# Patient Record
Sex: MALE | Race: WHITE | Employment: FULL TIME | URBAN - METROPOLITAN AREA
[De-identification: names, ages, dates, MRNs, and addresses within clinical notes are randomized per-mention and may not be internally consistent; named-entity substitution may affect disease eponyms.]

---

## 2017-01-27 ENCOUNTER — OFFICE VISIT (OUTPATIENT)
Dept: PAIN MANAGEMENT | Age: 42
End: 2017-01-27

## 2017-01-27 VITALS
HEART RATE: 78 BPM | SYSTOLIC BLOOD PRESSURE: 121 MMHG | WEIGHT: 315 LBS | BODY MASS INDEX: 52.58 KG/M2 | DIASTOLIC BLOOD PRESSURE: 74 MMHG

## 2017-01-27 DIAGNOSIS — S33.9XXA SPRAIN OF LIGAMENT OF LUMBOSACRAL JOINT, INITIAL ENCOUNTER: ICD-10-CM

## 2017-01-27 DIAGNOSIS — M51.27 LUMBAGO-SCIATICA DUE TO DISPLACEMENT OF LUMBAR INTERVERTEBRAL DISC: ICD-10-CM

## 2017-01-27 DIAGNOSIS — S83.92XA SPRAIN OF LEFT KNEE/LEG, INITIAL ENCOUNTER: ICD-10-CM

## 2017-01-27 PROCEDURE — 99214 OFFICE O/P EST MOD 30 MIN: CPT | Performed by: INTERNAL MEDICINE

## 2017-01-27 RX ORDER — OXYCODONE HYDROCHLORIDE 30 MG/1
30 TABLET, FILM COATED, EXTENDED RELEASE ORAL 3 TIMES DAILY
Qty: 84 EACH | Refills: 0 | Status: SHIPPED | OUTPATIENT
Start: 2017-01-27 | End: 2017-02-24 | Stop reason: SDUPTHER

## 2017-01-27 RX ORDER — OXYCODONE AND ACETAMINOPHEN 7.5; 325 MG/1; MG/1
.5-1 TABLET ORAL PRN
Qty: 30 TABLET | Refills: 0 | Status: SHIPPED | OUTPATIENT
Start: 2017-01-27 | End: 2017-02-24 | Stop reason: SDUPTHER

## 2017-01-27 RX ORDER — IBUPROFEN 600 MG/1
TABLET ORAL
Qty: 60 TABLET | Refills: 0 | OUTPATIENT
Start: 2017-01-27

## 2017-02-04 DIAGNOSIS — S83.92XA SPRAIN OF LEFT KNEE/LEG, INITIAL ENCOUNTER: ICD-10-CM

## 2017-02-04 DIAGNOSIS — S33.9XXA SPRAIN OF LIGAMENT OF LUMBOSACRAL JOINT, INITIAL ENCOUNTER: ICD-10-CM

## 2017-02-04 DIAGNOSIS — M51.27 LUMBAGO-SCIATICA DUE TO DISPLACEMENT OF LUMBAR INTERVERTEBRAL DISC: ICD-10-CM

## 2017-02-06 RX ORDER — IBUPROFEN 600 MG/1
TABLET ORAL
Qty: 60 TABLET | Refills: 0 | Status: SHIPPED | OUTPATIENT
Start: 2017-02-06 | End: 2017-02-24 | Stop reason: SDUPTHER

## 2017-02-22 DIAGNOSIS — S33.9XXA SPRAIN OF LIGAMENT OF LUMBOSACRAL JOINT, INITIAL ENCOUNTER: ICD-10-CM

## 2017-02-22 DIAGNOSIS — S83.92XA SPRAIN OF LEFT KNEE/LEG, INITIAL ENCOUNTER: ICD-10-CM

## 2017-02-22 DIAGNOSIS — M51.27 LUMBAGO-SCIATICA DUE TO DISPLACEMENT OF LUMBAR INTERVERTEBRAL DISC: ICD-10-CM

## 2017-02-22 RX ORDER — NORTRIPTYLINE HYDROCHLORIDE 25 MG/1
CAPSULE ORAL
Qty: 60 CAPSULE | Refills: 1 | Status: SHIPPED | OUTPATIENT
Start: 2017-02-22 | End: 2017-03-23 | Stop reason: SDUPTHER

## 2017-02-24 ENCOUNTER — OFFICE VISIT (OUTPATIENT)
Dept: PAIN MANAGEMENT | Age: 42
End: 2017-02-24

## 2017-02-24 VITALS
SYSTOLIC BLOOD PRESSURE: 158 MMHG | WEIGHT: 315 LBS | DIASTOLIC BLOOD PRESSURE: 84 MMHG | HEART RATE: 79 BPM | BODY MASS INDEX: 52 KG/M2

## 2017-02-24 DIAGNOSIS — M51.27 LUMBAGO-SCIATICA DUE TO DISPLACEMENT OF LUMBAR INTERVERTEBRAL DISC: ICD-10-CM

## 2017-02-24 DIAGNOSIS — S33.9XXA SPRAIN OF LIGAMENT OF LUMBOSACRAL JOINT, INITIAL ENCOUNTER: ICD-10-CM

## 2017-02-24 DIAGNOSIS — S83.92XA SPRAIN OF LEFT KNEE/LEG, INITIAL ENCOUNTER: ICD-10-CM

## 2017-02-24 PROCEDURE — 99214 OFFICE O/P EST MOD 30 MIN: CPT | Performed by: INTERNAL MEDICINE

## 2017-02-24 RX ORDER — PREGABALIN 100 MG/1
CAPSULE ORAL
Qty: 90 CAPSULE | Refills: 1 | Status: SHIPPED | OUTPATIENT
Start: 2017-02-24 | End: 2017-03-23 | Stop reason: SDUPTHER

## 2017-02-24 RX ORDER — OXYCODONE AND ACETAMINOPHEN 7.5; 325 MG/1; MG/1
.5-1 TABLET ORAL PRN
Qty: 30 TABLET | Refills: 0 | Status: SHIPPED | OUTPATIENT
Start: 2017-02-24 | End: 2017-03-23 | Stop reason: SDUPTHER

## 2017-02-24 RX ORDER — IBUPROFEN 600 MG/1
TABLET ORAL
Qty: 60 TABLET | Refills: 0 | Status: SHIPPED | OUTPATIENT
Start: 2017-02-24 | End: 2017-03-23 | Stop reason: SDUPTHER

## 2017-02-24 RX ORDER — OXYCODONE HYDROCHLORIDE 30 MG/1
30 TABLET, FILM COATED, EXTENDED RELEASE ORAL 3 TIMES DAILY
Qty: 84 EACH | Refills: 0 | Status: SHIPPED | OUTPATIENT
Start: 2017-02-24 | End: 2017-03-23 | Stop reason: SDUPTHER

## 2017-03-13 RX ORDER — CEPHALEXIN 500 MG/1
1000 CAPSULE ORAL 4 TIMES DAILY
Qty: 28 CAPSULE | Refills: 0 | Status: SHIPPED | OUTPATIENT
Start: 2017-03-13 | End: 2017-09-15

## 2017-03-23 ENCOUNTER — OFFICE VISIT (OUTPATIENT)
Dept: PAIN MANAGEMENT | Age: 42
End: 2017-03-23

## 2017-03-23 VITALS
DIASTOLIC BLOOD PRESSURE: 83 MMHG | SYSTOLIC BLOOD PRESSURE: 128 MMHG | WEIGHT: 315 LBS | BODY MASS INDEX: 53.67 KG/M2 | HEART RATE: 99 BPM

## 2017-03-23 DIAGNOSIS — S83.92XA SPRAIN OF LEFT KNEE/LEG, INITIAL ENCOUNTER: ICD-10-CM

## 2017-03-23 DIAGNOSIS — M51.27 LUMBAGO-SCIATICA DUE TO DISPLACEMENT OF LUMBAR INTERVERTEBRAL DISC: ICD-10-CM

## 2017-03-23 DIAGNOSIS — S33.9XXA SPRAIN OF LIGAMENT OF LUMBOSACRAL JOINT, INITIAL ENCOUNTER: ICD-10-CM

## 2017-03-23 PROCEDURE — 99214 OFFICE O/P EST MOD 30 MIN: CPT | Performed by: INTERNAL MEDICINE

## 2017-03-23 RX ORDER — PREGABALIN 100 MG/1
CAPSULE ORAL
Qty: 90 CAPSULE | Refills: 1 | Status: SHIPPED | OUTPATIENT
Start: 2017-03-23 | End: 2017-04-21 | Stop reason: SDUPTHER

## 2017-03-23 RX ORDER — OXYCODONE HYDROCHLORIDE 30 MG/1
30 TABLET, FILM COATED, EXTENDED RELEASE ORAL 3 TIMES DAILY
Qty: 87 EACH | Refills: 0 | Status: SHIPPED | OUTPATIENT
Start: 2017-03-23 | End: 2017-04-21 | Stop reason: SDUPTHER

## 2017-03-23 RX ORDER — IBUPROFEN 600 MG/1
TABLET ORAL
Qty: 60 TABLET | Refills: 0 | Status: SHIPPED | OUTPATIENT
Start: 2017-03-23 | End: 2017-04-21

## 2017-03-23 RX ORDER — NORTRIPTYLINE HYDROCHLORIDE 25 MG/1
CAPSULE ORAL
Qty: 60 CAPSULE | Refills: 1 | Status: SHIPPED | OUTPATIENT
Start: 2017-03-23 | End: 2017-04-21 | Stop reason: SDUPTHER

## 2017-03-23 RX ORDER — OXYCODONE AND ACETAMINOPHEN 7.5; 325 MG/1; MG/1
.5-1 TABLET ORAL PRN
Qty: 30 TABLET | Refills: 0 | Status: SHIPPED | OUTPATIENT
Start: 2017-03-23 | End: 2017-04-21 | Stop reason: SDUPTHER

## 2017-03-30 ENCOUNTER — TELEPHONE (OUTPATIENT)
Dept: PAIN MANAGEMENT | Age: 42
End: 2017-03-30

## 2017-04-03 ENCOUNTER — TELEPHONE (OUTPATIENT)
Dept: PAIN MANAGEMENT | Age: 42
End: 2017-04-03

## 2017-04-10 DIAGNOSIS — S83.92XA SPRAIN OF LEFT KNEE/LEG, INITIAL ENCOUNTER: ICD-10-CM

## 2017-04-10 DIAGNOSIS — M51.27 LUMBAGO-SCIATICA DUE TO DISPLACEMENT OF LUMBAR INTERVERTEBRAL DISC: ICD-10-CM

## 2017-04-10 DIAGNOSIS — S33.9XXA SPRAIN OF LIGAMENT OF LUMBOSACRAL JOINT, INITIAL ENCOUNTER: ICD-10-CM

## 2017-04-12 RX ORDER — IBUPROFEN 600 MG/1
TABLET ORAL
Qty: 60 TABLET | Refills: 0 | Status: SHIPPED | OUTPATIENT
Start: 2017-04-12 | End: 2017-04-21 | Stop reason: SDUPTHER

## 2017-04-19 DIAGNOSIS — S83.92XA SPRAIN OF LEFT KNEE/LEG, INITIAL ENCOUNTER: ICD-10-CM

## 2017-04-19 DIAGNOSIS — S33.9XXA SPRAIN OF LIGAMENT OF LUMBOSACRAL JOINT, INITIAL ENCOUNTER: ICD-10-CM

## 2017-04-19 DIAGNOSIS — M51.27 LUMBAGO-SCIATICA DUE TO DISPLACEMENT OF LUMBAR INTERVERTEBRAL DISC: ICD-10-CM

## 2017-04-19 LAB
A/G RATIO: 2.1 (ref 1.1–2.2)
ALBUMIN SERPL-MCNC: 4.5 G/DL (ref 3.4–5)
ALP BLD-CCNC: 61 U/L (ref 40–129)
ALT SERPL-CCNC: 33 U/L (ref 10–40)
ANION GAP SERPL CALCULATED.3IONS-SCNC: 16 MMOL/L (ref 3–16)
AST SERPL-CCNC: 26 U/L (ref 15–37)
BILIRUB SERPL-MCNC: 0.7 MG/DL (ref 0–1)
BUN BLDV-MCNC: 12 MG/DL (ref 7–20)
CALCIUM SERPL-MCNC: 9.2 MG/DL (ref 8.3–10.6)
CHLORIDE BLD-SCNC: 99 MMOL/L (ref 99–110)
CO2: 26 MMOL/L (ref 21–32)
CREAT SERPL-MCNC: 0.6 MG/DL (ref 0.9–1.3)
GFR AFRICAN AMERICAN: >60
GFR NON-AFRICAN AMERICAN: >60
GLOBULIN: 2.1 G/DL
GLUCOSE BLD-MCNC: 91 MG/DL (ref 70–99)
HCT VFR BLD CALC: 44.5 % (ref 40.5–52.5)
HEMOGLOBIN: 15.3 G/DL (ref 13.5–17.5)
MCH RBC QN AUTO: 30.3 PG (ref 26–34)
MCHC RBC AUTO-ENTMCNC: 34.5 G/DL (ref 31–36)
MCV RBC AUTO: 87.9 FL (ref 80–100)
PDW BLD-RTO: 13.8 % (ref 12.4–15.4)
PLATELET # BLD: 158 K/UL (ref 135–450)
PMV BLD AUTO: 9.3 FL (ref 5–10.5)
POTASSIUM SERPL-SCNC: 4.2 MMOL/L (ref 3.5–5.1)
RBC # BLD: 5.07 M/UL (ref 4.2–5.9)
SODIUM BLD-SCNC: 141 MMOL/L (ref 136–145)
TOTAL PROTEIN: 6.6 G/DL (ref 6.4–8.2)
WBC # BLD: 8.1 K/UL (ref 4–11)

## 2017-04-21 ENCOUNTER — OFFICE VISIT (OUTPATIENT)
Dept: PAIN MANAGEMENT | Age: 42
End: 2017-04-21

## 2017-04-21 VITALS
WEIGHT: 315 LBS | SYSTOLIC BLOOD PRESSURE: 162 MMHG | DIASTOLIC BLOOD PRESSURE: 76 MMHG | HEART RATE: 74 BPM | BODY MASS INDEX: 52.64 KG/M2

## 2017-04-21 DIAGNOSIS — S83.92XA SPRAIN OF LEFT KNEE/LEG, INITIAL ENCOUNTER: ICD-10-CM

## 2017-04-21 DIAGNOSIS — M51.27 LUMBAGO-SCIATICA DUE TO DISPLACEMENT OF LUMBAR INTERVERTEBRAL DISC: ICD-10-CM

## 2017-04-21 DIAGNOSIS — S33.9XXA SPRAIN OF LIGAMENT OF LUMBOSACRAL JOINT, INITIAL ENCOUNTER: ICD-10-CM

## 2017-04-21 PROCEDURE — 99214 OFFICE O/P EST MOD 30 MIN: CPT | Performed by: INTERNAL MEDICINE

## 2017-04-21 RX ORDER — OXYCODONE AND ACETAMINOPHEN 7.5; 325 MG/1; MG/1
.5-1 TABLET ORAL PRN
Qty: 30 TABLET | Refills: 0 | Status: SHIPPED | OUTPATIENT
Start: 2017-04-21 | End: 2017-05-19 | Stop reason: SDUPTHER

## 2017-04-21 RX ORDER — NORTRIPTYLINE HYDROCHLORIDE 25 MG/1
CAPSULE ORAL
Qty: 60 CAPSULE | Refills: 1 | Status: SHIPPED | OUTPATIENT
Start: 2017-04-21 | End: 2017-06-16 | Stop reason: SDUPTHER

## 2017-04-21 RX ORDER — IBUPROFEN 600 MG/1
TABLET ORAL
Qty: 60 TABLET | Refills: 0 | Status: SHIPPED | OUTPATIENT
Start: 2017-04-21 | End: 2017-05-19 | Stop reason: SDUPTHER

## 2017-04-21 RX ORDER — OXYCODONE HYDROCHLORIDE 30 MG/1
30 TABLET, FILM COATED, EXTENDED RELEASE ORAL 3 TIMES DAILY
Qty: 84 EACH | Refills: 0 | Status: SHIPPED | OUTPATIENT
Start: 2017-04-21 | End: 2017-05-19 | Stop reason: SDUPTHER

## 2017-04-21 RX ORDER — PREGABALIN 100 MG/1
CAPSULE ORAL
Qty: 90 CAPSULE | Refills: 1 | Status: SHIPPED | OUTPATIENT
Start: 2017-04-21 | End: 2017-06-16 | Stop reason: ALTCHOICE

## 2017-05-09 RX ORDER — FUROSEMIDE 20 MG/1
20 TABLET ORAL DAILY
Qty: 30 TABLET | Refills: 0 | Status: SHIPPED | OUTPATIENT
Start: 2017-05-09 | End: 2017-05-23 | Stop reason: SDUPTHER

## 2017-05-19 ENCOUNTER — OFFICE VISIT (OUTPATIENT)
Dept: PAIN MANAGEMENT | Age: 42
End: 2017-05-19

## 2017-05-19 VITALS
SYSTOLIC BLOOD PRESSURE: 131 MMHG | WEIGHT: 315 LBS | DIASTOLIC BLOOD PRESSURE: 79 MMHG | BODY MASS INDEX: 52.64 KG/M2 | HEART RATE: 72 BPM

## 2017-05-19 DIAGNOSIS — S33.9XXA SPRAIN OF LIGAMENT OF LUMBOSACRAL JOINT, INITIAL ENCOUNTER: ICD-10-CM

## 2017-05-19 DIAGNOSIS — S83.92XA SPRAIN OF LEFT KNEE/LEG, INITIAL ENCOUNTER: ICD-10-CM

## 2017-05-19 DIAGNOSIS — M51.27 LUMBAGO-SCIATICA DUE TO DISPLACEMENT OF LUMBAR INTERVERTEBRAL DISC: ICD-10-CM

## 2017-05-19 PROCEDURE — 99214 OFFICE O/P EST MOD 30 MIN: CPT | Performed by: INTERNAL MEDICINE

## 2017-05-19 RX ORDER — OXYCODONE HYDROCHLORIDE 30 MG/1
30 TABLET, FILM COATED, EXTENDED RELEASE ORAL 3 TIMES DAILY
Qty: 90 EACH | Refills: 0 | Status: SHIPPED | OUTPATIENT
Start: 2017-05-19 | End: 2017-06-16 | Stop reason: SDUPTHER

## 2017-05-19 RX ORDER — OXYCODONE AND ACETAMINOPHEN 7.5; 325 MG/1; MG/1
.5-1 TABLET ORAL PRN
Qty: 30 TABLET | Refills: 0 | Status: SHIPPED | OUTPATIENT
Start: 2017-05-19 | End: 2017-06-16

## 2017-05-19 RX ORDER — IBUPROFEN 600 MG/1
TABLET ORAL
Qty: 60 TABLET | Refills: 0 | Status: SHIPPED | OUTPATIENT
Start: 2017-05-19 | End: 2017-06-16 | Stop reason: SDUPTHER

## 2017-05-23 ENCOUNTER — OFFICE VISIT (OUTPATIENT)
Dept: FAMILY MEDICINE CLINIC | Age: 42
End: 2017-05-23

## 2017-05-23 VITALS
WEIGHT: 315 LBS | SYSTOLIC BLOOD PRESSURE: 120 MMHG | RESPIRATION RATE: 14 BRPM | BODY MASS INDEX: 40.43 KG/M2 | DIASTOLIC BLOOD PRESSURE: 74 MMHG | OXYGEN SATURATION: 98 % | HEIGHT: 74 IN | TEMPERATURE: 99 F | HEART RATE: 71 BPM

## 2017-05-23 DIAGNOSIS — J40 BRONCHITIS: ICD-10-CM

## 2017-05-23 DIAGNOSIS — E66.01 MORBID OBESITY DUE TO EXCESS CALORIES (HCC): Chronic | ICD-10-CM

## 2017-05-23 DIAGNOSIS — I87.2 STASIS DERMATITIS OF BOTH LEGS: Primary | ICD-10-CM

## 2017-05-23 PROCEDURE — 99214 OFFICE O/P EST MOD 30 MIN: CPT | Performed by: FAMILY MEDICINE

## 2017-05-23 RX ORDER — FUROSEMIDE 20 MG/1
TABLET ORAL
Qty: 60 TABLET | Refills: 5 | Status: SHIPPED | OUTPATIENT
Start: 2017-05-23 | End: 2017-12-08 | Stop reason: SDUPTHER

## 2017-05-23 RX ORDER — ALBUTEROL SULFATE 90 UG/1
2 AEROSOL, METERED RESPIRATORY (INHALATION) EVERY 6 HOURS PRN
Qty: 1 INHALER | Refills: 3 | Status: SHIPPED | OUTPATIENT
Start: 2017-05-23 | End: 2017-09-15

## 2017-05-23 ASSESSMENT — PATIENT HEALTH QUESTIONNAIRE - PHQ9
2. FEELING DOWN, DEPRESSED OR HOPELESS: 0
SUM OF ALL RESPONSES TO PHQ QUESTIONS 1-9: 0
SUM OF ALL RESPONSES TO PHQ9 QUESTIONS 1 & 2: 0
1. LITTLE INTEREST OR PLEASURE IN DOING THINGS: 0

## 2017-05-24 ENCOUNTER — TELEPHONE (OUTPATIENT)
Dept: BARIATRICS/WEIGHT MGMT | Age: 42
End: 2017-05-24

## 2017-05-30 ENCOUNTER — TELEPHONE (OUTPATIENT)
Dept: FAMILY MEDICINE CLINIC | Age: 42
End: 2017-05-30

## 2017-06-16 ENCOUNTER — OFFICE VISIT (OUTPATIENT)
Dept: PAIN MANAGEMENT | Age: 42
End: 2017-06-16

## 2017-06-16 VITALS
DIASTOLIC BLOOD PRESSURE: 83 MMHG | BODY MASS INDEX: 53.54 KG/M2 | HEART RATE: 70 BPM | SYSTOLIC BLOOD PRESSURE: 148 MMHG | WEIGHT: 315 LBS

## 2017-06-16 DIAGNOSIS — M51.27 LUMBAGO-SCIATICA DUE TO DISPLACEMENT OF LUMBAR INTERVERTEBRAL DISC: ICD-10-CM

## 2017-06-16 DIAGNOSIS — S33.9XXA SPRAIN OF LIGAMENT OF LUMBOSACRAL JOINT, INITIAL ENCOUNTER: ICD-10-CM

## 2017-06-16 DIAGNOSIS — S83.92XA SPRAIN OF LEFT KNEE/LEG, INITIAL ENCOUNTER: ICD-10-CM

## 2017-06-16 PROCEDURE — 99214 OFFICE O/P EST MOD 30 MIN: CPT | Performed by: INTERNAL MEDICINE

## 2017-06-16 RX ORDER — OXYCODONE HYDROCHLORIDE 30 MG/1
30 TABLET, FILM COATED, EXTENDED RELEASE ORAL 3 TIMES DAILY
Qty: 90 EACH | Refills: 0 | Status: SHIPPED | OUTPATIENT
Start: 2017-06-16 | End: 2017-07-17 | Stop reason: SDUPTHER

## 2017-06-16 RX ORDER — NORTRIPTYLINE HYDROCHLORIDE 25 MG/1
CAPSULE ORAL
Qty: 60 CAPSULE | Refills: 1 | Status: SHIPPED | OUTPATIENT
Start: 2017-06-16 | End: 2017-07-17 | Stop reason: SDUPTHER

## 2017-06-16 RX ORDER — PREGABALIN 150 MG/1
150 CAPSULE ORAL 3 TIMES DAILY
Qty: 90 CAPSULE | Refills: 0 | Status: SHIPPED | OUTPATIENT
Start: 2017-06-16 | End: 2017-07-17 | Stop reason: SDUPTHER

## 2017-06-16 RX ORDER — IBUPROFEN 600 MG/1
TABLET ORAL
Qty: 60 TABLET | Refills: 0 | Status: SHIPPED | OUTPATIENT
Start: 2017-06-16 | End: 2017-07-13 | Stop reason: SDUPTHER

## 2017-07-13 DIAGNOSIS — S33.9XXA SPRAIN OF LIGAMENT OF LUMBOSACRAL JOINT, INITIAL ENCOUNTER: ICD-10-CM

## 2017-07-13 DIAGNOSIS — S83.92XA SPRAIN OF LEFT KNEE/LEG, INITIAL ENCOUNTER: ICD-10-CM

## 2017-07-13 DIAGNOSIS — M51.27 LUMBAGO-SCIATICA DUE TO DISPLACEMENT OF LUMBAR INTERVERTEBRAL DISC: ICD-10-CM

## 2017-07-14 RX ORDER — IBUPROFEN 600 MG/1
TABLET ORAL
Qty: 60 TABLET | Refills: 0 | Status: SHIPPED | OUTPATIENT
Start: 2017-07-14 | End: 2017-07-17 | Stop reason: SDUPTHER

## 2017-07-17 ENCOUNTER — OFFICE VISIT (OUTPATIENT)
Dept: PAIN MANAGEMENT | Age: 42
End: 2017-07-17

## 2017-07-17 VITALS
WEIGHT: 315 LBS | SYSTOLIC BLOOD PRESSURE: 152 MMHG | BODY MASS INDEX: 53.54 KG/M2 | HEART RATE: 68 BPM | DIASTOLIC BLOOD PRESSURE: 82 MMHG

## 2017-07-17 DIAGNOSIS — S33.9XXA SPRAIN OF LIGAMENT OF LUMBOSACRAL JOINT, INITIAL ENCOUNTER: ICD-10-CM

## 2017-07-17 DIAGNOSIS — S83.92XA SPRAIN OF LEFT KNEE/LEG, INITIAL ENCOUNTER: ICD-10-CM

## 2017-07-17 DIAGNOSIS — M51.27 LUMBAGO-SCIATICA DUE TO DISPLACEMENT OF LUMBAR INTERVERTEBRAL DISC: ICD-10-CM

## 2017-07-17 PROCEDURE — 99214 OFFICE O/P EST MOD 30 MIN: CPT | Performed by: INTERNAL MEDICINE

## 2017-07-17 RX ORDER — PREGABALIN 150 MG/1
150 CAPSULE ORAL 3 TIMES DAILY
Qty: 90 CAPSULE | Refills: 0 | Status: SHIPPED | OUTPATIENT
Start: 2017-07-17 | End: 2017-08-14 | Stop reason: SDUPTHER

## 2017-07-17 RX ORDER — IBUPROFEN 600 MG/1
TABLET ORAL
Qty: 60 TABLET | Refills: 0 | Status: SHIPPED | OUTPATIENT
Start: 2017-07-17 | End: 2017-08-14 | Stop reason: SDUPTHER

## 2017-07-17 RX ORDER — NORTRIPTYLINE HYDROCHLORIDE 25 MG/1
CAPSULE ORAL
Qty: 60 CAPSULE | Refills: 1 | Status: SHIPPED | OUTPATIENT
Start: 2017-07-17 | End: 2017-08-14 | Stop reason: SDUPTHER

## 2017-07-17 RX ORDER — METHYLPREDNISOLONE 4 MG/1
TABLET ORAL
Qty: 1 KIT | Refills: 0 | Status: SHIPPED | OUTPATIENT
Start: 2017-07-17 | End: 2017-08-14

## 2017-07-17 RX ORDER — OXYCODONE HYDROCHLORIDE 30 MG/1
30 TABLET, FILM COATED, EXTENDED RELEASE ORAL 3 TIMES DAILY
Qty: 84 EACH | Refills: 0 | Status: SHIPPED | OUTPATIENT
Start: 2017-07-17 | End: 2017-08-14 | Stop reason: SDUPTHER

## 2017-08-14 ENCOUNTER — OFFICE VISIT (OUTPATIENT)
Dept: PAIN MANAGEMENT | Age: 42
End: 2017-08-14

## 2017-08-14 VITALS
WEIGHT: 315 LBS | DIASTOLIC BLOOD PRESSURE: 82 MMHG | HEART RATE: 73 BPM | BODY MASS INDEX: 53.67 KG/M2 | SYSTOLIC BLOOD PRESSURE: 138 MMHG

## 2017-08-14 DIAGNOSIS — S83.92XA SPRAIN OF LEFT KNEE/LEG, INITIAL ENCOUNTER: ICD-10-CM

## 2017-08-14 DIAGNOSIS — M51.27 LUMBAGO-SCIATICA DUE TO DISPLACEMENT OF LUMBAR INTERVERTEBRAL DISC: ICD-10-CM

## 2017-08-14 DIAGNOSIS — S33.9XXA SPRAIN OF LIGAMENT OF LUMBOSACRAL JOINT, INITIAL ENCOUNTER: ICD-10-CM

## 2017-08-14 PROCEDURE — 99214 OFFICE O/P EST MOD 30 MIN: CPT | Performed by: INTERNAL MEDICINE

## 2017-08-14 RX ORDER — NORTRIPTYLINE HYDROCHLORIDE 25 MG/1
CAPSULE ORAL
Qty: 60 CAPSULE | Refills: 1 | Status: SHIPPED | OUTPATIENT
Start: 2017-08-14 | End: 2017-09-15 | Stop reason: SDUPTHER

## 2017-08-14 RX ORDER — OXYCODONE HYDROCHLORIDE 30 MG/1
30 TABLET, FILM COATED, EXTENDED RELEASE ORAL 3 TIMES DAILY
Qty: 90 EACH | Refills: 0 | Status: SHIPPED | OUTPATIENT
Start: 2017-08-14 | End: 2017-09-15 | Stop reason: SDUPTHER

## 2017-08-14 RX ORDER — PREGABALIN 150 MG/1
150 CAPSULE ORAL 3 TIMES DAILY
Qty: 90 CAPSULE | Refills: 0 | Status: SHIPPED | OUTPATIENT
Start: 2017-08-14 | End: 2017-09-15 | Stop reason: SDUPTHER

## 2017-08-14 RX ORDER — DULOXETIN HYDROCHLORIDE 30 MG/1
CAPSULE, DELAYED RELEASE ORAL
Qty: 60 CAPSULE | Refills: 0 | Status: SHIPPED | OUTPATIENT
Start: 2017-08-14 | End: 2017-09-15

## 2017-08-14 RX ORDER — IBUPROFEN 600 MG/1
TABLET ORAL
Qty: 60 TABLET | Refills: 0 | Status: SHIPPED | OUTPATIENT
Start: 2017-08-14 | End: 2017-09-15 | Stop reason: SDUPTHER

## 2017-08-16 ENCOUNTER — OFFICE VISIT (OUTPATIENT)
Dept: FAMILY MEDICINE CLINIC | Age: 42
End: 2017-08-16

## 2017-08-16 VITALS
WEIGHT: 315 LBS | HEART RATE: 70 BPM | BODY MASS INDEX: 52.41 KG/M2 | TEMPERATURE: 97.3 F | OXYGEN SATURATION: 97 % | SYSTOLIC BLOOD PRESSURE: 138 MMHG | DIASTOLIC BLOOD PRESSURE: 84 MMHG

## 2017-08-16 DIAGNOSIS — G47.33 OSA (OBSTRUCTIVE SLEEP APNEA): ICD-10-CM

## 2017-08-16 DIAGNOSIS — L03.116 CELLULITIS OF LEFT LOWER EXTREMITY: Primary | ICD-10-CM

## 2017-08-16 DIAGNOSIS — E66.01 MORBID OBESITY DUE TO EXCESS CALORIES (HCC): Chronic | ICD-10-CM

## 2017-08-16 DIAGNOSIS — I87.2 CHRONIC VENOUS STASIS DERMATITIS OF BOTH LOWER EXTREMITIES: ICD-10-CM

## 2017-08-16 PROCEDURE — 99213 OFFICE O/P EST LOW 20 MIN: CPT | Performed by: FAMILY MEDICINE

## 2017-08-16 RX ORDER — CEPHALEXIN 500 MG/1
1000 CAPSULE ORAL 4 TIMES DAILY
Qty: 80 CAPSULE | Refills: 0 | Status: SHIPPED | OUTPATIENT
Start: 2017-08-16 | End: 2017-09-15

## 2017-08-29 ENCOUNTER — OFFICE VISIT (OUTPATIENT)
Dept: VASCULAR SURGERY | Age: 42
End: 2017-08-29

## 2017-08-29 VITALS
BODY MASS INDEX: 40.43 KG/M2 | HEIGHT: 74 IN | SYSTOLIC BLOOD PRESSURE: 136 MMHG | DIASTOLIC BLOOD PRESSURE: 72 MMHG | WEIGHT: 315 LBS

## 2017-08-29 DIAGNOSIS — I87.2 CHRONIC VENOUS STASIS DERMATITIS OF BOTH LOWER EXTREMITIES: Primary | ICD-10-CM

## 2017-08-29 PROCEDURE — 99203 OFFICE O/P NEW LOW 30 MIN: CPT | Performed by: SURGERY

## 2017-09-15 ENCOUNTER — OFFICE VISIT (OUTPATIENT)
Dept: PAIN MANAGEMENT | Age: 42
End: 2017-09-15

## 2017-09-15 VITALS
SYSTOLIC BLOOD PRESSURE: 145 MMHG | HEART RATE: 75 BPM | BODY MASS INDEX: 52.51 KG/M2 | DIASTOLIC BLOOD PRESSURE: 84 MMHG | WEIGHT: 315 LBS

## 2017-09-15 DIAGNOSIS — S33.9XXA SPRAIN OF LIGAMENT OF LUMBOSACRAL JOINT, INITIAL ENCOUNTER: ICD-10-CM

## 2017-09-15 DIAGNOSIS — S83.92XA SPRAIN OF LEFT KNEE/LEG, INITIAL ENCOUNTER: ICD-10-CM

## 2017-09-15 DIAGNOSIS — M51.27 LUMBAGO-SCIATICA DUE TO DISPLACEMENT OF LUMBAR INTERVERTEBRAL DISC: ICD-10-CM

## 2017-09-15 PROCEDURE — 99214 OFFICE O/P EST MOD 30 MIN: CPT | Performed by: INTERNAL MEDICINE

## 2017-09-15 RX ORDER — IBUPROFEN 600 MG/1
TABLET ORAL
Qty: 60 TABLET | Refills: 0 | Status: SHIPPED | OUTPATIENT
Start: 2017-09-15 | End: 2017-10-13 | Stop reason: SDUPTHER

## 2017-09-15 RX ORDER — NORTRIPTYLINE HYDROCHLORIDE 25 MG/1
CAPSULE ORAL
Qty: 60 CAPSULE | Refills: 0 | Status: SHIPPED | OUTPATIENT
Start: 2017-09-15 | End: 2017-10-13 | Stop reason: SDUPTHER

## 2017-09-15 RX ORDER — OXYCODONE HYDROCHLORIDE 30 MG/1
30 TABLET, FILM COATED, EXTENDED RELEASE ORAL 3 TIMES DAILY
Qty: 84 EACH | Refills: 0 | Status: SHIPPED | OUTPATIENT
Start: 2017-09-15 | End: 2017-10-13 | Stop reason: SDUPTHER

## 2017-09-15 RX ORDER — PREGABALIN 150 MG/1
150 CAPSULE ORAL 3 TIMES DAILY
Qty: 90 CAPSULE | Refills: 0 | Status: SHIPPED | OUTPATIENT
Start: 2017-09-15 | End: 2017-10-13 | Stop reason: SDUPTHER

## 2017-10-13 ENCOUNTER — OFFICE VISIT (OUTPATIENT)
Dept: PAIN MANAGEMENT | Age: 42
End: 2017-10-13

## 2017-10-13 ENCOUNTER — OFFICE VISIT (OUTPATIENT)
Dept: FAMILY MEDICINE CLINIC | Age: 42
End: 2017-10-13

## 2017-10-13 VITALS
DIASTOLIC BLOOD PRESSURE: 88 MMHG | HEART RATE: 80 BPM | SYSTOLIC BLOOD PRESSURE: 160 MMHG | WEIGHT: 315 LBS | BODY MASS INDEX: 51.15 KG/M2 | OXYGEN SATURATION: 97 %

## 2017-10-13 VITALS
DIASTOLIC BLOOD PRESSURE: 91 MMHG | SYSTOLIC BLOOD PRESSURE: 150 MMHG | BODY MASS INDEX: 52.51 KG/M2 | WEIGHT: 315 LBS | HEART RATE: 71 BPM

## 2017-10-13 DIAGNOSIS — M51.27 LUMBAGO-SCIATICA DUE TO DISPLACEMENT OF LUMBAR INTERVERTEBRAL DISC: ICD-10-CM

## 2017-10-13 DIAGNOSIS — G47.33 OSA (OBSTRUCTIVE SLEEP APNEA): ICD-10-CM

## 2017-10-13 DIAGNOSIS — S83.92XA SPRAIN OF LEFT KNEE/LEG, INITIAL ENCOUNTER: ICD-10-CM

## 2017-10-13 DIAGNOSIS — S33.9XXA SPRAIN OF LIGAMENT OF LUMBOSACRAL JOINT, INITIAL ENCOUNTER: ICD-10-CM

## 2017-10-13 DIAGNOSIS — E66.01 MORBID OBESITY DUE TO EXCESS CALORIES (HCC): Primary | Chronic | ICD-10-CM

## 2017-10-13 DIAGNOSIS — F41.1 GAD (GENERALIZED ANXIETY DISORDER): ICD-10-CM

## 2017-10-13 DIAGNOSIS — I10 ESSENTIAL HYPERTENSION: ICD-10-CM

## 2017-10-13 PROCEDURE — 99213 OFFICE O/P EST LOW 20 MIN: CPT | Performed by: INTERNAL MEDICINE

## 2017-10-13 PROCEDURE — 99213 OFFICE O/P EST LOW 20 MIN: CPT | Performed by: FAMILY MEDICINE

## 2017-10-13 RX ORDER — LISINOPRIL AND HYDROCHLOROTHIAZIDE 20; 12.5 MG/1; MG/1
1 TABLET ORAL DAILY
Qty: 30 TABLET | Refills: 3 | Status: SHIPPED | OUTPATIENT
Start: 2017-10-13 | End: 2018-02-16 | Stop reason: SDUPTHER

## 2017-10-13 RX ORDER — NORTRIPTYLINE HYDROCHLORIDE 25 MG/1
CAPSULE ORAL
Qty: 60 CAPSULE | Refills: 0 | Status: SHIPPED | OUTPATIENT
Start: 2017-10-13 | End: 2017-11-10 | Stop reason: SDUPTHER

## 2017-10-13 RX ORDER — PREGABALIN 150 MG/1
150 CAPSULE ORAL 3 TIMES DAILY
Qty: 90 CAPSULE | Refills: 0 | Status: SHIPPED | OUTPATIENT
Start: 2017-10-13 | End: 2017-11-10 | Stop reason: SDUPTHER

## 2017-10-13 RX ORDER — IBUPROFEN 600 MG/1
TABLET ORAL
Qty: 60 TABLET | Refills: 0 | Status: SHIPPED | OUTPATIENT
Start: 2017-10-13 | End: 2017-11-10 | Stop reason: SDUPTHER

## 2017-10-13 RX ORDER — OXYCODONE HYDROCHLORIDE 30 MG/1
30 TABLET, FILM COATED, EXTENDED RELEASE ORAL 3 TIMES DAILY
Qty: 84 EACH | Refills: 0 | Status: SHIPPED | OUTPATIENT
Start: 2017-10-13 | End: 2017-11-10 | Stop reason: SDUPTHER

## 2017-10-13 NOTE — PROGRESS NOTES
[facility]  1975  [id number]    HISTORY OF PRESENT ILLNESS:  Mr. Tim Grubbs is a 43 y.o. male returns for a follow up visit for multiple medical problems. His current presenting problems are   1. 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 BWC Sprain of left knee/leg, initial encounter    3. BWC Sprain of ligament of lumbosacral joint, initial encounter    . As per information/history obtained from the PADT(patient assessment and documentation tool) - He complains of pain in the lower back with radiation to the buttocks, hips Bilateral, upper leg Left, knees Left, lower leg Left, ankles Left and feet Left He rates the pain 4/10 and describes it as sharp, aching, burning, numbness, pins and needles. Pain is made worse by: movement, walking, standing, sitting, bending, lifting. Current treatment regimen has helped relieve about 70% of the pain. He denies side effects from the current pain regimen. Patient reports that since the last follow up visit the physical functioning is unchanged, family/social relationships are unchanged, mood is unchanged and sleep patterns are unchanged, and that the overall functioning is unchanged. Patient denies neurological bowel or bladder. Patient denies misusing/abusing his narcotic pain medications or using any illegal drugs. There are No indicators for possible drug abuse, addiction or diversion problems. Upon obtaining the medical history from Mr. Tim Grubbs regarding today's office visit for his presenting problems, Patient states his pain has been baseline tolerable with the medications. He states he has been having a lot of panic attacks and anxiety symptoms. He complains of excessive worry, agitation, labile mood,nervousness, restlessness and difficulty with concentration. Patient states his sleep is fair. Has fairly normal sleep latency. Averages about 4-6 hours of sleep a night. Denies any signs of sleep apnea.  Feels somewhat rested in the morning. Mr. Alli Schaefer mentions he had some deaths in his family. He mentions he is working full time still. He denies any constipation symptoms. Patient reports his weight has been stable. ALLERGIES: Patients list of allergies were reviewed     MEDICATIONS: Mr. Alli Schaefer list of medications were reviewed. His current medications are   Outpatient Medications Prior to Visit   Medication Sig Dispense Refill    oxyCODONE (OXYCONTIN) 30 MG T12A extended release tablet Take 30 mg by mouth 3 times daily . 84 each 0    ibuprofen (ADVIL;MOTRIN) 600 MG tablet TAKE 1 TABLET BY MOUTH 2 TIMES DAILY AS NEEDED FOR PAIN 60 tablet 0    pregabalin (LYRICA) 150 MG capsule Take 1 capsule by mouth 3 times daily 90 capsule 0    nortriptyline (PAMELOR) 25 MG capsule TAKE 1-2 TABLETS BY MOUTH NIGHTLY 60 capsule 0    furosemide (LASIX) 20 MG tablet 1 - 2 po qam prn edema 60 tablet 5     No facility-administered medications prior to visit. SOCIAL/FAMILY/PAST MEDICAL HISTORY: Mr. Deborrah Sandhoff, family and past medical history was reviewed. REVIEW OF SYSTEMS:    Respiratory: Negative for apnea, chest tightness and shortness of breath or change in baseline breathing. Gastrointestinal: Negative for nausea, vomiting, abdominal pain, diarrhea, constipation, blood in stool and abdominal distention. PHYSICAL EXAM:   Nursing note and vitals reviewed. BP (!) 150/91   Pulse 71   Wt (!) 409 lb (185.5 kg)   BMI 52.51 kg/m²   Constitutional: He appears well-developed and well-nourished. No acute distress. Skin: Skin is warm and dry, good turgor. No rash noted. He is not diaphoretic. Cardiovascular: Normal rate, regular rhythm, normal heart sounds, and does not have murmur. Pulmonary/Chest: Effort normal. No respiratory distress. He does not have wheezes in the lung fields. He has no rales. Neurological/Psychiatric:He is alert and oriented to person, place, and time.  Coordination is  normal. His mood documentation as scribed by   Ron Lovell MA in my presence and it is both accurate and complete

## 2017-10-13 NOTE — PROGRESS NOTES
Citlali Marlow is a 43 y.o. male. HPI: Here for anxiety  Has suffered several losses friends and family recently 3 funerals in the last several months plus numerous other stressors  Finds that his mind is racing at times  Does not actually feel depressed does not have anxiety attacks but worries a lot and isn't sleeping very well      Meds, vitamins and allergies reviewed with pt    ROS: No TIA's or unusual headaches, no dysphagia. No prolonged cough. No dyspnea or chest pain on exertion. No abdominal pain, change in bowel habits, black or bloody stools. No urinary tract symptoms. No new or unusual musculoskeletal symptoms. Prior to Visit Medications    Medication Sig Taking? Authorizing Provider   oxyCODONE (OXYCONTIN) 30 MG T12A extended release tablet Take 30 mg by mouth 3 times daily .  Yes Phyllis Babin MD   ibuprofen (ADVIL;MOTRIN) 600 MG tablet TAKE 1 TABLET BY MOUTH 2 TIMES DAILY AS NEEDED FOR PAIN Yes Phyllis Babin MD   pregabalin (LYRICA) 150 MG capsule Take 1 capsule by mouth 3 times daily Yes Phyllis Babin MD   nortriptyline (PAMELOR) 25 MG capsule TAKE 1-2 TABLETS BY MOUTH NIGHTLY Yes Phyllis Babin MD   furosemide (LASIX) 20 MG tablet 1 - 2 po qam prn edema Yes Maria Fernanda Willard MD       Past Medical History:   Diagnosis Date    Chronic back pain     HNP (herniated nucleus pulposus with myelopathy), thoracic     ANN-MARIE (obstructive sleep apnea)     Stasis dermatitis        Social History   Substance Use Topics    Smoking status: Former Smoker     Types: Cigarettes     Quit date: 1/1/2005    Smokeless tobacco: Never Used    Alcohol use No      Comment: rarely        Family History   Problem Relation Age of Onset    Cancer Mother     COPD Paternal Grandfather     Colon Cancer Maternal Grandfather        No Known Allergies    OBJECTIVE:  BP (!) 160/88   Pulse 80   Wt (!) 398 lb 6.4 oz (180.7 kg)   SpO2 97%   BMI 51.15 kg/m²   GEN:  in NADObese though he is improving some

## 2017-10-20 ENCOUNTER — TELEPHONE (OUTPATIENT)
Dept: FAMILY MEDICINE CLINIC | Age: 42
End: 2017-10-20

## 2017-10-20 NOTE — TELEPHONE ENCOUNTER
Patient having erectile dysfunction from 50 mg of Zoloft in the morning  Told him to decrease to 25 mg after dinner and report in 2 weeks

## 2017-11-08 ENCOUNTER — OFFICE VISIT (OUTPATIENT)
Dept: FAMILY MEDICINE CLINIC | Age: 42
End: 2017-11-08

## 2017-11-08 VITALS
SYSTOLIC BLOOD PRESSURE: 134 MMHG | HEIGHT: 74 IN | RESPIRATION RATE: 16 BRPM | OXYGEN SATURATION: 96 % | BODY MASS INDEX: 40.43 KG/M2 | HEART RATE: 64 BPM | DIASTOLIC BLOOD PRESSURE: 86 MMHG | WEIGHT: 315 LBS

## 2017-11-08 DIAGNOSIS — G47.33 OSA (OBSTRUCTIVE SLEEP APNEA): ICD-10-CM

## 2017-11-08 DIAGNOSIS — S83.92XA SPRAIN OF LEFT KNEE/LEG, INITIAL ENCOUNTER: ICD-10-CM

## 2017-11-08 DIAGNOSIS — S33.9XXA SPRAIN OF LIGAMENT OF LUMBOSACRAL JOINT, INITIAL ENCOUNTER: ICD-10-CM

## 2017-11-08 DIAGNOSIS — M51.27 LUMBAGO-SCIATICA DUE TO DISPLACEMENT OF LUMBAR INTERVERTEBRAL DISC: ICD-10-CM

## 2017-11-08 DIAGNOSIS — G89.29 CHRONIC MIDLINE BACK PAIN, UNSPECIFIED BACK LOCATION: Primary | Chronic | ICD-10-CM

## 2017-11-08 DIAGNOSIS — M54.9 CHRONIC MIDLINE BACK PAIN, UNSPECIFIED BACK LOCATION: Primary | Chronic | ICD-10-CM

## 2017-11-08 PROCEDURE — 99213 OFFICE O/P EST LOW 20 MIN: CPT | Performed by: FAMILY MEDICINE

## 2017-11-08 RX ORDER — NORTRIPTYLINE HYDROCHLORIDE 25 MG/1
CAPSULE ORAL
Qty: 60 CAPSULE | Refills: 1 | OUTPATIENT
Start: 2017-11-08

## 2017-11-08 NOTE — PROGRESS NOTES
Horace Cotton is a 43 y.o. male. HPI:  Here  For medical check up  zoloft like cymbalta made ED worse so dropped it form 50 to 25, but no better, so stopped it  Anxiety is doing well without meds for now  Seems to sleep well, feels well rested    Wt Readings from Last 3 Encounters:   11/08/17 (!) 410 lb (186 kg)   10/13/17 (!) 398 lb 6.4 oz (180.7 kg)   10/13/17 (!) 409 lb (185.5 kg)     Meds, vitamins and allergies reviewed with Patient    ROS:  Gen:  fever  HEENT:  cold symptoms, sore throat. CV:  Denies chest pain or palpitations. Pulm:  Denies shortness of breath, cough. Abd:  Denies abdominal pain, nausea and vomiting. Skin: no rash    No Known Allergies    Prior to Visit Medications    Medication Sig Taking? Authorizing Provider   oxyCODONE (OXYCONTIN) 30 MG T12A extended release tablet Take 30 mg by mouth 3 times daily . Yes Dylan Kearney MD   ibuprofen (ADVIL;MOTRIN) 600 MG tablet TAKE 1 TABLET BY MOUTH 2 TIMES DAILY AS NEEDED FOR PAIN Yes Dylan Kearney MD   pregabalin (LYRICA) 150 MG capsule Take 1 capsule by mouth 3 times daily Yes Dylan Kearney MD   nortriptyline (PAMELOR) 25 MG capsule TAKE 1-2 TABLETS BY MOUTH NIGHTLY Yes Dylan Kearney MD   sertraline (ZOLOFT) 50 MG tablet Take 1 tablet by mouth daily Yes Mariana Petersen MD   lisinopril-hydrochlorothiazide (PRINZIDE;ZESTORETIC) 20-12.5 MG per tablet Take 1 tablet by mouth daily Yes Mariana Petersen MD   furosemide (LASIX) 20 MG tablet 1 - 2 po qam prn edema Yes Mariana Petersen MD       OBJECTIVE:  /86 (Site: Left Arm, Position: Sitting, Cuff Size: Large Adult)   Pulse 64   Resp 16   Ht 6' 2\" (1.88 m)   Wt (!) 410 lb (186 kg)   SpO2 96%   BMI 52.64 kg/m²   GEN:  in NAD  HEENT:  NCAT, TMs:normal and throat: clear  NECK:  Supple without adenopathy. CV:  Regular rate and rhythm, S1 and S2 normal, no murmurs, clicks  PULM:  Chest is clear, no wheezing ,  symmetric air entry throughout both lung fields.   ABD: Soft, NT  EXT:

## 2017-11-10 ENCOUNTER — OFFICE VISIT (OUTPATIENT)
Dept: PAIN MANAGEMENT | Age: 42
End: 2017-11-10

## 2017-11-10 VITALS
SYSTOLIC BLOOD PRESSURE: 122 MMHG | DIASTOLIC BLOOD PRESSURE: 66 MMHG | HEART RATE: 72 BPM | WEIGHT: 315 LBS | BODY MASS INDEX: 52.64 KG/M2

## 2017-11-10 DIAGNOSIS — S83.92XA SPRAIN OF LEFT KNEE/LEG, INITIAL ENCOUNTER: ICD-10-CM

## 2017-11-10 DIAGNOSIS — M51.27 LUMBAGO-SCIATICA DUE TO DISPLACEMENT OF LUMBAR INTERVERTEBRAL DISC: ICD-10-CM

## 2017-11-10 DIAGNOSIS — S33.9XXA SPRAIN OF LIGAMENT OF LUMBOSACRAL JOINT, INITIAL ENCOUNTER: ICD-10-CM

## 2017-11-10 PROCEDURE — 99214 OFFICE O/P EST MOD 30 MIN: CPT | Performed by: INTERNAL MEDICINE

## 2017-11-10 RX ORDER — NORTRIPTYLINE HYDROCHLORIDE 25 MG/1
CAPSULE ORAL
Qty: 60 CAPSULE | Refills: 0 | Status: SHIPPED | OUTPATIENT
Start: 2017-11-10 | End: 2018-02-01 | Stop reason: SDUPTHER

## 2017-11-10 RX ORDER — OXYCODONE HYDROCHLORIDE 30 MG/1
30 TABLET, FILM COATED, EXTENDED RELEASE ORAL 3 TIMES DAILY
Qty: 84 EACH | Refills: 0 | Status: SHIPPED | OUTPATIENT
Start: 2017-11-10 | End: 2017-12-08 | Stop reason: SDUPTHER

## 2017-11-10 RX ORDER — METHYLPREDNISOLONE 4 MG/1
TABLET ORAL
Qty: 1 KIT | Refills: 0 | Status: SHIPPED | OUTPATIENT
Start: 2017-11-10 | End: 2017-12-08

## 2017-11-10 RX ORDER — PREGABALIN 150 MG/1
150 CAPSULE ORAL 3 TIMES DAILY
Qty: 90 CAPSULE | Refills: 0 | Status: SHIPPED | OUTPATIENT
Start: 2017-11-10 | End: 2017-12-08 | Stop reason: SDUPTHER

## 2017-11-10 RX ORDER — IBUPROFEN 600 MG/1
TABLET ORAL
Qty: 60 TABLET | Refills: 0 | Status: SHIPPED | OUTPATIENT
Start: 2017-11-10 | End: 2018-02-01 | Stop reason: SDUPTHER

## 2017-11-10 NOTE — PROGRESS NOTES
Citlali Marlow  1975  [id number]    HISTORY OF PRESENT ILLNESS:  Mr. Vida Brooks is a 43 y.o. male returns for a follow up visit for multiple medical problems. His current presenting problems are   1. 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 BWC Sprain of left knee/leg, initial encounter    3. BWC Sprain of ligament of lumbosacral joint, initial encounter    . As per information/history obtained from the PADT(patient assessment and documentation tool) - He complains of pain in the lower back with radiation to the buttocks, hips Bilateral, upper leg Left, lower leg Left, ankles Left and feet Left He rates the pain 5/10 and describes it as sharp, aching, burning, numbness, pins and needles. Pain is made worse by: movement, walking, standing, sitting, bending, lifting. Current treatment regimen has helped relieve about 60% of the pain. He denies side effects from the current pain regimen. Patient reports that since the last follow up visit the physical functioning is unchanged, family/social relationships are unchanged, mood is unchanged and sleep patterns are unchanged, and that the overall functioning is unchanged. Patient denies neurological bowel or bladder. Patient denies misusing/abusing his narcotic pain medications or using any illegal drugs. There are No indicators for possible drug abuse, addiction or diversion problems. Upon obtaining the medical history from Mr. Vida Brooks regarding today's office visit for his presenting problems, Patient states his pain has been worse. He states he has been having burning in the right side groin, feels like he has a lump on the side. Mr. Vida Brooks mentions using Lyrica everyday, 450 mg. He denies any accident, injury, or trauma. Patient states he sleeps well. Has normal sleep latency. Averages about 5-7 hours of sleep a night. Denies any signs of sleep apnea. Feels rested in the AM. Denies any sleep attacks during the day. Medication regimen helps with maintaining/regulating sleep. He mentions he is working full time, denies any heavy lifting. Patient's  subjective report of his mood is fair. he describes occasional symptoms of depression, occasional  irritability and some mood swings. Describes his mood as being neutral and reports some pleasure in his daily activities. Reports  fair  appetite, energy and concentration. Able to function well in different aspects of his daily activities. Denies suicidal or homicidal ideation. Denies any complaints of increased tension, does   Worry sometimes and occasional  irritability  he denies any c/o increased anxiety, No c/o panic attacks or symptoms of PTSD. ALLERGIES/PAST MED/FAM/SOC HISTORY: Mr. Naomi Saleh allergies, past medical, family and social history were reviewed in the chart and also listed below. Social History     Social History    Marital status: Single     Spouse name: Laura Chirinos Number of children: 2    Years of education: N/A     Occupational History    Not on file. Social History Main Topics    Smoking status: Former Smoker     Types: Cigarettes     Quit date: 1/1/2005    Smokeless tobacco: Never Used    Alcohol use No      Comment: rarely     Drug use: No    Sexual activity: Yes     Partners: Female     Other Topics Concern    Not on file     Social History Narrative    No narrative on file       Mr. Harris current medications are   Outpatient Medications Prior to Visit   Medication Sig Dispense Refill    oxyCODONE (OXYCONTIN) 30 MG T12A extended release tablet Take 30 mg by mouth 3 times daily .  84 each 0    ibuprofen (ADVIL;MOTRIN) 600 MG tablet TAKE 1 TABLET BY MOUTH 2 TIMES DAILY AS NEEDED FOR PAIN 60 tablet 0    pregabalin (LYRICA) 150 MG capsule Take 1 capsule by mouth 3 times daily 90 capsule 0    nortriptyline (PAMELOR) 25 MG capsule TAKE 1-2 TABLETS BY MOUTH NIGHTLY 60 capsule 0    sertraline (ZOLOFT) 50 MG tablet Take 1 tablet by mouth daily 30 tablet 3    lisinopril-hydrochlorothiazide (PRINZIDE;ZESTORETIC) 20-12.5 MG per tablet Take 1 tablet by mouth daily 30 tablet 3    furosemide (LASIX) 20 MG tablet 1 - 2 po qam prn edema 60 tablet 5     No facility-administered medications prior to visit. REVIEW OF SYSTEMS:   Constitutional: Negative for fatigue and unexpected weight change. Eyes: Negative for visual disturbance. Respiratory: Negative for shortness of breath. Cardiovascular: Negative for chest pain, palpitations  Gastrointestinal: Negative for blood in stool, abdominal distention, nausea, vomiting, abdominal pain, diarrhea,constipation. Skin: Negative for color change or any abnormal bruising. Neurological: Negative for speech difficulty, weakness and light-headedness, dizziness, tremors, sleepiness  Psychiatric/Behavioral: Negative for suicidal ideas, hallucinations, behavioral problems, self-injury, decreased concentration/cognition, agitation, confusion. PHYSICAL EXAM:   Nursing note and vitals reviewed. /66 (Site: Left Arm, Position: Sitting)   Pulse 72   Wt (!) 410 lb (186 kg)   BMI 52.64 kg/m²   General Appearance: Patient is well nourished, well developed, well groomed and in no acute distress. Skin: Skin is warm and dry, good turgor . No rash or lesions noted. He is not diaphoretic. Pulmonary/Chest: Effort normal. No respiratory distress or use of accessory muscles. Auscultation revealing normal air entry. He does not have wheezes in the lung fields. He has no rales. Cardiovascular: Normal rate, regular rhythm, normal heart sounds, and does not have murmur. Exam reveals no gallop and no friction rub. Abdominal: Soft. Bowel sounds are normal.  On inspection of abdomen is obese no distension and no mass. No tenderness. He has no rebound and no guarding. Musculoskeletal / Extremities: Range of motion is normal. Gait is normal, assistive devices use: none.     He exhibits edema: none, and no tenderness. Neurological/Psychiatric:He is alert and oriented to person, place, and time. Coordination is  normal.   Judgement and Insight is normal  His mood is Appropriate and affect is Flat/blunted . His behavior is normal.   thought content normal.        IMPRESSION:     1.  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 BWC Sprain of left knee/leg, initial encounter    3. BWC Sprain of ligament of lumbosacral joint, initial encounter        PLAN:  Informed verbal consent was obtained.  -Continue with current regimen   -MRI lumbar spine from 2015 reviewed : bilateral foraminal stenosis L5-S1 with impingement   -Advised medrol pack to help with radicular pain   -Continue with Lyrica 450 mg   -he was advised proper sleep hygiene-told to avoid:use of caffeine or other stimulants after noon, alcohol use near bedtime, long or frequent naps during the day, erratic sleep schedule, heavy meals near bedtime, vigorous exercise near bedtime and use of electronic devices near bedtime  -Continue with Pamelor   -May consider TPI/NANETTE     Mr. 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 Medications/orders associated with this visit:    Current Outpatient Prescriptions   Medication Sig Dispense Refill    oxyCODONE (OXYCONTIN) 30 MG T12A extended release tablet Take 30 mg by mouth 3 times daily .  84 each 0    ibuprofen (ADVIL;MOTRIN) 600 MG tablet TAKE 1 TABLET BY MOUTH 2 TIMES DAILY AS NEEDED FOR PAIN 60 tablet 0    pregabalin (LYRICA) 150 MG capsule Take 1 capsule by mouth 3 times daily 90 capsule 0    nortriptyline (PAMELOR) 25 MG capsule TAKE 1-2 TABLETS BY MOUTH NIGHTLY 60 capsule 0    sertraline (ZOLOFT) 50 MG tablet Take 1 tablet by mouth daily 30 tablet 3    lisinopril-hydrochlorothiazide (PRINZIDE;ZESTORETIC) 20-12.5 MG per tablet Take 1 tablet by mouth daily 30 tablet 3    furosemide (LASIX) 20 MG tablet 1 - 2 po qam prn edema 60 tablet 5     No current facility-administered medications for this visit. Goals of current treatment regimen include improvement in pain, restoration of functioning- with focus on improvement in physical performance, general activity, work or disability,emotional distress, health care utilization and  decreased medication consumption. Will continue to monitor progress towards achieving/maintaining therapeutic goals with special emphasis on  1. Improvement in perceived interfernce  of pain with ADL's. Ability to do home exercises independently. Ability to do household chores indoor and/or outdoor work and social and leisure activities. To increase flexibility/ROM, strength and endurance. Improve psychosocial and physical functioning.- he is not showing any significant progress/or showing regression  towards this goal and reassessment and adjustment of goals/treatment have been made. 2. Improving sleep to 6-7 hours a night. Improve mood/ anxiety and depression symptoms such as crying spells, low energy, problems with concentration, motivation.- he is showing progression towards this treatment goal with the current regimen. 3. Reduction of reliance on opioid analgesia/more appropriate opioid use. - he is showing progression towards this treatment goal with the current regimen. Risks and benefits of the medications and other alternative treatments have been/were  discussed with the patient. Any questions on the  common side effects of these medications were also answered.   He was advised against drinking alcohol with the narcotic pain medicines, advised against driving or handling machinery when  starting or adjusting the dose

## 2017-11-13 NOTE — ADDENDUM NOTE
Encounter addended by: Jevon Castro MA on: 11/13/2017  2:27 PM<BR>    Actions taken: Letter status changed

## 2017-12-08 ENCOUNTER — OFFICE VISIT (OUTPATIENT)
Dept: PAIN MANAGEMENT | Age: 42
End: 2017-12-08

## 2017-12-08 VITALS
DIASTOLIC BLOOD PRESSURE: 63 MMHG | HEART RATE: 69 BPM | BODY MASS INDEX: 53.28 KG/M2 | WEIGHT: 315 LBS | SYSTOLIC BLOOD PRESSURE: 144 MMHG

## 2017-12-08 DIAGNOSIS — S33.9XXA SPRAIN OF LIGAMENT OF LUMBOSACRAL JOINT, INITIAL ENCOUNTER: ICD-10-CM

## 2017-12-08 DIAGNOSIS — S83.92XA SPRAIN OF LEFT KNEE/LEG, INITIAL ENCOUNTER: ICD-10-CM

## 2017-12-08 DIAGNOSIS — M51.27 LUMBAGO-SCIATICA DUE TO DISPLACEMENT OF LUMBAR INTERVERTEBRAL DISC: ICD-10-CM

## 2017-12-08 PROCEDURE — 99213 OFFICE O/P EST LOW 20 MIN: CPT | Performed by: INTERNAL MEDICINE

## 2017-12-08 RX ORDER — OXYCODONE HYDROCHLORIDE 30 MG/1
30 TABLET, FILM COATED, EXTENDED RELEASE ORAL 3 TIMES DAILY
Qty: 84 EACH | Refills: 0 | Status: SHIPPED | OUTPATIENT
Start: 2017-12-08 | End: 2018-01-05 | Stop reason: SDUPTHER

## 2017-12-08 RX ORDER — PREGABALIN 150 MG/1
150 CAPSULE ORAL 3 TIMES DAILY
Qty: 90 CAPSULE | Refills: 0 | Status: SHIPPED | OUTPATIENT
Start: 2017-12-08 | End: 2018-01-05 | Stop reason: SDUPTHER

## 2017-12-11 RX ORDER — FUROSEMIDE 20 MG/1
TABLET ORAL
Qty: 60 TABLET | Refills: 5 | Status: SHIPPED | OUTPATIENT
Start: 2017-12-11 | End: 2018-06-28 | Stop reason: SDUPTHER

## 2018-01-05 ENCOUNTER — OFFICE VISIT (OUTPATIENT)
Dept: PAIN MANAGEMENT | Age: 43
End: 2018-01-05

## 2018-01-05 VITALS
HEART RATE: 72 BPM | DIASTOLIC BLOOD PRESSURE: 67 MMHG | BODY MASS INDEX: 52.64 KG/M2 | WEIGHT: 315 LBS | SYSTOLIC BLOOD PRESSURE: 136 MMHG

## 2018-01-05 DIAGNOSIS — S83.92XA SPRAIN OF LEFT KNEE/LEG, INITIAL ENCOUNTER: ICD-10-CM

## 2018-01-05 DIAGNOSIS — M51.27 LUMBAGO-SCIATICA DUE TO DISPLACEMENT OF LUMBAR INTERVERTEBRAL DISC: ICD-10-CM

## 2018-01-05 DIAGNOSIS — S33.9XXA SPRAIN OF LIGAMENT OF LUMBOSACRAL JOINT, INITIAL ENCOUNTER: ICD-10-CM

## 2018-01-05 PROCEDURE — 99213 OFFICE O/P EST LOW 20 MIN: CPT | Performed by: INTERNAL MEDICINE

## 2018-01-05 RX ORDER — PREGABALIN 150 MG/1
150 CAPSULE ORAL 3 TIMES DAILY
Qty: 90 CAPSULE | Refills: 0 | Status: SHIPPED | OUTPATIENT
Start: 2018-01-05 | End: 2018-02-01 | Stop reason: SDUPTHER

## 2018-01-05 RX ORDER — OXYCODONE HYDROCHLORIDE 30 MG/1
30 TABLET, FILM COATED, EXTENDED RELEASE ORAL 3 TIMES DAILY
Qty: 84 EACH | Refills: 0 | Status: SHIPPED | OUTPATIENT
Start: 2018-01-05 | End: 2018-02-01 | Stop reason: SDUPTHER

## 2018-01-05 NOTE — PROGRESS NOTES
Franklin Peña  1975  [id number]    HISTORY OF PRESENT ILLNESS:  Mr. Marco A Grimaldo is a 43 y.o. male returns for a follow up visit for multiple medical problems. His current presenting problems are   1. 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 BWC Sprain of left knee/leg, initial encounter    3. BWC Sprain of ligament of lumbosacral joint, initial encounter    . As per information/history obtained from the PADT(patient assessment and documentation tool) - He complains of pain in the lower back, buttocks and knees Bilateral with radiation to the lower leg Left, ankles Left and feet Left He rates the pain 4/10 and describes it as aching, burning, throbbing. Pain is made worse by: nothing, movement, walking, standing, sitting, bending, lifting. Current treatment regimen has helped relieve about 80% of the pain. He denies side effects from the current pain regimen. Patient reports that since the last follow up visit the physical functioning is worse, family/social relationships are unchanged, mood is unchanged and sleep patterns are unchanged, and that the overall functioning is unchanged. Patient denies neurological bowel or bladder. Patient denies misusing/abusing his narcotic pain medications or using any illegal drugs. There are No indicators for possible drug abuse, addiction or diversion problems. Upon obtaining the medical history from Mr. Marco A Grimaldo regarding today's office visit for his presenting problems, patient states Patient reports that the pain is fairly well tolerated with the current regimen has had some exacerbations, but overall has been tolerable. Mr. Marco A Grimaldo states that  he has been staying with his exercise routine and working indoors and/or outdoors as tolerated, performing household chores. Mr. Marco A Grimaldo says he has been taking all his medications. He mentions the burning in the legs is better, took the Medrol Pack and it helped.  He says he is working full time.Patient states his sleep is fair. Has fairly normal sleep latency. Averages about 4-6 hours of sleep a night. Denies any signs of sleep apnea. Feels somewhat rested in the morning. ALLERGIES: Patients list of allergies were reviewed     MEDICATIONS: Mr. Rajat Cuenca list of medications were reviewed. His current medications are   Outpatient Medications Prior to Visit   Medication Sig Dispense Refill    furosemide (LASIX) 20 MG tablet TAKE 1 TO 2 TABLETS BY MOUTH EVERY MORNING AS NEEDED FOR EDEMA 60 tablet 5    oxyCODONE (OXYCONTIN) 30 MG T12A extended release tablet Take 30 mg by mouth 3 times daily . 84 each 0    pregabalin (LYRICA) 150 MG capsule Take 1 capsule by mouth 3 times daily . 90 capsule 0    ibuprofen (ADVIL;MOTRIN) 600 MG tablet TAKE 1 TABLET BY MOUTH 2 TIMES DAILY AS NEEDED FOR PAIN 60 tablet 0    nortriptyline (PAMELOR) 25 MG capsule TAKE 1-2 TABLETS BY MOUTH NIGHTLY 60 capsule 0    lisinopril-hydrochlorothiazide (PRINZIDE;ZESTORETIC) 20-12.5 MG per tablet Take 1 tablet by mouth daily 30 tablet 3     No facility-administered medications prior to visit. SOCIAL/FAMILY/PAST MEDICAL HISTORY: Mr. Dima Pond, family and past medical history was reviewed. REVIEW OF SYSTEMS:    Respiratory: Negative for apnea, chest tightness and shortness of breath or change in baseline breathing. Gastrointestinal: Negative for nausea, vomiting, abdominal pain, diarrhea, constipation, blood in stool and abdominal distention. PHYSICAL EXAM:   Nursing note and vitals reviewed. /67 (Site: Right Arm, Position: Sitting)   Pulse 72   Wt (!) 410 lb (186 kg)   BMI 52.64 kg/m²   Constitutional: He appears well-developed and well-nourished. No acute distress. Skin: Skin is warm and dry, good turgor. No rash noted. He is not diaphoretic. Cardiovascular: Normal rate, regular rhythm, normal heart sounds, and does not have murmur. Pulmonary/Chest: Effort normal. No respiratory distress. develops new symptoms or if the symptoms worsen, the patient should call the office. While transcribing every attempt was made to maintain the accuracy of the note in terms of it's contents,there may have been some errors made inadvertently. Thank you for allowing me to participate in the care of this patient. Pedro Pablo Rivera MD.    Cc: Lucy Tong MD    I, Mary Kate Albert, scribing for in the presence  of Dr. Pedro Pablo Rivera. 01/05/18  5:12 PM  Con Ocampo Assistant  I, Dr. Pedro Pablo Rivera, personally performed the services described in this documentation as scribed by  Mary Kate Albert MA in my presence and it is both accurate and complete

## 2018-01-22 ENCOUNTER — TELEPHONE (OUTPATIENT)
Dept: FAMILY MEDICINE CLINIC | Age: 43
End: 2018-01-22

## 2018-01-22 NOTE — TELEPHONE ENCOUNTER
Pt states he feels like he has the flu and his leg is sore. Pt is most concerned with pain in left leg.      ACUTE ILL    NASAL DRAINAGE: no - no    WHAT COLOR: ,   COUGH: yes,   BRINGING UP PHLEGM:  no   IF YES, WHAT COLOR:  ,   SORE THROAT:  no,   POST NASAL DRIP:  no,   HEADACHE:  no,   EAR PAIN:  no,   S.O.B.:  no    WITH OR WITHOUT EXERTION:  ,   WHEEZING:  no,   HEAD CONGESTION:  no,   CHEST CONGESTION:  yes,   BODY ACHES:  yes, left leg, back, and all joints  VOMITING: no,   DIARRHEA:  no,   TEMP:  yes    IF SO, HIGHEST TEMP:  Pt states he has not taken temp,   SYMPTOMS FOR HOW MANY DAYS:  1 day,   WHAT MEDS HAS PT TRIED: Musinex, Ibuprofen, Tylenol

## 2018-01-23 ENCOUNTER — OFFICE VISIT (OUTPATIENT)
Dept: FAMILY MEDICINE CLINIC | Age: 43
End: 2018-01-23

## 2018-01-23 ENCOUNTER — TELEPHONE (OUTPATIENT)
Dept: FAMILY MEDICINE CLINIC | Age: 43
End: 2018-01-23

## 2018-01-23 VITALS
HEART RATE: 84 BPM | OXYGEN SATURATION: 95 % | TEMPERATURE: 97.3 F | WEIGHT: 315 LBS | SYSTOLIC BLOOD PRESSURE: 124 MMHG | BODY MASS INDEX: 53.03 KG/M2 | DIASTOLIC BLOOD PRESSURE: 72 MMHG

## 2018-01-23 DIAGNOSIS — R68.89 FLU-LIKE SYMPTOMS: Primary | ICD-10-CM

## 2018-01-23 DIAGNOSIS — L03.116 CELLULITIS OF LEFT LOWER EXTREMITY: ICD-10-CM

## 2018-01-23 LAB
INFLUENZA A ANTIBODY: NEGATIVE
INFLUENZA B ANTIBODY: NEGATIVE

## 2018-01-23 PROCEDURE — 99213 OFFICE O/P EST LOW 20 MIN: CPT | Performed by: FAMILY MEDICINE

## 2018-01-23 PROCEDURE — 87804 INFLUENZA ASSAY W/OPTIC: CPT | Performed by: FAMILY MEDICINE

## 2018-01-23 RX ORDER — CEPHALEXIN 500 MG/1
500 CAPSULE ORAL 4 TIMES DAILY
Qty: 40 CAPSULE | Refills: 0 | Status: SHIPPED | OUTPATIENT
Start: 2018-01-23 | End: 2018-05-31 | Stop reason: ALTCHOICE

## 2018-01-23 NOTE — PROGRESS NOTES
Shanell White is a 43 y.o. male. HPI:  Mr. David Blunt is a 43 y.o male who presents with flu like symptoms and leg pain. Symptoms started yesterday morning. When he woke up he was dizzy and had some confusion throughout the day. He had fatigue and muscle aches. He had a fever at home of 104.9, which went down to 100 last night. Feels better today but symptoms are still present. Denies chills, cough, shortness of breath, wheezing, sore throat, sinus congestion or nasal drainage. Denies sick contacts, but coworker's family has the flu. He has been alternating tylenol and ibuprofen, which has helped bring down his fever. He took Mucinex because his girlfriend thought he sounded congestion, but he denies any feelings of congestion. Patient's left leg was also sore yesterday. Pain is localized to his lateral leg. Tender to the touch. He describes it as a constant dull pain and rates it a 1/10. No redness or warmth in that area that he can tell. Not painful when he moves his leg. He has had cellulitis 10 times before in the same leg. When this has happened last time, a year ago, his leg was swollen, warm and tender. When this happened a year ago he had similar symptoms of fever and slightly altered mental status throughout the day. Has not noticed any edema in his leg. Wt Readings from Last 3 Encounters:   01/23/18 (!) 413 lb (187.3 kg)   01/05/18 (!) 410 lb (186 kg)   12/08/17 (!) 415 lb (188.2 kg)     Meds, vitamins and allergies reviewed with Patient    ROS:  Gen: positive for fever  HEENT: no cold symptoms, sore throat. CV:  Denies chest pain or palpitations. Pulm:  Denies shortness of breath, cough. Abd:  Denies abdominal pain, nausea and vomiting. Skin: no rash    No Known Allergies    Prior to Visit Medications    Medication Sig Taking? Authorizing Provider   oxyCODONE (OXYCONTIN) 30 MG T12A extended release tablet Take 30 mg by mouth 3 times daily for 28 days.  Yes Lorrene Apley, MD   pregabalin

## 2018-01-23 NOTE — TELEPHONE ENCOUNTER
*Per previous telephone encounter*    FYI--    Patient was scheduled to see Dr. Lisa Castellanos @ 2:00pm for possible flu and left leg pain. Dr. Hernan Willams 12:30pm slot opened up and patient rescheduled for there, ok per [facility].

## 2018-01-24 ENCOUNTER — TELEPHONE (OUTPATIENT)
Dept: FAMILY MEDICINE CLINIC | Age: 43
End: 2018-01-24

## 2018-02-01 ENCOUNTER — OFFICE VISIT (OUTPATIENT)
Dept: PAIN MANAGEMENT | Age: 43
End: 2018-02-01

## 2018-02-01 VITALS
BODY MASS INDEX: 53.03 KG/M2 | HEART RATE: 65 BPM | SYSTOLIC BLOOD PRESSURE: 122 MMHG | DIASTOLIC BLOOD PRESSURE: 75 MMHG | WEIGHT: 315 LBS

## 2018-02-01 DIAGNOSIS — S33.9XXA SPRAIN OF LIGAMENT OF LUMBOSACRAL JOINT, INITIAL ENCOUNTER: ICD-10-CM

## 2018-02-01 DIAGNOSIS — S83.92XA SPRAIN OF LEFT KNEE/LEG, INITIAL ENCOUNTER: ICD-10-CM

## 2018-02-01 DIAGNOSIS — M51.27 LUMBAGO-SCIATICA DUE TO DISPLACEMENT OF LUMBAR INTERVERTEBRAL DISC: ICD-10-CM

## 2018-02-01 PROCEDURE — 99213 OFFICE O/P EST LOW 20 MIN: CPT | Performed by: INTERNAL MEDICINE

## 2018-02-01 RX ORDER — PREGABALIN 150 MG/1
150 CAPSULE ORAL 3 TIMES DAILY
Qty: 90 CAPSULE | Refills: 0 | Status: SHIPPED | OUTPATIENT
Start: 2018-02-01 | End: 2018-03-02 | Stop reason: SDUPTHER

## 2018-02-01 RX ORDER — NORTRIPTYLINE HYDROCHLORIDE 25 MG/1
CAPSULE ORAL
Qty: 60 CAPSULE | Refills: 0 | Status: SHIPPED | OUTPATIENT
Start: 2018-02-01 | End: 2018-03-02 | Stop reason: SDUPTHER

## 2018-02-01 RX ORDER — OXYCODONE HYDROCHLORIDE 30 MG/1
30 TABLET, FILM COATED, EXTENDED RELEASE ORAL 3 TIMES DAILY
Qty: 84 EACH | Refills: 0 | Status: SHIPPED | OUTPATIENT
Start: 2018-02-01 | End: 2018-03-02 | Stop reason: SDUPTHER

## 2018-02-01 RX ORDER — IBUPROFEN 600 MG/1
TABLET ORAL
Qty: 60 TABLET | Refills: 0 | Status: SHIPPED | OUTPATIENT
Start: 2018-02-01 | End: 2018-03-02 | Stop reason: SDUPTHER

## 2018-02-01 NOTE — PROGRESS NOTES
[facility]  1975  [id number]    HISTORY OF PRESENT ILLNESS:  Mr. Pj Pink is a 43 y.o. male returns for a follow up visit for multiple medical problems. His current presenting problems are   1. Sprain of ligament of lumbosacral joint, initial encounter    2. Sprain of left knee/leg, initial encounter    3. [facility] Lumbago-sciatica due to displacement of lumbar intervertebral disc    . As per information/history obtained from the PADT(patient assessment and documentation tool) - He complains of pain in the lower back with radiation to the knees Left, lower leg Left, ankles Left and feet Left He rates the pain 4/10 and describes it as sharp, aching, burning. Pain is made worse by: movement, walking, standing. Current treatment regimen has helped relieve about 70% of the pain. He denies side effects from the current pain regimen. Patient reports that since the last follow up visit the physical functioning is unchanged, family/social relationships are unchanged, mood is unchanged and sleep patterns are unchanged, and that the overall functioning is unchanged. Patient denies neurological bowel or bladder. Patient denies misusing/abusing his narcotic pain medications or using any illegal drugs. There are No indicators for possible drug abuse, addiction or diversion problems. Upon obtaining the medical history from Mr. Pj Pink regarding today's office visit for his presenting problems, Patient states he pain has been baseline and tolerable somewhat. He says he is using OxyContin 3 per day. He denies any side effects with the medications. He mentions he is working 40 hrs per week. Denies abdominal pain, dysphagia, gas bloat, heartburn, nausea, odynophagia, regurgitation, vomiting and water brash-GERD Sxs are controlled  with the medications. He states his weight has been stable. He complains he was sick for a few days  and was on antibiotics.        ALLERGIES: Patients list of allergies were reviewed His mood isAppropriate and affect is Neutral/Euthymic(normal) . Other: + Venous stasis dermatitis     IMPRESSION:   1. Sprain of ligament of lumbosacral joint, initial encounter    2. Sprain of left knee/leg, initial encounter    3. [facility] Lumbago-sciatica due to displacement of lumbar intervertebral disc        PLAN:  Informed verbal consent was obtained  -Continue with current regimen   -Continue with OxyContin 30 mg TID   -Urine drug screen with GC/MS for opiates and drugs of abuse was ordered and will follow up on results.   -He was advised weight reduction, diet changes- 800-1200 roc diet, diet diary, exercising, nutritional  consult increased physical activity as tolerated   -Records from PCP Reviewed    Current Outpatient Prescriptions   Medication Sig Dispense Refill    cephALEXin (KEFLEX) 500 MG capsule Take 1 capsule by mouth 4 times daily 40 capsule 0    oxyCODONE (OXYCONTIN) 30 MG T12A extended release tablet Take 30 mg by mouth 3 times daily for 28 days. 84 each 0    pregabalin (LYRICA) 150 MG capsule Take 1 capsule by mouth 3 times daily for 30 days. 90 capsule 0    furosemide (LASIX) 20 MG tablet TAKE 1 TO 2 TABLETS BY MOUTH EVERY MORNING AS NEEDED FOR EDEMA 60 tablet 5    ibuprofen (ADVIL;MOTRIN) 600 MG tablet TAKE 1 TABLET BY MOUTH 2 TIMES DAILY AS NEEDED FOR PAIN 60 tablet 0    nortriptyline (PAMELOR) 25 MG capsule TAKE 1-2 TABLETS BY MOUTH NIGHTLY 60 capsule 0    lisinopril-hydrochlorothiazide (PRINZIDE;ZESTORETIC) 20-12.5 MG per tablet Take 1 tablet by mouth daily 30 tablet 3     No current facility-administered medications for this visit. I will continue his current medication regimen  which is part of the above treatment schedule.  It has been helping with Mr. Taryn Montana chronic  medical problems which for this visit include:   Diagnoses of Sprain of ligament of lumbosacral joint, initial encounter, Sprain of left knee/leg, initial encounter, and  [facility] Lumbago-sciatica due to by   Frandy Brower MA in my presence and it is both accurate and complete

## 2018-02-16 DIAGNOSIS — I10 ESSENTIAL HYPERTENSION: ICD-10-CM

## 2018-02-19 RX ORDER — LISINOPRIL AND HYDROCHLOROTHIAZIDE 20; 12.5 MG/1; MG/1
TABLET ORAL
Qty: 30 TABLET | Refills: 11 | Status: ON HOLD | OUTPATIENT
Start: 2018-02-19 | End: 2018-10-19

## 2018-02-19 NOTE — TELEPHONE ENCOUNTER
Last Ov: 01/23/2018, flu-like symptoms  Last Refill: 10/13/2017, #30,3    Lab Results   Component Value Date     04/19/2017    K 4.2 04/19/2017    CL 99 04/19/2017    CO2 26 04/19/2017    BUN 12 04/19/2017    CREATININE 0.6 (L) 04/19/2017    GLUCOSE 91 04/19/2017    CALCIUM 9.2 04/19/2017    PROT 6.6 04/19/2017    LABALBU 4.5 04/19/2017    BILITOT 0.7 04/19/2017    ALKPHOS 61 04/19/2017    AST 26 04/19/2017    ALT 33 04/19/2017    LABGLOM >60 04/19/2017    GFRAA >60 04/19/2017    AGRATIO 2.1 04/19/2017    GLOB 2.1 04/19/2017

## 2018-03-02 ENCOUNTER — OFFICE VISIT (OUTPATIENT)
Dept: PAIN MANAGEMENT | Age: 43
End: 2018-03-02

## 2018-03-02 VITALS
WEIGHT: 315 LBS | SYSTOLIC BLOOD PRESSURE: 126 MMHG | HEART RATE: 81 BPM | BODY MASS INDEX: 53.03 KG/M2 | DIASTOLIC BLOOD PRESSURE: 64 MMHG

## 2018-03-02 DIAGNOSIS — S33.9XXA SPRAIN OF LIGAMENT OF LUMBOSACRAL JOINT, INITIAL ENCOUNTER: ICD-10-CM

## 2018-03-02 DIAGNOSIS — S83.92XA SPRAIN OF LEFT KNEE/LEG, INITIAL ENCOUNTER: ICD-10-CM

## 2018-03-02 DIAGNOSIS — M51.27 LUMBAGO-SCIATICA DUE TO DISPLACEMENT OF LUMBAR INTERVERTEBRAL DISC: ICD-10-CM

## 2018-03-02 PROCEDURE — 99213 OFFICE O/P EST LOW 20 MIN: CPT | Performed by: INTERNAL MEDICINE

## 2018-03-02 RX ORDER — PREGABALIN 150 MG/1
150 CAPSULE ORAL 3 TIMES DAILY
Qty: 90 CAPSULE | Refills: 0 | Status: SHIPPED | OUTPATIENT
Start: 2018-03-02 | End: 2018-04-02 | Stop reason: SDUPTHER

## 2018-03-02 RX ORDER — NORTRIPTYLINE HYDROCHLORIDE 25 MG/1
CAPSULE ORAL
Qty: 60 CAPSULE | Refills: 0 | Status: SHIPPED | OUTPATIENT
Start: 2018-03-02 | End: 2018-04-02 | Stop reason: SDUPTHER

## 2018-03-02 RX ORDER — IBUPROFEN 600 MG/1
TABLET ORAL
Qty: 60 TABLET | Refills: 0 | Status: SHIPPED | OUTPATIENT
Start: 2018-03-02 | End: 2018-04-02 | Stop reason: SDUPTHER

## 2018-03-02 RX ORDER — OXYCODONE HYDROCHLORIDE 30 MG/1
30 TABLET, FILM COATED, EXTENDED RELEASE ORAL 3 TIMES DAILY
Qty: 84 EACH | Refills: 0 | Status: SHIPPED | OUTPATIENT
Start: 2018-03-02 | End: 2018-04-02 | Stop reason: SDUPTHER

## 2018-03-02 NOTE — PROGRESS NOTES
symptoms or if the symptoms worsen, the patient should call the office. While transcribing every attempt was made to maintain the accuracy of the note in terms of it's contents,there may have been some errors made inadvertently. Thank you for allowing me to participate in the care of this patient. Francisca Kirkland MD.    Cc: MD CHERYL Campbell, Ivelisse Sun, scribing for in the presence  of Dr. Francisca Kirkland.   03/02/18  9:21 AM  Tyson Eddyma Assistant  I, Dr. Francisca Kirkland, personally performed the services described in this documentation as scribed by  Ivelisse Sun MA in my presence and it is both accurate and complete

## 2018-03-12 ENCOUNTER — TELEPHONE (OUTPATIENT)
Dept: PAIN MANAGEMENT | Age: 43
End: 2018-03-12

## 2018-03-21 DIAGNOSIS — S83.92XA SPRAIN OF LEFT KNEE/LEG, INITIAL ENCOUNTER: ICD-10-CM

## 2018-03-21 DIAGNOSIS — S33.9XXA SPRAIN OF LIGAMENT OF LUMBOSACRAL JOINT, INITIAL ENCOUNTER: ICD-10-CM

## 2018-03-21 DIAGNOSIS — M51.27 LUMBAGO-SCIATICA DUE TO DISPLACEMENT OF LUMBAR INTERVERTEBRAL DISC: ICD-10-CM

## 2018-03-21 LAB
A/G RATIO: 1.8 (ref 1.1–2.2)
ALBUMIN SERPL-MCNC: 4.6 G/DL (ref 3.4–5)
ALP BLD-CCNC: 64 U/L (ref 40–129)
ALT SERPL-CCNC: 33 U/L (ref 10–40)
ANION GAP SERPL CALCULATED.3IONS-SCNC: 18 MMOL/L (ref 3–16)
AST SERPL-CCNC: 30 U/L (ref 15–37)
BILIRUB SERPL-MCNC: 0.6 MG/DL (ref 0–1)
BUN BLDV-MCNC: 11 MG/DL (ref 7–20)
CALCIUM SERPL-MCNC: 8.9 MG/DL (ref 8.3–10.6)
CHLORIDE BLD-SCNC: 95 MMOL/L (ref 99–110)
CO2: 26 MMOL/L (ref 21–32)
CREAT SERPL-MCNC: 0.6 MG/DL (ref 0.9–1.3)
GFR AFRICAN AMERICAN: >60
GFR NON-AFRICAN AMERICAN: >60
GLOBULIN: 2.5 G/DL
GLUCOSE BLD-MCNC: 98 MG/DL (ref 70–99)
HCT VFR BLD CALC: 45 % (ref 40.5–52.5)
HEMOGLOBIN: 15.7 G/DL (ref 13.5–17.5)
MCH RBC QN AUTO: 31.5 PG (ref 26–34)
MCHC RBC AUTO-ENTMCNC: 34.8 G/DL (ref 31–36)
MCV RBC AUTO: 90.4 FL (ref 80–100)
PDW BLD-RTO: 13.1 % (ref 12.4–15.4)
PLATELET # BLD: 143 K/UL (ref 135–450)
PMV BLD AUTO: 9.4 FL (ref 5–10.5)
POTASSIUM SERPL-SCNC: 4.2 MMOL/L (ref 3.5–5.1)
RBC # BLD: 4.98 M/UL (ref 4.2–5.9)
SODIUM BLD-SCNC: 139 MMOL/L (ref 136–145)
TOTAL PROTEIN: 7.1 G/DL (ref 6.4–8.2)
WBC # BLD: 6.2 K/UL (ref 4–11)

## 2018-04-02 ENCOUNTER — OFFICE VISIT (OUTPATIENT)
Dept: PAIN MANAGEMENT | Age: 43
End: 2018-04-02

## 2018-04-02 VITALS
WEIGHT: 315 LBS | BODY MASS INDEX: 54.32 KG/M2 | HEART RATE: 80 BPM | DIASTOLIC BLOOD PRESSURE: 76 MMHG | SYSTOLIC BLOOD PRESSURE: 138 MMHG

## 2018-04-02 DIAGNOSIS — S33.9XXA SPRAIN OF LIGAMENT OF LUMBOSACRAL JOINT, INITIAL ENCOUNTER: ICD-10-CM

## 2018-04-02 DIAGNOSIS — S83.92XA SPRAIN OF LEFT KNEE/LEG, INITIAL ENCOUNTER: ICD-10-CM

## 2018-04-02 DIAGNOSIS — M51.27 LUMBAGO-SCIATICA DUE TO DISPLACEMENT OF LUMBAR INTERVERTEBRAL DISC: ICD-10-CM

## 2018-04-02 PROCEDURE — 99214 OFFICE O/P EST MOD 30 MIN: CPT | Performed by: INTERNAL MEDICINE

## 2018-04-02 RX ORDER — IBUPROFEN 600 MG/1
TABLET ORAL
Qty: 60 TABLET | Refills: 0 | Status: SHIPPED | OUTPATIENT
Start: 2018-04-02 | End: 2018-05-01 | Stop reason: SDUPTHER

## 2018-04-02 RX ORDER — PREGABALIN 150 MG/1
150 CAPSULE ORAL 3 TIMES DAILY
Qty: 90 CAPSULE | Refills: 0 | Status: SHIPPED | OUTPATIENT
Start: 2018-04-02 | End: 2018-05-01 | Stop reason: SDUPTHER

## 2018-04-02 RX ORDER — OXYCODONE HYDROCHLORIDE 30 MG/1
30 TABLET, FILM COATED, EXTENDED RELEASE ORAL 3 TIMES DAILY
Qty: 84 EACH | Refills: 0 | Status: SHIPPED | OUTPATIENT
Start: 2018-04-02 | End: 2018-05-01 | Stop reason: SDUPTHER

## 2018-04-02 RX ORDER — NORTRIPTYLINE HYDROCHLORIDE 25 MG/1
CAPSULE ORAL
Qty: 60 CAPSULE | Refills: 0 | Status: SHIPPED | OUTPATIENT
Start: 2018-04-02 | End: 2018-05-01 | Stop reason: SDUPTHER

## 2018-04-30 ENCOUNTER — OFFICE VISIT (OUTPATIENT)
Dept: FAMILY MEDICINE CLINIC | Age: 43
End: 2018-04-30

## 2018-04-30 VITALS
SYSTOLIC BLOOD PRESSURE: 122 MMHG | RESPIRATION RATE: 14 BRPM | HEIGHT: 74 IN | TEMPERATURE: 99.5 F | WEIGHT: 315 LBS | HEART RATE: 85 BPM | OXYGEN SATURATION: 95 % | DIASTOLIC BLOOD PRESSURE: 80 MMHG | BODY MASS INDEX: 40.43 KG/M2

## 2018-04-30 DIAGNOSIS — G47.33 OSA (OBSTRUCTIVE SLEEP APNEA): ICD-10-CM

## 2018-04-30 DIAGNOSIS — L03.116 CELLULITIS OF LEFT LOWER EXTREMITY: Primary | ICD-10-CM

## 2018-04-30 DIAGNOSIS — I87.2 CHRONIC VENOUS STASIS DERMATITIS OF BOTH LOWER EXTREMITIES: ICD-10-CM

## 2018-04-30 PROCEDURE — 99213 OFFICE O/P EST LOW 20 MIN: CPT | Performed by: FAMILY MEDICINE

## 2018-04-30 RX ORDER — CLINDAMYCIN HYDROCHLORIDE 300 MG/1
300 CAPSULE ORAL 3 TIMES DAILY
Qty: 30 CAPSULE | Refills: 1 | Status: SHIPPED | OUTPATIENT
Start: 2018-04-30 | End: 2018-07-17 | Stop reason: SDUPTHER

## 2018-04-30 RX ORDER — CEPHALEXIN 500 MG/1
500 CAPSULE ORAL 3 TIMES DAILY
Qty: 30 CAPSULE | Refills: 0 | Status: SHIPPED | OUTPATIENT
Start: 2018-04-30 | End: 2018-05-31 | Stop reason: ALTCHOICE

## 2018-05-01 ENCOUNTER — OFFICE VISIT (OUTPATIENT)
Dept: PAIN MANAGEMENT | Age: 43
End: 2018-05-01

## 2018-05-01 VITALS
BODY MASS INDEX: 53.03 KG/M2 | DIASTOLIC BLOOD PRESSURE: 64 MMHG | SYSTOLIC BLOOD PRESSURE: 138 MMHG | WEIGHT: 315 LBS | HEART RATE: 81 BPM

## 2018-05-01 DIAGNOSIS — S83.92XA SPRAIN OF LEFT KNEE/LEG, INITIAL ENCOUNTER: ICD-10-CM

## 2018-05-01 DIAGNOSIS — M51.27 LUMBAGO-SCIATICA DUE TO DISPLACEMENT OF LUMBAR INTERVERTEBRAL DISC: ICD-10-CM

## 2018-05-01 DIAGNOSIS — S33.9XXA SPRAIN OF LIGAMENT OF LUMBOSACRAL JOINT, INITIAL ENCOUNTER: ICD-10-CM

## 2018-05-01 PROCEDURE — 99213 OFFICE O/P EST LOW 20 MIN: CPT | Performed by: INTERNAL MEDICINE

## 2018-05-01 RX ORDER — IBUPROFEN 600 MG/1
TABLET ORAL
Qty: 60 TABLET | Refills: 0 | Status: SHIPPED | OUTPATIENT
Start: 2018-05-01 | End: 2018-06-28 | Stop reason: SDUPTHER

## 2018-05-01 RX ORDER — OXYCODONE HYDROCHLORIDE 30 MG/1
30 TABLET, FILM COATED, EXTENDED RELEASE ORAL 3 TIMES DAILY
Qty: 90 EACH | Refills: 0 | Status: SHIPPED | OUTPATIENT
Start: 2018-05-01 | End: 2018-05-31 | Stop reason: SDUPTHER

## 2018-05-01 RX ORDER — PREGABALIN 150 MG/1
150 CAPSULE ORAL 3 TIMES DAILY
Qty: 90 CAPSULE | Refills: 0 | Status: SHIPPED | OUTPATIENT
Start: 2018-05-01 | End: 2018-05-31 | Stop reason: SDUPTHER

## 2018-05-01 RX ORDER — NORTRIPTYLINE HYDROCHLORIDE 25 MG/1
CAPSULE ORAL
Qty: 60 CAPSULE | Refills: 0 | Status: SHIPPED | OUTPATIENT
Start: 2018-05-01 | End: 2018-05-31 | Stop reason: SDUPTHER

## 2018-05-31 ENCOUNTER — OFFICE VISIT (OUTPATIENT)
Dept: PAIN MANAGEMENT | Age: 43
End: 2018-05-31

## 2018-05-31 VITALS
WEIGHT: 315 LBS | SYSTOLIC BLOOD PRESSURE: 137 MMHG | HEART RATE: 82 BPM | BODY MASS INDEX: 53.03 KG/M2 | DIASTOLIC BLOOD PRESSURE: 77 MMHG

## 2018-05-31 DIAGNOSIS — S83.92XA SPRAIN OF LEFT KNEE/LEG, INITIAL ENCOUNTER: ICD-10-CM

## 2018-05-31 DIAGNOSIS — M51.27 LUMBAGO-SCIATICA DUE TO DISPLACEMENT OF LUMBAR INTERVERTEBRAL DISC: ICD-10-CM

## 2018-05-31 DIAGNOSIS — S33.9XXA SPRAIN OF LIGAMENT OF LUMBOSACRAL JOINT, INITIAL ENCOUNTER: ICD-10-CM

## 2018-05-31 PROCEDURE — 99213 OFFICE O/P EST LOW 20 MIN: CPT | Performed by: INTERNAL MEDICINE

## 2018-05-31 RX ORDER — NORTRIPTYLINE HYDROCHLORIDE 25 MG/1
CAPSULE ORAL
Qty: 60 CAPSULE | Refills: 0 | Status: SHIPPED | OUTPATIENT
Start: 2018-05-31 | End: 2018-06-28 | Stop reason: SDUPTHER

## 2018-05-31 RX ORDER — PREGABALIN 150 MG/1
150 CAPSULE ORAL 3 TIMES DAILY
Qty: 90 CAPSULE | Refills: 0 | Status: SHIPPED | OUTPATIENT
Start: 2018-05-31 | End: 2018-06-28 | Stop reason: SDUPTHER

## 2018-05-31 RX ORDER — OXYCODONE HYDROCHLORIDE 30 MG/1
30 TABLET, FILM COATED, EXTENDED RELEASE ORAL 3 TIMES DAILY
Qty: 90 EACH | Refills: 0 | Status: SHIPPED | OUTPATIENT
Start: 2018-05-31 | End: 2018-06-28 | Stop reason: SDUPTHER

## 2018-06-28 ENCOUNTER — OFFICE VISIT (OUTPATIENT)
Dept: PAIN MANAGEMENT | Age: 43
End: 2018-06-28

## 2018-06-28 VITALS
HEART RATE: 73 BPM | WEIGHT: 315 LBS | DIASTOLIC BLOOD PRESSURE: 67 MMHG | SYSTOLIC BLOOD PRESSURE: 137 MMHG | BODY MASS INDEX: 53.41 KG/M2

## 2018-06-28 DIAGNOSIS — S33.9XXA SPRAIN OF LIGAMENT OF LUMBOSACRAL JOINT, INITIAL ENCOUNTER: ICD-10-CM

## 2018-06-28 DIAGNOSIS — M51.27 LUMBAGO-SCIATICA DUE TO DISPLACEMENT OF LUMBAR INTERVERTEBRAL DISC: ICD-10-CM

## 2018-06-28 DIAGNOSIS — S83.92XA SPRAIN OF LEFT KNEE/LEG, INITIAL ENCOUNTER: ICD-10-CM

## 2018-06-28 PROCEDURE — 99213 OFFICE O/P EST LOW 20 MIN: CPT | Performed by: INTERNAL MEDICINE

## 2018-06-28 RX ORDER — OXYCODONE HYDROCHLORIDE 30 MG/1
30 TABLET, FILM COATED, EXTENDED RELEASE ORAL 3 TIMES DAILY
Qty: 84 EACH | Refills: 0 | Status: SHIPPED | OUTPATIENT
Start: 2018-06-28 | End: 2018-07-26 | Stop reason: SDUPTHER

## 2018-06-28 RX ORDER — NORTRIPTYLINE HYDROCHLORIDE 25 MG/1
CAPSULE ORAL
Qty: 60 CAPSULE | Refills: 0 | Status: SHIPPED | OUTPATIENT
Start: 2018-06-28 | End: 2018-07-26 | Stop reason: SDUPTHER

## 2018-06-28 RX ORDER — FUROSEMIDE 20 MG/1
TABLET ORAL
Qty: 60 TABLET | Refills: 11 | Status: SHIPPED | OUTPATIENT
Start: 2018-06-28 | End: 2019-07-18 | Stop reason: SDUPTHER

## 2018-06-28 RX ORDER — PREGABALIN 150 MG/1
150 CAPSULE ORAL 3 TIMES DAILY
Qty: 90 CAPSULE | Refills: 0 | Status: SHIPPED | OUTPATIENT
Start: 2018-06-28 | End: 2018-07-26 | Stop reason: SDUPTHER

## 2018-06-28 RX ORDER — IBUPROFEN 600 MG/1
TABLET ORAL
Qty: 60 TABLET | Refills: 0 | Status: SHIPPED | OUTPATIENT
Start: 2018-06-28 | End: 2018-07-26 | Stop reason: SDUPTHER

## 2018-07-16 ENCOUNTER — TELEPHONE (OUTPATIENT)
Dept: FAMILY MEDICINE CLINIC | Age: 43
End: 2018-07-16

## 2018-07-16 DIAGNOSIS — L03.116 CELLULITIS OF LEFT LOWER EXTREMITY: ICD-10-CM

## 2018-07-16 NOTE — TELEPHONE ENCOUNTER
Pt had been having a fever since yesterday, now fever is down but pt is still feeling body ache. Pt wanted to see if  could call in something (keflex) instead of coming in for appt. Please Advise.

## 2018-07-17 ENCOUNTER — OFFICE VISIT (OUTPATIENT)
Dept: FAMILY MEDICINE CLINIC | Age: 43
End: 2018-07-17

## 2018-07-17 VITALS
DIASTOLIC BLOOD PRESSURE: 88 MMHG | TEMPERATURE: 100.3 F | SYSTOLIC BLOOD PRESSURE: 132 MMHG | WEIGHT: 315 LBS | BODY MASS INDEX: 54.44 KG/M2 | OXYGEN SATURATION: 92 % | HEART RATE: 92 BPM

## 2018-07-17 DIAGNOSIS — G47.33 OSA (OBSTRUCTIVE SLEEP APNEA): ICD-10-CM

## 2018-07-17 DIAGNOSIS — L03.116 CELLULITIS OF LEFT LOWER EXTREMITY: Primary | ICD-10-CM

## 2018-07-17 PROCEDURE — 99213 OFFICE O/P EST LOW 20 MIN: CPT | Performed by: FAMILY MEDICINE

## 2018-07-17 RX ORDER — CEPHALEXIN 500 MG/1
CAPSULE ORAL
Qty: 30 CAPSULE | Refills: 0 | OUTPATIENT
Start: 2018-07-17

## 2018-07-17 RX ORDER — CEPHALEXIN 500 MG/1
500 CAPSULE ORAL 4 TIMES DAILY
Qty: 40 CAPSULE | Refills: 2 | Status: SHIPPED | OUTPATIENT
Start: 2018-07-17 | End: 2018-07-17 | Stop reason: SDUPTHER

## 2018-07-17 RX ORDER — CEPHALEXIN 500 MG/1
500 CAPSULE ORAL 4 TIMES DAILY
Qty: 40 CAPSULE | Refills: 2 | Status: SHIPPED | OUTPATIENT
Start: 2018-07-17 | End: 2018-07-26

## 2018-07-17 RX ORDER — CLINDAMYCIN HYDROCHLORIDE 300 MG/1
300 CAPSULE ORAL 3 TIMES DAILY
Qty: 30 CAPSULE | Refills: 3 | Status: SHIPPED | OUTPATIENT
Start: 2018-07-17 | End: 2018-07-26

## 2018-07-17 RX ORDER — CLINDAMYCIN HYDROCHLORIDE 300 MG/1
300 CAPSULE ORAL 3 TIMES DAILY
Qty: 30 CAPSULE | Refills: 3 | Status: SHIPPED | OUTPATIENT
Start: 2018-07-17 | End: 2018-07-17 | Stop reason: SDUPTHER

## 2018-07-18 NOTE — PROGRESS NOTES
600 MG tablet TAKE 1 TABLET BY MOUTH 2 TIMES DAILY AS NEEDED FOR PAIN Yes Gabriella Lombardi MD   lisinopril-hydrochlorothiazide (PRINZIDE;ZESTORETIC) 20-12.5 MG per tablet TAKE ONE TABLET BY MOUTH DAILY Yes Mk Fernández MD       OBJECTIVE:  /88   Pulse 92   Temp 100.3 °F (37.9 °C)   Wt (!) 424 lb (192.3 kg)   SpO2 92%   BMI 54.44 kg/m²   GEN:  in NADMorbidly obese but thousand lose 50 pounds in the next 6 months  CV:  Regular rate and rhythm, S1 and S2 normal, no murmurs, clicks  PULM:  Chest is clear, no wheezing ,  symmetric air entry throughout both lung fields. EXT: Right leg is large and has chronic venous stasis hyperpigmentation changes without any pitting or redness or warmth under his compression stocking  Left leg is warm red tense swollen tender from the ankle up into the mid thigh with early fading of the redness at the line of demarcation of the mid thigh that the patient had placed yesterday      ASSESSMENT/PLAN:  1. Cellulitis of left lower extremity  Recurrent, stabilizing  - clindamycin (CLEOCIN) 300 MG capsule; Take 1 capsule by mouth 3 times daily for 10 days  Dispense: 30 capsule; Refill: 3  Keflex 500 4 times a day  Warning signs given-fever or chills increased pain he will need to be in the emergency room for IV antibiotics    2. Class 3 obesity due to excess calories without serious comorbidity with body mass index (BMI) of 50.0 to 59.9 in [facility])  He is getting to the point where he knows he has no choice, he plans to lose 50 pounds in the next 6 months    3.  ANN-MARIE (obstructive sleep apnea)  CPAP nightly  Further weight loss efforts

## 2018-07-26 ENCOUNTER — OFFICE VISIT (OUTPATIENT)
Dept: PAIN MANAGEMENT | Age: 43
End: 2018-07-26

## 2018-07-26 VITALS — DIASTOLIC BLOOD PRESSURE: 66 MMHG | SYSTOLIC BLOOD PRESSURE: 152 MMHG

## 2018-07-26 DIAGNOSIS — S83.92XA SPRAIN OF LEFT KNEE/LEG, INITIAL ENCOUNTER: ICD-10-CM

## 2018-07-26 DIAGNOSIS — S33.9XXA SPRAIN OF LIGAMENT OF LUMBOSACRAL JOINT, INITIAL ENCOUNTER: ICD-10-CM

## 2018-07-26 DIAGNOSIS — S33.9XXD SPRAIN OF LIGAMENT OF LUMBOSACRAL JOINT, SUBSEQUENT ENCOUNTER: ICD-10-CM

## 2018-07-26 DIAGNOSIS — S83.92XD SPRAIN OF LEFT KNEE/LEG, SUBSEQUENT ENCOUNTER: ICD-10-CM

## 2018-07-26 DIAGNOSIS — M51.27 LUMBAGO-SCIATICA DUE TO DISPLACEMENT OF LUMBAR INTERVERTEBRAL DISC: ICD-10-CM

## 2018-07-26 PROCEDURE — 99214 OFFICE O/P EST MOD 30 MIN: CPT | Performed by: INTERNAL MEDICINE

## 2018-07-26 RX ORDER — PREGABALIN 150 MG/1
150 CAPSULE ORAL 3 TIMES DAILY
Qty: 90 CAPSULE | Refills: 0 | Status: SHIPPED | OUTPATIENT
Start: 2018-07-26 | End: 2018-08-23 | Stop reason: SDUPTHER

## 2018-07-26 RX ORDER — OXYCODONE HYDROCHLORIDE 30 MG/1
30 TABLET, FILM COATED, EXTENDED RELEASE ORAL 3 TIMES DAILY
Qty: 84 EACH | Refills: 0 | Status: SHIPPED | OUTPATIENT
Start: 2018-07-26 | End: 2018-08-23 | Stop reason: SDUPTHER

## 2018-07-26 RX ORDER — IBUPROFEN 600 MG/1
TABLET ORAL
Qty: 60 TABLET | Refills: 0 | Status: SHIPPED | OUTPATIENT
Start: 2018-07-26 | End: 2018-08-23 | Stop reason: SDUPTHER

## 2018-07-26 RX ORDER — NORTRIPTYLINE HYDROCHLORIDE 25 MG/1
CAPSULE ORAL
Qty: 60 CAPSULE | Refills: 0 | Status: SHIPPED | OUTPATIENT
Start: 2018-07-26 | End: 2018-08-23 | Stop reason: SDUPTHER

## 2018-07-26 NOTE — PROGRESS NOTES
Lelo Deidre  1975  [id number]    HISTORY OF PRESENT ILLNESS:  Mr. Beryl Lambert is a 37 y.o. male returns for a follow up visit for multiple medical problems. His current presenting problems are   1. Sprain of ligament of lumbosacral joint, subsequent encounter    2. Sprain of left knee/leg, subsequent encounter    3. 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 As per information/history obtained from the PADT(patient assessment and documentation tool) - He complains of pain in the lower back with radiation to the buttocks, knees Left, lower leg Left, ankles Left and feet Left He rates the pain 4/10 and describes it as sharp, aching, burning, numbness, pins and needles. Pain is made worse by: movement, walking, standing, sitting, bending, lifting. Current treatment regimen has helped relieve about 70% of the pain. He denies side effects from the current pain regimen. Patient reports that since the last follow up visit the physical functioning is unchanged, family/social relationships are unchanged, mood is unchanged and sleep patterns are unchanged, and that the overall functioning is unchanged. Patient denies neurological bowel or bladder. Patient denies misusing/abusing his narcotic pain medications or using any illegal drugs. There are No indicators for possible drug abuse, addiction or diversion problems. Upon obtaining the medical history from Mr. Beryl Lambert regarding today's office visit for his presenting problems, Patient states he has been doing fair, managing with the medications. He complains of burning in the left leg on walking any distance. He says he sits at work mostly. Mr. Beryl Lambert mentions he had an infection in the left leg, took antibiotics and now doing better. He mentions his weight has been stable. Patient states his sleep is fair. Has fairly normal sleep latency. Averages about 4-6 hours of sleep a night. Denies any signs of sleep apnea.  Feels somewhat ONE TABLET BY MOUTH DAILY 30 tablet 11    cephALEXin (KEFLEX) 500 MG capsule Take 1 capsule by mouth 4 times daily 40 capsule 2    clindamycin (CLEOCIN) 300 MG capsule Take 1 capsule by mouth 3 times daily for 10 days 30 capsule 3     No facility-administered medications prior to visit. REVIEW OF SYSTEMS:   Constitutional: Negative for fatigue and unexpected weight change. Eyes: Negative for visual disturbance. Respiratory: Negative for shortness of breath. Cardiovascular: Negative for chest pain, palpitations  Gastrointestinal: Negative for blood in stool, abdominal distention, nausea, vomiting, abdominal pain, diarrhea,constipation. Skin: Negative for color change or any abnormal bruising. Neurological: Negative for speech difficulty, weakness and light-headedness, dizziness, tremors, sleepiness  Psychiatric/Behavioral: Negative for suicidal ideas, hallucinations, behavioral problems, self-injury, decreased concentration/cognition, agitation, confusion. PHYSICAL EXAM:   Nursing note and vitals reviewed. BP (!) 152/66 (Site: Left Arm, Position: Sitting, Cuff Size: Large Adult)   General Appearance: Patient is well nourished, well developed, well groomed and in no acute distress. Skin: Skin is warm and dry, good turgor . No rash or lesions noted. He is not diaphoretic. Pulmonary/Chest: Effort normal. No respiratory distress or use of accessory muscles. Auscultation revealing normal air entry. He does not have wheezes in the lung fields. He has no rales. Cardiovascular: Normal rate, regular rhythm, normal heart sounds, and does not have murmur. Exam reveals no gallop and no friction rub. Abdominal: Soft. Bowel sounds are normal.  On inspection of abdomen is obese no distension and no mass. No tenderness. He has no rebound and no guarding. Musculoskeletal / Extremities: Range of motion is normal. Gait is normal, assistive devices use: none.     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 Medications/orders associated with this visit:    Current Outpatient Prescriptions   Medication Sig Dispense Refill    furosemide (LASIX) 20 MG tablet TAKE ONE TO TWO TABLETS BY MOUTH EVERY MORNING AS NEEDED FOR EDEMA 60 tablet 11    oxyCODONE (OXYCONTIN) 30 MG T12A extended release tablet Take 30 mg by mouth 3 times daily for 28 days. . 84 each 0    nortriptyline (PAMELOR) 25 MG capsule TAKE 1-2 TABLETS BY MOUTH NIGHTLY 60 capsule 0    pregabalin (LYRICA) 150 MG capsule Take 1 capsule by mouth 3 times daily for 30 days. . 90 capsule 0    ibuprofen (ADVIL;MOTRIN) 600 MG tablet TAKE 1 TABLET BY MOUTH 2 TIMES DAILY AS NEEDED FOR PAIN 60 tablet 0    lisinopril-hydrochlorothiazide (PRINZIDE;ZESTORETIC) 20-12.5 MG per tablet TAKE ONE TABLET BY MOUTH DAILY 30 tablet 11     No current facility-administered medications for this visit. Goals of current treatment regimen include improvement in pain, restoration of functioning- with focus on improvement in physical performance, general activity, work or disability,emotional distress, health care utilization and  decreased medication consumption. Will continue to monitor progress towards achieving/maintaining therapeutic goals with special emphasis on  1. Improvement in perceived interfernce  of pain with ADL's. Ability to do home exercises independently. Ability to do household chores indoor and/or outdoor work and social and leisure activities. To increase flexibility/ROM, strength and endurance. Improve psychosocial and physical functioning.- he is showing progression towards this treatment goal with the current regimen. 2. Improving sleep to 6-7 hours a night.  Improve mood/ anxiety and depression symptoms such as

## 2018-08-23 ENCOUNTER — OFFICE VISIT (OUTPATIENT)
Dept: PAIN MANAGEMENT | Age: 43
End: 2018-08-23

## 2018-08-23 VITALS
DIASTOLIC BLOOD PRESSURE: 66 MMHG | SYSTOLIC BLOOD PRESSURE: 140 MMHG | HEIGHT: 74 IN | HEART RATE: 73 BPM | BODY MASS INDEX: 54.44 KG/M2 | RESPIRATION RATE: 18 BRPM

## 2018-08-23 DIAGNOSIS — S33.9XXA SPRAIN OF LIGAMENT OF LUMBOSACRAL JOINT, INITIAL ENCOUNTER: ICD-10-CM

## 2018-08-23 DIAGNOSIS — S83.92XA SPRAIN OF LEFT KNEE/LEG, INITIAL ENCOUNTER: ICD-10-CM

## 2018-08-23 DIAGNOSIS — S33.9XXD SPRAIN OF LIGAMENT OF LUMBOSACRAL JOINT, SUBSEQUENT ENCOUNTER: ICD-10-CM

## 2018-08-23 DIAGNOSIS — S83.92XD SPRAIN OF LEFT KNEE/LEG, SUBSEQUENT ENCOUNTER: ICD-10-CM

## 2018-08-23 DIAGNOSIS — M51.27 LUMBAGO-SCIATICA DUE TO DISPLACEMENT OF LUMBAR INTERVERTEBRAL DISC: ICD-10-CM

## 2018-08-23 PROCEDURE — 99213 OFFICE O/P EST LOW 20 MIN: CPT | Performed by: INTERNAL MEDICINE

## 2018-08-23 RX ORDER — OXYCODONE HYDROCHLORIDE 30 MG/1
30 TABLET, FILM COATED, EXTENDED RELEASE ORAL 3 TIMES DAILY
Qty: 84 EACH | Refills: 0 | Status: SHIPPED | OUTPATIENT
Start: 2018-08-23 | End: 2018-09-21 | Stop reason: SDUPTHER

## 2018-08-23 RX ORDER — PREGABALIN 150 MG/1
150 CAPSULE ORAL 3 TIMES DAILY
Qty: 90 CAPSULE | Refills: 0 | Status: SHIPPED | OUTPATIENT
Start: 2018-08-23 | End: 2018-09-21 | Stop reason: SDUPTHER

## 2018-08-23 RX ORDER — NORTRIPTYLINE HYDROCHLORIDE 25 MG/1
CAPSULE ORAL
Qty: 60 CAPSULE | Refills: 1 | Status: SHIPPED | OUTPATIENT
Start: 2018-08-23 | End: 2018-09-21 | Stop reason: SDUPTHER

## 2018-08-23 RX ORDER — IBUPROFEN 600 MG/1
TABLET ORAL
Qty: 60 TABLET | Refills: 1 | Status: SHIPPED | OUTPATIENT
Start: 2018-08-23 | End: 2018-09-21 | Stop reason: SDUPTHER

## 2018-08-23 NOTE — PROGRESS NOTES
[facility]  1975  [id number]    HISTORY OF PRESENT ILLNESS:  Mr. Alan Atkins is a 37 y.o. male returns for a follow up visit for multiple medical problems. His current presenting problems are   1. 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 Sprain of ligament of lumbosacral joint, subsequent encounter    3. Sprain of left knee/leg, subsequent encounter    . As per information/history obtained from the PADT(patient assessment and documentation tool) - He complains of pain in the lower back with radiation to the knees Left, lower leg Left, ankles Left and feet Left He rates the pain 4/10 and describes it as sharp, aching, burning. Pain is made worse by: nothing, movement, walking, standing, sitting, bending, lifting. Current treatment regimen has helped relieve about 70% of the pain. He denies side effects from the current pain regimen. Patient reports that since the last follow up visit the physical functioning is unchanged, family/social relationships are unchanged, mood is unchanged and sleep patterns are unchanged, and that the overall functioning is unchanged. Patient denies neurological bowel or bladder. Patient denies misusing/abusing his narcotic pain medications or using any illegal drugs. There are No indicators for possible drug abuse, addiction or diversion problems. Upon obtaining the medical history from Mr. Alan Atkins regarding today's office visit for his presenting problems, Patient reports he is doing fair. He says he is managing with the medications. He mentions he is using OxyContin 30 mg TID. Patient states his sleep is fair. Has fairly normal sleep latency. Averages about 4-6 hours of sleep a night. Denies any signs of sleep apnea. Feels somewhat rested in the morning. He states he is using Pamelor. He says he is using Motrin as needed. He mentions he weight has been stable. He reports he is managing his house chores and ADLs.        ALLERGIES: Patients list of allergies were reviewed     MEDICATIONS: Mr. Angel Luis Nielsen list of medications were reviewed. His current medications are   Outpatient Medications Prior to Visit   Medication Sig Dispense Refill    oxyCODONE (OXYCONTIN) 30 MG T12A extended release tablet Take 30 mg by mouth 3 times daily for 28 days. . 84 each 0    nortriptyline (PAMELOR) 25 MG capsule TAKE 1-2 TABLETS BY MOUTH NIGHTLY 60 capsule 0    pregabalin (LYRICA) 150 MG capsule Take 1 capsule by mouth 3 times daily for 30 days. . 90 capsule 0    ibuprofen (ADVIL;MOTRIN) 600 MG tablet TAKE 1 TABLET BY MOUTH 2 TIMES DAILY AS NEEDED FOR PAIN 60 tablet 0    furosemide (LASIX) 20 MG tablet TAKE ONE TO TWO TABLETS BY MOUTH EVERY MORNING AS NEEDED FOR EDEMA 60 tablet 11    lisinopril-hydrochlorothiazide (PRINZIDE;ZESTORETIC) 20-12.5 MG per tablet TAKE ONE TABLET BY MOUTH DAILY 30 tablet 11     No facility-administered medications prior to visit. SOCIAL/FAMILY/PAST MEDICAL HISTORY: Mr. Jocelyne Chan, family and past medical history was reviewed. REVIEW OF SYSTEMS:    Respiratory: Negative for apnea, chest tightness and shortness of breath or change in baseline breathing. Gastrointestinal: Negative for nausea, vomiting, abdominal pain, diarrhea, constipation, blood in stool and abdominal distention. PHYSICAL EXAM:   Nursing note and vitals reviewed. BP (!) 140/66   Pulse 73   Resp 18   Ht 6' 2\" (1.88 m)   BMI 54.44 kg/m²   Constitutional: He appears well-developed and well-nourished. No acute distress. Skin: Skin is warm and dry, good turgor. No rash noted. He is not diaphoretic. Cardiovascular: Normal rate, regular rhythm, normal heart sounds, and does not have murmur. Pulmonary/Chest: Effort normal. No respiratory distress. He does not have wheezes in the lung fields. He has no rales. Neurological/Psychiatric:He is alert and oriented to person, place, and time.  Coordination is  normal. His mood isAppropriate and were also discussed. If the patient develops new symptoms or if the symptoms worsen, the patient should call the office. While transcribing every attempt was made to maintain the accuracy of the note in terms of it's contents,there may have been some errors made inadvertently. Thank you for allowing me to participate in the care of this patient.     Sharri Lennox, MD.    Cc: Saintclair Conine, MD    I, Sharon Crump, am scribing for and in the presence of Dr. Sharri Lennox.   08/23/18  4:34 PM  MANNY Parra, Dr. Sharri Lennox, personally performed the services described in this documentation as scribed by   Sharon Crump MA in my presence and it is both accurate and complete

## 2018-09-21 ENCOUNTER — OFFICE VISIT (OUTPATIENT)
Dept: PAIN MANAGEMENT | Age: 43
End: 2018-09-21

## 2018-09-21 VITALS
WEIGHT: 315 LBS | HEART RATE: 70 BPM | SYSTOLIC BLOOD PRESSURE: 109 MMHG | BODY MASS INDEX: 53.92 KG/M2 | DIASTOLIC BLOOD PRESSURE: 66 MMHG

## 2018-09-21 DIAGNOSIS — S33.9XXA SPRAIN OF LIGAMENT OF LUMBOSACRAL JOINT, INITIAL ENCOUNTER: ICD-10-CM

## 2018-09-21 DIAGNOSIS — S83.92XA SPRAIN OF LEFT KNEE/LEG, INITIAL ENCOUNTER: ICD-10-CM

## 2018-09-21 DIAGNOSIS — M51.27 LUMBAGO-SCIATICA DUE TO DISPLACEMENT OF LUMBAR INTERVERTEBRAL DISC: ICD-10-CM

## 2018-09-21 DIAGNOSIS — S33.9XXD SPRAIN OF LIGAMENT OF LUMBOSACRAL JOINT, SUBSEQUENT ENCOUNTER: ICD-10-CM

## 2018-09-21 DIAGNOSIS — S83.92XD SPRAIN OF LEFT KNEE/LEG, SUBSEQUENT ENCOUNTER: ICD-10-CM

## 2018-09-21 PROCEDURE — 99214 OFFICE O/P EST MOD 30 MIN: CPT | Performed by: INTERNAL MEDICINE

## 2018-09-21 RX ORDER — NORTRIPTYLINE HYDROCHLORIDE 25 MG/1
CAPSULE ORAL
Qty: 60 CAPSULE | Refills: 1 | Status: SHIPPED | OUTPATIENT
Start: 2018-09-21 | End: 2018-11-13 | Stop reason: SDUPTHER

## 2018-09-21 RX ORDER — PREGABALIN 150 MG/1
150 CAPSULE ORAL 3 TIMES DAILY
Qty: 90 CAPSULE | Refills: 0 | Status: SHIPPED | OUTPATIENT
Start: 2018-09-21 | End: 2018-11-13

## 2018-09-21 RX ORDER — OXYCODONE HYDROCHLORIDE 30 MG/1
30 TABLET, FILM COATED, EXTENDED RELEASE ORAL 3 TIMES DAILY
Qty: 84 EACH | Refills: 0 | Status: SHIPPED | OUTPATIENT
Start: 2018-09-21 | End: 2018-10-16 | Stop reason: SDUPTHER

## 2018-09-21 RX ORDER — IBUPROFEN 600 MG/1
TABLET ORAL
Qty: 60 TABLET | Refills: 1 | Status: SHIPPED | OUTPATIENT
Start: 2018-09-21 | End: 2018-11-13 | Stop reason: SDUPTHER

## 2018-09-21 NOTE — PROGRESS NOTES
FOR PAIN 60 tablet 1    furosemide (LASIX) 20 MG tablet TAKE ONE TO TWO TABLETS BY MOUTH EVERY MORNING AS NEEDED FOR EDEMA 60 tablet 11    lisinopril-hydrochlorothiazide (PRINZIDE;ZESTORETIC) 20-12.5 MG per tablet TAKE ONE TABLET BY MOUTH DAILY 30 tablet 11     No facility-administered medications prior to visit. REVIEW OF SYSTEMS:   Constitutional: Negative for fatigue and unexpected weight change. Eyes: Negative for visual disturbance. Respiratory: Negative for shortness of breath. Cardiovascular: Negative for chest pain, palpitations  Gastrointestinal: Negative for blood in stool, abdominal distention, nausea, vomiting, abdominal pain, diarrhea,constipation. Skin: Negative for color change or any abnormal bruising. Neurological: Negative for speech difficulty, weakness and light-headedness, dizziness, tremors, sleepiness  Psychiatric/Behavioral: Negative for suicidal ideas, hallucinations, behavioral problems, self-injury, decreased concentration/cognition, agitation, confusion. PHYSICAL EXAM:   Nursing note and vitals reviewed. /66   Pulse 70   Wt (!) 420 lb (190.5 kg)   BMI 53.92 kg/m²   General Appearance: Patient is well nourished, well developed, well groomed and in no acute distress. Skin: Skin is warm and dry, good turgor . No rash or lesions noted. He is not diaphoretic. Pulmonary/Chest: Effort normal. No respiratory distress or use of accessory muscles. Auscultation revealing normal air entry. He does not have wheezes in the lung fields. He has no rales. Cardiovascular: Normal rate, regular rhythm, normal heart sounds, and does not have murmur. Exam reveals no gallop and no friction rub. Abdominal: Soft. Bowel sounds are normal.  On inspection of abdomen is obese no distension and no mass. No tenderness. He has no rebound and no guarding. Musculoskeletal / Extremities: Range of motion is normal. Gait + limp, assistive devices use: none.     He exhibits edema: which for this visit include:   Diagnoses of  [facility] Lumbago-sciatica due to displacement of lumbar intervertebral disc, C Sprain of left knee/leg, initial encounter, BWC Sprain of ligament of lumbosacral joint, initial encounter, Sprain of ligament of lumbosacral joint, initial encounter, Sprain of left knee/leg, initial encounter, Sprain of ligament of lumbosacral joint, subsequent encounter, and Sprain of left knee/leg, subsequent encounter were pertinent to this visit. Medications/orders associated with this visit:    Current Outpatient Prescriptions   Medication Sig Dispense Refill    pregabalin (LYRICA) 150 MG capsule Take 1 capsule by mouth 3 times daily for 30 days. . 90 capsule 0    nortriptyline (PAMELOR) 25 MG capsule TAKE 1-2 TABLETS BY MOUTH NIGHTLY 60 capsule 1    ibuprofen (ADVIL;MOTRIN) 600 MG tablet TAKE 1 TABLET BY MOUTH 2 TIMES DAILY AS NEEDED FOR PAIN 60 tablet 1    furosemide (LASIX) 20 MG tablet TAKE ONE TO TWO TABLETS BY MOUTH EVERY MORNING AS NEEDED FOR EDEMA 60 tablet 11    lisinopril-hydrochlorothiazide (PRINZIDE;ZESTORETIC) 20-12.5 MG per tablet TAKE ONE TABLET BY MOUTH DAILY 30 tablet 11     No current facility-administered medications for this visit. Goals of current treatment regimen include improvement in pain, restoration of functioning- with focus on improvement in physical performance, general activity, work or disability,emotional distress, health care utilization and  decreased medication consumption. Will continue to monitor progress towards achieving/maintaining therapeutic goals with special emphasis on  1. Improvement in perceived interfernce  of pain with ADL's. Ability to do home exercises independently. Ability to do household chores indoor and/or outdoor work and social and leisure activities. To increase flexibility/ROM, strength and endurance.  Improve psychosocial and physical functioning.- he is showing progression towards this treatment goal with the current regimen. 2. Improving sleep to 6-7 hours a night. Improve mood/ anxiety and depression symptoms such as crying spells, low energy, problems with concentration, motivation.- he is showing progression towards this treatment goal with the current regimen. 3. Reduction of reliance on opioid analgesia/more appropriate opioid use. - he is showing progression towards this treatment goal with the current regimen. 4. Ability to focus/concentrate at work and perform the duties required of him at work  Sit through a workday without lower extremity symptoms. Stand 30-60 minutes without lower extremity symptoms. Ability to lift up to 10-20 lbs. Ability to go up and down stairs. Sit 30-60 minutes  Without having to stand up frequently. - he is maintaining/progressing towards his work related goals with the current regimen. Risks and benefits of the medications and other alternative treatments have been/were  discussed with the patient. Any questions on the  common side effects of these medications were also answered. He was advised against drinking alcohol with the narcotic pain medicines, advised against driving or handling machinery when  starting or adjusting the dose of medicines, feeling groggy or drowsy, or if having any cognitive issues related to the current medications. Heis fully aware of the risk of overdose and death, if medicines are misused and not taken as prescribed. If he develops new symptoms or if the symptoms worsen, he was told to call the office. .  Thank you for allowing me to participate in the care of this patient.     Eve Calero MD.    Cc: MD CHERYL Stewart, Hayden Alaniz, am scribing for and in the presence of Dr. Eve Calero.   09/21/18  9:48 AM  MANNY Bragg, Dr. Eve Calero, personally performed the services described in this documentation as scribed by   Hayden Alaniz MA in my presence and it is both accurate and complete

## 2018-10-01 ENCOUNTER — OFFICE VISIT (OUTPATIENT)
Dept: FAMILY MEDICINE CLINIC | Age: 43
End: 2018-10-01

## 2018-10-01 VITALS
HEIGHT: 74 IN | BODY MASS INDEX: 40.43 KG/M2 | HEART RATE: 88 BPM | OXYGEN SATURATION: 96 % | DIASTOLIC BLOOD PRESSURE: 80 MMHG | WEIGHT: 315 LBS | SYSTOLIC BLOOD PRESSURE: 136 MMHG | TEMPERATURE: 98.8 F

## 2018-10-01 DIAGNOSIS — G47.33 OSA (OBSTRUCTIVE SLEEP APNEA): ICD-10-CM

## 2018-10-01 DIAGNOSIS — L03.116 CELLULITIS OF LEFT LOWER EXTREMITY: Primary | ICD-10-CM

## 2018-10-01 DIAGNOSIS — I87.2 CHRONIC VENOUS STASIS DERMATITIS OF BOTH LOWER EXTREMITIES: ICD-10-CM

## 2018-10-01 DIAGNOSIS — E66.01 MORBID OBESITY (HCC): ICD-10-CM

## 2018-10-01 PROCEDURE — 99213 OFFICE O/P EST LOW 20 MIN: CPT | Performed by: FAMILY MEDICINE

## 2018-10-01 RX ORDER — CEPHALEXIN 500 MG/1
1000 CAPSULE ORAL 3 TIMES DAILY
Qty: 60 CAPSULE | Refills: 0 | Status: ON HOLD | OUTPATIENT
Start: 2018-10-01 | End: 2018-10-20 | Stop reason: HOSPADM

## 2018-10-01 ASSESSMENT — PATIENT HEALTH QUESTIONNAIRE - PHQ9
SUM OF ALL RESPONSES TO PHQ QUESTIONS 1-9: 0
SUM OF ALL RESPONSES TO PHQ QUESTIONS 1-9: 0
1. LITTLE INTEREST OR PLEASURE IN DOING THINGS: 0
2. FEELING DOWN, DEPRESSED OR HOPELESS: 0
SUM OF ALL RESPONSES TO PHQ9 QUESTIONS 1 & 2: 0

## 2018-10-01 NOTE — PROGRESS NOTES
MORNING AS NEEDED FOR EDEMA Yes Sarita Witt MD   lisinopril-hydrochlorothiazide (PRINZIDE;ZESTORETIC) 20-12.5 MG per tablet TAKE ONE TABLET BY MOUTH DAILY  Sarita Witt MD       OBJECTIVE:  /80   Pulse 88   Temp 98.8 °F (37.1 °C) (Tympanic)   Ht 6' 2\" (1.88 m)   Wt (!) 426 lb 9.6 oz (193.5 kg)   SpO2 96%   BMI 54.77 kg/m²   GEN:  in NADMorbidly obese weight is up  CV:  Regular rate and rhythm, S1 and S2 normal, no murmurs, clicks  PULM:  Chest is clear, no wheezing ,  symmetric air entry throughout both lung fields. EXT: Both lower extremities have dark hyperpigmentation from the knees down from chronic venous stasis left leg the discoloration is actually purple in color from the ankle to the knee and there is redness and warmth from the calf following up to the left inner thigh no open sores noted      ASSESSMENT/PLAN:  1. Cellulitis of left lower extremity  Recurrent  Start Keflex 1000 mg 3 times a day with clindamycin 300 3 times a day Tylenol and ibuprofen  Warning signs given  I have a very low threshold for putting in the hospital for IV and Biaxin and an ID consult because this is continued to recur  He prefers to try outpatient management first    2. Morbid obesity ([address]  Weight loss    3. ANN-MARIE (obstructive sleep apnea)  CPAP    4.  Chronic venous stasis dermatitis of both lower extremities  Compression stockings    5 chronic pain   follow-up with     #6 hypertension  Continue to observe off medication

## 2018-10-16 ENCOUNTER — HOSPITAL ENCOUNTER (INPATIENT)
Age: 43
LOS: 3 days | Discharge: HOME | DRG: 603 | End: 2018-10-20
Attending: EMERGENCY MEDICINE | Admitting: FAMILY MEDICINE
Payer: COMMERCIAL

## 2018-10-16 ENCOUNTER — OFFICE VISIT (OUTPATIENT)
Dept: PAIN MANAGEMENT | Age: 43
End: 2018-10-16
Payer: COMMERCIAL

## 2018-10-16 VITALS
WEIGHT: 315 LBS | DIASTOLIC BLOOD PRESSURE: 70 MMHG | BODY MASS INDEX: 54.7 KG/M2 | SYSTOLIC BLOOD PRESSURE: 140 MMHG | HEART RATE: 90 BPM

## 2018-10-16 DIAGNOSIS — S33.9XXA SPRAIN OF LIGAMENT OF LUMBOSACRAL JOINT, INITIAL ENCOUNTER: ICD-10-CM

## 2018-10-16 DIAGNOSIS — I87.2 CHRONIC VENOUS STASIS DERMATITIS OF BOTH LOWER EXTREMITIES: ICD-10-CM

## 2018-10-16 DIAGNOSIS — S83.92XA SPRAIN OF LEFT KNEE/LEG, INITIAL ENCOUNTER: ICD-10-CM

## 2018-10-16 DIAGNOSIS — S83.92XD SPRAIN OF LEFT KNEE/LEG, SUBSEQUENT ENCOUNTER: ICD-10-CM

## 2018-10-16 DIAGNOSIS — L03.116 CELLULITIS OF LEFT LEG: Primary | ICD-10-CM

## 2018-10-16 DIAGNOSIS — S33.9XXD SPRAIN OF LIGAMENT OF LUMBOSACRAL JOINT, SUBSEQUENT ENCOUNTER: ICD-10-CM

## 2018-10-16 DIAGNOSIS — M51.27 LUMBAGO-SCIATICA DUE TO DISPLACEMENT OF LUMBAR INTERVERTEBRAL DISC: ICD-10-CM

## 2018-10-16 PROBLEM — E66.01 MORBID OBESITY WITH BMI OF 50.0-59.9, ADULT (HCC): Chronic | Status: ACTIVE | Noted: 2018-10-16

## 2018-10-16 LAB
ANION GAP SERPL CALCULATED.3IONS-SCNC: 11 MMOL/L (ref 3–16)
BASOPHILS ABSOLUTE: 0 K/UL (ref 0–0.2)
BASOPHILS RELATIVE PERCENT: 0.2 %
BUN BLDV-MCNC: 14 MG/DL (ref 7–20)
CALCIUM SERPL-MCNC: 9 MG/DL (ref 8.3–10.6)
CHLORIDE BLD-SCNC: 100 MMOL/L (ref 99–110)
CO2: 27 MMOL/L (ref 21–32)
CREAT SERPL-MCNC: 0.6 MG/DL (ref 0.9–1.3)
EOSINOPHILS ABSOLUTE: 0.1 K/UL (ref 0–0.6)
EOSINOPHILS RELATIVE PERCENT: 0.9 %
GFR AFRICAN AMERICAN: >60
GFR NON-AFRICAN AMERICAN: >60
GLUCOSE BLD-MCNC: 142 MG/DL (ref 70–99)
HCT VFR BLD CALC: 38.9 % (ref 40.5–52.5)
HEMOGLOBIN: 13.3 G/DL (ref 13.5–17.5)
LYMPHOCYTES ABSOLUTE: 1.5 K/UL (ref 1–5.1)
LYMPHOCYTES RELATIVE PERCENT: 21.9 %
MCH RBC QN AUTO: 30.1 PG (ref 26–34)
MCHC RBC AUTO-ENTMCNC: 34.2 G/DL (ref 31–36)
MCV RBC AUTO: 88.1 FL (ref 80–100)
MONOCYTES ABSOLUTE: 0.6 K/UL (ref 0–1.3)
MONOCYTES RELATIVE PERCENT: 9.5 %
NEUTROPHILS ABSOLUTE: 4.6 K/UL (ref 1.7–7.7)
NEUTROPHILS RELATIVE PERCENT: 67.5 %
PDW BLD-RTO: 13.7 % (ref 12.4–15.4)
PLATELET # BLD: 133 K/UL (ref 135–450)
PMV BLD AUTO: 8.6 FL (ref 5–10.5)
POTASSIUM SERPL-SCNC: 3.8 MMOL/L (ref 3.5–5.1)
RBC # BLD: 4.42 M/UL (ref 4.2–5.9)
SODIUM BLD-SCNC: 138 MMOL/L (ref 136–145)
WBC # BLD: 6.8 K/UL (ref 4–11)

## 2018-10-16 PROCEDURE — 87040 BLOOD CULTURE FOR BACTERIA: CPT

## 2018-10-16 PROCEDURE — 99213 OFFICE O/P EST LOW 20 MIN: CPT | Performed by: INTERNAL MEDICINE

## 2018-10-16 PROCEDURE — 6360000002 HC RX W HCPCS: Performed by: INTERNAL MEDICINE

## 2018-10-16 PROCEDURE — 36415 COLL VENOUS BLD VENIPUNCTURE: CPT

## 2018-10-16 PROCEDURE — 2580000003 HC RX 258: Performed by: INTERNAL MEDICINE

## 2018-10-16 PROCEDURE — 85025 COMPLETE CBC W/AUTO DIFF WBC: CPT

## 2018-10-16 PROCEDURE — 80048 BASIC METABOLIC PNL TOTAL CA: CPT

## 2018-10-16 PROCEDURE — 96365 THER/PROPH/DIAG IV INF INIT: CPT

## 2018-10-16 PROCEDURE — 96367 TX/PROPH/DG ADDL SEQ IV INF: CPT

## 2018-10-16 PROCEDURE — 99284 EMERGENCY DEPT VISIT MOD MDM: CPT

## 2018-10-16 RX ORDER — OXYCODONE HYDROCHLORIDE 30 MG/1
30 TABLET, FILM COATED, EXTENDED RELEASE ORAL 3 TIMES DAILY
Qty: 84 EACH | Refills: 0 | Status: SHIPPED | OUTPATIENT
Start: 2018-10-16 | End: 2018-11-13 | Stop reason: SDUPTHER

## 2018-10-16 RX ADMIN — VANCOMYCIN HYDROCHLORIDE 2000 MG: 10 INJECTION, POWDER, LYOPHILIZED, FOR SOLUTION INTRAVENOUS at 23:29

## 2018-10-16 RX ADMIN — PIPERACILLIN SODIUM,TAZOBACTAM SODIUM 3.38 G: 3; .375 INJECTION, POWDER, FOR SOLUTION INTRAVENOUS at 22:21

## 2018-10-16 ASSESSMENT — PAIN DESCRIPTION - DESCRIPTORS: DESCRIPTORS: ACHING;BURNING

## 2018-10-16 ASSESSMENT — ENCOUNTER SYMPTOMS
COLOR CHANGE: 1
BACK PAIN: 0
ABDOMINAL PAIN: 0
CHEST TIGHTNESS: 0
COUGH: 0
CHOKING: 0
ABDOMINAL DISTENTION: 0
SHORTNESS OF BREATH: 0
VOMITING: 0
CONSTIPATION: 0
DIARRHEA: 0
NAUSEA: 0

## 2018-10-16 ASSESSMENT — PAIN SCALES - GENERAL: PAINLEVEL_OUTOF10: 1

## 2018-10-16 ASSESSMENT — PAIN DESCRIPTION - PAIN TYPE: TYPE: ACUTE PAIN

## 2018-10-16 ASSESSMENT — PAIN DESCRIPTION - ORIENTATION: ORIENTATION: LEFT

## 2018-10-16 ASSESSMENT — PAIN DESCRIPTION - LOCATION: LOCATION: LEG

## 2018-10-16 NOTE — PROGRESS NOTES
NIGHTLY 60 capsule 1    ibuprofen (ADVIL;MOTRIN) 600 MG tablet TAKE 1 TABLET BY MOUTH 2 TIMES DAILY AS NEEDED FOR PAIN 60 tablet 1    oxyCODONE (OXYCONTIN) 30 MG T12A extended release tablet Take 30 mg by mouth 3 times daily for 28 days. [facility]on[facility] Date: 9/21/18 84 each 0    furosemide (LASIX) 20 MG tablet TAKE ONE TO TWO TABLETS BY MOUTH EVERY MORNING AS NEEDED FOR EDEMA 60 tablet 11    lisinopril-hydrochlorothiazide (PRINZIDE;ZESTORETIC) 20-12.5 MG per tablet TAKE ONE TABLET BY MOUTH DAILY 30 tablet 11     No facility-administered medications prior to visit. SOCIAL/FAMILY/PAST MEDICAL HISTORY: Mr. Gusman Reasons, family and past medical history was reviewed. REVIEW OF SYSTEMS:    Respiratory: Negative for apnea, chest tightness and shortness of breath or change in baseline breathing. Gastrointestinal: Negative for nausea, vomiting, abdominal pain, diarrhea, constipation, blood in stool and abdominal distention. PHYSICAL EXAM:   Nursing note and vitals reviewed. BP (!) 140/70   Pulse 90   Wt (!) 426 lb (193.2 kg)   BMI 54.70 kg/m²   Constitutional: He appears well-developed and well-nourished. No acute distress. Skin: Skin is warm and dry, good turgor. No rash noted. He is not diaphoretic. Cardiovascular: Normal rate, regular rhythm, normal heart sounds, and does not have murmur. Pulmonary/Chest: Effort normal. No respiratory distress. He does not have wheezes in the lung fields. He has no rales. Neurological/Psychiatric:He is alert and oriented to person, place, and time. Coordination is  normal. His mood isAppropriate and affect is Flat/blunted and Anxious . Other: Using Compression stocking LE     IMPRESSION:   1.  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 BWC Sprain of left knee/leg, initial encounter    3. BWC Sprain of ligament of lumbosacral joint, initial encounter    4. BWCSprain of left knee/leg, initial encounter    5. BWCSprain of ligament of lumbosacral joint, subsequent encounter    6. BWCSprain of left knee/leg, subsequent encounter        PLAN:  Informed verbal consent was obtained  - Continue with current regimen   -OxyContin 30 mg TID   -He was advised weight reduction, diet changes- 800-1200 roc diet, diet diary, exercising, nutritional  consult increased physical activity as tolerated   -Walking stretching exercises as advised   -Will check Labs and UDS in the next 1-2 visits   Current Outpatient Prescriptions   Medication Sig Dispense Refill    cephALEXin (KEFLEX) 500 MG capsule Take 2 capsules by mouth 3 times daily 60 capsule 0    pregabalin (LYRICA) 150 MG capsule Take 1 capsule by mouth 3 times daily for 30 days. . 90 capsule 0    nortriptyline (PAMELOR) 25 MG capsule TAKE 1-2 TABLETS BY MOUTH NIGHTLY 60 capsule 1    ibuprofen (ADVIL;MOTRIN) 600 MG tablet TAKE 1 TABLET BY MOUTH 2 TIMES DAILY AS NEEDED FOR PAIN 60 tablet 1    oxyCODONE (OXYCONTIN) 30 MG T12A extended release tablet Take 30 mg by mouth 3 times daily for 28 days. [facility] Date: 9/21/18 84 each 0    furosemide (LASIX) 20 MG tablet TAKE ONE TO TWO TABLETS BY MOUTH EVERY MORNING AS NEEDED FOR EDEMA 60 tablet 11    lisinopril-hydrochlorothiazide (PRINZIDE;ZESTORETIC) 20-12.5 MG per tablet TAKE ONE TABLET BY MOUTH DAILY 30 tablet 11     No current facility-administered medications for this visit. I will continue his current medication regimen  which is part of the above treatment schedule.  It has been helping with Mr. Delroy Dubin chronic  medical problems which for this visit include:   Diagnoses of  BWC Lumbago-sciatica due to displacement of lumbar intervertebral disc, BWC Sprain of left knee/leg, initial encounter, BWC Sprain of ligament of lumbosacral joint, initial encounter, BWCSprain of left knee/leg, initial encounter, BWCSprain of ligament of lumbosacral joint, subsequent encounter, and BWCSprain of left knee/leg, subsequent encounter

## 2018-10-17 PROBLEM — L03.90 CELLULITIS: Status: ACTIVE | Noted: 2018-10-17

## 2018-10-17 PROCEDURE — 2500000003 HC RX 250 WO HCPCS: Performed by: HOSPITALIST

## 2018-10-17 PROCEDURE — 93971 EXTREMITY STUDY: CPT

## 2018-10-17 PROCEDURE — 6370000000 HC RX 637 (ALT 250 FOR IP): Performed by: FAMILY MEDICINE

## 2018-10-17 PROCEDURE — 1200000000 HC SEMI PRIVATE

## 2018-10-17 PROCEDURE — 2580000003 HC RX 258: Performed by: FAMILY MEDICINE

## 2018-10-17 PROCEDURE — 6370000000 HC RX 637 (ALT 250 FOR IP): Performed by: HOSPITALIST

## 2018-10-17 PROCEDURE — 6360000002 HC RX W HCPCS: Performed by: FAMILY MEDICINE

## 2018-10-17 PROCEDURE — 2500000003 HC RX 250 WO HCPCS: Performed by: FAMILY MEDICINE

## 2018-10-17 RX ORDER — SODIUM CHLORIDE 0.9 % (FLUSH) 0.9 %
10 SYRINGE (ML) INJECTION PRN
Status: DISCONTINUED | OUTPATIENT
Start: 2018-10-17 | End: 2018-10-20 | Stop reason: HOSPADM

## 2018-10-17 RX ORDER — CLINDAMYCIN PHOSPHATE 600 MG/50ML
600 INJECTION INTRAVENOUS EVERY 8 HOURS
Status: DISCONTINUED | OUTPATIENT
Start: 2018-10-17 | End: 2018-10-20 | Stop reason: HOSPADM

## 2018-10-17 RX ORDER — BUMETANIDE 0.25 MG/ML
1 INJECTION, SOLUTION INTRAMUSCULAR; INTRAVENOUS ONCE
Status: COMPLETED | OUTPATIENT
Start: 2018-10-17 | End: 2018-10-17

## 2018-10-17 RX ORDER — NORTRIPTYLINE HYDROCHLORIDE 25 MG/1
25 CAPSULE ORAL NIGHTLY
Status: DISCONTINUED | OUTPATIENT
Start: 2018-10-17 | End: 2018-10-20 | Stop reason: HOSPADM

## 2018-10-17 RX ORDER — FUROSEMIDE 40 MG/1
40 TABLET ORAL DAILY
Status: DISCONTINUED | OUTPATIENT
Start: 2018-10-17 | End: 2018-10-17

## 2018-10-17 RX ORDER — PREGABALIN 150 MG/1
150 CAPSULE ORAL 3 TIMES DAILY
Status: DISCONTINUED | OUTPATIENT
Start: 2018-10-17 | End: 2018-10-20 | Stop reason: HOSPADM

## 2018-10-17 RX ORDER — HYDROCODONE BITARTRATE AND ACETAMINOPHEN 5; 325 MG/1; MG/1
2 TABLET ORAL EVERY 4 HOURS PRN
Status: DISCONTINUED | OUTPATIENT
Start: 2018-10-17 | End: 2018-10-20 | Stop reason: HOSPADM

## 2018-10-17 RX ORDER — LISINOPRIL AND HYDROCHLOROTHIAZIDE 20; 12.5 MG/1; MG/1
1 TABLET ORAL DAILY
Status: DISCONTINUED | OUTPATIENT
Start: 2018-10-17 | End: 2018-10-20 | Stop reason: HOSPADM

## 2018-10-17 RX ORDER — IBUPROFEN 200 MG
600 TABLET ORAL EVERY 8 HOURS PRN
Status: DISCONTINUED | OUTPATIENT
Start: 2018-10-17 | End: 2018-10-20 | Stop reason: HOSPADM

## 2018-10-17 RX ORDER — HYDROCODONE BITARTRATE AND ACETAMINOPHEN 5; 325 MG/1; MG/1
1 TABLET ORAL EVERY 4 HOURS PRN
Status: DISCONTINUED | OUTPATIENT
Start: 2018-10-17 | End: 2018-10-20 | Stop reason: HOSPADM

## 2018-10-17 RX ORDER — ACETAMINOPHEN 325 MG/1
650 TABLET ORAL EVERY 4 HOURS PRN
Status: DISCONTINUED | OUTPATIENT
Start: 2018-10-17 | End: 2018-10-20 | Stop reason: HOSPADM

## 2018-10-17 RX ORDER — SODIUM CHLORIDE 0.9 % (FLUSH) 0.9 %
10 SYRINGE (ML) INJECTION EVERY 12 HOURS SCHEDULED
Status: DISCONTINUED | OUTPATIENT
Start: 2018-10-17 | End: 2018-10-20 | Stop reason: HOSPADM

## 2018-10-17 RX ORDER — ONDANSETRON 2 MG/ML
4 INJECTION INTRAMUSCULAR; INTRAVENOUS EVERY 6 HOURS PRN
Status: DISCONTINUED | OUTPATIENT
Start: 2018-10-17 | End: 2018-10-20 | Stop reason: HOSPADM

## 2018-10-17 RX ORDER — LACTOBACILLUS RHAMNOSUS GG 10B CELL
2 CAPSULE ORAL 2 TIMES DAILY
Status: DISCONTINUED | OUTPATIENT
Start: 2018-10-17 | End: 2018-10-20 | Stop reason: HOSPADM

## 2018-10-17 RX ORDER — OXYCODONE HCL 10 MG/1
30 TABLET, FILM COATED, EXTENDED RELEASE ORAL 3 TIMES DAILY
Status: DISCONTINUED | OUTPATIENT
Start: 2018-10-17 | End: 2018-10-20 | Stop reason: HOSPADM

## 2018-10-17 RX ADMIN — FUROSEMIDE 40 MG: 40 TABLET ORAL at 09:34

## 2018-10-17 RX ADMIN — OXYCODONE HYDROCHLORIDE 30 MG: 10 TABLET, FILM COATED, EXTENDED RELEASE ORAL at 09:34

## 2018-10-17 RX ADMIN — CLINDAMYCIN PHOSPHATE 600 MG: 600 INJECTION, SOLUTION INTRAVENOUS at 18:16

## 2018-10-17 RX ADMIN — OXYCODONE HYDROCHLORIDE 30 MG: 10 TABLET, FILM COATED, EXTENDED RELEASE ORAL at 21:17

## 2018-10-17 RX ADMIN — LISINOPRIL AND HYDROCHLOROTHIAZIDE 1 TABLET: 12.5; 2 TABLET ORAL at 09:34

## 2018-10-17 RX ADMIN — Medication 2 CAPSULE: at 21:17

## 2018-10-17 RX ADMIN — ENOXAPARIN SODIUM 40 MG: 40 INJECTION SUBCUTANEOUS at 09:35

## 2018-10-17 RX ADMIN — Medication 10 ML: at 21:17

## 2018-10-17 RX ADMIN — DEXTROSE MONOHYDRATE 1 G: 5 INJECTION INTRAVENOUS at 18:16

## 2018-10-17 RX ADMIN — Medication 2 CAPSULE: at 11:10

## 2018-10-17 RX ADMIN — Medication 10 ML: at 11:08

## 2018-10-17 RX ADMIN — PREGABALIN 150 MG: 150 CAPSULE ORAL at 21:31

## 2018-10-17 RX ADMIN — PREGABALIN 150 MG: 150 CAPSULE ORAL at 09:33

## 2018-10-17 RX ADMIN — OXYCODONE HYDROCHLORIDE 30 MG: 10 TABLET, FILM COATED, EXTENDED RELEASE ORAL at 14:47

## 2018-10-17 RX ADMIN — DEXTROSE MONOHYDRATE 1 G: 5 INJECTION INTRAVENOUS at 11:07

## 2018-10-17 RX ADMIN — NORTRIPTYLINE HYDROCHLORIDE 25 MG: 25 CAPSULE ORAL at 21:18

## 2018-10-17 RX ADMIN — CLINDAMYCIN PHOSPHATE 600 MG: 600 INJECTION, SOLUTION INTRAVENOUS at 11:07

## 2018-10-17 RX ADMIN — DEXTROSE MONOHYDRATE 1 G: 5 INJECTION INTRAVENOUS at 03:00

## 2018-10-17 RX ADMIN — PREGABALIN 150 MG: 150 CAPSULE ORAL at 14:47

## 2018-10-17 RX ADMIN — BUMETANIDE 1 MG: 0.25 INJECTION INTRAMUSCULAR; INTRAVENOUS at 17:04

## 2018-10-17 RX ADMIN — CLINDAMYCIN PHOSPHATE 600 MG: 600 INJECTION, SOLUTION INTRAVENOUS at 03:00

## 2018-10-17 ASSESSMENT — PAIN DESCRIPTION - ONSET: ONSET: ON-GOING

## 2018-10-17 ASSESSMENT — PAIN DESCRIPTION - PAIN TYPE
TYPE: CHRONIC PAIN
TYPE: ACUTE PAIN

## 2018-10-17 ASSESSMENT — PAIN DESCRIPTION - LOCATION: LOCATION: BACK

## 2018-10-17 ASSESSMENT — PAIN DESCRIPTION - DESCRIPTORS: DESCRIPTORS: ACHING

## 2018-10-17 ASSESSMENT — PAIN SCALES - GENERAL
PAINLEVEL_OUTOF10: 4
PAINLEVEL_OUTOF10: 4
PAINLEVEL_OUTOF10: 3
PAINLEVEL_OUTOF10: 0
PAINLEVEL_OUTOF10: 2

## 2018-10-17 ASSESSMENT — PAIN DESCRIPTION - PROGRESSION: CLINICAL_PROGRESSION: NOT CHANGED

## 2018-10-17 ASSESSMENT — PAIN DESCRIPTION - FREQUENCY: FREQUENCY: CONTINUOUS

## 2018-10-17 ASSESSMENT — PAIN DESCRIPTION - ORIENTATION: ORIENTATION: LOWER

## 2018-10-17 NOTE — CARE COORDINATION
and is agreeable with CT calls at d/c.      Follow Up  Future Appointments  Date Time Provider Francie Salmon   11/13/2018 9:40 AM MD Jai Sanderson [facility]       Health Maintenance  Health Maintenance Due   Topic Date Due    Lipid screen  02/07/2015       Gumaro Winter RN

## 2018-10-17 NOTE — H&P
[facility]    10/17/2018 5:06 AM    Patient Information:  Pat Caba is a 37 y.o. male [id number]  PCP:  Tika Lin MD (Tel: [phone] )    Chief complaint:    Chief Complaint   Patient presents with    Leg Swelling     warm, red, hx of cellulitis, sent by pcp        History of Present Illness:  Xander Mesa is a 37 y.o. male who presents with increasing swelling, redness, warmth of his left leg over the past few days. He had keflex and clindamycin at home to use when cellulitis starts in his legs, which he started yesterday but no improvement. He has chronic venous statis and recurrent cellulitis in his legs. His left leg is more swollen than the right. No fevers, chills, vomiting. He has sleep apnea but does not uses CPAP or Bipap. REVIEW OF SYSTEMS:   Constitutional: Negative for fever,chills or night sweats  ENT: Negative for rhinorrhea, epistaxis, hoarseness, sore throat. Respiratory: Negative for shortness of breath,wheezing  Cardiovascular: Negative for chest pain, palpitations   Gastrointestinal: Negative for nausea, vomiting, diarrhea  Genitourinary: Negative for polyuria, dysuria   Hematologic/Lymphatic: Negative for bleeding tendency, easy bruising  Musculoskeletal: Negative for myalgias and arthralgias  Neurologic: Negative for confusion,dysarthria. Skin: Negative for itching,rash  +cellulitis leg  Psychiatric: Negative for depression,anxiety, agitation. Endocrine: Negative for polydipsia,polyuria,heat /cold intolerance. Past Medical History:   has a past medical history of Chronic back pain; HNP (herniated nucleus pulposus with myelopathy), thoracic; ANN-MARIE (obstructive sleep apnea); and Stasis dermatitis. Past Surgical History:   has a past surgical history that includes lumbar laminectomy (06/26/06) and Spine surgery.      Medications:  No current facility-administered distended, normal bowel sounds  Musculoskeletal: strength symmetric  Neurology: awake and alert; no facial asymmetry, CN 2-12 appear intact  No speech or motor deficits  Psychiatry: Appropriate affect. Not agitated, cooperative  Skin: Warm, dry, no rash    Labs:  CBC:   Lab Results   Component Value Date    WBC 6.8 10/16/2018    RBC 4.42 10/16/2018    HGB 13.3 10/16/2018    HCT 38.9 10/16/2018    MCV 88.1 10/16/2018    MCH 30.1 10/16/2018    MCHC 34.2 10/16/2018    RDW 13.7 10/16/2018     10/16/2018    MPV 8.6 10/16/2018     BMP:    Lab Results   Component Value Date     10/16/2018    K 3.8 10/16/2018     10/16/2018    CO2 27 10/16/2018    BUN 14 10/16/2018    CREATININE 0.6 10/16/2018    CALCIUM 9.0 10/16/2018    GFRAA >60 10/16/2018    LABGLOM >60 10/16/2018    GLUCOSE 142 10/16/2018            Assessment/Plan:   Principal Problem:    Cellulitis of left lower extremity in patient with chronic venous stasis. Active Problems:    ANN-MARIE (obstructive sleep apnea)    Chronic venous stasis dermatitis of both lower extremities    Morbid obesity with BMI of 50.0-59.9, adult ([facility] [address]  Hypertension  Rule out DVT    Will give IV clinda and ancef. Will check duplex to rule out DVT. Will continue lasix and home BP regimen. Monitor vitals. Admit as inpatient. I anticipate hospitalization spanning more than two midnights for investigation and treatment of the above medically necessary diagnoses.       Garrel Nyhan, MD    10/17/2018 5:06 AM

## 2018-10-17 NOTE — PROGRESS NOTES
turgor normal.  No rashes or lesions. Neurologic:  Neurovascularly intact without any focal sensory/motor deficits. Psychiatric: Alert and oriented, thought content appropriate, normal insight  Capillary Refill: Brisk,< 3 seconds   Peripheral Pulses: +2 palpable, equal bilaterally       Labs:   Recent Labs      10/16/18   2131   WBC  6.8   HGB  13.3*   HCT  38.9*   PLT  133*     Recent Labs      10/16/18   2131   NA  138   K  3.8   CL  100   CO2  27   BUN  14   CREATININE  0.6*   CALCIUM  9.0     No results for input(s): AST, ALT, BILIDIR, BILITOT, ALKPHOS in the last 72 hours. No results for input(s): INR in the last 72 hours. No results for input(s): [facility] in the last 72 hours. Urinalysis:    No results found for: Luiz Ormond, [address], [address] [place] [address] [address], [place]    Radiology:  VL Extremity Venous Left             Assessment/Plan:    Active Hospital Problems    Diagnosis     Cellulitis of left lower extremity [P68.501]     Morbid obesity with BMI of 50.0-59.9, adult (HCC) [E66.01, Z68.43]     Chronic venous stasis dermatitis of both lower extremities [I87.2]     ANN-MARIE (obstructive sleep apnea) [G47.33]      Recurrent cellulitis in pt with chronic venous stasis, edema, prior traumatic injuries to the L leg in past  Suspect with improvement in edema, can prevent recurrent cellulitis    AM labs    Cx improving on empiric regimen of IV Clindamycin + IV Ancef. Cont this for now. Add Lactobacillus. Ask ID to see given recurrence. Will dc PO Lasix and provide IV Bumex x 1 (given predom R sided edema), monitor UO and response. If good response, consider further doses into the coming days. Cont OOB as able, once erythema improves further, could also consider custom MARVIN hose vs ace wrap the legs.       DVT Prophylaxis: Lovenox  Diet: DIET GENERAL;  Code Status: Full Code    PT/OT Eval Status: None    Dispo - Home in 3 days    Discussed with pt in detail    >30 min spent in addition to my partners initial evaluation earlier today.     Fausto Arthur MD

## 2018-10-17 NOTE — ED PROVIDER NOTES
ED Attending Attestation Note     Date of evaluation: 10/16/2018    This patient was seen by the resident. I have seen and examined the patient, agree with the workup, evaluation, management and diagnosis. The care plan has been discussed. My assessment reveals here for left leg swelling and redness. .Patient's had problems with cellulitis in his left leg in the past.  He's had a long history of venous stasis problems in both lower extremities. On exam he does have enlargement of redness starting in his mid And going up to his thigh on the outer aspect and the inner aspect. Also has dark venous stasis changes of his mid to lower calf and shin area.        Albert Montoya MD  10/16/18 5629
capsules by mouth 3 times daily    FUROSEMIDE (LASIX) 20 MG TABLET    TAKE ONE TO TWO TABLETS BY MOUTH EVERY MORNING AS NEEDED FOR EDEMA    IBUPROFEN (ADVIL;MOTRIN) 600 MG TABLET    TAKE 1 TABLET BY MOUTH 2 TIMES DAILY AS NEEDED FOR PAIN    LISINOPRIL-HYDROCHLOROTHIAZIDE (PRINZIDE;ZESTORETIC) 20-12.5 MG PER TABLET    TAKE ONE TABLET BY MOUTH DAILY    NORTRIPTYLINE (PAMELOR) 25 MG CAPSULE    TAKE 1-2 TABLETS BY MOUTH NIGHTLY    OXYCODONE (OXYCONTIN) 30 MG T12A EXTENDED RELEASE TABLET    Take 30 mg by mouth 3 times daily for 28 days. Any Vergara PREGABALIN (LYRICA) 150 MG CAPSULE    Take 1 capsule by mouth 3 times daily for 30 days. .       Allergies     He has No Known Allergies. Physical Exam     INITIAL VITALS: BP: (!) 165/82, Temp: 98.5 °F (36.9 °C), Pulse: 87, Resp: 20, SpO2: 95 %   Physical Exam   Constitutional: He is oriented to person, place, and time. No distress. Morbidly obese male in no acute distress   Cardiovascular: Normal rate, regular rhythm and normal heart sounds. Exam reveals no gallop and no friction rub. No murmur heard. Pulmonary/Chest: Effort normal and breath sounds normal. No respiratory distress. He has no wheezes. He has no rales. He exhibits no tenderness. Abdominal: Soft. Bowel sounds are normal.   Morbidly obese abdomen   Musculoskeletal: Normal range of motion. He exhibits edema, tenderness and deformity. Neurological: He is alert and oriented to person, place, and time. He displays normal reflexes. No cranial nerve deficit or sensory deficit. He exhibits normal muscle tone. Coordination normal.   Skin: Skin is warm and dry. No rash noted. He is not diaphoretic. There is erythema. Bilateral veneous statis. Erythema of left lower extremity spreading to upper thigh. No folliculitis noted, no purulent discharge, no bulla     Skin break of left sole    Dark painful spot on inner foot   Nursing note and vitals reviewed.       Diagnostic Results       RADIOLOGY:  No orders to display

## 2018-10-17 NOTE — CONSULTS
PTD Vancomycin in ED per [place]    Ht: 73\"  Wt: 198.4 kg  Indication: cellulitis    Vancomycin 2g x1 dose ordered for ED. Please consult pharmacy for further dosing.      Donovan Ramirez, PharmD, [facility] 10/16/2018 9:38 PM

## 2018-10-18 LAB
ANION GAP SERPL CALCULATED.3IONS-SCNC: 12 MMOL/L (ref 3–16)
BASOPHILS ABSOLUTE: 0 K/UL (ref 0–0.2)
BASOPHILS RELATIVE PERCENT: 0.3 %
BUN BLDV-MCNC: 11 MG/DL (ref 7–20)
CALCIUM SERPL-MCNC: 9.1 MG/DL (ref 8.3–10.6)
CHLORIDE BLD-SCNC: 95 MMOL/L (ref 99–110)
CO2: 30 MMOL/L (ref 21–32)
CREAT SERPL-MCNC: 0.6 MG/DL (ref 0.9–1.3)
EOSINOPHILS ABSOLUTE: 0.1 K/UL (ref 0–0.6)
EOSINOPHILS RELATIVE PERCENT: 1.6 %
GFR AFRICAN AMERICAN: >60
GFR NON-AFRICAN AMERICAN: >60
GLUCOSE BLD-MCNC: 126 MG/DL (ref 70–99)
HCT VFR BLD CALC: 39.9 % (ref 40.5–52.5)
HEMOGLOBIN: 13.9 G/DL (ref 13.5–17.5)
LYMPHOCYTES ABSOLUTE: 1.4 K/UL (ref 1–5.1)
LYMPHOCYTES RELATIVE PERCENT: 21.4 %
MCH RBC QN AUTO: 30.6 PG (ref 26–34)
MCHC RBC AUTO-ENTMCNC: 34.9 G/DL (ref 31–36)
MCV RBC AUTO: 87.7 FL (ref 80–100)
MONOCYTES ABSOLUTE: 0.8 K/UL (ref 0–1.3)
MONOCYTES RELATIVE PERCENT: 12.3 %
NEUTROPHILS ABSOLUTE: 4.1 K/UL (ref 1.7–7.7)
NEUTROPHILS RELATIVE PERCENT: 64.4 %
PDW BLD-RTO: 13.7 % (ref 12.4–15.4)
PLATELET # BLD: 162 K/UL (ref 135–450)
PMV BLD AUTO: 8.4 FL (ref 5–10.5)
POTASSIUM SERPL-SCNC: 4 MMOL/L (ref 3.5–5.1)
RBC # BLD: 4.55 M/UL (ref 4.2–5.9)
SODIUM BLD-SCNC: 137 MMOL/L (ref 136–145)
WBC # BLD: 6.3 K/UL (ref 4–11)

## 2018-10-18 PROCEDURE — 99255 IP/OBS CONSLTJ NEW/EST HI 80: CPT | Performed by: INTERNAL MEDICINE

## 2018-10-18 PROCEDURE — 6360000002 HC RX W HCPCS: Performed by: FAMILY MEDICINE

## 2018-10-18 PROCEDURE — 36415 COLL VENOUS BLD VENIPUNCTURE: CPT

## 2018-10-18 PROCEDURE — 80048 BASIC METABOLIC PNL TOTAL CA: CPT

## 2018-10-18 PROCEDURE — 6370000000 HC RX 637 (ALT 250 FOR IP): Performed by: HOSPITALIST

## 2018-10-18 PROCEDURE — 6360000002 HC RX W HCPCS: Performed by: INTERNAL MEDICINE

## 2018-10-18 PROCEDURE — 2500000003 HC RX 250 WO HCPCS: Performed by: FAMILY MEDICINE

## 2018-10-18 PROCEDURE — 2580000003 HC RX 258: Performed by: FAMILY MEDICINE

## 2018-10-18 PROCEDURE — 85025 COMPLETE CBC W/AUTO DIFF WBC: CPT

## 2018-10-18 PROCEDURE — 6370000000 HC RX 637 (ALT 250 FOR IP): Performed by: FAMILY MEDICINE

## 2018-10-18 PROCEDURE — 1200000000 HC SEMI PRIVATE

## 2018-10-18 RX ADMIN — CEFAZOLIN SODIUM 2 G: 2 SOLUTION INTRAVENOUS at 21:52

## 2018-10-18 RX ADMIN — OXYCODONE HYDROCHLORIDE 30 MG: 10 TABLET, FILM COATED, EXTENDED RELEASE ORAL at 08:56

## 2018-10-18 RX ADMIN — PREGABALIN 150 MG: 150 CAPSULE ORAL at 14:09

## 2018-10-18 RX ADMIN — PREGABALIN 150 MG: 150 CAPSULE ORAL at 21:52

## 2018-10-18 RX ADMIN — LISINOPRIL AND HYDROCHLOROTHIAZIDE 1 TABLET: 12.5; 2 TABLET ORAL at 08:57

## 2018-10-18 RX ADMIN — OXYCODONE HYDROCHLORIDE 30 MG: 10 TABLET, FILM COATED, EXTENDED RELEASE ORAL at 21:51

## 2018-10-18 RX ADMIN — ENOXAPARIN SODIUM 40 MG: 40 INJECTION SUBCUTANEOUS at 08:56

## 2018-10-18 RX ADMIN — CEFAZOLIN SODIUM 2 G: 2 SOLUTION INTRAVENOUS at 11:57

## 2018-10-18 RX ADMIN — Medication 10 ML: at 21:54

## 2018-10-18 RX ADMIN — Medication 2 CAPSULE: at 21:51

## 2018-10-18 RX ADMIN — OXYCODONE HYDROCHLORIDE 30 MG: 10 TABLET, FILM COATED, EXTENDED RELEASE ORAL at 14:09

## 2018-10-18 RX ADMIN — CEFAZOLIN SODIUM 2 G: 2 SOLUTION INTRAVENOUS at 05:12

## 2018-10-18 RX ADMIN — IBUPROFEN 600 MG: 200 TABLET, FILM COATED ORAL at 05:51

## 2018-10-18 RX ADMIN — CLINDAMYCIN PHOSPHATE 600 MG: 600 INJECTION, SOLUTION INTRAVENOUS at 03:55

## 2018-10-18 RX ADMIN — CLINDAMYCIN PHOSPHATE 600 MG: 600 INJECTION, SOLUTION INTRAVENOUS at 12:06

## 2018-10-18 RX ADMIN — Medication 10 ML: at 08:57

## 2018-10-18 RX ADMIN — Medication 2 CAPSULE: at 08:56

## 2018-10-18 RX ADMIN — NORTRIPTYLINE HYDROCHLORIDE 25 MG: 25 CAPSULE ORAL at 21:51

## 2018-10-18 RX ADMIN — PREGABALIN 150 MG: 150 CAPSULE ORAL at 09:52

## 2018-10-18 RX ADMIN — CLINDAMYCIN PHOSPHATE 600 MG: 600 INJECTION, SOLUTION INTRAVENOUS at 18:07

## 2018-10-18 ASSESSMENT — PAIN SCALES - GENERAL
PAINLEVEL_OUTOF10: 4
PAINLEVEL_OUTOF10: 3
PAINLEVEL_OUTOF10: 2
PAINLEVEL_OUTOF10: 1
PAINLEVEL_OUTOF10: 3
PAINLEVEL_OUTOF10: 2
PAINLEVEL_OUTOF10: 3

## 2018-10-18 ASSESSMENT — PAIN DESCRIPTION - FREQUENCY
FREQUENCY: CONTINUOUS
FREQUENCY: CONTINUOUS

## 2018-10-18 ASSESSMENT — PAIN DESCRIPTION - PAIN TYPE
TYPE: CHRONIC PAIN
TYPE: CHRONIC PAIN

## 2018-10-18 ASSESSMENT — PAIN DESCRIPTION - DESCRIPTORS
DESCRIPTORS: CONSTANT
DESCRIPTORS: ACHING

## 2018-10-18 ASSESSMENT — PAIN DESCRIPTION - PROGRESSION
CLINICAL_PROGRESSION: NOT CHANGED
CLINICAL_PROGRESSION: NOT CHANGED

## 2018-10-18 ASSESSMENT — PAIN DESCRIPTION - LOCATION
LOCATION: BACK
LOCATION: BACK

## 2018-10-18 ASSESSMENT — PAIN DESCRIPTION - ONSET
ONSET: ON-GOING
ONSET: ON-GOING

## 2018-10-18 NOTE — PROGRESS NOTES
lesions. Neurologic:  Neurovascularly intact without any focal sensory/motor deficits. Psychiatric: Alert and oriented, thought content appropriate, normal insight  Capillary Refill: Brisk,< 3 seconds   Peripheral Pulses: +2 palpable, equal bilaterally       Labs:   Recent Labs      10/16/18   2131  10/18/18   0544   WBC  6.8  6.3   HGB  13.3*  13.9   HCT  38.9*  39.9*   PLT  133*  162     Recent Labs      10/16/18   2131  10/18/18   0544   NA  138  137   K  3.8  4.0   CL  100  95*   CO2  27  30   BUN  14  11   CREATININE  0.6*  0.6*   CALCIUM  9.0  9.1     No results for input(s): AST, ALT, BILIDIR, BILITOT, ALKPHOS in the last 72 hours. No results for input(s): INR in the last 72 hours. No results for input(s): [place] Hosteller in the last 72 hours. Urinalysis:    No results found for: [facility], [address] [place] [address], [facility] [address]    Radiology:  VL Extremity Venous Left   Final Result          Assessment/Plan:    Active Hospital Problems    Diagnosis     Cellulitis of left lower extremity [S46.129]     Morbid obesity with BMI of 50.0-59.9, adult (HCC) [E66.01, Z68.43]     Chronic venous stasis dermatitis of both lower extremities [I87.2]     ANN-MARIE (obstructive sleep apnea) [G47.33]      Recurrent cellulitis in pt with chronic venous stasis, edema, prior traumatic injuries to the L leg in past    AM labs    Pt seen by Dr Neeraj Garay, ABx adjusted    Pt will need long term plan for lower extremity edema.  Will do saeid hose while in hospital, and prescribe thigh-high compression stockings on discharge      DVT Prophylaxis: Lovenox  Diet: DIET GENERAL;  Code Status: Full Code    PT/OT Eval Status: None    Dispo - Home in 1-2 days      Adrian Douglas MD

## 2018-10-18 NOTE — PROGRESS NOTES
Pt alert and oriented, VSS. Pt denies pain in legs, but is c/o chronic pain and medications given. Redness in left leg is within border. Pt is urinated large amounts since medications given today. Is up ad sekou with a steady gate. Call light within reach.

## 2018-10-19 PROCEDURE — 2580000003 HC RX 258: Performed by: INTERNAL MEDICINE

## 2018-10-19 PROCEDURE — 6360000002 HC RX W HCPCS: Performed by: INTERNAL MEDICINE

## 2018-10-19 PROCEDURE — 1200000000 HC SEMI PRIVATE

## 2018-10-19 PROCEDURE — 2500000003 HC RX 250 WO HCPCS: Performed by: FAMILY MEDICINE

## 2018-10-19 PROCEDURE — 2580000003 HC RX 258: Performed by: FAMILY MEDICINE

## 2018-10-19 PROCEDURE — 6370000000 HC RX 637 (ALT 250 FOR IP): Performed by: HOSPITALIST

## 2018-10-19 PROCEDURE — 6360000002 HC RX W HCPCS: Performed by: FAMILY MEDICINE

## 2018-10-19 PROCEDURE — 6370000000 HC RX 637 (ALT 250 FOR IP): Performed by: FAMILY MEDICINE

## 2018-10-19 PROCEDURE — 99233 SBSQ HOSP IP/OBS HIGH 50: CPT | Performed by: INTERNAL MEDICINE

## 2018-10-19 RX ADMIN — PREGABALIN 150 MG: 150 CAPSULE ORAL at 08:42

## 2018-10-19 RX ADMIN — Medication 2 CAPSULE: at 08:42

## 2018-10-19 RX ADMIN — ACETAMINOPHEN 650 MG: 325 TABLET, FILM COATED ORAL at 18:16

## 2018-10-19 RX ADMIN — CEFAZOLIN SODIUM 2 G: 1 POWDER, FOR SOLUTION INTRAMUSCULAR; INTRAVENOUS at 06:10

## 2018-10-19 RX ADMIN — NORTRIPTYLINE HYDROCHLORIDE 25 MG: 25 CAPSULE ORAL at 20:43

## 2018-10-19 RX ADMIN — CEFAZOLIN SODIUM 2 G: 1 POWDER, FOR SOLUTION INTRAMUSCULAR; INTRAVENOUS at 13:54

## 2018-10-19 RX ADMIN — Medication 2 CAPSULE: at 20:44

## 2018-10-19 RX ADMIN — OXYCODONE HYDROCHLORIDE 30 MG: 10 TABLET, FILM COATED, EXTENDED RELEASE ORAL at 20:43

## 2018-10-19 RX ADMIN — Medication 10 ML: at 08:43

## 2018-10-19 RX ADMIN — OXYCODONE HYDROCHLORIDE 30 MG: 10 TABLET, FILM COATED, EXTENDED RELEASE ORAL at 13:54

## 2018-10-19 RX ADMIN — ENOXAPARIN SODIUM 40 MG: 40 INJECTION SUBCUTANEOUS at 08:41

## 2018-10-19 RX ADMIN — PREGABALIN 150 MG: 150 CAPSULE ORAL at 13:54

## 2018-10-19 RX ADMIN — LISINOPRIL AND HYDROCHLOROTHIAZIDE 1 TABLET: 12.5; 2 TABLET ORAL at 09:33

## 2018-10-19 RX ADMIN — CLINDAMYCIN PHOSPHATE 600 MG: 600 INJECTION, SOLUTION INTRAVENOUS at 18:09

## 2018-10-19 RX ADMIN — PREGABALIN 150 MG: 150 CAPSULE ORAL at 20:44

## 2018-10-19 RX ADMIN — CLINDAMYCIN PHOSPHATE 600 MG: 600 INJECTION, SOLUTION INTRAVENOUS at 03:52

## 2018-10-19 RX ADMIN — CLINDAMYCIN PHOSPHATE 600 MG: 600 INJECTION, SOLUTION INTRAVENOUS at 10:45

## 2018-10-19 RX ADMIN — OXYCODONE HYDROCHLORIDE 30 MG: 10 TABLET, FILM COATED, EXTENDED RELEASE ORAL at 08:42

## 2018-10-19 RX ADMIN — CEFAZOLIN SODIUM 2 G: 1 POWDER, FOR SOLUTION INTRAMUSCULAR; INTRAVENOUS at 23:46

## 2018-10-19 RX ADMIN — Medication 10 ML: at 20:46

## 2018-10-19 ASSESSMENT — PAIN SCALES - GENERAL
PAINLEVEL_OUTOF10: 3
PAINLEVEL_OUTOF10: 3
PAINLEVEL_OUTOF10: 2
PAINLEVEL_OUTOF10: 3
PAINLEVEL_OUTOF10: 1
PAINLEVEL_OUTOF10: 1
PAINLEVEL_OUTOF10: 4

## 2018-10-19 ASSESSMENT — PAIN DESCRIPTION - PROGRESSION
CLINICAL_PROGRESSION: NOT CHANGED

## 2018-10-19 ASSESSMENT — PAIN DESCRIPTION - FREQUENCY
FREQUENCY: CONTINUOUS

## 2018-10-19 ASSESSMENT — PAIN DESCRIPTION - DESCRIPTORS
DESCRIPTORS: ACHING;CONSTANT
DESCRIPTORS: ACHING
DESCRIPTORS: ACHING;CONSTANT

## 2018-10-19 ASSESSMENT — PAIN DESCRIPTION - PAIN TYPE
TYPE: CHRONIC PAIN

## 2018-10-19 ASSESSMENT — PAIN DESCRIPTION - ONSET
ONSET: ON-GOING

## 2018-10-19 ASSESSMENT — PAIN DESCRIPTION - LOCATION
LOCATION: BACK

## 2018-10-19 NOTE — PROGRESS NOTES
ID Follow-up NOTE    CC:   L LE cellulitis  Antibiotics: Unasyn    Admit Date: 10/16/2018  Hospital Day: 4    Subjective:     Patient reports less pain in L leg. No other complaint    Objective:     Patient Vitals for the past 8 hrs:   BP Temp Temp src Pulse Resp SpO2   10/19/18 0352 (!) 153/85 98.9 °F (37.2 °C) Oral 76 18 92 %   10/19/18 0017 (!) 155/84 98.1 °F (36.7 °C) Oral 68 18 95 %     I/O last 3 completed shifts: In: 56 [P.O.:920; I.V.:10; IV Piggyback:100]  Out: 3325 [Urine:3325]  No intake/output data recorded. EXAM:  GENERAL: No apparent distress. HEENT: Membranes moist, no oral lesion  NECK:  Supple  LUNGS: Clear b/l, no rales, no dullness  CARDIAC: RRR, no murmur appreciated  ABD:  + BS, soft / NT  EXT:  Bilateral LE venous stasis, R LE with increased redness / warmth / swelling, no wound  NEURO: No focal neurologic findings  PSYCH: Orientation, sensorium, mood normal  LINES:  Peripheral iv       Data Review:  Lab Results   Component Value Date    WBC 6.3 10/18/2018    HGB 13.9 10/18/2018    HCT 39.9 (L) 10/18/2018    MCV 87.7 10/18/2018     10/18/2018     Lab Results   Component Value Date    CREATININE 0.6 (L) 10/18/2018    BUN 11 10/18/2018     10/18/2018    K 4.0 10/18/2018    CL 95 (L) 10/18/2018    CO2 30 10/18/2018       Hepatic Function Panel: Lab Results   Component Value Date    ALKPHOS 64 03/21/2018    ALT 33 03/21/2018    AST 30 03/21/2018    PROT 7.1 03/21/2018    BILITOT 0.6 03/21/2018    LABALBU 4.6 03/21/2018       Micro:  10/17 BC sent     Imaging:   10/17 L LE venous doppler ultrasound   No evidence of deep vein or superficial thrombosis involving the left lower    extremity and the contralateral proximal common femoral vein.        Scheduled Meds:   ceFAZolin  2 g Intravenous Q8H    lisinopril-hydrochlorothiazide  1 tablet Oral Daily    pregabalin  150 mg Oral TID    oxyCODONE  30 mg Oral TID    clindamycin (CLEOCIN) IV  600 mg Intravenous Q8H    sodium

## 2018-10-19 NOTE — PLAN OF CARE
Problem: Skin Integrity - Impaired:  Goal: Absence of new skin breakdown  Absence of new skin breakdown   Outcome: Ongoing  Patient cellulitis is marked on LLE. Has not moved outside of borders. Is wrapped with ACE.  Will continue to monitor

## 2018-10-20 VITALS
WEIGHT: 315 LBS | OXYGEN SATURATION: 93 % | RESPIRATION RATE: 18 BRPM | HEIGHT: 73 IN | SYSTOLIC BLOOD PRESSURE: 123 MMHG | TEMPERATURE: 97.7 F | HEART RATE: 65 BPM | DIASTOLIC BLOOD PRESSURE: 78 MMHG | BODY MASS INDEX: 41.75 KG/M2

## 2018-10-20 PROCEDURE — 2500000003 HC RX 250 WO HCPCS: Performed by: FAMILY MEDICINE

## 2018-10-20 PROCEDURE — 6360000002 HC RX W HCPCS: Performed by: FAMILY MEDICINE

## 2018-10-20 PROCEDURE — 6370000000 HC RX 637 (ALT 250 FOR IP): Performed by: FAMILY MEDICINE

## 2018-10-20 PROCEDURE — 2580000003 HC RX 258: Performed by: FAMILY MEDICINE

## 2018-10-20 PROCEDURE — 6370000000 HC RX 637 (ALT 250 FOR IP): Performed by: HOSPITALIST

## 2018-10-20 PROCEDURE — 6360000002 HC RX W HCPCS: Performed by: INTERNAL MEDICINE

## 2018-10-20 PROCEDURE — 2580000003 HC RX 258: Performed by: INTERNAL MEDICINE

## 2018-10-20 RX ORDER — AMOXICILLIN AND CLAVULANATE POTASSIUM 875; 125 MG/1; MG/1
1 TABLET, FILM COATED ORAL 2 TIMES DAILY
Qty: 20 TABLET | Refills: 1 | Status: SHIPPED | OUTPATIENT
Start: 2018-10-20 | End: 2018-10-30

## 2018-10-20 RX ADMIN — Medication 10 ML: at 07:56

## 2018-10-20 RX ADMIN — LISINOPRIL AND HYDROCHLOROTHIAZIDE 1 TABLET: 12.5; 2 TABLET ORAL at 07:57

## 2018-10-20 RX ADMIN — ENOXAPARIN SODIUM 40 MG: 40 INJECTION SUBCUTANEOUS at 07:56

## 2018-10-20 RX ADMIN — PREGABALIN 150 MG: 150 CAPSULE ORAL at 07:56

## 2018-10-20 RX ADMIN — Medication 2 CAPSULE: at 07:57

## 2018-10-20 RX ADMIN — CLINDAMYCIN PHOSPHATE 600 MG: 600 INJECTION, SOLUTION INTRAVENOUS at 04:07

## 2018-10-20 RX ADMIN — CEFAZOLIN SODIUM 2 G: 1 POWDER, FOR SOLUTION INTRAMUSCULAR; INTRAVENOUS at 06:44

## 2018-10-20 RX ADMIN — OXYCODONE HYDROCHLORIDE 30 MG: 10 TABLET, FILM COATED, EXTENDED RELEASE ORAL at 09:04

## 2018-10-20 RX ADMIN — CLINDAMYCIN PHOSPHATE 600 MG: 600 INJECTION, SOLUTION INTRAVENOUS at 11:55

## 2018-10-20 ASSESSMENT — PAIN DESCRIPTION - DESCRIPTORS: DESCRIPTORS: ACHING;DISCOMFORT;SHARP

## 2018-10-20 ASSESSMENT — PAIN DESCRIPTION - ONSET: ONSET: PROGRESSIVE

## 2018-10-20 ASSESSMENT — PAIN DESCRIPTION - DIRECTION: RADIATING_TOWARDS: LEGS

## 2018-10-20 ASSESSMENT — PAIN SCALES - GENERAL: PAINLEVEL_OUTOF10: 4

## 2018-10-20 ASSESSMENT — PAIN DESCRIPTION - LOCATION: LOCATION: BACK

## 2018-10-20 ASSESSMENT — PAIN DESCRIPTION - PROGRESSION: CLINICAL_PROGRESSION: NOT CHANGED

## 2018-10-20 ASSESSMENT — PAIN DESCRIPTION - PAIN TYPE: TYPE: CHRONIC PAIN

## 2018-10-20 ASSESSMENT — PAIN DESCRIPTION - FREQUENCY: FREQUENCY: CONTINUOUS

## 2018-10-20 ASSESSMENT — PAIN DESCRIPTION - ORIENTATION: ORIENTATION: MID;LOWER

## 2018-10-20 NOTE — PROGRESS NOTES
Pt alert and oriented, VSS. Pt c/o chronic back pain, scheduled oxycotin given. Denies pain in LLE, redness is within border. Updated on POC per MD notes. Is up ad sekou with a steady gate. Call light within reach.

## 2018-10-21 ENCOUNTER — CARE COORDINATION (OUTPATIENT)
Dept: CASE MANAGEMENT | Age: 43
End: 2018-10-21

## 2018-10-21 LAB
BLOOD CULTURE, ROUTINE: NORMAL
CULTURE, BLOOD 2: NORMAL

## 2018-10-22 ENCOUNTER — TELEPHONE (OUTPATIENT)
Dept: FAMILY MEDICINE CLINIC | Age: 43
End: 2018-10-22

## 2018-10-22 ENCOUNTER — CARE COORDINATION (OUTPATIENT)
Dept: CASE MANAGEMENT | Age: 43
End: 2018-10-22

## 2018-10-22 DIAGNOSIS — E66.01 CLASS 3 SEVERE OBESITY DUE TO EXCESS CALORIES WITHOUT SERIOUS COMORBIDITY WITH BODY MASS INDEX (BMI) OF 50.0 TO 59.9 IN ADULT (HCC): Primary | Chronic | ICD-10-CM

## 2018-10-22 NOTE — CARE COORDINATION
urgent care or physicians 24 hr access line if assistance is needed after hours or on the weekend. Pt denies any additional needs and is agreeable with additional calls.     Follow Up  Future Appointments  Date Time Provider Francie Salmon   10/30/2018 5:30 PM Orly Craig MD [facility]   11/5/2018 8:00 AM MD Heidi Malloy [facility]   11/13/2018 9:40 AM Lynda Grubbs MD [facility]       Jenna Paz RN

## 2018-10-23 ENCOUNTER — TELEPHONE (OUTPATIENT)
Dept: INFECTIOUS DISEASES | Age: 43
End: 2018-10-23

## 2018-10-30 ENCOUNTER — OFFICE VISIT (OUTPATIENT)
Dept: FAMILY MEDICINE CLINIC | Age: 43
End: 2018-10-30
Payer: COMMERCIAL

## 2018-10-30 VITALS
TEMPERATURE: 98.4 F | SYSTOLIC BLOOD PRESSURE: 126 MMHG | HEART RATE: 75 BPM | DIASTOLIC BLOOD PRESSURE: 80 MMHG | WEIGHT: 315 LBS | HEIGHT: 74 IN | OXYGEN SATURATION: 95 % | BODY MASS INDEX: 40.43 KG/M2

## 2018-10-30 DIAGNOSIS — G47.33 OSA (OBSTRUCTIVE SLEEP APNEA): ICD-10-CM

## 2018-10-30 DIAGNOSIS — E66.01 MORBID OBESITY WITH BMI OF 50.0-59.9, ADULT (HCC): Chronic | ICD-10-CM

## 2018-10-30 DIAGNOSIS — Z23 NEED FOR INFLUENZA VACCINATION: Primary | ICD-10-CM

## 2018-10-30 DIAGNOSIS — L03.116 CELLULITIS OF LEFT LOWER EXTREMITY: ICD-10-CM

## 2018-10-30 DIAGNOSIS — I87.2 CHRONIC VENOUS STASIS DERMATITIS OF BOTH LOWER EXTREMITIES: ICD-10-CM

## 2018-10-30 PROCEDURE — 90682 RIV4 VACC RECOMBINANT DNA IM: CPT | Performed by: FAMILY MEDICINE

## 2018-10-30 PROCEDURE — 90471 IMMUNIZATION ADMIN: CPT | Performed by: FAMILY MEDICINE

## 2018-10-30 PROCEDURE — 99214 OFFICE O/P EST MOD 30 MIN: CPT | Performed by: FAMILY MEDICINE

## 2018-10-30 RX ORDER — PREGABALIN 150 MG/1
150 CAPSULE ORAL 2 TIMES DAILY
COMMUNITY
End: 2018-11-13 | Stop reason: SDUPTHER

## 2018-11-01 ENCOUNTER — CARE COORDINATION (OUTPATIENT)
Dept: CASE MANAGEMENT | Age: 43
End: 2018-11-01

## 2018-11-01 NOTE — CARE COORDINATION
[place] [facility] Transitions Initial Follow Up Call    18    Patient:  Kirit Fry Patient :  1975  MRN:  [id number]   Reason for Admission:  Left lower extremity cellulitis  Discharge Date:  10/20/18  RARS:  Anup      Final CTC attempt to reach Pt regarding recent hospital discharge. CTC left voice recording with call back number requesting a call back if further assistance is needed. Follow up appointments:    Future Appointments  Date Time Provider Francie Salmon   2018 8:00 AM MD Marianna Thomas   2018 9:40 AM Devi Parekh MD [facility]       JORDYN YoungerN, RN  Care Transition Coordinator  Contact Number:  [phone]

## 2018-11-05 ENCOUNTER — OFFICE VISIT (OUTPATIENT)
Dept: INFECTIOUS DISEASES | Age: 43
End: 2018-11-05
Payer: COMMERCIAL

## 2018-11-05 VITALS
DIASTOLIC BLOOD PRESSURE: 70 MMHG | HEIGHT: 74 IN | BODY MASS INDEX: 40.43 KG/M2 | SYSTOLIC BLOOD PRESSURE: 138 MMHG | TEMPERATURE: 98.2 F | WEIGHT: 315 LBS

## 2018-11-05 DIAGNOSIS — E66.01 MORBID OBESITY WITH BMI OF 50.0-59.9, ADULT (HCC): Chronic | ICD-10-CM

## 2018-11-05 DIAGNOSIS — I87.2 CHRONIC VENOUS STASIS DERMATITIS OF BOTH LOWER EXTREMITIES: ICD-10-CM

## 2018-11-05 DIAGNOSIS — L03.116 CELLULITIS OF LEFT LOWER EXTREMITY: Primary | ICD-10-CM

## 2018-11-05 PROCEDURE — 99214 OFFICE O/P EST MOD 30 MIN: CPT | Performed by: INTERNAL MEDICINE

## 2018-11-05 RX ORDER — PENICILLIN V POTASSIUM 500 MG/1
1000 TABLET ORAL 2 TIMES DAILY
Qty: 120 TABLET | Refills: 11 | Status: SHIPPED | OUTPATIENT
Start: 2018-11-05 | End: 2019-11-11 | Stop reason: SDUPTHER

## 2018-11-05 NOTE — PROGRESS NOTES
furosemide (LASIX) 20 MG tablet TAKE ONE TO TWO TABLETS BY MOUTH EVERY MORNING AS NEEDED FOR EDEMA 60 tablet 11    Compression Stockings MISC by Does not apply route Thigh high 1 each 0    Elastic Bandages & Supports (MEDICAL COMPRESSION THIGH HIGH) MISC Thigh high compression stockings, 30-40 mm Hg 2 each 1    pregabalin (LYRICA) 150 MG capsule Take 1 capsule by mouth 3 times daily for 30 days. . 90 capsule 0     No current facility-administered medications for this visit. Allergies:  Patient has no known allergies. Social History:    TOBACCO:                 None - past cig  ETOH:                         None   DRUGS:                      None   MARITAL STATUS:        OCCUPATION:           Computer repair                Family History:   No immunodeficiency    REVIEW OF SYSTEMS:    No fever / chills / sweats. No weight loss. No visual change, eye pain, eye discharge. No oral lesion, sore throat, dysphagia. Denies cough / sputum. Denies chest pain, palpitations. Denies n / v / abd pain. No diarrhea. Denies dysuria or change in urinary function. Denies joint swelling or pain. No myalgia, arthralgia. Denies skin change, rash, itching  Denies focal weakness, sensory change or other neurologic symptom  Denies new depression or other psychiatric problem. No symptoms endocrine disorder. No symptoms hematologic disorder. PHYSICAL EXAM:    Vitals:    /70   Temp 98.2 °F (36.8 °C) (Oral)   Ht 6' 2\" (1.88 m)   Wt (!) 407 lb (184.6 kg)   BMI 52.26 kg/m²     GENERAL: No apparent distress.     HEENT: Membranes moist, no oral lesion  NECK:  Supple  LYMPH: No adenopathy   LUNGS: Clear b/l, no rales, no dullness  CARDIAC: RRR, no murmur appreciated  ABD:  + BS, soft / NT  EXT:  LE venous stasis, L>R in size, non increased warmth, see picture - media tab  NEURO: No focal neurologic findings  PSYCH: Orientation, sensorium, mood normal    DATA:    See EPIC    Micro:  10/17 BC

## 2018-11-13 ENCOUNTER — OFFICE VISIT (OUTPATIENT)
Dept: PAIN MANAGEMENT | Age: 43
End: 2018-11-13
Payer: COMMERCIAL

## 2018-11-13 VITALS
BODY MASS INDEX: 54.57 KG/M2 | DIASTOLIC BLOOD PRESSURE: 95 MMHG | WEIGHT: 315 LBS | HEART RATE: 90 BPM | SYSTOLIC BLOOD PRESSURE: 152 MMHG

## 2018-11-13 DIAGNOSIS — S83.92XD SPRAIN OF LEFT KNEE/LEG, SUBSEQUENT ENCOUNTER: ICD-10-CM

## 2018-11-13 DIAGNOSIS — S33.9XXD SPRAIN OF LIGAMENT OF LUMBOSACRAL JOINT, SUBSEQUENT ENCOUNTER: ICD-10-CM

## 2018-11-13 DIAGNOSIS — S33.9XXA SPRAIN OF LIGAMENT OF LUMBOSACRAL JOINT, INITIAL ENCOUNTER: ICD-10-CM

## 2018-11-13 DIAGNOSIS — S83.92XA SPRAIN OF LEFT KNEE/LEG, INITIAL ENCOUNTER: ICD-10-CM

## 2018-11-13 DIAGNOSIS — M51.27 LUMBAGO-SCIATICA DUE TO DISPLACEMENT OF LUMBAR INTERVERTEBRAL DISC: ICD-10-CM

## 2018-11-13 PROCEDURE — 99214 OFFICE O/P EST MOD 30 MIN: CPT | Performed by: INTERNAL MEDICINE

## 2018-11-13 RX ORDER — OXYCODONE HYDROCHLORIDE 30 MG/1
30 TABLET, FILM COATED, EXTENDED RELEASE ORAL 3 TIMES DAILY
Qty: 84 EACH | Refills: 0 | Status: SHIPPED | OUTPATIENT
Start: 2018-11-13 | End: 2018-12-11 | Stop reason: SDUPTHER

## 2018-11-13 RX ORDER — NORTRIPTYLINE HYDROCHLORIDE 25 MG/1
CAPSULE ORAL
Qty: 60 CAPSULE | Refills: 1 | Status: SHIPPED | OUTPATIENT
Start: 2018-11-13 | End: 2018-12-11 | Stop reason: SDUPTHER

## 2018-11-13 RX ORDER — PREGABALIN 150 MG/1
150 CAPSULE ORAL 2 TIMES DAILY
Qty: 60 CAPSULE | Refills: 0 | Status: SHIPPED | OUTPATIENT
Start: 2018-11-13 | End: 2018-12-11 | Stop reason: SDUPTHER

## 2018-11-13 RX ORDER — IBUPROFEN 600 MG/1
TABLET ORAL
Qty: 60 TABLET | Refills: 1 | Status: SHIPPED | OUTPATIENT
Start: 2018-11-13 | End: 2018-12-11 | Stop reason: SDUPTHER

## 2018-11-13 NOTE — PROGRESS NOTES
reviewed   -Most recent labs were reviewed + increase glucose, Not fasting and are within normal limits.  -He was advised weight reduction, diet changes- 800-1200 roc diet, diet diary, exercising, nutritional  consult increased physical activity as tolerated   -Will Recheck fasting Blood Glucose in 1-2 months   -He was advised to increase fluids ( 5-7  glasses of fluid daily), limit caffeine, avoid cheese products, increase dietary fiber, increase activity and exercise as tolerated and relax regularly and enjoy meals    Mr. 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 Medications/orders associated with this visit:    Current Outpatient Prescriptions   Medication Sig Dispense Refill    penicillin v potassium (VEETID) 500 MG tablet Take 2 tablets by mouth 2 times daily 120 tablet 11    pregabalin (LYRICA) 150 MG capsule Take 150 mg by mouth 2 times daily. .      Compression Stockings MISC by Does not apply route Thigh high 1 each 0    Elastic Bandages & Supports (MEDICAL COMPRESSION THIGH HIGH) MISC Thigh high compression stockings, 30-40 mm Hg 2 each 1    oxyCODONE (OXYCONTIN) 30 MG T12A extended release tablet Take 30 mg by mouth 3 times daily for 28 days. . 84 each 0    nortriptyline (PAMELOR) 25 MG capsule TAKE 1-2 TABLETS BY MOUTH NIGHTLY 60 capsule 1    ibuprofen (ADVIL;MOTRIN) 600 MG tablet TAKE 1 TABLET BY MOUTH 2 TIMES DAILY AS NEEDED FOR PAIN 60 tablet 1    furosemide (LASIX) 20 MG tablet TAKE ONE TO TWO TABLETS BY MOUTH EVERY MORNING AS NEEDED FOR EDEMA 60 tablet 11     No current facility-administered medications for this visit. Goals of current treatment regimen include improvement in pain, restoration of functioning- with focus on improvement in physical performance, general activity, work or disability,emotional distress, health care utilization and  decreased medication consumption. Will continue to monitor progress towards achieving/maintaining therapeutic goals with special emphasis on  1. Improvement in perceived interfernce  of pain with ADL's. Ability to do home exercises independently. Ability to do household chores indoor and/or outdoor work and social and leisure activities. To increase flexibility/ROM, strength and endurance. Improve psychosocial and physical functioning.- he is showing progression towards this treatment goal with the current regimen. 2. Improving sleep to 6-7 hours a night. Improve mood/ anxiety and depression symptoms such as crying spells, low energy, problems with concentration, motivation.- he is showing progression towards this treatment goal with the current regimen. 3. Reduction of reliance on opioid analgesia/more appropriate opioid use. - he is showing progression towards this treatment goal with the current regimen. Risks and benefits of the medications and other alternative treatments have been/were  discussed with the patient. Any questions on the  common side effects of these medications were also answered.   He was advised against drinking alcohol with the narcotic pain medicines, advised against driving or handling machinery when  starting or adjusting the dose of medicines, feeling groggy or drowsy, or if having any cognitive issues related to the

## 2018-12-11 ENCOUNTER — OFFICE VISIT (OUTPATIENT)
Dept: PAIN MANAGEMENT | Age: 43
End: 2018-12-11
Payer: COMMERCIAL

## 2018-12-11 VITALS
BODY MASS INDEX: 56.24 KG/M2 | HEART RATE: 72 BPM | SYSTOLIC BLOOD PRESSURE: 159 MMHG | DIASTOLIC BLOOD PRESSURE: 88 MMHG | WEIGHT: 315 LBS

## 2018-12-11 DIAGNOSIS — S33.9XXA SPRAIN OF LIGAMENT OF LUMBOSACRAL JOINT, INITIAL ENCOUNTER: ICD-10-CM

## 2018-12-11 DIAGNOSIS — S83.92XA SPRAIN OF LEFT KNEE/LEG, INITIAL ENCOUNTER: ICD-10-CM

## 2018-12-11 DIAGNOSIS — S33.9XXD SPRAIN OF LIGAMENT OF LUMBOSACRAL JOINT, SUBSEQUENT ENCOUNTER: ICD-10-CM

## 2018-12-11 DIAGNOSIS — M51.27 LUMBAGO-SCIATICA DUE TO DISPLACEMENT OF LUMBAR INTERVERTEBRAL DISC: ICD-10-CM

## 2018-12-11 DIAGNOSIS — S83.92XD SPRAIN OF LEFT KNEE/LEG, SUBSEQUENT ENCOUNTER: ICD-10-CM

## 2018-12-11 PROCEDURE — 99214 OFFICE O/P EST MOD 30 MIN: CPT | Performed by: INTERNAL MEDICINE

## 2018-12-11 RX ORDER — NORTRIPTYLINE HYDROCHLORIDE 25 MG/1
CAPSULE ORAL
Qty: 60 CAPSULE | Refills: 1 | Status: SHIPPED | OUTPATIENT
Start: 2018-12-11 | End: 2019-01-08 | Stop reason: SDUPTHER

## 2018-12-11 RX ORDER — IBUPROFEN 600 MG/1
TABLET ORAL
Qty: 60 TABLET | Refills: 1 | Status: SHIPPED | OUTPATIENT
Start: 2018-12-11 | End: 2019-01-08 | Stop reason: SDUPTHER

## 2018-12-11 RX ORDER — METHYLPREDNISOLONE 4 MG/1
TABLET ORAL
Qty: 1 KIT | Refills: 0 | Status: SHIPPED | OUTPATIENT
Start: 2018-12-11 | End: 2019-03-05

## 2018-12-11 RX ORDER — OXYCODONE HYDROCHLORIDE 30 MG/1
30 TABLET, FILM COATED, EXTENDED RELEASE ORAL 3 TIMES DAILY
Qty: 84 EACH | Refills: 0 | Status: SHIPPED | OUTPATIENT
Start: 2018-12-11 | End: 2019-01-08 | Stop reason: SDUPTHER

## 2018-12-11 RX ORDER — PREGABALIN 150 MG/1
150 CAPSULE ORAL 2 TIMES DAILY
Qty: 60 CAPSULE | Refills: 0 | Status: SHIPPED | OUTPATIENT
Start: 2018-12-11 | End: 2019-01-08 | Stop reason: SDUPTHER

## 2018-12-11 NOTE — PROGRESS NOTES
Ruben Stephenson  1975  [id number]    HISTORY OF PRESENT ILLNESS:  Mr. Amber Mclaughlin is a 37 y.o. male returns for a follow up visit for multiple medical problems. His  presenting problems are   1. 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 BWC Sprain of left knee/leg, initial encounter    3. BWC Sprain of ligament of lumbosacral joint, initial encounter    4. Sprain of ligament of lumbosacral joint, initial encounter    5. Sprain of left knee/leg, initial encounter    6. Sprain of ligament of lumbosacral joint, subsequent encounter    7. Sprain of left knee/leg, subsequent encounter    8. BWCSprain of left knee/leg, initial encounter    9. BWCSprain of ligament of lumbosacral joint, subsequent encounter    10. BWCSprain of left knee/leg, subsequent encounter    . As per information/history obtained from the PADT(patient assessment and documentation tool) -  He complains of pain in the lower back and knees Left with radiation to the buttocks, hips Left, lower leg Left, ankles Left and feet Left He rates the pain 5/10 and describes it as aching, burning, throbbing, numbness, pins and needles. Pain is made worse by: nothing, movement, walking, standing, sitting, bending, lifting. Current treatment regimen has helped relieve about 70% of the pain. He denies side effects from the current pain regimen. Patient reports that since the last follow up visit the physical functioning is unchanged, family/social relationships are unchanged, mood is unchanged and sleep patterns are unchanged, and that the overall functioning is unchanged. Patient denies neurological bowel or bladder. Patient denies misusing/abusing his narcotic pain medications or using any illegal drugs. There are No indicators for possible drug abuse, addiction or diversion problems.    Upon obtaining the medical history from Mr. Amber Mclaughlin regarding today's office visit for his presenting problems, patient states he has been

## 2019-01-07 ENCOUNTER — OFFICE VISIT (OUTPATIENT)
Dept: INFECTIOUS DISEASES | Age: 44
End: 2019-01-07
Payer: COMMERCIAL

## 2019-01-07 VITALS
HEIGHT: 74 IN | SYSTOLIC BLOOD PRESSURE: 130 MMHG | WEIGHT: 315 LBS | DIASTOLIC BLOOD PRESSURE: 70 MMHG | TEMPERATURE: 98.1 F | BODY MASS INDEX: 40.43 KG/M2

## 2019-01-07 DIAGNOSIS — E66.01 CLASS 3 SEVERE OBESITY DUE TO EXCESS CALORIES WITHOUT SERIOUS COMORBIDITY WITH BODY MASS INDEX (BMI) OF 50.0 TO 59.9 IN ADULT (HCC): Chronic | ICD-10-CM

## 2019-01-07 DIAGNOSIS — I87.2 CHRONIC VENOUS STASIS DERMATITIS OF BOTH LOWER EXTREMITIES: Primary | ICD-10-CM

## 2019-01-07 DIAGNOSIS — R79.89 ELEVATED LFTS: ICD-10-CM

## 2019-01-07 DIAGNOSIS — B35.1 ONYCHOMYCOSIS: ICD-10-CM

## 2019-01-07 PROCEDURE — 99215 OFFICE O/P EST HI 40 MIN: CPT | Performed by: INTERNAL MEDICINE

## 2019-01-07 RX ORDER — TERBINAFINE HYDROCHLORIDE 250 MG/1
250 TABLET ORAL DAILY
Qty: 30 TABLET | Refills: 5 | Status: SHIPPED | OUTPATIENT
Start: 2019-01-07 | End: 2019-05-13 | Stop reason: SDUPTHER

## 2019-01-08 ENCOUNTER — OFFICE VISIT (OUTPATIENT)
Dept: PAIN MANAGEMENT | Age: 44
End: 2019-01-08
Payer: COMMERCIAL

## 2019-01-08 VITALS
BODY MASS INDEX: 56.24 KG/M2 | HEART RATE: 76 BPM | DIASTOLIC BLOOD PRESSURE: 88 MMHG | WEIGHT: 315 LBS | SYSTOLIC BLOOD PRESSURE: 155 MMHG

## 2019-01-08 DIAGNOSIS — S33.9XXA SPRAIN OF LIGAMENT OF LUMBOSACRAL JOINT, INITIAL ENCOUNTER: ICD-10-CM

## 2019-01-08 DIAGNOSIS — S33.9XXD SPRAIN OF LIGAMENT OF LUMBOSACRAL JOINT, SUBSEQUENT ENCOUNTER: ICD-10-CM

## 2019-01-08 DIAGNOSIS — S83.92XD SPRAIN OF LEFT KNEE/LEG, SUBSEQUENT ENCOUNTER: ICD-10-CM

## 2019-01-08 DIAGNOSIS — S83.92XA SPRAIN OF LEFT KNEE/LEG, INITIAL ENCOUNTER: ICD-10-CM

## 2019-01-08 DIAGNOSIS — M51.27 LUMBAGO-SCIATICA DUE TO DISPLACEMENT OF LUMBAR INTERVERTEBRAL DISC: ICD-10-CM

## 2019-01-08 PROCEDURE — 99214 OFFICE O/P EST MOD 30 MIN: CPT | Performed by: INTERNAL MEDICINE

## 2019-01-08 RX ORDER — IBUPROFEN 600 MG/1
TABLET ORAL
Qty: 60 TABLET | Refills: 1 | Status: SHIPPED | OUTPATIENT
Start: 2019-01-08 | End: 2019-03-05 | Stop reason: SDUPTHER

## 2019-01-08 RX ORDER — NALOXONE HYDROCHLORIDE 2 MG/.4ML
INJECTION, SOLUTION INTRAMUSCULAR; SUBCUTANEOUS
Qty: 1 PACKAGE | Refills: 0 | Status: SHIPPED | OUTPATIENT
Start: 2019-01-08 | End: 2019-12-19 | Stop reason: CLARIF

## 2019-01-08 RX ORDER — PREGABALIN 150 MG/1
150 CAPSULE ORAL 3 TIMES DAILY
Qty: 90 CAPSULE | Refills: 0 | Status: SHIPPED | OUTPATIENT
Start: 2019-01-08 | End: 2019-02-05 | Stop reason: SDUPTHER

## 2019-01-08 RX ORDER — NORTRIPTYLINE HYDROCHLORIDE 25 MG/1
CAPSULE ORAL
Qty: 60 CAPSULE | Refills: 1 | Status: SHIPPED | OUTPATIENT
Start: 2019-01-08 | End: 2019-03-05 | Stop reason: SDUPTHER

## 2019-01-08 RX ORDER — OXYCODONE HYDROCHLORIDE 30 MG/1
30 TABLET, FILM COATED, EXTENDED RELEASE ORAL 3 TIMES DAILY
Qty: 84 EACH | Refills: 0 | Status: SHIPPED | OUTPATIENT
Start: 2019-01-08 | End: 2019-02-05 | Stop reason: SDUPTHER

## 2019-01-16 ENCOUNTER — TELEPHONE (OUTPATIENT)
Dept: PAIN MANAGEMENT | Age: 44
End: 2019-01-16

## 2019-01-24 DIAGNOSIS — R79.89 ELEVATED LFTS: ICD-10-CM

## 2019-01-24 DIAGNOSIS — B35.1 ONYCHOMYCOSIS: ICD-10-CM

## 2019-01-24 LAB
A/G RATIO: 1.9 (ref 1.1–2.2)
ALBUMIN SERPL-MCNC: 4.7 G/DL (ref 3.4–5)
ALP BLD-CCNC: 72 U/L (ref 40–129)
ALT SERPL-CCNC: 33 U/L (ref 10–40)
ANION GAP SERPL CALCULATED.3IONS-SCNC: 15 MMOL/L (ref 3–16)
AST SERPL-CCNC: 33 U/L (ref 15–37)
BILIRUB SERPL-MCNC: 0.7 MG/DL (ref 0–1)
BUN BLDV-MCNC: 12 MG/DL (ref 7–20)
CALCIUM SERPL-MCNC: 9.3 MG/DL (ref 8.3–10.6)
CHLORIDE BLD-SCNC: 99 MMOL/L (ref 99–110)
CO2: 26 MMOL/L (ref 21–32)
CREAT SERPL-MCNC: 0.6 MG/DL (ref 0.9–1.3)
GFR AFRICAN AMERICAN: >60
GFR NON-AFRICAN AMERICAN: >60
GLOBULIN: 2.5 G/DL
GLUCOSE BLD-MCNC: 87 MG/DL (ref 70–99)
POTASSIUM SERPL-SCNC: 4.4 MMOL/L (ref 3.5–5.1)
SODIUM BLD-SCNC: 140 MMOL/L (ref 136–145)
TOTAL PROTEIN: 7.2 G/DL (ref 6.4–8.2)

## 2019-02-05 ENCOUNTER — OFFICE VISIT (OUTPATIENT)
Dept: PAIN MANAGEMENT | Age: 44
End: 2019-02-05
Payer: COMMERCIAL

## 2019-02-05 VITALS
SYSTOLIC BLOOD PRESSURE: 166 MMHG | DIASTOLIC BLOOD PRESSURE: 89 MMHG | BODY MASS INDEX: 56.24 KG/M2 | HEART RATE: 80 BPM | WEIGHT: 315 LBS

## 2019-02-05 DIAGNOSIS — S33.9XXA SPRAIN OF LIGAMENT OF LUMBOSACRAL JOINT, INITIAL ENCOUNTER: ICD-10-CM

## 2019-02-05 DIAGNOSIS — S33.9XXD SPRAIN OF LIGAMENT OF LUMBOSACRAL JOINT, SUBSEQUENT ENCOUNTER: ICD-10-CM

## 2019-02-05 DIAGNOSIS — S83.92XA SPRAIN OF LEFT KNEE/LEG, INITIAL ENCOUNTER: ICD-10-CM

## 2019-02-05 DIAGNOSIS — M51.27 LUMBAGO-SCIATICA DUE TO DISPLACEMENT OF LUMBAR INTERVERTEBRAL DISC: ICD-10-CM

## 2019-02-05 DIAGNOSIS — S83.92XD SPRAIN OF LEFT KNEE/LEG, SUBSEQUENT ENCOUNTER: ICD-10-CM

## 2019-02-05 PROCEDURE — 99213 OFFICE O/P EST LOW 20 MIN: CPT | Performed by: INTERNAL MEDICINE

## 2019-02-05 RX ORDER — OXYCODONE HYDROCHLORIDE 30 MG/1
30 TABLET, FILM COATED, EXTENDED RELEASE ORAL 3 TIMES DAILY
Qty: 84 EACH | Refills: 0 | Status: SHIPPED | OUTPATIENT
Start: 2019-02-05 | End: 2019-03-05 | Stop reason: SDUPTHER

## 2019-02-05 RX ORDER — PREGABALIN 150 MG/1
150 CAPSULE ORAL 3 TIMES DAILY
Qty: 90 CAPSULE | Refills: 0 | Status: SHIPPED | OUTPATIENT
Start: 2019-02-05 | End: 2019-03-05 | Stop reason: SDUPTHER

## 2019-03-05 ENCOUNTER — OFFICE VISIT (OUTPATIENT)
Dept: PAIN MANAGEMENT | Age: 44
End: 2019-03-05

## 2019-03-05 VITALS
SYSTOLIC BLOOD PRESSURE: 172 MMHG | HEART RATE: 83 BPM | WEIGHT: 315 LBS | BODY MASS INDEX: 53.28 KG/M2 | DIASTOLIC BLOOD PRESSURE: 96 MMHG

## 2019-03-05 DIAGNOSIS — S33.9XXD SPRAIN OF LIGAMENT OF LUMBOSACRAL JOINT, SUBSEQUENT ENCOUNTER: ICD-10-CM

## 2019-03-05 DIAGNOSIS — S33.9XXA SPRAIN OF LIGAMENT OF LUMBOSACRAL JOINT, INITIAL ENCOUNTER: ICD-10-CM

## 2019-03-05 DIAGNOSIS — S83.92XD SPRAIN OF LEFT KNEE/LEG, SUBSEQUENT ENCOUNTER: ICD-10-CM

## 2019-03-05 DIAGNOSIS — S83.92XA SPRAIN OF LEFT KNEE/LEG, INITIAL ENCOUNTER: ICD-10-CM

## 2019-03-05 DIAGNOSIS — M51.27 LUMBAGO-SCIATICA DUE TO DISPLACEMENT OF LUMBAR INTERVERTEBRAL DISC: ICD-10-CM

## 2019-03-05 PROCEDURE — 99214 OFFICE O/P EST MOD 30 MIN: CPT | Performed by: INTERNAL MEDICINE

## 2019-03-05 RX ORDER — OXYCODONE HYDROCHLORIDE 30 MG/1
30 TABLET, FILM COATED, EXTENDED RELEASE ORAL 3 TIMES DAILY
Qty: 84 EACH | Refills: 0 | Status: SHIPPED | OUTPATIENT
Start: 2019-03-05 | End: 2019-04-02 | Stop reason: SDUPTHER

## 2019-03-05 RX ORDER — NORTRIPTYLINE HYDROCHLORIDE 25 MG/1
CAPSULE ORAL
Qty: 60 CAPSULE | Refills: 1 | Status: SHIPPED | OUTPATIENT
Start: 2019-03-05 | End: 2019-04-02 | Stop reason: SDUPTHER

## 2019-03-05 RX ORDER — DICLOFENAC SODIUM 75 MG/1
75 TABLET, DELAYED RELEASE ORAL 2 TIMES DAILY
Qty: 60 TABLET | Refills: 0 | Status: SHIPPED | OUTPATIENT
Start: 2019-03-05 | End: 2019-04-02 | Stop reason: SDUPTHER

## 2019-03-05 RX ORDER — PREGABALIN 150 MG/1
150 CAPSULE ORAL 3 TIMES DAILY
Qty: 90 CAPSULE | Refills: 0 | Status: SHIPPED | OUTPATIENT
Start: 2019-03-05 | End: 2019-04-02 | Stop reason: SDUPTHER

## 2019-03-05 RX ORDER — IBUPROFEN 600 MG/1
TABLET ORAL
Qty: 60 TABLET | Refills: 1 | Status: SHIPPED | OUTPATIENT
Start: 2019-03-05 | End: 2019-04-02 | Stop reason: SDUPTHER

## 2019-03-05 RX ORDER — METHYLPREDNISOLONE 4 MG/1
TABLET ORAL
Qty: 1 KIT | Refills: 0 | Status: SHIPPED | OUTPATIENT
Start: 2019-03-05 | End: 2019-04-02

## 2019-04-02 ENCOUNTER — OFFICE VISIT (OUTPATIENT)
Dept: PAIN MANAGEMENT | Age: 44
End: 2019-04-02
Payer: COMMERCIAL

## 2019-04-02 VITALS
BODY MASS INDEX: 57.01 KG/M2 | SYSTOLIC BLOOD PRESSURE: 176 MMHG | DIASTOLIC BLOOD PRESSURE: 92 MMHG | WEIGHT: 315 LBS | HEART RATE: 84 BPM

## 2019-04-02 DIAGNOSIS — S83.92XA SPRAIN OF LEFT KNEE/LEG, INITIAL ENCOUNTER: ICD-10-CM

## 2019-04-02 DIAGNOSIS — S83.92XD SPRAIN OF LEFT KNEE/LEG, SUBSEQUENT ENCOUNTER: ICD-10-CM

## 2019-04-02 DIAGNOSIS — M51.27 LUMBAGO-SCIATICA DUE TO DISPLACEMENT OF LUMBAR INTERVERTEBRAL DISC: ICD-10-CM

## 2019-04-02 DIAGNOSIS — S33.9XXD SPRAIN OF LIGAMENT OF LUMBOSACRAL JOINT, SUBSEQUENT ENCOUNTER: ICD-10-CM

## 2019-04-02 DIAGNOSIS — S33.9XXA SPRAIN OF LIGAMENT OF LUMBOSACRAL JOINT, INITIAL ENCOUNTER: ICD-10-CM

## 2019-04-02 PROCEDURE — 99214 OFFICE O/P EST MOD 30 MIN: CPT | Performed by: INTERNAL MEDICINE

## 2019-04-02 RX ORDER — OXYCODONE HYDROCHLORIDE 30 MG/1
30 TABLET, FILM COATED, EXTENDED RELEASE ORAL 3 TIMES DAILY
Qty: 84 EACH | Refills: 0 | Status: SHIPPED | OUTPATIENT
Start: 2019-04-02 | End: 2019-04-30 | Stop reason: SDUPTHER

## 2019-04-02 RX ORDER — NORTRIPTYLINE HYDROCHLORIDE 25 MG/1
CAPSULE ORAL
Qty: 60 CAPSULE | Refills: 1 | Status: SHIPPED | OUTPATIENT
Start: 2019-04-02 | End: 2019-04-30 | Stop reason: SDUPTHER

## 2019-04-02 RX ORDER — PREGABALIN 150 MG/1
150 CAPSULE ORAL 3 TIMES DAILY
Qty: 90 CAPSULE | Refills: 0 | Status: SHIPPED | OUTPATIENT
Start: 2019-04-02 | End: 2019-04-30 | Stop reason: SDUPTHER

## 2019-04-02 RX ORDER — DICLOFENAC SODIUM 75 MG/1
75 TABLET, DELAYED RELEASE ORAL 2 TIMES DAILY
Qty: 60 TABLET | Refills: 0 | Status: SHIPPED | OUTPATIENT
Start: 2019-04-02 | End: 2019-04-30

## 2019-04-02 RX ORDER — IBUPROFEN 600 MG/1
TABLET ORAL
Qty: 60 TABLET | Refills: 1 | Status: SHIPPED | OUTPATIENT
Start: 2019-04-02 | End: 2019-04-30 | Stop reason: SDUPTHER

## 2019-04-02 NOTE — PROGRESS NOTES
Jenna Parker  1975  [id number]    HISTORY OF PRESENT ILLNESS:  Mr. Silvia Childs is a 40 y.o. male returns for a follow up visit for multiple medical problems. His current presenting problems are   1. 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 BWC Sprain of left knee/leg, initial encounter    3. BWC Sprain of ligament of lumbosacral joint, initial encounter    4. Sprain of ligament of lumbosacral joint, initial encounter    5. Sprain of left knee/leg, initial encounter    6. Sprain of ligament of lumbosacral joint, subsequent encounter    7. Sprain of left knee/leg, subsequent encounter    8. BWCSprain of left knee/leg, initial encounter    9. BWCSprain of ligament of lumbosacral joint, subsequent encounter    10. BWCSprain of left knee/leg, subsequent encounter    . As per information/history obtained from the PADT(patient assessment and documentation tool) - He complains of pain in the mid back, lower back and knees Left with radiation to the buttocks, hips Bilateral, lower leg Left, ankles Left and feet Left He rates the pain 5/10 and describes it as aching, burning, throbbing, numbness, pins and needles. Pain is made worse by: nothing, movement, walking, standing, sitting, bending, lifting. Current treatment regimen has helped relieve about 70% of the pain. He denies side effects from the current pain regimen. Patient reports that since the last follow up visit the physical functioning is unchanged, family/social relationships are unchanged, mood is unchanged and sleep patterns are unchanged, and that the overall functioning is unchanged. Patient denies neurological bowel or bladder. Patient denies misusing/abusing his narcotic pain medications or using any illegal drugs. There are No indicators for possible drug abuse, addiction or diversion problems. Moreila Thomas   Upon obtaining the medical history from Mr. Silvia Childs regarding today's office visit for his presenting problems, patient states he has been doing about the same. Mr. Gretchen New mentions he is using Voltaren 1 per day which is helping better than the Ibuprofen. He reports he is working full time still,states he has to sit all day at work. He says walking is painful. Patient denies any constipation symptoms. Patient states his sleep is fair. Has fairly normal sleep latency. Averages about 4-6 hours of sleep a night. Denies any signs of sleep apnea. Feels somewhat rested in the morning. Patient's  subjective report of his mood is fair. he describes occasional symptoms of depression, occasional  irritability and some mood swings. Describes his mood as being neutral and reports some pleasure in his daily activities. Reports  fair  appetite, energy and concentration. Able to function well in different aspects of his daily activities. Denies suicidal or homicidal ideation. Denies any complaints of increased tension, does   Worry sometimes and occasional  irritability  he denies any c/o increased anxiety, No c/o panic attacks or symptoms of PTSD. Patient reports her weight has been stable. He reports he is trying to do his stretching exercises. ALLERGIES/PAST MED/FAM/SOC HISTORY: Mr. Gretchen New allergies, past medical, family and social history were reviewed in the chart and also listed below.   Social History     Socioeconomic History    Marital status: Single     Spouse name: Adrianna Palacios Number of children: 2    Years of education: Not on file    Highest education level: Not on file   Occupational History    Not on file   Social Needs    Financial resource strain: Not on file    Food insecurity:     Worry: Not on file     Inability: Not on file    Transportation needs:     Medical: Not on file     Non-medical: Not on file   Tobacco Use    Smoking status: Former Smoker     Types: Cigarettes     Last attempt to quit: 2005     Years since quittin.2    Smokeless tobacco: Never Used   Substance and Sexual Activity    prior to visit. REVIEW OF SYSTEMS:   Constitutional: Negative for fatigue and unexpected weight change. Eyes: Negative for visual disturbance. Respiratory: Negative for shortness of breath. Cardiovascular: Negative for chest pain, palpitations  Gastrointestinal: Negative for blood in stool, abdominal distention, nausea, vomiting, abdominal pain, diarrhea,constipation. Skin: Negative for color change or any abnormal bruising. Neurological: Negative for speech difficulty, weakness and light-headedness, dizziness, tremors, sleepiness  Psychiatric/Behavioral: Negative for suicidal ideas, hallucinations, behavioral problems, self-injury, decreased concentration/cognition, agitation, confusion. PHYSICAL EXAM:   Nursing note and vitals reviewed. BP (!) 176/92   Pulse 84   Wt (!) 444 lb (201.4 kg)   BMI 57.01 kg/m²   General Appearance: Patient is well nourished, well developed, well groomed and in no acute distress. Skin: Skin is warm and dry, good turgor . No rash or lesions noted. He is not diaphoretic. Pulmonary/Chest: Effort normal. No respiratory distress or use of accessory muscles. Auscultation revealing normal air entry. He does not have wheezes in the lung fields. He has no rales. Cardiovascular: Normal rate, regular rhythm, normal heart sounds, and does not have murmur. Exam reveals no gallop and no friction rub. Abdominal: Soft. Bowel sounds are normal.  On inspection of abdomen is obese no distension and no mass. No tenderness. He has no rebound and no guarding. Musculoskeletal / Extremities: Range of motion is normal. Gait is normal, assistive devices use: none. He exhibits edema: trace edema LE, and no tenderness. Neurological/Psychiatric:He is alert and oriented to person, place, and time. Coordination is  normal.   Judgement and Insight is normal  His mood is Appropriate and affect is Neutral/Euthymic(normal) .  His behavior is normal.   thought content normal. IMPRESSION:     1.  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 BWC Sprain of left knee/leg, initial encounter    3. BWC Sprain of ligament of lumbosacral joint, initial encounter    4. Sprain of ligament of lumbosacral joint, initial encounter    5. Sprain of left knee/leg, initial encounter    6. Sprain of ligament of lumbosacral joint, subsequent encounter    7. Sprain of left knee/leg, subsequent encounter    8. BWCSprain of left knee/leg, initial encounter    9. BWCSprain of ligament of lumbosacral joint, subsequent encounter    10. BWCSprain of left knee/leg, subsequent encounter        PLAN:  Informed verbal consent was obtained. -[place] record was obtained and reviewed  for the last one year and no indicators of drug misuse  were found. Any other controlled substance prescriptions  seen on the record have been accounted for, I am aware of the patient receiving these medications. Tita Garcia  [place] record will be rechecked as part of office protocol.   -He was advised weight reduction, diet changes- 800-1200 roc diet, diet diary, exercising, nutritional  consult increased physical activity as tolerated  -advised to use a vari desk for work, no prolong sitting  -he was advised proper sleep hygiene-told to avoid:use of caffeine or other stimulants after noon, alcohol use near bedtime, long or frequent naps during the day, erratic sleep schedule, heavy meals near bedtime, vigorous exercise near bedtime and use of electronic devices near bedtime, continue with Pamelor  -continue with Lyrica 450 mg  -He was advised to increase fluids ( 5-7  glasses of fluid daily), limit caffeine, avoid cheese products, increase dietary fiber, increase activity and exercise as tolerated and relax regularly and enjoy meals   -back stretching exercises were given      Mr. Marie Nam will be prescribed  the medications  listed below which are for treatment of his presenting  medical problems which for this visit include:   Diagnoses of  [facility] Lumbago-sciatica due to displacement of lumbar intervertebral disc, C Sprain of left knee/leg, initial encounter, BWC Sprain of ligament of lumbosacral joint, initial encounter, Sprain of ligament of lumbosacral joint, initial encounter, Sprain of left knee/leg, initial encounter, Sprain of ligament of lumbosacral joint, subsequent encounter, Sprain of left knee/leg, subsequent encounter, BWCSprain of left knee/leg, initial encounter, BWCSprain of ligament of lumbosacral joint, subsequent encounter, and BWCSprain of left knee/leg, subsequent encounter were pertinent to this visit. Medications/orders associated with this visit:    Current Outpatient Medications   Medication Sig Dispense Refill    pregabalin (LYRICA) 150 MG capsule Take 1 capsule by mouth 3 times daily for 30 days. 90 capsule 0    oxyCODONE (OXYCONTIN) 30 MG T12A extended release tablet Take 30 mg by mouth 3 times daily for 28 days. 84 each 0    nortriptyline (PAMELOR) 25 MG capsule TAKE 1-2 TABLETS BY MOUTH NIGHTLY 60 capsule 1    ibuprofen (ADVIL;MOTRIN) 600 MG tablet TAKE 1 TABLET BY MOUTH 2 TIMES DAILY AS NEEDED FOR PAIN 60 tablet 1    diclofenac (VOLTAREN) 75 MG EC tablet Take 1 tablet by mouth 2 times daily 60 tablet 0    Naloxone HCl (EVZIO) 2 MG/0.4ML SOAJ Use as directed 1 Package 0    Compression Stockings MISC by Does not apply route Thigh high 1 each 0    Elastic Bandages & Supports (MEDICAL COMPRESSION THIGH HIGH) MISC Thigh high compression stockings, 30-40 mm Hg 2 each 1    furosemide (LASIX) 20 MG tablet TAKE ONE TO TWO TABLETS BY MOUTH EVERY MORNING AS NEEDED FOR EDEMA 60 tablet 11     No current facility-administered medications for this visit.          Goals of current treatment regimen include improvement in pain, restoration of functioning- with focus on improvement in physical performance, general activity, work or disability,emotional distress, health care utilization and  decreased medication consumption. Will continue to monitor progress towards achieving/maintaining therapeutic goals with special emphasis on  1. Improvement in perceived interfernce  of pain with ADL's. Ability to do home exercises independently. Ability to do household chores indoor and/or outdoor work and social and leisure activities. To increase flexibility/ROM, strength and endurance. Improve psychosocial and physical functioning.- he is showing progression towards this treatment goal with the current regimen. 2. Improving sleep to 6-7 hours a night. Improve mood/ anxiety and depression symptoms such as crying spells, low energy, problems with concentration, motivation.- he is showing progression towards this treatment goal with the current regimen. 3. Reduction of reliance on opioid analgesia/more appropriate opioid use. - he is showing progression towards this treatment goal with the current regimen. 4. Ability to focus/concentrate at work and perform the duties required of him at work  Sit through a workday without lower extremity symptoms. Stand 30-60 minutes without lower extremity symptoms. Ability to lift up to 10-20 lbs. Ability to go up and down stairs. Sit 30-60 minutes  Without having to stand up frequently. - he is maintaining/progressing towards his work related goals with the current regimen. Risks and benefits of the medications and other alternative treatments have been/were  discussed with the patient. Any questions on the  common side effects of these medications were also answered. He was advised against drinking alcohol with the narcotic pain medicines, advised against driving or handling machinery when  starting or adjusting the dose of medicines, feeling groggy or drowsy, or if having any cognitive issues related to the current medications. Heis fully aware of the risk of overdose and death, if medicines are misused and not taken as prescribed.  If he develops new symptoms or if the symptoms worsen, he was told to call the office. .  Thank you for allowing me to participate in the care of this patient. Jesus Almaguer MD.    Cc: Parish Campbell MD    I, Armando Blunt, scribing for in the presence  of Dr. Jesus Almaguer. 04/02/19  10:29 AM  Sid Galan Assistant    I, Dr. Jesus Almaguer, personally performed the services described in this documentation as scribed by  Armando Blunt MA in my presence and it is both accurate and complete

## 2019-04-30 ENCOUNTER — OFFICE VISIT (OUTPATIENT)
Dept: PAIN MANAGEMENT | Age: 44
End: 2019-04-30
Payer: COMMERCIAL

## 2019-04-30 VITALS
BODY MASS INDEX: 56.49 KG/M2 | DIASTOLIC BLOOD PRESSURE: 75 MMHG | HEART RATE: 85 BPM | WEIGHT: 315 LBS | SYSTOLIC BLOOD PRESSURE: 177 MMHG

## 2019-04-30 DIAGNOSIS — S83.92XD SPRAIN OF LEFT KNEE/LEG, SUBSEQUENT ENCOUNTER: ICD-10-CM

## 2019-04-30 DIAGNOSIS — S33.9XXA SPRAIN OF LIGAMENT OF LUMBOSACRAL JOINT, INITIAL ENCOUNTER: ICD-10-CM

## 2019-04-30 DIAGNOSIS — S33.9XXD SPRAIN OF LIGAMENT OF LUMBOSACRAL JOINT, SUBSEQUENT ENCOUNTER: ICD-10-CM

## 2019-04-30 DIAGNOSIS — S83.92XA SPRAIN OF LEFT KNEE/LEG, INITIAL ENCOUNTER: ICD-10-CM

## 2019-04-30 DIAGNOSIS — M51.27 LUMBAGO-SCIATICA DUE TO DISPLACEMENT OF LUMBAR INTERVERTEBRAL DISC: ICD-10-CM

## 2019-04-30 PROCEDURE — 99213 OFFICE O/P EST LOW 20 MIN: CPT | Performed by: INTERNAL MEDICINE

## 2019-04-30 RX ORDER — OXYCODONE HYDROCHLORIDE 30 MG/1
30 TABLET, FILM COATED, EXTENDED RELEASE ORAL 3 TIMES DAILY
Qty: 84 EACH | Refills: 0 | Status: SHIPPED | OUTPATIENT
Start: 2019-04-30 | End: 2019-05-28 | Stop reason: SDUPTHER

## 2019-04-30 RX ORDER — NORTRIPTYLINE HYDROCHLORIDE 25 MG/1
CAPSULE ORAL
Qty: 60 CAPSULE | Refills: 1 | Status: SHIPPED | OUTPATIENT
Start: 2019-04-30 | End: 2019-05-28 | Stop reason: SDUPTHER

## 2019-04-30 RX ORDER — PREGABALIN 150 MG/1
150 CAPSULE ORAL 3 TIMES DAILY
Qty: 90 CAPSULE | Refills: 0 | Status: SHIPPED | OUTPATIENT
Start: 2019-04-30 | End: 2019-05-28 | Stop reason: SDUPTHER

## 2019-04-30 RX ORDER — IBUPROFEN 600 MG/1
TABLET ORAL
Qty: 60 TABLET | Refills: 1 | Status: SHIPPED | OUTPATIENT
Start: 2019-04-30 | End: 2019-05-28 | Stop reason: SDUPTHER

## 2019-04-30 NOTE — PROGRESS NOTES
Maria Dolores Fitting  1975  [id number]    HISTORY OF PRESENT ILLNESS:  Mr. Celso Dumas is a 40 y.o. male returns for a follow up visit for multiple medical problems. His current presenting problems are   1. 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 BWC Sprain of left knee/leg, initial encounter    3. BWC Sprain of ligament of lumbosacral joint, initial encounter    4. Sprain of ligament of lumbosacral joint, initial encounter    5. Sprain of left knee/leg, initial encounter    6. Sprain of ligament of lumbosacral joint, subsequent encounter    7. Sprain of left knee/leg, subsequent encounter    8. BWCSprain of left knee/leg, initial encounter    9. BWCSprain of ligament of lumbosacral joint, subsequent encounter    10. BWCSprain of left knee/leg, subsequent encounter    . As per information/history obtained from the PADT(patient assessment and documentation tool) - He complains of pain in the lower back and knees Left with radiation to the buttocks, hips Bilateral, lower leg Left, ankles Left and feet Left He rates the pain 4/10 and describes it as burning, throbbing, numbness, pins and needles. Pain is made worse by: nothing, movement, walking, standing, sitting, bending, lifting. Current treatment regimen has helped relieve about 80% of the pain. He denies side effects from the current pain regimen. Patient reports that since the last follow up visit the physical functioning is unchanged, family/social relationships are unchanged, mood is unchanged and sleep patterns are unchanged, and that the overall functioning is unchanged. Patient denies neurological bowel or bladder. Patient denies misusing/abusing his narcotic pain medications or using any illegal drugs. There are No indicators for possible drug abuse, addiction or diversion problems. Abel Smith   Upon obtaining the medical history from Mr. Celso Dumas regarding today's office visit for his presenting problems, Mr. Celso Dumas states he had to quit taking the Voltaren due too GERD symptoms, he is back on Ibuprofen, he is doing ok with it. Patient denies any other side effects. Patient reports his weight has been stable, his weight is 440 pounds. He reports he is working full time still. Patient denies any constipation symptoms. He says his back hurts worse than his legs. ALLERGIES: Patients list of allergies were reviewed     MEDICATIONS: Mr. Elliot Mane list of medications were reviewed. His current medications are   Outpatient Medications Prior to Visit   Medication Sig Dispense Refill    pregabalin (LYRICA) 150 MG capsule Take 1 capsule by mouth 3 times daily for 30 days. 90 capsule 0    oxyCODONE (OXYCONTIN) 30 MG T12A extended release tablet Take 30 mg by mouth 3 times daily for 28 days. 84 each 0    nortriptyline (PAMELOR) 25 MG capsule TAKE 1-2 TABLETS BY MOUTH NIGHTLY 60 capsule 1    ibuprofen (ADVIL;MOTRIN) 600 MG tablet TAKE 1 TABLET BY MOUTH 2 TIMES DAILY AS NEEDED FOR PAIN 60 tablet 1    Naloxone HCl (EVZIO) 2 MG/0.4ML SOAJ Use as directed 1 Package 0    Compression Stockings MISC by Does not apply route Thigh high 1 each 0    Elastic Bandages & Supports (MEDICAL COMPRESSION THIGH HIGH) MISC Thigh high compression stockings, 30-40 mm Hg 2 each 1    furosemide (LASIX) 20 MG tablet TAKE ONE TO TWO TABLETS BY MOUTH EVERY MORNING AS NEEDED FOR EDEMA 60 tablet 11    diclofenac (VOLTAREN) 75 MG EC tablet Take 1 tablet by mouth 2 times daily 60 tablet 0     No facility-administered medications prior to visit. SOCIAL/FAMILY/PAST MEDICAL HISTORY: Mr. Obed Mooney, family and past medical history was reviewed. REVIEW OF SYSTEMS:    Respiratory: Negative for apnea, chest tightness and shortness of breath or change in baseline breathing. Gastrointestinal: Negative for nausea, vomiting, abdominal pain, diarrhea, constipation, blood in stool and abdominal distention.  +GERD symptoms      PHYSICAL EXAM:   Nursing note and vitals reviewed. BP (!) 177/75   Pulse 85   Wt (!) 440 lb (199.6 kg)   BMI 56.49 kg/m²   Constitutional: He appears well-developed and well-nourished. No acute distress. Skin: Skin is warm and dry, good turgor. No rash noted. He is not diaphoretic. Cardiovascular: Normal rate, regular rhythm, normal heart sounds, and does not have murmur. Pulmonary/Chest: Effort normal. No respiratory distress. He does not have wheezes in the lung fields. He has no rales. Neurological/Psychiatric:He is alert and oriented to person, place, and time. Coordination is  normal.  His mood isAppropriate and affect is Flat/blunted and Anxious . Other: trace edema  IMPRESSION:   1.  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 BWC Sprain of left knee/leg, initial encounter    3. BWC Sprain of ligament of lumbosacral joint, initial encounter    4. Sprain of ligament of lumbosacral joint, initial encounter    5. Sprain of left knee/leg, initial encounter    6. Sprain of ligament of lumbosacral joint, subsequent encounter    7. Sprain of left knee/leg, subsequent encounter    8. BWCSprain of left knee/leg, initial encounter    9. BWCSprain of ligament of lumbosacral joint, subsequent encounter    10. BWCSprain of left knee/leg, subsequent encounter        PLAN:  Informed verbal consent was obtained  -continue with current opioid regimen   -Adv Biofeedback, relaxation and meditation techniques.  Referral to psychologist for CBT was also discussed with patient  -cristino/c Voltaren, Advised caffeine reduction, dietary changes, elevate head end of bed, NPO after supper, if using alcohol advised reduction of alcohol intake, tobacco cessation if smoking, weight loss   -He was advised weight reduction, diet changes- 800-1200 roc diet, diet diary, exercising, nutritional  consult increased physical activity as tolerated   -Walking/back stretching exercises as advised      Current Outpatient Medications   Medication Sig Dispense Refill    pregabalin (LYRICA) 150 MG capsule Take 1 capsule by mouth 3 times daily for 30 days. 90 capsule 0    oxyCODONE (OXYCONTIN) 30 MG T12A extended release tablet Take 30 mg by mouth 3 times daily for 28 days. 84 each 0    nortriptyline (PAMELOR) 25 MG capsule TAKE 1-2 TABLETS BY MOUTH NIGHTLY 60 capsule 1    ibuprofen (ADVIL;MOTRIN) 600 MG tablet TAKE 1 TABLET BY MOUTH 2 TIMES DAILY AS NEEDED FOR PAIN 60 tablet 1    Naloxone HCl (EVZIO) 2 MG/0.4ML SOAJ Use as directed 1 Package 0    Compression Stockings MISC by Does not apply route Thigh high 1 each 0    Elastic Bandages & Supports (MEDICAL COMPRESSION THIGH HIGH) MISC Thigh high compression stockings, 30-40 mm Hg 2 each 1    furosemide (LASIX) 20 MG tablet TAKE ONE TO TWO TABLETS BY MOUTH EVERY MORNING AS NEEDED FOR EDEMA 60 tablet 11     No current facility-administered medications for this visit. I will continue his current medication regimen  which is part of the above treatment schedule. It has been helping with Mr. Maki Flair chronic  medical problems which for this visit include:   Diagnoses of  C Lumbago-sciatica due to displacement of lumbar intervertebral disc, BWC Sprain of left knee/leg, initial encounter, BWC Sprain of ligament of lumbosacral joint, initial encounter, Sprain of ligament of lumbosacral joint, initial encounter, Sprain of left knee/leg, initial encounter, Sprain of ligament of lumbosacral joint, subsequent encounter, Sprain of left knee/leg, subsequent encounter, BWCSprain of left knee/leg, initial encounter, BWCSprain of ligament of lumbosacral joint, subsequent encounter, and BWCSprain of left knee/leg, subsequent encounter were pertinent to this visit. Risks and benefits of the medications and other alternative treatments  including no treatment were discussed with the patient. The common side effects of these medications were also explained to the patient. Informed verbal consent was obtained.    Goals of current treatment regimen include improvement in pain, restoration of functioning- with focus on improvement in physical performance, general activity, work or disability,emotional distress, health care utilization and  decreased medication consumption. Will continue to monitor progress towards achieving/maintaining therapeutic goals with special emphasis on  1. Improvement in perceived interfernce  of pain with ADL's. Ability to do home exercises independently. Ability to do household chores indoor and/or outdoor work and social and leisure activities. Improve psychosocial and physical functioning. - he is showing progression towards this treatment goal with the current regimen. 2. Ability to focus/concentrate at work and perform the duties required of him at work  Sit through a workday without lower extremity symptoms. Stand 30-60 minutes without lower extremity symptoms. Ability to lift up to 10-20 lbs. Ability to go up and down stairs. Sit 30-60 minutes  Without having to stand up frequently. - he is maintaining/progressing towards his work related goals with the current regimen. He was advised against drinking alcohol with the narcotic pain medicines, advised against driving or handling machinery while adjusting the dose of medicines or if having cognitive  issues related to the current medications. Risk of overdose and death, if medicines not taken as prescribed, were also discussed. If the patient develops new symptoms or if the symptoms worsen, the patient should call the office. While transcribing every attempt was made to maintain the accuracy of the note in terms of it's contents,there may have been some errors made inadvertently. Thank you for allowing me to participate in the care of this patient. Alberto Carlin MD.    Cc: Shirley Beltran MD    I, Alena Gupta, scribing for in the presence  of Dr. Alberto Carlin.   04/30/19  9:57 AM  Amparo Auguste Dr. Lelia Meza, personally performed the services described in this documentation as scribed by  Colten Horowitz MA in my presence and it is both accurate and complete

## 2019-05-13 ENCOUNTER — OFFICE VISIT (OUTPATIENT)
Dept: INFECTIOUS DISEASES | Age: 44
End: 2019-05-13
Payer: COMMERCIAL

## 2019-05-13 VITALS
BODY MASS INDEX: 40.43 KG/M2 | WEIGHT: 315 LBS | TEMPERATURE: 98.6 F | HEIGHT: 74 IN | SYSTOLIC BLOOD PRESSURE: 134 MMHG | DIASTOLIC BLOOD PRESSURE: 76 MMHG

## 2019-05-13 DIAGNOSIS — B35.1 ONYCHOMYCOSIS: ICD-10-CM

## 2019-05-13 DIAGNOSIS — L03.116 CELLULITIS OF LEFT LOWER EXTREMITY: Primary | ICD-10-CM

## 2019-05-13 DIAGNOSIS — E66.01 MORBID OBESITY WITH BMI OF 50.0-59.9, ADULT (HCC): ICD-10-CM

## 2019-05-13 PROCEDURE — 99215 OFFICE O/P EST HI 40 MIN: CPT | Performed by: INTERNAL MEDICINE

## 2019-05-13 RX ORDER — TERBINAFINE HYDROCHLORIDE 250 MG/1
250 TABLET ORAL DAILY
Qty: 30 TABLET | Refills: 5 | Status: SHIPPED | OUTPATIENT
Start: 2019-05-13 | End: 2019-11-11 | Stop reason: SDUPTHER

## 2019-05-13 NOTE — PROGRESS NOTES
Infectious Diseases Oupatient Follow-up Note    Primary Care Physician:   Yamilex Bajwa MD  Last visit:    [facility] 10/19/18  History Obtained From:    Patient, EPIC    CHIEF COMPLAINT / ID problem:     Chief Complaint   Patient presents with    Follow-up       HISTORY OF PRESENT ILLNESS / Interval history:      Heide Wiley is a 40 y.o. male who was seen today for L leg cellulitis, hosp f/u.     36 yo ma with hx obesity (BMI (57), ANN-MARIE, LBP, hx back surgeries  Bilateral LE venous stasis / lymphedema. Hx L LE cellulitis.       Admit 10/16 - 10/20/18:  Presents with L LE redness, swelling and pain. Acute onset. No associated fever / chills. No trauma / new precipitant. Has had recurrent episodes - 3rd in last 2 mo. Similar to past episodes. In ED 10/16, afebrile, WBC 6.8. BC sent, venous dopplers - no DVT  Discharged on 10/20 on po augmentin x 10 days. Seen 11/5/18, pt reports L leg is better, not hot. Wearing compression stocks, 20-30 mm Hg, has 30-40 mm Hg stocking at home     Seen 1/7/19:  Pt reports he is doing well. \"no infection\"  Taking and tolerating PCN VK 1,000 mg bid. Using compression stockings    Today 5/13/19:  Pt reports he is doing well. \"no infection\" / no flares  Taking and tolerating PCN VK 1,000 mg bid. Using compression stockings       Past Medical History:    Past Medical History:   Diagnosis Date    Chronic back pain     HNP (herniated nucleus pulposus with myelopathy), thoracic     ANN-MARIE (obstructive sleep apnea)     Stasis dermatitis        Past Surgical History:    Past Surgical History:   Procedure Laterality Date    LUMBAR LAMINECTOMY  06/26/06    [facility]    SPINE SURGERY         Current Medications:    Current Outpatient Medications   Medication Sig Dispense Refill    pregabalin (LYRICA) 150 MG capsule Take 1 capsule by mouth 3 times daily for 30 days.  90 capsule 0    oxyCODONE (OXYCONTIN) 30 MG T12A extended release tablet Take 30 mg by mouth 3 times daily for 28 days. 84 each 0    nortriptyline (PAMELOR) 25 MG capsule TAKE 1-2 TABLETS BY MOUTH NIGHTLY 60 capsule 1    ibuprofen (ADVIL;MOTRIN) 600 MG tablet TAKE 1 TABLET BY MOUTH 2 TIMES DAILY AS NEEDED FOR PAIN 60 tablet 1    Naloxone HCl (EVZIO) 2 MG/0.4ML SOAJ Use as directed 1 Package 0    Compression Stockings MISC by Does not apply route Thigh high 1 each 0    Elastic Bandages & Supports (MEDICAL COMPRESSION THIGH HIGH) MISC Thigh high compression stockings, 30-40 mm Hg 2 each 1    furosemide (LASIX) 20 MG tablet TAKE ONE TO TWO TABLETS BY MOUTH EVERY MORNING AS NEEDED FOR EDEMA 60 tablet 11     No current facility-administered medications for this visit. Allergies:  Patient has no known allergies. Social History:    TOBACCO:                 None - past cig  ETOH:                         None   DRUGS:                      None   MARITAL STATUS:        OCCUPATION:           Computer repair                Family History:   No immunodeficiency    REVIEW OF SYSTEMS:    No fever / chills / sweats. No weight loss. No visual change, eye pain, eye discharge. No oral lesion, sore throat, dysphagia. Denies cough / sputum. Denies chest pain, palpitations. Denies n / v / abd pain. No diarrhea. Denies dysuria or change in urinary function. Denies joint swelling or pain. No myalgia, arthralgia. Denies skin change, rash, itching  Denies focal weakness, sensory change or other neurologic symptom  Denies new depression or other psychiatric problem. No symptoms endocrine disorder. No symptoms hematologic disorder. PHYSICAL EXAM:    Vitals:    /76   Temp 98.6 °F (37 °C) (Oral)   Ht 6' 2\" (1.88 m)   Wt (!) 440 lb (199.6 kg)   BMI 56.49 kg/m²     GENERAL: No apparent distress.     HEENT: Membranes moist, no oral lesion  NECK:  Supple  LYMPH: No adenopathy   LUNGS: Clear b/l, no rales, no dullness  CARDIAC: RRR, no murmur appreciated  ABD:  + BS, soft / NT  EXT:  LE venous stasis,

## 2019-05-23 ENCOUNTER — NURSE TRIAGE (OUTPATIENT)
Dept: OTHER | Facility: CLINIC | Age: 44
End: 2019-05-23

## 2019-05-23 ENCOUNTER — OFFICE VISIT (OUTPATIENT)
Dept: FAMILY MEDICINE CLINIC | Age: 44
End: 2019-05-23
Payer: COMMERCIAL

## 2019-05-23 VITALS
DIASTOLIC BLOOD PRESSURE: 80 MMHG | WEIGHT: 315 LBS | OXYGEN SATURATION: 97 % | HEART RATE: 89 BPM | TEMPERATURE: 99.2 F | SYSTOLIC BLOOD PRESSURE: 138 MMHG | BODY MASS INDEX: 55.59 KG/M2

## 2019-05-23 DIAGNOSIS — J06.9 UPPER RESPIRATORY TRACT INFECTION, UNSPECIFIED TYPE: ICD-10-CM

## 2019-05-23 DIAGNOSIS — R05.9 COUGH: Primary | ICD-10-CM

## 2019-05-23 PROCEDURE — 99213 OFFICE O/P EST LOW 20 MIN: CPT | Performed by: FAMILY MEDICINE

## 2019-05-23 RX ORDER — LORATADINE 10 MG/1
10 TABLET ORAL DAILY
Qty: 30 TABLET | Refills: 1 | Status: SHIPPED | OUTPATIENT
Start: 2019-05-23 | End: 2019-06-22

## 2019-05-23 ASSESSMENT — PATIENT HEALTH QUESTIONNAIRE - PHQ9
SUM OF ALL RESPONSES TO PHQ QUESTIONS 1-9: 0
2. FEELING DOWN, DEPRESSED OR HOPELESS: 0
1. LITTLE INTEREST OR PLEASURE IN DOING THINGS: 0
SUM OF ALL RESPONSES TO PHQ QUESTIONS 1-9: 0
SUM OF ALL RESPONSES TO PHQ9 QUESTIONS 1 & 2: 0

## 2019-05-23 NOTE — PROGRESS NOTES
[address] Note    Date: 5/23/2019                                               Subjective/Objective:     Chief Complaint   Patient presents with    URI     Started- monday chest cold no energy SOB. HPI   Chest congestion and cough starting 3 days ago. No sinus pain. +runny nose. No fever. +SOB at times. Overall is getting worse. Patient Active Problem List    Diagnosis Date Noted    Cellulitis 10/17/2018    Cellulitis of left lower extremity 10/16/2018    Morbid obesity with BMI of 50.0-59.9, adult ([facility] [address] 10/16/2018    Chronic venous stasis dermatitis of both lower extremities 01/30/2016     [facility] Lumbago-sciatica due to displacement of lumbar intervertebral disc 11/02/2015     [facility] Sprain of ligament of lumbosacral joint 07/13/2015     [facility] Sprain of left knee/leg 07/13/2015    Chronic back pain 04/08/2015    Elevated blood pressure reading 03/10/2014    Obesity 01/12/2011    HNP (herniated nucleus pulposus with myelopathy), thoracic     ANN-MARIE (obstructive sleep apnea)        Past Medical History:   Diagnosis Date    Chronic back pain     HNP (herniated nucleus pulposus with myelopathy), thoracic     ANN-MARIE (obstructive sleep apnea)     Stasis dermatitis        Current Outpatient Medications   Medication Sig Dispense Refill    loratadine (CLARITIN) 10 MG tablet Take 1 tablet by mouth daily 30 tablet 1    terbinafine (LAMISIL) 250 MG tablet Take 1 tablet by mouth daily 30 tablet 5    pregabalin (LYRICA) 150 MG capsule Take 1 capsule by mouth 3 times daily for 30 days. 90 capsule 0    oxyCODONE (OXYCONTIN) 30 MG T12A extended release tablet Take 30 mg by mouth 3 times daily for 28 days.  84 each 0    nortriptyline (PAMELOR) 25 MG capsule TAKE 1-2 TABLETS BY MOUTH NIGHTLY 60 capsule 1    ibuprofen (ADVIL;MOTRIN) 600 MG tablet TAKE 1 TABLET BY MOUTH 2 TIMES DAILY AS NEEDED FOR PAIN 60 tablet 1    Naloxone HCl (EVZIO) 2 MG/0.4ML SOAJ Use as directed 1 Package 0    Compression Stockings MISC by Does not apply route Thigh high 1 each 0    Elastic Bandages & Supports (MEDICAL COMPRESSION THIGH HIGH) MISC Thigh high compression stockings, 30-40 mm Hg 2 each 1    furosemide (LASIX) 20 MG tablet TAKE ONE TO TWO TABLETS BY MOUTH EVERY MORNING AS NEEDED FOR EDEMA 60 tablet 11     No current facility-administered medications for this visit. No Known Allergies    Review of Systems   No vomiting, no bruise    Vitals:  /80   Pulse 89   Temp 99.2 °F (37.3 °C)   Wt (!) 433 lb (196.4 kg)   SpO2 97%   BMI 55.59 kg/m²     Physical Exam   General:  Well-appearing, NAD, alert, non-toxic. +morbidly obese  HEENT:  Normocephalic, atraumatic. Pupils equal and round. CHEST/LUNGS: CTAB, no crackles, no wheeze, no rhonchi. Symmetric rise  CARDIOVASCULAR: RRR,  no murmur, no rub  EXTREMETIES: Normal movement of all extremities  SKIN:  No rash, no cellulitis, no bruising, no petechiae/purpura/vesicles/pustules/abscess  PSYCH:  A+O x 3; normal affect  NEURO:  GCS 15, CN2-12 grossly intact, no focal motor/sensory deficits, no cerebellar deficits, normal gait, normal speech      Assessment/Plan     44 y. o.yo male with acute viral URI. Recommend rest, fluids, cepacol as needed for sore throat. Delsym as needed for cough. Claritin for head congestion. Discussed with patient medication/s dosage, instructions, potential S/E, A/R and Drug Interaction  Educational material provided regarding patient's condition  Tylenol or Motrin as needed for pain or fever  Advise to return here if worse or go to nearest ER  Encourage fluids  Pt discharged in stable condition at 13:16      1. Cough      2. Upper respiratory tract infection, unspecified type    - loratadine (CLARITIN) 10 MG tablet; Take 1 tablet by mouth daily  Dispense: 30 tablet; Refill: 1       No orders of the defined types were placed in this encounter. No follow-ups on file.     Sruthi Bella MD    5/23/2019  1:16 PM

## 2019-05-23 NOTE — TELEPHONE ENCOUNTER
Reason for Disposition   Patient wants to be seen    Protocols used: DIZZINESS-ADULT-OH    Pt calls with a mild dizziness that is present when he gets up. Describes that the room is not spinning, he just feels \"off balance. \"  Nausea present, no vomiting. Denies fever or any other symptoms except for feeling tired. Symptoms started this past Monday. Caller with symptoms as documented above. Caller informed of disposition. Pt will keep current apt for today at 1230. Care advice as documented.

## 2019-05-28 ENCOUNTER — OFFICE VISIT (OUTPATIENT)
Dept: PAIN MANAGEMENT | Age: 44
End: 2019-05-28
Payer: COMMERCIAL

## 2019-05-28 VITALS
BODY MASS INDEX: 55.21 KG/M2 | WEIGHT: 315 LBS | DIASTOLIC BLOOD PRESSURE: 98 MMHG | SYSTOLIC BLOOD PRESSURE: 183 MMHG | HEART RATE: 81 BPM

## 2019-05-28 DIAGNOSIS — S33.9XXD SPRAIN OF LIGAMENT OF LUMBOSACRAL JOINT, SUBSEQUENT ENCOUNTER: ICD-10-CM

## 2019-05-28 DIAGNOSIS — M51.27 LUMBAGO-SCIATICA DUE TO DISPLACEMENT OF LUMBAR INTERVERTEBRAL DISC: ICD-10-CM

## 2019-05-28 DIAGNOSIS — S83.92XA SPRAIN OF LEFT KNEE/LEG, INITIAL ENCOUNTER: ICD-10-CM

## 2019-05-28 DIAGNOSIS — S83.92XD SPRAIN OF LEFT KNEE/LEG, SUBSEQUENT ENCOUNTER: ICD-10-CM

## 2019-05-28 DIAGNOSIS — S33.9XXA SPRAIN OF LIGAMENT OF LUMBOSACRAL JOINT, INITIAL ENCOUNTER: ICD-10-CM

## 2019-05-28 PROCEDURE — 99214 OFFICE O/P EST MOD 30 MIN: CPT | Performed by: INTERNAL MEDICINE

## 2019-05-28 RX ORDER — OXYCODONE AND ACETAMINOPHEN 7.5; 325 MG/1; MG/1
1 TABLET ORAL EVERY 6 HOURS PRN
Qty: 56 TABLET | Refills: 0 | Status: SHIPPED | OUTPATIENT
Start: 2019-05-28 | End: 2019-06-25 | Stop reason: SDUPTHER

## 2019-05-28 RX ORDER — PREGABALIN 150 MG/1
150 CAPSULE ORAL 3 TIMES DAILY
Qty: 90 CAPSULE | Refills: 0 | Status: SHIPPED | OUTPATIENT
Start: 2019-05-28 | End: 2019-06-25 | Stop reason: SDUPTHER

## 2019-05-28 RX ORDER — OXYCODONE HYDROCHLORIDE 30 MG/1
30 TABLET, FILM COATED, EXTENDED RELEASE ORAL 2 TIMES DAILY
Qty: 56 EACH | Refills: 0 | Status: SHIPPED | OUTPATIENT
Start: 2019-05-28 | End: 2019-06-25 | Stop reason: SDUPTHER

## 2019-05-28 RX ORDER — NORTRIPTYLINE HYDROCHLORIDE 25 MG/1
CAPSULE ORAL
Qty: 60 CAPSULE | Refills: 1 | Status: SHIPPED | OUTPATIENT
Start: 2019-05-28 | End: 2019-06-25 | Stop reason: SDUPTHER

## 2019-05-28 RX ORDER — IBUPROFEN 600 MG/1
TABLET ORAL
Qty: 60 TABLET | Refills: 1 | Status: SHIPPED | OUTPATIENT
Start: 2019-05-28 | End: 2019-06-25 | Stop reason: SDUPTHER

## 2019-05-28 NOTE — PROGRESS NOTES
Jaimie Mckay  1975  [id number]    HISTORY OF PRESENT ILLNESS:  Mr. Violet Dominguez is a 40 y.o. male returns for a follow up visit for multiple medical problems. His current presenting problems are   1. 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 BWC Sprain of left knee/leg, initial encounter    3. BWC Sprain of ligament of lumbosacral joint, initial encounter    4. Sprain of ligament of lumbosacral joint, initial encounter    5. Sprain of left knee/leg, initial encounter    6. Sprain of ligament of lumbosacral joint, subsequent encounter    7. Sprain of left knee/leg, subsequent encounter    8. BWCSprain of left knee/leg, initial encounter    9. BWCSprain of ligament of lumbosacral joint, subsequent encounter    10. BWCSprain of left knee/leg, subsequent encounter    . As per information/history obtained from the PADT(patient assessment and documentation tool) - He complains of pain in the lower back with radiation to the buttocks, hips Left, upper leg Left, knees Left, lower leg Left, ankles Left and feet Left He rates the pain 3/10 and describes it as sharp, aching, burning, numbness. Pain is made worse by: movement, walking, standing, sitting, bending, lifting. Current treatment regimen has helped relieve about 80% of the pain. He denies side effects from the current pain regimen. Patient reports that since the last follow up visit the physical functioning is unchanged, family/social relationships are unchanged, mood is unchanged and sleep patterns are unchanged, and that the overall functioning is unchanged. Patient denies neurological bowel or bladder. Patient denies misusing/abusing his narcotic pain medications or using any illegal drugs. There are No indicators for possible drug abuse, addiction or diversion problems.    Upon obtaining the medical history from Mr. Violet Dominguez regarding today's office visit for his presenting problems,  Patient reports he has been sick for the last few weeks and has been off work for 2-3 days. He says he had bronchitis and was having fatigue and no energy. He mentions he was on given antibiotics when he seen his PCP. He reports his pain has been somewhat tolerable. He states he is seeing ID for chronic leg effects. Patient states his sleep is fair. Has fairly normal sleep latency. Averages about 4-6 hours of sleep a night. Denies any signs of sleep apnea. Feels somewhat rested in the morning. Patient's  subjective report of his mood is fair. he describes occasional symptoms of depression, occasional  irritability and some mood swings. Describes his mood as being neutral and reports some pleasure in his daily activities. Reports  fair  appetite, energy and concentration. Able to function well in different aspects of his daily activities. Denies suicidal or homicidal ideation. Denies any complaints of increased tension, does   Worry sometimes and occasional  irritability  he denies any c/o increased anxiety, No c/o panic attacks or symptoms of PTSD. Denies abdominal pain, dysphagia, gas bloat, heartburn, nausea, odynophagia, regurgitation, vomiting and water brash-GERD Sxs are controlled  with the medications. He states his weight has been stable. ALLERGIES/PAST MED/FAM/SOC HISTORY: Mr. Josef Truong allergies, past medical, family and social history were reviewed in the chart and also listed below.   Social History     Socioeconomic History    Marital status: Single     Spouse name: Kasey Gillette Number of children: 2    Years of education: Not on file    Highest education level: Not on file   Occupational History    Not on file   Social Needs    Financial resource strain: Not on file    Food insecurity:     Worry: Not on file     Inability: Not on file    Transportation needs:     Medical: Not on file     Non-medical: Not on file   Tobacco Use    Smoking status: Former Smoker     Types: Cigarettes     Last attempt to quit: 1/1/2005     Years since quittin.4    Smokeless tobacco: Never Used   Substance and Sexual Activity    Alcohol use: No     Alcohol/week: 0.0 oz     Comment: rarely     Drug use: No    Sexual activity: Yes     Partners: Female   Lifestyle    Physical activity:     Days per week: Not on file     Minutes per session: Not on file    Stress: Not on file   Relationships    Social connections:     Talks on phone: Not on file     Gets together: Not on file     Attends Denominational service: Not on file     Active member of club or organization: Not on file     Attends meetings of clubs or organizations: Not on file     Relationship status: Not on file    Intimate partner violence:     Fear of current or ex partner: Not on file     Emotionally abused: Not on file     Physically abused: Not on file     Forced sexual activity: Not on file   Other Topics Concern    Not on file   Social History Narrative    Not on file       Mr. Harris current medications are   Outpatient Medications Prior to Visit   Medication Sig Dispense Refill    loratadine (CLARITIN) 10 MG tablet Take 1 tablet by mouth daily 30 tablet 1    terbinafine (LAMISIL) 250 MG tablet Take 1 tablet by mouth daily 30 tablet 5    pregabalin (LYRICA) 150 MG capsule Take 1 capsule by mouth 3 times daily for 30 days. 90 capsule 0    oxyCODONE (OXYCONTIN) 30 MG T12A extended release tablet Take 30 mg by mouth 3 times daily for 28 days.  84 each 0    nortriptyline (PAMELOR) 25 MG capsule TAKE 1-2 TABLETS BY MOUTH NIGHTLY 60 capsule 1    ibuprofen (ADVIL;MOTRIN) 600 MG tablet TAKE 1 TABLET BY MOUTH 2 TIMES DAILY AS NEEDED FOR PAIN 60 tablet 1    Naloxone HCl (EVZIO) 2 MG/0.4ML SOAJ Use as directed 1 Package 0    Compression Stockings MISC by Does not apply route Thigh high 1 each 0    Elastic Bandages & Supports (MEDICAL COMPRESSION THIGH HIGH) MISC Thigh high compression stockings, 30-40 mm Hg 2 each 1    furosemide (LASIX) 20 MG tablet TAKE ONE TO TWO TABLETS BY MOUTH Flat/blunted . His behavior is normal.   thought content normal.   Other: + venous stasis, dermatitis with compression stockings        IMPRESSION:     1.  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 BWC Sprain of left knee/leg, initial encounter    3. BWC Sprain of ligament of lumbosacral joint, initial encounter    4. Sprain of ligament of lumbosacral joint, initial encounter    5. Sprain of left knee/leg, initial encounter    6. Sprain of ligament of lumbosacral joint, subsequent encounter    7. Sprain of left knee/leg, subsequent encounter    8. BWCSprain of left knee/leg, initial encounter    9. BWCSprain of ligament of lumbosacral joint, subsequent encounter    10. BWCSprain of left knee/leg, subsequent encounter        PLAN:  Informed verbal consent was obtained.  -Continue with current opioid regimen   -Adv Biofeedback, relaxation and meditation techniques. Referral to psychologist for CBT was also discussed with patient   -He was advised weight reduction, diet changes- 800-1200 roc diet, diet diary, exercising, nutritional  consult increased physical activity as tolerated   -He was advised to increase fluids ( 5-7  glasses of fluid daily), limit caffeine, avoid cheese products, increase dietary fiber, increase activity and exercise as tolerated and relax regularly and enjoy meals   -Will try decrease the OxyContin 30 mg to BID and start Percocet 7.5 mg 1-2 per renetta  -Walking and stretching exercises as advised   -he was advised proper sleep hygiene-told to avoid:use of caffeine or other stimulants after noon, alcohol use near bedtime, long or frequent naps during the day, erratic sleep schedule, heavy meals near bedtime, vigorous exercise near bedtime and use of electronic devices near bedtime   -Continue with Pamelor   Mr. Harris will be prescribed  the medications  listed below which are for treatment of his presenting  medical problems which for this visit include: Diagnoses of  [facility] Lumbago-sciatica due to displacement of lumbar intervertebral disc, BWC Sprain of left knee/leg, initial encounter, BWC Sprain of ligament of lumbosacral joint, initial encounter, Sprain of ligament of lumbosacral joint, initial encounter, Sprain of left knee/leg, initial encounter, Sprain of ligament of lumbosacral joint, subsequent encounter, Sprain of left knee/leg, subsequent encounter, BWCSprain of left knee/leg, initial encounter, BWCSprain of ligament of lumbosacral joint, subsequent encounter, and BWCSprain of left knee/leg, subsequent encounter were pertinent to this visit. Medications/orders associated with this visit:    Current Outpatient Medications   Medication Sig Dispense Refill    loratadine (CLARITIN) 10 MG tablet Take 1 tablet by mouth daily 30 tablet 1    terbinafine (LAMISIL) 250 MG tablet Take 1 tablet by mouth daily 30 tablet 5    pregabalin (LYRICA) 150 MG capsule Take 1 capsule by mouth 3 times daily for 30 days. 90 capsule 0    oxyCODONE (OXYCONTIN) 30 MG T12A extended release tablet Take 30 mg by mouth 3 times daily for 28 days. 84 each 0    nortriptyline (PAMELOR) 25 MG capsule TAKE 1-2 TABLETS BY MOUTH NIGHTLY 60 capsule 1    ibuprofen (ADVIL;MOTRIN) 600 MG tablet TAKE 1 TABLET BY MOUTH 2 TIMES DAILY AS NEEDED FOR PAIN 60 tablet 1    Naloxone HCl (EVZIO) 2 MG/0.4ML SOAJ Use as directed 1 Package 0    Compression Stockings MISC by Does not apply route Thigh high 1 each 0    Elastic Bandages & Supports (MEDICAL COMPRESSION THIGH HIGH) MISC Thigh high compression stockings, 30-40 mm Hg 2 each 1    furosemide (LASIX) 20 MG tablet TAKE ONE TO TWO TABLETS BY MOUTH EVERY MORNING AS NEEDED FOR EDEMA 60 tablet 11     No current facility-administered medications for this visit.          Goals of current treatment regimen include improvement in pain, restoration of functioning- with focus on improvement in physical performance, general activity, work or disability,emotional distress, health care utilization and  decreased medication consumption. Will continue to monitor progress towards achieving/maintaining therapeutic goals with special emphasis on  1. Improvement in perceived interfernce  of pain with ADL's. Ability to do home exercises independently. Ability to do household chores indoor and/or outdoor work and social and leisure activities. To increase flexibility/ROM, strength and endurance. Improve psychosocial and physical functioning.- he is showing progression towards this treatment goal with the current regimen. 2. Improving sleep to 6-7 hours a night. Improve mood/ anxiety and depression symptoms such as crying spells, low energy, problems with concentration, motivation.- he is showing progression towards this treatment goal with the current regimen. 3. Reduction of reliance on opioid analgesia/more appropriate opioid use. - he is showing progression towards this treatment goal with the current regimen. Risks and benefits of the medications and other alternative treatments have been/were  discussed with the patient. Any questions on the  common side effects of these medications were also answered. He was advised against drinking alcohol with the narcotic pain medicines, advised against driving or handling machinery when  starting or adjusting the dose of medicines, feeling groggy or drowsy, or if having any cognitive issues related to the current medications. Heis fully aware of the risk of overdose and death, if medicines are misused and not taken as prescribed. If he develops new symptoms or if the symptoms worsen, he was told to call the office. .  Thank you for allowing me to participate in the care of this patient.     Kyle Rai MD.    Cc: MD CHERYL Cleveland, Karen Colby, am scribing for and in the presence of Dr. Kyle Rai.   05/28/19  12:06 PM  MANNY Gay, Dr. Kyle Rai, personally performed the services described in this documentation as scribed by   Reyna Velasquez MA in my presence and it is both accurate and complete

## 2019-06-25 ENCOUNTER — OFFICE VISIT (OUTPATIENT)
Dept: PAIN MANAGEMENT | Age: 44
End: 2019-06-25
Payer: COMMERCIAL

## 2019-06-25 VITALS
DIASTOLIC BLOOD PRESSURE: 77 MMHG | BODY MASS INDEX: 55.21 KG/M2 | WEIGHT: 315 LBS | HEART RATE: 98 BPM | SYSTOLIC BLOOD PRESSURE: 151 MMHG

## 2019-06-25 DIAGNOSIS — S83.92XD SPRAIN OF LEFT KNEE/LEG, SUBSEQUENT ENCOUNTER: ICD-10-CM

## 2019-06-25 DIAGNOSIS — S33.9XXD SPRAIN OF LIGAMENT OF LUMBOSACRAL JOINT, SUBSEQUENT ENCOUNTER: ICD-10-CM

## 2019-06-25 DIAGNOSIS — S33.9XXA SPRAIN OF LIGAMENT OF LUMBOSACRAL JOINT, INITIAL ENCOUNTER: ICD-10-CM

## 2019-06-25 DIAGNOSIS — M51.27 LUMBAGO-SCIATICA DUE TO DISPLACEMENT OF LUMBAR INTERVERTEBRAL DISC: ICD-10-CM

## 2019-06-25 DIAGNOSIS — S83.92XA SPRAIN OF LEFT KNEE/LEG, INITIAL ENCOUNTER: ICD-10-CM

## 2019-06-25 PROCEDURE — 99214 OFFICE O/P EST MOD 30 MIN: CPT | Performed by: INTERNAL MEDICINE

## 2019-06-25 RX ORDER — DULOXETIN HYDROCHLORIDE 30 MG/1
30 CAPSULE, DELAYED RELEASE ORAL DAILY
Qty: 30 CAPSULE | Refills: 1 | Status: SHIPPED | OUTPATIENT
Start: 2019-06-25 | End: 2019-07-30 | Stop reason: SDUPTHER

## 2019-06-25 RX ORDER — NORTRIPTYLINE HYDROCHLORIDE 25 MG/1
CAPSULE ORAL
Qty: 60 CAPSULE | Refills: 1 | Status: SHIPPED | OUTPATIENT
Start: 2019-06-25 | End: 2019-07-30 | Stop reason: SDUPTHER

## 2019-06-25 RX ORDER — IBUPROFEN 600 MG/1
TABLET ORAL
Qty: 60 TABLET | Refills: 1 | Status: SHIPPED | OUTPATIENT
Start: 2019-06-25 | End: 2019-07-30 | Stop reason: SDUPTHER

## 2019-06-25 RX ORDER — PREGABALIN 150 MG/1
150 CAPSULE ORAL 3 TIMES DAILY
Qty: 90 CAPSULE | Refills: 1 | Status: SHIPPED | OUTPATIENT
Start: 2019-06-25 | End: 2019-07-30 | Stop reason: SDUPTHER

## 2019-06-25 RX ORDER — OXYCODONE AND ACETAMINOPHEN 7.5; 325 MG/1; MG/1
1 TABLET ORAL EVERY 6 HOURS PRN
Qty: 70 TABLET | Refills: 0 | Status: SHIPPED | OUTPATIENT
Start: 2019-06-25 | End: 2019-07-30 | Stop reason: SDUPTHER

## 2019-06-25 RX ORDER — OXYCODONE HYDROCHLORIDE 30 MG/1
30 TABLET, FILM COATED, EXTENDED RELEASE ORAL 2 TIMES DAILY
Qty: 70 EACH | Refills: 0 | Status: SHIPPED | OUTPATIENT
Start: 2019-06-25 | End: 2019-07-30 | Stop reason: SDUPTHER

## 2019-06-25 NOTE — PROGRESS NOTES
some increase pain. Patient denies any side effects with the medications. He reports her back hurts worse than his legs, lowering the dose has not helped, worse over the last few weeks. He complains of increased pain with changes in weather. Extreme temperatures- cold and damp weather causes increased pain. Patient denies any constipation symptoms. Patient states his sleep is fair. Has fairly normal sleep latency. Averages about 4-6 hours of sleep a night. Denies any signs of sleep apnea. Feels somewhat rested in the morning. Patient's  subjective report of his mood is fair. he describes occasional symptoms of depression, occasional  irritability and some mood swings. Describes his mood as being neutral and reports some pleasure in his daily activities. Reports  fair  appetite, energy and concentration. Able to function well in different aspects of his daily activities. Denies suicidal or homicidal ideation. Denies any complaints of increased tension, does   Worry sometimes and occasional  irritability  he denies any c/o increased anxiety, No c/o panic attacks or symptoms of PTSD, feels down at times. Patient reports her weight has been stable. ALLERGIES/PAST MED/FAM/SOC HISTORY: Mr. Indy Garcia allergies, past medical, family and social history were reviewed in the chart and also listed below.   Social History     Socioeconomic History    Marital status: Single     Spouse name: Cj Arboleda Number of children: 2    Years of education: Not on file    Highest education level: Not on file   Occupational History    Not on file   Social Needs    Financial resource strain: Not on file    Food insecurity:     Worry: Not on file     Inability: Not on file    Transportation needs:     Medical: Not on file     Non-medical: Not on file   Tobacco Use    Smoking status: Former Smoker     Types: Cigarettes     Last attempt to quit: 2005     Years since quittin.4    Smokeless tobacco: Never Used   Substance and Sexual Activity    Alcohol use: No     Alcohol/week: 0.0 oz     Comment: rarely     Drug use: No    Sexual activity: Yes     Partners: Female   Lifestyle    Physical activity:     Days per week: Not on file     Minutes per session: Not on file    Stress: Not on file   Relationships    Social connections:     Talks on phone: Not on file     Gets together: Not on file     Attends Islam service: Not on file     Active member of club or organization: Not on file     Attends meetings of clubs or organizations: Not on file     Relationship status: Not on file    Intimate partner violence:     Fear of current or ex partner: Not on file     Emotionally abused: Not on file     Physically abused: Not on file     Forced sexual activity: Not on file   Other Topics Concern    Not on file   Social History Narrative    Not on file       Mr. Harris current medications are   Outpatient Medications Prior to Visit   Medication Sig Dispense Refill    Naloxone HCl (EVZIO) 2 MG/0.4ML SOAJ Use as directed 1 Package 0    Compression Stockings MISC by Does not apply route Thigh high 1 each 0    Elastic Bandages & Supports (MEDICAL COMPRESSION THIGH HIGH) MISC Thigh high compression stockings, 30-40 mm Hg 2 each 1    furosemide (LASIX) 20 MG tablet TAKE ONE TO TWO TABLETS BY MOUTH EVERY MORNING AS NEEDED FOR EDEMA 60 tablet 11    pregabalin (LYRICA) 150 MG capsule Take 1 capsule by mouth 3 times daily for 30 days. 90 capsule 0    oxyCODONE (OXYCONTIN) 30 MG T12A extended release tablet Take 30 mg by mouth 2 times daily for 28 days. 56 each 0    nortriptyline (PAMELOR) 25 MG capsule TAKE 1-2 TABLETS BY MOUTH NIGHTLY 60 capsule 1    ibuprofen (ADVIL;MOTRIN) 600 MG tablet TAKE 1 TABLET BY MOUTH 2 TIMES DAILY AS NEEDED FOR PAIN 60 tablet 1    oxyCODONE-acetaminophen (PERCOCET) 7.5-325 MG per tablet Take 1 tablet by mouth every 6 hours as needed for Pain (max 2 per day) for up to 28 days.  56 tablet 0     No facility-administered medications prior to visit. REVIEW OF SYSTEMS:   Constitutional: Negative for fatigue and unexpected weight change. Eyes: Negative for visual disturbance. Respiratory: Negative for shortness of breath. Cardiovascular: Negative for chest pain, palpitations  Gastrointestinal: Negative for blood in stool, abdominal distention, nausea, vomiting, abdominal pain, diarrhea,constipation. Skin: Negative for color change or any abnormal bruising. Neurological: Negative for speech difficulty, weakness and light-headedness, dizziness, tremors, sleepiness  Psychiatric/Behavioral: Negative for suicidal ideas, hallucinations, behavioral problems, self-injury, decreased concentration/cognition, agitation, confusion. PHYSICAL EXAM:   Nursing note and vitals reviewed. BP (!) 151/77   Pulse 98   Wt (!) 430 lb (195 kg)   BMI 55.21 kg/m²   General Appearance: Patient is well nourished, well developed, well groomed and in no acute distress. Skin: Skin is warm and dry, good turgor . No rash or lesions noted. He is not diaphoretic. Pulmonary/Chest: Effort normal. No respiratory distress or use of accessory muscles. Auscultation revealing normal air entry. He does not have wheezes in the lung fields. He has no rales. Cardiovascular: Normal rate, regular rhythm, normal heart sounds, and does not have murmur. Exam reveals no gallop and no friction rub. Abdominal: Soft. Bowel sounds are normal.  On inspection of abdomen is obese no distension and no mass. No tenderness. He has no rebound and no guarding. Musculoskeletal / Extremities: Range of motion is normal. Gait is normal, assistive devices use: none. He exhibits edema: Trace, and no tenderness. Neurological/Psychiatric:He is alert and oriented to person, place, and time. Coordination is  normal.   Judgement and Insight is normal  His mood is Appropriate and affect is Flat/blunted and Anxious .  His behavior is normal. thought content normal.   Other: +erythema LE with compression stockings    IMPRESSION:     1.  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 BWC Sprain of left knee/leg, initial encounter    3. BWC Sprain of ligament of lumbosacral joint, initial encounter    4. Sprain of ligament of lumbosacral joint, initial encounter    5. Sprain of left knee/leg, initial encounter    6. Sprain of ligament of lumbosacral joint, subsequent encounter    7. Sprain of left knee/leg, subsequent encounter    8. BWCSprain of left knee/leg, initial encounter    9. BWCSprain of ligament of lumbosacral joint, subsequent encounter    10. BWCSprain of left knee/leg, subsequent encounter        PLAN:  Informed verbal consent was obtained.  -continue with current opioid regimen, switching to OxyContin to [facility] along with Percocet 2 per day  -he was advised proper sleep hygiene-told to avoid:use of caffeine or other stimulants after noon, alcohol use near bedtime, long or frequent naps during the day, erratic sleep schedule, heavy meals near bedtime, vigorous exercise near bedtime and use of electronic devices near bedtime, continue with Pamelor  -He was advised to increase fluids ( 5-7  glasses of fluid daily), limit caffeine, avoid cheese products, increase dietary fiber, increase activity and exercise as tolerated and relax regularly and enjoy meals   -Adv Biofeedback, relaxation and meditation techniques. Referral to psychologist for CBT was also discussed with patient  -start Cymbalta 30 mg daily    Mr. Harris will be prescribed  the medications  listed below which are for treatment of his presenting  medical problems which for this visit include:   Diagnoses of  BWC Lumbago-sciatica due to displacement of lumbar intervertebral disc, BWC Sprain of left knee/leg, initial encounter, BWC Sprain of ligament of lumbosacral joint, initial encounter, Sprain of ligament of lumbosacral joint, initial encounter, Sprain of left knee/leg, initial encounter, Sprain of ligament of lumbosacral joint, subsequent encounter, Sprain of left knee/leg, subsequent encounter, BWCSprain of left knee/leg, initial encounter, BWCSprain of ligament of lumbosacral joint, subsequent encounter, and BWCSprain of left knee/leg, subsequent encounter were pertinent to this visit. Medications/orders associated with this visit:    Current Outpatient Medications   Medication Sig Dispense Refill    pregabalin (LYRICA) 150 MG capsule Take 1 capsule by mouth 3 times daily for 30 days. 90 capsule 1    oxyCODONE (OXYCONTIN) 30 MG T12A extended release tablet Take 30 mg by mouth 2 times daily for 35 days. 70 each 0    nortriptyline (PAMELOR) 25 MG capsule TAKE 1-2 TABLETS BY MOUTH NIGHTLY 60 capsule 1    ibuprofen (ADVIL;MOTRIN) 600 MG tablet TAKE 1 TABLET BY MOUTH 2 TIMES DAILY AS NEEDED FOR PAIN 60 tablet 1    oxyCODONE-acetaminophen (PERCOCET) 7.5-325 MG per tablet Take 1 tablet by mouth every 6 hours as needed for Pain (max 2 per day) for up to 35 days. 70 tablet 0    DULoxetine (CYMBALTA) 30 MG extended release capsule Take 1 capsule by mouth daily 30 capsule 1    Naloxone HCl (EVZIO) 2 MG/0.4ML SOAJ Use as directed 1 Package 0    Compression Stockings MISC by Does not apply route Thigh high 1 each 0    Elastic Bandages & Supports (MEDICAL COMPRESSION THIGH HIGH) MISC Thigh high compression stockings, 30-40 mm Hg 2 each 1    furosemide (LASIX) 20 MG tablet TAKE ONE TO TWO TABLETS BY MOUTH EVERY MORNING AS NEEDED FOR EDEMA 60 tablet 11     No current facility-administered medications for this visit. Goals of current treatment regimen include improvement in pain, restoration of functioning- with focus on improvement in physical performance, general activity, work or disability,emotional distress, health care utilization and  decreased medication consumption.  Will continue to monitor progress towards achieving/maintaining therapeutic goals with special emphasis on  1. Improvement in perceived interfernce  of pain with ADL's. Ability to do home exercises independently. Ability to do household chores indoor and/or outdoor work and social and leisure activities. To increase flexibility/ROM, strength and endurance. Improve psychosocial and physical functioning.- he is not showing any significant progress/or showing regression  towards this goal and reassessment and adjustment of goals/treatment have been made. 2. Improving sleep to 6-7 hours a night. Improve mood/ anxiety and depression symptoms such as crying spells, low energy, problems with concentration, motivation.- he is not showing any significant progress/or showing regression  towards this goal and reassessment and adjustment of goals/treatment have been made. 3. Reduction of reliance on opioid analgesia/more appropriate opioid use. - he is showing progression towards this treatment goal with the current regimen. 4. Ability to focus/concentrate at work and perform the duties required of him at work  Sit through a workday without lower extremity symptoms. Stand 30-60 minutes without lower extremity symptoms. Ability to lift up to 10-20 lbs. Ability to go up and down stairs. Sit 30-60 minutes  Without having to stand up frequently. - he is maintaining/progressing towards his work related goals with the current regimen. Risks and benefits of the medications and other alternative treatments have been/were  discussed with the patient. Any questions on the  common side effects of these medications were also answered. He was advised against drinking alcohol with the narcotic pain medicines, advised against driving or handling machinery when  starting or adjusting the dose of medicines, feeling groggy or drowsy, or if having any cognitive issues related to the current medications.  Heis fully aware of the risk of overdose and death, if medicines are misused and not taken as prescribed. If he develops new symptoms or if the symptoms worsen, he was told to call the office. .  Thank you for allowing me to participate in the care of this patient.     Nirmala Waters MD.    Cc: Mary Beth Keene MD

## 2019-07-18 RX ORDER — FUROSEMIDE 20 MG/1
TABLET ORAL
Qty: 60 TABLET | Refills: 11 | Status: SHIPPED | OUTPATIENT
Start: 2019-07-18 | End: 2020-08-13

## 2019-07-30 ENCOUNTER — OFFICE VISIT (OUTPATIENT)
Dept: PAIN MANAGEMENT | Age: 44
End: 2019-07-30
Payer: COMMERCIAL

## 2019-07-30 VITALS
DIASTOLIC BLOOD PRESSURE: 80 MMHG | BODY MASS INDEX: 55.21 KG/M2 | WEIGHT: 315 LBS | SYSTOLIC BLOOD PRESSURE: 151 MMHG | HEART RATE: 84 BPM

## 2019-07-30 DIAGNOSIS — S83.92XD SPRAIN OF LEFT KNEE/LEG, SUBSEQUENT ENCOUNTER: ICD-10-CM

## 2019-07-30 DIAGNOSIS — S83.92XA SPRAIN OF LEFT KNEE/LEG, INITIAL ENCOUNTER: ICD-10-CM

## 2019-07-30 DIAGNOSIS — S33.9XXA SPRAIN OF LIGAMENT OF LUMBOSACRAL JOINT, INITIAL ENCOUNTER: ICD-10-CM

## 2019-07-30 DIAGNOSIS — M51.27 LUMBAGO-SCIATICA DUE TO DISPLACEMENT OF LUMBAR INTERVERTEBRAL DISC: ICD-10-CM

## 2019-07-30 DIAGNOSIS — S33.9XXD SPRAIN OF LIGAMENT OF LUMBOSACRAL JOINT, SUBSEQUENT ENCOUNTER: ICD-10-CM

## 2019-07-30 PROCEDURE — 99213 OFFICE O/P EST LOW 20 MIN: CPT | Performed by: INTERNAL MEDICINE

## 2019-07-30 RX ORDER — OXYCODONE AND ACETAMINOPHEN 7.5; 325 MG/1; MG/1
1 TABLET ORAL EVERY 6 HOURS PRN
Qty: 56 TABLET | Refills: 0 | Status: SHIPPED | OUTPATIENT
Start: 2019-07-30 | End: 2019-08-27 | Stop reason: SDUPTHER

## 2019-07-30 RX ORDER — OXYCODONE HYDROCHLORIDE 30 MG/1
30 TABLET, FILM COATED, EXTENDED RELEASE ORAL 2 TIMES DAILY
Qty: 56 EACH | Refills: 0 | Status: SHIPPED | OUTPATIENT
Start: 2019-07-30 | End: 2019-08-27 | Stop reason: SDUPTHER

## 2019-07-30 RX ORDER — PREGABALIN 150 MG/1
150 CAPSULE ORAL 3 TIMES DAILY
Qty: 90 CAPSULE | Refills: 1 | Status: SHIPPED | OUTPATIENT
Start: 2019-07-30 | End: 2019-10-23 | Stop reason: SDUPTHER

## 2019-07-30 RX ORDER — DULOXETIN HYDROCHLORIDE 30 MG/1
30 CAPSULE, DELAYED RELEASE ORAL DAILY
Qty: 30 CAPSULE | Refills: 1 | Status: SHIPPED | OUTPATIENT
Start: 2019-07-30 | End: 2019-08-27

## 2019-07-30 RX ORDER — IBUPROFEN 600 MG/1
TABLET ORAL
Qty: 60 TABLET | Refills: 1 | Status: SHIPPED | OUTPATIENT
Start: 2019-07-30 | End: 2019-09-25 | Stop reason: SDUPTHER

## 2019-07-30 RX ORDER — NORTRIPTYLINE HYDROCHLORIDE 25 MG/1
CAPSULE ORAL
Qty: 60 CAPSULE | Refills: 1 | Status: SHIPPED | OUTPATIENT
Start: 2019-07-30 | End: 2019-09-25 | Stop reason: SDUPTHER

## 2019-07-30 NOTE — PROGRESS NOTES
Prior to Visit   Medication Sig Dispense Refill    furosemide (LASIX) 20 MG tablet TAKE ONE TO TWO TABLETS BY MOUTH EVERY MORNING AS NEEDED FOR EDEMA 60 tablet 11    oxyCODONE (OXYCONTIN) 30 MG T12A extended release tablet Take 30 mg by mouth 2 times daily for 35 days. 70 each 0    nortriptyline (PAMELOR) 25 MG capsule TAKE 1-2 TABLETS BY MOUTH NIGHTLY 60 capsule 1    ibuprofen (ADVIL;MOTRIN) 600 MG tablet TAKE 1 TABLET BY MOUTH 2 TIMES DAILY AS NEEDED FOR PAIN 60 tablet 1    oxyCODONE-acetaminophen (PERCOCET) 7.5-325 MG per tablet Take 1 tablet by mouth every 6 hours as needed for Pain (max 2 per day) for up to 35 days. 70 tablet 0    DULoxetine (CYMBALTA) 30 MG extended release capsule Take 1 capsule by mouth daily 30 capsule 1    Naloxone HCl (EVZIO) 2 MG/0.4ML SOAJ Use as directed 1 Package 0    Compression Stockings MISC by Does not apply route Thigh high 1 each 0    Elastic Bandages & Supports (MEDICAL COMPRESSION THIGH HIGH) MISC Thigh high compression stockings, 30-40 mm Hg 2 each 1    pregabalin (LYRICA) 150 MG capsule Take 1 capsule by mouth 3 times daily for 30 days. 90 capsule 1     No facility-administered medications prior to visit. SOCIAL/FAMILY/PAST MEDICAL HISTORY: Mr. Hayden Carrasco, family and past medical history was reviewed. REVIEW OF SYSTEMS:    Respiratory: Negative for apnea, chest tightness and shortness of breath or change in baseline breathing. Gastrointestinal: Negative for nausea, vomiting, abdominal pain, diarrhea, constipation, blood in stool and abdominal distention. PHYSICAL EXAM:   Nursing note and vitals reviewed. BP (!) 151/80   Pulse 84   Wt (!) 430 lb (195 kg)   BMI 55.21 kg/m²   Constitutional: He appears well-developed and well-nourished. No acute distress. Skin: Skin is warm and dry, good turgor. No rash noted. He is not diaphoretic. Cardiovascular: Normal rate, regular rhythm, normal heart sounds, and does not have murmur. Pulmonary/Chest: Effort normal. No respiratory distress. He does not have wheezes in the lung fields. He has no rales. Neurological/Psychiatric:He is alert and oriented to person, place, and time. Coordination is  normal.  His mood isAppropriate and affect is Flat/blunted and Anxious . IMPRESSION:   1.  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 BWC Sprain of left knee/leg, initial encounter    3. BWC Sprain of ligament of lumbosacral joint, initial encounter    4. Sprain of ligament of lumbosacral joint, initial encounter    5. Sprain of left knee/leg, initial encounter    6. Sprain of ligament of lumbosacral joint, subsequent encounter    7. Sprain of left knee/leg, subsequent encounter    8. BWCSprain of left knee/leg, initial encounter    9. BWCSprain of ligament of lumbosacral joint, subsequent encounter    10. BWCSprain of left knee/leg, subsequent encounter        PLAN:  Informed verbal consent was obtained  -Continue with current opioid regimen   -Try changing Cymbalta to 30 mg in the evening   -He was advised weight reduction, diet changes- 800-1200 roc diet, diet diary, exercising, nutritional  consult increased physical activity as tolerated   -Decrease Ibuprofen to as needed only  -He was advised to increase fluids ( 5-7  glasses of fluid daily), limit caffeine, avoid cheese products, increase dietary fiber, increase activity and exercise as tolerated and relax regularly and enjoy meals     Current Outpatient Medications   Medication Sig Dispense Refill    furosemide (LASIX) 20 MG tablet TAKE ONE TO TWO TABLETS BY MOUTH EVERY MORNING AS NEEDED FOR EDEMA 60 tablet 11    oxyCODONE (OXYCONTIN) 30 MG T12A extended release tablet Take 30 mg by mouth 2 times daily for 35 days.  70 each 0    nortriptyline (PAMELOR) 25 MG capsule TAKE 1-2 TABLETS BY MOUTH NIGHTLY 60 capsule 1    ibuprofen (ADVIL;MOTRIN) 600 MG tablet TAKE 1 TABLET BY MOUTH 2 TIMES DAILY AS NEEDED FOR

## 2019-08-01 ENCOUNTER — TELEPHONE (OUTPATIENT)
Dept: ADMINISTRATIVE | Age: 44
End: 2019-08-01

## 2019-08-01 NOTE — TELEPHONE ENCOUNTER
PER: Allegra SAUCEDO submitted to [facility]; status approved from 8/2/2019-8/5/2019 for brand name drug just until pharmacy manager to upload generic [address]. Please Advise Patient  Thank You.

## 2019-08-27 ENCOUNTER — OFFICE VISIT (OUTPATIENT)
Dept: PAIN MANAGEMENT | Age: 44
End: 2019-08-27
Payer: COMMERCIAL

## 2019-08-27 VITALS
BODY MASS INDEX: 54.57 KG/M2 | SYSTOLIC BLOOD PRESSURE: 173 MMHG | HEART RATE: 84 BPM | WEIGHT: 315 LBS | DIASTOLIC BLOOD PRESSURE: 85 MMHG

## 2019-08-27 DIAGNOSIS — S33.9XXA SPRAIN OF LIGAMENT OF LUMBOSACRAL JOINT, INITIAL ENCOUNTER: ICD-10-CM

## 2019-08-27 DIAGNOSIS — S83.92XA SPRAIN OF LEFT KNEE/LEG, INITIAL ENCOUNTER: ICD-10-CM

## 2019-08-27 DIAGNOSIS — S33.9XXD SPRAIN OF LIGAMENT OF LUMBOSACRAL JOINT, SUBSEQUENT ENCOUNTER: ICD-10-CM

## 2019-08-27 DIAGNOSIS — S83.92XD SPRAIN OF LEFT KNEE/LEG, SUBSEQUENT ENCOUNTER: ICD-10-CM

## 2019-08-27 DIAGNOSIS — M51.27 LUMBAGO-SCIATICA DUE TO DISPLACEMENT OF LUMBAR INTERVERTEBRAL DISC: ICD-10-CM

## 2019-08-27 PROCEDURE — 99214 OFFICE O/P EST MOD 30 MIN: CPT | Performed by: INTERNAL MEDICINE

## 2019-08-27 RX ORDER — OXYCODONE HYDROCHLORIDE 30 MG/1
30 TABLET, FILM COATED, EXTENDED RELEASE ORAL 2 TIMES DAILY
Qty: 60 EACH | Refills: 0 | Status: SHIPPED | OUTPATIENT
Start: 2019-08-27 | End: 2019-09-25 | Stop reason: SDUPTHER

## 2019-08-27 RX ORDER — OXYCODONE AND ACETAMINOPHEN 7.5; 325 MG/1; MG/1
1 TABLET ORAL EVERY 6 HOURS PRN
Qty: 60 TABLET | Refills: 0 | Status: SHIPPED | OUTPATIENT
Start: 2019-08-27 | End: 2019-09-25 | Stop reason: SDUPTHER

## 2019-08-27 RX ORDER — DESVENLAFAXINE 50 MG/1
50 TABLET, EXTENDED RELEASE ORAL DAILY
Qty: 30 TABLET | Refills: 0 | Status: SHIPPED | OUTPATIENT
Start: 2019-08-27 | End: 2019-09-25 | Stop reason: SDUPTHER

## 2019-08-27 NOTE — PROGRESS NOTES
Aranza West  1975  [id number]    HISTORY OF PRESENT ILLNESS:  Mr. Rosibel Shearer is a 40 y.o. male returns for a follow up visit for multiple medical problems. His  presenting problems are   1. 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 BWC Sprain of left knee/leg, initial encounter    3. BWC Sprain of ligament of lumbosacral joint, initial encounter    4. Sprain of ligament of lumbosacral joint, initial encounter    5. Sprain of left knee/leg, initial encounter    6. Sprain of ligament of lumbosacral joint, subsequent encounter    7. Sprain of left knee/leg, subsequent encounter    8. BWCSprain of left knee/leg, initial encounter    9. BWCSprain of ligament of lumbosacral joint, subsequent encounter    10. BWCSprain of left knee/leg, subsequent encounter    . As per information/history obtained from the PADT(patient assessment and documentation tool) -  He complains of pain in the lower back and knees Left with radiation to the buttocks, hips Bilateral, upper leg Left, lower leg Left, ankles Left and feet Left He rates the pain 5/10 and describes it as aching, burning, throbbing, numbness, pins and needles. Pain is made worse by: nothing, movement, walking, standing, sitting, bending, lifting. Current treatment regimen has helped relieve about 70% of the pain. He has nightmares side effects from the current pain regimen. Patient reports that since the last follow up visit the physical functioning is worse, family/social relationships are unchanged, mood is unchanged and sleep patterns are unchanged, and that the overall functioning is unchanged. Patient denies neurological bowel or bladder. Patient denies misusing/abusing his narcotic pain medications or using any illegal drugs. There are No indicators for possible drug abuse, addiction or diversion problems.    Upon obtaining the medical history from Mr. Rosibel Shearer regarding today's office visit for his presenting problems,  Drug use: No    Sexual activity: Yes     Partners: Female   Lifestyle    Physical activity:     Days per week: Not on file     Minutes per session: Not on file    Stress: Not on file   Relationships    Social connections:     Talks on phone: Not on file     Gets together: Not on file     Attends Jewish service: Not on file     Active member of club or organization: Not on file     Attends meetings of clubs or organizations: Not on file     Relationship status: Not on file    Intimate partner violence:     Fear of current or ex partner: Not on file     Emotionally abused: Not on file     Physically abused: Not on file     Forced sexual activity: Not on file   Other Topics Concern    Not on file   Social History Narrative    Not on file       Mr. Harris current medications are   Outpatient Medications Prior to Visit   Medication Sig Dispense Refill    pregabalin (LYRICA) 150 MG capsule Take 1 capsule by mouth 3 times daily for 30 days. 90 capsule 1    oxyCODONE (OXYCONTIN) 30 MG T12A extended release tablet Take 30 mg by mouth 2 times daily for 28 days. 56 each 0    nortriptyline (PAMELOR) 25 MG capsule TAKE 1-2 TABLETS BY MOUTH NIGHTLY 60 capsule 1    ibuprofen (ADVIL;MOTRIN) 600 MG tablet TAKE 1 TABLET BY MOUTH 2 TIMES DAILY AS NEEDED FOR PAIN 60 tablet 1    oxyCODONE-acetaminophen (PERCOCET) 7.5-325 MG per tablet Take 1 tablet by mouth every 6 hours as needed for Pain (max 2 per day) for up to 28 days.  56 tablet 0    furosemide (LASIX) 20 MG tablet TAKE ONE TO TWO TABLETS BY MOUTH EVERY MORNING AS NEEDED FOR EDEMA 60 tablet 11    Naloxone HCl (EVZIO) 2 MG/0.4ML SOAJ Use as directed 1 Package 0    Compression Stockings MISC by Does not apply route Thigh high 1 each 0    Elastic Bandages & Supports (MEDICAL COMPRESSION THIGH HIGH) MISC Thigh high compression stockings, 30-40 mm Hg 2 each 1    DULoxetine (CYMBALTA) 30 MG extended release capsule Take 1 capsule by mouth daily 30 capsule 1 pain associated with the industrial accident.    -continue with Pamelor and Lyrica and Motrin  -He was advised weight reduction, diet changes- 800-1200 roc diet, diet diary, exercising, nutritional  consult increased physical activity as tolerated     Mr. Henrik Billy will be prescribed  the medications  listed below which are for treatment of his presenting  medical problems which for this visit include:   Diagnoses of  BWC Lumbago-sciatica due to displacement of lumbar intervertebral disc, BWC Sprain of left knee/leg, initial encounter, BWC Sprain of ligament of lumbosacral joint, initial encounter, Sprain of ligament of lumbosacral joint, initial encounter, Sprain of left knee/leg, initial encounter, Sprain of ligament of lumbosacral joint, subsequent encounter, Sprain of left knee/leg, subsequent encounter, BWCSprain of left knee/leg, initial encounter, BWCSprain of ligament of lumbosacral joint, subsequent encounter, and BWCSprain of left knee/leg, subsequent encounter were pertinent to this visit. Medications/orders associated with this visit:    Current Outpatient Medications   Medication Sig Dispense Refill    pregabalin (LYRICA) 150 MG capsule Take 1 capsule by mouth 3 times daily for 30 days. 90 capsule 1    oxyCODONE (OXYCONTIN) 30 MG T12A extended release tablet Take 30 mg by mouth 2 times daily for 28 days. 56 each 0    nortriptyline (PAMELOR) 25 MG capsule TAKE 1-2 TABLETS BY MOUTH NIGHTLY 60 capsule 1    ibuprofen (ADVIL;MOTRIN) 600 MG tablet TAKE 1 TABLET BY MOUTH 2 TIMES DAILY AS NEEDED FOR PAIN 60 tablet 1    oxyCODONE-acetaminophen (PERCOCET) 7.5-325 MG per tablet Take 1 tablet by mouth every 6 hours as needed for Pain (max 2 per day) for up to 28 days.  56 tablet 0    furosemide (LASIX) 20 MG tablet TAKE ONE TO TWO TABLETS BY MOUTH EVERY MORNING AS NEEDED FOR EDEMA 60 tablet 11    Naloxone HCl (EVZIO) 2 MG/0.4ML SOAJ Use as directed 1 Package 0    Compression Stockings MISC by Does not apply been/were  discussed with the patient. Any questions on the  common side effects of these medications were also answered. He was advised against drinking alcohol with the narcotic pain medicines, advised against driving or handling machinery when  starting or adjusting the dose of medicines, feeling groggy or drowsy, or if having any cognitive issues related to the current medications. Heis fully aware of the risk of overdose and death, if medicines are misused and not taken as prescribed. If he develops new symptoms or if the symptoms worsen, he was told to call the office. .  Thank you for allowing me to participate in the care of this patient.     Quang Worley MD.    Cc: Simin Patel MD

## 2019-09-04 ENCOUNTER — TELEPHONE (OUTPATIENT)
Dept: PAIN MANAGEMENT | Age: 44
End: 2019-09-04

## 2019-09-20 DIAGNOSIS — S83.92XA SPRAIN OF LEFT KNEE/LEG, INITIAL ENCOUNTER: ICD-10-CM

## 2019-09-20 DIAGNOSIS — S33.9XXD SPRAIN OF LIGAMENT OF LUMBOSACRAL JOINT, SUBSEQUENT ENCOUNTER: ICD-10-CM

## 2019-09-20 DIAGNOSIS — M51.27 LUMBAGO-SCIATICA DUE TO DISPLACEMENT OF LUMBAR INTERVERTEBRAL DISC: ICD-10-CM

## 2019-09-20 DIAGNOSIS — S83.92XD SPRAIN OF LEFT KNEE/LEG, SUBSEQUENT ENCOUNTER: ICD-10-CM

## 2019-09-20 DIAGNOSIS — S33.9XXA SPRAIN OF LIGAMENT OF LUMBOSACRAL JOINT, INITIAL ENCOUNTER: ICD-10-CM

## 2019-09-20 LAB
A/G RATIO: 1.8 (ref 1.1–2.2)
ALBUMIN SERPL-MCNC: 4.5 G/DL (ref 3.4–5)
ALP BLD-CCNC: 80 U/L (ref 40–129)
ALT SERPL-CCNC: 34 U/L (ref 10–40)
ANION GAP SERPL CALCULATED.3IONS-SCNC: 16 MMOL/L (ref 3–16)
AST SERPL-CCNC: 35 U/L (ref 15–37)
BILIRUB SERPL-MCNC: 0.6 MG/DL (ref 0–1)
BUN BLDV-MCNC: 13 MG/DL (ref 7–20)
CALCIUM SERPL-MCNC: 9.2 MG/DL (ref 8.3–10.6)
CHLORIDE BLD-SCNC: 97 MMOL/L (ref 99–110)
CO2: 27 MMOL/L (ref 21–32)
CREAT SERPL-MCNC: 0.7 MG/DL (ref 0.9–1.3)
GFR AFRICAN AMERICAN: >60
GFR NON-AFRICAN AMERICAN: >60
GLOBULIN: 2.5 G/DL
GLUCOSE BLD-MCNC: 107 MG/DL (ref 70–99)
HCT VFR BLD CALC: 43.8 % (ref 40.5–52.5)
HEMOGLOBIN: 14.9 G/DL (ref 13.5–17.5)
MCH RBC QN AUTO: 30.8 PG (ref 26–34)
MCHC RBC AUTO-ENTMCNC: 34.2 G/DL (ref 31–36)
MCV RBC AUTO: 90.2 FL (ref 80–100)
PDW BLD-RTO: 13.8 % (ref 12.4–15.4)
PLATELET # BLD: 144 K/UL (ref 135–450)
PMV BLD AUTO: 8.7 FL (ref 5–10.5)
POTASSIUM SERPL-SCNC: 4.2 MMOL/L (ref 3.5–5.1)
RBC # BLD: 4.85 M/UL (ref 4.2–5.9)
SODIUM BLD-SCNC: 140 MMOL/L (ref 136–145)
TOTAL PROTEIN: 7 G/DL (ref 6.4–8.2)
WBC # BLD: 4.7 K/UL (ref 4–11)

## 2019-09-24 ENCOUNTER — OFFICE VISIT (OUTPATIENT)
Dept: FAMILY MEDICINE CLINIC | Age: 44
End: 2019-09-24
Payer: COMMERCIAL

## 2019-09-24 VITALS
WEIGHT: 315 LBS | OXYGEN SATURATION: 97 % | HEART RATE: 80 BPM | BODY MASS INDEX: 57.78 KG/M2 | SYSTOLIC BLOOD PRESSURE: 140 MMHG | DIASTOLIC BLOOD PRESSURE: 72 MMHG

## 2019-09-24 DIAGNOSIS — M25.562 ACUTE PAIN OF LEFT KNEE: Primary | ICD-10-CM

## 2019-09-24 PROCEDURE — 99213 OFFICE O/P EST LOW 20 MIN: CPT | Performed by: NURSE PRACTITIONER

## 2019-09-24 NOTE — PROGRESS NOTES
SUBJECTIVE:  Pt is a of 40 y.o. male comes in today with   Chief Complaint   Patient presents with    Knee Pain     Left knee pain x 3 weeks. Pt states it is getting worse        Patient presenting today for evaluation of left knee pain x 3 weeks. Intermittent pain for years. Denies recent injury. No improvement with chronic Oxycontin. Ibu helps with pain. No improvement with ice or knee brace. Associated with swelling. Prior to Visit Medications    Medication Sig Taking? Authorizing Provider   oxyCODONE (OXYCONTIN) 30 MG T12A extended release tablet Take 30 mg by mouth 2 times daily for 30 days. Yes Huseyin Starr MD   oxyCODONE-acetaminophen (PERCOCET) 7.5-325 MG per tablet Take 1 tablet by mouth every 6 hours as needed for Pain (max 2 per day) for up to 30 days. Yes Huseyin Starr MD   desvenlafaxine succinate (PRISTIQ) 50 MG TB24 extended release tablet Take 1 tablet by mouth daily Yes Huseyin Starr MD   nortriptyline (PAMELOR) 25 MG capsule TAKE 1-2 TABLETS BY MOUTH NIGHTLY Yes Huseyin Starr MD   ibuprofen (ADVIL;MOTRIN) 600 MG tablet TAKE 1 TABLET BY MOUTH 2 TIMES DAILY AS NEEDED FOR PAIN Yes Huseyin Starr MD   furosemide (LASIX) 20 MG tablet TAKE ONE TO TWO TABLETS BY MOUTH EVERY MORNING AS NEEDED FOR EDEMA Yes Velma Hawkins MD   Naloxone HCl (EVZIO) 2 MG/0.4ML SOAJ Use as directed Yes Huseyin Starr MD   Compression Stockings MISC by Does not apply route Thigh high Yes Kenny Martinez MD   Elastic Bandages & Supports (MEDICAL COMPRESSION THIGH HIGH) MISC Thigh high compression stockings, 30-40 mm Hg Yes Alberto Hyman MD   pregabalin (LYRICA) 150 MG capsule Take 1 capsule by mouth 3 times daily for 30 days. Huseyin Starr MD         Patient's allergies, past medical, surgical, social and family histories werereviewed and updated as appropriate. Review of Systems   Constitutional: Negative for chills, fatigue and fever.    Musculoskeletal: Positive for arthralgias (left knee pain with swelling) and joint swelling (left knee pain). Skin: Negative. Neurological: Negative for numbness. Physical Exam   Constitutional: He is oriented to person, place, and time. Morbidly obese  male     HENT:   Head: Normocephalic and atraumatic. Musculoskeletal:        Left knee: He exhibits decreased range of motion and swelling. He exhibits no erythema. Tenderness found. Medial joint line tenderness noted. Neurological: He is alert and oriented to person, place, and time. Skin: Skin is warm and dry. Psychiatric: He has a normal mood and affect. His behavior is normal. Judgment and thought content normal.   Vitals reviewed. Vitals:    09/24/19 0952 09/24/19 1037   BP: (!) 170/66 (!) 140/72   Pulse: 80    SpO2: 97%    Weight: (!) 450 lb (204.1 kg)              ASSESSMENT:  1. Acute pain of left knee        PLAN:  1. Acute pain of left knee  Apply a compressive ACE bandage. Rest and elevate the affected painful area. Apply cold compresses intermittently as needed. As pain recedes, begin normal activities slowly as tolerated. Call if symptoms persist.  Continue Ibu 600 mg TID  -     Eileen Burt MD, Orthopedic Surgery, [facility]  -     External Referral To Orthopedic Surgery  See pt instructions  F/u 5-7 days if no improvement, sooner if symptomsworsen. Discussed use, benefit, and side effects of prescribed medications. All patient questions answered. Pt voiced understanding.

## 2019-09-25 ENCOUNTER — OFFICE VISIT (OUTPATIENT)
Dept: PAIN MANAGEMENT | Age: 44
End: 2019-09-25
Payer: COMMERCIAL

## 2019-09-25 VITALS
SYSTOLIC BLOOD PRESSURE: 134 MMHG | DIASTOLIC BLOOD PRESSURE: 63 MMHG | HEART RATE: 93 BPM | BODY MASS INDEX: 57.78 KG/M2 | WEIGHT: 315 LBS

## 2019-09-25 DIAGNOSIS — S83.92XA SPRAIN OF LEFT KNEE/LEG, INITIAL ENCOUNTER: ICD-10-CM

## 2019-09-25 DIAGNOSIS — S83.92XD SPRAIN OF LEFT KNEE/LEG, SUBSEQUENT ENCOUNTER: ICD-10-CM

## 2019-09-25 DIAGNOSIS — S33.9XXD SPRAIN OF LIGAMENT OF LUMBOSACRAL JOINT, SUBSEQUENT ENCOUNTER: ICD-10-CM

## 2019-09-25 DIAGNOSIS — S33.9XXA SPRAIN OF LIGAMENT OF LUMBOSACRAL JOINT, INITIAL ENCOUNTER: ICD-10-CM

## 2019-09-25 DIAGNOSIS — M51.27 LUMBAGO-SCIATICA DUE TO DISPLACEMENT OF LUMBAR INTERVERTEBRAL DISC: ICD-10-CM

## 2019-09-25 PROCEDURE — 99214 OFFICE O/P EST MOD 30 MIN: CPT | Performed by: INTERNAL MEDICINE

## 2019-09-25 RX ORDER — DESVENLAFAXINE 50 MG/1
50 TABLET, EXTENDED RELEASE ORAL DAILY
Qty: 30 TABLET | Refills: 0 | Status: SHIPPED | OUTPATIENT
Start: 2019-09-25 | End: 2019-10-23 | Stop reason: SDUPTHER

## 2019-09-25 RX ORDER — OXYCODONE AND ACETAMINOPHEN 7.5; 325 MG/1; MG/1
1 TABLET ORAL EVERY 6 HOURS PRN
Qty: 56 TABLET | Refills: 0 | Status: SHIPPED | OUTPATIENT
Start: 2019-09-25 | End: 2019-10-23 | Stop reason: SDUPTHER

## 2019-09-25 RX ORDER — IBUPROFEN 600 MG/1
TABLET ORAL
Qty: 60 TABLET | Refills: 0 | Status: SHIPPED | OUTPATIENT
Start: 2019-09-25 | End: 2019-10-23

## 2019-09-25 RX ORDER — OXYCODONE HYDROCHLORIDE 30 MG/1
30 TABLET, FILM COATED, EXTENDED RELEASE ORAL 2 TIMES DAILY
Qty: 56 EACH | Refills: 0 | Status: SHIPPED | OUTPATIENT
Start: 2019-09-25 | End: 2019-10-23 | Stop reason: SDUPTHER

## 2019-09-25 RX ORDER — NORTRIPTYLINE HYDROCHLORIDE 25 MG/1
CAPSULE ORAL
Qty: 60 CAPSULE | Refills: 0 | Status: SHIPPED | OUTPATIENT
Start: 2019-09-25 | End: 2019-10-23 | Stop reason: SDUPTHER

## 2019-09-25 NOTE — PROGRESS NOTES
Aranza West  1975  [id number]    HISTORY OF PRESENT ILLNESS:  Mr. Rosibel Shearer is a 40 y.o. male returns for a follow up visit for multiple medical problems. His current presenting problems are   1. 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 BWC Sprain of left knee/leg, initial encounter    3. BWC Sprain of ligament of lumbosacral joint, initial encounter    4. Sprain of ligament of lumbosacral joint, initial encounter    5. Sprain of left knee/leg, initial encounter    6. Sprain of ligament of lumbosacral joint, subsequent encounter    7. Sprain of left knee/leg, subsequent encounter    8. BWCSprain of left knee/leg, initial encounter    9. BWCSprain of ligament of lumbosacral joint, subsequent encounter    10. BWCSprain of left knee/leg, subsequent encounter    . As per information/history obtained from the PADT(patient assessment and documentation tool) - He complains of pain in the lower back with radiation to the buttocks, hips Left, upper leg Left, knees Left, lower leg Left, ankles Left and feet Left He rates the pain 4/10 and describes it as sharp, aching, burning. Pain is made worse by: movement, walking, standing, sitting, bending, lifting. Current treatment regimen has helped relieve about 80% of the pain. He denies side effects from the current pain regimen. Patient reports that since the last follow up visit the physical functioning is unchanged, family/social relationships are unchanged, mood is unchanged and sleep patterns are unchanged, and that the overall functioning is unchanged. Patient denies neurological bowel or bladder. Patient denies misusing/abusing his narcotic pain medications or using any illegal drugs. There are No indicators for possible drug abuse, addiction or diversion problems.   Upon obtaining the medical history from Mr. Rosibel Shearer regarding today's office visit for his presenting problems,  states his back pain has been baseline encounter, BWC Sprain of ligament of lumbosacral joint, initial encounter, Sprain of ligament of lumbosacral joint, initial encounter, Sprain of left knee/leg, initial encounter, Sprain of ligament of lumbosacral joint, subsequent encounter, Sprain of left knee/leg, subsequent encounter, BWCSprain of left knee/leg, initial encounter, BWCSprain of ligament of lumbosacral joint, subsequent encounter, and BWCSprain of left knee/leg, subsequent encounter were pertinent to this visit. Medications/orders associated with this visit:    Current Outpatient Medications   Medication Sig Dispense Refill    oxyCODONE (OXYCONTIN) 30 MG T12A extended release tablet Take 30 mg by mouth 2 times daily for 30 days. 60 each 0    oxyCODONE-acetaminophen (PERCOCET) 7.5-325 MG per tablet Take 1 tablet by mouth every 6 hours as needed for Pain (max 2 per day) for up to 30 days. 60 tablet 0    desvenlafaxine succinate (PRISTIQ) 50 MG TB24 extended release tablet Take 1 tablet by mouth daily 30 tablet 0    nortriptyline (PAMELOR) 25 MG capsule TAKE 1-2 TABLETS BY MOUTH NIGHTLY 60 capsule 1    ibuprofen (ADVIL;MOTRIN) 600 MG tablet TAKE 1 TABLET BY MOUTH 2 TIMES DAILY AS NEEDED FOR PAIN 60 tablet 1    furosemide (LASIX) 20 MG tablet TAKE ONE TO TWO TABLETS BY MOUTH EVERY MORNING AS NEEDED FOR EDEMA 60 tablet 11    Naloxone HCl (EVZIO) 2 MG/0.4ML SOAJ Use as directed 1 Package 0    Compression Stockings MISC by Does not apply route Thigh high 1 each 0    Elastic Bandages & Supports (MEDICAL COMPRESSION THIGH HIGH) MISC Thigh high compression stockings, 30-40 mm Hg 2 each 1    pregabalin (LYRICA) 150 MG capsule Take 1 capsule by mouth 3 times daily for 30 days. 90 capsule 1     No current facility-administered medications for this visit.          Goals of current treatment regimen include improvement in pain, restoration of functioning- with focus on improvement in physical performance, general activity, work or disability,emotional

## 2019-10-23 ENCOUNTER — OFFICE VISIT (OUTPATIENT)
Dept: PAIN MANAGEMENT | Age: 44
End: 2019-10-23
Payer: COMMERCIAL

## 2019-10-23 VITALS
DIASTOLIC BLOOD PRESSURE: 73 MMHG | HEART RATE: 88 BPM | SYSTOLIC BLOOD PRESSURE: 180 MMHG | WEIGHT: 315 LBS | BODY MASS INDEX: 53.28 KG/M2

## 2019-10-23 DIAGNOSIS — S83.92XA SPRAIN OF LEFT KNEE/LEG, INITIAL ENCOUNTER: ICD-10-CM

## 2019-10-23 DIAGNOSIS — S33.9XXD SPRAIN OF LIGAMENT OF LUMBOSACRAL JOINT, SUBSEQUENT ENCOUNTER: ICD-10-CM

## 2019-10-23 DIAGNOSIS — S33.9XXA SPRAIN OF LIGAMENT OF LUMBOSACRAL JOINT, INITIAL ENCOUNTER: ICD-10-CM

## 2019-10-23 DIAGNOSIS — M51.27 LUMBAGO-SCIATICA DUE TO DISPLACEMENT OF LUMBAR INTERVERTEBRAL DISC: ICD-10-CM

## 2019-10-23 DIAGNOSIS — S83.92XD SPRAIN OF LEFT KNEE/LEG, SUBSEQUENT ENCOUNTER: ICD-10-CM

## 2019-10-23 PROCEDURE — 99214 OFFICE O/P EST MOD 30 MIN: CPT | Performed by: INTERNAL MEDICINE

## 2019-10-23 RX ORDER — DICLOFENAC SODIUM 75 MG/1
75 TABLET, DELAYED RELEASE ORAL 2 TIMES DAILY
Qty: 60 TABLET | Refills: 0 | Status: SHIPPED | OUTPATIENT
Start: 2019-10-23 | End: 2019-12-04 | Stop reason: SDUPTHER

## 2019-10-23 RX ORDER — PREGABALIN 150 MG/1
150 CAPSULE ORAL 3 TIMES DAILY
Qty: 90 CAPSULE | Refills: 0 | Status: SHIPPED | OUTPATIENT
Start: 2019-10-23 | End: 2019-12-04 | Stop reason: SDUPTHER

## 2019-10-23 RX ORDER — NORTRIPTYLINE HYDROCHLORIDE 25 MG/1
CAPSULE ORAL
Qty: 60 CAPSULE | Refills: 0 | Status: SHIPPED | OUTPATIENT
Start: 2019-10-23 | End: 2019-12-04 | Stop reason: SDUPTHER

## 2019-10-23 RX ORDER — OXYCODONE HYDROCHLORIDE 30 MG/1
30 TABLET, FILM COATED, EXTENDED RELEASE ORAL 2 TIMES DAILY
Qty: 84 EACH | Refills: 0 | Status: SHIPPED | OUTPATIENT
Start: 2019-10-23 | End: 2019-11-19 | Stop reason: SDUPTHER

## 2019-10-23 RX ORDER — DESVENLAFAXINE 50 MG/1
50 TABLET, EXTENDED RELEASE ORAL DAILY
Qty: 30 TABLET | Refills: 0 | Status: SHIPPED | OUTPATIENT
Start: 2019-10-23 | End: 2019-12-04 | Stop reason: SDUPTHER

## 2019-10-23 RX ORDER — OXYCODONE AND ACETAMINOPHEN 7.5; 325 MG/1; MG/1
1 TABLET ORAL EVERY 6 HOURS PRN
Qty: 84 TABLET | Refills: 0 | Status: SHIPPED | OUTPATIENT
Start: 2019-10-23 | End: 2019-11-19 | Stop reason: SDUPTHER

## 2019-11-11 ENCOUNTER — TELEPHONE (OUTPATIENT)
Dept: DERMATOLOGY | Age: 44
End: 2019-11-11

## 2019-11-11 ENCOUNTER — OFFICE VISIT (OUTPATIENT)
Dept: INFECTIOUS DISEASES | Age: 44
End: 2019-11-11
Payer: COMMERCIAL

## 2019-11-11 ENCOUNTER — TELEPHONE (OUTPATIENT)
Dept: INFECTIOUS DISEASES | Age: 44
End: 2019-11-11

## 2019-11-11 VITALS
BODY MASS INDEX: 40.43 KG/M2 | DIASTOLIC BLOOD PRESSURE: 86 MMHG | SYSTOLIC BLOOD PRESSURE: 142 MMHG | TEMPERATURE: 98 F | WEIGHT: 315 LBS | HEIGHT: 74 IN

## 2019-11-11 DIAGNOSIS — L98.9 SKIN LESION: ICD-10-CM

## 2019-11-11 DIAGNOSIS — B35.1 ONYCHOMYCOSIS: ICD-10-CM

## 2019-11-11 DIAGNOSIS — E66.01 MORBID OBESITY WITH BMI OF 50.0-59.9, ADULT (HCC): ICD-10-CM

## 2019-11-11 DIAGNOSIS — I87.2 CHRONIC VENOUS STASIS DERMATITIS OF BOTH LOWER EXTREMITIES: ICD-10-CM

## 2019-11-11 DIAGNOSIS — L03.119 RECURRENT CELLULITIS OF LOWER LEG: Primary | ICD-10-CM

## 2019-11-11 PROCEDURE — 99215 OFFICE O/P EST HI 40 MIN: CPT | Performed by: INTERNAL MEDICINE

## 2019-11-11 RX ORDER — TERBINAFINE HYDROCHLORIDE 250 MG/1
250 TABLET ORAL DAILY
Qty: 30 TABLET | Refills: 3 | Status: SHIPPED | OUTPATIENT
Start: 2019-11-11 | End: 2019-12-11

## 2019-11-11 RX ORDER — PENICILLIN V POTASSIUM 500 MG/1
1000 TABLET ORAL 2 TIMES DAILY
Qty: 120 TABLET | Refills: 11 | Status: SHIPPED | OUTPATIENT
Start: 2019-11-11 | End: 2019-12-11

## 2019-11-19 ENCOUNTER — TELEPHONE (OUTPATIENT)
Dept: PAIN MANAGEMENT | Age: 44
End: 2019-11-19

## 2019-11-19 DIAGNOSIS — S83.92XA SPRAIN OF LEFT KNEE/LEG, INITIAL ENCOUNTER: ICD-10-CM

## 2019-11-19 DIAGNOSIS — S33.9XXD SPRAIN OF LIGAMENT OF LUMBOSACRAL JOINT, SUBSEQUENT ENCOUNTER: ICD-10-CM

## 2019-11-19 DIAGNOSIS — S33.9XXA SPRAIN OF LIGAMENT OF LUMBOSACRAL JOINT, INITIAL ENCOUNTER: ICD-10-CM

## 2019-11-19 DIAGNOSIS — S83.92XD SPRAIN OF LEFT KNEE/LEG, SUBSEQUENT ENCOUNTER: ICD-10-CM

## 2019-11-19 DIAGNOSIS — M51.27 LUMBAGO-SCIATICA DUE TO DISPLACEMENT OF LUMBAR INTERVERTEBRAL DISC: ICD-10-CM

## 2019-11-19 RX ORDER — OXYCODONE AND ACETAMINOPHEN 7.5; 325 MG/1; MG/1
1 TABLET ORAL EVERY 6 HOURS PRN
Qty: 24 TABLET | Refills: 0 | Status: SHIPPED | OUTPATIENT
Start: 2019-11-19 | End: 2019-12-04 | Stop reason: SDUPTHER

## 2019-11-19 RX ORDER — OXYCODONE HYDROCHLORIDE 30 MG/1
30 TABLET, FILM COATED, EXTENDED RELEASE ORAL 2 TIMES DAILY
Qty: 24 EACH | Refills: 0 | Status: SHIPPED | OUTPATIENT
Start: 2019-11-19 | End: 2019-12-04 | Stop reason: SDUPTHER

## 2019-12-03 ENCOUNTER — OFFICE VISIT (OUTPATIENT)
Dept: DERMATOLOGY | Age: 44
End: 2019-12-03
Payer: COMMERCIAL

## 2019-12-03 DIAGNOSIS — D48.5 NEOPLASM OF UNCERTAIN BEHAVIOR OF SKIN: Primary | ICD-10-CM

## 2019-12-03 PROCEDURE — 11102 TANGNTL BX SKIN SINGLE LES: CPT | Performed by: DERMATOLOGY

## 2019-12-03 PROCEDURE — 99202 OFFICE O/P NEW SF 15 MIN: CPT | Performed by: DERMATOLOGY

## 2019-12-04 ENCOUNTER — OFFICE VISIT (OUTPATIENT)
Dept: PAIN MANAGEMENT | Age: 44
End: 2019-12-04
Payer: COMMERCIAL

## 2019-12-04 VITALS
WEIGHT: 315 LBS | SYSTOLIC BLOOD PRESSURE: 131 MMHG | HEART RATE: 97 BPM | DIASTOLIC BLOOD PRESSURE: 67 MMHG | BODY MASS INDEX: 57.78 KG/M2

## 2019-12-04 DIAGNOSIS — S83.92XA SPRAIN OF LEFT KNEE/LEG, INITIAL ENCOUNTER: ICD-10-CM

## 2019-12-04 DIAGNOSIS — S33.9XXA SPRAIN OF LIGAMENT OF LUMBOSACRAL JOINT, INITIAL ENCOUNTER: ICD-10-CM

## 2019-12-04 DIAGNOSIS — S33.9XXD SPRAIN OF LIGAMENT OF LUMBOSACRAL JOINT, SUBSEQUENT ENCOUNTER: ICD-10-CM

## 2019-12-04 DIAGNOSIS — S83.92XD SPRAIN OF LEFT KNEE/LEG, SUBSEQUENT ENCOUNTER: ICD-10-CM

## 2019-12-04 DIAGNOSIS — M51.27 LUMBAGO-SCIATICA DUE TO DISPLACEMENT OF LUMBAR INTERVERTEBRAL DISC: ICD-10-CM

## 2019-12-04 PROCEDURE — 99213 OFFICE O/P EST LOW 20 MIN: CPT | Performed by: INTERNAL MEDICINE

## 2019-12-04 RX ORDER — NORTRIPTYLINE HYDROCHLORIDE 25 MG/1
CAPSULE ORAL
Qty: 60 CAPSULE | Refills: 0 | Status: SHIPPED | OUTPATIENT
Start: 2019-12-04 | End: 2020-01-29 | Stop reason: SDUPTHER

## 2019-12-04 RX ORDER — OXYCODONE HYDROCHLORIDE 30 MG/1
30 TABLET, FILM COATED, EXTENDED RELEASE ORAL 2 TIMES DAILY
Qty: 60 EACH | Refills: 0 | Status: SHIPPED | OUTPATIENT
Start: 2019-12-04 | End: 2020-01-03 | Stop reason: SDUPTHER

## 2019-12-04 RX ORDER — DICLOFENAC SODIUM 75 MG/1
75 TABLET, DELAYED RELEASE ORAL 2 TIMES DAILY
Qty: 60 TABLET | Refills: 0 | Status: SHIPPED | OUTPATIENT
Start: 2019-12-04 | End: 2019-12-19 | Stop reason: CLARIF

## 2019-12-04 RX ORDER — PREGABALIN 150 MG/1
150 CAPSULE ORAL 3 TIMES DAILY
Qty: 90 CAPSULE | Refills: 0 | Status: SHIPPED | OUTPATIENT
Start: 2019-12-04 | End: 2019-12-19 | Stop reason: CLARIF

## 2019-12-04 RX ORDER — DESVENLAFAXINE 50 MG/1
50 TABLET, EXTENDED RELEASE ORAL DAILY
Qty: 30 TABLET | Refills: 0 | Status: SHIPPED | OUTPATIENT
Start: 2019-12-04 | End: 2019-12-19 | Stop reason: CLARIF

## 2019-12-04 RX ORDER — OXYCODONE AND ACETAMINOPHEN 7.5; 325 MG/1; MG/1
1 TABLET ORAL EVERY 6 HOURS PRN
Qty: 60 TABLET | Refills: 0 | Status: SHIPPED | OUTPATIENT
Start: 2019-12-04 | End: 2020-01-03 | Stop reason: SDUPTHER

## 2019-12-05 ENCOUNTER — TELEPHONE (OUTPATIENT)
Dept: DERMATOLOGY | Age: 44
End: 2019-12-05

## 2019-12-05 LAB — DERMATOLOGY PATHOLOGY REPORT: ABNORMAL

## 2019-12-05 NOTE — TELEPHONE ENCOUNTER
Biopsy resulted as Melanoma   [facility] from [place] contacting office to let us know result note may not show that specimen was sent to [facility], however wanted to assure us it was   Call for additional questions [phone]

## 2019-12-06 ENCOUNTER — TELEPHONE (OUTPATIENT)
Dept: INTERNAL MEDICINE CLINIC | Age: 44
End: 2019-12-06

## 2019-12-06 DIAGNOSIS — C43.9 ACRAL LENTIGINOUS MELANOMA (HCC): Primary | ICD-10-CM

## 2019-12-09 ENCOUNTER — TELEPHONE (OUTPATIENT)
Dept: PAIN MANAGEMENT | Age: 44
End: 2019-12-09

## 2019-12-10 ENCOUNTER — TELEPHONE (OUTPATIENT)
Dept: PAIN MANAGEMENT | Age: 44
End: 2019-12-10

## 2019-12-16 ENCOUNTER — TELEPHONE (OUTPATIENT)
Dept: SURGERY | Age: 44
End: 2019-12-16

## 2019-12-16 NOTE — PROGRESS NOTES
Stephany Mcfadden MD  Surgical Oncology  Date of service: 2019  Kelly Kwok    Reason for Consult: Dr Carla Lamb asked me to see Kelly Kwok for evaluation of melanoma of the left foot. Present Illness: Patient is a 40 y.o.  male presents to his dermatologist with a mole on the on the left instep of his foot. The lesion was approximately the size of a quarter and has been present for several years. It has recently increased in size. It has not been bleeding. The lesion has not been pruritic. Patient does not have any other lesions of concern. 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 No personal or family history of melanoma. Jeane Chaudhary does not have significant subcutaneous mass, swelling in the groin, cough, abdominal pain, jaundice, blood in stools, headaches, recent neurological changes or bone pain to indicate any metastatic disease. Past Medical History:   Diagnosis Date    Chronic back pain     HNP (herniated nucleus pulposus with myelopathy), thoracic     ANN-MARIE (obstructive sleep apnea)     Stasis dermatitis      Past Surgical History:   Procedure Laterality Date    LUMBAR LAMINECTOMY  06    Arand    SPINE SURGERY       Social  Social History     Tobacco Use    Smoking status: Former Smoker     Types: Cigarettes     Last attempt to quit: 2005     Years since quittin.9    Smokeless tobacco: Never Used   Substance Use Topics    Alcohol use: No     Alcohol/week: 0.0 standard drinks     Comment: rarely     Drug use: No     Family History   Problem Relation Age of Onset    Cancer Mother     COPD Paternal Grandfather     Colon Cancer Maternal Grandfather      Allergies: Patient has no known allergies.   Current Outpatient Medications   Medication Sig Dispense Refill    desvenlafaxine succinate (PRISTIQ) 50 MG TB24 extended release tablet Take 1 tablet by mouth daily 30 tablet 0    nortriptyline (PAMELOR) 25 MG capsule TAKE 1-2 TABLETS BY No masses,  No organomegaly. Extremities: Extremities normal, atraumatic, no cyanosis. Bilateral non-pitting edema present. Neurologic: Grossly intact sensory and motor exam.   Psychiatric: Appropriate mood and thought process     Pathology: Acral lentiginous melanoma. Thickness: 0.7 mm, Ulceration: no     Regression: no    Mitotic rate: 1 per sq. mm    Impression:    Stage: At least pT1a, melanoma of the left foot instep. Rajwinder Marin Decision Dx:  - Class 1  -Subclass 1b     Plan: The natural history, prognosis and treatment of melanoma were discussed. The discussion included description of Breslow thickness and melanoma staging. The discussion of the treatment included the techniques necessary for resection or wide local excision and sentinel node biopsy as well as the appropriate applications. These discussions were illustrated with diagrams and the patient was given a printed handout that summarizes the complete discussion. It was felt that this discussion was necessary because of the anxiety induced by the diagnosis of melanoma and the complex information that the patient receive when they attempt to educate themselves by the public sources and/or the internet. The patient was also instructed to avoid excessive sun exposure in the future. Specific techniques for doing this based on the Rocio measures recommended the by [facility]'s [place] [facility]anoma [facility] were given. The lesion can be treated with: Wide excision and sentinel lymph node biopsy under General anesthesia. Indications for sentinel lymph node biopsy discussed extensively. Patient already has bilateral lower extremity swelling and can get worse if he develops lymphedema. Also he will need a large incision in groin to remove lymph nodes. After all the discussion, pt wishes to proceed with wide excision only for now. I explained patient about the surgery. All the complications including wound issues were explained.  Risks, benefits and alternatives of surgery explained to the patient and patient wishes to proceed with surgery. Adjuvant management will depend on final pathology. All the questions of the patient are answered to his apparent satisfaction. Patient verbalized understanding of the management plan. My office will schedule the surgery and will inform the patient about preoperative instructions. Follow up with Dr Tigre King. Herman Green MD  Surgery Attending    I, Tequila Maynard RN, am scribing for and in the presence of Dr Herman Green. Tequila Maynard RN    I, Dr. Herman Green, personally performed the services described in this documentation as scribed by Tequila Maynard RN in my presence, and it is both accurate and complete.      Herman Green MD  Surgery Attending

## 2019-12-17 ENCOUNTER — OFFICE VISIT (OUTPATIENT)
Dept: SURGERY | Age: 44
End: 2019-12-17
Payer: COMMERCIAL

## 2019-12-17 VITALS
OXYGEN SATURATION: 94 % | TEMPERATURE: 97 F | HEIGHT: 75 IN | DIASTOLIC BLOOD PRESSURE: 63 MMHG | HEART RATE: 76 BPM | BODY MASS INDEX: 39.17 KG/M2 | WEIGHT: 315 LBS | SYSTOLIC BLOOD PRESSURE: 120 MMHG

## 2019-12-17 PROCEDURE — 99245 OFF/OP CONSLTJ NEW/EST HI 55: CPT | Performed by: SURGERY

## 2019-12-17 NOTE — LETTER
[facility]) Surgical Oncology and General Surgery   [address] [place] [address] [phone] MD Rajni    SURGERY ORDER -- [unfilled]      Facility:  [facility]. # _________________                                  Scheduled by: ____________    Surgery Date & Time:                                              Nuc Med / Inj. - or - Needle/Seed Loc:                    Pt arrival:      Patient Name: Miladis Polanco             :                1975           PCP:                 Gena Lopez MD   Home Ph:         [phone] (home) [phone] (work)                                                    PROCEDURE:  Wide excision of left foot melanoma    DIAGNOSIS: Left foot melanoma    Anesthesia: _MAC_ Time Needed: _45 mins_Pt Position: _supine_         Outpatient _Y_ Admit __  Assist._____  Pre-Op H & P to be done by: ___      Labs Needed: CBC [] PT/PTT []  INR [] CMP [] EKG [] CXR [] Urine Hcg []     Cardiac Clearance ___  (if Xd  to be done by) __________________    Medications to be stopped 5 days before surgery:  _________    Additional/Special Orders:    Ancef 3 gm IV marcin Finley MD  2019 4:06 PM

## 2019-12-19 ENCOUNTER — ANESTHESIA EVENT (OUTPATIENT)
Dept: OPERATING ROOM | Age: 44
End: 2019-12-19
Payer: COMMERCIAL

## 2019-12-19 ENCOUNTER — TELEPHONE (OUTPATIENT)
Dept: FAMILY MEDICINE CLINIC | Age: 44
End: 2019-12-19

## 2019-12-19 ENCOUNTER — OFFICE VISIT (OUTPATIENT)
Dept: FAMILY MEDICINE CLINIC | Age: 44
End: 2019-12-19
Payer: COMMERCIAL

## 2019-12-19 VITALS
TEMPERATURE: 99.2 F | BODY MASS INDEX: 39.17 KG/M2 | DIASTOLIC BLOOD PRESSURE: 85 MMHG | HEART RATE: 86 BPM | OXYGEN SATURATION: 94 % | WEIGHT: 315 LBS | HEIGHT: 75 IN | SYSTOLIC BLOOD PRESSURE: 135 MMHG

## 2019-12-19 DIAGNOSIS — R03.0 ELEVATED BLOOD PRESSURE READING: ICD-10-CM

## 2019-12-19 DIAGNOSIS — I87.2 CHRONIC VENOUS STASIS DERMATITIS OF BOTH LOWER EXTREMITIES: ICD-10-CM

## 2019-12-19 DIAGNOSIS — G47.33 OSA (OBSTRUCTIVE SLEEP APNEA): Primary | ICD-10-CM

## 2019-12-19 DIAGNOSIS — E66.01 CLASS 3 SEVERE OBESITY DUE TO EXCESS CALORIES WITHOUT SERIOUS COMORBIDITY WITH BODY MASS INDEX (BMI) OF 50.0 TO 59.9 IN ADULT (HCC): Chronic | ICD-10-CM

## 2019-12-19 PROCEDURE — 99214 OFFICE O/P EST MOD 30 MIN: CPT | Performed by: FAMILY MEDICINE

## 2019-12-19 RX ORDER — PENICILLIN V POTASSIUM 500 MG/1
1000 TABLET ORAL 2 TIMES DAILY
COMMUNITY
End: 2020-11-06

## 2019-12-20 ENCOUNTER — ANESTHESIA (OUTPATIENT)
Dept: OPERATING ROOM | Age: 44
End: 2019-12-20
Payer: COMMERCIAL

## 2019-12-20 ENCOUNTER — HOSPITAL ENCOUNTER (OUTPATIENT)
Age: 44
Setting detail: OUTPATIENT SURGERY
Discharge: HOME | End: 2019-12-20
Attending: SURGERY | Admitting: SURGERY
Payer: COMMERCIAL

## 2019-12-20 VITALS
BODY MASS INDEX: 39.17 KG/M2 | WEIGHT: 315 LBS | HEIGHT: 75 IN | SYSTOLIC BLOOD PRESSURE: 155 MMHG | RESPIRATION RATE: 18 BRPM | DIASTOLIC BLOOD PRESSURE: 74 MMHG | HEART RATE: 86 BPM | TEMPERATURE: 98.7 F | OXYGEN SATURATION: 95 %

## 2019-12-20 VITALS — SYSTOLIC BLOOD PRESSURE: 162 MMHG | DIASTOLIC BLOOD PRESSURE: 68 MMHG | OXYGEN SATURATION: 97 %

## 2019-12-20 DIAGNOSIS — C43.72: ICD-10-CM

## 2019-12-20 LAB
GLUCOSE BLD-MCNC: 105 MG/DL (ref 70–99)
PERFORMED ON: ABNORMAL

## 2019-12-20 PROCEDURE — 7100000001 HC PACU RECOVERY - ADDTL 15 MIN: Performed by: SURGERY

## 2019-12-20 PROCEDURE — 3600000004 HC SURGERY LEVEL 4 BASE: Performed by: SURGERY

## 2019-12-20 PROCEDURE — 7100000011 HC PHASE II RECOVERY - ADDTL 15 MIN: Performed by: SURGERY

## 2019-12-20 PROCEDURE — 6360000002 HC RX W HCPCS: Performed by: SURGERY

## 2019-12-20 PROCEDURE — 3700000001 HC ADD 15 MINUTES (ANESTHESIA): Performed by: SURGERY

## 2019-12-20 PROCEDURE — 3700000000 HC ANESTHESIA ATTENDED CARE: Performed by: SURGERY

## 2019-12-20 PROCEDURE — 11626 EXC S/N/H/F/G MAL+MRG >4 CM: CPT | Performed by: SURGERY

## 2019-12-20 PROCEDURE — 7100000000 HC PACU RECOVERY - FIRST 15 MIN: Performed by: SURGERY

## 2019-12-20 PROCEDURE — 7100000010 HC PHASE II RECOVERY - FIRST 15 MIN: Performed by: SURGERY

## 2019-12-20 PROCEDURE — 2709999900 HC NON-CHARGEABLE SUPPLY: Performed by: SURGERY

## 2019-12-20 PROCEDURE — 88309 TISSUE EXAM BY PATHOLOGIST: CPT

## 2019-12-20 PROCEDURE — 2580000003 HC RX 258: Performed by: ANESTHESIOLOGY

## 2019-12-20 PROCEDURE — 2500000003 HC RX 250 WO HCPCS: Performed by: SURGERY

## 2019-12-20 PROCEDURE — 6360000002 HC RX W HCPCS: Performed by: NURSE ANESTHETIST, CERTIFIED REGISTERED

## 2019-12-20 PROCEDURE — 2580000003 HC RX 258: Performed by: SURGERY

## 2019-12-20 PROCEDURE — 3600000014 HC SURGERY LEVEL 4 ADDTL 15MIN: Performed by: SURGERY

## 2019-12-20 RX ORDER — FENTANYL CITRATE 50 UG/ML
25 INJECTION, SOLUTION INTRAMUSCULAR; INTRAVENOUS EVERY 5 MIN PRN
Status: DISCONTINUED | OUTPATIENT
Start: 2019-12-20 | End: 2019-12-20 | Stop reason: HOSPADM

## 2019-12-20 RX ORDER — OXYCODONE HYDROCHLORIDE AND ACETAMINOPHEN 5; 325 MG/1; MG/1
2 TABLET ORAL PRN
Status: DISCONTINUED | OUTPATIENT
Start: 2019-12-20 | End: 2019-12-20 | Stop reason: HOSPADM

## 2019-12-20 RX ORDER — DIPHENHYDRAMINE HYDROCHLORIDE 50 MG/ML
12.5 INJECTION INTRAMUSCULAR; INTRAVENOUS
Status: DISCONTINUED | OUTPATIENT
Start: 2019-12-20 | End: 2019-12-20 | Stop reason: HOSPADM

## 2019-12-20 RX ORDER — MIDAZOLAM HYDROCHLORIDE 1 MG/ML
INJECTION INTRAMUSCULAR; INTRAVENOUS PRN
Status: DISCONTINUED | OUTPATIENT
Start: 2019-12-20 | End: 2019-12-20 | Stop reason: SDUPTHER

## 2019-12-20 RX ORDER — LANOLIN ALCOHOL/MO/W.PET/CERES
3000 CREAM (GRAM) TOPICAL DAILY
COMMUNITY
End: 2021-10-07

## 2019-12-20 RX ORDER — HYDRALAZINE HYDROCHLORIDE 20 MG/ML
5 INJECTION INTRAMUSCULAR; INTRAVENOUS EVERY 10 MIN PRN
Status: DISCONTINUED | OUTPATIENT
Start: 2019-12-20 | End: 2019-12-20 | Stop reason: HOSPADM

## 2019-12-20 RX ORDER — BUPIVACAINE HYDROCHLORIDE AND EPINEPHRINE 5; 5 MG/ML; UG/ML
INJECTION, SOLUTION EPIDURAL; INTRACAUDAL; PERINEURAL PRN
Status: DISCONTINUED | OUTPATIENT
Start: 2019-12-20 | End: 2019-12-20 | Stop reason: ALTCHOICE

## 2019-12-20 RX ORDER — SODIUM CHLORIDE 0.9 % (FLUSH) 0.9 %
10 SYRINGE (ML) INJECTION EVERY 12 HOURS SCHEDULED
Status: DISCONTINUED | OUTPATIENT
Start: 2019-12-20 | End: 2019-12-20 | Stop reason: HOSPADM

## 2019-12-20 RX ORDER — ONDANSETRON 2 MG/ML
4 INJECTION INTRAMUSCULAR; INTRAVENOUS
Status: DISCONTINUED | OUTPATIENT
Start: 2019-12-20 | End: 2019-12-20 | Stop reason: HOSPADM

## 2019-12-20 RX ORDER — LIDOCAINE HYDROCHLORIDE 10 MG/ML
1 INJECTION, SOLUTION EPIDURAL; INFILTRATION; INTRACAUDAL; PERINEURAL
Status: DISCONTINUED | OUTPATIENT
Start: 2019-12-20 | End: 2019-12-20 | Stop reason: HOSPADM

## 2019-12-20 RX ORDER — OXYCODONE HYDROCHLORIDE AND ACETAMINOPHEN 5; 325 MG/1; MG/1
1 TABLET ORAL PRN
Status: DISCONTINUED | OUTPATIENT
Start: 2019-12-20 | End: 2019-12-20 | Stop reason: HOSPADM

## 2019-12-20 RX ORDER — MAGNESIUM HYDROXIDE 1200 MG/15ML
LIQUID ORAL CONTINUOUS PRN
Status: COMPLETED | OUTPATIENT
Start: 2019-12-20 | End: 2019-12-20

## 2019-12-20 RX ORDER — MEPERIDINE HYDROCHLORIDE 25 MG/ML
12.5 INJECTION INTRAMUSCULAR; INTRAVENOUS; SUBCUTANEOUS EVERY 5 MIN PRN
Status: DISCONTINUED | OUTPATIENT
Start: 2019-12-20 | End: 2019-12-20 | Stop reason: HOSPADM

## 2019-12-20 RX ORDER — SODIUM CHLORIDE, SODIUM LACTATE, POTASSIUM CHLORIDE, CALCIUM CHLORIDE 600; 310; 30; 20 MG/100ML; MG/100ML; MG/100ML; MG/100ML
INJECTION, SOLUTION INTRAVENOUS CONTINUOUS
Status: DISCONTINUED | OUTPATIENT
Start: 2019-12-20 | End: 2019-12-20 | Stop reason: HOSPADM

## 2019-12-20 RX ORDER — FENTANYL CITRATE 50 UG/ML
50 INJECTION, SOLUTION INTRAMUSCULAR; INTRAVENOUS EVERY 5 MIN PRN
Status: DISCONTINUED | OUTPATIENT
Start: 2019-12-20 | End: 2019-12-20 | Stop reason: HOSPADM

## 2019-12-20 RX ORDER — PROCHLORPERAZINE EDISYLATE 5 MG/ML
5 INJECTION INTRAMUSCULAR; INTRAVENOUS
Status: DISCONTINUED | OUTPATIENT
Start: 2019-12-20 | End: 2019-12-20 | Stop reason: HOSPADM

## 2019-12-20 RX ORDER — LABETALOL 20 MG/4 ML (5 MG/ML) INTRAVENOUS SYRINGE
5 EVERY 10 MIN PRN
Status: DISCONTINUED | OUTPATIENT
Start: 2019-12-20 | End: 2019-12-20 | Stop reason: HOSPADM

## 2019-12-20 RX ORDER — PROPOFOL 10 MG/ML
INJECTION, EMULSION INTRAVENOUS PRN
Status: DISCONTINUED | OUTPATIENT
Start: 2019-12-20 | End: 2019-12-20 | Stop reason: SDUPTHER

## 2019-12-20 RX ORDER — SODIUM CHLORIDE 0.9 % (FLUSH) 0.9 %
10 SYRINGE (ML) INJECTION PRN
Status: DISCONTINUED | OUTPATIENT
Start: 2019-12-20 | End: 2019-12-20 | Stop reason: HOSPADM

## 2019-12-20 RX ORDER — BACITRACIN ZINC AND POLYMYXIN B SULFATE 500; 1000 [USP'U]/G; [USP'U]/G
OINTMENT TOPICAL
Qty: 1 TUBE | Refills: 2 | Status: SHIPPED | OUTPATIENT
Start: 2019-12-20 | End: 2019-12-27

## 2019-12-20 RX ORDER — LIDOCAINE HYDROCHLORIDE 20 MG/ML
INJECTION, SOLUTION INTRAVENOUS PRN
Status: DISCONTINUED | OUTPATIENT
Start: 2019-12-20 | End: 2019-12-20 | Stop reason: SDUPTHER

## 2019-12-20 RX ORDER — FENTANYL CITRATE 50 UG/ML
INJECTION, SOLUTION INTRAMUSCULAR; INTRAVENOUS PRN
Status: DISCONTINUED | OUTPATIENT
Start: 2019-12-20 | End: 2019-12-20 | Stop reason: SDUPTHER

## 2019-12-20 RX ADMIN — SODIUM CHLORIDE, POTASSIUM CHLORIDE, SODIUM LACTATE AND CALCIUM CHLORIDE: 600; 310; 30; 20 INJECTION, SOLUTION INTRAVENOUS at 11:51

## 2019-12-20 RX ADMIN — FENTANYL CITRATE 50 MCG: 50 INJECTION INTRAMUSCULAR; INTRAVENOUS at 14:43

## 2019-12-20 RX ADMIN — MIDAZOLAM HYDROCHLORIDE 2 MG: 2 INJECTION, SOLUTION INTRAMUSCULAR; INTRAVENOUS at 14:29

## 2019-12-20 RX ADMIN — FENTANYL CITRATE 50 MCG: 50 INJECTION INTRAMUSCULAR; INTRAVENOUS at 14:51

## 2019-12-20 RX ADMIN — PROPOFOL 50 MG: 10 INJECTION, EMULSION INTRAVENOUS at 14:43

## 2019-12-20 RX ADMIN — LIDOCAINE HYDROCHLORIDE 50 MG: 20 INJECTION, SOLUTION INTRAVENOUS at 15:03

## 2019-12-20 RX ADMIN — FENTANYL CITRATE 50 MCG: 50 INJECTION INTRAMUSCULAR; INTRAVENOUS at 14:59

## 2019-12-20 RX ADMIN — PROPOFOL 50 MG: 10 INJECTION, EMULSION INTRAVENOUS at 14:40

## 2019-12-20 RX ADMIN — PROPOFOL 100 MG: 10 INJECTION, EMULSION INTRAVENOUS at 14:42

## 2019-12-20 RX ADMIN — DEXTROSE MONOHYDRATE 3 G: 50 INJECTION, SOLUTION INTRAVENOUS at 14:41

## 2019-12-20 ASSESSMENT — PULMONARY FUNCTION TESTS
PIF_VALUE: 1
PIF_VALUE: 0
PIF_VALUE: 16
PIF_VALUE: 17
PIF_VALUE: 19
PIF_VALUE: 0
PIF_VALUE: 16
PIF_VALUE: 6
PIF_VALUE: 16
PIF_VALUE: 16
PIF_VALUE: 18
PIF_VALUE: 0
PIF_VALUE: 16
PIF_VALUE: 0
PIF_VALUE: 16
PIF_VALUE: 17
PIF_VALUE: 19
PIF_VALUE: 17
PIF_VALUE: 16
PIF_VALUE: 1
PIF_VALUE: 1
PIF_VALUE: 15
PIF_VALUE: 18
PIF_VALUE: 4
PIF_VALUE: 1
PIF_VALUE: 16
PIF_VALUE: 16
PIF_VALUE: 17
PIF_VALUE: 16
PIF_VALUE: 0
PIF_VALUE: 2
PIF_VALUE: 18
PIF_VALUE: 0
PIF_VALUE: 0
PIF_VALUE: 16
PIF_VALUE: 0
PIF_VALUE: 0
PIF_VALUE: 17
PIF_VALUE: 19
PIF_VALUE: 17
PIF_VALUE: 0

## 2019-12-20 ASSESSMENT — PAIN DESCRIPTION - ORIENTATION: ORIENTATION: LEFT;RIGHT

## 2019-12-20 ASSESSMENT — PAIN SCALES - GENERAL: PAINLEVEL_OUTOF10: 1

## 2019-12-20 ASSESSMENT — PAIN DESCRIPTION - DESCRIPTORS
DESCRIPTORS: THROBBING;ACHING;CONSTANT
DESCRIPTORS: ACHING;CONSTANT;THROBBING

## 2019-12-20 ASSESSMENT — PAIN DESCRIPTION - LOCATION: LOCATION: KNEE

## 2019-12-20 ASSESSMENT — PAIN DESCRIPTION - FREQUENCY: FREQUENCY: CONTINUOUS

## 2019-12-20 ASSESSMENT — PAIN DESCRIPTION - ONSET: ONSET: ON-GOING

## 2019-12-20 ASSESSMENT — PAIN - FUNCTIONAL ASSESSMENT
PAIN_FUNCTIONAL_ASSESSMENT: PREVENTS OR INTERFERES SOME ACTIVE ACTIVITIES AND ADLS
PAIN_FUNCTIONAL_ASSESSMENT: 0-10

## 2019-12-20 ASSESSMENT — PAIN DESCRIPTION - PAIN TYPE: TYPE: CHRONIC PAIN

## 2019-12-26 NOTE — PROGRESS NOTES
Elfego Henry is here for postop evaluation. He is gradually improving since then. Minimal pain now. No other complaints. O/E: Alert, oriented x 3, sitting comfortably with out any discomfort  No respiratory distress  Postop wound - granulating well. Path - FINAL DIAGNOSIS:    Excision of melanoma of skin of heel:     - Superficial malignant melanoma and melanoma in situ. 1.5 mm thickness    A/P: S/P Wide excision of left foot melanoma 12/20/19, doing well  No postop complications  Path as above reviewed with patient  Discussed re-excision of another 1 cm with possible Fellows Lymph Node biopsy - pt has high risk of lymphedema. Discussed obtaining a PET scan to r/o metastatic disease (his weight may become an issue) and US of left groin to evaluate lymph nodes. Follow up in 2 weeks    Chhaya Spaulding MD  Surgery Attending    I, Chrissie Mcintyre RN, am scribing for and in the presence of Dr Chhaya Spaulding. Chrissie Mcintyre RN    I, Dr. Chhaya Spaulding, personally performed the services described in this documentation as scribed by Chrissie Mcintyre RN in my presence, and it is both accurate and complete.      Chhaya Spaulding MD  Surgery Attending

## 2019-12-30 ENCOUNTER — TELEPHONE (OUTPATIENT)
Dept: SURGERY | Age: 44
End: 2019-12-30

## 2019-12-31 ENCOUNTER — OFFICE VISIT (OUTPATIENT)
Dept: SURGERY | Age: 44
End: 2019-12-31

## 2019-12-31 VITALS
BODY MASS INDEX: 45.1 KG/M2 | DIASTOLIC BLOOD PRESSURE: 81 MMHG | HEART RATE: 75 BPM | WEIGHT: 315 LBS | SYSTOLIC BLOOD PRESSURE: 155 MMHG | OXYGEN SATURATION: 95 % | HEIGHT: 70 IN | TEMPERATURE: 97.4 F | RESPIRATION RATE: 18 BRPM

## 2019-12-31 PROCEDURE — 99024 POSTOP FOLLOW-UP VISIT: CPT | Performed by: SURGERY

## 2020-01-03 ENCOUNTER — OFFICE VISIT (OUTPATIENT)
Dept: PAIN MANAGEMENT | Age: 45
End: 2020-01-03
Payer: COMMERCIAL

## 2020-01-03 VITALS
WEIGHT: 315 LBS | DIASTOLIC BLOOD PRESSURE: 76 MMHG | SYSTOLIC BLOOD PRESSURE: 134 MMHG | BODY MASS INDEX: 65.72 KG/M2 | HEART RATE: 82 BPM

## 2020-01-03 PROCEDURE — 99214 OFFICE O/P EST MOD 30 MIN: CPT | Performed by: INTERNAL MEDICINE

## 2020-01-03 RX ORDER — PREGABALIN 150 MG/1
150 CAPSULE ORAL 3 TIMES DAILY
Qty: 90 CAPSULE | Refills: 0 | Status: SHIPPED | OUTPATIENT
Start: 2020-01-03 | End: 2020-01-29 | Stop reason: SDUPTHER

## 2020-01-03 RX ORDER — OXYCODONE HYDROCHLORIDE 30 MG/1
30 TABLET, FILM COATED, EXTENDED RELEASE ORAL 2 TIMES DAILY
Qty: 56 EACH | Refills: 0 | Status: SHIPPED | OUTPATIENT
Start: 2020-01-03 | End: 2020-01-29 | Stop reason: ALTCHOICE

## 2020-01-03 RX ORDER — DESVENLAFAXINE 50 MG/1
50 TABLET, EXTENDED RELEASE ORAL DAILY
Qty: 30 TABLET | Refills: 0 | Status: SHIPPED | OUTPATIENT
Start: 2020-01-03 | End: 2020-01-29 | Stop reason: ALTCHOICE

## 2020-01-03 RX ORDER — NALOXONE HYDROCHLORIDE 4 MG/.1ML
1 SPRAY NASAL PRN
Qty: 1 EACH | Refills: 5 | Status: SHIPPED | OUTPATIENT
Start: 2020-01-03 | End: 2020-12-30

## 2020-01-03 RX ORDER — OXYCODONE AND ACETAMINOPHEN 7.5; 325 MG/1; MG/1
1 TABLET ORAL EVERY 6 HOURS PRN
Qty: 56 TABLET | Refills: 0 | Status: SHIPPED | OUTPATIENT
Start: 2020-01-03 | End: 2020-01-29 | Stop reason: SDUPTHER

## 2020-01-03 NOTE — PROGRESS NOTES
compression stockings, 30-40 mm Hg 2 each 1     No facility-administered medications prior to visit. REVIEW OF SYSTEMS:   Constitutional: Negative for fatigue and unexpected weight change. Eyes: Negative for visual disturbance. Respiratory: Negative for shortness of breath. Cardiovascular: Negative for chest pain, palpitations  Gastrointestinal: Negative for blood in stool, abdominal distention, nausea, vomiting, abdominal pain, diarrhea,constipation. Skin: Negative for color change or any abnormal bruising. Neurological: Negative for speech difficulty, weakness and light-headedness, dizziness, tremors, sleepiness  Psychiatric/Behavioral: Negative for suicidal ideas, hallucinations, behavioral problems, self-injury, decreased concentration/cognition, agitation, confusion. PHYSICAL EXAM:   Nursing note and vitals reviewed. /76   Pulse 82   Wt (!) 458 lb (207.7 kg)   BMI 65.72 kg/m²   General Appearance: Patient is well nourished, well developed, well groomed and in no acute distress. Skin: Skin is warm and dry, good turgor . No rash or lesions noted. He is not diaphoretic.   + Venous stasis dermatitis changes  Pulmonary/Chest: Effort normal. No respiratory distress or use of accessory muscles. Auscultation revealing normal air entry. He does not have wheezes in the lung fields. He has no rales. Cardiovascular: Normal rate, regular rhythm, normal heart sounds, and does not have murmur. Exam reveals no gallop and no friction rub. Abdominal: Soft. Bowel sounds are normal.  On inspection of abdomen is obese no distension and no mass. No tenderness. He has no rebound and no guarding. Musculoskeletal / Extremities: Range of motion is normal. Gait is normal, assistive devices use: cane. He exhibits edema: 1+, and no tenderness. Neurological/Psychiatric:He is alert and oriented to person, place, and time.  Coordination is  normal.   Judgement and Insight is normal  His mood is pathways. Non pharmacological treatment modalities such as PT, TENS, ROM and strengthening exercises and other devices are also needed to help with the chronic pain. Long acting and short acting opioids are being used to treat the chronic pain. Adjuvants such as neuromodulators including but are not limited to AED's, SNRI, TCA's are being used to help with neuropathic component of pain,  to decrease the amount of opioids needed to control pain, and to improve the quality of life- improve psychosocial functioning  and to relieve suffering and pain. NSAIDS and muscle relaxers are used to help with acute exacerbations of the chronic pain. All medications are being used to treat the chronic pain associated with the industrial accident. Mr. 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   Medications/orders associated with this visit:    Current Outpatient Medications   Medication Sig Dispense Refill    vitamin B-12 (CYANOCOBALAMIN) 1000 MCG tablet Take 3,000 mcg by mouth daily       penicillin v potassium (VEETID) 500 MG tablet Take 1,000 mg by mouth 2 times daily      nortriptyline (PAMELOR) 25 MG capsule TAKE 1-2 TABLETS BY MOUTH NIGHTLY 60 capsule 0    oxyCODONE (OXYCONTIN) 30 MG T12A extended release tablet Take 30 mg by mouth 2 times daily for 30 days. 60 each 0    oxyCODONE-acetaminophen (PERCOCET) 7.5-325 MG per tablet Take 1 tablet by mouth every 6 hours as needed for Pain (max 2 per day) for up to 30 days. 60 tablet 0    furosemide (LASIX) 20 MG tablet TAKE ONE TO TWO TABLETS BY MOUTH EVERY MORNING AS NEEDED FOR EDEMA (Patient taking differently: daily ) 60 tablet 11    Compression Stockings MISC by Does not apply route Thigh high 1 each 0    Elastic Bandages & Supports (MEDICAL COMPRESSION THIGH HIGH) MISC Thigh high compression stockings, 30-40 mm Hg 2 each 1     No current facility-administered medications for this visit. Goals of current treatment regimen include improvement in pain, restoration of functioning- with focus on improvement in physical performance, general activity, work or disability,emotional distress, health care utilization and  decreased medication consumption. Will continue to monitor progress towards achieving/maintaining therapeutic goals with special emphasis on  1. Improvement in perceived interfernce  of pain with ADL's. Ability to do home exercises independently. Ability to do household chores indoor and/or outdoor work and social and leisure activities. To increase flexibility/ROM, strength and endurance. Improve psychosocial and physical functioning.- he is not showing any significant progress/or showing regression  towards this goal and reassessment and adjustment of goals/treatment have been made. 2. Improving sleep to 6-7 hours a night. Improve mood/ anxiety and depression symptoms such as crying spells, low energy, problems with concentration, motivation.- he is showing progression towards this treatment goal with the current regimen. 3. Reduction of reliance on opioid analgesia/more appropriate opioid use. - he is showing progression towards this treatment goal with the current regimen.    4. Ability to focus/concentrate at work and perform the duties required of

## 2020-01-07 ENCOUNTER — TELEPHONE (OUTPATIENT)
Dept: PAIN MANAGEMENT | Age: 45
End: 2020-01-07

## 2020-01-08 NOTE — PROGRESS NOTES
Konstantin Araiza is here for postop evaluation of his left foot melanoma and a wound check. He is gradually improving since then. Minimal pain now. Denies fever's, redness, drainage or odor from the wound. No other complaints. Review of systems is otherwise negative    Past medical history, Past surgical history, Social history, Family history and allergies reviewed and updated. Lily Decision Dx:  - Class 1  -Subclass 1b     Vitals:    01/14/20 0905 01/14/20 0917   BP: (!) 185/88 (!) 161/88   Pulse: 68    Temp: 97.2 °F (36.2 °C)    TempSrc: Oral    SpO2: 96%    Weight: (!) 451 lb (204.6 kg)    Height: 6' 3\" (1.905 m)      Wt Readings from Last 3 Encounters:   01/14/20 (!) 451 lb (204.6 kg)   01/03/20 (!) 458 lb (207.7 kg)   12/31/19 (!) 459 lb 6.4 oz (208.4 kg)     Body mass index is 56.37 kg/m². O/E:   Constitutional: Patient is oriented to person, place, and time. No distress. HENT: mucus membranes - moist. No scleral icterus. Neck: Supple and normal range of motion. No lymphadenopathy present. Pulmonary/Chest: Effort normal. No respiratory distress. Extremities: no edema and no tenderness. Postop wound - healing well. US left extremity 1/13/20:     Impression: 2 lymph nodes noted at the patient indicated region of interest. The largest node (4.3 cm) is without pathologic features.     The smaller node is indeterminate with preservation of its fatty hilum and sharply marginated cortex. There is, however, suggestion of increased vascularity. Continued follow-up is recommended.     A/P: S/P Wide excision of left foot melanoma 12/20/19, doing well  No postop complications  Imaging reviewed with patient  Path reviewed with patient and significant other at his last visit  Again discussed re-excision of another 1 cm with possible SLN  Follow up with Dr Kendra Cortés  Follow up in after CT scan    Bryce Duffy MD  Surgery Attending    Mike Cancino RN, am scribing for and in the presence of Dr Jackie Wilde

## 2020-01-13 ENCOUNTER — HOSPITAL ENCOUNTER (OUTPATIENT)
Dept: ULTRASOUND IMAGING | Age: 45
Discharge: HOME | End: 2020-01-13
Payer: COMMERCIAL

## 2020-01-13 PROCEDURE — 76882 US LMTD JT/FCL EVL NVASC XTR: CPT

## 2020-01-14 ENCOUNTER — OFFICE VISIT (OUTPATIENT)
Dept: SURGERY | Age: 45
End: 2020-01-14

## 2020-01-14 VITALS
HEART RATE: 68 BPM | BODY MASS INDEX: 39.17 KG/M2 | OXYGEN SATURATION: 96 % | WEIGHT: 315 LBS | SYSTOLIC BLOOD PRESSURE: 161 MMHG | DIASTOLIC BLOOD PRESSURE: 88 MMHG | HEIGHT: 75 IN | TEMPERATURE: 97.2 F

## 2020-01-14 PROCEDURE — 99024 POSTOP FOLLOW-UP VISIT: CPT | Performed by: SURGERY

## 2020-01-17 ENCOUNTER — HOSPITAL ENCOUNTER (OUTPATIENT)
Dept: CT IMAGING | Age: 45
Discharge: HOME | End: 2020-01-17
Payer: COMMERCIAL

## 2020-01-17 PROCEDURE — 6360000004 HC RX CONTRAST MEDICATION: Performed by: INTERNAL MEDICINE

## 2020-01-17 PROCEDURE — 74177 CT ABD & PELVIS W/CONTRAST: CPT

## 2020-01-17 RX ADMIN — IOHEXOL 50 ML: 240 INJECTION, SOLUTION INTRATHECAL; INTRAVASCULAR; INTRAVENOUS; ORAL at 12:48

## 2020-01-17 RX ADMIN — IOPAMIDOL 80 ML: 755 INJECTION, SOLUTION INTRAVENOUS at 12:48

## 2020-01-20 NOTE — CARE COORDINATION
Called patient to check on left arm extravasation area. Pt states pain and swelling lasted for about a day but it feels fine today. No pain,swelling or any other issues.

## 2020-01-28 ENCOUNTER — HOSPITAL ENCOUNTER (OUTPATIENT)
Dept: ULTRASOUND IMAGING | Age: 45
Discharge: HOME | End: 2020-01-28
Payer: COMMERCIAL

## 2020-01-28 PROCEDURE — 2709999900 US BIOPSY LYMPH NODE

## 2020-01-29 ENCOUNTER — OFFICE VISIT (OUTPATIENT)
Dept: PAIN MANAGEMENT | Age: 45
End: 2020-01-29
Payer: COMMERCIAL

## 2020-01-29 VITALS
DIASTOLIC BLOOD PRESSURE: 102 MMHG | WEIGHT: 315 LBS | BODY MASS INDEX: 56.62 KG/M2 | HEART RATE: 74 BPM | SYSTOLIC BLOOD PRESSURE: 175 MMHG

## 2020-01-29 PROCEDURE — 99214 OFFICE O/P EST MOD 30 MIN: CPT | Performed by: INTERNAL MEDICINE

## 2020-01-29 RX ORDER — OXYCODONE AND ACETAMINOPHEN 7.5; 325 MG/1; MG/1
1 TABLET ORAL EVERY 6 HOURS PRN
Qty: 112 TABLET | Refills: 0 | Status: SHIPPED | OUTPATIENT
Start: 2020-01-29 | End: 2020-02-26 | Stop reason: SDUPTHER

## 2020-01-29 RX ORDER — PREGABALIN 150 MG/1
150 CAPSULE ORAL 3 TIMES DAILY
Qty: 90 CAPSULE | Refills: 0 | Status: SHIPPED | OUTPATIENT
Start: 2020-01-29 | End: 2020-02-26 | Stop reason: SDUPTHER

## 2020-01-29 RX ORDER — DULOXETIN HYDROCHLORIDE 30 MG/1
30 CAPSULE, DELAYED RELEASE ORAL DAILY
Qty: 30 CAPSULE | Refills: 0 | Status: SHIPPED | OUTPATIENT
Start: 2020-01-29 | End: 2020-02-26 | Stop reason: SDUPTHER

## 2020-01-29 RX ORDER — NORTRIPTYLINE HYDROCHLORIDE 25 MG/1
CAPSULE ORAL
Qty: 60 CAPSULE | Refills: 0 | Status: SHIPPED | OUTPATIENT
Start: 2020-01-29 | End: 2020-02-26 | Stop reason: SDUPTHER

## 2020-01-29 NOTE — PROGRESS NOTES
a biopsy on the lymph nodes. Patient BP is elevated today, he is not on medications. He states he is unable to get the OxyContin, he is using Percocet only. He states he was unable to get the Pristiq either. Patient states he has been having increase pain since but managing. He reports he is still working full time. Patient's  subjective report of his mood is fair. he describes occasional symptoms of depression, occasional  irritability and some mood swings. Describes his mood as being neutral and reports some pleasure in his daily activities. Reports  fair  appetite, energy and concentration. Able to function well in different aspects of his daily activities. Denies suicidal or homicidal ideation. Denies any complaints of increased tension, does   Worry sometimes and occasional  irritability  he denies any c/o increased anxiety, No c/o panic attacks or symptoms of PTSD. Patient states his sleep is fair. Has fairly normal sleep latency. Averages about 4-6 hours of sleep a night. Denies any signs of sleep apnea. Feels somewhat rested in the morning. Patient reports his weight has been stable. ALLERGIES/PAST MED/FAM/SOC HISTORY: Mr. Cecilia Ignacio allergies, past medical, family and social history were reviewed in the chart and also listed below.   Social History     Socioeconomic History    Marital status: Single     Spouse name: Abby Ocampo Number of children: 2    Years of education: Not on file    Highest education level: Not on file   Occupational History    Not on file   Social Needs    Financial resource strain: Not on file    Food insecurity:     Worry: Not on file     Inability: Not on file    Transportation needs:     Medical: Not on file     Non-medical: Not on file   Tobacco Use    Smoking status: Former Smoker     Types: Cigarettes     Last attempt to quit: 1/1/2005     Years since quitting: 15.0    Smokeless tobacco: Never Used   Substance and Sexual Activity    Alcohol use: No     Alcohol/week: alert and oriented to person, place, and time. Coordination is  normal.   Judgement and Insight is normal  His mood is Appropriate and affect is Neutral/Euthymic(normal) . His behavior is normal.   thought content normal.   Other: +varicous      IMPRESSION:     1.  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 BWC Sprain of left knee/leg, initial encounter    3. BWC Sprain of ligament of lumbosacral joint, initial encounter    4. Sprain of ligament of lumbosacral joint, initial encounter    5. Sprain of left knee/leg, initial encounter    6. Sprain of ligament of lumbosacral joint, subsequent encounter    7. Sprain of left knee/leg, subsequent encounter    8. BWCSprain of left knee/leg, initial encounter    9. BWCSprain of ligament of lumbosacral joint, subsequent encounter    10. BWCSprain of left knee/leg, subsequent encounter        PLAN:  Informed verbal consent was obtained. -He was advised weight reduction, diet changes- 800-1200 roc diet, diet diary, exercising, nutritional  consult increased physical activity as tolerated   -d/c OxyContin   -will increase Percocet to 7.5 mg 4 per day  -start Cymbalta 30 mg   -He was advised to increase fluids ( 5-7  glasses of fluid daily), limit caffeine, avoid cheese products, increase dietary fiber, increase activity and exercise as tolerated and relax regularly and enjoy meals   -d/c Pristiq  -Adv Biofeedback, relaxation and meditation techniques. Referral to psychologist for CBT was also discussed with patient  -he was advised proper sleep hygiene-told to avoid:use of caffeine or other stimulants after noon, alcohol use near bedtime, long or frequent naps during the day, erratic sleep schedule, heavy meals near bedtime, vigorous exercise near bedtime and use of electronic devices near bedtime, continue with Pamelor.    Mr. Mariola Rankin will be prescribed  the medications  listed below which are for treatment of his presenting  medical problems which for this visit include:   Diagnoses of  [facility] Lumbago-sciatica due to displacement of lumbar intervertebral disc, BWC Sprain of left knee/leg, initial encounter, BWC Sprain of ligament of lumbosacral joint, initial encounter, Sprain of ligament of lumbosacral joint, initial encounter, Sprain of left knee/leg, initial encounter, Sprain of ligament of lumbosacral joint, subsequent encounter, Sprain of left knee/leg, subsequent encounter, BWCSprain of left knee/leg, initial encounter, BWCSprain of ligament of lumbosacral joint, subsequent encounter, and BWCSprain of left knee/leg, subsequent encounter were pertinent to this visit. Medications/orders associated with this visit:    Current Outpatient Medications   Medication Sig Dispense Refill    oxyCODONE (OXYCONTIN) 30 MG T12A extended release tablet Take 30 mg by mouth 2 times daily for 28 days. 56 each 0    oxyCODONE-acetaminophen (PERCOCET) 7.5-325 MG per tablet Take 1 tablet by mouth every 6 hours as needed for Pain (max 2 per day) for up to 28 days. 56 tablet 0    pregabalin (LYRICA) 150 MG capsule Take 1 capsule by mouth 3 times daily for 30 days.  90 capsule 0    desvenlafaxine succinate (PRISTIQ) 50 MG TB24 extended release tablet Take 1 tablet by mouth daily 30 tablet 0    naloxone 4 MG/0.1ML LIQD nasal spray 1 spray by Nasal route as needed for Opioid Reversal 1 each 5    vitamin B-12 (CYANOCOBALAMIN) 1000 MCG tablet Take 3,000 mcg by mouth daily       penicillin v potassium (VEETID) 500 MG tablet Take 1,000 mg by mouth 2 times daily      nortriptyline (PAMELOR) 25 MG capsule TAKE 1-2 TABLETS BY MOUTH NIGHTLY 60 capsule 0    furosemide (LASIX) 20 MG tablet TAKE ONE TO TWO TABLETS BY MOUTH EVERY MORNING AS NEEDED FOR EDEMA (Patient taking differently: daily ) 60 tablet 11    Compression Stockings MISC by Does not apply route Thigh high 1 each 0    Elastic Bandages & Supports (MEDICAL COMPRESSION THIGH HIGH) MISC Thigh high compression stockings, 30-40 mm Hg 2 each 1     No current facility-administered medications for this visit. Goals of current treatment regimen include improvement in pain, restoration of functioning- with focus on improvement in physical performance, general activity, work or disability,emotional distress, health care utilization and  decreased medication consumption. Will continue to monitor progress towards achieving/maintaining therapeutic goals with special emphasis on  1. Improvement in perceived interfernce  of pain with ADL's. Ability to do home exercises independently. Ability to do household chores indoor and/or outdoor work and social and leisure activities. To increase flexibility/ROM, strength and endurance. Improve psychosocial and physical functioning.- he is not showing any significant progress/or showing regression  towards this goal and reassessment and adjustment of goals/treatment have been made. 2. Improving sleep to 6-7 hours a night. Improve mood/ anxiety and depression symptoms such as crying spells, low energy, problems with concentration, motivation.- he is showing progression towards this treatment goal with the current regimen. 3. Reduction of reliance on opioid analgesia/more appropriate opioid use. - he is showing progression towards this treatment goal with the current regimen. Risks and benefits of the medications and other alternative treatments have been/were  discussed with the patient. Any questions on the  common side effects of these medications were also answered. He was advised against drinking alcohol with the narcotic pain medicines, advised against driving or handling machinery when  starting or adjusting the dose of medicines, feeling groggy or drowsy, or if having any cognitive issues related to the current medications. Heis fully aware of the risk of overdose and death, if medicines are misused and not taken as prescribed.  If he develops new symptoms or if the

## 2020-02-05 ENCOUNTER — TELEPHONE (OUTPATIENT)
Dept: SURGERY | Age: 45
End: 2020-02-05

## 2020-02-05 NOTE — TELEPHONE ENCOUNTER
The patient called to schedule an appointment to see Dr. Jaya aMrtin The patient stated that he needs to discuss his next surgery and a recent negative biopsy. Dr. Jaya Martin is booked. I am not sure how soon the patient needs to be seen. Please call.

## 2020-02-06 ENCOUNTER — TELEPHONE (OUTPATIENT)
Dept: SURGERY | Age: 45
End: 2020-02-06

## 2020-02-07 ENCOUNTER — ANESTHESIA EVENT (OUTPATIENT)
Dept: OPERATING ROOM | Age: 45
End: 2020-02-07
Payer: COMMERCIAL

## 2020-02-07 NOTE — PROGRESS NOTES
[facility] / [facility] ([facility]) [address] [facility], [facility]    Acknowledgment of Informed Consent for Surgical or Medical Procedure and Sedation  I agree to allow doctor(s) Stu Garduno and his/her associates or assistants, including residents and/or other qualified medical practitioner to perform the following medical treatment or procedure and to administer or direct the administration of sedation as necessary:  Procedure(s): LEFT FOOT [address]  My doctor has explained the following regarding the proposed procedure:   the explanation of the procedure   the benefits of the procedure   the potential problems that might occur during recuperation   the risks and side effects of the procedure which could include but are not limited to severe blood loss, infection, stroke or death   the benefits, risks and side effect of alternative procedures including the consequences of declining this procedure or any alternative procedures   the likelihood of achieving satisfactory results. I acknowledge no guarantee or assurance has been made to me regarding the results. I understand that during the course of this treatment/procedure, unforeseen conditions can occur which require an additional or different procedure. I agree to allow my physician or assistants to perform such extension of the original procedure as they may find necessary. I understand that sedation will often result in temporary impairment of memory and fine motor skills and that sedation can occasionally progress to a state of deep sedation or general anesthesia. I understand the risks of anesthesia for surgery include, but are not limited to, sore throat, hoarseness, injury to face, mouth, or teeth; nausea; headache; injury to blood vessels or nerves; death, brain damage, or paralysis.     I understand that if I have a Limitation of Treatment order in effect during my hospitalization, the order may or may not be in effect during this procedure. I give my doctor permission to give me blood or blood products. I understand that there are risks with receiving blood such as hepatitis, AIDS, fever, or allergic reaction. I acknowledge that the risks, benefits, and alternatives of this treatment have been explained to me and that no express or implied warranty has been given by the hospital, any blood bank, or any person or entity as to the blood or blood components transfused. At the discretion of my doctor, I agree to allow observers, equipment/product representatives and allow photographing, and/or televising of the procedure, provided my name or identity is maintained confidentially. I agree the hospital may dispose of or use for scientific or educational purposes any tissue, fluid, or body parts which may be removed.     ________________________________Date________Time______ am/pm  ([facility])  Patient or Signature of Closest Relative or Legal Guardian    ________________________________Date________Time______am/pm      Page 1 of  1  Witness

## 2020-02-07 NOTE — PROGRESS NOTES

## 2020-02-10 ENCOUNTER — ANESTHESIA (OUTPATIENT)
Dept: OPERATING ROOM | Age: 45
End: 2020-02-10
Payer: COMMERCIAL

## 2020-02-10 ENCOUNTER — HOSPITAL ENCOUNTER (OUTPATIENT)
Age: 45
Setting detail: OUTPATIENT SURGERY
Discharge: HOME | End: 2020-02-10
Attending: SURGERY | Admitting: SURGERY
Payer: COMMERCIAL

## 2020-02-10 ENCOUNTER — HOSPITAL ENCOUNTER (OUTPATIENT)
Dept: NUCLEAR MEDICINE | Age: 45
Discharge: HOME | End: 2020-02-10
Payer: COMMERCIAL

## 2020-02-10 VITALS
HEART RATE: 97 BPM | TEMPERATURE: 98.8 F | HEIGHT: 75 IN | DIASTOLIC BLOOD PRESSURE: 62 MMHG | OXYGEN SATURATION: 90 % | BODY MASS INDEX: 39.17 KG/M2 | WEIGHT: 315 LBS | SYSTOLIC BLOOD PRESSURE: 141 MMHG | RESPIRATION RATE: 17 BRPM

## 2020-02-10 VITALS — OXYGEN SATURATION: 97 % | SYSTOLIC BLOOD PRESSURE: 180 MMHG | DIASTOLIC BLOOD PRESSURE: 89 MMHG | TEMPERATURE: 99.1 F

## 2020-02-10 PROCEDURE — A9541 TC99M SULFUR COLLOID: HCPCS | Performed by: SURGERY

## 2020-02-10 PROCEDURE — 3700000000 HC ANESTHESIA ATTENDED CARE: Performed by: SURGERY

## 2020-02-10 PROCEDURE — 88307 TISSUE EXAM BY PATHOLOGIST: CPT

## 2020-02-10 PROCEDURE — 3600000004 HC SURGERY LEVEL 4 BASE: Performed by: SURGERY

## 2020-02-10 PROCEDURE — 7100000001 HC PACU RECOVERY - ADDTL 15 MIN: Performed by: SURGERY

## 2020-02-10 PROCEDURE — 11626 EXC S/N/H/F/G MAL+MRG >4 CM: CPT | Performed by: SURGERY

## 2020-02-10 PROCEDURE — 13121 CMPLX RPR S/A/L 2.6-7.5 CM: CPT | Performed by: SURGERY

## 2020-02-10 PROCEDURE — 3700000001 HC ADD 15 MINUTES (ANESTHESIA): Performed by: SURGERY

## 2020-02-10 PROCEDURE — 2709999900 HC NON-CHARGEABLE SUPPLY: Performed by: SURGERY

## 2020-02-10 PROCEDURE — 38531 OPEN BX/EXC INGUINOFEM NODES: CPT | Performed by: SURGERY

## 2020-02-10 PROCEDURE — 2580000003 HC RX 258: Performed by: SURGERY

## 2020-02-10 PROCEDURE — 88342 IMHCHEM/IMCYTCHM 1ST ANTB: CPT

## 2020-02-10 PROCEDURE — 2580000003 HC RX 258: Performed by: ANESTHESIOLOGY

## 2020-02-10 PROCEDURE — 38792 RA TRACER ID OF SENTINL NODE: CPT

## 2020-02-10 PROCEDURE — 2500000003 HC RX 250 WO HCPCS: Performed by: SURGERY

## 2020-02-10 PROCEDURE — 2500000003 HC RX 250 WO HCPCS: Performed by: NURSE ANESTHETIST, CERTIFIED REGISTERED

## 2020-02-10 PROCEDURE — 6360000002 HC RX W HCPCS: Performed by: SURGERY

## 2020-02-10 PROCEDURE — 38900 IO MAP OF SENT LYMPH NODE: CPT | Performed by: SURGERY

## 2020-02-10 PROCEDURE — 2720000010 HC SURG SUPPLY STERILE: Performed by: SURGERY

## 2020-02-10 PROCEDURE — 6370000000 HC RX 637 (ALT 250 FOR IP): Performed by: ANESTHESIOLOGY

## 2020-02-10 PROCEDURE — 3600000014 HC SURGERY LEVEL 4 ADDTL 15MIN: Performed by: SURGERY

## 2020-02-10 PROCEDURE — 6360000002 HC RX W HCPCS: Performed by: NURSE ANESTHETIST, CERTIFIED REGISTERED

## 2020-02-10 PROCEDURE — 3430000000 HC RX DIAGNOSTIC RADIOPHARMACEUTICAL: Performed by: SURGERY

## 2020-02-10 PROCEDURE — 15240 FTH/GFT F/C/C/M/N/AX/G/H/F20: CPT | Performed by: SURGERY

## 2020-02-10 PROCEDURE — 15241 FTH/GFT F/C/C/M/N/A/G/H/F EA: CPT | Performed by: SURGERY

## 2020-02-10 PROCEDURE — 7100000000 HC PACU RECOVERY - FIRST 15 MIN: Performed by: SURGERY

## 2020-02-10 PROCEDURE — 88341 IMHCHEM/IMCYTCHM EA ADD ANTB: CPT

## 2020-02-10 PROCEDURE — 88305 TISSUE EXAM BY PATHOLOGIST: CPT

## 2020-02-10 RX ORDER — LIDOCAINE HYDROCHLORIDE 20 MG/ML
INJECTION, SOLUTION INTRAVENOUS PRN
Status: DISCONTINUED | OUTPATIENT
Start: 2020-02-10 | End: 2020-02-10 | Stop reason: SDUPTHER

## 2020-02-10 RX ORDER — ROCURONIUM BROMIDE 10 MG/ML
INJECTION, SOLUTION INTRAVENOUS PRN
Status: DISCONTINUED | OUTPATIENT
Start: 2020-02-10 | End: 2020-02-10 | Stop reason: SDUPTHER

## 2020-02-10 RX ORDER — OXYCODONE HYDROCHLORIDE AND ACETAMINOPHEN 5; 325 MG/1; MG/1
1 TABLET ORAL PRN
Status: COMPLETED | OUTPATIENT
Start: 2020-02-10 | End: 2020-02-10

## 2020-02-10 RX ORDER — LABETALOL 20 MG/4 ML (5 MG/ML) INTRAVENOUS SYRINGE
5 EVERY 10 MIN PRN
Status: DISCONTINUED | OUTPATIENT
Start: 2020-02-10 | End: 2020-02-10 | Stop reason: HOSPADM

## 2020-02-10 RX ORDER — OXYCODONE HYDROCHLORIDE AND ACETAMINOPHEN 5; 325 MG/1; MG/1
2 TABLET ORAL PRN
Status: COMPLETED | OUTPATIENT
Start: 2020-02-10 | End: 2020-02-10

## 2020-02-10 RX ORDER — HYDROMORPHONE HCL 110MG/55ML
PATIENT CONTROLLED ANALGESIA SYRINGE INTRAVENOUS PRN
Status: DISCONTINUED | OUTPATIENT
Start: 2020-02-10 | End: 2020-02-10 | Stop reason: SDUPTHER

## 2020-02-10 RX ORDER — SODIUM CHLORIDE 0.9 % (FLUSH) 0.9 %
10 SYRINGE (ML) INJECTION EVERY 12 HOURS SCHEDULED
Status: DISCONTINUED | OUTPATIENT
Start: 2020-02-10 | End: 2020-02-10 | Stop reason: HOSPADM

## 2020-02-10 RX ORDER — BUPIVACAINE HYDROCHLORIDE AND EPINEPHRINE 5; 5 MG/ML; UG/ML
INJECTION, SOLUTION EPIDURAL; INTRACAUDAL; PERINEURAL PRN
Status: DISCONTINUED | OUTPATIENT
Start: 2020-02-10 | End: 2020-02-10 | Stop reason: ALTCHOICE

## 2020-02-10 RX ORDER — SODIUM CHLORIDE 0.9 % (FLUSH) 0.9 %
10 SYRINGE (ML) INJECTION PRN
Status: DISCONTINUED | OUTPATIENT
Start: 2020-02-10 | End: 2020-02-10 | Stop reason: HOSPADM

## 2020-02-10 RX ORDER — DOCUSATE SODIUM 100 MG/1
100 CAPSULE, LIQUID FILLED ORAL 2 TIMES DAILY
Qty: 30 CAPSULE | Refills: 0 | Status: SHIPPED | OUTPATIENT
Start: 2020-02-10 | End: 2020-02-24

## 2020-02-10 RX ORDER — OXYCODONE HYDROCHLORIDE 5 MG/1
5 TABLET ORAL EVERY 6 HOURS PRN
Qty: 20 TABLET | Refills: 0 | Status: SHIPPED | OUTPATIENT
Start: 2020-02-10 | End: 2020-02-15

## 2020-02-10 RX ORDER — PROCHLORPERAZINE EDISYLATE 5 MG/ML
5 INJECTION INTRAMUSCULAR; INTRAVENOUS
Status: DISCONTINUED | OUTPATIENT
Start: 2020-02-10 | End: 2020-02-10 | Stop reason: HOSPADM

## 2020-02-10 RX ORDER — SUCCINYLCHOLINE/SOD CL,ISO/PF 200MG/10ML
SYRINGE (ML) INTRAVENOUS PRN
Status: DISCONTINUED | OUTPATIENT
Start: 2020-02-10 | End: 2020-02-10 | Stop reason: SDUPTHER

## 2020-02-10 RX ORDER — ONDANSETRON 2 MG/ML
INJECTION INTRAMUSCULAR; INTRAVENOUS PRN
Status: DISCONTINUED | OUTPATIENT
Start: 2020-02-10 | End: 2020-02-10 | Stop reason: SDUPTHER

## 2020-02-10 RX ORDER — SODIUM CHLORIDE 9 MG/ML
INJECTION, SOLUTION INTRAVENOUS CONTINUOUS
Status: DISCONTINUED | OUTPATIENT
Start: 2020-02-10 | End: 2020-02-10 | Stop reason: HOSPADM

## 2020-02-10 RX ORDER — PROPOFOL 10 MG/ML
INJECTION, EMULSION INTRAVENOUS PRN
Status: DISCONTINUED | OUTPATIENT
Start: 2020-02-10 | End: 2020-02-10 | Stop reason: SDUPTHER

## 2020-02-10 RX ORDER — DEXAMETHASONE SODIUM PHOSPHATE 4 MG/ML
INJECTION, SOLUTION INTRA-ARTICULAR; INTRALESIONAL; INTRAMUSCULAR; INTRAVENOUS; SOFT TISSUE PRN
Status: DISCONTINUED | OUTPATIENT
Start: 2020-02-10 | End: 2020-02-10 | Stop reason: SDUPTHER

## 2020-02-10 RX ORDER — MEPERIDINE HYDROCHLORIDE 25 MG/ML
12.5 INJECTION INTRAMUSCULAR; INTRAVENOUS; SUBCUTANEOUS EVERY 5 MIN PRN
Status: DISCONTINUED | OUTPATIENT
Start: 2020-02-10 | End: 2020-02-10 | Stop reason: HOSPADM

## 2020-02-10 RX ORDER — DIPHENHYDRAMINE HYDROCHLORIDE 50 MG/ML
12.5 INJECTION INTRAMUSCULAR; INTRAVENOUS
Status: DISCONTINUED | OUTPATIENT
Start: 2020-02-10 | End: 2020-02-10 | Stop reason: HOSPADM

## 2020-02-10 RX ORDER — FENTANYL CITRATE 50 UG/ML
50 INJECTION, SOLUTION INTRAMUSCULAR; INTRAVENOUS EVERY 5 MIN PRN
Status: DISCONTINUED | OUTPATIENT
Start: 2020-02-10 | End: 2020-02-10 | Stop reason: HOSPADM

## 2020-02-10 RX ORDER — HYDRALAZINE HYDROCHLORIDE 20 MG/ML
5 INJECTION INTRAMUSCULAR; INTRAVENOUS EVERY 10 MIN PRN
Status: DISCONTINUED | OUTPATIENT
Start: 2020-02-10 | End: 2020-02-10 | Stop reason: HOSPADM

## 2020-02-10 RX ORDER — FENTANYL CITRATE 50 UG/ML
25 INJECTION, SOLUTION INTRAMUSCULAR; INTRAVENOUS EVERY 5 MIN PRN
Status: DISCONTINUED | OUTPATIENT
Start: 2020-02-10 | End: 2020-02-10 | Stop reason: HOSPADM

## 2020-02-10 RX ORDER — ONDANSETRON 2 MG/ML
4 INJECTION INTRAMUSCULAR; INTRAVENOUS
Status: DISCONTINUED | OUTPATIENT
Start: 2020-02-10 | End: 2020-02-10 | Stop reason: HOSPADM

## 2020-02-10 RX ORDER — SODIUM CHLORIDE, SODIUM LACTATE, POTASSIUM CHLORIDE, CALCIUM CHLORIDE 600; 310; 30; 20 MG/100ML; MG/100ML; MG/100ML; MG/100ML
INJECTION, SOLUTION INTRAVENOUS CONTINUOUS
Status: DISCONTINUED | OUTPATIENT
Start: 2020-02-10 | End: 2020-02-10 | Stop reason: HOSPADM

## 2020-02-10 RX ADMIN — SUGAMMADEX 200 MG: 100 INJECTION, SOLUTION INTRAVENOUS at 18:03

## 2020-02-10 RX ADMIN — SODIUM CHLORIDE, SODIUM LACTATE, POTASSIUM CHLORIDE, AND CALCIUM CHLORIDE: 600; 310; 30; 20 INJECTION, SOLUTION INTRAVENOUS at 09:39

## 2020-02-10 RX ADMIN — PROPOFOL 500 MG: 10 INJECTION, EMULSION INTRAVENOUS at 15:43

## 2020-02-10 RX ADMIN — SODIUM CHLORIDE, SODIUM LACTATE, POTASSIUM CHLORIDE, AND CALCIUM CHLORIDE: 600; 310; 30; 20 INJECTION, SOLUTION INTRAVENOUS at 15:37

## 2020-02-10 RX ADMIN — SUGAMMADEX 200 MG: 100 INJECTION, SOLUTION INTRAVENOUS at 18:10

## 2020-02-10 RX ADMIN — Medication 852 MICRO CURIE: at 10:00

## 2020-02-10 RX ADMIN — OXYCODONE HYDROCHLORIDE AND ACETAMINOPHEN 2 TABLET: 5; 325 TABLET ORAL at 18:57

## 2020-02-10 RX ADMIN — ROCURONIUM BROMIDE 30 MG: 10 INJECTION, SOLUTION INTRAVENOUS at 16:10

## 2020-02-10 RX ADMIN — ROCURONIUM BROMIDE 30 MG: 10 INJECTION, SOLUTION INTRAVENOUS at 15:49

## 2020-02-10 RX ADMIN — DEXAMETHASONE SODIUM PHOSPHATE 8 MG: 4 INJECTION, SOLUTION INTRAMUSCULAR; INTRAVENOUS at 15:55

## 2020-02-10 RX ADMIN — CEFAZOLIN SODIUM 3 G: 1 POWDER, FOR SOLUTION INTRAMUSCULAR; INTRAVENOUS at 15:52

## 2020-02-10 RX ADMIN — PROPOFOL 200 MG: 10 INJECTION, EMULSION INTRAVENOUS at 16:10

## 2020-02-10 RX ADMIN — ROCURONIUM BROMIDE 10 MG: 10 INJECTION, SOLUTION INTRAVENOUS at 15:43

## 2020-02-10 RX ADMIN — ONDANSETRON 8 MG: 2 INJECTION INTRAMUSCULAR; INTRAVENOUS at 15:55

## 2020-02-10 RX ADMIN — Medication 200 MG: at 15:43

## 2020-02-10 RX ADMIN — LIDOCAINE HYDROCHLORIDE 100 MG: 20 INJECTION, SOLUTION INTRAVENOUS at 15:43

## 2020-02-10 RX ADMIN — HYDROMORPHONE HYDROCHLORIDE 1 MG: 2 INJECTION, SOLUTION INTRAMUSCULAR; INTRAVENOUS; SUBCUTANEOUS at 16:10

## 2020-02-10 ASSESSMENT — PULMONARY FUNCTION TESTS
PIF_VALUE: 30
PIF_VALUE: 0
PIF_VALUE: 1
PIF_VALUE: 13
PIF_VALUE: 3
PIF_VALUE: 30
PIF_VALUE: 32
PIF_VALUE: 30
PIF_VALUE: 30
PIF_VALUE: 34
PIF_VALUE: 32
PIF_VALUE: 30
PIF_VALUE: 0
PIF_VALUE: 30
PIF_VALUE: 32
PIF_VALUE: 30
PIF_VALUE: 35
PIF_VALUE: 4
PIF_VALUE: 30
PIF_VALUE: 21
PIF_VALUE: 26
PIF_VALUE: 20
PIF_VALUE: 32
PIF_VALUE: 30
PIF_VALUE: 33
PIF_VALUE: 30
PIF_VALUE: 31
PIF_VALUE: 32
PIF_VALUE: 31
PIF_VALUE: 30
PIF_VALUE: 32
PIF_VALUE: 1
PIF_VALUE: 30
PIF_VALUE: 21
PIF_VALUE: 30
PIF_VALUE: 22
PIF_VALUE: 30
PIF_VALUE: 30
PIF_VALUE: 1
PIF_VALUE: 31
PIF_VALUE: 0
PIF_VALUE: 13
PIF_VALUE: 0
PIF_VALUE: 30
PIF_VALUE: 32
PIF_VALUE: 1
PIF_VALUE: 33
PIF_VALUE: 32
PIF_VALUE: 30
PIF_VALUE: 6
PIF_VALUE: 26
PIF_VALUE: 30
PIF_VALUE: 31
PIF_VALUE: 26
PIF_VALUE: 27
PIF_VALUE: 30
PIF_VALUE: 30
PIF_VALUE: 26
PIF_VALUE: 30
PIF_VALUE: 31
PIF_VALUE: 13
PIF_VALUE: 30
PIF_VALUE: 28
PIF_VALUE: 31
PIF_VALUE: 30
PIF_VALUE: 0
PIF_VALUE: 1
PIF_VALUE: 30
PIF_VALUE: 30
PIF_VALUE: 33
PIF_VALUE: 3
PIF_VALUE: 32
PIF_VALUE: 30
PIF_VALUE: 31
PIF_VALUE: 26
PIF_VALUE: 31
PIF_VALUE: 1
PIF_VALUE: 30
PIF_VALUE: 32
PIF_VALUE: 26
PIF_VALUE: 32
PIF_VALUE: 30
PIF_VALUE: 30
PIF_VALUE: 31
PIF_VALUE: 1
PIF_VALUE: 30
PIF_VALUE: 33
PIF_VALUE: 14
PIF_VALUE: 30
PIF_VALUE: 31
PIF_VALUE: 31
PIF_VALUE: 30
PIF_VALUE: 18
PIF_VALUE: 30
PIF_VALUE: 1
PIF_VALUE: 30
PIF_VALUE: 32
PIF_VALUE: 1
PIF_VALUE: 30
PIF_VALUE: 30
PIF_VALUE: 26
PIF_VALUE: 32
PIF_VALUE: 30
PIF_VALUE: 26
PIF_VALUE: 30
PIF_VALUE: 32
PIF_VALUE: 7
PIF_VALUE: 30
PIF_VALUE: 35
PIF_VALUE: 30
PIF_VALUE: 33
PIF_VALUE: 30
PIF_VALUE: 1
PIF_VALUE: 30
PIF_VALUE: 1
PIF_VALUE: 30
PIF_VALUE: 30
PIF_VALUE: 17
PIF_VALUE: 26
PIF_VALUE: 32
PIF_VALUE: 30
PIF_VALUE: 32
PIF_VALUE: 31
PIF_VALUE: 30
PIF_VALUE: 31
PIF_VALUE: 30
PIF_VALUE: 30
PIF_VALUE: 33
PIF_VALUE: 31
PIF_VALUE: 32
PIF_VALUE: 17
PIF_VALUE: 17
PIF_VALUE: 31
PIF_VALUE: 31
PIF_VALUE: 30
PIF_VALUE: 32
PIF_VALUE: 30
PIF_VALUE: 30

## 2020-02-10 ASSESSMENT — PAIN - FUNCTIONAL ASSESSMENT: PAIN_FUNCTIONAL_ASSESSMENT: 0-10

## 2020-02-10 ASSESSMENT — PAIN SCALES - GENERAL
PAINLEVEL_OUTOF10: 6
PAINLEVEL_OUTOF10: 0
PAINLEVEL_OUTOF10: 0

## 2020-02-10 NOTE — PROGRESS NOTES
Patient admitted to PACU. Post op Left foot melanoma wide excision and full thickness skin graft left groin sentinel lymph node biopsy. Patient drowsy- awakes easily when spoken to. Left groin with gauze/tegaderm dressing intact. Left foot ace wrap intact. Patient has own crutches. No needs at this time.

## 2020-02-10 NOTE — PROGRESS NOTES
Snoring? Do you snore loudly (loud enough to be heard through closed doors, or your bed partner elbows you for snoring at night)? Yes    Tired? Do you often feel tired, fatigued, or sleepy during the daytime (such as falling asleep during driving)? Yes    Observed? Has anyone observed you stop breathing or choking/gasping during your sleep? No    Pressure? Do you have or are being treated for high blood pressure? No    Neck Size? (measured around Kwadwos apple)  For male, is your shirt collar 17 inches or larger? For female, is your shirt collar 16 inches or larger? Yes    Age older than 48years old? No    Gender = Male  Yes    Body Mass Index more than 35 kg/m2? Yes    Risk of ANN-MARIE Scoring criteria:    [] Low risk:  Yes to 0 - 2 questions    [] Intermediate risk:  Yes to 3 - 4 questions    [x] High risk:  Yes to 5 - 8 questions     For ALL PATIENTS THAT SCORE  5 or >:    Results called to OR Scheduling? Yes, date 02/10/2020    Patient given Sleep Apnea Referral Pamphlet?   No has Hx of sleep apnea

## 2020-02-10 NOTE — H&P
Kingsley Brush    [id number]      Department of General Surgery    Surgical Services     Pre-operative History and Physical        INDICATION:   LEFT FOOT MELANOMA    PROCEDURE:  WI EXC TUMOR SOFT TISSUE FOOT/TOE SUBFASC <1.5CM [25468] (LEFT FOOT MELANOMA WIDE EXCISION AND SENTINEL LYMPH NODE EXCISION)    CHIEF COMPLAINT:  Left foot melanoma     History obtained from: Patient interview and EHR     HISTORY:   Patient is a morbidly obese (Body mass index is 56.62 kg/m².), 39 y.o. male with a past medical history including obstructive sleep apnea and statis dermatitis. He is S/P wide excision for melanoma of the right heel on 12/20/20. The patient is unable to undergo PET scan due to his weight. He presents today for the above procedures. Weight loss/gain:  No    Past Medical History:        Diagnosis Date    Arthritis     Chronic back pain     Hepatitis age 24    HNP (herniated nucleus pulposus with myelopathy), thoracic     Melanoma (HCC)     left foot     ANN-MARIE (obstructive sleep apnea)     NO CPAP     Stasis dermatitis      Past Surgical History:        Procedure Laterality Date    FOOT SURGERY Left 12/20/2019    WIDE EXCISION OF LEFT FOOT MELANOMA performed by Marya Rios MD at [facility]  06/26/06    Arand       Medications Prior to Admission:   Prior to Admission medications    Medication Sig Start Date End Date Taking? Authorizing Provider   oxyCODONE-acetaminophen (PERCOCET) 7.5-325 MG per tablet Take 1 tablet by mouth every 6 hours as needed for Pain (max 4 per day) for up to 28 days. 1/29/20 2/26/20  Martine Davis MD   pregabalin (LYRICA) 150 MG capsule Take 1 capsule by mouth 3 times daily for 30 days.  1/29/20 2/28/20  Martine Davsi MD   nortriptyline (PAMELOR) 25 MG capsule TAKE 1-2 TABLETS BY MOUTH NIGHTLY 1/29/20   Martine Davis MD   DULoxetine (CYMBALTA) 30 MG extended release capsule Take 1 capsule by mouth daily 1/29/20   Martine Davis MD   naloxone 4 MG/0.1ML LIQD nasal spray 1 spray by Nasal route as needed for Opioid Reversal 1/3/20   Anthony Cordero MD   vitamin B-12 (CYANOCOBALAMIN) 1000 MCG tablet Take 3,000 mcg by mouth daily     Historical Provider, MD   penicillin v potassium (VEETID) 500 MG tablet Take 1,000 mg by mouth 2 times daily    Historical Provider, MD   furosemide (LASIX) 20 MG tablet TAKE ONE TO TWO TABLETS BY MOUTH EVERY MORNING AS NEEDED FOR EDEMA  Patient taking differently: daily  7/18/19   Julio C Chase MD   Compression Stockings MISC by Does not apply route Thigh high 10/22/18   Leo Chan MD   Elastic Bandages & Supports (MEDICAL COMPRESSION THIGH HIGH) MISC Thigh high compression stockings, 30-40 mm Hg 10/20/18   Delfina Lucero MD       Allergies:  Patient has no known allergies.     Social History:   Social History     Socioeconomic History    Marital status: Single     Spouse name: Jose Daniel Yi Number of children: 2    Years of education: Not on file    Highest education level: Not on file   Occupational History    Not on file   Social Needs    Financial resource strain: Not on file    Food insecurity:     Worry: Not on file     Inability: Not on file    Transportation needs:     Medical: Not on file     Non-medical: Not on file   Tobacco Use    Smoking status: Former Smoker     Types: Cigarettes     Last attempt to quit: 1/1/2005     Years since quitting: 15.1    Smokeless tobacco: Never Used   Substance and Sexual Activity    Alcohol use: No     Alcohol/week: 0.0 standard drinks     Comment: rarely     Drug use: No    Sexual activity: Yes     Partners: Female   Lifestyle    Physical activity:     Days per week: Not on file     Minutes per session: Not on file    Stress: Not on file   Relationships    Social connections:     Talks on phone: Not on file     Gets together: Not on file     Attends Shinto service: Not on file     Active member of club or organization: Not on file     Attends meetings of clubs or organizations: Not on file     Relationship status: Not on file    Intimate partner violence:     Fear of current or ex partner: Not on file     Emotionally abused: Not on file     Physically abused: Not on file     Forced sexual activity: Not on file   Other Topics Concern    Not on file   Social History Narrative    Not on file         Family History:       Problem Relation Age of Onset    Cancer Mother     COPD Paternal Grandfather     Colon Cancer Maternal Grandfather          REVIEW OF SYSTEMS:    Constitutional: Negative for chills, fatigue and fever. HENT: Negative for loss of hearing   Eyes: Negative for visual disturbance. Respiratory: Negative for  cough, chest tightness, shortness of breath and wheezing. Cardiovascular: Negative for chest pain, palpitations and exertional chest pressure  Gastrointestinal: Negative for constipation, diarrhea, nausea and vomiting. Musculoskeletal: Negative for gait problem, and joint swelling. Skin: Negative for rash   Neurological: Negative for dizziness, syncope, light-headedness,    Hematological: Does not bruise/bleed easily. Psychiatric/Behavioral: Negative for agitation    PHYSICAL EXAM:      BP (!) 165/81   Pulse 72   Temp 97.4 °F (36.3 °C) (Oral)   Resp 16   Ht 6' 3\" (1.905 m)   Wt (!) 453 lb (205.5 kg)   SpO2 94%   BMI 56.62 kg/m²  I      Eyes:  pupils equal, round and reactive to light and conjunctiva normal    Head/ENT:  Normocephalic, without obvious abnormality, atramatic,     Neck:  Supple, symmetrical, trachea midline, no adenopathy, thyroid symmetric, not enlarged and no tenderness, skin warm and dry.     Heart: regular rate and rhythm    Lungs:  No increased work of breathing, good air exchange, clear to auscultation bilaterally, no crackles or wheezing    Abdomen:  Normal bowel sounds, soft, non-distended, non-tender, no masses palpated    Extremities: Edema of the bilateral LE, staisis hyperpigmentation of the bilateral LE,

## 2020-02-10 NOTE — BRIEF OP NOTE
Brief Postoperative Note  ______________________________________________________________    Patient: Riley Thomas  YOB: 1975  MRN: [id number]  Date of Procedure: 2/10/2020    Pre-Op Diagnosis: LEFT FOOT MELANOMA    Post-Op Diagnosis: Same       Procedure(s):  LEFT FOOT MELANOMA WIDE EXCISION AND FULL THICKNESS SKIN GRAFT,LEFT GROIN SENTINEL LYMPH NODE BIOPSY    Anesthesia: Monitor Anesthesia Care    Surgeon(s):  Macy Warner MD    Assistant:   Lori Fam MD PGY-1    Estimated Blood Loss (mL): less than 50     Complications: None    Specimens:   ID Type Source Tests Collected by Time Destination   A : LEFT THIGH FAT/ LOOK FOR LYMPH NODES Tissue Tissue SURGICAL PATHOLOGY Macy Warner MD 2/10/2020 1622    B : B. LEFT GROIN SENTINEL LYMPH NODE 1 Tissue Tissue SURGICAL PATHOLOGY Macy Warner MD 2/10/2020 1640    C : C.LEFT GROIN SENTINEL LYMPH NODE 2 Tissue Tissue SURGICAL PATHOLOGY Macy Warner MD 2/10/2020 1641    D : LEFT MEDIAL FOOT MELANOMA, SHORT=DISTAL; LONG=ANTERIOR Tissue Tissue SURGICAL PATHOLOGY Macy Warner MD 2/10/2020 1706      Findings:   Procedure as listed above. No complications. Further details to follow in full report. Will discharge from PACU.     Lori Fam MD  Date: 2/10/2020  Time: 6:19 PM

## 2020-02-10 NOTE — ANESTHESIA PRE PROCEDURE
Department of Anesthesiology  Preprocedure Note       Name:  Raina Brooke   Age:  39 y.o.  :  1975                                          MRN:  [id number]         Date:  2/10/2020      Surgeon: Ema Julio):  Chencho Fuller MD    Procedure: LEFT FOOT MELANOMA WIDE EXCISION AND SENTINEL LYMPH NODE EXCISION (Left )    Medications prior to admission:   Prior to Admission medications    Medication Sig Start Date End Date Taking? Authorizing Provider   oxyCODONE-acetaminophen (PERCOCET) 7.5-325 MG per tablet Take 1 tablet by mouth every 6 hours as needed for Pain (max 4 per day) for up to 28 days. 20  Alvarado Watson MD   pregabalin (LYRICA) 150 MG capsule Take 1 capsule by mouth 3 times daily for 30 days. 20  Alvarado Watson MD   nortriptyline (PAMELOR) 25 MG capsule TAKE 1-2 TABLETS BY MOUTH NIGHTLY 20   Alvarado Watson MD   DULoxetine (CYMBALTA) 30 MG extended release capsule Take 1 capsule by mouth daily 20   Alvarado Watson MD   naloxone 4 MG/0.1ML LIQD nasal spray 1 spray by Nasal route as needed for Opioid Reversal 1/3/20   Alvarado Watson MD   vitamin B-12 (CYANOCOBALAMIN) 1000 MCG tablet Take 3,000 mcg by mouth daily     Historical Provider, MD   penicillin v potassium (VEETID) 500 MG tablet Take 1,000 mg by mouth 2 times daily    Historical Provider, MD   furosemide (LASIX) 20 MG tablet TAKE ONE TO TWO TABLETS BY MOUTH EVERY MORNING AS NEEDED FOR EDEMA  Patient taking differently: daily  19   Melquiades Chowdhury MD   Compression Stockings MISC by Does not apply route Thigh high 10/22/18   Baljit Guillen MD   Elastic Bandages & Supports (MEDICAL COMPRESSION THIGH HIGH) MISC Thigh high compression stockings, 30-40 mm Hg 10/20/18   Benjamin Miller MD       Current medications:    No current facility-administered medications for this visit. No current outpatient medications on file.      Facility-Administered Medications Ordered in Other Visits noncompliant,      (-) asthma                           Cardiovascular:  Exercise tolerance: good (>4 METS),       (-) past MI,  angina and  BLAIR        Rate: normal                    Neuro/Psych:      (-) seizures and CVA           GI/Hepatic/Renal:   (+) GERD:, liver disease:, morbid obesity          Endo/Other:        (-) diabetes mellitus               Abdominal:           Vascular:                                          Anesthesia Plan      MAC     ASA 3     (-npo MN  -denies any cardiac history, no chest pain or palpitations. Does have swelling in legs and redness bilaterally. Very obese, was not sob walking into hosptial  -just had surgery last month with no anesthesia problems)  Induction: intravenous. MIPS: Postoperative opioids intended. Anesthetic plan and risks discussed with patient. Plan discussed with CRNA.     Attending anesthesiologist reviewed and agrees with Pre Eval content              Domonique Ramos MD   2/10/2020

## 2020-02-11 ENCOUNTER — TELEPHONE (OUTPATIENT)
Dept: SURGERY | Age: 45
End: 2020-02-11

## 2020-02-18 ENCOUNTER — TELEPHONE (OUTPATIENT)
Dept: SURGERY | Age: 45
End: 2020-02-18

## 2020-02-18 NOTE — TELEPHONE ENCOUNTER
RN reviewed pathology with patient who verbalized an understanding. Patient had no questions or concerns.

## 2020-02-19 ENCOUNTER — TELEPHONE (OUTPATIENT)
Dept: SURGERY | Age: 45
End: 2020-02-19

## 2020-02-19 NOTE — TELEPHONE ENCOUNTER
Returned call to patient, advised by Dr. Kiara Brody patient not to submerge foot or let shower run directly on foot and pat dry then rewrap. Patient understands.

## 2020-02-23 NOTE — OP NOTE
division of tissues. Skin along with subcutaneous fat until fascia excised and oriented with sutures. Specimen sent to pathology. Because of the location and size of the resection area, decision was made to use a full thickness skin graft. Impression of the resection area was taken with a glove cover. Decision was made to use skin from left thigh area and include incision made for lymph node excision. Impression was placed over skin and graft area marked out. This area was measuring 5.5 cm x 5 cm and it was converted into an eyeball shape to allow for the closure. Skin incised with scalpel. Incision deepened to subcutaneous tissues. Electrocautery used for further division of tissues. Skin along with subcutaneous fat excised. The graft skin was  from rest of the tissues taking minimal subcutaneous fat with it. Fat was sent to pathology. Hemostasis checked and achieved. Because the defect size, flaps are raised on all sides with diathermy. Flaps are raised above the fascia. The wound was closed in layers with interrupted 2-0 Vicryl subcutaneous sutures in 2 layers. 3-0 vicryl deep dermal sutures were placed. Skin was closed with 4-0 Monocryl continuous subcuticular suture. Derma flex with preneo, telfa, gauze and tegaderm placed over the closed incision. Now attention was given to place the graft. On back table, defatting of the graft skin done. Multiple through and through slits were placed with scalpel to allow drainage. Running 5-0 fast gut placed to hold graft in place. Polysporin and adaptic placed over graft. Bolstere placed over adaptic and held in place with silk sutures. The patient tolerated the procedure well. Patient was awakened and transferred to the recovery room uneventfully. I was present for the entire procedure.     Stephany Mcfadden MD  Surgical Oncologist

## 2020-02-25 ENCOUNTER — OFFICE VISIT (OUTPATIENT)
Dept: SURGERY | Age: 45
End: 2020-02-25

## 2020-02-25 ENCOUNTER — TELEPHONE (OUTPATIENT)
Dept: PAIN MANAGEMENT | Age: 45
End: 2020-02-25

## 2020-02-25 VITALS
OXYGEN SATURATION: 94 % | HEART RATE: 80 BPM | BODY MASS INDEX: 39.17 KG/M2 | TEMPERATURE: 97.4 F | WEIGHT: 315 LBS | DIASTOLIC BLOOD PRESSURE: 91 MMHG | SYSTOLIC BLOOD PRESSURE: 187 MMHG | HEIGHT: 75 IN

## 2020-02-25 PROCEDURE — 99024 POSTOP FOLLOW-UP VISIT: CPT | Performed by: SURGERY

## 2020-02-25 NOTE — TELEPHONE ENCOUNTER
I need clarification regarding the relationship of diagnosis facrt arthropathy and foraminal stenosis at L5-S1 to the original injury in order to complete a form from an . Please pick from one of the following? DIRECT result of injury    FLOW THROUGH from the initial diagnosis which required surgical intervention OR resulted in a second injury due to fall OR other injury which resulted in the requested condition(s)    SUBSTANTIAL AGGRAVATION OF PRE-EXISTING. The IW was not under current treatment for the requested condition and report no issues until after the injury. Therefore the condition was substantially aggravated by the work related injury.

## 2020-02-26 ENCOUNTER — OFFICE VISIT (OUTPATIENT)
Dept: PAIN MANAGEMENT | Age: 45
End: 2020-02-26
Payer: COMMERCIAL

## 2020-02-26 VITALS
WEIGHT: 315 LBS | BODY MASS INDEX: 56.87 KG/M2 | HEART RATE: 91 BPM | SYSTOLIC BLOOD PRESSURE: 161 MMHG | DIASTOLIC BLOOD PRESSURE: 79 MMHG

## 2020-02-26 PROCEDURE — 99213 OFFICE O/P EST LOW 20 MIN: CPT | Performed by: INTERNAL MEDICINE

## 2020-02-26 RX ORDER — NORTRIPTYLINE HYDROCHLORIDE 25 MG/1
CAPSULE ORAL
Qty: 60 CAPSULE | Refills: 0 | Status: SHIPPED | OUTPATIENT
Start: 2020-02-26 | End: 2020-03-25 | Stop reason: SDUPTHER

## 2020-02-26 RX ORDER — PREGABALIN 150 MG/1
150 CAPSULE ORAL 3 TIMES DAILY
Qty: 90 CAPSULE | Refills: 0 | Status: SHIPPED | OUTPATIENT
Start: 2020-02-26 | End: 2020-03-25 | Stop reason: SDUPTHER

## 2020-02-26 RX ORDER — OXYCODONE AND ACETAMINOPHEN 7.5; 325 MG/1; MG/1
1 TABLET ORAL EVERY 6 HOURS PRN
Qty: 112 TABLET | Refills: 0 | Status: SHIPPED | OUTPATIENT
Start: 2020-02-26 | End: 2020-03-25 | Stop reason: SDUPTHER

## 2020-02-26 RX ORDER — DULOXETIN HYDROCHLORIDE 30 MG/1
30 CAPSULE, DELAYED RELEASE ORAL DAILY
Qty: 30 CAPSULE | Refills: 0 | Status: SHIPPED | OUTPATIENT
Start: 2020-02-26 | End: 2020-03-25 | Stop reason: SDUPTHER

## 2020-02-26 NOTE — PROGRESS NOTES
nausea, vomiting, abdominal pain, diarrhea, constipation, blood in stool and abdominal distention. PHYSICAL EXAM:   Nursing note and vitals reviewed. BP (!) 161/79   Pulse 91   Wt (!) 455 lb (206.4 kg)   BMI 56.87 kg/m²   Constitutional: He appears well-developed and well-nourished. No acute distress. Skin: Skin is warm and dry, good turgor. No rash noted. He is not diaphoretic. Cardiovascular: Normal rate, regular rhythm, normal heart sounds, and does not have murmur. Pulmonary/Chest: Effort normal. No respiratory distress. He does not have wheezes in the lung fields. He has no rales. Neurological/Psychiatric:He is alert and oriented to person, place, and time. Coordination is  normal.  His mood isAppropriate and affect is Neutral/Euthymic(normal) . Other: + venous statis dermatitis change, left leg bandage, using a cane   -  IMPRESSION:   1.  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 BWC Sprain of left knee/leg, initial encounter    3. BWC Sprain of ligament of lumbosacral joint, initial encounter    4. Sprain of ligament of lumbosacral joint, initial encounter    5. Sprain of left knee/leg, initial encounter    6. Sprain of ligament of lumbosacral joint, subsequent encounter    7. Sprain of left knee/leg, subsequent encounter    8. BWCSprain of left knee/leg, initial encounter    9. BWCSprain of ligament of lumbosacral joint, subsequent encounter    10.  BWCSprain of left knee/leg, subsequent encounter        PLAN:  Informed verbal consent was obtained  -Interim history reviewed   -Records for surgery reviewed   -He was advised to increase fluids ( 5-7  glasses of fluid daily), limit caffeine, avoid cheese products, increase dietary fiber, increase activity and exercise as tolerated and relax regularly and enjoy meals   -Continue with Lyrica same dose   -Continue with Percocet 7.5 mg 4 per day    Current Outpatient Medications   Medication Sig Dispense Refill    oxyCODONE-acetaminophen (PERCOCET) 7.5-325 MG per tablet Take 1 tablet by mouth every 6 hours as needed for Pain (max 4 per day) for up to 28 days. 112 tablet 0    pregabalin (LYRICA) 150 MG capsule Take 1 capsule by mouth 3 times daily for 30 days. 90 capsule 0    nortriptyline (PAMELOR) 25 MG capsule TAKE 1-2 TABLETS BY MOUTH NIGHTLY 60 capsule 0    DULoxetine (CYMBALTA) 30 MG extended release capsule Take 1 capsule by mouth daily 30 capsule 0    naloxone 4 MG/0.1ML LIQD nasal spray 1 spray by Nasal route as needed for Opioid Reversal 1 each 5    vitamin B-12 (CYANOCOBALAMIN) 1000 MCG tablet Take 3,000 mcg by mouth daily       penicillin v potassium (VEETID) 500 MG tablet Take 1,000 mg by mouth 2 times daily      furosemide (LASIX) 20 MG tablet TAKE ONE TO TWO TABLETS BY MOUTH EVERY MORNING AS NEEDED FOR EDEMA (Patient taking differently: daily ) 60 tablet 11    Compression Stockings MISC by Does not apply route Thigh high 1 each 0    Elastic Bandages & Supports (MEDICAL COMPRESSION THIGH HIGH) MISC Thigh high compression stockings, 30-40 mm Hg 2 each 1     No current facility-administered medications for this visit. I will continue his current medication regimen  which is part of the above treatment schedule.  It has been helping with Mr. Hunter Rom chronic  medical problems which for this visit include:   Diagnoses of  C Lumbago-sciatica due to displacement of lumbar intervertebral disc, BWC Sprain of left knee/leg, initial encounter, BWC Sprain of ligament of lumbosacral joint, initial encounter, Sprain of ligament of lumbosacral joint, initial encounter, Sprain of left knee/leg, initial encounter, Sprain of ligament of lumbosacral joint, subsequent encounter, Sprain of left knee/leg, subsequent encounter, BWCSprain of left knee/leg, initial encounter, BWCSprain of ligament of lumbosacral joint, subsequent encounter, and BWCSprain of left knee/leg, subsequent encounter were pertinent to accurate and complete

## 2020-03-09 ENCOUNTER — TELEPHONE (OUTPATIENT)
Dept: FAMILY MEDICINE CLINIC | Age: 45
End: 2020-03-09

## 2020-03-09 RX ORDER — CEPHALEXIN 500 MG/1
500 CAPSULE ORAL 4 TIMES DAILY
Qty: 40 CAPSULE | Refills: 0 | Status: SHIPPED | OUTPATIENT
Start: 2020-03-09 | End: 2020-12-30

## 2020-03-09 NOTE — TELEPHONE ENCOUNTER
Pt c/o L leg still having redness and some swelling, he asked if his antbx of Keflex can be refilled before it gets worse.  Thanks

## 2020-03-12 ENCOUNTER — OFFICE VISIT (OUTPATIENT)
Dept: DERMATOLOGY | Age: 45
End: 2020-03-12
Payer: COMMERCIAL

## 2020-03-12 PROCEDURE — 99213 OFFICE O/P EST LOW 20 MIN: CPT | Performed by: DERMATOLOGY

## 2020-03-12 NOTE — PROGRESS NOTES
[address] [facility], [facility] Physicians    Previous clinic visit: Dec 2019    CC:  lesion on L foot    HPI:    1.) Here today in f/u s/p wle for acral melanoma involving the L foot or a several mo h/o enlarging dark brown lesion located on medial side of L foot. Is doing well after surgery. No complaints today. Denies new, changing, or sx nevi. DERM HISTORY:   Personal history of NMSC or MM- medial L foot acral melanoma, 0.7 mm depth, s/p excision by Dr. Henrik Bolivar in Dec 2019, negative sentinel nodes  Family history of NMSC or MM- no   Sunburns easily- Yes   Uses sunscreen- No  History of tanning bed use- No    ADDITIONAL HISTORY:    I have reviewed past medical and surgical histories, current medications, allergies, social and family histories as documented in the patient's electronic medical record. Current Outpatient Medications   Medication Sig Dispense Refill    cephALEXin (KEFLEX) 500 MG capsule Take 1 capsule by mouth 4 times daily 40 capsule 0    oxyCODONE-acetaminophen (PERCOCET) 7.5-325 MG per tablet Take 1 tablet by mouth every 6 hours as needed for Pain (max 4 per day) for up to 28 days. 112 tablet 0    pregabalin (LYRICA) 150 MG capsule Take 1 capsule by mouth 3 times daily for 30 days.  90 capsule 0    nortriptyline (PAMELOR) 25 MG capsule TAKE 1-2 TABLETS BY MOUTH NIGHTLY 60 capsule 0    DULoxetine (CYMBALTA) 30 MG extended release capsule Take 1 capsule by mouth daily 30 capsule 0    naloxone 4 MG/0.1ML LIQD nasal spray 1 spray by Nasal route as needed for Opioid Reversal 1 each 5    vitamin B-12 (CYANOCOBALAMIN) 1000 MCG tablet Take 3,000 mcg by mouth daily       penicillin v potassium (VEETID) 500 MG tablet Take 1,000 mg by mouth 2 times daily      furosemide (LASIX) 20 MG tablet TAKE ONE TO TWO TABLETS BY MOUTH EVERY MORNING AS NEEDED FOR EDEMA (Patient taking differently: daily ) 60 tablet 11    Compression Stockings MISC by Does not apply route Thigh high 1 each 0    Elastic Bandages & Supports (MEDICAL COMPRESSION THIGH HIGH) MISC Thigh high compression stockings, 30-40 mm Hg 2 each 1     No current facility-administered medications for this visit. ROS:    General: No fevers, chills, sweats, unexplained weight loss or weight gain, fatigue, malaise   Skin: Denies additional lesions    Heme: No history of bleeding diatheses    Allergy: Denies seasonal or environmental allergies or other medication allergies     PHYSICAL EXAM:    General: Well-appearing, NAD    Neurologic/psychiatric: Patient is AOx3; mood and affect are appropriate and congruent  Eyes: conjunctivae and eyelids without erythema, injection, or discharge   Integument: Examination was performed of the following and were unremarkable except as otherwise noted below:scalp, hair, face, ears, mucosa of gums/teeth/cutaneous and mucosal lips, RUE, LUE, RLE, LLE, digits/nails, apocrine/eccrine glands; genital exam deferred per pt request    Abnormalities noted include:    1.) L medial foot not examined as is wrapped; was just examined by surg onc.   2.) Torso and extremities with several variably sized scattered brown to tan round smooth melanocytic macules and papules      ASSESSMENT AND PLAN:    1.) History of acral melanoma, s/p wle by Dr. Justine Boss, here today for first post op derm visit; doing well:  - Reassurance  - FBSE every 3-4 mo recommended  - Discussed risk for secondary tumor is highest in first 5 years post diagnosis    2.) Multiple scattered acquired melanocytic nevi with banal features:   - Reassurance  - Edu: patient regarding sun protection strategies, including use of broad spectrum sunscreen with SPF of at least 30, sun guard clothing, broad brimmed hat, sunglasses, and sun avoidance during peak hours of the day. ABCDE's of melanoma also reviewed      Return to Clinic:  3-4 mo and prn changes  Discussed plan with patient and/or primary caretaker.    Patient to call clinic with any questions /

## 2020-03-25 ENCOUNTER — OFFICE VISIT (OUTPATIENT)
Dept: PAIN MANAGEMENT | Age: 45
End: 2020-03-25
Payer: COMMERCIAL

## 2020-03-25 ENCOUNTER — TELEPHONE (OUTPATIENT)
Dept: DERMATOLOGY | Age: 45
End: 2020-03-25

## 2020-03-25 VITALS
DIASTOLIC BLOOD PRESSURE: 89 MMHG | TEMPERATURE: 96.1 F | BODY MASS INDEX: 56.87 KG/M2 | SYSTOLIC BLOOD PRESSURE: 153 MMHG | HEART RATE: 84 BPM | WEIGHT: 315 LBS

## 2020-03-25 PROCEDURE — 99214 OFFICE O/P EST MOD 30 MIN: CPT | Performed by: INTERNAL MEDICINE

## 2020-03-25 RX ORDER — DULOXETIN HYDROCHLORIDE 30 MG/1
30 CAPSULE, DELAYED RELEASE ORAL DAILY
Qty: 30 CAPSULE | Refills: 0 | Status: SHIPPED | OUTPATIENT
Start: 2020-03-25 | End: 2020-04-22

## 2020-03-25 RX ORDER — PREGABALIN 150 MG/1
150 CAPSULE ORAL 3 TIMES DAILY
Qty: 90 CAPSULE | Refills: 0 | Status: SHIPPED | OUTPATIENT
Start: 2020-03-25 | End: 2020-04-22 | Stop reason: SDUPTHER

## 2020-03-25 RX ORDER — NORTRIPTYLINE HYDROCHLORIDE 25 MG/1
CAPSULE ORAL
Qty: 60 CAPSULE | Refills: 0 | Status: SHIPPED | OUTPATIENT
Start: 2020-03-25 | End: 2020-04-22

## 2020-03-25 RX ORDER — OXYCODONE AND ACETAMINOPHEN 7.5; 325 MG/1; MG/1
1 TABLET ORAL EVERY 6 HOURS PRN
Qty: 112 TABLET | Refills: 0 | Status: SHIPPED | OUTPATIENT
Start: 2020-03-25 | End: 2020-04-22 | Stop reason: SDUPTHER

## 2020-03-25 NOTE — PROGRESS NOTES
Helene Harris  1975  [id number]    HISTORY OF PRESENT ILLNESS:  Mr. Markos Strong is a 39 y.o. male returns for a follow up visit for multiple medical problems. His  presenting problems are   1. 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 BWC Sprain of left knee/leg, initial encounter    3. BWC Sprain of ligament of lumbosacral joint, initial encounter    4. Sprain of ligament of lumbosacral joint, initial encounter    5. Sprain of left knee/leg, initial encounter    6. Sprain of ligament of lumbosacral joint, subsequent encounter    7. Sprain of left knee/leg, subsequent encounter    8. BWCSprain of left knee/leg, initial encounter    9. BWCSprain of ligament of lumbosacral joint, subsequent encounter    10. BWCSprain of left knee/leg, subsequent encounter    . As per information/history obtained from the PADT(patient assessment and documentation tool) -  He complains of pain in the lower back with radiation to the buttocks, hips Left, upper leg Left, knees Left, lower leg Left, ankles Left and feet Left He rates the pain 3/10 and describes it as sharp, aching, burning. Pain is made worse by: movement, walking, standing, sitting, bending, lifting. Current treatment regimen has helped relieve about 70% of the pain. He denies side effects from the current pain regimen. Patient reports that since the last follow up visit the physical functioning is unchanged, family/social relationships are unchanged, mood is unchanged and sleep patterns are unchanged, and that the overall functioning is unchanged. Patient denies neurological bowel or bladder. Patient denies misusing/abusing his narcotic pain medications or using any illegal drugs. There are No indicators for possible drug abuse, addiction or diversion problems.    Upon obtaining the medical history from Mr. Markos Strong regarding today's office visit for his presenting problems,  reports he has been doing about the Comment: rarely     Drug use: No    Sexual activity: Yes     Partners: Female   Lifestyle    Physical activity     Days per week: Not on file     Minutes per session: Not on file    Stress: Not on file   Relationships    Social connections     Talks on phone: Not on file     Gets together: Not on file     Attends Sikh service: Not on file     Active member of club or organization: Not on file     Attends meetings of clubs or organizations: Not on file     Relationship status: Not on file    Intimate partner violence     Fear of current or ex partner: Not on file     Emotionally abused: Not on file     Physically abused: Not on file     Forced sexual activity: Not on file   Other Topics Concern    Not on file   Social History Narrative    Not on file       Mr. Harris current medications are   Outpatient Medications Prior to Visit   Medication Sig Dispense Refill    cephALEXin (KEFLEX) 500 MG capsule Take 1 capsule by mouth 4 times daily 40 capsule 0    oxyCODONE-acetaminophen (PERCOCET) 7.5-325 MG per tablet Take 1 tablet by mouth every 6 hours as needed for Pain (max 4 per day) for up to 28 days. 112 tablet 0    pregabalin (LYRICA) 150 MG capsule Take 1 capsule by mouth 3 times daily for 30 days.  90 capsule 0    nortriptyline (PAMELOR) 25 MG capsule TAKE 1-2 TABLETS BY MOUTH NIGHTLY 60 capsule 0    DULoxetine (CYMBALTA) 30 MG extended release capsule Take 1 capsule by mouth daily 30 capsule 0    naloxone 4 MG/0.1ML LIQD nasal spray 1 spray by Nasal route as needed for Opioid Reversal 1 each 5    vitamin B-12 (CYANOCOBALAMIN) 1000 MCG tablet Take 3,000 mcg by mouth daily       penicillin v potassium (VEETID) 500 MG tablet Take 1,000 mg by mouth 2 times daily      furosemide (LASIX) 20 MG tablet TAKE ONE TO TWO TABLETS BY MOUTH EVERY MORNING AS NEEDED FOR EDEMA (Patient taking differently: daily ) 60 tablet 11    Compression Stockings MISC by Does not apply route Thigh high 1 each 0    Elastic Bandages & Supports (MEDICAL COMPRESSION THIGH HIGH) MISC Thigh high compression stockings, 30-40 mm Hg 2 each 1     No facility-administered medications prior to visit. REVIEW OF SYSTEMS:   Constitutional: Negative for fatigue and unexpected weight change. Eyes: Negative for visual disturbance. Respiratory: Negative for shortness of breath. Cardiovascular: Negative for chest pain, palpitations  Gastrointestinal: Negative for blood in stool, abdominal distention, nausea, vomiting, abdominal pain, diarrhea,constipation. Skin: Negative for color change or any abnormal bruising. Neurological: Negative for speech difficulty, weakness and light-headedness, dizziness, tremors, sleepiness  Psychiatric/Behavioral: Negative for suicidal ideas, hallucinations, behavioral problems, self-injury, decreased concentration/cognition, agitation, confusion. PHYSICAL EXAM:   Nursing note and vitals reviewed. BP (!) 153/89   Pulse 84   Temp 96.1 °F (35.6 °C)   Wt (!) 455 lb (206.4 kg)   BMI 56.87 kg/m²   General Appearance: Patient is well nourished, well developed, well groomed and in no acute distress. Skin: Skin is warm and dry, good turgor . No rash or lesions noted. He is not diaphoretic. Pulmonary/Chest: Effort normal. No respiratory distress or use of accessory muscles. Auscultation revealing normal air entry. He does not have wheezes in the lung fields. He has no rales. Cardiovascular: Normal rate, regular rhythm, normal heart sounds, and does not have murmur. Exam reveals no gallop and no friction rub. Abdominal: Soft. Bowel sounds are normal.  On inspection of abdomen is flat no distension and no mass. No tenderness. He has no rebound and no guarding. Musculoskeletal / Extremities: Range of motion is normal. Gait is normal, assistive devices use: walking boot, cane   He exhibits edema: none, and no tenderness.    Neurological/Psychiatric:He is alert and oriented to person, place, and near bedtime   -Continue with Pamelor   -Walking/Stretching exercises as advised  -ORT score is 1     Mr. Harris will be prescribed  the medications  listed below which are for treatment of his presenting  medical problems which for this visit include:   Diagnoses of  BWC Lumbago-sciatica due to displacement of lumbar intervertebral disc, BWC Sprain of left knee/leg, initial encounter, BWC Sprain of ligament of lumbosacral joint, initial encounter, Sprain of ligament of lumbosacral joint, initial encounter, Sprain of left knee/leg, initial encounter, Sprain of ligament of lumbosacral joint, subsequent encounter, Sprain of left knee/leg, subsequent encounter, BWCSprain of left knee/leg, initial encounter, BWCSprain of ligament of lumbosacral joint, subsequent encounter, and BWCSprain of left knee/leg, subsequent encounter were pertinent to this visit. Medications/orders associated with this visit:    Current Outpatient Medications   Medication Sig Dispense Refill    cephALEXin (KEFLEX) 500 MG capsule Take 1 capsule by mouth 4 times daily 40 capsule 0    oxyCODONE-acetaminophen (PERCOCET) 7.5-325 MG per tablet Take 1 tablet by mouth every 6 hours as needed for Pain (max 4 per day) for up to 28 days. 112 tablet 0    pregabalin (LYRICA) 150 MG capsule Take 1 capsule by mouth 3 times daily for 30 days.  90 capsule 0    nortriptyline (PAMELOR) 25 MG capsule TAKE 1-2 TABLETS BY MOUTH NIGHTLY 60 capsule 0    DULoxetine (CYMBALTA) 30 MG extended release capsule Take 1 capsule by mouth daily 30 capsule 0    naloxone 4 MG/0.1ML LIQD nasal spray 1 spray by Nasal route as needed for Opioid Reversal 1 each 5    vitamin B-12 (CYANOCOBALAMIN) 1000 MCG tablet Take 3,000 mcg by mouth daily       penicillin v potassium (VEETID) 500 MG tablet Take 1,000 mg by mouth 2 times daily      furosemide (LASIX) 20 MG tablet TAKE ONE TO TWO TABLETS BY MOUTH EVERY MORNING AS NEEDED FOR EDEMA (Patient taking differently: daily ) 60 tablet 11    Compression Stockings MISC by Does not apply route Thigh high 1 each 0    Elastic Bandages & Supports (MEDICAL COMPRESSION THIGH HIGH) MISC Thigh high compression stockings, 30-40 mm Hg 2 each 1     No current facility-administered medications for this visit. Goals of current treatment regimen include improvement in pain, restoration of functioning- with focus on improvement in physical performance, general activity, work or disability,emotional distress, health care utilization and  decreased medication consumption. Will continue to monitor progress towards achieving/maintaining therapeutic goals with special emphasis on  1. Improvement in perceived interfernce  of pain with ADL's. Ability to do home exercises independently. Ability to do household chores indoor and/or outdoor work and social and leisure activities. To increase flexibility/ROM, strength and endurance. Improve psychosocial and physical functioning.- he is not showing any significant progress/or showing regression  towards this goal and reassessment and adjustment of goals/treatment have been made. 2. Improving sleep to 6-7 hours a night. Improve mood/ anxiety and depression symptoms such as crying spells, low energy, problems with concentration, motivation. .- he is showing progression towards this treatment goal with the current regimen. 3. Reduction of reliance on opioid analgesia/more appropriate opioid use. - he is showing progression towards this treatment goal with the current regimen. Risks and benefits of the medications and other alternative treatments have been/were  discussed with the patient. Any questions on the  common side effects of these medications were also answered.   He was advised against drinking alcohol with the narcotic pain medicines, advised against driving or handling machinery when  starting or adjusting the dose of medicines, feeling groggy or drowsy, or if having any cognitive issues related

## 2020-03-31 ENCOUNTER — TELEMEDICINE (OUTPATIENT)
Dept: SURGERY | Age: 45
End: 2020-03-31

## 2020-03-31 PROCEDURE — 99024 POSTOP FOLLOW-UP VISIT: CPT | Performed by: SURGERY

## 2020-04-22 ENCOUNTER — VIRTUAL VISIT (OUTPATIENT)
Dept: PAIN MANAGEMENT | Age: 45
End: 2020-04-22
Payer: COMMERCIAL

## 2020-04-22 PROCEDURE — 99214 OFFICE O/P EST MOD 30 MIN: CPT | Performed by: INTERNAL MEDICINE

## 2020-04-22 RX ORDER — DICLOFENAC SODIUM 75 MG/1
75 TABLET, DELAYED RELEASE ORAL 2 TIMES DAILY PRN
Qty: 60 TABLET | Refills: 0 | Status: SHIPPED | OUTPATIENT
Start: 2020-04-22 | End: 2020-05-20 | Stop reason: SDUPTHER

## 2020-04-22 RX ORDER — PREGABALIN 150 MG/1
150 CAPSULE ORAL 3 TIMES DAILY
Qty: 90 CAPSULE | Refills: 0 | Status: SHIPPED | OUTPATIENT
Start: 2020-04-22 | End: 2020-05-20 | Stop reason: SDUPTHER

## 2020-04-22 RX ORDER — DULOXETIN HYDROCHLORIDE 60 MG/1
60 CAPSULE, DELAYED RELEASE ORAL DAILY
Qty: 30 CAPSULE | Refills: 0 | Status: SHIPPED | OUTPATIENT
Start: 2020-04-22 | End: 2020-05-20 | Stop reason: SDUPTHER

## 2020-04-22 RX ORDER — OXYCODONE AND ACETAMINOPHEN 7.5; 325 MG/1; MG/1
1 TABLET ORAL EVERY 6 HOURS PRN
Qty: 112 TABLET | Refills: 0 | Status: SHIPPED | OUTPATIENT
Start: 2020-04-22 | End: 2020-05-20 | Stop reason: SDUPTHER

## 2020-04-22 NOTE — PROGRESS NOTES
TELE HEALTH VISIT (AUDIO-VISUAL)    Pursuant to the emergency declaration under the [facility], [facility] waiver authority and the [employer] and [employer] Act, this Virtual  Visit was conducted, with patient's/legal guardian's consent, to reduce the patient's risk of exposure to COVID-19 and provide continuity of care for an established patient. Service is  provided through a video synchronous discussion virtually to substitute for in-person clinic visit. Due to this being a TeleHealth encounter (During OBL-99 public health emergency), evaluation of the following organ systems was limited: Vitals/Constitutional/EENT/Resp/CV/GI//MS/Neuro/Skin/Msqd-Ugxx-Lcg. Chepeabhijeet Eric  1975  [id number]    Mr. Harris is being seen virtually for a follow up visit using one of the following techniques -Doxy. me/Lorena Video visit/Google Duo or Face time. Informed verbal consent to the virtual visit was obtained from Mr. Katty Tripathi. Risks associated with HIPPA compliance with the virtual visit was explained to the patient. Mr. Katty Tripathi is at his residence and Dr. Zara Diallo is in his office. HISTORY OF PRESENT ILLNESS:  Mr. Katty Tripathi is a 39 y.o. male  being assessed for a follow up visit for pain management for evaluation of ongoing care regarding his symptoms and monitoring of compliance with long term use high risk medications. He has a diagnosis of   1. 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 BWC Sprain of left knee/leg, initial encounter    3. BWC Sprain of ligament of lumbosacral joint, initial encounter    4. Sprain of ligament of lumbosacral joint, subsequent encounter    5. Sprain of left knee/leg, subsequent encounter    .       As per information/history obtained from the PADT(patient assessment and documentation tool) -  He complains of pain in the lower back with radiation to the buttocks and hips and social history were reviewed in the chart and also listed below. Social History     Socioeconomic History    Marital status: Single     Spouse name: Lila Baig Number of children: 2    Years of education: Not on file    Highest education level: Not on file   Occupational History    Not on file   Social Needs    Financial resource strain: Not on file    Food insecurity     Worry: Not on file     Inability: Not on file    Transportation needs     Medical: Not on file     Non-medical: Not on file   Tobacco Use    Smoking status: Former Smoker     Types: Cigarettes     Last attempt to quit: 1/1/2005     Years since quitting: 15.3    Smokeless tobacco: Never Used   Substance and Sexual Activity    Alcohol use: No     Alcohol/week: 0.0 standard drinks     Comment: rarely     Drug use: No    Sexual activity: Yes     Partners: Female   Lifestyle    Physical activity     Days per week: Not on file     Minutes per session: Not on file    Stress: Not on file   Relationships    Social connections     Talks on phone: Not on file     Gets together: Not on file     Attends Rastafari service: Not on file     Active member of club or organization: Not on file     Attends meetings of clubs or organizations: Not on file     Relationship status: Not on file    Intimate partner violence     Fear of current or ex partner: Not on file     Emotionally abused: Not on file     Physically abused: Not on file     Forced sexual activity: Not on file   Other Topics Concern    Not on file   Social History Narrative    Not on file       Mr. Harris current medications are   Outpatient Medications Prior to Visit   Medication Sig Dispense Refill    oxyCODONE-acetaminophen (PERCOCET) 7.5-325 MG per tablet Take 1 tablet by mouth every 6 hours as needed for Pain (max 4 per day) for up to 28 days. 112 tablet 0    pregabalin (LYRICA) 150 MG capsule Take 1 capsule by mouth 3 times daily for 30 days.  90 capsule 0    DULoxetine left knee/leg, subsequent encounter were pertinent to this visit. Risks and benefits of the medications and other alternative treatments  including no treatment were discussed with the patient. The common side effects of these medications were also explained to the patient. Informed verbal consent was obtained. Goals of current treatment regimen include improvement in pain, restoration of functioning- with focus on improvement in physical performance, general activity, work or disability,emotional distress, health care utilization and  decreased medication consumption. Will continue to monitor progress towards achieving/maintaining therapeutic goals with special emphasis on  1. Improvement in perceived interfernce  of pain with ADL's. Ability to do home exercises independently. Ability to do household chores indoor and/or outdoor work and social and leisure activities. Improve psychosocial and physical functioning. - he is not showing any significant progress/or showing regression  towards this goal and reassessment and adjustment of goals/treatment have been made. 2. Improving sleep to 6-7 hours a night. Improve mood/ anxiety and depression symptoms such as crying spells, low energy, problems with concentration, motivation.- he is not showing any significant progress/or showing regression  towards this goal and reassessment and adjustment of goals/treatment have been made. 3. Reduction of reliance on opioid analgesia/more appropriate opioid use. - he is showing progression towards this treatment goal with the current regimen. He was advised against drinking alcohol with the narcotic pain medicines, advised against driving or handling machinery while adjusting the dose of medicines or if having cognitive  issues related to the current medications. Risk of overdose and death, if medicines not taken as prescribed, were also discussed.  If the patient develops new symptoms or if the symptoms worsen, the patient should call the office. While transcribing every attempt was made to maintain the accuracy of the note in terms of it's contents,there may have been some errors made inadvertently. Thank you for allowing me to participate in the care of this patient.     Golden Rodriguez MD.    Cc: Markus Contreras MD

## 2020-05-08 ENCOUNTER — PATIENT MESSAGE (OUTPATIENT)
Dept: INFECTIOUS DISEASES | Age: 45
End: 2020-05-08

## 2020-05-11 ENCOUNTER — VIRTUAL VISIT (OUTPATIENT)
Dept: INFECTIOUS DISEASES | Age: 45
End: 2020-05-11
Payer: COMMERCIAL

## 2020-05-11 VITALS — HEIGHT: 75 IN | WEIGHT: 315 LBS | BODY MASS INDEX: 39.17 KG/M2

## 2020-05-11 PROCEDURE — 99423 OL DIG E/M SVC 21+ MIN: CPT | Performed by: INTERNAL MEDICINE

## 2020-05-11 NOTE — PROGRESS NOTES
Infectious Diseases Oupatient Follow-up Note    Primary Care Physician:   Yas Wolf MD  Last visit:    [facility] 10/19/18  History Obtained From:    Patient, EPIC    CHIEF COMPLAINT / ID problem:     Chief Complaint   Patient presents with    Follow-up     recurrent cellulitis of lower leg       HISTORY OF PRESENT ILLNESS / Interval history:      Judith Rush is a 39 y.o. male who was seen today for L leg cellulitis, hosp f/u.     36 yo ma with hx obesity (BMI (57), ANN-MARIE, LBP, hx back surgeries  Bilateral LE venous stasis / lymphedema. Hx L LE cellulitis.       Admit 10/16 - 10/20/18:  Presents with L LE redness, swelling and pain. Acute onset. No associated fever / chills. No trauma / new precipitant. Has had recurrent episodes - 3rd in last 2 mo. Similar to past episodes. In ED 10/16, afebrile, WBC 6.8. BC sent, venous dopplers - no DVT  Discharged on 10/20 on po augmentin x 10 days. Seen 11/5/18, pt reports L leg is better, not hot. Wearing compression stocks, 20-30 mm Hg, has 30-40 mm Hg stocking at home     Seen 1/7/19:  Pt reported he is doing well. \"no infection\"  Taking and tolerating PCN VK 1,000 mg bid. Using compression stockings    Seen 5/13/19:  Pt reported he is doing well. \"no infection\" / no flares  Taking and tolerating PCN VK 1,000 mg bid. Using compression stockings    Seen 11/11/19:  Pt reported he is doing well. \"no infection\" / no flares  Taking and tolerating PCN VK 1,000 mg bid. Using compression stockings    Today 5/11/20:  Video Visit (consent obtained, pt at home, med / allergies reviewed, doxy. 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   Using compression stockings      Past Medical History:    Past Medical History:   Diagnosis Date    by Nasal route as needed for Opioid Reversal (Patient not taking: Reported on 5/11/2020) 1 each 5     No current facility-administered medications for this visit. Allergies:  Patient has no known allergies. Social History:    TOBACCO:                 None - past cig  ETOH:                         None   DRUGS:                      None   MARITAL STATUS:        OCCUPATION:           Computer repair                Family History:   No immunodeficiency    REVIEW OF SYSTEMS:    No fever / chills / sweats. No weight loss. No visual change, eye pain, eye discharge. No oral lesion, sore throat, dysphagia. Denies cough / sputum. Denies chest pain, palpitations. Denies n / v / abd pain. No diarrhea. Denies dysuria or change in urinary function. Denies joint swelling or pain. No myalgia, arthralgia. Denies skin change, rash, itching  Denies focal weakness, sensory change or other neurologic symptom  Denies new depression or other psychiatric problem. No symptoms endocrine disorder. No symptoms hematologic disorder.     PHYSICAL EXAM:    Vitals:    Ht 6' 3\" (1.905 m)   Wt (!) 455 lb 0.5 oz (206.4 kg)   BMI 56.87 kg/m²     No PE   Visualized legs by video - venous stasis, edema  L foot wound clean, L leg wound healed  Took pictures on [employer]        IMPRESSION:    Obesity (BMI 56), ANN-MARIE, LBP  LE venous stasis with chronic increase in size of L leg    Hx recurrent L LE cellulitis, non-purulent, recurrent with acute onset - c/c Streptococcal cellulitis    - 4- 5 episodes this year, 1  hospitalization   - rapid onset, pre-emptive antibiotic may not be effective   - prophylactic PCN - prescribed, PCN 1,000 mg po bid (11/5/19)   - NO acute infection (1/7/19, 5/13/19, 11/11/19)    Onychomycosis / tinea pedis, improved on terbinafine, completed 12 mo terbinafine    L foot lesion, present for 1 yr, no pain, increase in size, + melanoma, resected 2/10/20 [facility] [place])    RECOMMENDATIONS:      Continue

## 2020-05-11 NOTE — PROGRESS NOTES
mouth 3 times daily for 30 days. Amos Brock MD   DULoxetine (CYMBALTA) 60 MG extended release capsule Take 1 capsule by mouth daily  Amos Brock MD   diclofenac (VOLTAREN) 75 MG EC tablet Take 1 tablet by mouth 2 times daily as needed for Pain  Amos Brock MD   cephALEXin (KEFLEX) 500 MG capsule Take 1 capsule by mouth 4 times daily  Patient not taking: Reported on 5/11/2020  Levar Lynn MD   naloxone 4 MG/0.1ML LIQD nasal spray 1 spray by Nasal route as needed for Opioid Reversal  Patient not taking: Reported on 5/11/2020  Amos Brock MD   vitamin B-12 (CYANOCOBALAMIN) 1000 MCG tablet Take 3,000 mcg by mouth daily   Historical Provider, MD   penicillin v potassium (VEETID) 500 MG tablet Take 1,000 mg by mouth 2 times daily  Historical Provider, MD   furosemide (LASIX) 20 MG tablet TAKE ONE TO TWO TABLETS BY MOUTH EVERY MORNING AS NEEDED FOR EDEMA  Patient taking differently: daily   Levar Lynn MD   Compression Stockings MISC by Does not apply route Thigh high  Ashley Martin MD   Elastic Bandages & Supports (MEDICAL COMPRESSION THIGH HIGH) MISC Thigh high compression stockings, 30-40 mm Hg  Alberto Hyman MD       No Known Allergies    PHYSICAL EXAMINATION:    Constitutional: [x] Appears well-developed and well-nourished [x] No apparent distress      [] Abnormal-   Mental status  [x] Alert and awake  [x] Oriented to person/place/time [x]Able to follow commands      Eyes:  EOM    [x]  Normal  [] Abnormal-  Sclera  [x]  Normal  [] Abnormal -         Discharge [x]  None visible  [] Abnormal -    HENT:   [x] Normocephalic, atraumatic.   [] Abnormal   [x] Mouth/Throat: Mucous membranes are moist.     External Ears [x] Normal  [] Abnormal-     Neck: [x] No visualized mass     Pulmonary/Chest: [x] Respiratory effort normal.  [x] No visualized signs of difficulty breathing or respiratory distress        [] Abnormal-      Musculoskeletal:   [x] Normal gait with no signs of ataxia         [x]

## 2020-05-13 ENCOUNTER — TELEMEDICINE (OUTPATIENT)
Dept: SURGERY | Age: 45
End: 2020-05-13
Payer: COMMERCIAL

## 2020-05-13 PROCEDURE — 99213 OFFICE O/P EST LOW 20 MIN: CPT | Performed by: SURGERY

## 2020-05-20 ENCOUNTER — TELEPHONE (OUTPATIENT)
Dept: PAIN MANAGEMENT | Age: 45
End: 2020-05-20

## 2020-05-20 ENCOUNTER — VIRTUAL VISIT (OUTPATIENT)
Dept: PAIN MANAGEMENT | Age: 45
End: 2020-05-20
Payer: COMMERCIAL

## 2020-05-20 PROCEDURE — 99213 OFFICE O/P EST LOW 20 MIN: CPT | Performed by: INTERNAL MEDICINE

## 2020-05-20 RX ORDER — PREGABALIN 150 MG/1
150 CAPSULE ORAL 3 TIMES DAILY
Qty: 90 CAPSULE | Refills: 0 | Status: SHIPPED | OUTPATIENT
Start: 2020-05-20 | End: 2020-06-17 | Stop reason: SDUPTHER

## 2020-05-20 RX ORDER — DICLOFENAC SODIUM 75 MG/1
75 TABLET, DELAYED RELEASE ORAL 2 TIMES DAILY PRN
Qty: 60 TABLET | Refills: 0 | Status: SHIPPED | OUTPATIENT
Start: 2020-05-20 | End: 2020-06-17 | Stop reason: SDUPTHER

## 2020-05-20 RX ORDER — DULOXETIN HYDROCHLORIDE 60 MG/1
60 CAPSULE, DELAYED RELEASE ORAL DAILY
Qty: 30 CAPSULE | Refills: 0 | Status: SHIPPED | OUTPATIENT
Start: 2020-05-20 | End: 2020-06-17 | Stop reason: SDUPTHER

## 2020-05-20 RX ORDER — OXYCODONE AND ACETAMINOPHEN 7.5; 325 MG/1; MG/1
1 TABLET ORAL EVERY 6 HOURS PRN
Qty: 112 TABLET | Refills: 0 | Status: SHIPPED | OUTPATIENT
Start: 2020-05-20 | End: 2020-06-17 | Stop reason: SDUPTHER

## 2020-05-20 NOTE — PROGRESS NOTES
TELE HEALTH VISIT (AUDIO-VISUAL)    Pursuant to the emergency declaration under the [facility], [facility] waiver authority and the [employer] Act, this Virtual  Visit was conducted, with patient's/legal guardian's consent, to reduce the patient's risk of exposure to COVID-19 and provide continuity of care for an established patient. Service is  provided through a video synchronous discussion virtually to substitute for in-person clinic visit. Due to this being a TeleHealth encounter (During HGBZD-61 public health emergency), evaluation of the following organ systems was limited: Vitals/Constitutional/EENT/Resp/CV/GI//MS/Neuro/Skin/Wjks-Jmjg-Nzr. Emmanuel Kruse  1975  [id number]    Mr. Harris is being seen virtually for a follow up visit using one of the following techniques-[employer]. me/[employer] Video visit/Google Duo or Face time. Informed verbal consent to the virtual visit was obtained from Mr. Kika Gilbert. Risks associated with HIPPA compliance with the virtual visit was explained to the patient. Mr. Kika Gilbert is at his residence and Dr. Nessa Porter is in his office. HISTORY OF PRESENT ILLNESS:  Mr. Kika Gilbert is a 39 y.o. male  being assessed for a follow up visit for pain management for evaluation of ongoing care regarding his symptoms and monitoring of compliance with long term use high risk medications. He has a diagnosis of   1. 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 BWC Sprain of ligament of lumbosacral joint, subsequent encounter    3. BWC Sprain of left knee/leg, subsequent encounter    . He complains of pain in the Low back   with radiation to the buttocks and hips Bilateral . He rates the pain 4/10 and describes it as sharp, aching, burning. Current treatment regimen has helped relieve about 70% of the pain. He denies any side effects from the current pain regimen.  Patient TABLETS BY MOUTH EVERY MORNING AS NEEDED FOR EDEMA (Patient taking differently: daily ) 60 tablet 11    Compression Stockings MISC by Does not apply route Thigh high 1 each 0    Elastic Bandages & Supports (MEDICAL COMPRESSION THIGH HIGH) MISC Thigh high compression stockings, 30-40 mm Hg 2 each 1     No facility-administered medications prior to visit. SOCIAL/FAMILY/PAST MEDICAL HISTORY: Mr. Lida Quintanilla, family and past medical history was reviewed. REVIEW OF SYSTEMS:    Respiratory: Negative for apnea, chest tightness and shortness of breath or change in baseline breathing. Gastrointestinal: Negative for nausea, vomiting, abdominal pain, diarrhea, constipation, blood in stool and abdominal distention. PHYSICAL EXAM:   Nursing note and vitals per patient reviewed. There were no vitals taken for this visit. Constitutional: He appears well-developed and well-nourished. No acute distress. No respiratory distress. Skin: Skin appears to be warm and dry. No rashes or any other marks noted. He is not diaphoretic. Respiratory/Pulmonary: NO conversational dyspnea, no accessory muscle use, no coughing during exam. He does not appear to be in labored breathing. Neurological/Psychiatric:He is alert and oriented to person, place, and time. Coordination is  normal.  His mood isAppropriate and affect is Neutral/Euthymic(normal). Musculoskeletal / Extremities: Range of motion is normal. Gait is normal, assistive devices use: none. IMPRESSION:   1.  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 BWC Sprain of ligament of lumbosacral joint, subsequent encounter    3. BWC Sprain of left knee/leg, subsequent encounter    4. BWC Sprain of left knee/leg, initial encounter    5. BWC Sprain of ligament of lumbosacral joint, initial encounter    6. Sprain of ligament of lumbosacral joint, initial encounter    7. Sprain of left knee/leg, initial encounter    8.  Sprain of times daily      furosemide (LASIX) 20 MG tablet TAKE ONE TO TWO TABLETS BY MOUTH EVERY MORNING AS NEEDED FOR EDEMA (Patient taking differently: daily ) 60 tablet 11    Compression Stockings MISC by Does not apply route Thigh high 1 each 0    Elastic Bandages & Supports (MEDICAL COMPRESSION THIGH HIGH) MISC Thigh high compression stockings, 30-40 mm Hg 2 each 1     No current facility-administered medications for this visit. I will continue his current medication regimen  which is part of the above treatment schedule. It has been helping with Mr. 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 Risks and benefits of the medications and other alternative treatments  including no treatment were discussed with the patient. The common side effects of these medications were also explained to the patient. Informed verbal consent was obtained. Goals of current treatment regimen include improvement in pain, restoration of functioning- with focus on improvement in physical performance, general activity, work or disability,emotional distress, health care utilization and  decreased medication consumption. Will continue to monitor progress towards achieving/maintaining therapeutic goals with special emphasis on  1. Improvement in perceived interfernce  of pain with ADL's. Ability to do home exercises independently. Ability to do household chores indoor and/or outdoor work and social and leisure activities. Improve psychosocial and physical functioning. - he is showing progression towards this treatment goal with the current regimen.      He was advised against drinking alcohol with the narcotic pain medicines, advised against driving or handling machinery while

## 2020-06-17 ENCOUNTER — VIRTUAL VISIT (OUTPATIENT)
Dept: PAIN MANAGEMENT | Age: 45
End: 2020-06-17
Payer: COMMERCIAL

## 2020-06-17 PROCEDURE — 99213 OFFICE O/P EST LOW 20 MIN: CPT | Performed by: INTERNAL MEDICINE

## 2020-06-17 RX ORDER — PREGABALIN 150 MG/1
150 CAPSULE ORAL 3 TIMES DAILY
Qty: 90 CAPSULE | Refills: 0 | Status: SHIPPED | OUTPATIENT
Start: 2020-06-17 | End: 2020-07-15 | Stop reason: SDUPTHER

## 2020-06-17 RX ORDER — DICLOFENAC SODIUM 75 MG/1
75 TABLET, DELAYED RELEASE ORAL 2 TIMES DAILY PRN
Qty: 60 TABLET | Refills: 0 | Status: SHIPPED | OUTPATIENT
Start: 2020-06-17 | End: 2020-07-15 | Stop reason: SDUPTHER

## 2020-06-17 RX ORDER — OXYCODONE AND ACETAMINOPHEN 7.5; 325 MG/1; MG/1
1 TABLET ORAL EVERY 6 HOURS PRN
Qty: 112 TABLET | Refills: 0 | Status: SHIPPED | OUTPATIENT
Start: 2020-06-17 | End: 2020-07-15 | Stop reason: SDUPTHER

## 2020-06-17 RX ORDER — DULOXETIN HYDROCHLORIDE 60 MG/1
60 CAPSULE, DELAYED RELEASE ORAL DAILY
Qty: 30 CAPSULE | Refills: 0 | Status: SHIPPED | OUTPATIENT
Start: 2020-06-17 | End: 2020-07-15 | Stop reason: SDUPTHER

## 2020-06-17 NOTE — PROGRESS NOTES
subsequent encounter    10. BWCSprain of left knee/leg, initial encounter    11. BWCSprain of ligament of lumbosacral joint, subsequent encounter    12. BWCSprain of left knee/leg, subsequent encounter        PLAN:  Informed verbal consent regarding treatment was obtained  -Continue with Voltaren 75 mg daily   -Continue with Percocet 7.5 gm 4 per day   -He was advised to increase fluids ( 5-7  glasses of fluid daily), limit caffeine, avoid cheese products, increase dietary fiber, increase activity and exercise as tolerated and relax regularly and enjoy meals   -Back stretching exercises as advised   -Walking as tolerated   -Consider starting AFTAB if symptoms continue    Current Outpatient Medications   Medication Sig Dispense Refill    oxyCODONE-acetaminophen (PERCOCET) 7.5-325 MG per tablet Take 1 tablet by mouth every 6 hours as needed for Pain (max 4 per day) for up to 28 days. 112 tablet 0    pregabalin (LYRICA) 150 MG capsule Take 1 capsule by mouth 3 times daily for 30 days.  90 capsule 0    DULoxetine (CYMBALTA) 60 MG extended release capsule Take 1 capsule by mouth daily 30 capsule 0    diclofenac (VOLTAREN) 75 MG EC tablet Take 1 tablet by mouth 2 times daily as needed for Pain 60 tablet 0    cephALEXin (KEFLEX) 500 MG capsule Take 1 capsule by mouth 4 times daily 40 capsule 0    naloxone 4 MG/0.1ML LIQD nasal spray 1 spray by Nasal route as needed for Opioid Reversal 1 each 5    vitamin B-12 (CYANOCOBALAMIN) 1000 MCG tablet Take 3,000 mcg by mouth daily       penicillin v potassium (VEETID) 500 MG tablet Take 1,000 mg by mouth 2 times daily      furosemide (LASIX) 20 MG tablet TAKE ONE TO TWO TABLETS BY MOUTH EVERY MORNING AS NEEDED FOR EDEMA (Patient taking differently: daily ) 60 tablet 11    Compression Stockings MISC by Does not apply route Thigh high 1 each 0    Elastic Bandages & Supports (MEDICAL COMPRESSION THIGH HIGH) MISC Thigh high compression stockings, 30-40 mm Hg 2 each 1     No call the office. While transcribing every attempt was made to maintain the accuracy of the note in terms of it's contents,there may have been some errors made inadvertently. Thank you for allowing me to participate in the care of this patient.     Janeen Monteiro MD.    Cc: Ashley Beck MD

## 2020-07-09 ENCOUNTER — OFFICE VISIT (OUTPATIENT)
Dept: DERMATOLOGY | Age: 45
End: 2020-07-09
Payer: COMMERCIAL

## 2020-07-09 VITALS — TEMPERATURE: 97.6 F

## 2020-07-09 PROCEDURE — 11302 SHAVE SKIN LESION 1.1-2.0 CM: CPT | Performed by: DERMATOLOGY

## 2020-07-09 PROCEDURE — 99214 OFFICE O/P EST MOD 30 MIN: CPT | Performed by: DERMATOLOGY

## 2020-07-09 NOTE — PROGRESS NOTES
Patient's Name: Jad Kimble  MRN: [id number]  YOB: 1975  Date of Visit: 7/9/2020  Primary Care Provider: Mena Cook MD  Referring Provider: No ref. provider found    Subjective:     Chief Complaint   Patient presents with    Skin Lesion     full body skin exam, hx of Melanoma        History of Present Illness:  Jad Kimble is a 39 y.o. male with a h/o acral melanoma of left foot who presents in clinic today as a new patient to me, previousy evaluated by Dr. Karina Waters for a full body skin examination for melanoma surveillance. They have undergone a full body skin examination in the past, last was in. What is it: mole  Signs and symptoms: increasing thickness and drainage  Location: Rt lower back  Severity: Severity now is 5/10. Modifying factors: Things that make this condition worse are squeezing it. Things that make this condition better are none. Duration: This lesion has been present for several years, but has been irritated the last few months.   Current treatment: none  Previous treatment: none    Patient reports trying to squeeze the lesion, but when he realized it was a mole he stopped    Denies any other new, changing, painful or bleeding lesion      Past Dermatologic History:    positive personal history of melanoma (acral melanoma of left foot)  negative personal history of abnormal/dysplastic moles  negative personal history of tanning bed use  negative blistering sunburns        Past Medical History:  Past Medical History:   Diagnosis Date    Arthritis     Cellulitis     Chronic back pain     Hepatitis age 25    HNP (herniated nucleus pulposus with myelopathy), thoracic     Melanoma ([address]     left foot     ANN-MARIE (obstructive sleep apnea)     NO CPAP     Stasis dermatitis        Past Surgical History:  Past Surgical History:   Procedure Laterality Date    FOOT SURGERY Left 12/20/2019    WIDE EXCISION OF LEFT FOOT MELANOMA performed by Rosa Moy MD at illness. feels well   Skin: Skin:As per HPI AND otherwise no new, bleeding or symptomatic skin lesions      Objective:     Vitals:    07/09/20 1014   Temp: 97.6 °F (36.4 °C)   TempSrc: Temporal       Physical Examination:  General: alert, comfortable, no apparent distress, well-appearing  Psych: alert, oriented and pleasant  Neuro: oriented to person, place, and time  Skin: Areas examined: head including face, lips, conjunctiva and lids, neck, hair/scalp, chest, including breasts and axilla, abdomen, back, buttocks, right upper extremity, left upper extremity, right lower extremity, left lower extremity, left hand, right hand, digits and nails, groin, patient declined genital exam, oropharynx including mucosa, lymph nodes of neck, lymph nodes of axillae and lymph nodes of groin      All areas examined were within normal limits except those listed below with the appropriate assessment and plan    Assessment and Plan (with relevant objective exam findings):     1. Personal history of acral melanoma of left foot s/p WLE with lymph node dissection and graft  Location: Left medial foot  Objective findings: 4.4 cm circular well healed graft with no nodularity or repigmentation noted  - discussed the increased risk of secondary tumor within the first 5 years following diagnosis of melanoma  -Will continue to have TBSE every 3 months for the first year      2. Neoplasm uncertain behavior, skin  Location:Rt lower back  Objective findings: 1.5 cm brown papulonodule with brown netlike pigmentation and gray scar like centrally  Ddx: Dysplastic nevus with DF versus DF  Shave removal today. See procedure note below. The specimen was sent to pathology for diagnosis. We will call patient or send note on Mychart with  results as soon as they are available, and discuss treatment options as needed. Wound care was discussed and written instructions also given to patient.        3.  Elephantiasis nostras verrucosa  Location: bilateral lower legs and feet  Objective findings: Leg an feet edema with lichenification, cobblestoned papules, corrugation, wart like (verrucous) skin surface and brown/grayish pigmentation    - Discussed the nature of this condition and its relation to lymphedema and venous stasis which is exacerbated by obesity. 4. Multiple melanocytic nevi of face, trunk, and/or extremities  Location: back, chest, arms, legs, buttocks  Objective findings: multiple brown/pink macules and papules without abnormal findings or concerning findings on exam and dermatoscopy    -Counseled the patient that these are benign and need no therapy. Observation for any changes recommended   - The importance of continued surveillance was discussed. Monthly self examinations were encouraged. - Signs of cutaneous malignancy were discussed and they were encouraged to contact me if they note any evolving, bleeding, painful or non-healing lesions. ABCDEs of melanoma also reviewed. - Photoprotection was encouraged and written material on sunscreen provided. Follow up:  Return visit in 3 months or as needed for change in condition. All questions addressed. Procedure:   PROCEDURE: REMOVAL BY SHAVE TECHNIQUE   Reason for Removal:  personal history of skin cancer and concern for dysplastic nevus versus DF   Location: Rt lower back   Size:1.5 cm  Risks, benefits, and alternatives were explained and verbal informed consent was obtained. The area was photographed with patients permission. The area was cleaned with alcohol and infiltrated with 1% lidocaine with epinephrine. The lesion was then removed by shave technique using a DermaBlade scoop technique blade. Hemostasis was achieved with aluminum chloride. Antibiotic ointment and sterile dressing were applied and wound care instructions were given verbally/in writing. The patient tolerated the procedure well with no complications.   The patient will be notified by mail or telephone of pathology results and is instructed to call us if they have not received notification within three weeks.     CPT code for procedure:  Locations:  1.1 to 2.0 cm  -  64685                          Alejandro Orozco MD. MS

## 2020-07-09 NOTE — PATIENT INSTRUCTIONS
There are many types of sunscreen. A.  Creams are best for dry skin and the face. Neutrogena ultra sheer dry touch can be nice for the face. B.  Gels are good for hairy areas, such as the scalp or male chest.    C.  Sticks are good to use around the eyes. D. Sprays are sometimes preferred by parents since they are easy to apply to children, however sprays are NOT generally recommended due to the inability to fully cover most areas adequately. If you are using these, make sure to use enough of these products to cover the entire surface area thoroughly and rub in with your hands after spraying. Do NOT inhale these products or apply near heat, open flame or while smoking. E.  There also are sunscreens made for specific purposes, such as for sensitive skin and babies. Aveeno and Neutrogena are two examples. F. Daily facial moisturizers with spf can also be helpful (Aveeno positively radiant, Aveeno smart essentials, Cetaphil with sunscreen, etc). 6.  Wear protective clothing, such as a long-sleeved shirt, pants, a wide-brimmed hat and sunglasses, where possible. There are even clothing lines that have special sun-protective clothing and hats such as [place] and [place] (coolibar. com). 7.  Seek shade when appropriate, remembering that the sun's rays are strongest between 10 a.m. and 2 p.m. If your shadow is shorter than you are, seek shade. 8.  Use extra caution near water, snow and sand as they reflect the damaging rays of the sun, which can increase your chance of sunburn. 9.  Get vitamin D safely through a healthy diet that may include vitamin supplements. Don't seek the sun. 10.  Avoid tanning beds. Ultraviolet light from the sun and tanning beds can cause skin cancer and wrinkling. If you want to look tan, consider using a self-tanning product, but continue to use sunscreen with it.       A-B-C-D-E guide for Melanoma     Characteristics of unusual moles that may indicate melanomas or other skin cancers follow the A-B-C-D-E guide developed by the American Academy [employer] Dermat[employer]:  1. A is for asymmetrical shape. Look for moles with irregular shapes, such as two very different-looking halves. 2. B is for irregular border. Look for moles with irregular, notched or scalloped borders, these may be characteristics of melanomas. 3. C is for changes in color. Look for growths that have many colors or an uneven distribution of color. 4. D is for diameter. Look for new growth in a mole larger than about 1/4 inch (6 millimeters) or about the size of a pencil eraser. 5. E is for evolving. This is the most important one. Look for changes over time, such as a mole that grows in size or that changes color or shape. David Slimmer may also evolve to develop new signs and symptoms, such as new itchiness or bleeding. If any of your moles appear to be changing, see your doctor. Other suspicious changes in a mole may include: Scaly skin, itching, spreading of color from the mole into the surrounding skin, oozing or bleeding. Cancerous (malignant) moles vary greatly in appearance. Some may show all of the changes listed above, while others may have only one or two unusual characteristics. Please call and schedule an appointment if you have any concerns or questions.

## 2020-07-13 LAB — DERMATOLOGY PATHOLOGY REPORT: NORMAL

## 2020-07-15 ENCOUNTER — VIRTUAL VISIT (OUTPATIENT)
Dept: PAIN MANAGEMENT | Age: 45
End: 2020-07-15
Payer: COMMERCIAL

## 2020-07-15 PROCEDURE — 99213 OFFICE O/P EST LOW 20 MIN: CPT | Performed by: INTERNAL MEDICINE

## 2020-07-15 RX ORDER — DULOXETIN HYDROCHLORIDE 60 MG/1
60 CAPSULE, DELAYED RELEASE ORAL DAILY
Qty: 30 CAPSULE | Refills: 0 | Status: SHIPPED | OUTPATIENT
Start: 2020-07-15 | End: 2020-08-12 | Stop reason: SDUPTHER

## 2020-07-15 RX ORDER — DICLOFENAC SODIUM 75 MG/1
75 TABLET, DELAYED RELEASE ORAL 2 TIMES DAILY PRN
Qty: 60 TABLET | Refills: 0 | Status: SHIPPED | OUTPATIENT
Start: 2020-07-15 | End: 2020-08-12 | Stop reason: SDUPTHER

## 2020-07-15 RX ORDER — PREGABALIN 150 MG/1
150 CAPSULE ORAL 3 TIMES DAILY
Qty: 90 CAPSULE | Refills: 0 | Status: SHIPPED | OUTPATIENT
Start: 2020-07-15 | End: 2020-08-12 | Stop reason: SDUPTHER

## 2020-07-15 RX ORDER — OXYCODONE AND ACETAMINOPHEN 7.5; 325 MG/1; MG/1
1 TABLET ORAL EVERY 6 HOURS PRN
Qty: 112 TABLET | Refills: 0 | Status: SHIPPED | OUTPATIENT
Start: 2020-07-15 | End: 2020-08-12 | Stop reason: SDUPTHER

## 2020-07-15 NOTE — PROGRESS NOTES
TELE HEALTH VISIT (AUDIO-VISUAL)    Pursuant to the emergency declaration under the [facility], [facility] waiver authority and the [employer] Act, this Virtual  Visit was conducted, with patient's/legal guardian's consent, to reduce the patient's risk of exposure to COVID-19 and provide continuity of care for an established patient. Service is  provided through a video synchronous discussion virtually to substitute for in-person clinic visit. Due to this being a TeleHealth encounter (During DSYFP-10 public health emergency), evaluation of the following organ systems was limited: Vitals/Constitutional/EENT/Resp/CV/GI//MS/Neuro/Skin/Etcj-Zrjs-Nab. aDrion Solis  1975  [id number]    Mr. Harris is being seen virtually for a follow up visit using one of the following techniques  Google Duo  Informed verbal consent to the virtual visit was obtained from Mr. Deepika Salcedo. Risks associated with HIPPA compliance with the virtual visit was explained to the patient. Mr. Deepika Salcedo is at his residence and Dr. Dayo Sánchez is in his office. HISTORY OF PRESENT ILLNESS:  Mr. Deepika Salcedo is a 39 y.o. male  being assessed for a follow up visit for pain management for evaluation of ongoing care regarding his symptoms and monitoring of compliance with long term use high risk medications. He has a diagnosis of   1. 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 BWC Sprain of ligament of lumbosacral joint, subsequent encounter    3. BWC Sprain of left knee/leg, subsequent encounter    4. BWC Sprain of left knee/leg, initial encounter    5. BWC Sprain of ligament of lumbosacral joint, initial encounter    . He complains of pain in the lower back  with radiation to the buttocks and hips Bilateral . He rates the pain 4/10 and describes it as sharp, aching, burning.  Current treatment regimen has helped relieve about 70% of the pain. He denies any side effects from the current pain regimen. Patient reports that since the last follow up visit the physical functioning is unchanged, family/social relationships are unchanged, mood is unchanged sleep patterns are unchanged, and that the overall functioning is unchanged. Patient denies misusing/abusing his narcotic pain medications or using any illegal drugs. There are No indicators for possible drug abuse, addiction or diversion problems. Mr. Mono Curry states he has been doing fair. He reports he is working full time and is staying active. He mentions he is using Percocet along with Lyrica and Cymbalta. Patient denies any constipation symptoms. Patient says he is using Voltaren PRN also. Patient reports his weight has been stable. ALLERGIES: Patients list of allergies were reviewed     MEDICATIONS: Mr. Mono Curry list of medications were reviewed. His current medications are   Outpatient Medications Prior to Visit   Medication Sig Dispense Refill    oxyCODONE-acetaminophen (PERCOCET) 7.5-325 MG per tablet Take 1 tablet by mouth every 6 hours as needed for Pain (max 4 per day) for up to 28 days. 112 tablet 0    pregabalin (LYRICA) 150 MG capsule Take 1 capsule by mouth 3 times daily for 30 days.  90 capsule 0    DULoxetine (CYMBALTA) 60 MG extended release capsule Take 1 capsule by mouth daily 30 capsule 0    diclofenac (VOLTAREN) 75 MG EC tablet Take 1 tablet by mouth 2 times daily as needed for Pain 60 tablet 0    cephALEXin (KEFLEX) 500 MG capsule Take 1 capsule by mouth 4 times daily 40 capsule 0    naloxone 4 MG/0.1ML LIQD nasal spray 1 spray by Nasal route as needed for Opioid Reversal 1 each 5    vitamin B-12 (CYANOCOBALAMIN) 1000 MCG tablet Take 3,000 mcg by mouth daily       penicillin v potassium (VEETID) 500 MG tablet Take 1,000 mg by mouth 2 times daily      furosemide (LASIX) 20 MG tablet TAKE ONE TO TWO TABLETS BY MOUTH EVERY MORNING AS NEEDED FOR EDEMA (Patient of left knee/leg, subsequent encounter    10. BWCSprain of left knee/leg, initial encounter    11. BWCSprain of ligament of lumbosacral joint, subsequent encounter    12. BWCSprain of left knee/leg, subsequent encounter        PLAN:  Informed verbal consent regarding treatment was obtained  -continue with current opioid regimen Percocet 3-4 per day  -Adv Biofeedback, relaxation and meditation techniques. Referral to psychologist for CBT was also discussed with patient   -He was advised to increase fluids ( 5-7  glasses of fluid daily), limit caffeine, avoid cheese products, increase dietary fiber, increase activity and exercise as tolerated and relax regularly and enjoy meals   -Continue with all other adjuvant medications as before  -walking as tolerated    Current Outpatient Medications   Medication Sig Dispense Refill    oxyCODONE-acetaminophen (PERCOCET) 7.5-325 MG per tablet Take 1 tablet by mouth every 6 hours as needed for Pain (max 4 per day) for up to 28 days. 112 tablet 0    pregabalin (LYRICA) 150 MG capsule Take 1 capsule by mouth 3 times daily for 30 days.  90 capsule 0    DULoxetine (CYMBALTA) 60 MG extended release capsule Take 1 capsule by mouth daily 30 capsule 0    diclofenac (VOLTAREN) 75 MG EC tablet Take 1 tablet by mouth 2 times daily as needed for Pain 60 tablet 0    cephALEXin (KEFLEX) 500 MG capsule Take 1 capsule by mouth 4 times daily 40 capsule 0    naloxone 4 MG/0.1ML LIQD nasal spray 1 spray by Nasal route as needed for Opioid Reversal 1 each 5    vitamin B-12 (CYANOCOBALAMIN) 1000 MCG tablet Take 3,000 mcg by mouth daily       penicillin v potassium (VEETID) 500 MG tablet Take 1,000 mg by mouth 2 times daily      furosemide (LASIX) 20 MG tablet TAKE ONE TO TWO TABLETS BY MOUTH EVERY MORNING AS NEEDED FOR EDEMA (Patient taking differently: daily ) 60 tablet 11    Compression Stockings MISC by Does not apply route Thigh high 1 each 0    Elastic Bandages & Supports (MEDICAL COMPRESSION THIGH HIGH) MISC Thigh high compression stockings, 30-40 mm Hg 2 each 1     No current facility-administered medications for this visit. I will continue his current medication regimen  which is part of the above treatment schedule. It has been helping with Mr. Wharton Bad chronic  medical problems which for this visit include:   Diagnoses of  C Lumbago-sciatica due to displacement of lumbar intervertebral disc, BWC Sprain of ligament of lumbosacral joint, subsequent encounter, BWC Sprain of left knee/leg, subsequent encounter, BWC Sprain of left knee/leg, initial encounter, and BWC Sprain of ligament of lumbosacral joint, initial encounter were pertinent to this visit. Risks and benefits of the medications and other alternative treatments  including no treatment were discussed with the patient. The common side effects of these medications were also explained to the patient. Informed verbal consent was obtained. Goals of current treatment regimen include improvement in pain, restoration of functioning- with focus on improvement in physical performance, general activity, work or disability,emotional distress, health care utilization and  decreased medication consumption. Will continue to monitor progress towards achieving/maintaining therapeutic goals with special emphasis on  1. Improvement in perceived interfernce  of pain with ADL's. Ability to do home exercises independently. Ability to do household chores indoor and/or outdoor work and social and leisure activities. Improve psychosocial and physical functioning. - he is showing progression towards this treatment goal with the current regimen. He was advised against drinking alcohol with the narcotic pain medicines, advised against driving or handling machinery while adjusting the dose of medicines or if having cognitive  issues related to the current medications. Risk of overdose and death, if medicines not taken as prescribed, were also discussed. If the patient develops new symptoms or if the symptoms worsen, the patient should call the office. While transcribing every attempt was made to maintain the accuracy of the note in terms of it's contents,there may have been some errors made inadvertently. Thank you for allowing me to participate in the care of this patient.     Randi Woody MD.    Cc: Charli Lew MD

## 2020-07-16 ENCOUNTER — TELEPHONE (OUTPATIENT)
Dept: BARIATRICS/WEIGHT MGMT | Age: 45
End: 2020-07-16

## 2020-07-16 NOTE — TELEPHONE ENCOUNTER
Patient sent digital bariatric seminar via e-mail: [email or web address]. Jamar Neil received.  (Livia)

## 2020-07-24 ENCOUNTER — TELEPHONE (OUTPATIENT)
Dept: BARIATRICS/WEIGHT MGMT | Age: 45
End: 2020-07-24

## 2020-07-24 NOTE — TELEPHONE ENCOUNTER
Seminar Date: 7/16/20     Presenter: [employer] Type: Livia GANDARA Covered: N    Medically Supervised Diet Req:    Length of time:     Has patient had prev bariatric or gastric surgeries :     Is patient's BMI lower than 35 : If yes, BMI :    Does patient have dx of diabetes :    *If previous bariatric or gastric surgeries, please do not schedule until speaking with the provider*     Plan exclusion, did offer self pay and MWM, pt will think about and call back.

## 2020-08-12 ENCOUNTER — VIRTUAL VISIT (OUTPATIENT)
Dept: PAIN MANAGEMENT | Age: 45
End: 2020-08-12
Payer: COMMERCIAL

## 2020-08-12 PROCEDURE — 99213 OFFICE O/P EST LOW 20 MIN: CPT | Performed by: INTERNAL MEDICINE

## 2020-08-12 RX ORDER — DICLOFENAC SODIUM 75 MG/1
75 TABLET, DELAYED RELEASE ORAL 2 TIMES DAILY PRN
Qty: 60 TABLET | Refills: 1 | Status: SHIPPED | OUTPATIENT
Start: 2020-08-12 | End: 2020-09-09 | Stop reason: SDUPTHER

## 2020-08-12 RX ORDER — PREGABALIN 150 MG/1
150 CAPSULE ORAL 3 TIMES DAILY
Qty: 90 CAPSULE | Refills: 0 | Status: SHIPPED | OUTPATIENT
Start: 2020-08-12 | End: 2020-09-09 | Stop reason: SDUPTHER

## 2020-08-12 RX ORDER — DULOXETIN HYDROCHLORIDE 60 MG/1
60 CAPSULE, DELAYED RELEASE ORAL DAILY
Qty: 30 CAPSULE | Refills: 1 | Status: SHIPPED | OUTPATIENT
Start: 2020-08-12 | End: 2020-09-09 | Stop reason: SDUPTHER

## 2020-08-12 RX ORDER — OXYCODONE AND ACETAMINOPHEN 7.5; 325 MG/1; MG/1
1 TABLET ORAL EVERY 6 HOURS PRN
Qty: 112 TABLET | Refills: 0 | Status: SHIPPED | OUTPATIENT
Start: 2020-08-12 | End: 2020-09-09 | Stop reason: SDUPTHER

## 2020-08-12 NOTE — PROGRESS NOTES
TELE HEALTH VISIT (AUDIO-VISUAL)    Pursuant to the emergency declaration under the [address] [facility] waiver authority and the [employer] Act, this Virtual  Visit was conducted, with patient's/legal guardian's consent, to reduce the patient's risk of exposure to COVID-19 and provide continuity of care for an established patient. Service is  provided through a video synchronous discussion virtually to substitute for in-person clinic visit. Due to this being a TeleHealth encounter (During KYFLG-00 public health emergency), evaluation of the following organ systems was limited: Vitals/Constitutional/EENT/Resp/CV/GI//MS/Neuro/Skin/Fysp-[employer]. Nadira Pereyra  1975  [id number]    Mr. Harris is being seen virtually for a follow up visit using one of the following techniques  Google Duo  Informed verbal consent to the virtual visit was obtained from Mr. Hernandez Rasheed. Risks associated with HIPPA compliance with the virtual visit was explained to the patient. Mr. Hernandez Rasheed is at his residence and Dr. Venita Lofton is in his office. HISTORY OF PRESENT ILLNESS:  Mr. Hernandez Rsaheed is a 39 y.o. male  being assessed for a follow up visit for pain management for evaluation of ongoing care regarding his symptoms and monitoring of compliance with long term use high risk medications. He has a diagnosis of   1. 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 BWC Sprain of ligament of lumbosacral joint, subsequent encounter    3. BWC Sprain of left knee/leg, subsequent encounter    4. BWC Sprain of left knee/leg, initial encounter    5. BWC Sprain of ligament of lumbosacral joint, initial encounter    . He complains of pain in the mid, lower back  with radiation to the buttocks . He rates the pain 4/10 and describes it as sharp, aching, burning. Current treatment regimen has helped relieve about 40% of the pain. He denies any side effects from the current pain regimen. Patient reports that since the last follow up visit the physical functioning is unchanged, family/social relationships are unchanged, mood is unchanged sleep patterns are unchanged, and that the overall functioning is unchanged. Patient denies misusing/abusing his narcotic pain medications or using any illegal drugs. There are No indicators for possible drug abuse, addiction or diversion problems. Mr. Laura Evans states he has been doing fair, pain has been tolerable. He mentions he is using Percocet 4 per day along with Lyrica and Cymbalta. Patient denies any constipation symptoms. Patient denies any swelling. He reports he is working full time still. ALLERGIES: Patients list of allergies were reviewed     MEDICATIONS: Mr. Laura Evans list of medications were reviewed. His current medications are   Outpatient Medications Prior to Visit   Medication Sig Dispense Refill    oxyCODONE-acetaminophen (PERCOCET) 7.5-325 MG per tablet Take 1 tablet by mouth every 6 hours as needed for Pain (max 4 per day) for up to 28 days. 112 tablet 0    pregabalin (LYRICA) 150 MG capsule Take 1 capsule by mouth 3 times daily for 30 days.  90 capsule 0    DULoxetine (CYMBALTA) 60 MG extended release capsule Take 1 capsule by mouth daily 30 capsule 0    diclofenac (VOLTAREN) 75 MG EC tablet Take 1 tablet by mouth 2 times daily as needed for Pain 60 tablet 0    cephALEXin (KEFLEX) 500 MG capsule Take 1 capsule by mouth 4 times daily 40 capsule 0    naloxone 4 MG/0.1ML LIQD nasal spray 1 spray by Nasal route as needed for Opioid Reversal 1 each 5    vitamin B-12 (CYANOCOBALAMIN) 1000 MCG tablet Take 3,000 mcg by mouth daily       penicillin v potassium (VEETID) 500 MG tablet Take 1,000 mg by mouth 2 times daily      furosemide (LASIX) 20 MG tablet TAKE ONE TO TWO TABLETS BY MOUTH EVERY MORNING AS NEEDED FOR EDEMA (Patient taking differently: daily ) 60 tablet 11    Compression Stockings MISC by Does not apply route Thigh high 1 each 0    Elastic Bandages & Supports (MEDICAL COMPRESSION THIGH HIGH) MISC Thigh high compression stockings, 30-40 mm Hg 2 each 1     No facility-administered medications prior to visit. SOCIAL/FAMILY/PAST MEDICAL HISTORY: Mr. Shi Rico, family and past medical history was reviewed. REVIEW OF SYSTEMS:    Respiratory: Negative for apnea, chest tightness and shortness of breath or change in baseline breathing. Gastrointestinal: Negative for nausea, vomiting, abdominal pain, diarrhea, constipation, blood in stool and abdominal distention. PHYSICAL EXAM:   Nursing note and vitals per patient reviewed. There were no vitals taken for this visit. Constitutional: He appears well-developed and well-nourished. No acute distress. No respiratory distress. Skin: Skin appears to be warm and dry. No rashes or any other marks noted. He is not diaphoretic. Respiratory/Pulmonary: NO conversational dyspnea, no accessory muscle use, no coughing during exam. He does not appear to be in labored breathing. Neurological/Psychiatric:He is alert and oriented to person, place, and time. Coordination is  normal.  His mood isAppropriate and affect is Neutral/Euthymic(normal). Musculoskeletal / Extremities: Range of motion is normal. Gait is normal, assistive devices use: none. IMPRESSION:   1.  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 BWC Sprain of ligament of lumbosacral joint, subsequent encounter    3. BWC Sprain of left knee/leg, subsequent encounter    4. BWC Sprain of left knee/leg, initial encounter    5. BWC Sprain of ligament of lumbosacral joint, initial encounter    6. Sprain of ligament of lumbosacral joint, initial encounter    7. Sprain of left knee/leg, initial encounter    8. Sprain of ligament of lumbosacral joint, subsequent encounter    9. Sprain of left knee/leg, subsequent encounter    10.  BWCSprain of left knee/leg, initial encounter    11. BWCSprain of ligament of lumbosacral joint, subsequent encounter    12. BWCSprain of left knee/leg, subsequent encounter        PLAN:  Informed verbal consent regarding treatment was obtained  -continue with current opioid regimen Percocet 4 per day  -continue with Lyrica and Cymbalta  -He was advised weight reduction, diet changes- 800-1200 roc diet, diet diary, exercising, nutritional  consult increased physical activity as tolerated   -walking/stretching exercises as advised       Current Outpatient Medications   Medication Sig Dispense Refill    oxyCODONE-acetaminophen (PERCOCET) 7.5-325 MG per tablet Take 1 tablet by mouth every 6 hours as needed for Pain (max 4 per day) for up to 28 days. 112 tablet 0    pregabalin (LYRICA) 150 MG capsule Take 1 capsule by mouth 3 times daily for 30 days. 90 capsule 0    DULoxetine (CYMBALTA) 60 MG extended release capsule Take 1 capsule by mouth daily 30 capsule 0    diclofenac (VOLTAREN) 75 MG EC tablet Take 1 tablet by mouth 2 times daily as needed for Pain 60 tablet 0    cephALEXin (KEFLEX) 500 MG capsule Take 1 capsule by mouth 4 times daily 40 capsule 0    naloxone 4 MG/0.1ML LIQD nasal spray 1 spray by Nasal route as needed for Opioid Reversal 1 each 5    vitamin B-12 (CYANOCOBALAMIN) 1000 MCG tablet Take 3,000 mcg by mouth daily       penicillin v potassium (VEETID) 500 MG tablet Take 1,000 mg by mouth 2 times daily      furosemide (LASIX) 20 MG tablet TAKE ONE TO TWO TABLETS BY MOUTH EVERY MORNING AS NEEDED FOR EDEMA (Patient taking differently: daily ) 60 tablet 11    Compression Stockings MISC by Does not apply route Thigh high 1 each 0    Elastic Bandages & Supports (MEDICAL COMPRESSION THIGH HIGH) MISC Thigh high compression stockings, 30-40 mm Hg 2 each 1     No current facility-administered medications for this visit.       I will continue his current medication regimen  which is part of the above treatment schedule. It has been helping with Mr. 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 Risks and benefits of the medications and other alternative treatments  including no treatment were discussed with the patient. The common side effects of these medications were also explained to the patient. Informed verbal consent was obtained. Goals of current treatment regimen include improvement in pain, restoration of functioning- with focus on improvement in physical performance, general activity, work or disability,emotional distress, health care utilization and  decreased medication consumption. Will continue to monitor progress towards achieving/maintaining therapeutic goals with special emphasis on  1. Improvement in perceived interfernce  of pain with ADL's. Ability to do home exercises independently. Ability to do household chores indoor and/or outdoor work and social and leisure activities. Improve psychosocial and physical functioning. - he is showing progression towards this treatment goal with the current regimen. He was advised against drinking alcohol with the narcotic pain medicines, advised against driving or handling machinery while adjusting the dose of medicines or if having cognitive  issues related to the current medications. Risk of overdose and death, if medicines not taken as prescribed, were also discussed. If the patient develops new symptoms or if the symptoms worsen, the patient should call the office.     While transcribing every attempt was made to maintain the accuracy of the note in terms of it's contents,there may have been some errors made inadvertently. Thank you for allowing me to participate in the care of this patient.     Marcelle Francois MD.    Cc: Landen Marsh MD

## 2020-09-09 ENCOUNTER — OFFICE VISIT (OUTPATIENT)
Dept: PAIN MANAGEMENT | Age: 45
End: 2020-09-09
Payer: COMMERCIAL

## 2020-09-09 VITALS
DIASTOLIC BLOOD PRESSURE: 111 MMHG | OXYGEN SATURATION: 97 % | TEMPERATURE: 97.5 F | HEIGHT: 75 IN | SYSTOLIC BLOOD PRESSURE: 191 MMHG | BODY MASS INDEX: 39.17 KG/M2 | HEART RATE: 64 BPM | WEIGHT: 315 LBS

## 2020-09-09 PROCEDURE — 99213 OFFICE O/P EST LOW 20 MIN: CPT | Performed by: INTERNAL MEDICINE

## 2020-09-09 RX ORDER — DICLOFENAC SODIUM 75 MG/1
75 TABLET, DELAYED RELEASE ORAL 2 TIMES DAILY PRN
Qty: 60 TABLET | Refills: 1 | Status: SHIPPED | OUTPATIENT
Start: 2020-09-09 | End: 2020-10-07 | Stop reason: SDUPTHER

## 2020-09-09 RX ORDER — PREGABALIN 150 MG/1
150 CAPSULE ORAL 3 TIMES DAILY
Qty: 90 CAPSULE | Refills: 0 | Status: SHIPPED | OUTPATIENT
Start: 2020-09-09 | End: 2020-10-07 | Stop reason: SDUPTHER

## 2020-09-09 RX ORDER — DULOXETIN HYDROCHLORIDE 60 MG/1
60 CAPSULE, DELAYED RELEASE ORAL DAILY
Qty: 30 CAPSULE | Refills: 1 | Status: SHIPPED | OUTPATIENT
Start: 2020-09-09 | End: 2020-10-07 | Stop reason: SDUPTHER

## 2020-09-09 RX ORDER — OXYCODONE AND ACETAMINOPHEN 7.5; 325 MG/1; MG/1
1 TABLET ORAL EVERY 6 HOURS PRN
Qty: 112 TABLET | Refills: 0 | Status: SHIPPED | OUTPATIENT
Start: 2020-09-09 | End: 2020-10-07 | Stop reason: SDUPTHER

## 2020-09-09 NOTE — PROGRESS NOTES
Natalie Abhishek  1975  [id number]      HISTORY OF PRESENT ILLNESS:  Mr. Maria Eugenia Hancock is a 39 y.o. male returns for a follow up visit for pain management  He has a diagnosis of   1. 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 BWC Sprain of ligament of lumbosacral joint, subsequent encounter    3. BWC Sprain of left knee/leg, subsequent encounter    4. BWC Sprain of left knee/leg, initial encounter    5. BWC Sprain of ligament of lumbosacral joint, initial encounter    6. Sprain of ligament of lumbosacral joint, initial encounter    7. Sprain of left knee/leg, initial encounter    8. Sprain of ligament of lumbosacral joint, subsequent encounter    9. Sprain of left knee/leg, subsequent encounter    10. BWCSprain of left knee/leg, initial encounter    11. BWCSprain of ligament of lumbosacral joint, subsequent encounter    12. BWCSprain of left knee/leg, subsequent encounter    . He complains of pain in the lower back with radiation to the left foot He rates the pain 4/10 and describes it as sharp, aching, burning, numbness, pins and needles. Current treatment regimen has helped relieve about 70% of the pain. He denies any side effects from the current pain regimen. Patient reports that since the last follow up visit the physical functioning is unchanged, family/social relationships are unchanged, mood is unchanged sleep patterns are unchanged, and that the overall functioning is unchanged. Patient denies misusing/abusing his narcotic pain medications or using any illegal drugs. There are No indicators for possible drug abuse, addiction or diversion problems. Mr. Maria Eugenia Hancock states he is doing fair, pain has been manageable. He says he is having sinus problems. Patient mentions he is using Percocet 4 per day along with Lyrica and Cymbalta. Patient reports he is working full time still. Patient reports his weight has been stable.       ALLERGIES: Patients list of allergies were kg/m²   Constitutional: He appears well-developed and well-nourished. No acute distress. Skin: Skin is warm and dry, good turgor. No rash noted. He is not diaphoretic. Cardiovascular: Normal rate, regular rhythm, normal heart sounds, and does not have murmur. Pulmonary/Chest: Effort normal. No respiratory distress. He does not have wheezes in the lung fields. He has no rales. Neurological/Psychiatric:He is alert and oriented to person, place, and time. Coordination is  normal.  His mood isAppropriate and affect is Neutral/Euthymic(normal) . His    IMPRESSION:   1.  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 BWC Sprain of ligament of lumbosacral joint, subsequent encounter    3. BWC Sprain of left knee/leg, subsequent encounter    4. BWC Sprain of left knee/leg, initial encounter    5. BWC Sprain of ligament of lumbosacral joint, initial encounter    6. Sprain of ligament of lumbosacral joint, initial encounter    7. Sprain of left knee/leg, initial encounter    8. Sprain of ligament of lumbosacral joint, subsequent encounter    9. Sprain of left knee/leg, subsequent encounter    10. BWCSprain of left knee/leg, initial encounter    11. BWCSprain of ligament of lumbosacral joint, subsequent encounter    12. BWCSprain of left knee/leg, subsequent encounter        PLAN:  Informed verbal consent was obtained  -[place] was obtained and reviewed  for the last one year and no indicators of drug misuse  were found. Any other controlled substance prescriptions  seen on the record have been accounted for, I am aware of the patient receiving these medications. Alex Leon  [place] record will be rechecked as part of office protocol.    -continue with current opioid regimen Percocet 4 per day  -He was advised to increase fluids ( 5-7  glasses of fluid daily), limit caffeine, avoid cheese products, increase dietary fiber, increase activity and exercise as tolerated and relax regularly and enjoy meals   -walking/stretching exercises as advised    -He was advised weight reduction, diet changes- 800-1200 roc diet, diet diary, exercising, nutritional  consult increased physical activity as tolerated   -Urine drug screen with GC/MS for opiates and drugs of abuse was ordered and will follow up on results. Current Outpatient Medications   Medication Sig Dispense Refill    oxyCODONE-acetaminophen (PERCOCET) 7.5-325 MG per tablet Take 1 tablet by mouth every 6 hours as needed for Pain (max 4 per day) for up to 28 days. 112 tablet 0    pregabalin (LYRICA) 150 MG capsule Take 1 capsule by mouth 3 times daily for 30 days. 90 capsule 0    DULoxetine (CYMBALTA) 60 MG extended release capsule Take 1 capsule by mouth daily 30 capsule 1    diclofenac (VOLTAREN) 75 MG EC tablet Take 1 tablet by mouth 2 times daily as needed for Pain 60 tablet 1    furosemide (LASIX) 20 MG tablet TAKE ONE TO TWO TABLETS BY MOUTH EVERY MORNING AS NEEDED FOR EDEMA 60 tablet 3    cephALEXin (KEFLEX) 500 MG capsule Take 1 capsule by mouth 4 times daily 40 capsule 0    naloxone 4 MG/0.1ML LIQD nasal spray 1 spray by Nasal route as needed for Opioid Reversal 1 each 5    vitamin B-12 (CYANOCOBALAMIN) 1000 MCG tablet Take 3,000 mcg by mouth daily       penicillin v potassium (VEETID) 500 MG tablet Take 1,000 mg by mouth 2 times daily      Compression Stockings MISC by Does not apply route Thigh high 1 each 0    Elastic Bandages & Supports (MEDICAL COMPRESSION THIGH HIGH) MISC Thigh high compression stockings, 30-40 mm Hg 2 each 1     No current facility-administered medications for this visit. I will continue his current medication regimen  which is part of the above treatment schedule.  It has been helping with Mr. 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, BWC Sprain of ligament of lumbosacral joint, subsequent encounter, BWC Sprain of left knee/leg, subsequent encounter, BWC Sprain of left knee/leg, initial encounter, BWC Sprain of ligament of lumbosacral joint, initial encounter, Sprain of ligament of lumbosacral joint, initial encounter, Sprain of left knee/leg, initial encounter, Sprain of ligament of lumbosacral joint, subsequent encounter, Sprain of left knee/leg, subsequent encounter, BWCSprain of left knee/leg, initial encounter, BWCSprain of ligament of lumbosacral joint, subsequent encounter, and BWCSprain of left knee/leg, subsequent encounter were pertinent to this visit. Risks and benefits of the medications and other alternative treatments  including no treatment were discussed with the patient. The common side effects of these medications were also explained to the patient. Informed verbal consent was obtained. Goals of current treatment regimen include improvement in pain, restoration of functioning- with focus on improvement in physical performance, general activity, work or disability,emotional distress, health care utilization and  decreased medication consumption. Will continue to monitor progress towards achieving/maintaining therapeutic goals with special emphasis on  1. Improvement in perceived interfernce  of pain with ADL's. Ability to do home exercises independently. Ability to do household chores indoor and/or outdoor work and social and leisure activities. Improve psychosocial and physical functioning. - he is showing progression towards this treatment goal with the current regimen. He was advised against drinking alcohol with the narcotic pain medicines, advised against driving or handling machinery while adjusting the dose of medicines or if having cognitive  issues related to the current medications. Risk of overdose and death, if medicines not taken as prescribed, were also discussed. If the patient develops new symptoms or if the symptoms worsen, the patient should call the office. While transcribing every attempt was made to maintain the accuracy of the note in terms of it's contents,there may have been some errors made inadvertently. Thank you for allowing me to participate in the care of this patient.     Fatuma Perry MD.    Cc: Ryan Helms MD

## 2020-10-07 ENCOUNTER — VIRTUAL VISIT (OUTPATIENT)
Dept: PAIN MANAGEMENT | Age: 45
End: 2020-10-07
Payer: COMMERCIAL

## 2020-10-07 PROCEDURE — 99213 OFFICE O/P EST LOW 20 MIN: CPT | Performed by: INTERNAL MEDICINE

## 2020-10-07 RX ORDER — DICLOFENAC SODIUM 75 MG/1
75 TABLET, DELAYED RELEASE ORAL 2 TIMES DAILY PRN
Qty: 60 TABLET | Refills: 1 | Status: SHIPPED | OUTPATIENT
Start: 2020-10-07 | End: 2020-11-03 | Stop reason: SDUPTHER

## 2020-10-07 RX ORDER — OXYCODONE AND ACETAMINOPHEN 7.5; 325 MG/1; MG/1
1 TABLET ORAL EVERY 6 HOURS PRN
Qty: 112 TABLET | Refills: 0 | Status: SHIPPED | OUTPATIENT
Start: 2020-10-07 | End: 2020-11-03 | Stop reason: SDUPTHER

## 2020-10-07 RX ORDER — DULOXETIN HYDROCHLORIDE 60 MG/1
60 CAPSULE, DELAYED RELEASE ORAL DAILY
Qty: 30 CAPSULE | Refills: 1 | Status: SHIPPED | OUTPATIENT
Start: 2020-10-07 | End: 2020-11-03 | Stop reason: SDUPTHER

## 2020-10-07 RX ORDER — PREGABALIN 150 MG/1
150 CAPSULE ORAL 3 TIMES DAILY
Qty: 90 CAPSULE | Refills: 0 | Status: SHIPPED | OUTPATIENT
Start: 2020-10-07 | End: 2020-11-03 | Stop reason: SDUPTHER

## 2020-10-07 NOTE — PROGRESS NOTES
TELE HEALTH VISIT (AUDIO-VISUAL)    Pursuant to the emergency declaration under the [facility], [facility] waiver authority and the [employer] Act, this Virtual  Visit was conducted, with patient's/legal guardian's consent, to reduce the patient's risk of exposure to COVID-19 and provide continuity of care for an established patient. Service is  provided through a video synchronous discussion virtually to substitute for in-person clinic visit. Due to this being a TeleHealth encounter (During IHEQX-52 public health emergency), evaluation of the following organ systems was limited: Vitals/Constitutional/EENT/Resp/CV/GI//MS/Neuro/Skin/Dxby-Tisz-Bjl. Sher Hurley  1975  [id number]    Mr. Harris is being seen virtually for a follow up visit using one of the following techniques  [employer] Du[employer]  Informed verbal consent to the virtual visit was obtained from Mr. Grady Agarwal. Risks associated with HIPPA compliance with the virtual visit was explained to the patient. Mr. Grady Agarwal is at his residence and Dr. Tavo Mcintosh is in his office. HISTORY OF PRESENT ILLNESS:  Mr. Grady Agarwal is a 39 y.o. male  being assessed for a follow up visit for pain management for evaluation of ongoing care regarding his symptoms and monitoring of compliance with long term use high risk medications. He has a diagnosis of   1. 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 BWC Sprain of ligament of lumbosacral joint, subsequent encounter    3. BWC Sprain of left knee/leg, subsequent encounter    4. BWC Sprain of ligament of lumbosacral joint, initial encounter    5. BWC Sprain of left knee/leg, initial encounter    . He complains of pain in the lower back  with radiation to the upper leg Left, knees Left and lower leg Left . He rates the pain 4/10 and describes it as sharp, aching, burning, numbness.  Current treatment regimen has helped relieve about 70% of the pain. He denies any side effects from the current pain regimen. Patient reports that since the last follow up visit the physical functioning is unchanged, family/social relationships are unchanged, mood is unchanged sleep patterns are unchanged, and that the overall functioning is unchanged. Patient denies misusing/abusing his narcotic pain medications or using any illegal drugs. There are No indicators for possible drug abuse, addiction or diversion problems.  states he has been doing good. He says he is working full time and managing with the current regimen. He mentions he is using Percocet 4 per day along with the adjuvants. He reports his weight has been stable and he is managing his house chores/ADLs. ALLERGIES: Patients list of allergies were reviewed     MEDICATIONS: Mr. Maurice Olivares list of medications were reviewed. His current medications are   Outpatient Medications Prior to Visit   Medication Sig Dispense Refill    oxyCODONE-acetaminophen (PERCOCET) 7.5-325 MG per tablet Take 1 tablet by mouth every 6 hours as needed for Pain (max 4 per day) for up to 28 days. 112 tablet 0    pregabalin (LYRICA) 150 MG capsule Take 1 capsule by mouth 3 times daily for 30 days.  90 capsule 0    DULoxetine (CYMBALTA) 60 MG extended release capsule Take 1 capsule by mouth daily 30 capsule 1    diclofenac (VOLTAREN) 75 MG EC tablet Take 1 tablet by mouth 2 times daily as needed for Pain 60 tablet 1    furosemide (LASIX) 20 MG tablet TAKE ONE TO TWO TABLETS BY MOUTH EVERY MORNING AS NEEDED FOR EDEMA 60 tablet 3    cephALEXin (KEFLEX) 500 MG capsule Take 1 capsule by mouth 4 times daily 40 capsule 0    naloxone 4 MG/0.1ML LIQD nasal spray 1 spray by Nasal route as needed for Opioid Reversal 1 each 5    vitamin B-12 (CYANOCOBALAMIN) 1000 MCG tablet Take 3,000 mcg by mouth daily       penicillin v potassium (VEETID) 500 MG tablet Take 1,000 mg by mouth 2 times daily      Compression Stockings MISC by Does not apply route Thigh high 1 each 0    Elastic Bandages & Supports (MEDICAL COMPRESSION THIGH HIGH) MISC Thigh high compression stockings, 30-40 mm Hg 2 each 1     No facility-administered medications prior to visit. SOCIAL/FAMILY/PAST MEDICAL HISTORY: Mr. Nicole Seth, family and past medical history was reviewed. REVIEW OF SYSTEMS:    Respiratory: Negative for apnea, chest tightness and shortness of breath or change in baseline breathing. Gastrointestinal: Negative for nausea, vomiting, abdominal pain, diarrhea, constipation, blood in stool and abdominal distention. PHYSICAL EXAM:   Nursing note and vitals per patient reviewed. There were no vitals taken for this visit. Constitutional: He appears well-developed and well-nourished. No acute distress. No respiratory distress. Skin: Skin appears to be warm and dry. No rashes or any other marks noted. He is not diaphoretic. Respiratory/Pulmonary: NO conversational dyspnea, no accessory muscle use, no coughing during exam. He does not appear to be in labored breathing. Neurological/Psychiatric:He is alert and oriented to person, place, and time. Coordination is  normal.  His mood isAppropriate and affect is Neutral/Euthymic(normal). Musculoskeletal / Extremities: Range of motion is normal. Gait is normal, assistive devices use: none. IMPRESSION:   1.  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 BWC Sprain of ligament of lumbosacral joint, subsequent encounter    3. BWC Sprain of left knee/leg, subsequent encounter    4. BWC Sprain of ligament of lumbosacral joint, initial encounter    5. BWC Sprain of left knee/leg, initial encounter    6. Sprain of ligament of lumbosacral joint, initial encounter    7. Sprain of left knee/leg, initial encounter    8. Sprain of ligament of lumbosacral joint, subsequent encounter    9.  Sprain of left knee/leg, subsequent encounter 10. BWCSprain of left knee/leg, initial encounter    11. BWCSprain of ligament of lumbosacral joint, subsequent encounter    12. BWCSprain of left knee/leg, subsequent encounter        PLAN:  Informed verbal consent regarding treatment was obtained  -He was advised weight reduction, diet changes- 800-1200 roc diet, diet diary, exercising, nutritional  consult increased physical activity as tolerated   -Patient's urine drug screen results with GC/MS confirmation were obtained and reviewed and were negative for any illicit drugs. Prescribed medications were within acceptable range.   -He was advised to increase fluids ( 5-7  glasses of fluid daily), limit caffeine, avoid cheese products, increase dietary fiber, increase activity and exercise as tolerated and relax regularly and enjoy meals   -Labs ordered CBC, CMP, and TSH. -Walking/Stretching exercises as advised    Current Outpatient Medications   Medication Sig Dispense Refill    oxyCODONE-acetaminophen (PERCOCET) 7.5-325 MG per tablet Take 1 tablet by mouth every 6 hours as needed for Pain (max 4 per day) for up to 28 days. 112 tablet 0    pregabalin (LYRICA) 150 MG capsule Take 1 capsule by mouth 3 times daily for 30 days.  90 capsule 0    DULoxetine (CYMBALTA) 60 MG extended release capsule Take 1 capsule by mouth daily 30 capsule 1    diclofenac (VOLTAREN) 75 MG EC tablet Take 1 tablet by mouth 2 times daily as needed for Pain 60 tablet 1    furosemide (LASIX) 20 MG tablet TAKE ONE TO TWO TABLETS BY MOUTH EVERY MORNING AS NEEDED FOR EDEMA 60 tablet 3    cephALEXin (KEFLEX) 500 MG capsule Take 1 capsule by mouth 4 times daily 40 capsule 0    naloxone 4 MG/0.1ML LIQD nasal spray 1 spray by Nasal route as needed for Opioid Reversal 1 each 5    vitamin B-12 (CYANOCOBALAMIN) 1000 MCG tablet Take 3,000 mcg by mouth daily       penicillin v potassium (VEETID) 500 MG tablet Take 1,000 mg by mouth 2 times daily      Compression Stockings MISC by Does not apply route Thigh high 1 each 0    Elastic Bandages & Supports (MEDICAL COMPRESSION THIGH HIGH) MISC Thigh high compression stockings, 30-40 mm Hg 2 each 1     No current facility-administered medications for this visit. I will continue his current medication regimen  which is part of the above treatment schedule. It has been helping with Mr. Sienna Stringer chronic  medical problems which for this visit include: The primary encounter diagnosis was  [facility] Lumbago-sciatica due to displacement of lumbar intervertebral disc. Diagnoses of BWC Sprain of ligament of lumbosacral joint, subsequent encounter, BWC Sprain of left knee/leg, subsequent encounter, BWC Sprain of ligament of lumbosacral joint, initial encounter, and BWC Sprain of left knee/leg, initial encounter were also pertinent to this visit. Risks and benefits of the medications and other alternative treatments  including no treatment were discussed with the patient. The common side effects of these medications were also explained to the patient. Informed verbal consent was obtained. Goals of current treatment regimen include improvement in pain, restoration of functioning- with focus on improvement in physical performance, general activity, work or disability,emotional distress, health care utilization and  decreased medication consumption. Will continue to monitor progress towards achieving/maintaining therapeutic goals with special emphasis on  1. Improvement in perceived interfernce  of pain with ADL's. Ability to do home exercises independently. Ability to do household chores indoor and/or outdoor work and social and leisure activities. Improve psychosocial and physical functioning. - he is showing progression towards this treatment goal with the current regimen.      He was advised against drinking alcohol with the narcotic pain medicines, advised against driving or handling machinery while adjusting the dose of medicines or if having cognitive  issues related to the current medications. Risk of overdose and death, if medicines not taken as prescribed, were also discussed. If the patient develops new symptoms or if the symptoms worsen, the patient should call the office. While transcribing every attempt was made to maintain the accuracy of the note in terms of it's contents,there may have been some errors made inadvertently. Thank you for allowing me to participate in the care of this patient.     Gokul Sandoval MD.    Cc: Darius Morrow MD

## 2020-10-20 ENCOUNTER — TELEPHONE (OUTPATIENT)
Dept: PAIN MANAGEMENT | Age: 45
End: 2020-10-20

## 2020-11-03 ENCOUNTER — VIRTUAL VISIT (OUTPATIENT)
Dept: PAIN MANAGEMENT | Age: 45
End: 2020-11-03
Payer: COMMERCIAL

## 2020-11-03 PROCEDURE — 99213 OFFICE O/P EST LOW 20 MIN: CPT | Performed by: INTERNAL MEDICINE

## 2020-11-03 RX ORDER — DULOXETIN HYDROCHLORIDE 60 MG/1
60 CAPSULE, DELAYED RELEASE ORAL DAILY
Qty: 30 CAPSULE | Refills: 1 | Status: SHIPPED | OUTPATIENT
Start: 2020-11-03 | End: 2020-12-02 | Stop reason: SDUPTHER

## 2020-11-03 RX ORDER — OXYCODONE AND ACETAMINOPHEN 7.5; 325 MG/1; MG/1
1 TABLET ORAL EVERY 6 HOURS PRN
Qty: 112 TABLET | Refills: 0 | Status: SHIPPED | OUTPATIENT
Start: 2020-11-03 | End: 2020-12-02 | Stop reason: SDUPTHER

## 2020-11-03 RX ORDER — DICLOFENAC SODIUM 75 MG/1
75 TABLET, DELAYED RELEASE ORAL 2 TIMES DAILY PRN
Qty: 60 TABLET | Refills: 1 | Status: SHIPPED | OUTPATIENT
Start: 2020-11-03 | End: 2020-12-30 | Stop reason: SDUPTHER

## 2020-11-03 RX ORDER — PREGABALIN 150 MG/1
150 CAPSULE ORAL 3 TIMES DAILY
Qty: 90 CAPSULE | Refills: 0 | Status: SHIPPED | OUTPATIENT
Start: 2020-11-03 | End: 2020-12-02 | Stop reason: SDUPTHER

## 2020-11-03 NOTE — PROGRESS NOTES
TELE HEALTH VISIT (AUDIO-VISUAL)    Pursuant to the emergency declaration under the [facility], [facility] waiver authority and the [employer] and [employer] Act, this Virtual  Visit was conducted, with patient's/legal guardian's consent, to reduce the patient's risk of exposure to COVID-19 and provide continuity of care for an established patient. Service is  provided through a video synchronous discussion virtually to substitute for in-person clinic visit. Due to this being a TeleHealth encounter (During CRV-24 public health emergency), evaluation of the following organ systems was limited: Vitals/Constitutional/EENT/Resp/CV/GI//MS/Neuro/Skin/Mgmp-Ariy-Cpu. Noel Mccoy  1975  [phone]    Mr. Harris is being seen virtually for a follow up visit using one of the following techniques  Google Duo  Informed verbal consent to the virtual visit was obtained from Mr. Ewelina Pena. Risks associated with HIPPA compliance with the virtual visit was explained to the patient. Mr. Ewelina Pena is at his residence and Dr. Courtney Middleton is in his office. HISTORY OF PRESENT ILLNESS:  Mr. Ewelina Pena is a 39 y.o. male  being assessed for a follow up visit for pain management for evaluation of ongoing care regarding his symptoms and monitoring of compliance with long term use high risk medications. He has a diagnosis of   1. 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 BWC Sprain of ligament of lumbosacral joint, subsequent encounter    3. BWC Sprain of left knee/leg, subsequent encounter    4. BWC Sprain of ligament of lumbosacral joint, initial encounter    5. BWC Sprain of left knee/leg, initial encounter    . He complains of pain in the lower back  with radiation to the buttocks, hips Left, upper leg Left, knees Left, lower leg Left, ankles Left and feet Left .  He rates the pain 5/10 and describes it as sharp, aching, route as needed for Opioid Reversal 1 each 5    vitamin B-12 (CYANOCOBALAMIN) 1000 MCG tablet Take 3,000 mcg by mouth daily       penicillin v potassium (VEETID) 500 MG tablet Take 1,000 mg by mouth 2 times daily      Compression Stockings MISC by Does not apply route Thigh high 1 each 0    Elastic Bandages & Supports (MEDICAL COMPRESSION THIGH HIGH) MISC Thigh high compression stockings, 30-40 mm Hg 2 each 1     No facility-administered medications prior to visit. SOCIAL/FAMILY/PAST MEDICAL HISTORY: Mr. Nicole Seth, family and past medical history was reviewed. REVIEW OF SYSTEMS:    Respiratory: Negative for apnea, chest tightness and shortness of breath or change in baseline breathing. Gastrointestinal: Negative for nausea, vomiting, abdominal pain, diarrhea, constipation, blood in stool and abdominal distention. PHYSICAL EXAM:   Nursing note and vitals per patient reviewed. There were no vitals taken for this visit. Constitutional: He appears well-developed and well-nourished. No acute distress. No respiratory distress. Skin: Skin appears to be warm and dry. No rashes or any other marks noted. He is not diaphoretic. Respiratory/Pulmonary: NO conversational dyspnea, no accessory muscle use, no coughing during exam. He does not appear to be in labored breathing. Neurological/Psychiatric:He is alert and oriented to person, place, and time. Coordination is  normal.  His mood isAppropriate and affect is Neutral/Euthymic(normal). Musculoskeletal / Extremities: Range of motion is normal. Gait is normal, assistive devices use: none. IMPRESSION:   1.  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 BWC Sprain of ligament of lumbosacral joint, subsequent encounter    3. BWC Sprain of left knee/leg, subsequent encounter    4. BWC Sprain of ligament of lumbosacral joint, initial encounter    5.  BWC Sprain of left knee/leg, initial encounter PLAN:  Informed verbal consent regarding treatment was obtained  -continue with current opioid regimen Percocet 3-4 per day  -ROM/Stretching exercises as advised for the back  -He was advised weight reduction, diet changes- 800-1200 roc diet, diet diary, exercising, nutritional  consult increased physical activity as tolerated   -He was advised to increase fluids ( 5-7  glasses of fluid daily), limit caffeine, avoid cheese products, increase dietary fiber, increase activity and exercise as tolerated and relax regularly and enjoy meals      Current Outpatient Medications   Medication Sig Dispense Refill    oxyCODONE-acetaminophen (PERCOCET) 7.5-325 MG per tablet Take 1 tablet by mouth every 6 hours as needed for Pain (max 4 per day) for up to 28 days. 112 tablet 0    pregabalin (LYRICA) 150 MG capsule Take 1 capsule by mouth 3 times daily for 30 days. 90 capsule 0    DULoxetine (CYMBALTA) 60 MG extended release capsule Take 1 capsule by mouth daily 30 capsule 1    diclofenac (VOLTAREN) 75 MG EC tablet Take 1 tablet by mouth 2 times daily as needed for Pain 60 tablet 1    furosemide (LASIX) 20 MG tablet TAKE ONE TO TWO TABLETS BY MOUTH EVERY MORNING AS NEEDED FOR EDEMA 60 tablet 3    cephALEXin (KEFLEX) 500 MG capsule Take 1 capsule by mouth 4 times daily 40 capsule 0    naloxone 4 MG/0.1ML LIQD nasal spray 1 spray by Nasal route as needed for Opioid Reversal 1 each 5    vitamin B-12 (CYANOCOBALAMIN) 1000 MCG tablet Take 3,000 mcg by mouth daily       penicillin v potassium (VEETID) 500 MG tablet Take 1,000 mg by mouth 2 times daily      Compression Stockings MISC by Does not apply route Thigh high 1 each 0    Elastic Bandages & Supports (MEDICAL COMPRESSION THIGH HIGH) MISC Thigh high compression stockings, 30-40 mm Hg 2 each 1     No current facility-administered medications for this visit. I will continue his current medication regimen  which is part of the above treatment schedule.  It has been helping with Mr. 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 Risks and benefits of the medications and other alternative treatments  including no treatment were discussed with the patient. The common side effects of these medications were also explained to the patient. Informed verbal consent was obtained. Goals of current treatment regimen include improvement in pain, restoration of functioning- with focus on improvement in physical performance, general activity, work or disability,emotional distress, health care utilization and  decreased medication consumption. Will continue to monitor progress towards achieving/maintaining therapeutic goals with special emphasis on  1. Improvement in perceived interfernce  of pain with ADL's. Ability to do home exercises independently. Ability to do household chores indoor and/or outdoor work and social and leisure activities. Improve psychosocial and physical functioning. - he is showing progression towards this treatment goal with the current regimen. He was advised against drinking alcohol with the narcotic pain medicines, advised against driving or handling machinery while adjusting the dose of medicines or if having cognitive  issues related to the current medications. Risk of overdose and death, if medicines not taken as prescribed, were also discussed. If the patient develops new symptoms or if the symptoms worsen, the patient should call the office.     While transcribing every attempt was made to maintain the accuracy of the note in terms of it's contents,there may have been some errors made inadvertently. Thank you for allowing me to participate in the care of this patient.     Caroline Lewis MD.    Cc: Chase Suazo MD

## 2020-11-06 RX ORDER — PENICILLIN V POTASSIUM 500 MG/1
TABLET ORAL
Qty: 120 TABLET | Refills: 10 | Status: ON HOLD | OUTPATIENT
Start: 2020-11-06 | End: 2021-04-12 | Stop reason: ALTCHOICE

## 2020-12-02 ENCOUNTER — VIRTUAL VISIT (OUTPATIENT)
Dept: PAIN MANAGEMENT | Age: 45
End: 2020-12-02
Payer: COMMERCIAL

## 2020-12-02 PROCEDURE — 99213 OFFICE O/P EST LOW 20 MIN: CPT | Performed by: INTERNAL MEDICINE

## 2020-12-02 RX ORDER — DULOXETIN HYDROCHLORIDE 60 MG/1
60 CAPSULE, DELAYED RELEASE ORAL DAILY
Qty: 30 CAPSULE | Refills: 1 | Status: SHIPPED | OUTPATIENT
Start: 2020-12-02 | End: 2020-12-30 | Stop reason: SDUPTHER

## 2020-12-02 RX ORDER — PREGABALIN 150 MG/1
150 CAPSULE ORAL 3 TIMES DAILY
Qty: 90 CAPSULE | Refills: 0 | Status: SHIPPED | OUTPATIENT
Start: 2020-12-02 | End: 2020-12-30 | Stop reason: SDUPTHER

## 2020-12-02 RX ORDER — OXYCODONE AND ACETAMINOPHEN 7.5; 325 MG/1; MG/1
1 TABLET ORAL EVERY 6 HOURS PRN
Qty: 112 TABLET | Refills: 0 | Status: SHIPPED | OUTPATIENT
Start: 2020-12-02 | End: 2020-12-30 | Stop reason: SDUPTHER

## 2020-12-02 NOTE — PROGRESS NOTES
TELE HEALTH VISIT (AUDIO-VISUAL)    Pursuant to the emergency declaration under the [facility], [facility] waiver authority and the [employer] and [employer] Act, this Virtual  Visit was conducted, with patient's/legal guardian's consent, to reduce the patient's risk of exposure to COVID-19 and provide continuity of care for an established patient. Service is  provided through a video synchronous discussion virtually to substitute for in-person clinic visit. Due to this being a TeleHealth encounter (During ZSVNF-60 public health emergency), evaluation of the following organ systems was limited: Vitals/Constitutional/EENT/Resp/CV/GI//MS/Neuro/Skin/Xsgi-Gucv-Muy. Dylan Ortega  1975  [id number]    Mr. Harris is being seen virtually for a follow up visit using one of the following techniques  Google Duo  Informed verbal consent to the virtual visit was obtained from Mr. Mick Macias. Risks associated with HIPPA compliance with the virtual visit was explained to the patient. Mr. Mick Macias is at his residence and Dr. Mela Landau is in his office. HISTORY OF PRESENT ILLNESS:  Mr. Mick Macias is a 39 y.o. male  being assessed for a follow up visit for pain management for evaluation of ongoing care regarding his symptoms and monitoring of compliance with long term use high risk medications. He has a diagnosis of   1. 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 BWC Sprain of ligament of lumbosacral joint, subsequent encounter    3. BWC Sprain of left knee/leg, subsequent encounter    4. BWC Sprain of ligament of lumbosacral joint, initial encounter    5. BWC Sprain of left knee/leg, initial encounter    . He complains of pain in the lower back . He rates the pain 5/10 and describes it as sharp, aching, burning, numbness, pins and needles. Current treatment regimen has helped relieve about 70% of the pain.   He 30-40 mm Hg 2 each 1     No facility-administered medications prior to visit. SOCIAL/FAMILY/PAST MEDICAL HISTORY: Mr. Nat Waldrop, family and past medical history was reviewed. REVIEW OF SYSTEMS:    Respiratory: Negative for apnea, chest tightness and shortness of breath or change in baseline breathing. Gastrointestinal: Negative for nausea, vomiting, abdominal pain, diarrhea, constipation, blood in stool and abdominal distention. PHYSICAL EXAM:   Nursing note and vitals per patient reviewed. There were no vitals taken for this visit. Constitutional: He appears well-developed and well-nourished. No acute distress. No respiratory distress. Skin: Skin appears to be warm and dry. No rashes or any other marks noted. He is not diaphoretic. Respiratory/Pulmonary: NO conversational dyspnea, no accessory muscle use, no coughing during exam. He does not appear to be in labored breathing. Neurological/Psychiatric:He is alert and oriented to person, place, and time. Coordination is  normal.  His mood isAppropriate and affect is Neutral/Euthymic(normal). Musculoskeletal / Extremities: Range of motion is normal. Gait is normal, assistive devices use: none. IMPRESSION:   1.  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 BWC Sprain of ligament of lumbosacral joint, subsequent encounter    3. BWC Sprain of left knee/leg, subsequent encounter    4. BWC Sprain of ligament of lumbosacral joint, initial encounter    5.  BWC Sprain of left knee/leg, initial encounter        PLAN:  Informed verbal consent regarding treatment was obtained  -He was advised weight reduction, diet changes- 800-1200 roc diet, diet diary, exercising, nutritional  consult increased physical activity as tolerated   -continue with current opioid regimen Percocet 4 per day  -He was advised to increase fluids ( 5-7  glasses of fluid daily), limit caffeine, avoid cheese products, increase dietary fiber, increase activity and exercise as tolerated and relax regularly and enjoy meals   -Gerardo exercises given     Current Outpatient Medications   Medication Sig Dispense Refill    penicillin v potassium (VEETID) 500 MG tablet TAKE TWO TABLETS BY MOUTH TWICE A  tablet 10    pregabalin (LYRICA) 150 MG capsule Take 1 capsule by mouth 3 times daily for 30 days. 90 capsule 0    DULoxetine (CYMBALTA) 60 MG extended release capsule Take 1 capsule by mouth daily 30 capsule 1    diclofenac (VOLTAREN) 75 MG EC tablet Take 1 tablet by mouth 2 times daily as needed for Pain 60 tablet 1    furosemide (LASIX) 20 MG tablet TAKE ONE TO TWO TABLETS BY MOUTH EVERY MORNING AS NEEDED FOR EDEMA 60 tablet 3    cephALEXin (KEFLEX) 500 MG capsule Take 1 capsule by mouth 4 times daily 40 capsule 0    naloxone 4 MG/0.1ML LIQD nasal spray 1 spray by Nasal route as needed for Opioid Reversal 1 each 5    vitamin B-12 (CYANOCOBALAMIN) 1000 MCG tablet Take 3,000 mcg by mouth daily       Compression Stockings MISC by Does not apply route Thigh high 1 each 0    Elastic Bandages & Supports (MEDICAL COMPRESSION THIGH HIGH) MISC Thigh high compression stockings, 30-40 mm Hg 2 each 1     No current facility-administered medications for this visit. I will continue his current medication regimen  which is part of the above treatment schedule. It has been helping with Mr. 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 Risks and benefits of the medications and other alternative treatments  including no treatment were discussed with the patient. The common side effects of these medications were also explained to the patient. Informed verbal consent was obtained. Goals of current treatment regimen include improvement in pain, restoration of functioning- with focus on improvement in physical performance, general activity, work or disability,emotional distress, health care utilization and  decreased medication consumption. Will continue to monitor progress towards achieving/maintaining therapeutic goals with special emphasis on  1. Improvement in perceived interfernce  of pain with ADL's. Ability to do home exercises independently. Ability to do household chores indoor and/or outdoor work and social and leisure activities. Improve psychosocial and physical functioning. - he is showing progression towards this treatment goal with the current regimen. He was advised against drinking alcohol with the narcotic pain medicines, advised against driving or handling machinery while adjusting the dose of medicines or if having cognitive  issues related to the current medications. Risk of overdose and death, if medicines not taken as prescribed, were also discussed. If the patient develops new symptoms or if the symptoms worsen, the patient should call the office. While transcribing every attempt was made to maintain the accuracy of the note in terms of it's contents,there may have been some errors made inadvertently. Thank you for allowing me to participate in the care of this patient.     Billie Juarez MD.    Cc: Hamzah Natarajan MD

## 2020-12-08 ENCOUNTER — OFFICE VISIT (OUTPATIENT)
Dept: DERMATOLOGY | Age: 45
End: 2020-12-08
Payer: COMMERCIAL

## 2020-12-08 VITALS — TEMPERATURE: 97.6 F

## 2020-12-08 PROCEDURE — 99214 OFFICE O/P EST MOD 30 MIN: CPT | Performed by: DERMATOLOGY

## 2020-12-08 NOTE — PROGRESS NOTES
Patient's Name: Selma James  MRN: [id number]  YOB: 1975  Date of Visit: 12/8/2020  Primary Care Provider: Chloe Becker MD    Subjective:     Chief Complaint   Patient presents with    Follow-up     3 Month        History of Present Illness:  Selma James is a 39 y.o. male with h/o acral melanoma of left foot is an established patient who presents in clinic today for a total body skin examination for melanoma surveillance. Patient was last evaluated on 7/9/20. At their last visit they had a shave removal of a lesion from his back that came back as DF. Patient reports the site healed well. He denies any new lesions of concern today. Denies any constitutional symptoms. Past medical/surgical/family history reviewed with no changes since last visit on 7/9/20    Allergies:  No Known Allergies    Current Medications:  Current Outpatient Medications   Medication Sig Dispense Refill    pregabalin (LYRICA) 150 MG capsule Take 1 capsule by mouth 3 times daily for 30 days. 90 capsule 0    DULoxetine (CYMBALTA) 60 MG extended release capsule Take 1 capsule by mouth daily 30 capsule 1    oxyCODONE-acetaminophen (PERCOCET) 7.5-325 MG per tablet Take 1 tablet by mouth every 6 hours as needed for Pain (max 4 per day) for up to 28 days.  112 tablet 0    penicillin v potassium (VEETID) 500 MG tablet TAKE TWO TABLETS BY MOUTH TWICE A  tablet 10    diclofenac (VOLTAREN) 75 MG EC tablet Take 1 tablet by mouth 2 times daily as needed for Pain 60 tablet 1    furosemide (LASIX) 20 MG tablet TAKE ONE TO TWO TABLETS BY MOUTH EVERY MORNING AS NEEDED FOR EDEMA 60 tablet 3    vitamin B-12 (CYANOCOBALAMIN) 1000 MCG tablet Take 3,000 mcg by mouth daily       Compression Stockings MISC by Does not apply route Thigh high 1 each 0    Elastic Bandages & Supports (MEDICAL COMPRESSION THIGH HIGH) MISC Thigh high compression stockings, 30-40 mm Hg 2 each 1  cephALEXin (KEFLEX) 500 MG capsule Take 1 capsule by mouth 4 times daily (Patient not taking: Reported on 12/8/2020) 40 capsule 0    naloxone 4 MG/0.1ML LIQD nasal spray 1 spray by Nasal route as needed for Opioid Reversal (Patient not taking: Reported on 12/8/2020) 1 each 5     No current facility-administered medications for this visit. Review of Systems:  Constitutional: No fevers, chills or recent illness. feels well   Skin: Skin:As per HPI AND otherwise no new, bleeding or symptomatic skin lesions      Objective:     Vitals:    12/08/20 0830   Temp: 97.6 °F (36.4 °C)       Physical Examination:  General: alert, comfortable, no apparent distress, well-appearing  Psych: alert, oriented and pleasant  Neuro: oriented to person, place, and time  Skin: Areas examined: head including face, lips, conjunctiva and lids, neck, hair/scalp, chest, including breasts and axilla, abdomen, back, buttocks, right upper extremity, left upper extremity, right lower extremity, left lower extremity, left hand, right hand, digits and nails, Right foot, Left foot, toe nails, patient declined genital exam, lymph nodes of neck and lymph nodes of groin      All areas examined were within normal limits except those listed below with the appropriate assessment and plan    Assessment and Plan (with relevant objective exam findings):     1. Personal history of malignant melanoma of left foot s/p WLE and graft  Location:Left medial foot  Objective findings: well healed graft with no re-pigmentation or nodularity  - Will continue monitoring given the increased risk of melanoma and non melanoma skin cancer. Patient verbalized understanding. 2. Blue nevus  Location: dorsal left 3rd distal phalanx  Objective findings: ~2 mm blue/gray well defined homogenous macule  Discussed the benign nature of the lesion. Photographs taken today for monitoring    3.  Multiple benign melanocytic nevi   Location: torso and extremities Objective findings: multiple brown/pink macules and papules without abnormal findings or concerning findings on exam and dermatoscopy    -Counseled the patient that these are benign and need no therapy. Observation for any changes recommended       4. Elephantiasis nostra verrucosa  Location: bilateral lower legs  Objective findings: cobblestoned papules with verrucous skin surface on a background of brownish/grayish patch  - Reviewed the etiology of this condition and its relation with weight, venous obstruction and lymphedema. Compression stockings recommended    5. Solar Lentigo  Location: sun exposed areas, most prominently on the dorsal hands, forearms, neck, shoulders and upper chest      Objective: Numerous lacy brown macules ranging in size from 2-10 mm diameter. The lentigines seen today are benign in character, caused by the sun, and do not require treatment. However, they do occasionally transform into a malignancy, so the patient needs to monitor for changes. They were educated on what a suspicious change would include, change in size or color, and included education on the ABCDs of melanoma. Follow up:  Return visit in 3 months or as needed for change in condition. All questions addressed.      Procedure:   No procedure performed                  Dany Hall MD. MS

## 2020-12-08 NOTE — PATIENT INSTRUCTIONS
Sunscreen information    1. Sunscreen should be broad-spectrum protection (protects against UVA and UVB rays), Sun Protection Factor (SPF) 50 or greater, and water-resistant. 2.  Apply the sunscreen to dry skin 15-30 minutes BEFORE going outdoors. Be sure to apply it generously to achieve the UV protection indicated on the product label. 3.  Re-apply sunscreen approximately every two hours or after swimming or sweating heavily according to the directions on the bottle. If the bottle specifies a lower water resistance time, then reapply according to those guidelines (ie water resistance of 60 minutes needs to be applied every 60 minutes). 4.  Skin cancer also can form on the lips. To protect your lips, apply a lip balm or lipstick that contains sunscreen with an SPF of 50 or higher. 5.  There are many types of sunscreen. A.  Creams are best for dry skin and the face. Neutrogena ultra sheer dry touch can be nice for the face. B.  Gels are good for hairy areas, such as the scalp or male chest.    C.  Sticks are good to use around the eyes. D. Sprays are sometimes preferred by parents since they are easy to apply to children, however sprays are NOT generally recommended due to the inability to fully cover most areas adequately. If you are using these, make sure to use enough of these products to cover the entire surface area thoroughly and rub in with your hands after spraying. Do NOT inhale these products or apply near heat, open flame or while smoking. E.  There also are sunscreens made for specific purposes, such as for sensitive skin and babies. Aveeno and Neutrogena are two examples. F. Daily facial moisturizers with spf can also be helpful (Aveeno positively radiant, Aveeno smart essentials, Cetaphil with sunscreen, etc). 6.  Wear protective clothing, such as a long-sleeved shirt, pants, a wide-brimmed hat and sunglasses, where possible.  There are even clothing lines that have special sun-protective clothing and hats such as Coolibar and [place] (Trivie). 7.  Seek shade when appropriate, remembering that the sun's rays are strongest between 10 a.m. and 2 p.m. If your shadow is shorter than you are, seek shade. 8.  Use extra caution near water, snow and sand as they reflect the damaging rays of the sun, which can increase your chance of sunburn. 9.  Get vitamin D safely through a healthy diet that may include vitamin supplements. Don't seek the sun. 10.  Avoid tanning beds. Ultraviolet light from the sun and tanning beds can cause skin cancer and wrinkling. If you want to look tan, consider using a self-tanning product, but continue to use sunscreen with it. A-B-C-D-E guide for Melanoma     Characteristics of unusual moles that may indicate melanomas or other skin cancers follow the A-B-C-D-E guide developed by the American Academy of Dermatology:  1. A is for asymmetrical shape. Look for moles with irregular shapes, such as two very different-looking halves. 2. B is for irregular border. Look for moles with irregular, notched or scalloped borders, these may be characteristics of melanomas. 3. C is for changes in color. Look for growths that have many colors or an uneven distribution of color. 4. D is for diameter. Look for new growth in a mole larger than about 1/4 inch (6 millimeters) or about the size of a pencil eraser. 5. E is for evolving. This is the most important one. Look for changes over time, such as a mole that grows in size or that changes color or shape. Angella Tolbert may also evolve to develop new signs and symptoms, such as new itchiness or bleeding. If any of your moles appear to be changing, see your doctor. Other suspicious changes in a mole may include: Scaly skin, itching, spreading of color from the mole into the surrounding skin, oozing or bleeding. Cancerous (malignant) moles vary greatly in appearance.  Some may show all of the changes uneven borders. c. It gets thicker, raised, or worn down. d. It changes color. e. It starts to bleed easily.

## 2020-12-14 RX ORDER — FUROSEMIDE 20 MG/1
TABLET ORAL
Qty: 60 TABLET | Refills: 0 | Status: SHIPPED | OUTPATIENT
Start: 2020-12-14 | End: 2021-01-13

## 2020-12-14 NOTE — TELEPHONE ENCOUNTER
Medication:   Requested Prescriptions     Pending Prescriptions Disp Refills    furosemide (LASIX) 20 MG tablet [Pharmacy Med Name: FUROSEMIDE 20 MG TABLET] 60 tablet 2     Sig: TAKE ONE TO TWO TABLETS BY MOUTH EVERY MORNING AS NEEDED FOR EDEMA       Last Filled:  8/13/20 #60, 3 RF     Patient Phone Number: [phone] (home) [phone] (work)    Last appt: 12/19/19 pre op    Next appt: Visit date not found - patient scheduled with Dr. Mayra White 12/30/20     Lab Results   Component Value Date     09/20/2019    K 4.2 09/20/2019    CL 97 (L) 09/20/2019    CO2 27 09/20/2019    BUN 13 09/20/2019    CREATININE 0.7 (L) 09/20/2019    GLUCOSE 107 (H) 09/20/2019    CALCIUM 9.2 09/20/2019    PROT 7.0 09/20/2019    LABALBU 4.5 09/20/2019    BILITOT 0.6 09/20/2019    ALKPHOS 80 09/20/2019    AST 35 09/20/2019    ALT 34 09/20/2019    LABGLOM >60 09/20/2019    GFRAA >60 09/20/2019    AGRATIO 1.8 09/20/2019    GLOB 2.5 09/20/2019

## 2020-12-16 ENCOUNTER — OFFICE VISIT (OUTPATIENT)
Dept: PRIMARY CARE CLINIC | Age: 45
End: 2020-12-16
Payer: COMMERCIAL

## 2020-12-16 PROCEDURE — 99211 OFF/OP EST MAY X REQ PHY/QHP: CPT | Performed by: NURSE PRACTITIONER

## 2020-12-16 NOTE — PROGRESS NOTES
Alexis Fournier received a viral test for COVID-19. They were educated on isolation and quarantine as appropriate. For any symptoms, they were directed to seek care from their PCP, given contact information to establish with a doctor, directed to an urgent care or the emergency room.

## 2020-12-17 LAB — SARS-COV-2: NOT DETECTED

## 2020-12-23 ENCOUNTER — OFFICE VISIT (OUTPATIENT)
Dept: PRIMARY CARE CLINIC | Age: 45
End: 2020-12-23
Payer: COMMERCIAL

## 2020-12-23 PROCEDURE — 99211 OFF/OP EST MAY X REQ PHY/QHP: CPT | Performed by: NURSE PRACTITIONER

## 2020-12-23 NOTE — PROGRESS NOTES
Selma Colonlisandra received a viral test for COVID-19. They were educated on isolation and quarantine as appropriate. For any symptoms, they were directed to seek care from their PCP, given contact information to establish with a doctor, directed to an urgent care or the emergency room.

## 2020-12-24 LAB — SARS-COV-2, NAA: NOT DETECTED

## 2020-12-30 ENCOUNTER — VIRTUAL VISIT (OUTPATIENT)
Dept: PAIN MANAGEMENT | Age: 45
End: 2020-12-30
Payer: COMMERCIAL

## 2020-12-30 ENCOUNTER — OFFICE VISIT (OUTPATIENT)
Dept: FAMILY MEDICINE CLINIC | Age: 45
End: 2020-12-30
Payer: COMMERCIAL

## 2020-12-30 VITALS
TEMPERATURE: 97.9 F | SYSTOLIC BLOOD PRESSURE: 155 MMHG | DIASTOLIC BLOOD PRESSURE: 90 MMHG | HEART RATE: 82 BPM | HEIGHT: 75 IN | WEIGHT: 315 LBS | OXYGEN SATURATION: 93 % | BODY MASS INDEX: 39.17 KG/M2

## 2020-12-30 PROCEDURE — 99214 OFFICE O/P EST MOD 30 MIN: CPT | Performed by: FAMILY MEDICINE

## 2020-12-30 PROCEDURE — 99213 OFFICE O/P EST LOW 20 MIN: CPT | Performed by: INTERNAL MEDICINE

## 2020-12-30 RX ORDER — DICLOFENAC SODIUM 75 MG/1
75 TABLET, DELAYED RELEASE ORAL 2 TIMES DAILY PRN
Qty: 60 TABLET | Refills: 1 | Status: SHIPPED | OUTPATIENT
Start: 2020-12-30 | End: 2021-02-22 | Stop reason: SDUPTHER

## 2020-12-30 RX ORDER — DULOXETIN HYDROCHLORIDE 60 MG/1
60 CAPSULE, DELAYED RELEASE ORAL DAILY
Qty: 30 CAPSULE | Refills: 1 | Status: SHIPPED | OUTPATIENT
Start: 2020-12-30 | End: 2021-02-22 | Stop reason: SDUPTHER

## 2020-12-30 RX ORDER — PREGABALIN 150 MG/1
150 CAPSULE ORAL 3 TIMES DAILY
Qty: 90 CAPSULE | Refills: 0 | Status: SHIPPED | OUTPATIENT
Start: 2020-12-30 | End: 2021-01-25 | Stop reason: SDUPTHER

## 2020-12-30 RX ORDER — LOSARTAN POTASSIUM 100 MG/1
100 TABLET ORAL DAILY
Qty: 30 TABLET | Refills: 5 | Status: SHIPPED | OUTPATIENT
Start: 2020-12-30 | End: 2021-07-08

## 2020-12-30 RX ORDER — OXYCODONE AND ACETAMINOPHEN 7.5; 325 MG/1; MG/1
1 TABLET ORAL EVERY 6 HOURS PRN
Qty: 112 TABLET | Refills: 0 | Status: SHIPPED | OUTPATIENT
Start: 2020-12-30 | End: 2021-01-25 | Stop reason: SDUPTHER

## 2020-12-30 ASSESSMENT — PATIENT HEALTH QUESTIONNAIRE - PHQ9
2. FEELING DOWN, DEPRESSED OR HOPELESS: 0
SUM OF ALL RESPONSES TO PHQ9 QUESTIONS 1 & 2: 0
1. LITTLE INTEREST OR PLEASURE IN DOING THINGS: 0
SUM OF ALL RESPONSES TO PHQ QUESTIONS 1-9: 0

## 2020-12-30 NOTE — PROGRESS NOTES
Yazmin Sandoval is a 39 y.o. male. HPI:  Here for several concerns  First he like to have his blood pressure checked  Second right ear has been troublesome and he recalls having a perforated tympanic membrane 8 years ago that never got treated and has had a gravelly voice for 8 months  Mucinex does not help  He inquired about bariatric surgery and was told his insurance does not cover it  Wt Readings from Last 3 Encounters:   12/30/20 (!) 478 lb (216.8 kg)   09/09/20 (!) 450 lb (204.1 kg)   05/11/20 (!) 455 lb 0.5 oz (206.4 kg)     Meds, vitamins and allergies reviewed with Patient    ROS:  Gen:no  fever  HEENT:no  cold symptoms,no sore throat. Gravelly voice  CV:  Denies chest pain or palpitations. Pulm:  Denies shortness of breath, cough. Abd:  Denies abdominal pain, nausea and vomiting. Skin: no rash    No Known Allergies    Prior to Visit Medications    Medication Sig Taking? Authorizing Provider   pregabalin (LYRICA) 150 MG capsule Take 1 capsule by mouth 3 times daily for 30 days. Yes Ramez Salinas MD   DULoxetine (CYMBALTA) 60 MG extended release capsule Take 1 capsule by mouth daily Yes Ramez Salinas MD   oxyCODONE-acetaminophen (PERCOCET) 7.5-325 MG per tablet Take 1 tablet by mouth every 6 hours as needed for Pain (max 4 per day) for up to 28 days.  Yes Ramez Salinas MD   diclofenac (VOLTAREN) 75 MG EC tablet Take 1 tablet by mouth 2 times daily as needed for Pain Yes Ramez Salinas MD   furosemide (LASIX) 20 MG tablet TAKE ONE TO TWO TABLETS BY MOUTH EVERY MORNING AS NEEDED FOR EDEMA Yes Merline Less, MD   penicillin v potassium (VEETID) 500 MG tablet TAKE TWO TABLETS BY MOUTH TWICE A DAY Yes Cesar Lei MD   vitamin B-12 (CYANOCOBALAMIN) 1000 MCG tablet Take 3,000 mcg by mouth daily  Yes Historical Provider, MD   Compression Stockings MISC by Does not apply route Thigh high Yes Cesar Lei MD Elastic Bandages & Supports (MEDICAL COMPRESSION THIGH HIGH) MISC Thigh high compression stockings, 30-40 mm Hg Yes Alberto Hyman MD       OBJECTIVE:  BP (!) 144/88   Pulse 82   Temp 97.9 °F (36.6 °C)   Ht 6' 3\" (1.905 m)   Wt (!) 478 lb (216.8 kg)   SpO2 93%   BMI 59.75 kg/m²   GEN:  in NAD, morbidly obese up 25 pounds  HEENT:  NCAT, TMs:normal and throat: clear TMs both appear cloudy and yellow not sure if it is wax down by the eardrum or abnormal drum NECK:  Supple without adenopathy. CV:  Regular rate and rhythm, S1 and S2 normal, no murmurs, clicks  PULM:  Chest is clear, no wheezing ,  symmetric air entry throughout both lung fields.   EXT: No rash or edema  NEURO: Alert and oriented ×3    ASSESSMENT/PLAN:  #1 htn not at goal  Remind him to decrease alcohol and salt  Increase exercise  Add losartan 100 mg daily    #2 obesity-referred him to bariatric surgeon even though his insurance may not cover it for further inquiry    #3 veinous insufficiency-stable lose weight    #4 abnormal tympanic membrane/diminished hearing right ear  Refer to ENT      Spent 25 minutes with patient greater 50% time discussing weight loss surgery and weight loss and blood pressure strategies

## 2021-01-13 RX ORDER — FUROSEMIDE 20 MG/1
TABLET ORAL
Qty: 60 TABLET | Refills: 0 | Status: SHIPPED | OUTPATIENT
Start: 2021-01-13 | End: 2021-02-24

## 2021-01-13 NOTE — TELEPHONE ENCOUNTER
Medication:   Requested Prescriptions     Pending Prescriptions Disp Refills    furosemide (LASIX) 20 MG tablet [Pharmacy Med Name: FUROSEMIDE 20 MG TABLET] 60 tablet 0     Sig: TAKE ONE TO TWO TABLETS BY MOUTH EVERY MORNING  AS NEEDED FOR EDEMA       Last Filled:  12/14/2020 #60 0rf    Patient Phone Number: [phone] (home) [phone] (work)    Last appt: 12/30/2020   Next appt: 2/1/2021    Lab Results   Component Value Date     09/20/2019    K 4.2 09/20/2019    CL 97 (L) 09/20/2019    CO2 27 09/20/2019    BUN 13 09/20/2019    CREATININE 0.7 (L) 09/20/2019    GLUCOSE 107 (H) 09/20/2019    CALCIUM 9.2 09/20/2019    PROT 7.0 09/20/2019    LABALBU 4.5 09/20/2019    BILITOT 0.6 09/20/2019    ALKPHOS 80 09/20/2019    AST 35 09/20/2019    ALT 34 09/20/2019    LABGLOM >60 09/20/2019    GFRAA >60 09/20/2019    AGRATIO 1.8 09/20/2019    GLOB 2.5 09/20/2019

## 2021-01-25 ENCOUNTER — VIRTUAL VISIT (OUTPATIENT)
Dept: PAIN MANAGEMENT | Age: 46
End: 2021-01-25
Payer: COMMERCIAL

## 2021-01-25 DIAGNOSIS — S83.92XA SPRAIN OF LEFT KNEE/LEG, INITIAL ENCOUNTER: ICD-10-CM

## 2021-01-25 DIAGNOSIS — S83.92XD SPRAIN OF LEFT KNEE/LEG, SUBSEQUENT ENCOUNTER: ICD-10-CM

## 2021-01-25 DIAGNOSIS — M51.27 LUMBAGO-SCIATICA DUE TO DISPLACEMENT OF LUMBAR INTERVERTEBRAL DISC: ICD-10-CM

## 2021-01-25 DIAGNOSIS — S33.9XXA SPRAIN OF LIGAMENT OF LUMBOSACRAL JOINT, INITIAL ENCOUNTER: ICD-10-CM

## 2021-01-25 DIAGNOSIS — S33.9XXD SPRAIN OF LIGAMENT OF LUMBOSACRAL JOINT, SUBSEQUENT ENCOUNTER: ICD-10-CM

## 2021-01-25 PROCEDURE — 99213 OFFICE O/P EST LOW 20 MIN: CPT | Performed by: INTERNAL MEDICINE

## 2021-01-25 RX ORDER — PREGABALIN 150 MG/1
150 CAPSULE ORAL 3 TIMES DAILY
Qty: 90 CAPSULE | Refills: 0 | Status: SHIPPED | OUTPATIENT
Start: 2021-01-25 | End: 2021-02-22 | Stop reason: SDUPTHER

## 2021-01-25 RX ORDER — OXYCODONE AND ACETAMINOPHEN 7.5; 325 MG/1; MG/1
1 TABLET ORAL EVERY 6 HOURS PRN
Qty: 112 TABLET | Refills: 0 | Status: SHIPPED | OUTPATIENT
Start: 2021-01-25 | End: 2021-02-22 | Stop reason: SDUPTHER

## 2021-01-25 NOTE — PROGRESS NOTES
TELE HEALTH VISIT (AUDIO-VISUAL)    Pursuant to the emergency declaration under the [facility] waiver authority and the [employer] Act, this Virtual  Visit was conducted, with patient's/legal guardian's consent, to reduce the patient's risk of exposure to COVID-19 and provide continuity of care for an established patient. Service is  provided through a video synchronous discussion virtually to substitute for in-person clinic visit. Due to this being a TeleHealth encounter (During TMRNT-69 public health emergency), evaluation of the following organ systems was limited: Vitals/Constitutional/EENT/Resp/CV/GI//MS/Neuro/Skin/Xzkf-Dzrn-Fvh. [place] Phoebe  1975  [id number]    Mr. Harris is being seen virtually for a follow up visit using one of the following techniques   Doxy. me  Informed verbal consent to the virtual visit was obtained from Mr. Juan Carlos Delatorre. Risks associated with HIPPA compliance with the virtual visit was explained to the patient. Mr. Juan Carlos Delatorre is at his residence and Dr. Catherine Abrams is in his office. HISTORY OF PRESENT ILLNESS:  Mr. Juan Carlos Delatorre is a 39 y.o. male returns for a follow up visit for multiple medical problems. His current presenting problems are   1. 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 BWC Sprain of ligament of lumbosacral joint, subsequent encounter    3. BWC Sprain of left knee/leg, subsequent encounter    4. BWC Sprain of ligament of lumbosacral joint, initial encounter    5. BWC Sprain of left knee/leg, initial encounter    . As per information/history obtained from the PADT(patient assessment and documentation tool) - He complains of pain in the lower back with radiation to the upper leg Left He rates the pain 7/10 and describes it as sharp, dull, aching, burning, squeezing, numbness, tingling. Pain is made worse by: walking, standing. Current treatment regimen has helped relieve about 70% of the pain. He denies side effects from the current pain regimen. Patient reports that since the last follow up visit the physical functioning is worse, family/social relationships are unchanged, mood is unchanged and sleep patterns are unchanged, and that the overall functioning is unchanged. Patient denies neurological bowel or bladder. Patient denies misusing/abusing his narcotic pain medications or using any illegal drugs. There are No indicators for possible drug abuse, addiction or diversion problems. Upon obtaining the medical history from Mr. Suhail Michelle regarding today's office visit for his presenting problems, Mr. Suhail Michelle states he has been doing fair. He says it has been harder to move around but managing. Patient is complaining of his back hurting worse than his legs. He states he has pain going into left leg. Patient mentions he is using Lyrica along with Voltaren. Patient denies any side effects with the medications. He states he has changes in insurance, now his medications costing more. ALLERGIES: Patients list of allergies were reviewed     MEDICATIONS: Mr. Suhail Michelle list of medications were reviewed. His current medications are   Outpatient Medications Prior to Visit   Medication Sig Dispense Refill    furosemide (LASIX) 20 MG tablet TAKE ONE TO TWO TABLETS BY MOUTH EVERY MORNING  AS NEEDED FOR EDEMA 60 tablet 0    DULoxetine (CYMBALTA) 60 MG extended release capsule Take 1 capsule by mouth daily 30 capsule 1    diclofenac (VOLTAREN) 75 MG EC tablet Take 1 tablet by mouth 2 times daily as needed for Pain 60 tablet 1 -[place] was obtained and reviewed  for the last one year and no indicators of drug misuse  were found. Any other controlled substance prescriptions  seen on the record have been accounted for, I am aware of the patient receiving these medications. Marlys Hernandez [place] record will be rechecked as part of office protocol.   -He was advised to increase fluids ( 5-7  glasses of fluid daily), limit caffeine, avoid cheese products, increase dietary fiber, increase activity and exercise as tolerated and relax regularly and enjoy meals   -walking/stretching exercises as advised    -Gerardo exercises given  -He was advised weight reduction, diet changes- 800-1200 roc diet, diet diary, exercising, nutritional  consult increased physical activity as tolerated   -continue with Percocet 4 per day  -Adv Biofeedback, relaxation and meditation techniques. Referral to psychologist for CBT was also discussed with patient      Current Outpatient Medications   Medication Sig Dispense Refill    oxyCODONE-acetaminophen (PERCOCET) 7.5-325 MG per tablet Take 1 tablet by mouth every 6 hours as needed for Pain (max 4 per day) for up to 28 days. 112 tablet 0    pregabalin (LYRICA) 150 MG capsule Take 1 capsule by mouth 3 times daily for 30 days.  90 capsule 0    furosemide (LASIX) 20 MG tablet TAKE ONE TO TWO TABLETS BY MOUTH EVERY MORNING  AS NEEDED FOR EDEMA 60 tablet 0    DULoxetine (CYMBALTA) 60 MG extended release capsule Take 1 capsule by mouth daily 30 capsule 1    diclofenac (VOLTAREN) 75 MG EC tablet Take 1 tablet by mouth 2 times daily as needed for Pain 60 tablet 1    losartan (COZAAR) 100 MG tablet Take 1 tablet by mouth daily 30 tablet 5    penicillin v potassium (VEETID) 500 MG tablet TAKE TWO TABLETS BY MOUTH TWICE A  tablet 10    vitamin B-12 (CYANOCOBALAMIN) 1000 MCG tablet Take 3,000 mcg by mouth daily       Compression Stockings MISC by Does not apply route Thigh high 1 each 0  Elastic Bandages & Supports (MEDICAL COMPRESSION THIGH HIGH) MISC Thigh high compression stockings, 30-40 mm Hg 2 each 1     No current facility-administered medications for this visit. I will continue his current medication regimen  which is part of the above treatment schedule. It has been helping with Mr. 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 Risks and benefits of the medications and other alternative treatments  including no treatment were discussed with the patient. The common side effects of these medications were also explained to the patient. Informed verbal consent was obtained. Goals of current treatment regimen include improvement in pain, restoration of functioning- with focus on improvement in physical performance, general activity, work or disability,emotional distress, health care utilization and  decreased medication consumption. Will continue to monitor progress towards achieving/maintaining therapeutic goals with special emphasis on  1. Improvement in perceived interfernce  of pain with ADL's. Ability to do home exercises independently. Ability to do household chores indoor and/or outdoor work and social and leisure activities. Improve psychosocial and physical functioning. - he is showing progression towards this treatment goal with the current regimen. He was advised against drinking alcohol with the narcotic pain medicines, advised against driving or handling machinery while adjusting the dose of medicines or if having cognitive  issues related to the current medications. Risk of overdose and death, if medicines not taken as prescribed, were also discussed. If the patient develops new symptoms or if the symptoms worsen, the patient should call the office. While transcribing every attempt was made to maintain the accuracy of the note in terms of it's contents,there may have been some errors made inadvertently. Thank you for allowing me to participate in the care of this patient.     Adelita Lewis MD.    Cc: Sammi Allen MD

## 2021-02-01 ENCOUNTER — OFFICE VISIT (OUTPATIENT)
Dept: FAMILY MEDICINE CLINIC | Age: 46
End: 2021-02-01
Payer: COMMERCIAL

## 2021-02-01 VITALS
HEIGHT: 75 IN | DIASTOLIC BLOOD PRESSURE: 70 MMHG | OXYGEN SATURATION: 95 % | TEMPERATURE: 100.4 F | HEART RATE: 76 BPM | WEIGHT: 315 LBS | BODY MASS INDEX: 39.17 KG/M2 | SYSTOLIC BLOOD PRESSURE: 138 MMHG

## 2021-02-01 DIAGNOSIS — I87.2 CHRONIC VENOUS STASIS DERMATITIS OF BOTH LOWER EXTREMITIES: ICD-10-CM

## 2021-02-01 DIAGNOSIS — R03.0 ELEVATED BLOOD PRESSURE READING: ICD-10-CM

## 2021-02-01 DIAGNOSIS — E66.01 CLASS 3 SEVERE OBESITY DUE TO EXCESS CALORIES WITHOUT SERIOUS COMORBIDITY WITH BODY MASS INDEX (BMI) OF 50.0 TO 59.9 IN ADULT (HCC): Primary | Chronic | ICD-10-CM

## 2021-02-01 DIAGNOSIS — G47.33 OSA (OBSTRUCTIVE SLEEP APNEA): ICD-10-CM

## 2021-02-01 PROCEDURE — 99214 OFFICE O/P EST MOD 30 MIN: CPT | Performed by: FAMILY MEDICINE

## 2021-02-01 SDOH — ECONOMIC STABILITY: FOOD INSECURITY: WITHIN THE PAST 12 MONTHS, YOU WORRIED THAT YOUR FOOD WOULD RUN OUT BEFORE YOU GOT MONEY TO BUY MORE.: NEVER TRUE

## 2021-02-01 SDOH — ECONOMIC STABILITY: INCOME INSECURITY: HOW HARD IS IT FOR YOU TO PAY FOR THE VERY BASICS LIKE FOOD, HOUSING, MEDICAL CARE, AND HEATING?: NOT HARD AT ALL

## 2021-02-01 SDOH — ECONOMIC STABILITY: TRANSPORTATION INSECURITY
IN THE PAST 12 MONTHS, HAS THE LACK OF TRANSPORTATION KEPT YOU FROM MEDICAL APPOINTMENTS OR FROM GETTING MEDICATIONS?: NO

## 2021-02-01 ASSESSMENT — PATIENT HEALTH QUESTIONNAIRE - PHQ9
SUM OF ALL RESPONSES TO PHQ QUESTIONS 1-9: 0
SUM OF ALL RESPONSES TO PHQ9 QUESTIONS 1 & 2: 0
SUM OF ALL RESPONSES TO PHQ QUESTIONS 1-9: 0
1. LITTLE INTEREST OR PLEASURE IN DOING THINGS: 0
2. FEELING DOWN, DEPRESSED OR HOPELESS: 0
SUM OF ALL RESPONSES TO PHQ QUESTIONS 1-9: 0

## 2021-02-01 NOTE — PROGRESS NOTES
Garret Majano is a 39 y.o. male. HPI: For complex medical visit and follow-up  At last visit he was considering bariatric surgery, was started on losartan 100 mg a day for his high blood pressure, and had some ear difficulties and was given the name to see for ENT  He never did see the ENT  He tells me he is not using CPAP and has been about 15 years  He is also got some right elbow pain for last week and a half or so as he plays a lot of video games and uses a computer a lot  It hurts even to  1 pound object  Meds, vitamins and allergies reviewed with pt    ROS: No TIA's or unusual headaches, no dysphagia. No prolonged cough. No dyspnea or chest pain on exertion. No abdominal pain, change in bowel habits, black or bloody stools. No urinary tract symptoms. No new or unusual musculoskeletal symptoms. Prior to Visit Medications    Medication Sig Taking? Authorizing Provider   oxyCODONE-acetaminophen (PERCOCET) 7.5-325 MG per tablet Take 1 tablet by mouth every 6 hours as needed for Pain (max 4 per day) for up to 28 days. Yes Lupillo Knowles MD   pregabalin (LYRICA) 150 MG capsule Take 1 capsule by mouth 3 times daily for 30 days.  Yes Lupillo Knowles MD   furosemide (LASIX) 20 MG tablet TAKE ONE TO TWO TABLETS BY MOUTH EVERY MORNING  AS NEEDED FOR EDEMA Yes Suzanna Choudhury MD   DULoxetine (CYMBALTA) 60 MG extended release capsule Take 1 capsule by mouth daily Yes Lupillo Knowles MD   diclofenac (VOLTAREN) 75 MG EC tablet Take 1 tablet by mouth 2 times daily as needed for Pain Yes Lupillo Knowles MD   losartan (COZAAR) 100 MG tablet Take 1 tablet by mouth daily Yes Suzanna Choudhury MD   penicillin v potassium (VEETID) 500 MG tablet TAKE TWO TABLETS BY MOUTH TWICE A DAY Yes Kika Bhatia MD   vitamin B-12 (CYANOCOBALAMIN) 1000 MCG tablet Take 3,000 mcg by mouth daily  Yes Historical Provider, MD   Compression Stockings MISC by Does not apply route Thigh high Yes Kika Bhatia MD Elastic Bandages & Supports (MEDICAL COMPRESSION THIGH HIGH) MISC Thigh high compression stockings, 30-40 mm Hg Yes Saint Cleverly, MD       Past Medical History:   Diagnosis Date    Arthritis     Cellulitis     Chronic back pain     Hepatitis age 24    HNP (herniated nucleus pulposus with myelopathy), thoracic     Melanoma ([address]     left foot     Obesity 2011    ANN-MARIE (obstructive sleep apnea)     NO CPAP     Stasis dermatitis        Social History     Tobacco Use    Smoking status: Former Smoker     Types: Cigarettes     Quit date: 2005     Years since quittin.0    Smokeless tobacco: Never Used   Substance Use Topics    Alcohol use: No     Alcohol/week: 0.0 standard drinks     Comment: rarely     Drug use: No       Family History   Problem Relation Age of Onset    Cancer Mother     COPD Paternal Grandfather     Colon Cancer Maternal Grandfather        No Known Allergies    OBJECTIVE:  BP (!) 160/72   Pulse 76   Temp 100.4 °F (38 °C)   Ht 6' 3\" (1.905 m)   Wt (!) 482 lb (218.6 kg)   SpO2 95%   BMI 60.25 kg/m²   GEN:  in NAD, morbidly obese. CV:  Regular rate and rhythm, S1 and S2 normal, no murmurs, clicks  EXT: No rash or edema. Right elbow full range of motion point tender over the lateral malleolus radial pulse 2+  NEURO: nonfocal    ASSESSMENT/PLAN:  1. Class 3 severe obesity due to excess calories without serious comorbidity with body mass index (BMI) of 50.0 to 59.9 in adult [facility])  Bariatric surgery consultation this week    2. Chronic venous stasis dermatitis of both lower extremities  stable  Needs further weight loss    3. Elevated blood pressure reading  Much improved  To new present medication    4 h/o melanoma  Sees derm q 6 hr.     5OSA - not using CPAP  Refer sleep    6 chronic back pain  stable    7 IMM/HM  Considering Covid vaccine when available  Declines flu shot  Due for labs at next visit    8 right lateral epicondylitis tennis elbow band Voltaren 75 twice daily  Heat   rest  Consider cortisone shot as needed

## 2021-02-03 ENCOUNTER — OFFICE VISIT (OUTPATIENT)
Dept: BARIATRICS/WEIGHT MGMT | Age: 46
End: 2021-02-03

## 2021-02-03 VITALS
HEART RATE: 83 BPM | BODY MASS INDEX: 39.17 KG/M2 | TEMPERATURE: 97.3 F | WEIGHT: 315 LBS | HEIGHT: 75 IN | SYSTOLIC BLOOD PRESSURE: 151 MMHG | DIASTOLIC BLOOD PRESSURE: 77 MMHG

## 2021-02-03 DIAGNOSIS — G47.33 OSA (OBSTRUCTIVE SLEEP APNEA): ICD-10-CM

## 2021-02-03 DIAGNOSIS — E66.01 MORBID OBESITY WITH BMI OF 50.0-59.9, ADULT (HCC): Primary | ICD-10-CM

## 2021-02-03 PROCEDURE — 99204 OFFICE O/P NEW MOD 45 MIN: CPT | Performed by: SURGERY

## 2021-02-07 NOTE — PROGRESS NOTES
Formerly [facility]) Physicians   General & Laparoscopic Surgery  [employer]      HPI:    Sophie Castle is a very pleasant 55 y.o. obese male ,   Body mass index is 59.72 kg/m². And multiple medical problems who is presenting for weight loss surgery evaluation and consultation by Dr. Renuka Ardon. Patient has been struggling for several years now with obesity. Patient feels the weight is an obstacle to achieve and perform things in daily living as well risk on health. Tries to diet, and exercise but can't keep the weight off. Patient tried other regimens, but with no sustainable weight loss. Patient  is very determined to lose weight and be healthy, and is interested in surgical weight loss to achieve this goal.    Otherwise patient denies any nausea, vomiting, fevers, chills, shortness of breath, chest pain, constipation or urinary symptoms.         Pain Assessment   Denies any abdominal pain     Past Medical History:   Diagnosis Date    Arthritis     Cellulitis     Chronic back pain     Hepatitis age 24    HNP (herniated nucleus pulposus with myelopathy), thoracic     Melanoma ([facility] [address]     left foot     Obesity 2011    ANN-MARIE (obstructive sleep apnea)     NO CPAP     Stasis dermatitis      Past Surgical History:   Procedure Laterality Date    FOOT SURGERY Left 2019    WIDE EXCISION OF LEFT FOOT MELANOMA performed by Adore Kumari MD at [address] [place] [address] Left 2/10/2020    LEFT FOOT MELANOMA WIDE EXCISION AND FULL THICKNESS SKIN GRAFT,LEFT GROIN SENTINEL LYMPH NODE BIOPSY performed by Adore Kumari MD at [facility]  [address]    [address] [place] [address]     Family History   Problem Relation Age of Onset    Cancer Mother     COPD Paternal Grandfather     Colon Cancer Maternal Grandfather      Social History     Tobacco Use    Smoking status: Former Smoker     Types: Cigarettes     Quit date: 2005     Years since quittin.1  Smokeless tobacco: Never Used   Substance Use Topics    Alcohol use: No     Alcohol/week: 0.0 standard drinks     Comment: rarely      I counseled the patient on the importance of not smoking and risks of ETOH. No Known Allergies  Vitals:    02/03/21 1127   BP: (!) 151/77   Pulse: 83   Temp: 97.3 °F (36.3 °C)   Weight: (!) 477 lb 12.8 oz (216.7 kg)   Height: 6' 3\" (1.905 m)       Body mass index is 59.72 kg/m². Current Outpatient Medications:     oxyCODONE-acetaminophen (PERCOCET) 7.5-325 MG per tablet, Take 1 tablet by mouth every 6 hours as needed for Pain (max 4 per day) for up to 28 days. , Disp: 112 tablet, Rfl: 0    pregabalin (LYRICA) 150 MG capsule, Take 1 capsule by mouth 3 times daily for 30 days. , Disp: 90 capsule, Rfl: 0    furosemide (LASIX) 20 MG tablet, TAKE ONE TO TWO TABLETS BY MOUTH EVERY MORNING  AS NEEDED FOR EDEMA, Disp: 60 tablet, Rfl: 0    DULoxetine (CYMBALTA) 60 MG extended release capsule, Take 1 capsule by mouth daily, Disp: 30 capsule, Rfl: 1    diclofenac (VOLTAREN) 75 MG EC tablet, Take 1 tablet by mouth 2 times daily as needed for Pain, Disp: 60 tablet, Rfl: 1    losartan (COZAAR) 100 MG tablet, Take 1 tablet by mouth daily, Disp: 30 tablet, Rfl: 5    penicillin v potassium (VEETID) 500 MG tablet, TAKE TWO TABLETS BY MOUTH TWICE A DAY, Disp: 120 tablet, Rfl: 10    vitamin B-12 (CYANOCOBALAMIN) 1000 MCG tablet, Take 3,000 mcg by mouth daily , Disp: , Rfl:     Compression Stockings MISC, by Does not apply route Thigh high, Disp: 1 each, Rfl: 0    Elastic Bandages & Supports (MEDICAL COMPRESSION THIGH HIGH) MISC, Thigh high compression stockings, 30-40 mm Hg, Disp: 2 each, Rfl: 1      Review of Systems - History obtained from the patient  General ROS: negative  Psychological ROS: negative  Ophthalmic ROS: negative  Neurological ROS: negative  ENT ROS: negative  Allergy and Immunology ROS: negative  Hematological and Lymphatic ROS: negative  Endocrine ROS: negative Respiratory ROS: negative  Cardiovascular ROS: negative  Gastrointestinal ROS:negative  Genito-Urinary ROS: negative  Musculoskeletal ROS: negative   Skin ROS: negative    Physical Exam   Constitutional: Patient is oriented to person, place, and time. Vital signs are normal. Patient  appears well-developed and well-nourished. Patient  is active and cooperative. Non-toxic appearance. No distress. HENT:   Head: Normocephalic and atraumatic. Head is without laceration. Right Ear: External ear normal. No lacerations. No drainage, swelling or tenderness. Left Ear: External ear normal. No lacerations. No drainage, swelling or tenderness. Nose: Nose normal. No nose lacerations or nasal deformity. Mouth/Throat: Uvula is midline, oropharynx is clear and moist and mucous membranes are normal. No oropharyngeal exudate. Eyes: Conjunctivae, EOM and lids are normal. Pupils are equal, round, and reactive to light. Right eye exhibits no discharge. No foreign body present in the right eye. Left eye exhibits no discharge. No foreign body present in the left eye. No scleral icterus. Neck: Trachea normal and normal range of motion. Neck supple. No JVD present. No tracheal tenderness present. Carotid bruit is not present. No rigidity. No tracheal deviation and no edema present. No thyromegaly present. Cardiovascular: Normal rate, regular rhythm, normal heart sounds, intact distal pulses and normal pulses. Pulmonary/Chest: Effort normal and breath sounds normal. No stridor. No respiratory distress. Patient  has no wheezes. Patient has no rales. Patient exhibits no tenderness and no crepitus. Abdominal: Soft. Normal appearance and bowel sounds are normal. Patient exhibits no distension, no abdominal bruit, no ascites and no mass. There is no hepatosplenomegaly. There is no tenderness. There is no rigidity, no rebound, no guarding and no CVA tenderness. No hernia. Hernia confirmed negative in the ventral area. Musculoskeletal: Normal range of motion. Patient exhibits no edema or tenderness. Lymphadenopathy:        Head (right side): No submental, no submandibular, no preauricular, no posterior auricular and no occipital adenopathy present. Head (left side): No submental, no submandibular, no preauricular, no posterior auricular and no occipital adenopathy present. Patient  has no cervical adenopathy. Right: No supraclavicular adenopathy present. Left: No supraclavicular adenopathy present. Neurological: Patient is alert and oriented to person, place, and time. Patient has normal strength. Coordination and gait normal. GCS eye subscore is 4. GCS verbal subscore is 5. GCS motor subscore is 6. Skin: Skin is warm and dry. No abrasion and no rash noted. Patient  is not diaphoretic. No cyanosis or erythema. Psychiatric: Patient has a normal mood and affect. speech is normal and behavior is normal. Cognition and memory are normal.             A/P  Asa Ibarra is a very pleasant 55 y.o. male with Obesity,  Body mass index is 59.72 kg/m². and multiple obesity related co-morbidities. Asa Ibarra is very motivated to lose weight and being more healthy. We discussed how his weight affects his overall health including:  Dayday Franklin was seen today for weight loss. Diagnoses and all orders for this visit:    Morbid obesity with BMI of 50.0-59.9[facility]  -     CBC Auto Differential; Future  -     Comprehensive Metabolic Panel; Future  -     Hemoglobin A1C; Future  -     Iron and TIBC; Future  -     Lipid Panel; Future  -     TSH with Reflex; Future  -     Vitamin B12 & Folate; Future  -     Vitamin D 25 Hydroxy;  Future  -     Jamaica Ceja MD, Sleep Medicine, Salomon Morales MD, Cardiology, [place]    ANN-MARIE (obstructive sleep apnea)

## 2021-02-22 ENCOUNTER — VIRTUAL VISIT (OUTPATIENT)
Dept: PAIN MANAGEMENT | Age: 46
End: 2021-02-22
Payer: COMMERCIAL

## 2021-02-22 DIAGNOSIS — S33.9XXD SPRAIN OF LIGAMENT OF LUMBOSACRAL JOINT, SUBSEQUENT ENCOUNTER: ICD-10-CM

## 2021-02-22 DIAGNOSIS — S83.92XA SPRAIN OF LEFT KNEE/LEG, INITIAL ENCOUNTER: ICD-10-CM

## 2021-02-22 DIAGNOSIS — S83.92XD SPRAIN OF LEFT KNEE/LEG, SUBSEQUENT ENCOUNTER: ICD-10-CM

## 2021-02-22 DIAGNOSIS — S33.9XXA SPRAIN OF LIGAMENT OF LUMBOSACRAL JOINT, INITIAL ENCOUNTER: ICD-10-CM

## 2021-02-22 DIAGNOSIS — M51.27 LUMBAGO-SCIATICA DUE TO DISPLACEMENT OF LUMBAR INTERVERTEBRAL DISC: ICD-10-CM

## 2021-02-22 PROCEDURE — 99213 OFFICE O/P EST LOW 20 MIN: CPT | Performed by: INTERNAL MEDICINE

## 2021-02-22 RX ORDER — DULOXETIN HYDROCHLORIDE 60 MG/1
60 CAPSULE, DELAYED RELEASE ORAL DAILY
Qty: 30 CAPSULE | Refills: 1 | Status: SHIPPED | OUTPATIENT
Start: 2021-02-22 | End: 2021-03-25 | Stop reason: SDUPTHER

## 2021-02-22 RX ORDER — OXYCODONE AND ACETAMINOPHEN 7.5; 325 MG/1; MG/1
1 TABLET ORAL EVERY 6 HOURS PRN
Qty: 112 TABLET | Refills: 0 | Status: SHIPPED | OUTPATIENT
Start: 2021-02-22 | End: 2021-03-25 | Stop reason: SDUPTHER

## 2021-02-22 RX ORDER — DICLOFENAC SODIUM 75 MG/1
75 TABLET, DELAYED RELEASE ORAL 2 TIMES DAILY PRN
Qty: 60 TABLET | Refills: 1 | Status: SHIPPED | OUTPATIENT
Start: 2021-02-22 | End: 2021-03-25 | Stop reason: SDUPTHER

## 2021-02-22 RX ORDER — PREGABALIN 150 MG/1
150 CAPSULE ORAL 3 TIMES DAILY
Qty: 90 CAPSULE | Refills: 0 | Status: SHIPPED | OUTPATIENT
Start: 2021-02-22 | End: 2021-03-25 | Stop reason: SDUPTHER

## 2021-02-22 NOTE — PROGRESS NOTES
As per information/history obtained from the PADT(patient assessment and documentation tool) - He complains of pain in the lower back with radiation to the buttocks, hips Left, upper leg Left and knees Left He rates the pain 4/10 and describes it as sharp, aching, burning, stabbing. Pain is made worse by: walking, standing. Current treatment regimen has helped relieve about 70% of the pain. He denies side effects from the current pain regimen. Patient reports that since the last follow up visit the physical functioning is unchanged, family/social relationships are unchanged, mood is unchanged and sleep patterns are unchanged, and that the overall functioning is worse. Patient denies neurological bowel or bladder. Patient denies misusing/abusing his narcotic pain medications or using any illegal drugs. There are No indicators for possible drug abuse, addiction or diversion problems. Upon obtaining the medical history from Mr. Jose Cruz Alvarez regarding today's office visit for his presenting problems, patient states she has been doing fair and the pian has been manageable. She says she is using Voltaren along with Cymbalta. She reports she is working full time still. She complains her back and legs hurting more. She mentions she is using Percocet  4 per day. ALLERGIES: Patients list of allergies were reviewed     MEDICATIONS: Mr. Jose Cruz Alvarez list of medications were reviewed. His current medications are   Outpatient Medications Prior to Visit   Medication Sig Dispense Refill    furosemide (LASIX) 20 MG tablet TAKE ONE TO TWO TABLETS BY MOUTH EVERY MORNING  AS NEEDED FOR EDEMA 60 tablet 0    losartan (COZAAR) 100 MG tablet Take 1 tablet by mouth daily 30 tablet 5    penicillin v potassium (VEETID) 500 MG tablet TAKE TWO TABLETS BY MOUTH TWICE A  tablet 10    vitamin B-12 (CYANOCOBALAMIN) 1000 MCG tablet Take 3,000 mcg by mouth daily  Compression Stockings MISC by Does not apply route Thigh high 1 each 0    Elastic Bandages & Supports (MEDICAL COMPRESSION THIGH HIGH) MISC Thigh high compression stockings, 30-40 mm Hg 2 each 1    oxyCODONE-acetaminophen (PERCOCET) 7.5-325 MG per tablet Take 1 tablet by mouth every 6 hours as needed for Pain (max 4 per day) for up to 28 days. 112 tablet 0    pregabalin (LYRICA) 150 MG capsule Take 1 capsule by mouth 3 times daily for 30 days. 90 capsule 0    DULoxetine (CYMBALTA) 60 MG extended release capsule Take 1 capsule by mouth daily 30 capsule 1    diclofenac (VOLTAREN) 75 MG EC tablet Take 1 tablet by mouth 2 times daily as needed for Pain 60 tablet 1     No facility-administered medications prior to visit. REVIEW OF SYSTEMS:    Respiratory: Negative for apnea, chest tightness and shortness of breath or change in baseline breathing. PHYSICAL EXAM:   Nursing note and vitals reviewed. There were no vitals taken for this visit. Constitutional: He appears well-developed and well-nourished. No acute distress. Cardiovascular: Normal rate, regular rhythm, normal heart sounds, and does not have murmur. Pulmonary/Chest: Effort normal. No respiratory distress. He does not have wheezes in the lung fields. He has no rales. Neurological/Psychiatric:He is alert and oriented to person, place, and time. Coordination is  normal.  His mood isAppropriate and affect is Neutral/Euthymic(normal) . His    IMPRESSION:   1.  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 BWC Sprain of ligament of lumbosacral joint, subsequent encounter    3. BWC Sprain of left knee/leg, subsequent encounter    4. BWC Sprain of ligament of lumbosacral joint, initial encounter    5.  BWC Sprain of left knee/leg, initial encounter        PLAN:  Informed verbal consent was obtained  -back stretching exercises as advised -He was advised to increase fluids ( 5-7  glasses of fluid daily), limit caffeine, avoid cheese products, increase dietary fiber, increase activity and exercise as tolerated and relax regularly and enjoy meals   -continue with Percocet 4 per day  -Gerardo exercises as advised   -if symptoms continues will consider TPI lumbar spine  -He was advised weight reduction, diet changes- 800-1200 roc diet, diet diary, exercising, nutritional  consult increased physical activity as tolerated     Current Outpatient Medications   Medication Sig Dispense Refill    oxyCODONE-acetaminophen (PERCOCET) 7.5-325 MG per tablet Take 1 tablet by mouth every 6 hours as needed for Pain (max 4 per day) for up to 28 days. 112 tablet 0    pregabalin (LYRICA) 150 MG capsule Take 1 capsule by mouth 3 times daily for 30 days. 90 capsule 0    DULoxetine (CYMBALTA) 60 MG extended release capsule Take 1 capsule by mouth daily 30 capsule 1    diclofenac (VOLTAREN) 75 MG EC tablet Take 1 tablet by mouth 2 times daily as needed for Pain 60 tablet 1    furosemide (LASIX) 20 MG tablet TAKE ONE TO TWO TABLETS BY MOUTH EVERY MORNING  AS NEEDED FOR EDEMA 60 tablet 0    losartan (COZAAR) 100 MG tablet Take 1 tablet by mouth daily 30 tablet 5    penicillin v potassium (VEETID) 500 MG tablet TAKE TWO TABLETS BY MOUTH TWICE A  tablet 10    vitamin B-12 (CYANOCOBALAMIN) 1000 MCG tablet Take 3,000 mcg by mouth daily       Compression Stockings MISC by Does not apply route Thigh high 1 each 0    Elastic Bandages & Supports (MEDICAL COMPRESSION THIGH HIGH) MISC Thigh high compression stockings, 30-40 mm Hg 2 each 1     No current facility-administered medications for this visit. I will continue his current medication regimen  which is part of the above treatment schedule.  It has been helping with Mr. German Rice chronic  medical problems which for this visit include: Diagnoses of  BWC Lumbago-sciatica due to displacement of lumbar intervertebral disc, BWC Sprain of ligament of lumbosacral joint, subsequent encounter, BWC Sprain of left knee/leg, subsequent encounter, BWC Sprain of ligament of lumbosacral joint, initial encounter, and BWC Sprain of left knee/leg, initial encounter were pertinent to this visit. Risks and benefits of the medications and other alternative treatments  including no treatment were discussed with the patient. The common side effects of these medications were also explained to the patient. Informed verbal consent was obtained. Goals of current treatment regimen include improvement in pain, restoration of functioning- with focus on improvement in physical performance, general activity, work or disability,emotional distress, health care utilization and  decreased medication consumption. Will continue to monitor progress towards achieving/maintaining therapeutic goals with special emphasis on  1. Improvement in perceived interfernce  of pain with ADL's. Ability to do home exercises independently. Ability to do household chores indoor and/or outdoor work and social and leisure activities. Improve psychosocial and physical functioning. - he is showing progression towards this treatment goal with the current regimen. He was advised against drinking alcohol with the narcotic pain medicines, advised against driving or handling machinery while adjusting the dose of medicines or if having cognitive  issues related to the current medications. Risk of overdose and death, if medicines not taken as prescribed, were also discussed. If the patient develops new symptoms or if the symptoms worsen, the patient should call the office. While transcribing every attempt was made to maintain the accuracy of the note in terms of it's contents,there may have been some errors made inadvertently. Thank you for allowing me to participate in the care of this patient. Todd Camejo MD.    Cc: Renetta Mccallum MD

## 2021-02-24 RX ORDER — FUROSEMIDE 20 MG/1
TABLET ORAL
Qty: 180 TABLET | Refills: 3 | Status: SHIPPED | OUTPATIENT
Start: 2021-02-24 | End: 2022-03-03

## 2021-02-24 NOTE — TELEPHONE ENCOUNTER
Medication:   Requested Prescriptions     Pending Prescriptions Disp Refills    furosemide (LASIX) 20 MG tablet [Pharmacy Med Name: FUROSEMIDE 20 MG TABLET] 60 tablet 0     Sig: TAKE ONE TO TWO TABLETS BY MOUTH EVERY MORNING AS NEEDED FOR EDEMA       Last Filled:  1/13/21 #60, 0 RF     Patient Phone Number: [phone] (home) [phone] (work)    Last appt: 2/1/2021  HTN   Next appt: Visit date not found    Lab Results   Component Value Date     09/20/2019    K 4.2 09/20/2019    CL 97 (L) 09/20/2019    CO2 27 09/20/2019    BUN 13 09/20/2019    CREATININE 0.7 (L) 09/20/2019    GLUCOSE 107 (H) 09/20/2019    CALCIUM 9.2 09/20/2019    PROT 7.0 09/20/2019    LABALBU 4.5 09/20/2019    BILITOT 0.6 09/20/2019    ALKPHOS 80 09/20/2019    AST 35 09/20/2019    ALT 34 09/20/2019    LABGLOM >60 09/20/2019    GFRAA >60 09/20/2019    AGRATIO 1.8 09/20/2019    GLOB 2.5 09/20/2019

## 2021-03-03 ENCOUNTER — OFFICE VISIT (OUTPATIENT)
Dept: BARIATRICS/WEIGHT MGMT | Age: 46
End: 2021-03-03

## 2021-03-03 VITALS
BODY MASS INDEX: 39.17 KG/M2 | HEIGHT: 75 IN | HEART RATE: 94 BPM | SYSTOLIC BLOOD PRESSURE: 153 MMHG | DIASTOLIC BLOOD PRESSURE: 103 MMHG | WEIGHT: 315 LBS

## 2021-03-03 DIAGNOSIS — G47.33 OSA (OBSTRUCTIVE SLEEP APNEA): ICD-10-CM

## 2021-03-03 DIAGNOSIS — E66.01 MORBID OBESITY WITH BMI OF 50.0-59.9, ADULT (HCC): Primary | ICD-10-CM

## 2021-03-03 PROCEDURE — 99213 OFFICE O/P EST LOW 20 MIN: CPT | Performed by: SURGERY

## 2021-03-03 NOTE — PROGRESS NOTES
Nikolai Harris lost 4.6 lbs over 1 month. Pt reports being a little more active after cortisone shot in knee. Pt has increased frequency of meals. Breakfast: 6a - greek yogurt with granola     Snack: 10-10:30 - granola bar with banana or fruit cup    Lunch: 1-1:30 - leftovers    Snack: sometimes - CC     Dinner: 7-7:30p - chix with veggies and sometimes sweet potato with sugar, cinnamon and butter / white chix chili / chix tortilla soup with tortilla chips and sour cream / fast food 2x week - burger with fries or pizza    Snack: sometimes - cookies    Is pt consuming smaller portions? No    Is pt consuming at least 64 oz of fluids per day? Yes - approx 1 gal water     Is pt consuming carbonated, caffeinated, or sugary beverages? Yes -  sweet tea, usually 1 soft drink daily and 1 energy drink daily (lightly carbonated)    Has pt sampled Unjury and/or Nectar protein?  No, reviewed and encouraged    Exercise: pt trying to be more active throughout day    Plan/Recommendations:   - Continue to eat 4-5x a day to include protein with each meal and snack  - Choose foods with 10 grams or less of sugar and 5 grams or less of fat  - Work to eliminate caffeine, carbonation and energy drinks  - Try protein powders    Handouts: Frozen Meals, Protein Shake and Protein Bar    Fred Lopez

## 2021-03-09 ENCOUNTER — OFFICE VISIT (OUTPATIENT)
Dept: DERMATOLOGY | Age: 46
End: 2021-03-09
Payer: COMMERCIAL

## 2021-03-09 VITALS — TEMPERATURE: 97.3 F

## 2021-03-09 DIAGNOSIS — D22.9 MULTIPLE BENIGN MELANOCYTIC NEVI: ICD-10-CM

## 2021-03-09 DIAGNOSIS — L91.8 ACROCHORDON: ICD-10-CM

## 2021-03-09 DIAGNOSIS — L83 ACANTHOSIS NIGRICANS: ICD-10-CM

## 2021-03-09 DIAGNOSIS — D23.9 BLUE NEVUS: ICD-10-CM

## 2021-03-09 DIAGNOSIS — Z85.820 PERSONAL HISTORY OF MALIGNANT MELANOMA: ICD-10-CM

## 2021-03-09 DIAGNOSIS — D18.01 CHERRY ANGIOMA: ICD-10-CM

## 2021-03-09 DIAGNOSIS — L72.0 EPIDERMAL INCLUSION CYST: ICD-10-CM

## 2021-03-09 DIAGNOSIS — L90.5 SCAR CONDITION AND FIBROSIS OF SKIN: ICD-10-CM

## 2021-03-09 DIAGNOSIS — D48.5 NEOPLASM OF UNCERTAIN BEHAVIOR OF SKIN: Primary | ICD-10-CM

## 2021-03-09 DIAGNOSIS — L73.8 SEBACEOUS HYPERPLASIA: ICD-10-CM

## 2021-03-09 PROCEDURE — 99214 OFFICE O/P EST MOD 30 MIN: CPT | Performed by: DERMATOLOGY

## 2021-03-09 PROCEDURE — 11306 SHAVE SKIN LESION 0.6-1.0 CM: CPT | Performed by: DERMATOLOGY

## 2021-03-09 NOTE — PATIENT INSTRUCTIONS
Wound care instructions    1. Keep bandage dry for 24 hrs. 2. After 24 hrs, clean with soap and water twice daily  3. Apply Vaseline or Aquaphor ointment twice daily and cover with small dressing or Band-Aid for 7-10 days. Studies show that wounds heal better when covered with ointment and a bandage. FREQUENTLY ASKED QUESTIONS:    Dhillon It is OK to get the wound wet when washing or bathing.  If bleeding should occur, apply firm direct pressure for 20 minutes CONTINUOUSLY. After 20 minutes, you may check to see if bleeding has stopped. If bleeding persists, please repeat CONTINUOUS PRESSURE for another 20 minutes WITHOUT LOOKING. o If bleeding still persists, call the office at [phone]   Signs of infection include increased redness extending over 1 centimeter from the wound, increased warmth, yellow to green purulent (pus-like) discharge, and significantly increased tenderness to the touch. If you have any concerns regarding infection or develop fevers or chills, please call [phone]   You will be contacted with the results of your procedure when we receive them, usually within two weeks. Please call if you have not heard from us.

## 2021-03-09 NOTE — PROGRESS NOTES
Patient's Name: Jennifer Frankel  MRN: [id number]  YOB: 1975  Date of Visit: 3/9/2021  Primary Care Provider: Flower Allen MD  Referring Provider: No ref. provider found    Subjective:     Chief Complaint   Patient presents with    Follow-up     3 month, hx of melanoma. History of Present Illness:  Jennifer Frankel a 55 y.o. male with h/o acral melanoma of left foot is a follow up patient to my practice. They were last seen in clinic on 12/8/21       He has no concerning lesions today. Denies any changing, bleeding, painful, or itching lesions. Denies fever, chills, or unintentional weight loss. Denies chest pain shortness of breath. Denies nausea, diarrhea, or vomiting. positive personal history of melanoma (acral melanoma of left foot)  negative personal history of abnormal/dysplastic moles  negative personal history of tanning bed use  negative blistering sunburns     Patient currently is noncompliant with using sun-protective measures. Past medical/surgical/family history reviewed with no changes since last visit on 12/8/20        Allergies:  No Known Allergies    Current Medications:  Current Outpatient Medications   Medication Sig Dispense Refill    furosemide (LASIX) 20 MG tablet TAKE ONE TO TWO TABLETS BY MOUTH EVERY MORNING AS NEEDED FOR EDEMA 180 tablet 3    oxyCODONE-acetaminophen (PERCOCET) 7.5-325 MG per tablet Take 1 tablet by mouth every 6 hours as needed for Pain (max 4 per day) for up to 28 days. 112 tablet 0    pregabalin (LYRICA) 150 MG capsule Take 1 capsule by mouth 3 times daily for 30 days.  90 capsule 0    DULoxetine (CYMBALTA) 60 MG extended release capsule Take 1 capsule by mouth daily 30 capsule 1    diclofenac (VOLTAREN) 75 MG EC tablet Take 1 tablet by mouth 2 times daily as needed for Pain 60 tablet 1    losartan (COZAAR) 100 MG tablet Take 1 tablet by mouth daily 30 tablet 5    penicillin v potassium (VEETID) 500 MG tablet TAKE TWO TABLETS BY MOUTH TWICE A  tablet 10    vitamin B-12 (CYANOCOBALAMIN) 1000 MCG tablet Take 3,000 mcg by mouth daily       Elastic Bandages & Supports (MEDICAL COMPRESSION THIGH HIGH) MISC Thigh high compression stockings, 30-40 mm Hg 2 each 1    Compression Stockings MISC by Does not apply route Thigh high 1 each 0     No current facility-administered medications for this visit. Review of Systems:  Constitutional: No fevers, chills or recent illness. Skin: Skin:As per HPI AND otherwise no new, bleeding or symptomatic skin lesions      Objective:     Vitals:    03/09/21 1238   Temp: 97.3 °F (36.3 °C)       Physical Examination:  General: alert, comfortable, no apparent distress, well-appearing  Psych: alert, oriented and pleasant  Neuro: oriented to person, place, and time  Skin: Areas examined: head including face, lips, conjunctiva and lids, neck, hair/scalp, chest, including breasts and axilla, abdomen, back, buttocks, right upper extremity, left upper extremity, right lower extremity, left lower extremity, left hand, right hand, digits and nails, Right foot, Left foot and toe nails      All areas examined were within normal limits except those listed below with the appropriate assessment and plan    Assessment and Plan (with relevant objective exam findings): 1. Neoplasm uncertain behavior, skin  Location: left anterior ankle  Objective findings:  8mm red brown firm papulonodule  Ddx: DF versus dysplastic nevus  Shave removal today. See procedure note below. The specimen was sent to pathology for diagnosis. We will call patient or send note on Mychart with biopsy results as soon as they are available, and discuss treatment options as needed. Wound care was discussed and written instructions also given to patient.        2.  Personal history of malignant melanoma of left plantar foot s/p WLE and full thickness graft  Location/objective findings: Left medial plantar foot well healed graft with no re-pigmentation or nodularity. Light brown irregular pigmentation noted which appears epidermal in location. Will monitor for changes  Melanoma surveillance every 3 months. Patient was advised to follow up with Dr. Lisa Shah. He will call and schedule and appointment. 3. Multiple benign melanocytic nevi   Location: posterior neck, chest, abdomen, arms, back  Objective findings: multiple brown/pink macules and papules without abnormal findings or concerning findings on exam and dermatoscopy    -Counseled the patient that these are benign and need no therapy. Observation for any changes recommended     4. Sebaceous hyperplasia. Location:  forehead, cheeks  Objective findings: yellow umbilicated papule(s) with blood vessels overlying them on dermatoscopy. We discussed that this is a benign growth consisting of clogged and swollen oil glands most likely due to a combination of genetic factors and sun damage. Decision was made to do the following:  Observation/no treatment        5. Acanthosis nigricans. Location: Anterior and posterior neck  Objective findings: brown/pink velvet like plaques    Counseled patient that Acanthosis Nigricans can be treated with keratolytics such as ammonium lactate, salicylic acid, or urea. It can be associated with diabetes and obesity. No treatment desired by patient. He is working towards weight loss surgery. Diet, exercise, and weight loss to attempt to gain better control of weight and insulin resistance. 6. Skin Tags/Acrochordons  Location: axilla, neck  Objective findings: brown and tan , pedunculated papules     Discussed that skin tags are benign and can be removed with a sharp tool, or destroyed with liquid nitrogen or other methods (tooth floss, string, etc). After discussion of r/b/a decision was made to do the following:  Patient elected to treat at home as he has done previously.       7.  Scar conditions and fibrosis of skin  Location: right lower back  Objective findings: scar like papule that is brown in color and round in shape/configuration    Counseled the patient that this is benign and needs no therapy. Observation for any changes recommended     8. Epidermal Inclusion Cyst  Location: Right mid back    Objective findings: subcutaneous papulonodule(s) which central pore   Patient was reassured of its benign nature, and that if it grows or is/becomes painful, it should be reassessed and can be excised. Observation, with treatment/excision only if the cyst/mass becomes symptomatic, grows, or becomes painful    9. Cherry Hemangiomas  Location: posterior neck, chest    Objective findings: Multiple bright red, dome-shaped papules     Discussed that these are benign lesions and require no treatment. Removal is usually not done unless they bleed or are irritated. Patient elected no treatment    10. Blue nevus  Location/objective findings: dorsal left 3rd distal phalanx is ~2mm blue/gray homogenous well defined macule  No change from previous exam.      Follow up:  Return visit in 3 month or as needed for change in condition. All questions addressed. Procedure:     PROCEDURE: REMOVAL BY SHAVE TECHNIQUE     Location:     Left anterior ankle  Indication:    Neoplasm uncertain behavior, skin  Size: 8 mm    Risks, benefits and alternatives were explained and verbal informed consent was obtained. The area was photographed with the patient's permission The area was cleaned with alcohol and infiltrated with 1% lidocaine with epinephrine. After adequate analgesia had been confirmed, the lesion was then removed by scoop shave technique using a dermablade. Hemostasis was achieved with aluminum chloride. Vaseline ointment and sterile dressing were applied and wound care instructions were given verbally and in writing. The patient tolerated the procedure well with no complications.  The patient will be notified by mail or telephone of pathology results and was instructed to call if he has not received notification within two weeks.     CPT code for procedure:  Locations:    0.6 to 1.0 cm  -  50279          Senthil Pacheco MD. MS

## 2021-03-09 NOTE — Clinical Note
I instructed patient to call and schedule follow up with you, as he had missed several appointments before. It looks like he is scheduled with you on 3/16.

## 2021-03-11 LAB — DERMATOLOGY PATHOLOGY REPORT: NORMAL

## 2021-03-12 ENCOUNTER — TELEPHONE (OUTPATIENT)
Dept: SURGERY | Age: 46
End: 2021-03-12

## 2021-03-12 ENCOUNTER — OFFICE VISIT (OUTPATIENT)
Dept: FAMILY MEDICINE CLINIC | Age: 46
End: 2021-03-12
Payer: COMMERCIAL

## 2021-03-12 VITALS
SYSTOLIC BLOOD PRESSURE: 138 MMHG | HEART RATE: 73 BPM | TEMPERATURE: 96.4 F | WEIGHT: 315 LBS | DIASTOLIC BLOOD PRESSURE: 80 MMHG | OXYGEN SATURATION: 94 % | BODY MASS INDEX: 59.75 KG/M2

## 2021-03-12 DIAGNOSIS — R32 URINARY INCONTINENCE, UNSPECIFIED TYPE: Primary | ICD-10-CM

## 2021-03-12 LAB
BILIRUBIN, POC: NORMAL
BLOOD URINE, POC: NORMAL
CLARITY, POC: CLEAR
COLOR, POC: YELLOW
GLUCOSE URINE, POC: NORMAL
KETONES, POC: NORMAL
LEUKOCYTE EST, POC: NORMAL
NITRITE, POC: NORMAL
PH, POC: 8.5
PROTEIN, POC: NORMAL
SPECIFIC GRAVITY, POC: 1.02
UROBILINOGEN, POC: 0.2

## 2021-03-12 PROCEDURE — 99213 OFFICE O/P EST LOW 20 MIN: CPT | Performed by: FAMILY MEDICINE

## 2021-03-12 PROCEDURE — 81002 URINALYSIS NONAUTO W/O SCOPE: CPT | Performed by: FAMILY MEDICINE

## 2021-03-12 RX ORDER — CIPROFLOXACIN 500 MG/1
500 TABLET, FILM COATED ORAL 2 TIMES DAILY
Qty: 20 TABLET | Refills: 0 | Status: SHIPPED | OUTPATIENT
Start: 2021-03-12 | End: 2021-03-22

## 2021-03-12 NOTE — TELEPHONE ENCOUNTER
Patient states that Dr. Alexandria Mcdonald took out lymph nodes in his groin a few years back and he now has something about the size of a pimple near the surgical area    The sore is leaking clear liquid with no scent    Patient saw his PCP and was put on antibiotics but they are not helping    Please contact the patient  [phone]

## 2021-03-12 NOTE — TELEPHONE ENCOUNTER
Patient states when he woke this morning he had noticed a wey area in his shorts. States the area is about 6: long and oval in shape in his left groin where surgical incision is. States area is hard, non tender or red in color. States liquid draining is clear. Denies fever;s. Patient saw PCP today who started him on Cipro 500 MG B. I.D for 10 day's. RN will discuss with Dr. Berna Nugent and advise patient.

## 2021-03-12 NOTE — PROGRESS NOTES
[address] Note    Date: 3/12/2021                                               Subjective/Objective:     Chief Complaint   Patient presents with    Incontinence     this morning        HPI   1 episode of urinary incontinence this morning. Pt woke up and went to his recliner and fell asleep and when he woke up a little later he was wet. Took a shower and walked out to his car and noticed he was wet again. No dysuria. No urinary symptoms prior to this. No abdominal pain. Is sexually active with long time female partner. No rash, no bruise.          Patient Active Problem List    Diagnosis Date Noted    Neoplasm of uncertain behavior of skin 12/03/2019    Cellulitis 10/17/2018    Cellulitis of left lower extremity 10/16/2018    Morbid obesity with BMI of 50.0-59.9, adult ([address] 10/16/2018    Chronic venous stasis dermatitis of both lower extremities 01/30/2016     [facility] Lumbago-sciatica due to displacement of lumbar intervertebral disc 11/02/2015     [facility] Sprain of ligament of lumbosacral joint 07/13/2015     [facility] Sprain of left knee/leg 07/13/2015    Chronic back pain 04/08/2015    Elevated blood pressure reading 03/10/2014    Obesity 01/12/2011    HNP (herniated nucleus pulposus with myelopathy), thoracic     ANN-MARIE (obstructive sleep apnea)        Past Medical History:   Diagnosis Date    Arthritis     Cellulitis     Chronic back pain     Hepatitis age 24    HNP (herniated nucleus pulposus with myelopathy), thoracic     [facility] [address]     left foot     Obesity 1/12/2011    ANN-MARIE (obstructive sleep apnea)     NO CPAP     Stasis dermatitis        Current Outpatient Medications   Medication Sig Dispense Refill    ciprofloxacin (CIPRO) 500 MG tablet Take 1 tablet by mouth 2 times daily for 10 days 20 tablet 0    furosemide (LASIX) 20 MG tablet TAKE ONE TO TWO TABLETS BY MOUTH EVERY MORNING AS NEEDED FOR EDEMA 180 tablet 3    oxyCODONE-acetaminophen (PERCOCET) 7.5-325 MG per tablet Take 1 tablet by mouth every 6 hours as needed for Pain (max 4 per day) for up to 28 days. 112 tablet 0    pregabalin (LYRICA) 150 MG capsule Take 1 capsule by mouth 3 times daily for 30 days. 90 capsule 0    DULoxetine (CYMBALTA) 60 MG extended release capsule Take 1 capsule by mouth daily 30 capsule 1    diclofenac (VOLTAREN) 75 MG EC tablet Take 1 tablet by mouth 2 times daily as needed for Pain 60 tablet 1    losartan (COZAAR) 100 MG tablet Take 1 tablet by mouth daily 30 tablet 5    penicillin v potassium (VEETID) 500 MG tablet TAKE TWO TABLETS BY MOUTH TWICE A  tablet 10    vitamin B-12 (CYANOCOBALAMIN) 1000 MCG tablet Take 3,000 mcg by mouth daily       Compression Stockings MISC by Does not apply route Thigh high 1 each 0    Elastic Bandages & Supports (MEDICAL COMPRESSION THIGH HIGH) MISC Thigh high compression stockings, 30-40 mm Hg 2 each 1     No current facility-administered medications for this visit. No Known Allergies    Review of Systems   No fever, no vomiting    Vitals:  /80   Pulse 73   Temp 96.4 °F (35.8 °C)   Wt (!) 478 lb (216.8 kg)   SpO2 94%   BMI 59.75 kg/m²     Physical Exam   General:  Well-appearing, NAD, alert, non-toxic  HEENT:  Normocephalic, atraumatic. CHEST/LUNGS: No dyspnea  EXTREMETIES: Normal movement of all extremities. No edema. No joint swelling. SKIN:  No rash, no cellulitis, no bruising, no petechiae/purpura/vesicles/pustules/abscess  PSYCH:  A+O x 3; normal affect  NEURO:  GCS 15, CN2-12 grossly intact, no focal motor/sensory deficits, normal gait, normal speech      Assessment/Plan     51yo male with urinary incontinence. This is of unclear etiology. Could be due to UTI. Will treat empirically with cipro. Check urine culture. If symptoms don't resolve, may refer to urology.     Discussed with patient medication/s dosage, instructions, potential S/E, A/R and Drug Interaction  Tylenol or Motrin as needed for pain or fever  Advise to return here if worse or go to nearest ER  Encourage fluids  Pt discharged in stable condition at 11:11      1. Urinary incontinence, unspecified type    - POCT Urinalysis no Micro  - Culture, Urine  - ciprofloxacin (CIPRO) 500 MG tablet; Take 1 tablet by mouth 2 times daily for 10 days  Dispense: 20 tablet; Refill: 0         Orders Placed This Encounter   Procedures    Culture, Urine     Order Specific Question:   Specify (ex-cath, midstream, cysto, etc)? Answer:   midstream    POCT Urinalysis no Micro       No follow-ups on file.     Earnestine Costa MD    3/12/2021  11:10 AM

## 2021-03-13 LAB — URINE CULTURE, ROUTINE: NORMAL

## 2021-03-14 NOTE — PROGRESS NOTES
[facility]) Physicians   General & Laparoscopic Surgery  [employer]       HPI:     Jennifer Frankel is a very pleasant 55 y.o. male with Body mass index is 59.15 kg/m². , Pre-Surgery. Pre-operative clearance and work up pending. Working hard to keep good dietary habits as well level of activity. Patient denies any nausea, vomiting, fevers, chills, shortness of breath, chest pain, cough, constipation or difficulty urinating. Past Medical History:   Diagnosis Date    Arthritis     Cellulitis     Chronic back pain     Hepatitis age 24    HNP (herniated nucleus pulposus with myelopathy), thoracic     Melanoma ([facility] [address]     left foot     Obesity 2011    ANN-MARIE (obstructive sleep apnea)     NO CPAP     Stasis dermatitis      Past Surgical History:   Procedure Laterality Date    FOOT SURGERY Left 2019    WIDE EXCISION OF LEFT FOOT MELANOMA performed by Luzmaria Ruelas MD at [facility] [place] [place] Left 2/10/2020    LEFT FOOT MELANOMA WIDE EXCISION AND FULL THICKNESS SKIN GRAFT,LEFT GROIN SENTINEL LYMPH NODE BIOPSY performed by Luzmaria Ruelas MD at [facility]  [address] [place] [address]     Family History   Problem Relation Age of Onset    Cancer Mother     COPD Paternal Grandfather     Colon Cancer Maternal Grandfather      Social History     Tobacco Use    Smoking status: Former Smoker     Types: Cigarettes     Quit date: 2005     Years since quittin.2    Smokeless tobacco: Never Used   Substance Use Topics    Alcohol use: No     Alcohol/week: 0.0 standard drinks     Comment: rarely      I counseled the patient on the importance of not smoking and risks of ETOH. No Known Allergies  Vitals:    21 1257   BP: (!) 153/103   Pulse: 94   Weight: (!) 473 lb 3.2 oz (214.6 kg)   Height: 6' 3\" (1.905 m)       Body mass index is 59.15 kg/m².       Current Outpatient Medications:     furosemide (LASIX) 20 MG tablet, TAKE ONE TO TWO TABLETS BY MOUTH EVERY MORNING AS NEEDED FOR EDEMA, Disp: 180 tablet, Rfl: 3    oxyCODONE-acetaminophen (PERCOCET) 7.5-325 MG per tablet, Take 1 tablet by mouth every 6 hours as needed for Pain (max 4 per day) for up to 28 days. , Disp: 112 tablet, Rfl: 0    pregabalin (LYRICA) 150 MG capsule, Take 1 capsule by mouth 3 times daily for 30 days. , Disp: 90 capsule, Rfl: 0    DULoxetine (CYMBALTA) 60 MG extended release capsule, Take 1 capsule by mouth daily, Disp: 30 capsule, Rfl: 1    diclofenac (VOLTAREN) 75 MG EC tablet, Take 1 tablet by mouth 2 times daily as needed for Pain, Disp: 60 tablet, Rfl: 1    losartan (COZAAR) 100 MG tablet, Take 1 tablet by mouth daily, Disp: 30 tablet, Rfl: 5    penicillin v potassium (VEETID) 500 MG tablet, TAKE TWO TABLETS BY MOUTH TWICE A DAY, Disp: 120 tablet, Rfl: 10    vitamin B-12 (CYANOCOBALAMIN) 1000 MCG tablet, Take 3,000 mcg by mouth daily , Disp: , Rfl:     Compression Stockings MISC, by Does not apply route Thigh high, Disp: 1 each, Rfl: 0    Elastic Bandages & Supports (MEDICAL COMPRESSION THIGH HIGH) MISC, Thigh high compression stockings, 30-40 mm Hg, Disp: 2 each, Rfl: 1    ciprofloxacin (CIPRO) 500 MG tablet, Take 1 tablet by mouth 2 times daily for 10 days, Disp: 20 tablet, Rfl: 0      Review of Systems - History obtained from the patient  General ROS: negative  Psychological ROS: negative  Endocrine ROS: negative  Respiratory ROS: negative  Cardiovascular ROS: negative  Gastrointestinal ROS:negative  Genito-Urinary ROS: negative  Musculoskeletal ROS: negative   Skin ROS: negative    Physical Exam   Vitals Reviewed   Constitutional: Patient is oriented to person, place, and time. Patient appears well-developed and well-nourished. Patient is active and cooperative. Non-toxic appearance. No distress. Neck: Trachea normal and normal range of motion. No JVD present. Pulmonary/Chest: Effort normal. No accessory muscle usage or stridor. No apnea. No respiratory distress. Cardiovascular: Normal rate and no JVD. Abdominal: Normal appearance. Patient exhibits no distension. Abdomen is soft, obese, non tender. Musculoskeletal: Normal range of motion. Patient exhibits no edema. Neurological: Patient is alert and oriented to person, place, and time. Patient has normal strength. GCS eye subscore is 4. GCS verbal subscore is 5. GCS motor subscore is 6. Skin: Skin is warm and dry. No abrasion and no rash noted. Patient is not diaphoretic. No cyanosis or erythema. Psychiatric: Patient has a normal mood and affect. Speech is normal and behavior is normal. Cognition and memory are normal.       A/P    Kelechi Contreras is 55 y.o. male, Body mass index is 59.15 kg/m². pre surgery, has lost 4.6# since last visit. The patient underwent dietary counseling with registered dietician. I have reviewed, discussed and agree with the dietary plan. Patient is trying hard to keep good dietary and behavior modifications. Patient is monitoring portion sizes, food choices and liquid calories. Patient is trying to exercise regularly as much as possible. We discussed how his weight affects his overall health including:  Eliza Bush was seen today for weight management. Diagnoses and all orders for this visit:    Morbid obesity with BMI of 50.0-59.9, adult (HCC)    ANN-MARIE (obstructive sleep apnea)       and importance of weight loss to alleviate those co morbid conditions. I encouraged the patient to continue exercise and keeping healthy eating habits. Discussed pre-op labs and work up till now. Also counseled the patient extensively on Surgery. Total encounter time: 20 minutes including any number of the following: review of labs, imaging, provider notes, outside hospital records; performing examination/evaluation; counseling patient and family; ordering medications/tests; placing referrals and communication with referring physicians; coordination of care, and documentation in the EHR.      RTC in 4 weeks  Obtain rest of pre-op work up / clearances  Diet and Exercise      Patient advised that its their responsibility to follow up for studies and/or labs ordered today.      Jurgen Ramirez

## 2021-03-16 ENCOUNTER — OFFICE VISIT (OUTPATIENT)
Dept: SURGERY | Age: 46
End: 2021-03-16
Payer: COMMERCIAL

## 2021-03-16 VITALS
BODY MASS INDEX: 39.17 KG/M2 | HEART RATE: 93 BPM | DIASTOLIC BLOOD PRESSURE: 91 MMHG | WEIGHT: 315 LBS | SYSTOLIC BLOOD PRESSURE: 175 MMHG | TEMPERATURE: 97.9 F | HEIGHT: 75 IN | OXYGEN SATURATION: 98 %

## 2021-03-16 DIAGNOSIS — C43.72 MALIGNANT MELANOMA OF LEFT LOWER EXTREMITY INCLUDING HIP (HCC): Primary | ICD-10-CM

## 2021-03-16 PROCEDURE — 99213 OFFICE O/P EST LOW 20 MIN: CPT | Performed by: SURGERY

## 2021-03-16 NOTE — PROGRESS NOTES
Melanoma Follow-up  Joseph Weber MD  Surgical Oncology  [phone]    Date of service: 3/16/2021     Oscar Jimenez    Initial Melanoma:   Location: foot    Date initially Treated  12/2019        Thickness 1.5 mm. Subsequent Melanoma: no    Oscar Jimenez returns for follow up of his melanoma of foot. Had a liquid stain on inner wear for one day but having now. He is otherwise doing well. Today He denies new subcutaneous mass, headache, bone pain, cough, abdominal pain, or suspicious new lesions. He is being followed by Dr. Jazmine Lopez. Following with Dr. [facility]. No other complaints. Review of systems is otherwise negative    Past medical history, Past surgical history, Social history, Family history and allergies reviewed and updated. Vitals:    03/16/21 1249   BP: (!) 175/91   Pulse: 93   Temp: 97.9 °F (36.6 °C)   TempSrc: Temporal   SpO2: 98%   Weight: (!) 484 lb (219.5 kg)   Height: 6' 3\" (1.905 m)     Wt Readings from Last 3 Encounters:   03/16/21 (!) 484 lb (219.5 kg)   03/12/21 (!) 478 lb (216.8 kg)   03/03/21 (!) 473 lb 3.2 oz (214.6 kg)     Body mass index is 60.5 kg/m². O/E:   Constitutional: Patient is oriented to person, place, and time. No distress. HENT: mucus membranes - moist. No scleral icterus. Neck: Supple and normal range of motion. No bilateral lymphadenopathy present. Pulmonary/Chest: Effort normal. No respiratory distress. Abdominal: Soft. No distension and no mass. No tenderness. No Hepatosplenomegaly. Extremities: no edema and no tenderness. Neurological: Grossly intact motor and sensory exam  Skin: Skin is warm and dry. No rash noted. He is not diaphoretic. Surgical site:    Incision normal: Yes   Surrounding nodularity: No   Abnormal pigmentation: No   Other lesions: No  Lymph nodes: Palpable regional lymph nodes: No  Psychiatric: He has a normal mood and affect.  His behavior is normal. Judgment and thought content normal.     A/P:    Diagnosis Orders   1. Malignant melanoma of left lower extremity including hip (HCC)       No obvious palpable lymph nodes  No evidence of recurrent melanoma  No evidence of new primary melanoma    Plan: The chance of developing a new melanoma is present and thus importance of follow up with dermatologist is explained. Symptoms and signs of recurrence explained including headache, jaundice, cough, abdominal pain and blood in stools. Follow up in 6 months.

## 2021-03-22 ENCOUNTER — TELEPHONE (OUTPATIENT)
Dept: PAIN MANAGEMENT | Age: 46
End: 2021-03-22

## 2021-03-22 NOTE — TELEPHONE ENCOUNTER
Patient states his last appt with [facility] was 2/28 and was given RX oxyCODONE-acetaminophen (PERCOCET) 7.5-325 MG for 28 days. Patient f/u appt is 3/29 and will be out of medication before his f/u patinet requesting new Rx for medication to hold him until his VV next Monday. Jameson Maddox.

## 2021-03-23 NOTE — TELEPHONE ENCOUNTER
Called patient to advise him that we can see him on Thursday 03/25/21 at 2:00 pm. Patient voiced his understanding.

## 2021-03-25 ENCOUNTER — VIRTUAL VISIT (OUTPATIENT)
Dept: PAIN MANAGEMENT | Age: 46
End: 2021-03-25
Payer: COMMERCIAL

## 2021-03-25 DIAGNOSIS — S33.9XXA SPRAIN OF LIGAMENT OF LUMBOSACRAL JOINT, INITIAL ENCOUNTER: ICD-10-CM

## 2021-03-25 DIAGNOSIS — S83.92XA SPRAIN OF LEFT KNEE/LEG, INITIAL ENCOUNTER: ICD-10-CM

## 2021-03-25 DIAGNOSIS — M51.27 LUMBAGO-SCIATICA DUE TO DISPLACEMENT OF LUMBAR INTERVERTEBRAL DISC: ICD-10-CM

## 2021-03-25 DIAGNOSIS — S83.92XD SPRAIN OF LEFT KNEE/LEG, SUBSEQUENT ENCOUNTER: ICD-10-CM

## 2021-03-25 DIAGNOSIS — S33.9XXD SPRAIN OF LIGAMENT OF LUMBOSACRAL JOINT, SUBSEQUENT ENCOUNTER: ICD-10-CM

## 2021-03-25 PROCEDURE — 99214 OFFICE O/P EST MOD 30 MIN: CPT | Performed by: INTERNAL MEDICINE

## 2021-03-25 RX ORDER — OXYCODONE AND ACETAMINOPHEN 7.5; 325 MG/1; MG/1
1 TABLET ORAL EVERY 6 HOURS PRN
Qty: 112 TABLET | Refills: 0 | Status: SHIPPED | OUTPATIENT
Start: 2021-03-25 | End: 2021-04-22 | Stop reason: SDUPTHER

## 2021-03-25 RX ORDER — PREGABALIN 150 MG/1
150 CAPSULE ORAL 3 TIMES DAILY
Qty: 90 CAPSULE | Refills: 0 | Status: SHIPPED | OUTPATIENT
Start: 2021-03-25 | End: 2021-04-22 | Stop reason: SDUPTHER

## 2021-03-25 RX ORDER — DICLOFENAC SODIUM 75 MG/1
75 TABLET, DELAYED RELEASE ORAL 2 TIMES DAILY PRN
Qty: 60 TABLET | Refills: 1 | Status: SHIPPED | OUTPATIENT
Start: 2021-03-25 | End: 2021-05-19 | Stop reason: SDUPTHER

## 2021-03-25 RX ORDER — DULOXETIN HYDROCHLORIDE 60 MG/1
60 CAPSULE, DELAYED RELEASE ORAL DAILY
Qty: 30 CAPSULE | Refills: 1 | Status: SHIPPED | OUTPATIENT
Start: 2021-03-25 | End: 2021-04-22 | Stop reason: SDUPTHER

## 2021-03-25 NOTE — PROGRESS NOTES
TELE HEALTH VISIT (AUDIO-VISUAL)    Pursuant to the emergency declaration under the [facility], [facility] waiver authority and the [employer] Act, this Virtual  Visit was conducted, with patient's/legal guardian's consent, to reduce the patient's risk of exposure to COVID-19 and provide continuity of care for an established patient. Service is  provided through a video synchronous discussion virtually to substitute for in-person clinic visit. Due to this being a TeleHealth encounter (During [facility]- public health emergency), evaluation of the following organ systems was limited: Vitals/Constitutional/EENT/Resp/CV/GI//MS/Neuro/Skin/Vjkq-Dmei-Zyq. Dennysmary Gurjit  1975  [id number]    Mr. Harris is being seen virtually for a follow up visit using one of the following techniques  Google Duo, Face time or Doxy. me  Informed verbal consent to the virtual visit was obtained from Mr. Diaz Griffith. Risks associated with HIPPA compliance with the virtual visit was explained to the patient. Mr. Diaz Griffith is at his residence and Dr. Bushra Myrick is in his office. HISTORY OF PRESENT ILLNESS:  Mr. Diaz Griffith is a 55 y.o. male returns for a follow up visit for multiple medical problems. His current presenting problems are   1. 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 BWC Sprain of left knee/leg, subsequent encounter    3. BWC Sprain of left knee/leg, initial encounter    4. BWC Sprain of ligament of lumbosacral joint, subsequent encounter    5. BWC Sprain of ligament of lumbosacral joint, initial encounter    . As per information/history obtained from the PADT(patient assessment and documentation tool) - He complains of pain in the lower back He rates the pain 4/10 and describes it as aching, burning, stabbing. Pain is made worse by: walking.   Current treatment regimen has helped relieve about 70% of the pain.  He denies side effects from the current pain regimen. Patient reports that since the last follow up visit the physical functioning is unchanged, family/social relationships are unchanged, mood is unchanged and sleep patterns are unchanged, and that the overall functioning is unchanged. Patient denies neurological bowel or bladder. Patient denies misusing/abusing his narcotic pain medications or using any illegal drugs. There are No indicators for possible drug abuse, addiction or diversion problems. Upon obtaining the medical history from Mr. Suhail Michelle regarding today's office visit for his presenting problems, patient reports he has been doing fair, pain has been toelrable with he medications. He says he has been doing slightly better. He mentions he is using Melatonin helps. He denies any GERD symptoms. He says he is using Voltaren as needed. ALLERGIES/PAST MED/FAM/SOC HISTORY: Mr. Suhail Michelle allergies, past medical, family and social history were reviewed in the chart.       Mr. Suhail Michelle current medications are   Outpatient Medications Prior to Visit   Medication Sig Dispense Refill    furosemide (LASIX) 20 MG tablet TAKE ONE TO TWO TABLETS BY MOUTH EVERY MORNING AS NEEDED FOR EDEMA 180 tablet 3    DULoxetine (CYMBALTA) 60 MG extended release capsule Take 1 capsule by mouth daily 30 capsule 1    diclofenac (VOLTAREN) 75 MG EC tablet Take 1 tablet by mouth 2 times daily as needed for Pain 60 tablet 1    losartan (COZAAR) 100 MG tablet Take 1 tablet by mouth daily 30 tablet 5    penicillin v potassium (VEETID) 500 MG tablet TAKE TWO TABLETS BY MOUTH TWICE A  tablet 10    vitamin B-12 (CYANOCOBALAMIN) 1000 MCG tablet Take 3,000 mcg by mouth daily       Compression Stockings MISC by Does not apply route Thigh high 1 each 0    Elastic Bandages & Supports (MEDICAL COMPRESSION THIGH HIGH) MISC Thigh high compression stockings, 30-40 mm Hg 2 each 1    pregabalin (LYRICA) 150 MG capsule Take 1 capsule by mouth 3 times daily for 30 days. 90 capsule 0     No facility-administered medications prior to visit. REVIEW OF SYSTEMS:     Respiratory: Negative for shortness of breath. Cardiovascular: Negative for chest pain, palpitations  Gastrointestinal: Negative for blood in stool, abdominal distention, nausea, vomiting, abdominal pain, diarrhea,constipation. Neurological: Negative for speech difficulty, weakness and light-headedness, dizziness, tremors, sleepiness  Psychiatric/Behavioral: Negative for suicidal ideas, hallucinations, behavioral problems, self-injury, decreased concentration/cognition, agitation, confusion. PHYSICAL EXAM:   Nursing note and vitals reviewed. There were no vitals taken for this visit. General Appearance: Patient is well nourished, well developed, well groomed and in no acute distress. Skin: Skin is warm and dry, good turgor . No rash or lesions noted. He is not diaphoretic. Pulmonary/Chest: Effort normal. No respiratory distress or use of accessory muscles. Auscultation revealing normal air entry. He does not have wheezes in the lung fields. He has no rales. Cardiovascular: Normal rate, regular rhythm, normal heart sounds, and does not have murmur. Exam reveals no gallop and no friction rub. Musculoskeletal / Extremities: Range of motion is normal. Gait is normal, assistive devices use: none. He exhibits edema: none, and no tenderness. Neurological/Psychiatric:He is alert and oriented to person, place, and time. Coordination is  normal.   Judgement and Insight is normal  His mood is Appropriate and affect is Neutral/Euthymic(normal) . His behavior is normal.   thought content normal.        IMPRESSION:     1.  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 BWC Sprain of left knee/leg, subsequent encounter    3. BWC Sprain of left knee/leg, initial encounter    4.  BWC Sprain of ligament of lumbosacral joint, subsequent encounter    5. BWC Sprain of ligament of lumbosacral joint, initial encounter        PLAN:  Informed verbal consent was obtained.  -Continue with current opioid regimen Percocet per day   -He was advised to increase fluids ( 5-7  glasses of fluid daily), limit caffeine, avoid cheese products, increase dietary fiber, increase activity and exercise as tolerated and relax regularly and enjoy meals   -ROM/Stretching exercises as advised   -Walking as tolerated 20-30 minutes daily   -Continue with Voltaren 7 mg daily   -He was advised weight reduction, diet changes- 800-1200 roc diet, diet diary, exercising, nutritional  consult increased physical activity as tolerated   Mr. 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   Medications/orders associated with this visit:    Current Outpatient Medications   Medication Sig Dispense Refill    furosemide (LASIX) 20 MG tablet TAKE ONE TO TWO TABLETS BY MOUTH EVERY MORNING AS NEEDED FOR EDEMA 180 tablet 3    DULoxetine (CYMBALTA) 60 MG extended release capsule Take 1 capsule by mouth daily 30 capsule 1    diclofenac (VOLTAREN) 75 MG EC tablet Take 1 tablet by mouth 2 times daily as needed for Pain 60 tablet 1    losartan (COZAAR) 100 MG tablet Take 1 tablet by mouth daily 30 tablet 5    penicillin v potassium (VEETID) 500 MG tablet TAKE TWO TABLETS BY MOUTH TWICE A  tablet 10    vitamin B-12 (CYANOCOBALAMIN) 1000 MCG tablet Take 3,000 mcg by mouth daily       Compression Stockings MISC by Does not apply route Thigh high 1 each 0    Elastic Bandages & Supports (MEDICAL COMPRESSION THIGH HIGH) MISC Thigh high compression stockings, 30-40 mm Hg 2 each 1    pregabalin (LYRICA) 150 MG capsule Take 1 capsule by mouth 3 times daily for 30 days. 90 capsule 0     No current facility-administered medications for this visit. Goals of current treatment regimen include improvement in pain, restoration of functioning- with focus on improvement in physical performance, general activity, work or disability,emotional distress, health care utilization and  decreased medication consumption. Will continue to monitor progress towards achieving/maintaining therapeutic goals with special emphasis on  1. Improvement in perceived interfernce  of pain with ADL's. Ability to do home exercises independently. Ability to do household chores indoor and/or outdoor work and social and leisure activities. To increase flexibility/ROM, strength and endurance. Improve psychosocial and physical functioning.- he is not showing any significant progress/or showing regression  towards this goal and reassessment and adjustment of goals/treatment have been made. 2. Improving sleep to 6-7 hours a night. Improve mood/ anxiety and depression symptoms such as crying spells, low energy, problems with concentration, motivation.- he is showing progression towards this treatment goal with the current regimen. 3. Reduction of reliance on opioid analgesia/more appropriate opioid use. - he is showing progression towards this treatment goal with the current regimen. 4. Ability to focus/concentrate at work and perform the duties required of him at work  Sit through a workday without lower extremity symptoms. Stand 30-60 minutes without lower extremity symptoms. Ability to lift up to 10-20 lbs. Ability to go up and down stairs. Sit 30-60 minutes  Without having to stand up frequently. - he is maintaining/progressing towards his work related goals with the current regimen. Risks and benefits of the medications and other alternative treatments have been/were  discussed with the patient. Any questions on the  common side effects of these medications were also answered. He was advised against drinking alcohol with the narcotic pain medicines, advised against driving or handling machinery when  starting or adjusting the dose of medicines, feeling groggy or drowsy, or if having any cognitive issues related to the current medications. Heis fully aware of the risk of overdose and death, if medicines are misused and not taken as prescribed. If he develops new symptoms or if the symptoms worsen, he was told to call the office. .  Thank you for allowing me to participate in the care of this patient.     Markus Grijalva MD.    Cc: Dilip Christianson MD

## 2021-04-05 ENCOUNTER — OFFICE VISIT (OUTPATIENT)
Dept: BARIATRICS/WEIGHT MGMT | Age: 46
End: 2021-04-05

## 2021-04-05 VITALS
SYSTOLIC BLOOD PRESSURE: 125 MMHG | BODY MASS INDEX: 39.17 KG/M2 | HEART RATE: 80 BPM | TEMPERATURE: 97.3 F | DIASTOLIC BLOOD PRESSURE: 70 MMHG | WEIGHT: 315 LBS | HEIGHT: 75 IN

## 2021-04-05 DIAGNOSIS — G47.33 OSA (OBSTRUCTIVE SLEEP APNEA): ICD-10-CM

## 2021-04-05 DIAGNOSIS — E66.01 MORBID OBESITY WITH BMI OF 50.0-59.9, ADULT (HCC): Primary | ICD-10-CM

## 2021-04-05 PROCEDURE — 99213 OFFICE O/P EST LOW 20 MIN: CPT | Performed by: SURGERY

## 2021-04-05 NOTE — PROGRESS NOTES
Kristin Leon gained 9 lbs over 1 month. Breakfast: 6a - 1/2-3/4c vanilla greek yogurt with ~ 1c granola / sugary cereal    Snack: sometimes 10a - oats and honey granola bar / banana / protein cheese puffs / [employer] bar    Lunch: leftovers / Frozen Meals with plain steamed veggies and yogurt    Dinner: 7-7:30 - eating more homemade foods - baked chix with veggies and sometimes rice or pasta with sauce / tortilla soup with tortilla chips    Snack: NV oats and honey bar    Is pt consuming smaller portions? Trying to be mindful but sometimes over on portions    Is pt consuming at least 64 oz of fluids per day? Yes - water    Is pt consuming carbonated, caffeinated, or sugary beverages? Yes - 1-2 diet coke daily + lightly carbonated energy drink    Has pt sampled Unjury and/or Nectar protein?  No, reviewed and encouraged    Exercise: trying to be more active throughout day    Plan/Recommendations:   - Switch NV granola bars to protein bars  - Mindful of portion sizes  - Decrease diet coke and energy drink to 2-3x week  - Try protein powders  - Complete support group    Handouts: Portion Control, 1800kcal LCMP + extra protein based snack, emailed Portion Control Support Group    Fred Lopez

## 2021-04-05 NOTE — PATIENT INSTRUCTIONS
Patient received dietary handouts and education.   - Switch NV granola bars to protein bars  - Mindful of portion sizes  - Decrease diet coke and energy drink to 2-3x week  - Try protein powders  - Complete support group    Handouts: Portion Control, 1800kcal LCMP + extra protein based snack, emailed Portion Control Support Group

## 2021-04-05 NOTE — PROGRESS NOTES
[facility]) Physicians   General & Laparoscopic Surgery  [employer]       HPI:     Shaheen Ochoa is a very pleasant 55 y.o. male with Body mass index is 60.27 kg/m². , Pre-Surgery. Pre-operative clearance and work up pending. Working hard to keep good dietary habits as well level of activity. Patient denies any nausea, vomiting, fevers, chills, shortness of breath, chest pain, cough, constipation or difficulty urinating. Past Medical History:   Diagnosis Date    Arthritis     Cellulitis     Chronic back pain     Hepatitis age 24    HNP (herniated nucleus pulposus with myelopathy), thoracic     Melanoma ([facility] [address]     left foot     Obesity 2011    ANN-MARIE (obstructive sleep apnea)     NO CPAP     Stasis dermatitis      Past Surgical History:   Procedure Laterality Date    FOOT SURGERY Left 2019    WIDE EXCISION OF LEFT FOOT MELANOMA performed by Denis Tyson MD at [address] [place] [address] Left 2/10/2020    LEFT FOOT MELANOMA WIDE EXCISION AND FULL THICKNESS SKIN GRAFT,LEFT GROIN SENTINEL LYMPH NODE BIOPSY performed by Denis Tyson MD at [address]  [address]    [place] Sandra     Family History   Problem Relation Age of Onset    Cancer Mother     COPD Paternal Grandfather     Colon Cancer Maternal Grandfather      Social History     Tobacco Use    Smoking status: Former Smoker     Types: Cigarettes     Quit date: 2005     Years since quittin.2    Smokeless tobacco: Never Used   Substance Use Topics    Alcohol use: No     Alcohol/week: 0.0 standard drinks     Comment: rarely      I counseled the patient on the importance of not smoking and risks of ETOH. No Known Allergies  Vitals:    21 1240   BP: 125/70   Pulse: 80   Temp: 97.3 °F (36.3 °C)   Weight: (!) 482 lb 3.2 oz (218.7 kg)   Height: 6' 3\" (1.905 m)       Body mass index is 60.27 kg/m².       Current Outpatient Medications:     DULoxetine (CYMBALTA) 60 MG extended release capsule, Take 1 capsule by mouth daily, Disp: 30 capsule, Rfl: 1    diclofenac (VOLTAREN) 75 MG EC tablet, Take 1 tablet by mouth 2 times daily as needed for Pain, Disp: 60 tablet, Rfl: 1    pregabalin (LYRICA) 150 MG capsule, Take 1 capsule by mouth 3 times daily for 30 days. , Disp: 90 capsule, Rfl: 0    oxyCODONE-acetaminophen (PERCOCET) 7.5-325 MG per tablet, Take 1 tablet by mouth every 6 hours as needed for Pain (max 4 per day) for up to 28 days. , Disp: 112 tablet, Rfl: 0    furosemide (LASIX) 20 MG tablet, TAKE ONE TO TWO TABLETS BY MOUTH EVERY MORNING AS NEEDED FOR EDEMA, Disp: 180 tablet, Rfl: 3    losartan (COZAAR) 100 MG tablet, Take 1 tablet by mouth daily, Disp: 30 tablet, Rfl: 5    penicillin v potassium (VEETID) 500 MG tablet, TAKE TWO TABLETS BY MOUTH TWICE A DAY, Disp: 120 tablet, Rfl: 10    vitamin B-12 (CYANOCOBALAMIN) 1000 MCG tablet, Take 3,000 mcg by mouth daily , Disp: , Rfl:     Compression Stockings MISC, by Does not apply route Thigh high, Disp: 1 each, Rfl: 0    Elastic Bandages & Supports (MEDICAL COMPRESSION THIGH HIGH) MISC, Thigh high compression stockings, 30-40 mm Hg, Disp: 2 each, Rfl: 1      Review of Systems - History obtained from the patient  General ROS: negative  Psychological ROS: negative  Endocrine ROS: negative  Respiratory ROS: negative  Cardiovascular ROS: negative  Gastrointestinal ROS:negative  Genito-Urinary ROS: negative  Musculoskeletal ROS: negative   Skin ROS: negative    Physical Exam   Vitals Reviewed   Constitutional: Patient is oriented to person, place, and time. Patient appears well-developed and well-nourished. Patient is active and cooperative. Non-toxic appearance. No distress. Neck: Trachea normal and normal range of motion. No JVD present. Pulmonary/Chest: Effort normal. No accessory muscle usage or stridor. No apnea. No respiratory distress. Cardiovascular: Normal rate and no JVD. Abdominal: Normal appearance.  Patient exhibits no distension. Abdomen is soft, obese, non tender. Musculoskeletal: Normal range of motion. Patient exhibits no edema. Neurological: Patient is alert and oriented to person, place, and time. Patient has normal strength. GCS eye subscore is 4. GCS verbal subscore is 5. GCS motor subscore is 6. Skin: Skin is warm and dry. No abrasion and no rash noted. Patient is not diaphoretic. No cyanosis or erythema. Psychiatric: Patient has a normal mood and affect. Speech is normal and behavior is normal. Cognition and memory are normal.       A/P    Marely Cm is 55 y.o. male, Body mass index is 60.27 kg/m². pre surgery, has gained 9# since last visit. The patient underwent dietary counseling with registered dietician. I have reviewed, discussed and agree with the dietary plan. Patient is trying hard to keep good dietary and behavior modifications. Patient is monitoring portion sizes, food choices and liquid calories. Patient is trying to exercise regularly as much as possible. We discussed how his weight affects his overall health including:  Delmer Gamboa was seen today for obesity. Diagnoses and all orders for this visit:    Morbid obesity with BMI of 50.0-59.9, adult (HCC)    ANN-MARIE (obstructive sleep apnea)       and importance of weight loss to alleviate those co morbid conditions. I encouraged the patient to continue exercise and keeping healthy eating habits. Discussed pre-op labs and work up till now. Also counseled the patient extensively on Surgery. Total encounter time: 20 minutes including any number of the following: review of labs, imaging, provider notes, outside hospital records; performing examination/evaluation; counseling patient and family; ordering medications/tests; placing referrals and communication with referring physicians; coordination of care, and documentation in the EHR.      RTC in 4 weeks  Obtain rest of pre-op work up / clearances  Diet and Exercise      Patient advised that its their responsibility to follow up for studies and/or labs ordered today.      Anali Feng

## 2021-04-07 ENCOUNTER — NURSE ONLY (OUTPATIENT)
Dept: PRIMARY CARE CLINIC | Age: 46
End: 2021-04-07
Payer: COMMERCIAL

## 2021-04-07 ENCOUNTER — TELEPHONE (OUTPATIENT)
Dept: BARIATRICS/WEIGHT MGMT | Age: 46
End: 2021-04-07

## 2021-04-07 DIAGNOSIS — Z01.818 PREOP EXAMINATION: Primary | ICD-10-CM

## 2021-04-07 PROCEDURE — 99211 OFF/OP EST MAY X REQ PHY/QHP: CPT | Performed by: NURSE PRACTITIONER

## 2021-04-07 NOTE — PROGRESS NOTES
ENDOSCOPY PREOP PHONE INTERVIEW  INSTRUCTIONS:      All patients having a procedure with sedation / anesthesia are required to be Covid tested. You will need to quarantine from the time you are tested until your procedure.  Follow all instructions / preps given to you from your doctor's office. If you have not received these instructions yet, please call the office immediately.  Enter the MAIN entrance located on [address] [place] [address] and report to the desk on left side of the lobby. Arrival Time: 0715 for your procedure scheduled at: [address] your insurance & photo ID with you.  Dress comfortably. No lotion, powders or jewelry   Bring an accurate list of your medications with doses/ frequency with you day of the procedure, including over the counter medications. If you are taking blood thinners, ASA or diabetic medication, make sure to call Dr. Jinny Barrera  or your PCP for instructions prior to your procedure.  Arrange for someone to be with you and sign you out & drive you home after your procedure.  We are allowing 1 visitor with you in the hospital.    [address] [place] AGE: Please make sure to be able to give a urine sample on arrival      If you have further questions, you may contact your Endoscopist's office or Pre Admission Testing staff at [facility] [place] 21. 4/7/2021 .9:53 AM

## 2021-04-07 NOTE — TELEPHONE ENCOUNTER
Spoke with pt to remind him to get his pre EGD Covid testing completed today or his EGD on 4/12/21 will be cancelled. Pt states he is going to [facility] [place] [facility] today.

## 2021-04-08 LAB — SARS-COV-2: NOT DETECTED

## 2021-04-09 ENCOUNTER — ANESTHESIA EVENT (OUTPATIENT)
Dept: ENDOSCOPY | Age: 46
End: 2021-04-09
Payer: COMMERCIAL

## 2021-04-12 ENCOUNTER — HOSPITAL ENCOUNTER (OUTPATIENT)
Age: 46
Setting detail: OUTPATIENT SURGERY
Discharge: HOME | End: 2021-04-12
Attending: SURGERY | Admitting: SURGERY
Payer: COMMERCIAL

## 2021-04-12 ENCOUNTER — ANESTHESIA (OUTPATIENT)
Dept: ENDOSCOPY | Age: 46
End: 2021-04-12
Payer: COMMERCIAL

## 2021-04-12 VITALS
DIASTOLIC BLOOD PRESSURE: 80 MMHG | OXYGEN SATURATION: 98 % | SYSTOLIC BLOOD PRESSURE: 174 MMHG | RESPIRATION RATE: 17 BRPM

## 2021-04-12 VITALS
BODY MASS INDEX: 39.17 KG/M2 | SYSTOLIC BLOOD PRESSURE: 144 MMHG | HEIGHT: 75 IN | WEIGHT: 315 LBS | DIASTOLIC BLOOD PRESSURE: 56 MMHG | RESPIRATION RATE: 14 BRPM | TEMPERATURE: 98.1 F | HEART RATE: 69 BPM | OXYGEN SATURATION: 96 %

## 2021-04-12 DIAGNOSIS — Z01.818 PREOPERATIVE CLEARANCE: ICD-10-CM

## 2021-04-12 PROCEDURE — 6360000002 HC RX W HCPCS: Performed by: NURSE ANESTHETIST, CERTIFIED REGISTERED

## 2021-04-12 PROCEDURE — 3700000000 HC ANESTHESIA ATTENDED CARE: Performed by: SURGERY

## 2021-04-12 PROCEDURE — 7100000011 HC PHASE II RECOVERY - ADDTL 15 MIN: Performed by: SURGERY

## 2021-04-12 PROCEDURE — 2500000003 HC RX 250 WO HCPCS: Performed by: NURSE ANESTHETIST, CERTIFIED REGISTERED

## 2021-04-12 PROCEDURE — 88305 TISSUE EXAM BY PATHOLOGIST: CPT

## 2021-04-12 PROCEDURE — 3700000001 HC ADD 15 MINUTES (ANESTHESIA): Performed by: SURGERY

## 2021-04-12 PROCEDURE — 2580000003 HC RX 258: Performed by: ANESTHESIOLOGY

## 2021-04-12 PROCEDURE — 3609012400 HC EGD TRANSORAL BIOPSY SINGLE/MULTIPLE: Performed by: SURGERY

## 2021-04-12 PROCEDURE — 2709999900 HC NON-CHARGEABLE SUPPLY: Performed by: SURGERY

## 2021-04-12 PROCEDURE — 7100000010 HC PHASE II RECOVERY - FIRST 15 MIN: Performed by: SURGERY

## 2021-04-12 PROCEDURE — 43239 EGD BIOPSY SINGLE/MULTIPLE: CPT | Performed by: SURGERY

## 2021-04-12 RX ORDER — SODIUM CHLORIDE, SODIUM LACTATE, POTASSIUM CHLORIDE, CALCIUM CHLORIDE 600; 310; 30; 20 MG/100ML; MG/100ML; MG/100ML; MG/100ML
INJECTION, SOLUTION INTRAVENOUS CONTINUOUS
Status: DISCONTINUED | OUTPATIENT
Start: 2021-04-12 | End: 2021-04-12 | Stop reason: HOSPADM

## 2021-04-12 RX ORDER — KETAMINE HYDROCHLORIDE 50 MG/ML
INJECTION, SOLUTION, CONCENTRATE INTRAMUSCULAR; INTRAVENOUS PRN
Status: DISCONTINUED | OUTPATIENT
Start: 2021-04-12 | End: 2021-04-12 | Stop reason: SDUPTHER

## 2021-04-12 RX ORDER — LIDOCAINE HYDROCHLORIDE 20 MG/ML
INJECTION, SOLUTION EPIDURAL; INFILTRATION; INTRACAUDAL; PERINEURAL PRN
Status: DISCONTINUED | OUTPATIENT
Start: 2021-04-12 | End: 2021-04-12 | Stop reason: SDUPTHER

## 2021-04-12 RX ORDER — SODIUM CHLORIDE 0.9 % (FLUSH) 0.9 %
10 SYRINGE (ML) INJECTION EVERY 12 HOURS SCHEDULED
Status: DISCONTINUED | OUTPATIENT
Start: 2021-04-12 | End: 2021-04-12 | Stop reason: HOSPADM

## 2021-04-12 RX ORDER — ESMOLOL HYDROCHLORIDE 10 MG/ML
INJECTION INTRAVENOUS PRN
Status: DISCONTINUED | OUTPATIENT
Start: 2021-04-12 | End: 2021-04-12 | Stop reason: SDUPTHER

## 2021-04-12 RX ORDER — SODIUM CHLORIDE 9 MG/ML
25 INJECTION, SOLUTION INTRAVENOUS PRN
Status: DISCONTINUED | OUTPATIENT
Start: 2021-04-12 | End: 2021-04-12 | Stop reason: HOSPADM

## 2021-04-12 RX ORDER — SODIUM CHLORIDE 0.9 % (FLUSH) 0.9 %
10 SYRINGE (ML) INJECTION PRN
Status: DISCONTINUED | OUTPATIENT
Start: 2021-04-12 | End: 2021-04-12 | Stop reason: HOSPADM

## 2021-04-12 RX ORDER — GLYCOPYRROLATE 0.2 MG/ML
INJECTION INTRAMUSCULAR; INTRAVENOUS PRN
Status: DISCONTINUED | OUTPATIENT
Start: 2021-04-12 | End: 2021-04-12 | Stop reason: SDUPTHER

## 2021-04-12 RX ORDER — SODIUM CHLORIDE 9 MG/ML
INJECTION, SOLUTION INTRAVENOUS CONTINUOUS
Status: DISCONTINUED | OUTPATIENT
Start: 2021-04-12 | End: 2021-04-12 | Stop reason: HOSPADM

## 2021-04-12 RX ORDER — PROPOFOL 10 MG/ML
INJECTION, EMULSION INTRAVENOUS PRN
Status: DISCONTINUED | OUTPATIENT
Start: 2021-04-12 | End: 2021-04-12 | Stop reason: SDUPTHER

## 2021-04-12 RX ORDER — PENICILLIN V POTASSIUM 500 MG/1
1000 TABLET ORAL 2 TIMES DAILY
COMMUNITY
End: 2021-10-07

## 2021-04-12 RX ADMIN — ESMOLOL HYDROCHLORIDE 40 MG: 10 INJECTION, SOLUTION INTRAVENOUS at 08:36

## 2021-04-12 RX ADMIN — KETAMINE HYDROCHLORIDE 20 MG: 50 INJECTION, SOLUTION INTRAMUSCULAR; INTRAVENOUS at 08:27

## 2021-04-12 RX ADMIN — KETAMINE HYDROCHLORIDE 20 MG: 50 INJECTION, SOLUTION INTRAMUSCULAR; INTRAVENOUS at 08:32

## 2021-04-12 RX ADMIN — ESMOLOL HYDROCHLORIDE 30 MG: 10 INJECTION, SOLUTION INTRAVENOUS at 08:33

## 2021-04-12 RX ADMIN — PROPOFOL 50 MG: 10 INJECTION, EMULSION INTRAVENOUS at 08:29

## 2021-04-12 RX ADMIN — LIDOCAINE HYDROCHLORIDE 100 MG: 20 INJECTION, SOLUTION EPIDURAL; INFILTRATION; INTRACAUDAL; PERINEURAL at 08:23

## 2021-04-12 RX ADMIN — PROPOFOL 50 MG: 10 INJECTION, EMULSION INTRAVENOUS at 08:24

## 2021-04-12 RX ADMIN — GLYCOPYRROLATE 0.2 MG: 0.2 INJECTION INTRAMUSCULAR; INTRAVENOUS at 08:23

## 2021-04-12 RX ADMIN — PROPOFOL 50 MG: 10 INJECTION, EMULSION INTRAVENOUS at 08:25

## 2021-04-12 RX ADMIN — SODIUM CHLORIDE, POTASSIUM CHLORIDE, SODIUM LACTATE AND CALCIUM CHLORIDE: 600; 310; 30; 20 INJECTION, SOLUTION INTRAVENOUS at 07:23

## 2021-04-12 RX ADMIN — PROPOFOL 50 MG: 10 INJECTION, EMULSION INTRAVENOUS at 08:23

## 2021-04-12 RX ADMIN — KETAMINE HYDROCHLORIDE 10 MG: 50 INJECTION, SOLUTION INTRAMUSCULAR; INTRAVENOUS at 08:23

## 2021-04-12 RX ADMIN — PROPOFOL 50 MG: 10 INJECTION, EMULSION INTRAVENOUS at 08:31

## 2021-04-12 RX ADMIN — PROPOFOL 50 MG: 10 INJECTION, EMULSION INTRAVENOUS at 08:27

## 2021-04-12 ASSESSMENT — PAIN DESCRIPTION - PAIN TYPE
TYPE: CHRONIC PAIN
TYPE_2: CHRONIC PAIN

## 2021-04-12 ASSESSMENT — PAIN DESCRIPTION - ORIENTATION
ORIENTATION_2: LOWER
ORIENTATION: RIGHT;LEFT
ORIENTATION: RIGHT;LEFT

## 2021-04-12 ASSESSMENT — PULMONARY FUNCTION TESTS
PIF_VALUE: 1
PIF_VALUE: 0
PIF_VALUE: 1
PIF_VALUE: 1
PIF_VALUE: 0

## 2021-04-12 ASSESSMENT — ENCOUNTER SYMPTOMS: SHORTNESS OF BREATH: 1

## 2021-04-12 ASSESSMENT — PAIN SCALES - WONG BAKER
WONGBAKER_NUMERICALRESPONSE: 0
WONGBAKER_NUMERICALRESPONSE: 4

## 2021-04-12 ASSESSMENT — PAIN SCALES - GENERAL: PAINLEVEL_OUTOF10: 7

## 2021-04-12 ASSESSMENT — PAIN DESCRIPTION - DESCRIPTORS
DESCRIPTORS: SHARP
DESCRIPTORS_2: ACHING

## 2021-04-12 ASSESSMENT — PAIN DESCRIPTION - LOCATION
LOCATION: KNEE
LOCATION_2: BACK
LOCATION: KNEE

## 2021-04-12 ASSESSMENT — PAIN DESCRIPTION - ONSET: ONSET_2: ON-GOING

## 2021-04-12 ASSESSMENT — PAIN DESCRIPTION - PROGRESSION: CLINICAL_PROGRESSION_2: NOT CHANGED

## 2021-04-12 ASSESSMENT — PAIN DESCRIPTION - FREQUENCY: FREQUENCY: CONTINUOUS

## 2021-04-12 NOTE — OP NOTE
Esophagogastroduodenoscopy with biopsy    Indications: Pre-op gastric Surgery, rule out Gastroesophageal reflux disease. Pre-operative Diagnosis: Obesity/pre-op bariatric surgery . Post-operative Diagnosis: Superficial Gastritis    Surgeon: Bryce Todd    Anesthesia: MAC      Procedure Details   The patient was seen in the Holding Room. The risks, benefits, complications, treatment options, and expected outcomes were discussed with the patient. Pre-op Endoscopy recommended to rule any intragastric pathology that would interfere with proposed procedure /  And or due to current conditions. The possibilities of reaction to medication, pulmonary aspiration, perforation of viscus, bleeding, recurrent infection, the need for additional procedures, failure to diagnose a condition, and creating a complication requiring transfusion or operation were discussed with the patient. The patient concurred with the proposed plan, giving informed consent. The site of surgery properly noted/marked. The patient was taken to Endoscopy Suite, identified and the procedure verified as  Esophagogastroduodenoscopy  A Time Out was held and the above information confirmed. Upper Endoscopy: An endoscope was inserted through the oropharynx into the upper esophagus. The endoscope was passed into the stomach to the level of the pylorus and passed to the duodenum where the ampulla was visualized and duodenum was normal. Then the scope was retracted back to the stomach and it was normal except for gastritis, biopsy of the antrum obtained for H. Pylori, then retroflexed and the gastroesophageal junction was inspected.  There was no hiatal hernia and no evidence of Crenshaw's     Findings:  Esophageal findings include:  Upper: normal Esophageal Mucosa  Lower:normal Esophageal Mucosa  Distal: normal Esophageal Mucosa      Gastric findings include: abnormal Gastric Mucosa    Duodenal Findings: normal Duodenal Mucosa        Estimated Blood Loss:  none    Biopsy:  Antrum    Complications:  None; patient tolerated the procedure well. Disposition: PACU - hemodynamically stable. Condition: stable    Attending Attestation: I was present and scrubbed for the entire procedure.

## 2021-04-12 NOTE — ANESTHESIA POSTPROCEDURE EVALUATION
Department of Anesthesiology  Postprocedure Note    Patient: Fabi Ku  MRN: [id number]  YOB: 1975  Date of evaluation: 4/12/2021  Time:  10:00 AM     Procedure Summary     Date: 04/12/21 Room / Location: [facility]    Anesthesia Start: [facility] Anesthesia Stop: 4386    Procedure: EGD BIOPSY (N/A ) Diagnosis:       Preoperative clearance      (Preoperative clearance [Z01.818])    Surgeons: Noel Gonzalez DO Responsible Provider: Reno Duarte MD    Anesthesia Type: MAC ASA Status: 3          Anesthesia Type: MAC    Vicky Phase I: Vicky Score: 10    Vicky Phase II: Vicky Score: 10    Last vitals: Reviewed and per EMR flowsheets.        Anesthesia Post Evaluation    Patient location during evaluation: bedside  Patient participation: complete - patient participated  Level of consciousness: awake  Pain score: 0  Airway patency: patent  Nausea & Vomiting: no nausea and no vomiting  Complications: no  Cardiovascular status: hemodynamically stable  Respiratory status: acceptable  Hydration status: euvolemic

## 2021-04-12 NOTE — H&P
Update History & Physical    The patient's History and Physical of April 5, EGD procedure was reviewed with the patient and I examined the patient. There was no change. The surgical site was confirmed by the patient and me. Plan: The risks, benefits, expected outcome, and alternative to the recommended procedure have been discussed with the patient. Patient understands and wants to proceed with the procedure.      Electronically signed by Amy Lawler MD on 4/12/2021 at 6:57 AM

## 2021-04-12 NOTE — PROGRESS NOTES
Ambulatory Surgery/Procedure Discharge Note    Vitals:    04/12/21 0855   BP: (!) 144/56   Pulse: 69   Resp: 14   Temp: 98.1 °F (36.7 °C)   SpO2: 96%       No intake/output data recorded. Restroom use offered before discharge. Yes  Pt tolerating PO well and no nausea reported. Discharge instructions were read to the significant other at bedside and reiterate to keep the follow up appointment as scheduled. Pain assessment:  none  Pain Level: 4        Patient discharged to home/self care.  Patient discharged and walk the patient to the discharge door to waiting family/S.O.       4/12/2021 9:22 AM

## 2021-04-12 NOTE — ANESTHESIA PRE PROCEDURE
Department of Anesthesiology  Preprocedure Note       Name:  Kvng Houston   Age:  55 y.o.  :  1975                                          MRN:  [id number]         Date:  2021      Surgeon: Issac Ríos): Alena Dickson DO    Procedure: ESOPHAGOGASTRODUODENOSCOPY (N/A )    Medications prior to admission:   Prior to Admission medications    Medication Sig Start Date End Date Taking? Authorizing Provider   penicillin v potassium (VEETID) 500 MG tablet Take 1,000 mg by mouth 2 times daily    Historical Provider, MD   DULoxetine (CYMBALTA) 60 MG extended release capsule Take 1 capsule by mouth daily 3/25/21   Andrew Bronson MD   diclofenac (VOLTAREN) 75 MG EC tablet Take 1 tablet by mouth 2 times daily as needed for Pain 3/25/21   Andrew Bronson MD   pregabalin (LYRICA) 150 MG capsule Take 1 capsule by mouth 3 times daily for 30 days. 3/25/21 4/24/21  Andrew Bronson MD   oxyCODONE-acetaminophen (PERCOCET) 7.5-325 MG per tablet Take 1 tablet by mouth every 6 hours as needed for Pain (max 4 per day) for up to 28 days. 3/25/21 4/22/21  Andrew Bronson MD   furosemide (LASIX) 20 MG tablet TAKE ONE TO TWO TABLETS BY MOUTH EVERY MORNING AS NEEDED FOR EDEMA 21   Johny De La Cruz MD   losartan (COZAAR) 100 MG tablet Take 1 tablet by mouth daily 20   Johny De La Cruz MD   vitamin B-12 (CYANOCOBALAMIN) 1000 MCG tablet Take 3,000 mcg by mouth daily     Historical Provider, MD   Compression Stockings MISC by Does not apply route Thigh high 10/22/18   Myrna Akbar MD   Elastic Bandages & Supports (MEDICAL COMPRESSION THIGH HIGH) MISC Thigh high compression stockings, 30-40 mm Hg 10/20/18   Abdoul Ashby MD       Current medications:    No current facility-administered medications for this visit. No current outpatient medications on file.      Facility-Administered Medications Ordered in Other Visits   Medication Dose Route Frequency Provider Last Rate Last Admin    sodium chloride flush 0.9 % injection 10 mL  10 mL Intravenous 2 times per day Marc Twila, DO        sodium chloride flush 0.9 % injection 10 mL  10 mL Intravenous PRN Marc Twila, DO        0.9 % sodium chloride infusion  25 mL Intravenous PRN Marc Twila, DO        0.9 % sodium chloride infusion   Intravenous Continuous Marc Twila, DO        lactated ringers infusion   Intravenous Continuous Marc Twila,  mL/hr at 04/12/21 0723 New Bag at 04/12/21 0723       Allergies:  No Known Allergies    Problem List:    Patient Active Problem List   Diagnosis Code    HNP (herniated nucleus pulposus with myelopathy), thoracic M51.04    ANN-MARIE (obstructive sleep apnea) G47.33    Obesity E66.9    Elevated blood pressure reading R03.0    Chronic back pain M54.9, G89.29     BWC Sprain of ligament of lumbosacral joint S33. 9XXA     BWC Sprain of left knee/leg S83. 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 80    Morbid obesity with BMI of 50.0-59.9, adult (HCC) E66.01, Z68.43    Cellulitis L03.90    Neoplasm of uncertain behavior of skin D48.5       Past Medical History:        Diagnosis Date    Arthritis     Cellulitis     Chronic back pain     Hepatitis age 24    HNP (herniated nucleus pulposus with myelopathy), thoracic     Hypertension     Melanoma ([address]     left foot     Obesity 1/12/2011    ANN-MARIE (obstructive sleep apnea)     NO CPAP     Stasis dermatitis        Past Surgical History:        Procedure Laterality Date    ADENOIDECTOMY      FOOT SURGERY Left 12/20/2019    WIDE EXCISION OF LEFT FOOT MELANOMA performed by Jozef Huang MD at [address] [place] [address] Left 2/10/2020    LEFT FOOT MELANOMA WIDE EXCISION AND FULL THICKNESS SKIN GRAFT,LEFT GROIN SENTINEL LYMPH NODE BIOPSY performed by Jozef Huang MD at [facility]  06/26/06    Velma  WISDOM TOOTH EXTRACTION         Social History:    Social History     Tobacco Use    Smoking status: Former Smoker     Packs/day: 1.50     Types: Cigarettes     Quit date: 2005     Years since quittin.2    Smokeless tobacco: Former User     Types: Chew   Substance Use Topics    Alcohol use: No     Alcohol/week: 0.0 standard drinks     Comment: rarely -- 1 every few months                                Counseling given: Not Answered      Vital Signs (Current): There were no vitals filed for this visit. BP Readings from Last 3 Encounters:   21 (!) 166/86   21 125/70   21 (!) 175/91       NPO Status:                                                                                 BMI:   Wt Readings from Last 3 Encounters:   21 (!) 475 lb (215.5 kg)   21 (!) 482 lb 3.2 oz (218.7 kg)   21 (!) 484 lb (219.5 kg)     There is no height or weight on file to calculate BMI.    CBC:   Lab Results   Component Value Date    WBC 4.7 2019    RBC 4.85 2019    HGB 14.9 2019    HCT 43.8 2019    MCV 90.2 2019    RDW 13.8 2019     2019       CMP:   Lab Results   Component Value Date     2019    K 4.2 2019    CL 97 2019    CO2 27 2019    BUN 13 2019    CREATININE 0.7 2019    GFRAA >60 2019    AGRATIO 1.8 2019    LABGLOM >60 2019    GLUCOSE 107 2019    PROT 7.0 2019    CALCIUM 9.2 2019    BILITOT 0.6 2019    ALKPHOS 80 2019    AST 35 2019    ALT 34 2019       POC Tests: No results for input(s): POCGLU, POCNA, POCK, POCCL, POCBUN, POCHEMO, POCHCT in the last 72 hours.     Coags: No results found for: PROTIME, INR, APTT    HCG (If Applicable): No results found for: PREGTESTUR, PREGSERUM, HCG, HCGQUANT     ABGs: No results found for: PHART, PO2ART, IYU9PRS, BAC4LCD, BEART, G8MIDCTE     Type & Screen (If Applicable):  No results found for: LABABO, LABRH    Anesthesia Evaluation    Airway: Mallampati: III  TM distance: >3 FB   Neck ROM: full  Mouth opening: > = 3 FB Dental:          Pulmonary:   (+) shortness of breath:  sleep apnea: on noncompliant,      (-) asthma                           Cardiovascular:  Exercise tolerance: poor (<4 METS),   (+) hypertension:,     (-) past MI,  angina and  BLAIR        Rate: normal                    Neuro/Psych:      (-) seizures and CVA           GI/Hepatic/Renal:   (+) GERD:, liver disease:, morbid obesity          Endo/Other:        (-) diabetes mellitus               Abdominal:           Vascular:                                        Anesthesia Plan      MAC     ASA 3     (-npo MN  -denies any cardiac history, no chest pain or palpitations. Does have swelling in legs and redness bilaterally. Very obese, was not sob walking into hosptial  -surgery recently with no anesthesia problems- was angry that someone \"aggressively put oxygen on my face last time. \"  I explained to him the difficulties of preoxygenation and keeping his oxygen high based on habitus and actually his beard. He states \"I understand. \")  Induction: intravenous. MIPS: Postoperative opioids intended. Anesthetic plan and risks discussed with patient. Plan discussed with CRNA.     Attending anesthesiologist reviewed and agrees with Pre Eval content            Lois Mulligan MD   4/12/2021

## 2021-04-20 ENCOUNTER — TELEPHONE (OUTPATIENT)
Dept: FAMILY MEDICINE CLINIC | Age: 46
End: 2021-04-20

## 2021-04-20 NOTE — TELEPHONE ENCOUNTER
Please compose a letter as follows:      Re: Kingston Stone    Date of birth 1975      Dear Dr. Esequiel Alfonso,    As you know Kingston Stone has been a patient of mine at [facility] for many years. I have referred him to you for consideration for bariatric surgery due to his morbid obesity. Despite his best efforts over the years to eat right, exercise more, and attempt to lose weight he has had little success. At his last visit on 2/1/2021 his height was 6 foot 3 inches tall and weight was 482 pounds giving him a BMI of 60.2. His comorbid medical conditions include obstructive sleep apnea, chronic low back pain, hypertension, and chronic venous stasis of bilateral lower extremities resulting in recurrent cellulitis. It is my opinion that with successful bariatric surgery all the above medical conditions would greatly improve. Thank you for your consideration.     Sincerely,      Elliot Blandon MD

## 2021-04-20 NOTE — TELEPHONE ENCOUNTER
Once completed, please have Dr. Sonia Singh bring the letter home so that I can sign it and fax it in as requested

## 2021-04-20 NOTE — LETTER
[address]  Phone: [phone]  Fax: [phone]    Corey Garcia MD        Re: Ijeoma Kahn     Date of birth 1975        Dear Dr. Jerald Arce,     As you know Ijeoma Kahn has been a patient of mine at [facility] for many years. I have referred him to you for consideration for bariatric surgery due to his morbid obesity. Despite his best efforts over the years to eat right, exercise more, and attempt to lose weight he has had little success.     At his last visit on 2/1/2021 his height was 6 foot 3 inches tall and weight was 482 pounds giving him a BMI of 60.2. His comorbid medical conditions include obstructive sleep apnea, chronic low back pain, hypertension, and chronic venous stasis of bilateral lower extremities resulting in recurrent cellulitis.   It is my opinion that with successful bariatric surgery all the above medical conditions would greatly improve.     Thank you for your consideration.     Sincerely,        Corey Garcia MD

## 2021-04-22 ENCOUNTER — VIRTUAL VISIT (OUTPATIENT)
Dept: PAIN MANAGEMENT | Age: 46
End: 2021-04-22
Payer: COMMERCIAL

## 2021-04-22 DIAGNOSIS — S83.92XA SPRAIN OF LEFT KNEE/LEG, INITIAL ENCOUNTER: ICD-10-CM

## 2021-04-22 DIAGNOSIS — S33.9XXD SPRAIN OF LIGAMENT OF LUMBOSACRAL JOINT, SUBSEQUENT ENCOUNTER: ICD-10-CM

## 2021-04-22 DIAGNOSIS — M51.27 LUMBAGO-SCIATICA DUE TO DISPLACEMENT OF LUMBAR INTERVERTEBRAL DISC: ICD-10-CM

## 2021-04-22 DIAGNOSIS — S83.92XD SPRAIN OF LEFT KNEE/LEG, SUBSEQUENT ENCOUNTER: ICD-10-CM

## 2021-04-22 DIAGNOSIS — S33.9XXA SPRAIN OF LIGAMENT OF LUMBOSACRAL JOINT, INITIAL ENCOUNTER: ICD-10-CM

## 2021-04-22 PROCEDURE — 99213 OFFICE O/P EST LOW 20 MIN: CPT | Performed by: INTERNAL MEDICINE

## 2021-04-22 RX ORDER — PREGABALIN 150 MG/1
150 CAPSULE ORAL 3 TIMES DAILY
Qty: 90 CAPSULE | Refills: 0 | Status: SHIPPED | OUTPATIENT
Start: 2021-04-22 | End: 2021-05-19 | Stop reason: SDUPTHER

## 2021-04-22 RX ORDER — DULOXETIN HYDROCHLORIDE 60 MG/1
60 CAPSULE, DELAYED RELEASE ORAL DAILY
Qty: 30 CAPSULE | Refills: 1 | Status: SHIPPED | OUTPATIENT
Start: 2021-04-22 | End: 2021-05-19 | Stop reason: SDUPTHER

## 2021-04-22 RX ORDER — OXYCODONE AND ACETAMINOPHEN 7.5; 325 MG/1; MG/1
1 TABLET ORAL EVERY 6 HOURS PRN
Qty: 112 TABLET | Refills: 0 | Status: SHIPPED | OUTPATIENT
Start: 2021-04-22 | End: 2021-05-19 | Stop reason: SDUPTHER

## 2021-04-22 NOTE — PROGRESS NOTES
3,000 mcg by mouth daily       Compression Stockings MISC by Does not apply route Thigh high 1 each 0    Elastic Bandages & Supports (MEDICAL COMPRESSION THIGH HIGH) MISC Thigh high compression stockings, 30-40 mm Hg 2 each 1    DULoxetine (CYMBALTA) 60 MG extended release capsule Take 1 capsule by mouth daily 30 capsule 1    pregabalin (LYRICA) 150 MG capsule Take 1 capsule by mouth 3 times daily for 30 days. 90 capsule 0    oxyCODONE-acetaminophen (PERCOCET) 7.5-325 MG per tablet Take 1 tablet by mouth every 6 hours as needed for Pain (max 4 per day) for up to 28 days. 112 tablet 0     No facility-administered medications prior to visit. REVIEW OF SYSTEMS:    Respiratory: Negative for apnea, chest tightness and shortness of breath or change in baseline breathing. PHYSICAL EXAM:   Nursing note and vitals reviewed. There were no vitals taken for this visit. Constitutional: He appears well-developed and well-nourished. No acute distress. Cardiovascular: Normal rate, regular rhythm, normal heart sounds, and does not have murmur. Pulmonary/Chest: Effort normal. No respiratory distress. He does not have wheezes in the lung fields. He has no rales. Neurological/Psychiatric:He is alert and oriented to person, place, and time. Coordination is  normal.  His mood isAppropriate and affect is Neutral/Euthymic(normal) . His    IMPRESSION:   1.  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 BWC Sprain of ligament of lumbosacral joint, subsequent encounter    3. BWC Sprain of ligament of lumbosacral joint, initial encounter    4. BWC Sprain of left knee/leg, subsequent encounter    5. BWC Sprain of left knee/leg, initial encounter        PLAN:  Informed verbal consent was obtained  [place] record was obtained and reviewed  for the last one year and no indicators of drug misuse  were found.  Any other controlled substance prescriptions  seen on the record have been accounted for, I am aware of the patient receiving these medications. Murphy Garcia [place] record will be rechecked as part of office protocol.    -He was advised weight reduction, diet changes- 800-1200 roc diet, diet diary, exercising, nutritional  consult increased physical activity as tolerated   -continue with Percocet 4 per day  -He was advised to increase fluids ( 5-7  glasses of fluid daily), limit caffeine, avoid cheese products, increase dietary fiber, increase activity and exercise as tolerated and relax regularly and enjoy meals   -advised to walk 20-30 minutes daily as tolerated   -Interm history reviewed       Current Outpatient Medications   Medication Sig Dispense Refill    DULoxetine (CYMBALTA) 60 MG extended release capsule Take 1 capsule by mouth daily 30 capsule 1    oxyCODONE-acetaminophen (PERCOCET) 7.5-325 MG per tablet Take 1 tablet by mouth every 6 hours as needed for Pain (max 4 per day) for up to 28 days. 112 tablet 0    pregabalin (LYRICA) 150 MG capsule Take 1 capsule by mouth 3 times daily for 30 days. 90 capsule 0    penicillin v potassium (VEETID) 500 MG tablet Take 1,000 mg by mouth 2 times daily      diclofenac (VOLTAREN) 75 MG EC tablet Take 1 tablet by mouth 2 times daily as needed for Pain 60 tablet 1    furosemide (LASIX) 20 MG tablet TAKE ONE TO TWO TABLETS BY MOUTH EVERY MORNING AS NEEDED FOR EDEMA 180 tablet 3    losartan (COZAAR) 100 MG tablet Take 1 tablet by mouth daily 30 tablet 5    vitamin B-12 (CYANOCOBALAMIN) 1000 MCG tablet Take 3,000 mcg by mouth daily       Compression Stockings MISC by Does not apply route Thigh high 1 each 0    Elastic Bandages & Supports (MEDICAL COMPRESSION THIGH HIGH) MISC Thigh high compression stockings, 30-40 mm Hg 2 each 1     No current facility-administered medications for this visit. I will continue his current medication regimen  which is part of the above treatment schedule.  It has been helping with Mr. Agustín Reid chronic  medical problems which for this visit include:   Diagnoses of  BWC Lumbago-sciatica due to displacement of lumbar intervertebral disc, BWC Sprain of ligament of lumbosacral joint, subsequent encounter, BWC Sprain of ligament of lumbosacral joint, initial encounter, BWC Sprain of left knee/leg, subsequent encounter, and BWC Sprain of left knee/leg, initial encounter were pertinent to this visit. Risks and benefits of the medications and other alternative treatments  including no treatment were discussed with the patient. The common side effects of these medications were also explained to the patient. Informed verbal consent was obtained. Goals of current treatment regimen include improvement in pain, restoration of functioning- with focus on improvement in physical performance, general activity, work or disability,emotional distress, health care utilization and  decreased medication consumption. Will continue to monitor progress towards achieving/maintaining therapeutic goals with special emphasis on  1. Improvement in perceived interfernce  of pain with ADL's. Ability to do home exercises independently. Ability to do household chores indoor and/or outdoor work and social and leisure activities. Improve psychosocial and physical functioning. - he is showing progression towards this treatment goal with the current regimen. 2. Ability to focus/concentrate at work and perform the duties required of him at work  Sit through a workday without lower extremity symptoms. Stand 30-60 minutes without lower extremity symptoms. Ability to lift up to 10-20 lbs. Ability to go up and down stairs. Sit 30-60 minutes  Without having to stand up frequently. - he is maintaining/progressing towards his work related goals with the current regimen.      He was advised against drinking alcohol with the narcotic pain medicines, advised against driving or handling machinery while adjusting the dose of medicines or if having cognitive  issues related to the current medications. Risk of overdose and death, if medicines not taken as prescribed, were also discussed. If the patient develops new symptoms or if the symptoms worsen, the patient should call the office. While transcribing every attempt was made to maintain the accuracy of the note in terms of it's contents,there may have been some errors made inadvertently. Thank you for allowing me to participate in the care of this patient.     Claude Duffy MD.    Cc: Dequan Dey MD

## 2021-04-28 ENCOUNTER — OFFICE VISIT (OUTPATIENT)
Dept: SLEEP MEDICINE | Age: 46
End: 2021-04-28
Payer: COMMERCIAL

## 2021-04-28 VITALS
TEMPERATURE: 97.7 F | HEIGHT: 75 IN | WEIGHT: 315 LBS | DIASTOLIC BLOOD PRESSURE: 88 MMHG | HEART RATE: 74 BPM | OXYGEN SATURATION: 94 % | RESPIRATION RATE: 16 BRPM | BODY MASS INDEX: 39.17 KG/M2 | SYSTOLIC BLOOD PRESSURE: 132 MMHG

## 2021-04-28 DIAGNOSIS — E66.01 MORBID OBESITY WITH BMI OF 50.0-59.9, ADULT (HCC): Chronic | ICD-10-CM

## 2021-04-28 DIAGNOSIS — G47.33 OBSTRUCTIVE SLEEP APNEA: Primary | ICD-10-CM

## 2021-04-28 DIAGNOSIS — I10 ESSENTIAL HYPERTENSION: ICD-10-CM

## 2021-04-28 PROCEDURE — 99204 OFFICE O/P NEW MOD 45 MIN: CPT | Performed by: PSYCHIATRY & NEUROLOGY

## 2021-04-28 ASSESSMENT — ENCOUNTER SYMPTOMS
APNEA: 1
COUGH: 1
GASTROINTESTINAL NEGATIVE: 1
ALLERGIC/IMMUNOLOGIC NEGATIVE: 1
EYES NEGATIVE: 1

## 2021-04-28 ASSESSMENT — SLEEP AND FATIGUE QUESTIONNAIRES
HOW LIKELY ARE YOU TO NOD OFF OR FALL ASLEEP WHILE SITTING INACTIVE IN A PUBLIC PLACE: 0
NECK CIRCUMFERENCE (INCHES): 21
HOW LIKELY ARE YOU TO NOD OFF OR FALL ASLEEP WHILE SITTING AND TALKING TO SOMEONE: 0
HOW LIKELY ARE YOU TO NOD OFF OR FALL ASLEEP IN A CAR, WHILE STOPPED FOR A FEW MINUTES IN TRAFFIC: 0
HOW LIKELY ARE YOU TO NOD OFF OR FALL ASLEEP WHILE SITTING QUIETLY AFTER LUNCH WITHOUT ALCOHOL: 1

## 2021-04-28 NOTE — PATIENT INSTRUCTIONS
Orders Placed This Encounter   Procedures    Home Sleep Study     Standing Status:   Future     Standing Expiration Date:   4/28/2022     Order Specific Question:   Location For Sleep Study     Answer:   [place]     Order Specific Question:   Select Sleep Lab Location     Answer:   [facility]        Patient Education        Learning About CPAP for Sleep Apnea  What is CPAP? CPAP is a small machine that you use at home every night while you sleep. It increases air pressure in your throat to keep your airway open. When you have sleep apnea, this can help you sleep better so you feel much better. CPAP stands for \"continuous positive airway pressure. \"  The CPAP machine will have one of the following:  · A mask that covers your nose and mouth  · Prongs that fit into your nose  · A mask that covers your nose only, which is the most common type. This type is called NCPAP. The N stands for \"nasal.\"  Why is it done? CPAP is usually the best treatment for obstructive sleep apnea. It is the first treatment choice and the most widely used. CPAP:  · Helps you have more normal sleep, so you feel less sleepy and more alert during the daytime. · May help keep heart failure or other heart problems from getting worse. · May help lower your blood pressure. If you use CPAP, your bed partner may also sleep better. That's because you aren't snoring or restless. Your doctor may suggest CPAP if you have:  · Moderate to severe sleep apnea. · Sleep apnea and coronary artery disease (CAD). · Sleep apnea and heart failure. What are the side effects? Some people who use CPAP have:  · A dry or stuffy nose and a sore throat. · Irritated skin on the face. · Sore eyes. · Bloating. How can you care for yourself? If using CPAP is not comfortable, or if you have certain side effects, work with your doctor to fix them. Here are some things you can try:  · Be sure the mask or nasal prongs fit well.   · See if your doctor can adjust the pressure of your CPAP. · If your nose is dry, try a humidifier. · If your nose is runny or stuffy, try decongestant medicine or a steroid nasal spray. Be safe with medicines. Read and follow all instructions on the label. Do not use the medicine longer than the label says. If these things don't help, you might try a different type of machine. Some machines have air pressure that adjusts on its own. Others have air pressures that are different when you breathe in than when you breathe out. This may reduce discomfort caused by too much pressure in your nose. Where can you learn more? Go to https://[employer]. org and sign in to your [employer] account. Enter T769 in the viaCycle box to learn more about \"Learning About CPAP for Sleep Apnea. \"     If you do not have an account, please click on the \"Sign Up Now\" link. Current as of: October 26, 2020               Content Version: 12.8  © 2006-2021 [employer]wise, Inc[employer]. Care instructions adapted under license by [facility] [facility] [facility] If you have questions about a medical condition or this instruction, always ask your healthcare professional. [place] [address] any warranty or liability for your use of this information.

## 2021-04-28 NOTE — PROGRESS NOTES
MD NICHOLE Silverman Board Certified in Sleep Medicine  Certified [facility] Sleep Medicine  Board Certified in Neurology [address]  1000 [facility] [address] [facility], [address]  326 [facility]2209 [facility]  [address] [place] includes the [facility] [address], [address]           [address]  382 [facility] [address] [phone]    Subjective:     Patient ID: Biju Morris is a 55 y.o. male. Chief Complaint   Patient presents with    Sleep Apnea     NP referred by Dr Scooby Olea for ANN-MARIE- dx 15 yrs ago - gastric sleeve surgery       HPI:        Biju Morris is a 55 y.o. male referred by Dr. Scooby Olea for a sleep evaluation. He complains of snoring, periods of not breathing, tossing and turning, kicking, feels sleepy during the day, take naps during the day but he denies snorting, choking, knees buckling with laughing, completely or partially paralyzed while falling asleep or waking up, noisy environment, uncomfortable room temperature, uncomfortable bedding. Symptoms began several years ago, gradually worsening since that time. The patient's bed-partner confirmed the snoring and stopped breathing at night  SLEEP SCHEDULE: Goes to bed around 9-10 PM in the weekdays and 9-10 PM in the weekends. It usually takes the patient few minutes to fall asleep. The patient gets up 0-1 per night to go to the bathroom. The Patient finally gets up at 4:30 AM during the weekdays and 4:30 AM in the weekends. The patient has restless sleep with frequent arousals in addition to the Patient has significant daytime sleepiness. The Patient scored Total score: 9 on [place] Sleepiness Scale ( more than 10 is indicative of daytime sleepiness)and 63 in fatigue scale ( more than 36 is indicative of daytime fatigue). The patient takes 1-2 naps/week  for 60 minutes and usually is not refreshing nap.      Previous evaluation and treatment has included- PSG. 15 years ago, had tried the CPAP for short period of time    The Patient has been obese for many years and tried, has gained 150 in the last 15 years,  unsuccessfully to lose weight through diet, exercise. DOT/CDL - N/A  FAA/'leila - N/A  The patient HTN is stable.      Previous Report(s) Reviewed: historical medical records       Social History     Socioeconomic History    Marital status: Single     Spouse name: Reji Pat Number of children: 2    Years of education: Not on file    Highest education level: Not on file   Occupational History    Not on file   Social Needs    Financial resource strain: Not hard at all   Navdy insecurity     Worry: Never true     Inability: Never true   [employer] needs     Medical: No     Non-medical: No   Tobacco Use    Smoking status: Former Smoker     Packs/day: 1.50     Types: Cigarettes     Quit date: 2005     Years since quittin.3    Smokeless tobacco: Former User     Types: Chew   Substance and Sexual Activity    Alcohol use: No     Alcohol/week: 0.0 standard drinks     Comment: rarely -- 1 every few months    Drug use: No    Sexual activity: Yes     Partners: Female   Lifestyle    Physical activity     Days per week: Not on file     Minutes per session: Not on file    Stress: Not on file   Relationships    Social connections     Talks on phone: Not on file     Gets together: Not on file     Attends Advent service: Not on file     Active member of club or organization: Not on file     Attends meetings of clubs or organizations: Not on file     Relationship status: Not on file    Intimate partner violence     Fear of current or ex partner: Not on file     Emotionally abused: Not on file     Physically abused: Not on file     Forced sexual activity: Not on file   Other Topics Concern    Not on file   Social History Narrative    Not on file       Prior to Admission medications    Medication Sig Start Past Medical History:   Diagnosis Date    Arthritis     Cellulitis     Chronic back pain     Hepatitis age 24    HNP (herniated nucleus pulposus with myelopathy), thoracic     Hypertension     Melanoma ([address]     left foot     Obesity 1/12/2011    ANN-MARIE (obstructive sleep apnea)     NO CPAP     Stasis dermatitis        Past Surgical History:   Procedure Laterality Date    ADENOIDECTOMY      FOOT SURGERY Left 12/20/2019    WIDE EXCISION OF LEFT FOOT MELANOMA performed by Lacinda Osgood, MD at [address] [place] [address] Left 2/10/2020    LEFT FOOT MELANOMA WIDE EXCISION AND FULL THICKNESS SKIN GRAFT,LEFT GROIN SENTINEL LYMPH NODE BIOPSY performed by Lacinda Osgood, MD at [facility] & [address]  06/26/06    Arand    UPPER GASTROINTESTINAL ENDOSCOPY N/A 4/12/2021    EGD BIOPSY performed by Maco Huerta DO at [facility]         Family History   Problem Relation Age of Onset    Cancer Mother     COPD Paternal Grandfather     Colon Cancer Maternal Grandfather        Review of Systems   Constitutional: Positive for diaphoresis and fatigue. HENT: Positive for hearing loss (mild). Eyes: Negative. Respiratory: Positive for apnea and cough. Cardiovascular: Negative. Gastrointestinal: Negative. Endocrine: Positive for heat intolerance. Genitourinary: Negative for frequency. Musculoskeletal: Positive for arthralgias and myalgias. Skin: Negative. Allergic/Immunologic: Negative. Neurological: Negative for headaches. Hematological: Negative. Psychiatric/Behavioral: Negative. Negative for agitation and dysphoric mood. The patient is not nervous/anxious. Objective:     Vitals:  Weight BMI Neck circumference    Wt Readings from Last 3 Encounters:   04/28/21 (!) 476 lb (215.9 kg)   04/12/21 (!) 475 lb (215.5 kg)   04/05/21 (!) 482 lb 3.2 oz (218.7 kg)    Body mass index is 59.5 kg/m².  Neck circumference: 21     BP HR SaO2   BP Readings from Last 3 Encounters:   04/28/21 132/88   04/12/21 (!) 174/80   04/12/21 (!) 144/56    Pulse Readings from Last 3 Encounters:   04/28/21 74   04/12/21 69   04/05/21 80    SpO2 Readings from Last 3 Encounters:   04/28/21 94%   04/12/21 98%   04/12/21 96%        The mandibular molar Class :   [x]1 []2 []3      Mallampati I Airway Classification:   []1 [x]2 []3 []4        Physical Exam  Vitals signs and nursing note reviewed. HENT:      Head: Atraumatic. Nose: Nose normal.      Mouth/Throat:      Comments: Mallampati class 2, no retrognathia or hypognathia , normal airflow in bilateral nostrils, no septum deviation , crowded oropharynx with low soft palate, high arched hard palate,no tonsils enlargement. Eyes:      Extraocular Movements: Extraocular movements intact. Neck:      Musculoskeletal: Normal range of motion and neck supple. Cardiovascular:      Rate and Rhythm: Normal rate and regular rhythm. Heart sounds: Normal heart sounds. Pulmonary:      Effort: Pulmonary effort is normal.      Breath sounds: Normal breath sounds. Musculoskeletal: Normal range of motion. Right lower leg: Edema present. Left lower leg: Edema present. Skin:     General: Skin is warm. Neurological:      General: No focal deficit present. Psychiatric:         Mood and Affect: Mood normal.         Assessment:    Obstructive sleep apnea especially with snoring, snorting,  observed apnea, daytime sleepiness, large neck circumference, Mallampati class of 2 and obesity. Diagnosis Orders   1. Obstructive sleep apnea  Home Sleep Study   2. Morbid obesity with BMI of 50.0-59.9, adult ([facility]  Home Sleep Study   3. Essential hypertension  Home Sleep Study     Plan:     Patient was counseled about the pathophysiology of obstructive sleep apnea syndrome and the methods for evaluating its presence and severity.   Patient was counseled to avoid driving and other potentially hazardous circumstances if the patient is experiencing excessive sleepiness. Treatment considerations include the use of nasal CPAP, oral dental appliance or a surgical intervention, which should be based on otolarygologic findings, In the meantime, the patient should be cautioned to avoid the use of alcohol or other depressant medications because of potential for increasing the duration and severity of apnea and cautioned regarding driving or operating and dangerous equipment if the patient is experiencing daytime sleepiness. .  Most likely treating the ANN-MARIE will have position impact on HTN control. Most likely the patient will benefit from the gastric sleeve surgery in treating of the obstructive sleep apnea. In 2013, in a study published in Obesity Surgery Journal  A total of 69 studies with 13,900 patients were included and revealed that all the procedures achieved profound effects on ANN-MARIE, as over 75 % of patients saw at least an improvement in their sleep apnea, bariatric surgery is a definitive treatment for obstructive sleep apnea, regardless of the specific type of surgery. We discussed the proportionality between weight and AHI. With 10% weight change, the AHI has a 27% proportionate change. With 20% weight change, the AHI has a 45-50% proportionate change. Orders Placed This Encounter   Procedures    Home Sleep Study       Return in about 3 months (around 7/28/2021) for Reveiwing CPAP usage and compliance report and tro.     Ivan Schafer MD  Medical Director [facility]

## 2021-05-04 ENCOUNTER — INITIAL CONSULT (OUTPATIENT)
Dept: BARIATRICS/WEIGHT MGMT | Age: 46
End: 2021-05-04

## 2021-05-04 DIAGNOSIS — E66.01 MORBID OBESITY WITH BMI OF 50.0-59.9, ADULT (HCC): Primary | ICD-10-CM

## 2021-05-04 PROCEDURE — 99999 PR OFFICE/OUTPT VISIT,PROCEDURE ONLY: CPT | Performed by: PSYCHOLOGIST

## 2021-05-04 NOTE — PROGRESS NOTES
Deepika Steel presented for his presurgical psychological evaluation on 05/04/2021. The evaluation consisted of a clinical interview, the Eating Habits Checklist, the Binge Eating Disorder Screener - 7 (BEDS-7), and the [address] (MBMD) administered by the provider. Based on the evaluation, Deepika Steel is considered to be an appropriate candidate for bariatric surgery from a psychological standpoint. He exhibits mild anxiety secondary to his health and impending surgery, but otherwise reports no significant history of mental health concerns. He reports no history of psychological intervention, including psychotropic medication. He denies a history of suicidal ideation or suicide attempts, and has never been hospitalized psychiatrically. There is no indication of chemical abuse or dependence with the exception of nicotine. He smoked cigarettes for many years prior to quitting in 2005. He has been using e-cigarettes since 2008. He notes he is actively working on decreasing the nicotine in his e-cigarette at present. He reports rarely drinking alcohol, and denies any other recreational or illicit drug use. He denies a history of trauma. Deepika Steel has never been diagnosed with an eating disorder, and his responses in the interview and on the Eating Habits Checklist and the BEDS-7 do not warrant a clinical diagnosis. He acknowledges eating in response to boredom on occasion. He endorsed grazing behavior, and reports a history of skipping lunch, but notes he is currently eating 4-6 small meals and/or snacks consistently throughout his day. He has significantly reduced his consumption of caffeine and carbonated beverages, reporting his current use as one diet soda and/or one energy drink per day. He reports drinking an adequate daily intake of water. He denies binge eating or purging behavior.  Deepika Steel maintains a high level of functional activity working for [employer], with whom he has been employed for nine years. He currently resides with his fiancee of three years and his 15 y/o daughter (half-time). He maintains regular contact with his 24 y/o son as well. Nikolai BLANK Harris completed the MBMD as part of the evaluation. His profile is valid. Results indicate eating, inactivity, and smoking as possible problem areas at this time. There is no indication of acute psychiatric distress, including anxiety, depression, emotional lability, or cognitive dysfunction. His profile is characterized by a self-assured and easygoing demeanor. As a medical patient, his initial response to illness is likely to be denial, followed by annoyance or flippancy to mask the growing anxiety beneath his calm veneer. While he is inclined to be friendly and cooperative, he may find the rules and repetitiveness of a long-term treatment regimen overly boring or demanding. Frequent follow-up in which the importance of his active participation in maintaining his self-care can be reinforced is recommended. Mr. Everton Huffman endorsed more pain sensitivity than the typical medical patient. His profile suggests he may exhibit a strong emotional reaction to stressful medical procedures. The primary coping assets reflected in his profile are social support, and openness to receiving feedback and/or discussing matters pertaining to his health. Lisandro Pradhan exhibited good awareness of the risks of bariatric surgery; however, he believes the benefits will outweigh the potential risks, as he hopes to minimize or reverse the effects of several medical concerns, including sleep apnea, hypertension, arthritis, and chronic back and knee pain. He reports realistic expectations for the procedure, as his overall goals include improved health, reduced pain, reduced medication usage, increased activity, and a goal weight of 250-275 lbs.  He understands the need for permanent lifestyle change, including dietary modifications and a regular exercise program, and expressed willingness to implement the necessary changes. He expressed a commitment to comply with treatment recommendations through this office. He identified his fiancee and his former wife, who has already undergone weight loss surgery, as a good support system in his efforts to meet his weight management goals. In summary, Donald Horton is considered to be an appropriate candidate for bariatric surgery from a psychological standpoint, provided he demonstrates the ability to effectively implement and sustain presurgical dietary and medical recommendations (e.g. nicotine cessation). He was encouraged to participate in support group activities through [facility], and to consult with our staff should he experience any significant post-surgical mood changes or have difficulty modifying his eating behaviors. Feel free to consult with me as needed with any further questions regarding this evaluation. Patient spent 45 minutes completing the psychological testing. Provider spent 45 minutes in test evaluation services on 05/04/2021, and an additional 15 minutes on 05/06/2021.

## 2021-05-05 ENCOUNTER — OFFICE VISIT (OUTPATIENT)
Dept: BARIATRICS/WEIGHT MGMT | Age: 46
End: 2021-05-05

## 2021-05-05 VITALS — WEIGHT: 315 LBS | BODY MASS INDEX: 59.85 KG/M2

## 2021-05-05 DIAGNOSIS — E66.01 MORBID OBESITY WITH BMI OF 50.0-59.9, ADULT (HCC): Primary | ICD-10-CM

## 2021-05-05 DIAGNOSIS — Z72.0 TOBACCO USE: ICD-10-CM

## 2021-05-05 DIAGNOSIS — G47.33 OSA (OBSTRUCTIVE SLEEP APNEA): ICD-10-CM

## 2021-05-05 PROCEDURE — 99213 OFFICE O/P EST LOW 20 MIN: CPT | Performed by: SURGERY

## 2021-05-05 NOTE — PROGRESS NOTES
[facility]) Physicians   General & Laparoscopic Surgery  [employer] [facility] [employer]       HPI:     Padmini Sher is a very pleasant 55 y.o. male with Body mass index is 59.85 kg/m². , Pre-Surgery. Pre-operative clearance and work up pending. Working hard to keep good dietary habits as well level of activity. Patient denies any nausea, vomiting, fevers, chills, shortness of breath, chest pain, cough, constipation or difficulty urinating. Past Medical History:   Diagnosis Date    Arthritis     Cellulitis     Chronic back pain     Hepatitis age 24    HNP (herniated nucleus pulposus with myelopathy), thoracic     Hypertension     [facility] [address]     left foot     Obesity 2011    ANN-MARIE (obstructive sleep apnea)     NO CPAP     Stasis dermatitis      Past Surgical History:   Procedure Laterality Date    ADENOIDECTOMY      FOOT SURGERY Left 2019    WIDE EXCISION OF LEFT FOOT MELANOMA performed by Tamia Bateman MD at [address] [place] [address] 2/10/2020    LEFT FOOT MELANOMA WIDE EXCISION AND FULL THICKNESS SKIN GRAFT,LEFT GROIN SENTINEL LYMPH NODE BIOPSY performed by Tamia Batemna MD at [facility]  06    Arand    UPPER GASTROINTESTINAL ENDOSCOPY N/A 2021    EGD BIOPSY performed by Dea Yoon DO at [facility] EXTRACTION       Family History   Problem Relation Age of Onset    Cancer Mother     COPD Paternal Grandfather     Colon Cancer Maternal Grandfather      Social History     Tobacco Use    Smoking status: Former Smoker     Packs/day: 1.50     Types: Cigarettes     Quit date: 2005     Years since quittin.3    Smokeless tobacco: Former User     Types: Chew   Substance Use Topics    Alcohol use: No     Alcohol/week: 0.0 standard drinks     Comment: rarely -- 1 every few months     I counseled the patient on the importance of not smoking and risks of ETOH.    No Known Allergies  Vitals:    21 1317 Weight: (!) 478 lb 12.8 oz (217.2 kg)       Body mass index is 59.85 kg/m². Current Outpatient Medications:     DULoxetine (CYMBALTA) 60 MG extended release capsule, Take 1 capsule by mouth daily, Disp: 30 capsule, Rfl: 1    oxyCODONE-acetaminophen (PERCOCET) 7.5-325 MG per tablet, Take 1 tablet by mouth every 6 hours as needed for Pain (max 4 per day) for up to 28 days. , Disp: 112 tablet, Rfl: 0    pregabalin (LYRICA) 150 MG capsule, Take 1 capsule by mouth 3 times daily for 30 days. , Disp: 90 capsule, Rfl: 0    penicillin v potassium (VEETID) 500 MG tablet, Take 1,000 mg by mouth 2 times daily, Disp: , Rfl:     diclofenac (VOLTAREN) 75 MG EC tablet, Take 1 tablet by mouth 2 times daily as needed for Pain, Disp: 60 tablet, Rfl: 1    furosemide (LASIX) 20 MG tablet, TAKE ONE TO TWO TABLETS BY MOUTH EVERY MORNING AS NEEDED FOR EDEMA, Disp: 180 tablet, Rfl: 3    losartan (COZAAR) 100 MG tablet, Take 1 tablet by mouth daily, Disp: 30 tablet, Rfl: 5    vitamin B-12 (CYANOCOBALAMIN) 1000 MCG tablet, Take 3,000 mcg by mouth daily , Disp: , Rfl:     Compression Stockings MISC, by Does not apply route Thigh high, Disp: 1 each, Rfl: 0    Elastic Bandages & Supports (MEDICAL COMPRESSION THIGH HIGH) MISC, Thigh high compression stockings, 30-40 mm Hg, Disp: 2 each, Rfl: 1      Review of Systems - History obtained from the patient  General ROS: negative  Psychological ROS: negative  Endocrine ROS: negative  Respiratory ROS: negative  Cardiovascular ROS: negative  Gastrointestinal ROS:negative  Genito-Urinary ROS: negative  Musculoskeletal ROS: negative   Skin ROS: negative    Physical Exam   Vitals Reviewed   Constitutional: Patient is oriented to person, place, and time. Patient appears well-developed and well-nourished. Patient is active and cooperative. Non-toxic appearance. No distress. Neck: Trachea normal and normal range of motion. No JVD present.    Pulmonary/Chest: Effort normal. No accessory muscle usage or stridor. No apnea. No respiratory distress. Cardiovascular: Normal rate and no JVD. Abdominal: Normal appearance. Patient exhibits no distension. Abdomen is soft, obese, non tender. Musculoskeletal: Normal range of motion. Patient exhibits no edema. Neurological: Patient is alert and oriented to person, place, and time. Patient has normal strength. GCS eye subscore is 4. GCS verbal subscore is 5. GCS motor subscore is 6. Skin: Skin is warm and dry. No abrasion and no rash noted. Patient is not diaphoretic. No cyanosis or erythema. Psychiatric: Patient has a normal mood and affect. Speech is normal and behavior is normal. Cognition and memory are normal.       Yaquelin Jorge is 55 y.o. male, Body mass index is 59.85 kg/m². pre surgery, has lost 3# since last visit. The patient underwent dietary counseling with registered dietician. I have reviewed, discussed and agree with the dietary plan. Patient is trying hard to keep good dietary and behavior modifications. Patient is monitoring portion sizes, food choices and liquid calories. Patient is trying to exercise regularly as much as possible. We discussed how his weight affects his overall health including:  Cherie Montanez was seen today for weight management. Diagnoses and all orders for this visit:    Morbid obesity with BMI of 50.0-59.9, adult (HCC)    ANN-MARIE (obstructive sleep apnea)    Tobacco use       and importance of weight loss to alleviate those co morbid conditions. I encouraged the patient to continue exercise and keeping healthy eating habits. Discussed pre-op labs and work up till now. Also counseled the patient extensively on Surgery.      Total encounter time: 20 minutes including any number of the following: review of labs, imaging, provider notes, outside hospital records; performing examination/evaluation; counseling patient and family; ordering medications/tests; placing referrals and communication with referring

## 2021-05-18 ENCOUNTER — HOSPITAL ENCOUNTER (OUTPATIENT)
Dept: SLEEP CENTER | Age: 46
Discharge: HOME | End: 2021-05-18
Payer: COMMERCIAL

## 2021-05-18 DIAGNOSIS — E66.01 MORBID OBESITY WITH BMI OF 50.0-59.9, ADULT (HCC): Chronic | ICD-10-CM

## 2021-05-18 DIAGNOSIS — I10 ESSENTIAL HYPERTENSION: ICD-10-CM

## 2021-05-18 DIAGNOSIS — G47.33 OBSTRUCTIVE SLEEP APNEA: ICD-10-CM

## 2021-05-18 PROCEDURE — 95806 SLEEP STUDY UNATT&RESP EFFT: CPT | Performed by: PSYCHIATRY & NEUROLOGY

## 2021-05-18 PROCEDURE — 95806 SLEEP STUDY UNATT&RESP EFFT: CPT

## 2021-05-19 ENCOUNTER — VIRTUAL VISIT (OUTPATIENT)
Dept: PAIN MANAGEMENT | Age: 46
End: 2021-05-19
Payer: COMMERCIAL

## 2021-05-19 DIAGNOSIS — M51.27 LUMBAGO-SCIATICA DUE TO DISPLACEMENT OF LUMBAR INTERVERTEBRAL DISC: ICD-10-CM

## 2021-05-19 DIAGNOSIS — S33.9XXD SPRAIN OF LIGAMENT OF LUMBOSACRAL JOINT, SUBSEQUENT ENCOUNTER: ICD-10-CM

## 2021-05-19 DIAGNOSIS — S83.92XA SPRAIN OF LEFT KNEE/LEG, INITIAL ENCOUNTER: ICD-10-CM

## 2021-05-19 DIAGNOSIS — S33.9XXA SPRAIN OF LIGAMENT OF LUMBOSACRAL JOINT, INITIAL ENCOUNTER: ICD-10-CM

## 2021-05-19 DIAGNOSIS — S83.92XD SPRAIN OF LEFT KNEE/LEG, SUBSEQUENT ENCOUNTER: ICD-10-CM

## 2021-05-19 PROCEDURE — 99214 OFFICE O/P EST MOD 30 MIN: CPT | Performed by: INTERNAL MEDICINE

## 2021-05-19 RX ORDER — PREGABALIN 150 MG/1
150 CAPSULE ORAL 3 TIMES DAILY
Qty: 90 CAPSULE | Refills: 0 | Status: SHIPPED | OUTPATIENT
Start: 2021-05-19 | End: 2021-06-16 | Stop reason: SDUPTHER

## 2021-05-19 RX ORDER — DULOXETIN HYDROCHLORIDE 60 MG/1
60 CAPSULE, DELAYED RELEASE ORAL DAILY
Qty: 30 CAPSULE | Refills: 1 | Status: SHIPPED | OUTPATIENT
Start: 2021-05-19 | End: 2021-06-16 | Stop reason: SDUPTHER

## 2021-05-19 RX ORDER — OXYCODONE AND ACETAMINOPHEN 7.5; 325 MG/1; MG/1
1 TABLET ORAL EVERY 6 HOURS PRN
Qty: 112 TABLET | Refills: 0 | Status: SHIPPED | OUTPATIENT
Start: 2021-05-19 | End: 2021-06-16 | Stop reason: SDUPTHER

## 2021-05-19 RX ORDER — DICLOFENAC SODIUM 75 MG/1
75 TABLET, DELAYED RELEASE ORAL 2 TIMES DAILY PRN
Qty: 60 TABLET | Refills: 1 | Status: SHIPPED | OUTPATIENT
Start: 2021-05-19 | End: 2021-06-16 | Stop reason: SDUPTHER

## 2021-05-19 NOTE — PROGRESS NOTES
TELE HEALTH VISIT (AUDIO-VISUAL)    Pursuant to the emergency declaration under the [facility], [facility] waiver authority and the [employer] Act, this Virtual  Visit was conducted, with patient's/legal guardian's consent, to reduce the patient's risk of exposure to COVID-19 and provide continuity of care for an established patient. Service is  provided through a video synchronous discussion virtually to substitute for in-person clinic visit. Due to this being a TeleHealth encounter ([facility]), evaluation of the following organ systems was limited: Vitals/Constitutional/EENT/Resp/CV/GI//MS/Neuro/Skin/Ojgr-Rcwq-Feb. Joann Grimes  1975  [id number]    Mr. Harris is being seen virtually for a follow up visit using one of the following techniques  Google Duo, Face time or Doxy. me  Informed verbal consent to the virtual visit was obtained from Mr. Nishant Chino. Risks associated with HIPPA compliance with the virtual visit was explained to the patient. Mr. Nishant Chino is at his residence and Dr. Albina Rice is in his office. HISTORY OF PRESENT ILLNESS:  Mr. Nishant Chino is a 55 y.o. male returns for a follow up visit for multiple medical problems. His current presenting problems are   1. 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 BWC Sprain of ligament of lumbosacral joint, subsequent encounter    3. BWC Sprain of ligament of lumbosacral joint, initial encounter    4. BWC Sprain of left knee/leg, subsequent encounter    5. BWC Sprain of left knee/leg, initial encounter    . As per information/history obtained from the PADT(patient assessment and documentation tool) - He complains of pain in the lower back with radiation to the upper leg Left and lower leg Left He rates the pain 4/10 and describes it as aching, burning, stabbing.   Pain is made worse by: walking, standing. Current treatment regimen has helped relieve about 70% of the pain. He denies side effects from the current pain regimen. Patient reports that since the last follow up visit the physical functioning is unchanged, family/social relationships are unchanged, mood is unchanged and sleep patterns are unchanged, and that the overall functioning is unchanged. Patient denies neurological bowel or bladder. Patient denies misusing/abusing his narcotic pain medications or using any illegal drugs. There are No indicators for possible drug abuse, addiction or diversion problems. Upon obtaining the medical history from Mr. Kaelyn Hartmann regarding today's office visit for his presenting problems, patient states he has been doing fair, pain has been about the same. He complains the pain is greater in the back than the legs. He mentions he is working full time. He says he is using Percocet 4 per day along with Voltaren and Lyrica. He reports his weight has been stable. Patient's  subjective report of his mood is fair. he describes occasional symptoms of depression, occasional  irritability and some mood swings. Describes his mood as being neutral and reports some pleasure in his daily activities. Reports  fair  appetite, energy and concentration. Able to function well in different aspects of his daily activities. Denies suicidal or homicidal ideation. Denies any complaints of increased tension, does   Worry sometimes and occasional  irritability  he denies any c/o increased anxiety, No c/o panic attacks or symptoms of PTSD. He says he is using Cymbalta. He reports he has been off Voltaren for a week due to EGD and has noticed a big difference in pain. ALLERGIES/PAST MED/FAM/SOC HISTORY: Mr. Kaelyn Hartmann allergies, past medical, family and social history were reviewed in the chart.       Mr. Kaelyn Hartmann current medications are   Outpatient Medications Prior to Visit   Medication Sig Dispense Refill    DULoxetine (CYMBALTA) 60 MG extended release capsule Take 1 capsule by mouth daily 30 capsule 1    oxyCODONE-acetaminophen (PERCOCET) 7.5-325 MG per tablet Take 1 tablet by mouth every 6 hours as needed for Pain (max 4 per day) for up to 28 days. 112 tablet 0    pregabalin (LYRICA) 150 MG capsule Take 1 capsule by mouth 3 times daily for 30 days. 90 capsule 0    penicillin v potassium (VEETID) 500 MG tablet Take 1,000 mg by mouth 2 times daily      diclofenac (VOLTAREN) 75 MG EC tablet Take 1 tablet by mouth 2 times daily as needed for Pain 60 tablet 1    furosemide (LASIX) 20 MG tablet TAKE ONE TO TWO TABLETS BY MOUTH EVERY MORNING AS NEEDED FOR EDEMA 180 tablet 3    losartan (COZAAR) 100 MG tablet Take 1 tablet by mouth daily 30 tablet 5    vitamin B-12 (CYANOCOBALAMIN) 1000 MCG tablet Take 3,000 mcg by mouth daily       Compression Stockings MISC by Does not apply route Thigh high 1 each 0    Elastic Bandages & Supports (MEDICAL COMPRESSION THIGH HIGH) MISC Thigh high compression stockings, 30-40 mm Hg 2 each 1     No facility-administered medications prior to visit. REVIEW OF SYSTEMS:     Respiratory: Negative for shortness of breath. Cardiovascular: Negative for chest pain, palpitations  Gastrointestinal: Negative for blood in stool, abdominal distention, nausea, vomiting, abdominal pain, diarrhea,constipation. Neurological: Negative for speech difficulty, weakness and light-headedness, dizziness, tremors, sleepiness  Psychiatric/Behavioral: Negative for suicidal ideas, hallucinations, behavioral problems, self-injury, decreased concentration/cognition, agitation, confusion. PHYSICAL EXAM:   Nursing note and vitals reviewed. There were no vitals taken for this visit. General Appearance: Patient is well nourished, well developed, well groomed and in no acute distress. Skin: Skin is warm and dry, good turgor . No rash or lesions noted. He is not diaphoretic.      Pulmonary/Chest: Effort normal. No respiratory distress or use of accessory muscles. Auscultation revealing normal air entry. He does not have wheezes in the lung fields. He has no rales. Cardiovascular: Normal rate, regular rhythm, normal heart sounds, and does not have murmur. Exam reveals no gallop and no friction rub. Musculoskeletal / Extremities: Range of motion is normal. Gait is normal, assistive devices use: none. He exhibits edema: none, and no tenderness. Neurological/Psychiatric:He is alert and oriented to person, place, and time. Coordination is  normal.   Judgement and Insight is normal  His mood is Appropriate and affect is Neutral/Euthymic(normal) . His behavior is normal.   thought content normal.        IMPRESSION:     1.  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 BWC Sprain of ligament of lumbosacral joint, subsequent encounter    3. BWC Sprain of ligament of lumbosacral joint, initial encounter    4. BWC Sprain of left knee/leg, subsequent encounter    5. BWC Sprain of left knee/leg, initial encounter        PLAN:  Informed verbal consent was obtained.  -labs ordered; not done yet  -continue with Percocet 4 per day  -He was advised weight reduction, diet changes- 800-1200 roc diet, diet diary, exercising, nutritional  consult increased physical activity as tolerated   -walking/stretching exercises as advised    -he was advised proper sleep hygiene-told to avoid:use of caffeine or other stimulants after noon, alcohol use near bedtime, long or frequent naps during the day, erratic sleep schedule, heavy meals near bedtime, vigorous exercise near bedtime and use of electronic devices near bedtime   -Adv Biofeedback, relaxation and meditation techniques. Referral to psychologist for CBT was also discussed with patient.  Continue with Cymbalta   -back stretching exercises as advised     Mr. Casa Alejandro will be prescribed  the medications  listed below which are for treatment of his presenting  medical problems which for this visit include:   Diagnoses of  [facility] Lumbago-sciatica due to displacement of lumbar intervertebral disc, BWC Sprain of ligament of lumbosacral joint, subsequent encounter, BWC Sprain of ligament of lumbosacral joint, initial encounter, BWC Sprain of left knee/leg, subsequent encounter, and BWC Sprain of left knee/leg, initial encounter were pertinent to this visit. Medications/orders associated with this visit:    Current Outpatient Medications   Medication Sig Dispense Refill    DULoxetine (CYMBALTA) 60 MG extended release capsule Take 1 capsule by mouth daily 30 capsule 1    oxyCODONE-acetaminophen (PERCOCET) 7.5-325 MG per tablet Take 1 tablet by mouth every 6 hours as needed for Pain (max 4 per day) for up to 28 days. 112 tablet 0    pregabalin (LYRICA) 150 MG capsule Take 1 capsule by mouth 3 times daily for 30 days. 90 capsule 0    penicillin v potassium (VEETID) 500 MG tablet Take 1,000 mg by mouth 2 times daily      diclofenac (VOLTAREN) 75 MG EC tablet Take 1 tablet by mouth 2 times daily as needed for Pain 60 tablet 1    furosemide (LASIX) 20 MG tablet TAKE ONE TO TWO TABLETS BY MOUTH EVERY MORNING AS NEEDED FOR EDEMA 180 tablet 3    losartan (COZAAR) 100 MG tablet Take 1 tablet by mouth daily 30 tablet 5    vitamin B-12 (CYANOCOBALAMIN) 1000 MCG tablet Take 3,000 mcg by mouth daily       Compression Stockings MISC by Does not apply route Thigh high 1 each 0    Elastic Bandages & Supports (MEDICAL COMPRESSION THIGH HIGH) MISC Thigh high compression stockings, 30-40 mm Hg 2 each 1     No current facility-administered medications for this visit. Goals of current treatment regimen include improvement in pain, restoration of functioning- with focus on improvement in physical performance, general activity, work or disability,emotional distress, health care utilization and  decreased medication consumption.  Will continue to monitor progress towards achieving/maintaining therapeutic goals with special emphasis on  1. Improvement in perceived interfernce  of pain with ADL's. Ability to do home exercises independently. Ability to do household chores indoor and/or outdoor work and social and leisure activities. To increase flexibility/ROM, strength and endurance. Improve psychosocial and physical functioning.- he is showing progression towards this treatment goal with the current regimen. 2. Improving sleep to 6-7 hours a night. Improve mood/ anxiety and depression symptoms such as crying spells, low energy, problems with concentration, motivation.- he is showing progression towards this treatment goal with the current regimen. 3. Reduction of reliance on opioid analgesia/more appropriate opioid use. - he is showing progression towards this treatment goal with the current regimen. 4. Ability to focus/concentrate at work and perform the duties required of him at work  Sit through a workday without lower extremity symptoms. Stand 30-60 minutes without lower extremity symptoms. Ability to lift up to 10-20 lbs. Ability to go up and down stairs. Sit 30-60 minutes  Without having to stand up frequently. - he is maintaining/progressing towards his work related goals with the current regimen. Risks and benefits of the medications and other alternative treatments have been/were  discussed with the patient. Any questions on the  common side effects of these medications were also answered. He was advised against drinking alcohol with the narcotic pain medicines, advised against driving or handling machinery when  starting or adjusting the dose of medicines, feeling groggy or drowsy, or if having any cognitive issues related to the current medications. Heis fully aware of the risk of overdose and death, if medicines are misused and not taken as prescribed. If he develops new symptoms or if the symptoms worsen, he was told to call the office.  .  Thank you for allowing me to participate in the care of this patient.     Maya Adkins MD.    Cc: Dayday Tatum MD

## 2021-05-20 NOTE — PROGRESS NOTES
Juan Rebeka         : 1975  [] [facility]     [] [address]      [] Sebastian     []Dilcia    [] Ronny Lainez  [] [facility]   [] Other:  Diagnosis: [x] ANN-MARIE (G47.33) [] CSA (G47.31) [] Apnea (G47.30)   Length of Need: [] 12 Months [x] 99 Months [] Other:    Machine (GIANNA!): [x] Respironics Dream Station      Auto [x] ResMed AirSense     Auto [] Other:     [x]  CPAP () [] Bilevel ()   Mode: [x] Auto [] Spontaneous    Mode: [] Auto [] Spontaneous           Between 5 and 20 cm                 Comfort Settings:   - Ramp Pressure: 5 cmH2O                                        - Ramp time: 15 min                                     -  Flex/EPR - 3 full time                                    - For ResMed Bilevel (TiMax-4 sec   TiMin- 0.2 sec)     Humidifier: [x] Heated ()        [x] Water chamber replacement ()/ 1 per 6 months        Mask:  Please always start with the mask the patient used during the titraion   [x] Nasal () /1 per 3 months [x] Full Face () /1 per 3 months   [x] Patient choice -Size and fit mask [x] Patient Choice - Size and fit mask   [] Dispense:  [] Dispense:    [x] Headgear () / 1 per 3 months [x] Headgear () / 1 per 3 months   [x] Replacement Nasal Cushion ()/2 per month [x] Interface Replacement ()/1 per month   [x] Replacement Nasal Pillows ()/2 per month         Tubing: [x] Heated ()/1 per 3 months    [] Standard ()/1 per 3 months [] Other:           Filters: [x] Non-disposable ()/1 per 6 months     [x] Ultra-Fine, Disposable ()/2 per month        Miscellaneous: [x] Chin Strap ()/ 1 per 6 months [] O2 bleed-in:       LPM   [] Oximetry on CPAP/Bilevel []  Other:          Start Order Date: 21    MEDICAL JUSTIFICATION:  I, the undersigned, certify that the above prescribed supplies are medically necessary for this patients wellbeing.   In my opinion, the supplies are both reasonable and necessary in reference

## 2021-05-21 ENCOUNTER — TELEPHONE (OUTPATIENT)
Dept: PULMONOLOGY | Age: 46
End: 2021-05-21

## 2021-05-27 ENCOUNTER — TELEPHONE (OUTPATIENT)
Dept: PULMONOLOGY | Age: 46
End: 2021-05-27

## 2021-06-02 ENCOUNTER — OFFICE VISIT (OUTPATIENT)
Dept: BARIATRICS/WEIGHT MGMT | Age: 46
End: 2021-06-02
Payer: COMMERCIAL

## 2021-06-02 VITALS — WEIGHT: 315 LBS | BODY MASS INDEX: 59.12 KG/M2

## 2021-06-02 DIAGNOSIS — R10.11 RUQ ABDOMINAL PAIN: ICD-10-CM

## 2021-06-02 DIAGNOSIS — G47.33 OBSTRUCTIVE SLEEP APNEA: ICD-10-CM

## 2021-06-02 DIAGNOSIS — E66.01 MORBID OBESITY WITH BMI OF 50.0-59.9, ADULT (HCC): Primary | ICD-10-CM

## 2021-06-02 PROCEDURE — 99213 OFFICE O/P EST LOW 20 MIN: CPT | Performed by: SURGERY

## 2021-06-02 NOTE — PROGRESS NOTES
Nikolai Harris lost 5 lbs over the past month. Breakfast: cereal - sugary OR protein bar    Snack: protein bar OR bag of trail mix with sunflower seeds    Lunch: leftovers OR light frozen entree with brussels sprouts    Snack: none    Dinner: burgers OR brats/tammie OR pulled pork OR steak, veggies, white rice with sauce    Snack: popcorn OR frozen yogurt    Is pt consuming smaller portions? yes he is reading labels    Is pt consuming at least 64 oz of fluids per day? yes water, crystal lite at least 80oz    Is pt consuming carbonated, caffeinated, or sugary beverages? yes diet soda occasionally    Has pt sampled Unjury and/or Nectar protein?  yes    Exercise: none    Plan/Recommendations: eliminate sugary cereal, diet soda    Handouts: none    Mirna Carreno RD, LD

## 2021-06-09 ENCOUNTER — OFFICE VISIT (OUTPATIENT)
Dept: DERMATOLOGY | Age: 46
End: 2021-06-09
Payer: COMMERCIAL

## 2021-06-09 VITALS — TEMPERATURE: 97.6 F

## 2021-06-09 DIAGNOSIS — D22.9 MULTIPLE BENIGN MELANOCYTIC NEVI: Primary | ICD-10-CM

## 2021-06-09 DIAGNOSIS — D23.62: ICD-10-CM

## 2021-06-09 DIAGNOSIS — L91.8 INFLAMED ACROCHORDON: ICD-10-CM

## 2021-06-09 DIAGNOSIS — L73.8 SEBACEOUS HYPERPLASIA: ICD-10-CM

## 2021-06-09 DIAGNOSIS — Z85.820 PERSONAL HISTORY OF MALIGNANT MELANOMA: ICD-10-CM

## 2021-06-09 DIAGNOSIS — I89.0 ELEPHANTIASIS NOSTRA VERRUCOSA: ICD-10-CM

## 2021-06-09 DIAGNOSIS — R60.0 BILATERAL LOWER EXTREMITY EDEMA: ICD-10-CM

## 2021-06-09 DIAGNOSIS — D18.01 CHERRY ANGIOMA: ICD-10-CM

## 2021-06-09 PROCEDURE — 99214 OFFICE O/P EST MOD 30 MIN: CPT | Performed by: DERMATOLOGY

## 2021-06-10 NOTE — PROGRESS NOTES
days. 112 tablet 0    pregabalin (LYRICA) 150 MG capsule Take 1 capsule by mouth 3 times daily for 30 days. 90 capsule 0    diclofenac (VOLTAREN) 75 MG EC tablet Take 1 tablet by mouth 2 times daily as needed for Pain 60 tablet 1    penicillin v potassium (VEETID) 500 MG tablet Take 1,000 mg by mouth 2 times daily      furosemide (LASIX) 20 MG tablet TAKE ONE TO TWO TABLETS BY MOUTH EVERY MORNING AS NEEDED FOR EDEMA 180 tablet 3    losartan (COZAAR) 100 MG tablet Take 1 tablet by mouth daily 30 tablet 5    vitamin B-12 (CYANOCOBALAMIN) 1000 MCG tablet Take 3,000 mcg by mouth daily       Compression Stockings MISC by Does not apply route Thigh high 1 each 0    Elastic Bandages & Supports (MEDICAL COMPRESSION THIGH HIGH) MISC Thigh high compression stockings, 30-40 mm Hg 2 each 1     No current facility-administered medications for this visit. Review of Systems:  Constitutional: No fevers, chills or recent illness.     Skin: Skin:As per HPI AND otherwise no new, bleeding or symptomatic skin lesions      Objective:     Vitals:    06/09/21 1234   Temp: 97.6 °F (36.4 °C)   TempSrc: Temporal       Physical Examination:  General: alert, comfortable, no apparent distress, well-appearing  Psych: alert, oriented and pleasant  Neuro: oriented to person, place, and time  Skin: Areas examined: head including face, lips, conjunctiva and lids, neck, hair/scalp, chest, including breasts and axilla, abdomen, back, buttocks, right upper extremity, left upper extremity, right lower extremity, left lower extremity, left hand, right hand, digits and nails, Right foot, Left foot, toe nails and patient declined genital exam      All areas examined were within normal limits except those listed below with the appropriate assessment and plan    Assessment and Plan (with relevant objective exam findings):     1. Multiple benign melanocytic nevi   Location: posterior neck, chest, arms abdomen and back  Objective findings: multiple umbilicated papule(s) with blood vessels overlying them on dermatoscopy. We discussed that this is a benign growth consisting of clogged and swollen oil glands most likely due to a combination of genetic factors and sun damage. Decision was made to do the following:  Observation/no treatment      Follow up:  Return visit in 3 months or as needed for change in condition. All questions addressed.      Procedure:   No procedure performed          Mari Bruno MD. MS

## 2021-06-16 ENCOUNTER — VIRTUAL VISIT (OUTPATIENT)
Dept: PAIN MANAGEMENT | Age: 46
End: 2021-06-16
Payer: COMMERCIAL

## 2021-06-16 DIAGNOSIS — S83.92XD SPRAIN OF LEFT KNEE/LEG, SUBSEQUENT ENCOUNTER: ICD-10-CM

## 2021-06-16 DIAGNOSIS — S83.92XA SPRAIN OF LEFT KNEE/LEG, INITIAL ENCOUNTER: ICD-10-CM

## 2021-06-16 DIAGNOSIS — S33.9XXD SPRAIN OF LIGAMENT OF LUMBOSACRAL JOINT, SUBSEQUENT ENCOUNTER: ICD-10-CM

## 2021-06-16 DIAGNOSIS — S33.9XXA SPRAIN OF LIGAMENT OF LUMBOSACRAL JOINT, INITIAL ENCOUNTER: ICD-10-CM

## 2021-06-16 DIAGNOSIS — M51.27 LUMBAGO-SCIATICA DUE TO DISPLACEMENT OF LUMBAR INTERVERTEBRAL DISC: ICD-10-CM

## 2021-06-16 PROCEDURE — 99213 OFFICE O/P EST LOW 20 MIN: CPT | Performed by: INTERNAL MEDICINE

## 2021-06-16 RX ORDER — OXYCODONE AND ACETAMINOPHEN 7.5; 325 MG/1; MG/1
1 TABLET ORAL EVERY 6 HOURS PRN
Qty: 112 TABLET | Refills: 0 | Status: SHIPPED | OUTPATIENT
Start: 2021-06-16 | End: 2021-07-14 | Stop reason: SDUPTHER

## 2021-06-16 RX ORDER — DULOXETIN HYDROCHLORIDE 60 MG/1
60 CAPSULE, DELAYED RELEASE ORAL DAILY
Qty: 30 CAPSULE | Refills: 1 | Status: SHIPPED | OUTPATIENT
Start: 2021-06-16 | End: 2021-07-14 | Stop reason: SDUPTHER

## 2021-06-16 RX ORDER — DICLOFENAC SODIUM 75 MG/1
75 TABLET, DELAYED RELEASE ORAL 2 TIMES DAILY PRN
Qty: 60 TABLET | Refills: 1 | Status: SHIPPED | OUTPATIENT
Start: 2021-06-16 | End: 2021-07-14 | Stop reason: SDUPTHER

## 2021-06-16 RX ORDER — PREGABALIN 150 MG/1
150 CAPSULE ORAL 3 TIMES DAILY
Qty: 90 CAPSULE | Refills: 0 | Status: SHIPPED | OUTPATIENT
Start: 2021-06-16 | End: 2021-07-14 | Stop reason: SDUPTHER

## 2021-06-16 NOTE — PROGRESS NOTES
TELE HEALTH VISIT (AUDIO-VISUAL)    Pursuant to the emergency declaration under the [facility] waiver authority and the [employer] Act, this Virtual  Visit was conducted, with patient's/legal guardian's consent, to reduce the patient's risk of exposure to COVID-19 and provide continuity of care for an established patient. Service is  provided through a video synchronous discussion virtually to substitute for in-person clinic visit. Due to this being a TeleHealth encounter (During [facility]-09 public health emergency), evaluation of the following organ systems was limited: Vitals/Constitutional/EENT/Resp/CV/GI//MS/Neuro/Skin/Zdma-Kaqe-Iti. Vinnie Nakita  1975  [id number]    Mr. Harris is being seen virtually for a follow up visit using one of the following techniques  Google Duo, Face time or Doxy. me  Informed verbal consent to the virtual visit was obtained from Mr. Pradeep Barnett. Risks associated with HIPPA compliance with the virtual visit was explained to the patient. Mr. Pradeep Barnett is at his residence and Dr. Betsy Zayas is in his office. HISTORY OF PRESENT ILLNESS:  Mr. Pradeep Barnett is a 55 y.o. male returns for a follow up visit for multiple medical problems. His current presenting problems are   1. 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 BWC Sprain of ligament of lumbosacral joint, subsequent encounter    3. BWC Sprain of ligament of lumbosacral joint, initial encounter    4. BWC Sprain of left knee/leg, subsequent encounter    5. BWC Sprain of left knee/leg, initial encounter    . As per information/history obtained from the PADT(patient assessment and documentation tool) - He complains of pain in the lower back with radiation to the lower leg Left He rates the pain 3/10 and describes it as sharp, aching, burning, numbness. Pain is made worse by: walking, standing. MCG tablet Take 3,000 mcg by mouth daily       Compression Stockings MISC by Does not apply route Thigh high 1 each 0    Elastic Bandages & Supports (MEDICAL COMPRESSION THIGH HIGH) MISC Thigh high compression stockings, 30-40 mm Hg 2 each 1     No facility-administered medications prior to visit. REVIEW OF SYSTEMS:    Respiratory: Negative for apnea, chest tightness and shortness of breath or change in baseline breathing. PHYSICAL EXAM:   Nursing note and vitals reviewed. There were no vitals taken for this visit. Constitutional: He appears well-developed and well-nourished. No acute distress. Cardiovascular: Normal rate, regular rhythm, normal heart sounds, and does not have murmur. Pulmonary/Chest: Effort normal. No respiratory distress. He does not have wheezes in the lung fields. He has no rales. Neurological/Psychiatric:He is alert and oriented to person, place, and time. Coordination is  normal.  His mood isAppropriate and affect is Neutral/Euthymic(normal) . His    IMPRESSION:   1.  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 BWC Sprain of ligament of lumbosacral joint, subsequent encounter    3. BWC Sprain of ligament of lumbosacral joint, initial encounter    4. BWC Sprain of left knee/leg, subsequent encounter    5.  BWC Sprain of left knee/leg, initial encounter        PLAN:  Informed verbal consent was obtained  -ROM/Stretching exercises as advised   -He was advised to increase fluids ( 5-7  glasses of fluid daily), limit caffeine, avoid cheese products, increase dietary fiber, increase activity and exercise as tolerated and relax regularly and enjoy meals   -continue with Percocet 4 per day  -Continue with all other adjuvant medications as before      Current Outpatient Medications   Medication Sig Dispense Refill    DULoxetine (CYMBALTA) 60 MG extended release capsule Take 1 capsule by mouth daily 30 capsule 1    oxyCODONE-acetaminophen disability,emotional distress, health care utilization and  decreased medication consumption. Will continue to monitor progress towards achieving/maintaining therapeutic goals with special emphasis on  1. Improvement in perceived interfernce  of pain with ADL's. Ability to do home exercises independently. Ability to do household chores indoor and/or outdoor work and social and leisure activities. Improve psychosocial and physical functioning. - he is showing progression towards this treatment goal with the current regimen. He was advised against drinking alcohol with the narcotic pain medicines, advised against driving or handling machinery while adjusting the dose of medicines or if having cognitive  issues related to the current medications. Risk of overdose and death, if medicines not taken as prescribed, were also discussed. If the patient develops new symptoms or if the symptoms worsen, the patient should call the office. While transcribing every attempt was made to maintain the accuracy of the note in terms of it's contents,there may have been some errors made inadvertently. Thank you for allowing me to participate in the care of this patient.     Jayesh Durham MD.    Cc: Magui Carrington MD

## 2021-07-01 NOTE — PROGRESS NOTES
[facility]) Physicians   General & Laparoscopic Surgery  [employer]       HPI:     Latisha Montalvo is a very pleasant 55 y.o. male with Body mass index is 59.12 kg/m². , Pre-Surgery. Pre-operative clearance and work up pending. Working hard to keep good dietary habits as well level of activity. Patient denies any nausea, vomiting, fevers, chills, shortness of breath, chest pain, cough, constipation or difficulty urinating. Past Medical History:   Diagnosis Date    Arthritis     Cellulitis     Chronic back pain     Hepatitis age 24    HNP (herniated nucleus pulposus with myelopathy), thoracic     Hypertension     Melanoma ([facility] [address]     left foot     Obesity 2011    ANN-MARIE (obstructive sleep apnea)     NO CPAP     Stasis dermatitis      Past Surgical History:   Procedure Laterality Date    ADENOIDECTOMY      FOOT SURGERY Left 2019    WIDE EXCISION OF LEFT FOOT MELANOMA performed by Sallie Gordon MD at [facility] Left 2/10/2020    LEFT FOOT MELANOMA WIDE EXCISION AND FULL THICKNESS SKIN GRAFT,LEFT GROIN SENTINEL LYMPH NODE BIOPSY performed by Sallie Gordon MD at [facility]  06    Arand    UPPER GASTROINTESTINAL ENDOSCOPY N/A 2021    EGD BIOPSY performed by Jed Anderson DO at [facility] EXTRACTION       Family History   Problem Relation Age of Onset    Cancer Mother     COPD Paternal Grandfather     Colon Cancer Maternal Grandfather      Social History     Tobacco Use    Smoking status: Former Smoker     Packs/day: 1.50     Types: Cigarettes     Quit date: 2005     Years since quittin.5    Smokeless tobacco: Former User     Types: Chew   Substance Use Topics    Alcohol use: No     Alcohol/week: 0.0 standard drinks     Comment: rarely -- 1 every few months     I counseled the patient on the importance of not smoking and risks of ETOH.    No Known Allergies  Vitals:    21 1403 Weight: (!) 473 lb (214.6 kg)       Body mass index is 59.12 kg/m². Current Outpatient Medications:     penicillin v potassium (VEETID) 500 MG tablet, Take 1,000 mg by mouth 2 times daily, Disp: , Rfl:     furosemide (LASIX) 20 MG tablet, TAKE ONE TO TWO TABLETS BY MOUTH EVERY MORNING AS NEEDED FOR EDEMA, Disp: 180 tablet, Rfl: 3    losartan (COZAAR) 100 MG tablet, Take 1 tablet by mouth daily, Disp: 30 tablet, Rfl: 5    vitamin B-12 (CYANOCOBALAMIN) 1000 MCG tablet, Take 3,000 mcg by mouth daily , Disp: , Rfl:     Compression Stockings MISC, by Does not apply route Thigh high, Disp: 1 each, Rfl: 0    Elastic Bandages & Supports (MEDICAL COMPRESSION THIGH HIGH) MISC, Thigh high compression stockings, 30-40 mm Hg, Disp: 2 each, Rfl: 1    DULoxetine (CYMBALTA) 60 MG extended release capsule, Take 1 capsule by mouth daily, Disp: 30 capsule, Rfl: 1    oxyCODONE-acetaminophen (PERCOCET) 7.5-325 MG per tablet, Take 1 tablet by mouth every 6 hours as needed for Pain (max 4 per day) for up to 28 days. , Disp: 112 tablet, Rfl: 0    pregabalin (LYRICA) 150 MG capsule, Take 1 capsule by mouth 3 times daily for 30 days. , Disp: 90 capsule, Rfl: 0    diclofenac (VOLTAREN) 75 MG EC tablet, Take 1 tablet by mouth 2 times daily as needed for Pain, Disp: 60 tablet, Rfl: 1      Review of Systems - History obtained from the patient  General ROS: negative  Psychological ROS: negative  Endocrine ROS: negative  Respiratory ROS: negative  Cardiovascular ROS: negative  Gastrointestinal ROS:negative  Genito-Urinary ROS: negative  Musculoskeletal ROS: negative   Skin ROS: negative    Physical Exam   Vitals Reviewed   Constitutional: Patient is oriented to person, place, and time. Patient appears well-developed and well-nourished. Patient is active and cooperative. Non-toxic appearance. No distress. Neck: Trachea normal and normal range of motion. No JVD present.    Pulmonary/Chest: Effort normal. No accessory muscle usage or stridor. No apnea. No respiratory distress. Cardiovascular: Normal rate and no JVD. Abdominal: Normal appearance. Patient exhibits no distension. Abdomen is soft, obese, non tender. Musculoskeletal: Normal range of motion. Patient exhibits no edema. Neurological: Patient is alert and oriented to person, place, and time. Patient has normal strength. GCS eye subscore is 4. GCS verbal subscore is 5. GCS motor subscore is 6. Skin: Skin is warm and dry. No abrasion and no rash noted. Patient is not diaphoretic. No cyanosis or erythema. Psychiatric: Patient has a normal mood and affect. Speech is normal and behavior is normal. Cognition and memory are normal.       Kimberly Nielsen is 55 y.o. male, Body mass index is 59.12 kg/m². pre surgery, has lost 5# since last visit. The patient underwent dietary counseling with registered dietician. I have reviewed, discussed and agree with the dietary plan. Patient is trying hard to keep good dietary and behavior modifications. Patient is monitoring portion sizes, food choices and liquid calories. Patient is trying to exercise regularly as much as possible. We discussed how his weight affects his overall health including:  Jaja Stephens was seen today for obesity. Diagnoses and all orders for this visit:    Morbid obesity with BMI of 50.0-59.9, adult ([address]    RUQ abdominal pain  -     US GALLBLADDER RUQ; Future    Obstructive sleep apnea       and importance of weight loss to alleviate those co morbid conditions. I encouraged the patient to continue exercise and keeping healthy eating habits. Discussed pre-op labs and work up till now. Also counseled the patient extensively on Surgery.      Total encounter time: 20 minutes including any number of the following: review of labs, imaging, provider notes, outside hospital records; performing examination/evaluation; counseling patient and family; ordering medications/tests; placing referrals and communication with referring physicians; coordination of care, and documentation in the EHR. RTC in 4 weeks  Obtain rest of pre-op work up / clearances  Diet and Exercise      Patient advised that its their responsibility to follow up for studies and/or labs ordered today.      [facility]

## 2021-07-07 ENCOUNTER — OFFICE VISIT (OUTPATIENT)
Dept: BARIATRICS/WEIGHT MGMT | Age: 46
End: 2021-07-07
Payer: COMMERCIAL

## 2021-07-07 VITALS — HEIGHT: 75 IN | BODY MASS INDEX: 39.17 KG/M2 | WEIGHT: 315 LBS

## 2021-07-07 DIAGNOSIS — G47.33 OBSTRUCTIVE SLEEP APNEA: ICD-10-CM

## 2021-07-07 DIAGNOSIS — Z72.0 TOBACCO USE: ICD-10-CM

## 2021-07-07 DIAGNOSIS — E66.01 MORBID OBESITY WITH BMI OF 50.0-59.9, ADULT (HCC): Primary | ICD-10-CM

## 2021-07-07 PROCEDURE — 99213 OFFICE O/P EST LOW 20 MIN: CPT | Performed by: SURGERY

## 2021-07-07 NOTE — PROGRESS NOTES
Nikolai Harris gained 0.5 lbs over the past month. Is pt eating at least 4 times everyday? yes 3 meals and 1 am snacks    Is pt eating a lean protein source with all meals and snacks? yes protein bar , hb egg, protein shake, tuna - wife packs lunch and snacks    Has pt decreased their portions using the plate method? yes eating more frequently and smaller portions    Is pt choosing low fat/sugar free options? yes wife packs    Is pt drinking at least 64 oz of clear liquids everyday? yes water and crystal lite    Has pt stopped drinking carbonation, caffeinated, and sugar sweetened beverages? still drinking diet soda occasionally    Has pt sampled Unjury and/or Nectar protein?  yes tried and tolerated    Participating in intentional exercise? very little because of pain and wrapped legs    Plan/Recommendations: will email PC and Emotional eating SG                                            Eliminate soda                                            Include protein based afternoon snack  Handouts: email SG    Dilcia Ross RD, LD

## 2021-07-07 NOTE — PROGRESS NOTES
Weight: (!) 473 lb 6.4 oz (214.7 kg)   Height: 6' 3\" (1.905 m)       Body mass index is 59.17 kg/m². Current Outpatient Medications:     DULoxetine (CYMBALTA) 60 MG extended release capsule, Take 1 capsule by mouth daily, Disp: 30 capsule, Rfl: 1    oxyCODONE-acetaminophen (PERCOCET) 7.5-325 MG per tablet, Take 1 tablet by mouth every 6 hours as needed for Pain (max 4 per day) for up to 28 days. , Disp: 112 tablet, Rfl: 0    pregabalin (LYRICA) 150 MG capsule, Take 1 capsule by mouth 3 times daily for 30 days. , Disp: 90 capsule, Rfl: 0    diclofenac (VOLTAREN) 75 MG EC tablet, Take 1 tablet by mouth 2 times daily as needed for Pain, Disp: 60 tablet, Rfl: 1    penicillin v potassium (VEETID) 500 MG tablet, Take 1,000 mg by mouth 2 times daily, Disp: , Rfl:     furosemide (LASIX) 20 MG tablet, TAKE ONE TO TWO TABLETS BY MOUTH EVERY MORNING AS NEEDED FOR EDEMA, Disp: 180 tablet, Rfl: 3    losartan (COZAAR) 100 MG tablet, Take 1 tablet by mouth daily, Disp: 30 tablet, Rfl: 5    Compression Stockings MISC, by Does not apply route Thigh high, Disp: 1 each, Rfl: 0    Elastic Bandages & Supports (MEDICAL COMPRESSION THIGH HIGH) MISC, Thigh high compression stockings, 30-40 mm Hg, Disp: 2 each, Rfl: 1    vitamin B-12 (CYANOCOBALAMIN) 1000 MCG tablet, Take 3,000 mcg by mouth daily  (Patient not taking: Reported on 7/7/2021), Disp: , Rfl:       Review of Systems - History obtained from the patient  General ROS: negative  Psychological ROS: negative  Endocrine ROS: negative  Respiratory ROS: negative  Cardiovascular ROS: negative  Gastrointestinal ROS:negative  Genito-Urinary ROS: negative  Musculoskeletal ROS: negative   Skin ROS: negative    Physical Exam   Vitals Reviewed   Constitutional: Patient is oriented to person, place, and time. Patient appears well-developed and well-nourished. Patient is active and cooperative. Non-toxic appearance. No distress. Neck: Trachea normal and normal range of motion.  No JVD present. Pulmonary/Chest: Effort normal. No accessory muscle usage or stridor. No apnea. No respiratory distress. Cardiovascular: Normal rate and no JVD. Abdominal: Normal appearance. Patient exhibits no distension. Abdomen is soft, obese, non tender. Musculoskeletal: Normal range of motion. Patient exhibits no edema. Neurological: Patient is alert and oriented to person, place, and time. Patient has normal strength. GCS eye subscore is 4. GCS verbal subscore is 5. GCS motor subscore is 6. Skin: Skin is warm and dry. No abrasion and no rash noted. Patient is not diaphoretic. No cyanosis or erythema. Psychiatric: Patient has a normal mood and affect. Speech is normal and behavior is normal. Cognition and memory are normal.       Abhinav Wu is 55 y.o. male, Body mass index is 59.17 kg/m². pre surgery, has stayed weight stable since last visit. The patient underwent dietary counseling with registered dietician. I have reviewed, discussed and agree with the dietary plan. Patient is trying hard to keep good dietary and behavior modifications. Patient is monitoring portion sizes, food choices and liquid calories. Patient is trying to exercise regularly as much as possible. We discussed how his weight affects his overall health including:  Troy Hooper was seen today for obesity. Diagnoses and all orders for this visit:    Morbid obesity with BMI of 50.0-59.9, adult ([facility] [address]    Obstructive sleep apnea    Tobacco use       and importance of weight loss to alleviate those co morbid conditions. I encouraged the patient to continue exercise and keeping healthy eating habits. Discussed pre-op labs and work up till now. Also counseled the patient extensively on Surgery.      Total encounter time: 20 minutes including any number of the following: review of labs, imaging, provider notes, outside hospital records; performing examination/evaluation; counseling patient and family; ordering medications/tests; placing referrals and communication with referring physicians; coordination of care, and documentation in the EHR. RTC in 4 weeks  Obtain rest of pre-op work up / clearances  Diet and Exercise      Patient advised that its their responsibility to follow up for studies and/or labs ordered today.      Smitha Blood

## 2021-07-08 RX ORDER — LOSARTAN POTASSIUM 100 MG/1
TABLET ORAL
Qty: 90 TABLET | Refills: 2 | Status: SHIPPED | OUTPATIENT
Start: 2021-07-08 | End: 2022-04-04

## 2021-07-08 NOTE — TELEPHONE ENCOUNTER
Medication:   Requested Prescriptions     Pending Prescriptions Disp Refills    losartan (COZAAR) 100 MG tablet [Pharmacy Med Name: LOSARTAN POTASSIUM 100 MG TAB] 30 tablet 4     Sig: TAKE ONE TABLET BY MOUTH DAILY       Last Filled:  12/30/20 #30, 5 RF     Patient Phone Number: [phone] (home) [phone] (work)    Last appt: 3/12/2021 incontinence   Next appt: Visit date not found    Lab Results   Component Value Date     09/20/2019    K 4.2 09/20/2019    CL 97 (L) 09/20/2019    CO2 27 09/20/2019    BUN 13 09/20/2019    CREATININE 0.7 (L) 09/20/2019    GLUCOSE 107 (H) 09/20/2019    CALCIUM 9.2 09/20/2019    PROT 7.0 09/20/2019    LABALBU 4.5 09/20/2019    BILITOT 0.6 09/20/2019    ALKPHOS 80 09/20/2019    AST 35 09/20/2019    ALT 34 09/20/2019    LABGLOM >60 09/20/2019    GFRAA >60 09/20/2019    AGRATIO 1.8 09/20/2019    GLOB 2.5 09/20/2019

## 2021-07-14 ENCOUNTER — VIRTUAL VISIT (OUTPATIENT)
Dept: PAIN MANAGEMENT | Age: 46
End: 2021-07-14
Payer: COMMERCIAL

## 2021-07-14 DIAGNOSIS — S83.92XD SPRAIN OF LEFT KNEE/LEG, SUBSEQUENT ENCOUNTER: ICD-10-CM

## 2021-07-14 DIAGNOSIS — S33.9XXD SPRAIN OF LIGAMENT OF LUMBOSACRAL JOINT, SUBSEQUENT ENCOUNTER: ICD-10-CM

## 2021-07-14 DIAGNOSIS — S33.9XXA SPRAIN OF LIGAMENT OF LUMBOSACRAL JOINT, INITIAL ENCOUNTER: ICD-10-CM

## 2021-07-14 DIAGNOSIS — S83.92XA SPRAIN OF LEFT KNEE/LEG, INITIAL ENCOUNTER: ICD-10-CM

## 2021-07-14 DIAGNOSIS — M51.27 LUMBAGO-SCIATICA DUE TO DISPLACEMENT OF LUMBAR INTERVERTEBRAL DISC: ICD-10-CM

## 2021-07-14 PROCEDURE — 99214 OFFICE O/P EST MOD 30 MIN: CPT | Performed by: INTERNAL MEDICINE

## 2021-07-14 RX ORDER — DICLOFENAC SODIUM 75 MG/1
75 TABLET, DELAYED RELEASE ORAL DAILY PRN
Qty: 30 TABLET | Refills: 1 | Status: SHIPPED | OUTPATIENT
Start: 2021-07-14 | End: 2021-08-11 | Stop reason: SDUPTHER

## 2021-07-14 RX ORDER — DULOXETIN HYDROCHLORIDE 60 MG/1
60 CAPSULE, DELAYED RELEASE ORAL DAILY
Qty: 30 CAPSULE | Refills: 1 | Status: SHIPPED | OUTPATIENT
Start: 2021-07-14 | End: 2021-08-11 | Stop reason: SDUPTHER

## 2021-07-14 RX ORDER — OXYCODONE AND ACETAMINOPHEN 7.5; 325 MG/1; MG/1
1 TABLET ORAL EVERY 6 HOURS PRN
Qty: 112 TABLET | Refills: 0 | Status: SHIPPED | OUTPATIENT
Start: 2021-07-14 | End: 2021-08-11 | Stop reason: SDUPTHER

## 2021-07-14 RX ORDER — PREGABALIN 150 MG/1
150 CAPSULE ORAL 3 TIMES DAILY
Qty: 90 CAPSULE | Refills: 1 | Status: SHIPPED | OUTPATIENT
Start: 2021-07-14 | End: 2021-08-11 | Stop reason: SDUPTHER

## 2021-07-14 NOTE — PROGRESS NOTES
TELE HEALTH VISIT (AUDIO-VISUAL)    Pursuant to the emergency declaration under the [facility] waiver authority and the [employer] Act, this Virtual  Visit was conducted, with patient's/legal guardian's consent, to reduce the patient's risk of exposure to COVID-19 and provide continuity of care for an established patient. Service is  provided through a video synchronous discussion virtually to substitute for in-person clinic visit. Due to this being a TeleHealth encounter (During S[employer] public [facility] emergency), evaluation of the following organ systems was limited: Vitals/Constitutional/EENT/Resp/CV/GI//MS/Neuro/Skin/Afva-Zkde-Fdf. [facility]  1975  [phone]    Mr. Harris is being seen virtually for a follow up visit using one of the following techniques  Google Duo, Face time or Doxy. me  Informed verbal consent to the virtual visit was obtained from Mr. Juliette Sky. Risks associated with HIPPA compliance with the virtual visit was explained to the patient. Mr. Juliette Sky is at his residence and Dr. Isacc Gonsales is in his office. HISTORY OF PRESENT ILLNESS:  Mr. Juliette Sky is a 55 y.o. male returns for a follow up visit for multiple medical problems. His current presenting problems are   1. 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 BWC Sprain of ligament of lumbosacral joint, subsequent encounter    3. BWC Sprain of left knee/leg, subsequent encounter    . As per information/history obtained from the PADT(patient assessment and documentation tool) - He complains of pain in the lower back with radiation to the hips Left, upper leg Left, knees Left, lower leg Left, ankles Left and feet Left He rates the pain 4/10 and describes it as sharp, aching, burning, throbbing, numbness. Pain is made worse by: walking, standing.   Current treatment regimen has helped relieve about not diaphoretic. Pulmonary/Chest: Effort normal. No respiratory distress or use of accessory muscles. Auscultation revealing normal air entry. He does not have wheezes in the lung fields. He has no rales. Cardiovascular: Normal rate, regular rhythm, normal heart sounds, and does not have murmur. Exam reveals no gallop and no friction rub. Musculoskeletal / Extremities: Range of motion is normal. Gait is normal, assistive devices use: none. He exhibits edema: none, and no tenderness. Neurological/Psychiatric:He is alert and oriented to person, place, and time. Coordination is  normal.   Judgement and Insight is normal  His mood is Appropriate and affect is Neutral/Euthymic(normal) . His behavior is normal.   thought content normal.        IMPRESSION:     1.  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 BWC Sprain of ligament of lumbosacral joint, subsequent encounter    3. BWC Sprain of left knee/leg, subsequent encounter    4. BWC Sprain of ligament of lumbosacral joint, initial encounter    5.  BWC Sprain of left knee/leg, initial encounter        PLAN:  Informed verbal consent was obtained.  -Labs are still pending   -Decrease Voltaren to 1 per day PRN   -Advised caffeine reduction, dietary changes, elevate head end of bed, NPO after supper, if using alcohol advised reduction of alcohol intake, tobacco cessation if smoking, weight loss   -Continue with Percocet 4 per day  -He was advised to increase fluids ( 5-7  glasses of fluid daily), limit caffeine, avoid cheese products, increase dietary fiber, increase activity and exercise as tolerated and relax regularly and enjoy meals   -CBT techniques- relaxation therapies such as biofeedback, mindfulness based stress reduction, imagery, cognitive restructuring, problem solving discussed with patient   -Continue with Cymbalta 60 mg   -Advised to walk 20 minutes daily as tolerated     Mr. 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 Medications/orders associated with this visit:    Current Outpatient Medications   Medication Sig Dispense Refill    losartan (COZAAR) 100 MG tablet TAKE ONE TABLET BY MOUTH DAILY 90 tablet 2    DULoxetine (CYMBALTA) 60 MG extended release capsule Take 1 capsule by mouth daily 30 capsule 1    oxyCODONE-acetaminophen (PERCOCET) 7.5-325 MG per tablet Take 1 tablet by mouth every 6 hours as needed for Pain (max 4 per day) for up to 28 days. 112 tablet 0    pregabalin (LYRICA) 150 MG capsule Take 1 capsule by mouth 3 times daily for 30 days. 90 capsule 0    diclofenac (VOLTAREN) 75 MG EC tablet Take 1 tablet by mouth 2 times daily as needed for Pain 60 tablet 1    penicillin v potassium (VEETID) 500 MG tablet Take 1,000 mg by mouth 2 times daily      furosemide (LASIX) 20 MG tablet TAKE ONE TO TWO TABLETS BY MOUTH EVERY MORNING AS NEEDED FOR EDEMA 180 tablet 3    vitamin B-12 (CYANOCOBALAMIN) 1000 MCG tablet Take 3,000 mcg by mouth daily       Compression Stockings MISC by Does not apply route Thigh high 1 each 0    Elastic Bandages & Supports (MEDICAL COMPRESSION THIGH HIGH) MISC Thigh high compression stockings, 30-40 mm Hg 2 each 1     No current facility-administered medications for this visit. Goals of current treatment regimen include improvement in pain, restoration of functioning- with focus on improvement in physical performance, general activity, work or disability,emotional distress, health care utilization and  decreased medication consumption.  Will continue to monitor progress towards achieving/maintaining therapeutic goals with special emphasis on  1. Improvement in perceived interfernce  of pain with ADL's. Ability to do home exercises independently. Ability to do household chores indoor and/or outdoor work and social and leisure activities. To increase flexibility/ROM, strength and endurance. Improve psychosocial and physical functioning.- he is showing progression towards this treatment goal with the current regimen. 2. Improving sleep to 6-7 hours a night. Improve mood/ anxiety and depression symptoms such as crying spells, low energy, problems with concentration, motivation.- he is showing progression towards this treatment goal with the current regimen. 3. Reduction of reliance on opioid analgesia/more appropriate opioid use. - he is showing progression towards this treatment goal with the current regimen. 4. Ability to focus/concentrate at work and perform the duties required of him at work  Sit through a workday without lower extremity symptoms. Stand 30-60 minutes without lower extremity symptoms. Ability to lift up to 10-20 lbs. Ability to go up and down stairs. Sit 30-60 minutes  Without having to stand up frequently. - he is maintaining/progressing towards his work related goals with the current regimen. Risks and benefits of the medications and other alternative treatments have been/were  discussed with the patient. Any questions on the  common side effects of these medications were also answered. He was advised against drinking alcohol with the narcotic pain medicines, advised against driving or handling machinery when  starting or adjusting the dose of medicines, feeling groggy or drowsy, or if having any cognitive issues related to the current medications. Heis fully aware of the risk of overdose and death, if medicines are misused and not taken as prescribed. If he develops new symptoms or if the symptoms worsen, he was told to call the office.  .  Thank you for allowing me to participate in the care of this patient.     Milena Milan MD.    Cc: Leanna Cuenca MD

## 2021-07-21 NOTE — PROGRESS NOTES
Melanoma Follow-up  Petty Chavez MD  Surgical Oncology  [phone]    Date of service: 7/23/2021     Lana Gloria    Initial Melanoma:   Location: Left Foot Instep    Date initially Treated  12/20/19        Thickness at least 0.7 mm. Ulceration  No                                Mitotic Rate 1 /mm2   Regression  No  Stage (1A)    [place] lymph node negative. Subsequent Melanoma: no    Lana Gloria returns for follow up of his melanoma of the Left Foot Instep. S/P Wide excision of left foot melanoma 12/20/19. He is overall doing well. He denies new subcutaneous mass, headache, bone pain, cough, abdominal pain, or suspicious new lesions. He is being followed by Dr. Dave Esparza. No other complaints. Review of systems is otherwise negative    Past medical history, Past surgical history, Social history, Family history and allergies reviewed and updated. Vitals:    07/23/21 1402   BP: 127/67   Pulse: 75   Temp: 98.2 °F (36.8 °C)   Weight: (!) 471 lb 3.2 oz (213.7 kg)   Height: 6' 3\" (1.905 m)     Wt Readings from Last 3 Encounters:   07/23/21 (!) 471 lb 3.2 oz (213.7 kg)   07/07/21 (!) 473 lb 6.4 oz (214.7 kg)   06/02/21 (!) 473 lb (214.6 kg)     Body mass index is 58.9 kg/m². O/E:   Constitutional: Patient is oriented to person, place, and time. No distress. HENT: mucus membranes - moist. No scleral icterus. Neck: Supple and normal range of motion. No bilateral lymphadenopathy present. Pulmonary/Chest: Effort normal. No respiratory distress. Abdominal: Soft. No distension and no mass. No tenderness. No Hepatosplenomegaly. Extremities: no edema and no tenderness. Neurological: Grossly intact motor and sensory exam  Skin: Skin is warm and dry. No rash noted. He is not diaphoretic.   Surgical site:    Incision normal: Yes   Surrounding nodularity: No   Abnormal pigmentation: No   Other lesions: No  Lymph nodes: Palpable regional lymph nodes: No  Psychiatric: He has a normal mood and affect. His behavior is normal. Judgment and thought content normal.       A/P:    Diagnosis Orders   1. Malignant melanoma of left lower extremity including hip (HCC)         No evidence of recurrent melanoma  No evidence of new primary melanoma    Plan: The patient has stage ( pT1a) melanoma of the left foot. The chance of developing a new melanoma is present and thus importance of follow up with dermatologist is explained. Symptoms and signs of recurrence explained including headache, jaundice, cough, abdominal pain and blood in stools. Follow up with Dr. Isreal Tristan. Follow up in 6 months. Mykel Bajwa MD  Surgery Attending    I, Reva Stubbs RN, am scribing for and in the presence of Dr Mykel Bajwa. Reva Stubbs RN    I, Dr. Mykel Bajwa, personally performed the services described in this documentation as scribed by Reva Stubbs RN in my presence, and it is both accurate and complete.      Mykel Bajwa MD  Surgery Attending

## 2021-07-23 ENCOUNTER — OFFICE VISIT (OUTPATIENT)
Dept: SURGERY | Age: 46
End: 2021-07-23
Payer: COMMERCIAL

## 2021-07-23 VITALS
WEIGHT: 315 LBS | TEMPERATURE: 98.2 F | SYSTOLIC BLOOD PRESSURE: 127 MMHG | HEART RATE: 75 BPM | HEIGHT: 75 IN | BODY MASS INDEX: 39.17 KG/M2 | DIASTOLIC BLOOD PRESSURE: 67 MMHG

## 2021-07-23 DIAGNOSIS — C43.72 MALIGNANT MELANOMA OF LEFT LOWER EXTREMITY INCLUDING HIP (HCC): Primary | ICD-10-CM

## 2021-07-23 PROCEDURE — 99213 OFFICE O/P EST LOW 20 MIN: CPT | Performed by: SURGERY

## 2021-08-11 ENCOUNTER — OFFICE VISIT (OUTPATIENT)
Dept: PAIN MANAGEMENT | Age: 46
End: 2021-08-11
Payer: COMMERCIAL

## 2021-08-11 VITALS
BODY MASS INDEX: 58.75 KG/M2 | DIASTOLIC BLOOD PRESSURE: 93 MMHG | WEIGHT: 315 LBS | TEMPERATURE: 98.2 F | SYSTOLIC BLOOD PRESSURE: 157 MMHG | HEART RATE: 67 BPM

## 2021-08-11 DIAGNOSIS — S33.9XXA SPRAIN OF LIGAMENT OF LUMBOSACRAL JOINT, INITIAL ENCOUNTER: ICD-10-CM

## 2021-08-11 DIAGNOSIS — S33.9XXD SPRAIN OF LIGAMENT OF LUMBOSACRAL JOINT, SUBSEQUENT ENCOUNTER: ICD-10-CM

## 2021-08-11 DIAGNOSIS — M51.27 LUMBAGO-SCIATICA DUE TO DISPLACEMENT OF LUMBAR INTERVERTEBRAL DISC: ICD-10-CM

## 2021-08-11 DIAGNOSIS — S83.92XA SPRAIN OF LEFT KNEE/LEG, INITIAL ENCOUNTER: ICD-10-CM

## 2021-08-11 DIAGNOSIS — S83.92XD SPRAIN OF LEFT KNEE/LEG, SUBSEQUENT ENCOUNTER: ICD-10-CM

## 2021-08-11 PROCEDURE — 99213 OFFICE O/P EST LOW 20 MIN: CPT | Performed by: INTERNAL MEDICINE

## 2021-08-11 RX ORDER — PREGABALIN 150 MG/1
150 CAPSULE ORAL 3 TIMES DAILY
Qty: 90 CAPSULE | Refills: 1 | Status: SHIPPED | OUTPATIENT
Start: 2021-08-11 | End: 2021-09-10 | Stop reason: SDUPTHER

## 2021-08-11 RX ORDER — DICLOFENAC SODIUM 75 MG/1
75 TABLET, DELAYED RELEASE ORAL DAILY PRN
Qty: 30 TABLET | Refills: 1 | Status: SHIPPED | OUTPATIENT
Start: 2021-08-11 | End: 2021-09-10 | Stop reason: SDUPTHER

## 2021-08-11 RX ORDER — OXYCODONE AND ACETAMINOPHEN 7.5; 325 MG/1; MG/1
1 TABLET ORAL EVERY 6 HOURS PRN
Qty: 112 TABLET | Refills: 0 | Status: SHIPPED | OUTPATIENT
Start: 2021-08-11 | End: 2021-09-10 | Stop reason: SDUPTHER

## 2021-08-11 RX ORDER — DULOXETIN HYDROCHLORIDE 60 MG/1
60 CAPSULE, DELAYED RELEASE ORAL DAILY
Qty: 30 CAPSULE | Refills: 1 | Status: SHIPPED | OUTPATIENT
Start: 2021-08-11 | End: 2021-09-10 | Stop reason: SDUPTHER

## 2021-08-11 NOTE — PROGRESS NOTES
Lizfaribamichael Brito [facility]  1975  [id number]      HISTORY OF PRESENT ILLNESS:  Mr. Dav Gutierrez is a 55 y.o. male returns for a follow up visit for pain management  He has a diagnosis of   1. 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 BWC Sprain of ligament of lumbosacral joint, subsequent encounter    3. BWC Sprain of left knee/leg, subsequent encounter    4. BWC Sprain of left knee/leg, initial encounter    5. BWC Sprain of ligament of lumbosacral joint, initial encounter    . He complains of pain in the Low back, right knee, right lower leg, right ankle and foot  He rates the pain 4/10 and describes it as sharp, aching, burning. Current treatment regimen has helped relieve about 70% of the pain. He denies any side effects from the current pain regimen. Patient reports that since the last follow up visit the physical functioning is unchanged, family/social relationships are unchanged, mood is unchanged sleep patterns are unchanged, and that the overall functioning is unchanged. Patient denies misusing/abusing his narcotic pain medications or using any illegal drugs. There are No indicators for possible drug abuse, addiction or diversion problems. Patient states he has been doing fair, pain has been baseline. He mentions he had labs done recently. He says he is working full time. He mentions he is using Percocet 4 per day along with the other adjuvants. ALLERGIES: Patients list of allergies were reviewed     MEDICATIONS: Mr. Dav Gutierrez list of medications were reviewed. His current medications are   Outpatient Medications Prior to Visit   Medication Sig Dispense Refill    DULoxetine (CYMBALTA) 60 MG extended release capsule Take 1 capsule by mouth daily 30 capsule 1    oxyCODONE-acetaminophen (PERCOCET) 7.5-325 MG per tablet Take 1 tablet by mouth every 6 hours as needed for Pain (max 4 per day) for up to 28 days.  112 tablet 0    pregabalin (LYRICA) 150 MG capsule Take 1 capsule by mouth 3 times daily for 30 days. 90 capsule 1    diclofenac (VOLTAREN) 75 MG EC tablet Take 1 tablet by mouth daily as needed for Pain 30 tablet 1    losartan (COZAAR) 100 MG tablet TAKE ONE TABLET BY MOUTH DAILY 90 tablet 2    penicillin v potassium (VEETID) 500 MG tablet Take 1,000 mg by mouth 2 times daily      furosemide (LASIX) 20 MG tablet TAKE ONE TO TWO TABLETS BY MOUTH EVERY MORNING AS NEEDED FOR EDEMA 180 tablet 3    vitamin B-12 (CYANOCOBALAMIN) 1000 MCG tablet Take 3,000 mcg by mouth daily       Compression Stockings MISC by Does not apply route Thigh high 1 each 0    Elastic Bandages & Supports (MEDICAL COMPRESSION THIGH HIGH) MISC Thigh high compression stockings, 30-40 mm Hg 2 each 1     No facility-administered medications prior to visit. REVIEW OF SYSTEMS:    Respiratory: Negative for apnea, chest tightness and shortness of breath or change in baseline breathing. PHYSICAL EXAM:   Nursing note and vitals reviewed. BP (!) 157/93   Pulse 67   Temp 98.2 °F (36.8 °C)   Wt (!) 470 lb (213.2 kg)   BMI 58.75 kg/m²   Constitutional: He appears well-developed and well-nourished. No acute distress. Cardiovascular: Normal rate, regular rhythm, normal heart sounds, and does not have murmur. Pulmonary/Chest: Effort normal. No respiratory distress. He does not have wheezes in the lung fields. He has no rales. Neurological/Psychiatric:He is alert and oriented to person, place, and time. Coordination is  normal.  His mood isAppropriate and affect is Neutral/Euthymic(normal) . Other: trace edema   IMPRESSION:   1.  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 BWC Sprain of ligament of lumbosacral joint, subsequent encounter    3. BWC Sprain of left knee/leg, subsequent encounter    4. BWC Sprain of left knee/leg, initial encounter    5.  BWC Sprain of ligament of lumbosacral joint, initial encounter        PLAN:  Informed verbal consent was obtained  - [place] was obtained and reviewed  for the last one year and no indicators of drug misuse  were found. Any other controlled substance prescriptions  seen on the record have been accounted for, I am aware of the patient receiving these medications. Óscar Michel [place] record will be rechecked as part of office protocol.    -Urine drug screen with GC/MS for opiates and drugs of abuse was ordered and will follow up on results.   -He was advised weight reduction, diet changes- 800-1200 roc diet, diet diary, exercising, nutritional  consult increased physical activity as tolerated   -Continue with Percocet 4 per day   -He was advised to increase fluids ( 5-7  glasses of fluid daily), limit caffeine, avoid cheese products, increase dietary fiber, increase activity and exercise as tolerated and relax regularly and enjoy meals   -Labs reviewed, baseline venkat   Current Outpatient Medications   Medication Sig Dispense Refill    DULoxetine (CYMBALTA) 60 MG extended release capsule Take 1 capsule by mouth daily 30 capsule 1    oxyCODONE-acetaminophen (PERCOCET) 7.5-325 MG per tablet Take 1 tablet by mouth every 6 hours as needed for Pain (max 4 per day) for up to 28 days. 112 tablet 0    pregabalin (LYRICA) 150 MG capsule Take 1 capsule by mouth 3 times daily for 30 days.  90 capsule 1    diclofenac (VOLTAREN) 75 MG EC tablet Take 1 tablet by mouth daily as needed for Pain 30 tablet 1    losartan (COZAAR) 100 MG tablet TAKE ONE TABLET BY MOUTH DAILY 90 tablet 2    penicillin v potassium (VEETID) 500 MG tablet Take 1,000 mg by mouth 2 times daily      furosemide (LASIX) 20 MG tablet TAKE ONE TO TWO TABLETS BY MOUTH EVERY MORNING AS NEEDED FOR EDEMA 180 tablet 3    vitamin B-12 (CYANOCOBALAMIN) 1000 MCG tablet Take 3,000 mcg by mouth daily       Compression Stockings MISC by Does not apply route Thigh high 1 each 0    Elastic Bandages & Supports (MEDICAL COMPRESSION THIGH HIGH) MISC Thigh high compression stockings, 30-40 mm Hg 2 each 1     No current facility-administered medications for this visit. I will continue his current medication regimen  which is part of the above treatment schedule. It has been helping with Mr. 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 Risks and benefits of the medications and other alternative treatments  including no treatment were discussed with the patient. The common side effects of these medications were also explained to the patient. Informed verbal consent was obtained. Goals of current treatment regimen include improvement in pain, restoration of functioning- with focus on improvement in physical performance, general activity, work or disability,emotional distress, health care utilization and  decreased medication consumption. Will continue to monitor progress towards achieving/maintaining therapeutic goals with special emphasis on  1. Improvement in perceived interfernce  of pain with ADL's. Ability to do home exercises independently. Ability to do household chores indoor and/or outdoor work and social and leisure activities. Improve psychosocial and physical functioning. - he is showing progression towards this treatment goal with the current regimen. 2. Ability to focus/concentrate at work and perform the duties required of him at work  Sit through a workday without lower extremity symptoms. Stand 30-60 minutes without lower extremity symptoms. Ability to lift up to 10-20 lbs. Ability to go up and down stairs. Sit 30-60 minutes  Without having to stand up frequently.  - he is maintaining/progressing towards his work related goals with the current regimen. He was advised against drinking alcohol with the narcotic pain medicines, advised against driving or handling machinery while adjusting the dose of medicines or if having cognitive  issues related to the current medications. Risk of overdose and death, if medicines not taken as prescribed, were also discussed. If the patient develops new symptoms or if the symptoms worsen, the patient should call the office. While transcribing every attempt was made to maintain the accuracy of the note in terms of it's contents,there may have been some errors made inadvertently. Thank you for allowing me to participate in the care of this patient.     Caroline Lewis MD.    Cc: Chase Suazo MD

## 2021-09-08 ENCOUNTER — OFFICE VISIT (OUTPATIENT)
Dept: BARIATRICS/WEIGHT MGMT | Age: 46
End: 2021-09-08
Payer: COMMERCIAL

## 2021-09-08 VITALS
DIASTOLIC BLOOD PRESSURE: 73 MMHG | WEIGHT: 315 LBS | SYSTOLIC BLOOD PRESSURE: 190 MMHG | BODY MASS INDEX: 39.17 KG/M2 | HEIGHT: 75 IN

## 2021-09-08 DIAGNOSIS — Z72.0 TOBACCO USE: ICD-10-CM

## 2021-09-08 DIAGNOSIS — G47.33 OBSTRUCTIVE SLEEP APNEA: ICD-10-CM

## 2021-09-08 DIAGNOSIS — E66.01 MORBID OBESITY WITH BMI OF 50.0-59.9, ADULT (HCC): Primary | ICD-10-CM

## 2021-09-08 PROCEDURE — 99213 OFFICE O/P EST LOW 20 MIN: CPT | Performed by: SURGERY

## 2021-09-08 NOTE — PROGRESS NOTES
[facility] – [place], [place]) Physicians   General & Laparoscopic Surgery  [employer]       HPI:     Abdirizak Braxton is a very pleasant 55 y.o. male with Body mass index is 59.1 kg/m². , Pre-Surgery. Pre-operative clearance and work up pending. Working hard to keep good dietary habits as well level of activity. Patient denies any nausea, vomiting, fevers, chills, shortness of breath, chest pain, cough, constipation or difficulty urinating. Past Medical History:   Diagnosis Date    Arthritis     Cellulitis     Chronic back pain     Hepatitis age 24    HNP (herniated nucleus pulposus with myelopathy), thoracic     Hypertension     Melanoma ([facility] [address]     left foot     Obesity 2011    ANN-MARIE (obstructive sleep apnea)     NO CPAP     Stasis dermatitis      Past Surgical History:   Procedure Laterality Date    ADENOIDECTOMY      FOOT SURGERY Left 2019    WIDE EXCISION OF LEFT FOOT MELANOMA performed by Harry Ellsworth MD at [address] [place] [address] Left 2/10/2020    LEFT FOOT MELANOMA WIDE EXCISION AND FULL THICKNESS SKIN GRAFT,LEFT GROIN SENTINEL LYMPH NODE BIOPSY performed by Harry Ellsworth MD at [facility]  [address]    [address]    UPPER GASTROINTESTINAL ENDOSCOPY N/A 2021    EGD BIOPSY performed by Ela Montez DO at [facility] EXTRACTION       Family History   Problem Relation Age of Onset    Cancer Mother     COPD Paternal Grandfather     Colon Cancer Maternal Grandfather      Social History     Tobacco Use    Smoking status: Former Smoker     Packs/day: 1.50     Types: Cigarettes     Quit date: 2005     Years since quittin.6    Smokeless tobacco: Former User     Types: Chew   Substance Use Topics    Alcohol use: No     Alcohol/week: 0.0 standard drinks     Comment: rarely -- 1 every few months     I counseled the patient on the importance of not smoking and risks of ETOH.    No Known Allergies  Vitals:    21 1254 BP: (!) 190/73   Site: Left Wrist   Position: Sitting   Cuff Size: Large Adult   Weight: (!) 472 lb 12.8 oz (214.5 kg)   Height: 6' 3\" (1.905 m)       Body mass index is 59.1 kg/m². Current Outpatient Medications:     DULoxetine (CYMBALTA) 60 MG extended release capsule, Take 1 capsule by mouth daily, Disp: 30 capsule, Rfl: 1    oxyCODONE-acetaminophen (PERCOCET) 7.5-325 MG per tablet, Take 1 tablet by mouth every 6 hours as needed for Pain (max 4 per day) for up to 28 days. , Disp: 112 tablet, Rfl: 0    pregabalin (LYRICA) 150 MG capsule, Take 1 capsule by mouth 3 times daily for 30 days. , Disp: 90 capsule, Rfl: 1    diclofenac (VOLTAREN) 75 MG EC tablet, Take 1 tablet by mouth daily as needed for Pain, Disp: 30 tablet, Rfl: 1    losartan (COZAAR) 100 MG tablet, TAKE ONE TABLET BY MOUTH DAILY, Disp: 90 tablet, Rfl: 2    penicillin v potassium (VEETID) 500 MG tablet, Take 1,000 mg by mouth 2 times daily, Disp: , Rfl:     furosemide (LASIX) 20 MG tablet, TAKE ONE TO TWO TABLETS BY MOUTH EVERY MORNING AS NEEDED FOR EDEMA, Disp: 180 tablet, Rfl: 3    vitamin B-12 (CYANOCOBALAMIN) 1000 MCG tablet, Take 3,000 mcg by mouth daily , Disp: , Rfl:     Compression Stockings MISC, by Does not apply route Thigh high, Disp: 1 each, Rfl: 0    Elastic Bandages & Supports (MEDICAL COMPRESSION THIGH HIGH) MISC, Thigh high compression stockings, 30-40 mm Hg, Disp: 2 each, Rfl: 1      Review of Systems - History obtained from the patient  General ROS: negative  Psychological ROS: negative  Endocrine ROS: negative  Respiratory ROS: negative  Cardiovascular ROS: negative  Gastrointestinal ROS:negative  Genito-Urinary ROS: negative  Musculoskeletal ROS: negative   Skin ROS: negative    Physical Exam   Vitals Reviewed   Constitutional: Patient is oriented to person, place, and time. Patient appears well-developed and well-nourished. Patient is active and cooperative. Non-toxic appearance. No distress.    Neck: Trachea normal and normal range of motion. No JVD present. Pulmonary/Chest: Effort normal. No accessory muscle usage or stridor. No apnea. No respiratory distress. Cardiovascular: Normal rate and no JVD. Abdominal: Normal appearance. Patient exhibits no distension. Abdomen is soft, obese, non tender. Musculoskeletal: Normal range of motion. Patient exhibits no edema. Neurological: Patient is alert and oriented to person, place, and time. Patient has normal strength. GCS eye subscore is 4. GCS verbal subscore is 5. GCS motor subscore is 6. Skin: Skin is warm and dry. No abrasion and no rash noted. Patient is not diaphoretic. No cyanosis or erythema. Psychiatric: Patient has a normal mood and affect. Speech is normal and behavior is normal. Cognition and memory are normal.       Palmira Colon is 55 y.o. male, Body mass index is 59.1 kg/m². pre surgery, has stayed weight stable since last visit. The patient underwent dietary counseling with registered dietician. I have reviewed, discussed and agree with the dietary plan. Patient is trying hard to keep good dietary and behavior modifications. Patient is monitoring portion sizes, food choices and liquid calories. Patient is trying to exercise regularly as much as possible. We discussed how his weight affects his overall health including:  Bharti Squires was seen today for weight management. Diagnoses and all orders for this visit:    Morbid obesity with BMI of 50.0-59.9, adult ([address]    Obstructive sleep apnea       and importance of weight loss to alleviate those co morbid conditions. I encouraged the patient to continue exercise and keeping healthy eating habits. Discussed pre-op labs and work up till now. Also counseled the patient extensively on Surgery.      Total encounter time: 20 minutes including any number of the following: review of labs, imaging, provider notes, outside hospital records; performing examination/evaluation; counseling patient and family; ordering medications/tests; placing referrals and communication with referring physicians; coordination of care, and documentation in the EHR. RTC in 4 weeks  Obtain rest of pre-op work up / clearances  Diet and Exercise   Counseled on tobacco cessation  Waiting on correct CPAP machine     Patient advised that its their responsibility to follow up for studies and/or labs ordered today.      Hai Jones

## 2021-09-08 NOTE — PROGRESS NOTES
Nikolai Harris lost 0.6 lbs over the past 2 months. Breakfast: yogurt with granola OR granola bar    Snack: protein bar OR yogurt     Lunch: leftovers OR Healthy Choice, bag of veggies    Snack: cottage cheese and fruit OR yogurt    Dinner: baked chix OR venison OR burgers, veggies    Snack: none    Is pt consuming smaller portions? yes he is mindful of portions    Is pt consuming at least 64 oz of fluids per day? yes water, crystal lite, lightly sweetened iced tea, diet soda 1x week    Is pt consuming carbonated, caffeinated, or sugary beverages? yes diet soda 1x week    Has pt sampled Unjury and/or Nectar protein?  Yes; tried and tolerated    Exercise: none because ofpain    Plan/Recommendations: avoid granola bars, eliminate soda, sweet tea    Handouts: none    Kirit Saxena RD, LD

## 2021-09-10 ENCOUNTER — OFFICE VISIT (OUTPATIENT)
Dept: PAIN MANAGEMENT | Age: 46
End: 2021-09-10
Payer: COMMERCIAL

## 2021-09-10 VITALS
DIASTOLIC BLOOD PRESSURE: 70 MMHG | WEIGHT: 315 LBS | BODY MASS INDEX: 59.5 KG/M2 | HEART RATE: 68 BPM | SYSTOLIC BLOOD PRESSURE: 125 MMHG

## 2021-09-10 DIAGNOSIS — M51.27 LUMBAGO-SCIATICA DUE TO DISPLACEMENT OF LUMBAR INTERVERTEBRAL DISC: ICD-10-CM

## 2021-09-10 DIAGNOSIS — S33.9XXD SPRAIN OF LIGAMENT OF LUMBOSACRAL JOINT, SUBSEQUENT ENCOUNTER: ICD-10-CM

## 2021-09-10 DIAGNOSIS — S33.9XXA SPRAIN OF LIGAMENT OF LUMBOSACRAL JOINT, INITIAL ENCOUNTER: ICD-10-CM

## 2021-09-10 DIAGNOSIS — S83.92XA SPRAIN OF LEFT KNEE/LEG, INITIAL ENCOUNTER: ICD-10-CM

## 2021-09-10 DIAGNOSIS — S83.92XD SPRAIN OF LEFT KNEE/LEG, SUBSEQUENT ENCOUNTER: ICD-10-CM

## 2021-09-10 PROCEDURE — 99214 OFFICE O/P EST MOD 30 MIN: CPT | Performed by: INTERNAL MEDICINE

## 2021-09-10 RX ORDER — DICLOFENAC SODIUM 75 MG/1
75 TABLET, DELAYED RELEASE ORAL DAILY PRN
Qty: 30 TABLET | Refills: 1 | Status: SHIPPED | OUTPATIENT
Start: 2021-09-10 | End: 2021-10-08 | Stop reason: SDUPTHER

## 2021-09-10 RX ORDER — PREGABALIN 150 MG/1
150 CAPSULE ORAL 3 TIMES DAILY
Qty: 90 CAPSULE | Refills: 1 | Status: SHIPPED | OUTPATIENT
Start: 2021-09-10 | End: 2021-11-05 | Stop reason: SDUPTHER

## 2021-09-10 RX ORDER — DULOXETIN HYDROCHLORIDE 60 MG/1
60 CAPSULE, DELAYED RELEASE ORAL DAILY
Qty: 30 CAPSULE | Refills: 1 | Status: SHIPPED | OUTPATIENT
Start: 2021-09-10 | End: 2021-11-05

## 2021-09-10 RX ORDER — OXYCODONE AND ACETAMINOPHEN 7.5; 325 MG/1; MG/1
1 TABLET ORAL EVERY 6 HOURS PRN
Qty: 120 TABLET | Refills: 0 | Status: SHIPPED | OUTPATIENT
Start: 2021-09-10 | End: 2021-10-08 | Stop reason: SDUPTHER

## 2021-09-10 NOTE — PROGRESS NOTES
[facility]  1975  [id number]    HISTORY OF PRESENT ILLNESS:  Mr. Dav Gutierrez is a 55 y.o. male returns for a follow up visit for multiple medical problems. His  presenting problems are   1. 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 BWC Sprain of ligament of lumbosacral joint, subsequent encounter    3. BWC Sprain of left knee/leg, subsequent encounter    4. BWC Sprain of left knee/leg, initial encounter    5. BWC Sprain of ligament of lumbosacral joint, initial encounter    . As per information/history obtained from the PADT(patient assessment and documentation tool) -  He complains of pain in the lower back with radiation to the lower back, buttocks, hips Bilateral, upper leg Bilateral, knees Bilateral, lower leg Bilateral, ankles Bilateral and feet Bilateral He rates the pain 4/10 and describes it as sharp, aching, burning. Pain is made worse by: movement, walking, standing, sitting, bending, lifting. Current treatment regimen has helped relieve about 70% of the pain. He denies side effects from the current pain regimen. Patient reports that since the last follow up visit the physical functioning is unchanged, family/social relationships are unchanged, mood is unchanged and sleep patterns are unchanged, and that the overall functioning is unchanged. Patient denies neurological bowel or bladder. Patient denies misusing/abusing his narcotic pain medications or using any illegal drugs. There are No indicators for possible drug abuse, addiction or diversion problems. Upon obtaining the medical history from Mr. Dav Gutierrez regarding today's office visit for his presenting problems, patient states he has been doing fair, pain has been baseline and still hurts. he says he is working full time still. He mentions he is using Percocet 4 per day, which is helping some. Patient states his sleep is fair. Has fairly normal sleep latency. Averages about 4-6 hours of sleep a night. Denies any signs of sleep apnea. Feels somewhat rested in the morning. He says he is using Lyrica along with Voltaren. Patient's  subjective report of his mood is fair. he describes occasional symptoms of depression, occasional  irritability and some mood swings. Describes his mood as being neutral and reports some pleasure in his daily activities. Reports  fair  appetite, energy and concentration. Able to function well in different aspects of his daily activities. Denies suicidal or homicidal ideation. Denies any complaints of increased tension, does   Worry sometimes and occasional  irritability  he denies any c/o increased anxiety, No c/o panic attacks or symptoms of PTSD. He mentions he is using Cymbalta 60 mg. He states his weight has been stable. ALLERGIES/PAST MED/FAM/SOC HISTORY: Mr. Arellano Gave allergies, past medical, family and social history were reviewed in the chart. Mr. Arellano Gave current medications are   Outpatient Medications Prior to Visit   Medication Sig Dispense Refill    DULoxetine (CYMBALTA) 60 MG extended release capsule Take 1 capsule by mouth daily 30 capsule 1    pregabalin (LYRICA) 150 MG capsule Take 1 capsule by mouth 3 times daily for 30 days. 90 capsule 1    diclofenac (VOLTAREN) 75 MG EC tablet Take 1 tablet by mouth daily as needed for Pain 30 tablet 1    losartan (COZAAR) 100 MG tablet TAKE ONE TABLET BY MOUTH DAILY 90 tablet 2    penicillin v potassium (VEETID) 500 MG tablet Take 1,000 mg by mouth 2 times daily      furosemide (LASIX) 20 MG tablet TAKE ONE TO TWO TABLETS BY MOUTH EVERY MORNING AS NEEDED FOR EDEMA 180 tablet 3    vitamin B-12 (CYANOCOBALAMIN) 1000 MCG tablet Take 3,000 mcg by mouth daily       Compression Stockings MISC by Does not apply route Thigh high 1 each 0    Elastic Bandages & Supports (MEDICAL COMPRESSION THIGH HIGH) MISC Thigh high compression stockings, 30-40 mm Hg 2 each 1     No facility-administered medications prior to visit.        REVIEW OF SYSTEMS: .   Respiratory: Negative for shortness of breath. Cardiovascular: Negative for chest pain, palpitations  Gastrointestinal: Negative for blood in stool, abdominal distention, nausea, vomiting, abdominal pain, diarrhea,constipation. Neurological: Negative for speech difficulty, weakness and light-headedness, dizziness, tremors, sleepiness  Psychiatric/Behavioral: Negative for suicidal ideas, hallucinations, behavioral problems, self-injury, decreased concentration/cognition, agitation, confusion. PHYSICAL EXAM:   Nursing note and vitals reviewed. /70   Pulse 68   Wt (!) 476 lb (215.9 kg)   BMI 59.50 kg/m²   General Appearance: Patient is well nourished, well developed, well groomed and in no acute distress. Skin: Skin is warm and dry, good turgor . No rash or lesions noted. He is not diaphoretic.  + hyperkeratosis BLE    Pulmonary/Chest: Effort normal. No respiratory distress or use of accessory muscles. Auscultation revealing normal air entry. He does not have wheezes in the lung fields. He has no rales. Cardiovascular: Normal rate, regular rhythm, normal heart sounds, and does not have murmur. Exam reveals no gallop and no friction rub. Musculoskeletal / Extremities: Range of motion is normal. Gait is normal, assistive devices use: none. He exhibits edema: none, and no tenderness. Neurological/Psychiatric:He is alert and oriented to person, place, and time. Coordination is  normal.   Judgement and Insight is normal  His mood is Appropriate and affect is Anxious . His behavior is normal.   thought content normal.        IMPRESSION:     1.  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 BWC Sprain of ligament of lumbosacral joint, subsequent encounter    3. BWC Sprain of left knee/leg, subsequent encounter    4. BWC Sprain of left knee/leg, initial encounter    5.  BWC Sprain of ligament of lumbosacral joint, initial encounter PLAN:  Informed verbal consent was obtained. - ROM/stretching exercises   -Advised patient to quit smoking for  health related concerns and to improve the treatment outcomes. Education was given on quitting smoking and the use of different modalities including medications, hypnotherapy, counselling  and biofeedback. These were discussed with patient.   -Interim history reviewed   -[place] record was obtained and reviewed  for the last one year and no indicators of drug misuse  were found. Any other controlled substance prescriptions  seen on the record have been accounted for, I am aware of the patient receiving these medications. Jabier Kirk [place] record will be rechecked as part of office protocol.    -Most recent labs were reviewed and are within normal limits. Will repeat them within next 9-12 months if there is no status change   -Patient's urine drug screen results with GC/MS confirmation were obtained and reviewed and were negative for any illicit drugs. Prescribed medications were within acceptable range.   -Continue with Percocet 4 per day   -Continue with Lyrica same dose   -CBT techniques- relaxation therapies such as biofeedback, mindfulness based stress reduction, imagery, cognitive restructuring, problem solving discussed with patient   -Continue with Cymbalta 60 mg   -Continue with Cymbalta 60 mg   Mr. 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   Medications/orders associated with this visit:    Current Outpatient Medications   Medication Sig Dispense Refill  DULoxetine (CYMBALTA) 60 MG extended release capsule Take 1 capsule by mouth daily 30 capsule 1    pregabalin (LYRICA) 150 MG capsule Take 1 capsule by mouth 3 times daily for 30 days. 90 capsule 1    diclofenac (VOLTAREN) 75 MG EC tablet Take 1 tablet by mouth daily as needed for Pain 30 tablet 1    losartan (COZAAR) 100 MG tablet TAKE ONE TABLET BY MOUTH DAILY 90 tablet 2    penicillin v potassium (VEETID) 500 MG tablet Take 1,000 mg by mouth 2 times daily      furosemide (LASIX) 20 MG tablet TAKE ONE TO TWO TABLETS BY MOUTH EVERY MORNING AS NEEDED FOR EDEMA 180 tablet 3    vitamin B-12 (CYANOCOBALAMIN) 1000 MCG tablet Take 3,000 mcg by mouth daily       Compression Stockings MISC by Does not apply route Thigh high 1 each 0    Elastic Bandages & Supports (MEDICAL COMPRESSION THIGH HIGH) MISC Thigh high compression stockings, 30-40 mm Hg 2 each 1     No current facility-administered medications for this visit. Goals of current treatment regimen include improvement in pain, restoration of functioning- with focus on improvement in physical performance, general activity, work or disability,emotional distress, health care utilization and  decreased medication consumption. Will continue to monitor progress towards achieving/maintaining therapeutic goals with special emphasis on  1. Improvement in perceived interfernce  of pain with ADL's. Ability to do home exercises independently. Ability to do household chores indoor and/or outdoor work and social and leisure activities. To increase flexibility/ROM, strength and endurance. Improve psychosocial and physical functioning.- he is showing progression towards this treatment goal with the current regimen. 2. Improving sleep to 6-7 hours a night. Improve mood/ anxiety and depression symptoms such as crying spells, low energy, problems with concentration, motivation.- he is showing progression towards this treatment goal with the current regimen.    3. Reduction of reliance on opioid analgesia/more appropriate opioid use. - he is showing progression towards this treatment goal with the current regimen. Risks and benefits of the medications and other alternative treatments have been/were  discussed with the patient. Any questions on the  common side effects of these medications were also answered. He was advised against drinking alcohol with the narcotic pain medicines, advised against driving or handling machinery when  starting or adjusting the dose of medicines, feeling groggy or drowsy, or if having any cognitive issues related to the current medications. Heis fully aware of the risk of overdose and death, if medicines are misused and not taken as prescribed. If he develops new symptoms or if the symptoms worsen, he was told to call the office. .  Thank you for allowing me to participate in the care of this patient.     Juan Akers MD    Cc: Grace Cardoso MD

## 2021-09-22 ENCOUNTER — OFFICE VISIT (OUTPATIENT)
Dept: DERMATOLOGY | Age: 46
End: 2021-09-22
Payer: COMMERCIAL

## 2021-09-22 VITALS — TEMPERATURE: 97.3 F

## 2021-09-22 DIAGNOSIS — L91.8 ACROCHORDON: ICD-10-CM

## 2021-09-22 DIAGNOSIS — D18.01 CHERRY ANGIOMA: ICD-10-CM

## 2021-09-22 DIAGNOSIS — L73.8 SEBACEOUS HYPERPLASIA: ICD-10-CM

## 2021-09-22 DIAGNOSIS — S81.802A WOUND OF LEFT LOWER EXTREMITY, INITIAL ENCOUNTER: ICD-10-CM

## 2021-09-22 DIAGNOSIS — D23.62: ICD-10-CM

## 2021-09-22 DIAGNOSIS — D22.9 MULTIPLE BENIGN MELANOCYTIC NEVI: ICD-10-CM

## 2021-09-22 DIAGNOSIS — I89.0 ELEPHANTIASIS NOSTRA VERRUCOSA: Primary | ICD-10-CM

## 2021-09-22 DIAGNOSIS — I87.2 CHRONIC VENOUS STASIS DERMATITIS OF BOTH LOWER EXTREMITIES: ICD-10-CM

## 2021-09-22 DIAGNOSIS — Z85.820 PERSONAL HISTORY OF MALIGNANT MELANOMA: ICD-10-CM

## 2021-09-22 PROCEDURE — 99214 OFFICE O/P EST MOD 30 MIN: CPT | Performed by: DERMATOLOGY

## 2021-09-22 NOTE — PROGRESS NOTES
Patient's Name: Tyrone Bello  MRN: [id number]  YOB: 1975  Date of Visit: 9/22/2021  Primary Care Provider: Jenelle Lesches, MD  Referring Provider: No ref. provider found    Subjective:     Chief Complaint   Patient presents with    Follow-up     skin exam- Hx of melanoma last visit 6-9-21        History of Present Illness:  Tyrone Bello a 55 y.o. male with h/o acral melanoma of Lt foot is a follow up patient to my practice. They were last seen in clinic on 6/9/21   He      At his last visit he had a wound on his Lt leg which patient reports has been healing. He complains of swelling on the Lt groin region, which is becoming uncomfortable. He reports being unable to sit or walk properly. Patient reports taking pictures of his left leg because he doesn't want to take off the compression stocking he has on. Patient is being followed by Dr. Ilene Edward, last visit was 7/23/21    He reports no changed to his graft site, except that sometimes it itches. He has no other concerning lesions today. 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           Past medical/surgical/family history reviewed with no changes since last visit on 6/9/21    Allergies:  No Known Allergies    Current Medications:  Current Outpatient Medications   Medication Sig Dispense Refill    DULoxetine (CYMBALTA) 60 MG extended release capsule Take 1 capsule by mouth daily 30 capsule 1    pregabalin (LYRICA) 150 MG capsule Take 1 capsule by mouth 3 times daily for 30 days.  90 capsule 1    diclofenac (VOLTAREN) 75 edema with lichenification, cobblestoned papules some with verrucous surface and brown mottled pigmentation and a superficial wound on lateral aspect. Patient did not want to remove stocking from left leg. Photographs on his phone of Lt leg wound were shared. Appears improved from last photographs, however a shallow ulceration still evident. Discussed lymphedema and stasis changes with his ulceration on bilateral lower legs and concern for possible worsening or risk of infection.  - Patient agreed to be evaluated by the wound clinic. Will place referral  Elevation and compression   Continue current wound care      2. Personal history of malignant melanoma of left plantar foot (pT1a) s/p WLE and FTSG  Location/objective findings: Left lower leg and foot not examined. Discussed importance of examination. Patient had photos of legs and shallow ulceration. Patient to have leg unwrapped at next visit. 3. Multiple benign melanocytic nevi   Location: neck, torso and extremities  Objective findings: multiple brown/pink macules and papules without abnormal findings or concerning findings on exam and dermatoscopy    -Counseled the patient that these are benign and need no therapy. Observation for any changes recommended     4. Dean Hemangiomas  Location: posterior neck, upper arms, chest and upper back    Objective findings: Multiple bright red, dome-shaped papules     Discussed that these are benign lesions and require no treatment. 5. Sebaceous hyperplasia. Location: Forehead and cheeks  Objective findings: yellow umbilicated papule(s) with blood vessels overlying them on dermatoscopy. We discussed that this is a benign growth consisting of clogged and swollen oil glands most likely due to a combination of genetic factors and sun damage. Treatment is not necessary    Decision was made to do the following:  Observation/no treatment    6.  Blue nevus  Location/objective findings: dorsal Lt 3rd DIP with a 2 mm blue gray homogenous well defined macule  Stable with no changes. Will monitor for any changes. 7. Skin Tags/Acrochordons  Location: neck,upper chest and bilateral axillae    Objective findings:brown /tan pedunculated papules   -Reassurance, re: benign nature. No treatment is necessary      Follow up:  Return visit in 3 months or as needed for change in condition. All questions addressed.      Procedure:   No procedure performed          Naty Real MD. MS

## 2021-10-07 ENCOUNTER — HOSPITAL ENCOUNTER (OUTPATIENT)
Dept: WOUND CARE | Age: 46
Discharge: HOME | End: 2021-10-07
Payer: COMMERCIAL

## 2021-10-07 VITALS
HEART RATE: 80 BPM | TEMPERATURE: 96.8 F | DIASTOLIC BLOOD PRESSURE: 103 MMHG | WEIGHT: 315 LBS | SYSTOLIC BLOOD PRESSURE: 172 MMHG | BODY MASS INDEX: 59.33 KG/M2 | RESPIRATION RATE: 16 BRPM

## 2021-10-07 DIAGNOSIS — L97.211 VARICOSE VEINS OF RIGHT LOWER EXTREMITY WITH INFLAMMATION, WITH ULCER OF CALF LIMITED TO BREAKDOWN OF SKIN (HCC): ICD-10-CM

## 2021-10-07 DIAGNOSIS — L97.222 VARICOSE VEINS OF LEFT LOWER EXTREMITY WITH INFLAMMATION, WITH ULCER OF CALF WITH FAT LAYER EXPOSED (HCC): ICD-10-CM

## 2021-10-07 DIAGNOSIS — I83.212 VARICOSE VEINS OF RIGHT LOWER EXTREMITY WITH INFLAMMATION, WITH ULCER OF CALF LIMITED TO BREAKDOWN OF SKIN (HCC): ICD-10-CM

## 2021-10-07 DIAGNOSIS — I83.222 VARICOSE VEINS OF LEFT LOWER EXTREMITY WITH INFLAMMATION, WITH ULCER OF CALF WITH FAT LAYER EXPOSED (HCC): ICD-10-CM

## 2021-10-07 DIAGNOSIS — L97.222 NON-PRESSURE CHRONIC ULCER OF LEFT CALF WITH FAT LAYER EXPOSED (HCC): ICD-10-CM

## 2021-10-07 PROBLEM — L97.219 VARICOSE VEINS OF RIGHT LOWER EXTREMITY WITH BOTH ULCER OF CALF AND INFLAMMATION (HCC): Status: ACTIVE | Noted: 2021-10-07

## 2021-10-07 PROBLEM — L97.229 VARICOSE VEINS OF LEFT LOWER EXTREMITY WITH BOTH ULCER OF CALF AND INFLAMMATION (HCC): Status: ACTIVE | Noted: 2021-10-07

## 2021-10-07 PROCEDURE — 11042 DBRDMT SUBQ TIS 1ST 20SQCM/<: CPT

## 2021-10-07 PROCEDURE — 29581 APPL MULTLAYER CMPRN SYS LEG: CPT

## 2021-10-07 PROCEDURE — 6370000000 HC RX 637 (ALT 250 FOR IP): Performed by: PODIATRIST

## 2021-10-07 RX ORDER — M-VIT,TX,IRON,MINS/CALC/FOLIC 27MG-0.4MG
1 TABLET ORAL DAILY
COMMUNITY

## 2021-10-07 RX ORDER — LIDOCAINE HYDROCHLORIDE 20 MG/ML
JELLY TOPICAL ONCE
Status: CANCELLED | OUTPATIENT
Start: 2021-10-07 | End: 2021-10-07

## 2021-10-07 RX ORDER — BACITRACIN, NEOMYCIN, POLYMYXIN B 400; 3.5; 5 [USP'U]/G; MG/G; [USP'U]/G
OINTMENT TOPICAL ONCE
Status: CANCELLED | OUTPATIENT
Start: 2021-10-07 | End: 2021-10-07

## 2021-10-07 RX ORDER — CLOBETASOL PROPIONATE 0.5 MG/G
OINTMENT TOPICAL ONCE
Status: CANCELLED | OUTPATIENT
Start: 2021-10-07 | End: 2021-10-07

## 2021-10-07 RX ORDER — LIDOCAINE HYDROCHLORIDE 40 MG/ML
SOLUTION TOPICAL ONCE
Status: COMPLETED | OUTPATIENT
Start: 2021-10-07 | End: 2021-10-07

## 2021-10-07 RX ORDER — GENTAMICIN SULFATE 1 MG/G
OINTMENT TOPICAL ONCE
Status: CANCELLED | OUTPATIENT
Start: 2021-10-07 | End: 2021-10-07

## 2021-10-07 RX ORDER — LIDOCAINE 50 MG/G
OINTMENT TOPICAL ONCE
Status: CANCELLED | OUTPATIENT
Start: 2021-10-07 | End: 2021-10-07

## 2021-10-07 RX ORDER — BETAMETHASONE DIPROPIONATE 0.05 %
OINTMENT (GRAM) TOPICAL ONCE
Status: CANCELLED | OUTPATIENT
Start: 2021-10-07 | End: 2021-10-07

## 2021-10-07 RX ORDER — GINSENG 100 MG
CAPSULE ORAL ONCE
Status: CANCELLED | OUTPATIENT
Start: 2021-10-07 | End: 2021-10-07

## 2021-10-07 RX ORDER — BACITRACIN ZINC AND POLYMYXIN B SULFATE 500; 1000 [USP'U]/G; [USP'U]/G
OINTMENT TOPICAL ONCE
Status: CANCELLED | OUTPATIENT
Start: 2021-10-07 | End: 2021-10-07

## 2021-10-07 RX ORDER — LIDOCAINE 40 MG/G
CREAM TOPICAL ONCE
Status: CANCELLED | OUTPATIENT
Start: 2021-10-07 | End: 2021-10-07

## 2021-10-07 RX ORDER — LIDOCAINE HYDROCHLORIDE 40 MG/ML
SOLUTION TOPICAL ONCE
Status: CANCELLED | OUTPATIENT
Start: 2021-10-07 | End: 2021-10-07

## 2021-10-07 RX ADMIN — LIDOCAINE HYDROCHLORIDE: 40 SOLUTION TOPICAL at 14:37

## 2021-10-07 ASSESSMENT — PAIN DESCRIPTION - ORIENTATION: ORIENTATION: RIGHT;LEFT

## 2021-10-07 ASSESSMENT — PAIN DESCRIPTION - PAIN TYPE: TYPE: CHRONIC PAIN

## 2021-10-07 ASSESSMENT — PAIN DESCRIPTION - LOCATION: LOCATION: GENERALIZED

## 2021-10-07 ASSESSMENT — PAIN SCALES - GENERAL: PAINLEVEL_OUTOF10: 4

## 2021-10-07 ASSESSMENT — PAIN DESCRIPTION - DESCRIPTORS: DESCRIPTORS: ACHING

## 2021-10-07 NOTE — PROGRESS NOTES
Multilayer Compression Wrap   (Not Unna) Below the Knee    NAME:  Bill Harris  YOB: 1975  MEDICAL RECORD NUMBER:  [id number]  DATE:  10/7/2021        Removed old Multilayer wrap if present and washed leg with mild soap/water.   Applied moisturizing agent to dry skin as needed.   Applied primary and secondary dressing as ordered     Applied multilayered dressing below the knee to Bilateral lower leg(s)  (2 layer lite ) .   Instructed patient/caregiver not to remove dressing and to keep it clean and dry.   Instructed patient/caregiver on complications to report to provider, such as pain, numbness in toes, heavy drainage, and slippage of dressing.   Instructed patient on purpose of compression dressing and on activity and exercise recommendations.     Applied per   Guidelines    Electronically signed by Jose Ramirez RN on 10/7/2021 at 3:19 PM

## 2021-10-07 NOTE — PROGRESS NOTES
[facility]  Progress Note and Procedure Note      Elizabeth Clay  MEDICAL RECORD NUMBER:  [id number]  AGE: 55 y.o. GENDER: male  : 1975  EPISODE DATE:  10/7/2021    Subjective:     Chief Complaint   Patient presents with    Wound Check     initial         HISTORY of PRESENT ILLNESS HPI     Elizabeth Clay is a 55 y.o. male who presents today for wound/ulcer evaluation. History of Wound Context: Patient has ulcer left lateral leg. He has chronic swelling in his legs and uses 20-30mmHg compression socks on the left leg. He has a history for melanoma of the left foot.   Wound/Ulcer Pain Timing/Severity: intermittent, mild, moderate  Quality of pain: sharp  Severity:  5 / 10   Modifying Factors: Pain worsens with walking  Associated Signs/Symptoms: edema    Ulcer Identification:  Ulcer Type: venous    Contributing Factors: edema, venous stasis and lymphedema    Acute Wound: N/A        PAST MEDICAL HISTORY        Diagnosis Date    Arthritis     Cellulitis     Chronic back pain     Hepatitis age 24    HNP (herniated nucleus pulposus with myelopathy), thoracic     Hypertension     Melanoma ([address]     left foot     Obesity 2011    ANN-MARIE (obstructive sleep apnea)     NO CPAP     Stasis dermatitis        PAST SURGICAL HISTORY    Past Surgical History:   Procedure Laterality Date    ADENOIDECTOMY      FOOT SURGERY Left 2019    WIDE EXCISION OF LEFT FOOT MELANOMA performed by Shabbir Billings MD at [address] [place] [address] Left 2/10/2020    LEFT FOOT MELANOMA WIDE EXCISION AND FULL THICKNESS SKIN GRAFT,LEFT GROIN SENTINEL LYMPH NODE BIOPSY performed by Shabbir Billings MD at [address]  06    Arand    UPPER GASTROINTESTINAL ENDOSCOPY N/A 2021    EGD BIOPSY performed by Ministerio Stein DO at [facility] ENDOSCOPY    WISDOM TOOTH EXTRACTION         FAMILY HISTORY    Family History   Problem Relation Age of Onset    Cancer Mother    Jennifer Hutson COPD Paternal Grandfather     Colon Cancer Maternal Grandfather        SOCIAL HISTORY    Social History     Tobacco Use    Smoking status: Former Smoker     Packs/day: 1.50     Types: Cigarettes     Quit date: 2005     Years since quittin.7    Smokeless tobacco: Former User     Types: Chew   Vaping Use    Vaping Use: Every day    Substances: Nicotine, Flavoring    Devices: Refillable tank   Substance Use Topics    Alcohol use: No     Alcohol/week: 0.0 standard drinks     Comment: rarely -- 1 every few months    Drug use: No       ALLERGIES    No Known Allergies    MEDICATIONS    Current Outpatient Medications on File Prior to Encounter   Medication Sig Dispense Refill    penicillin v potassium (VEETID) 500 MG tablet TAKE TWO TABLETS BY MOUTH TWICE A  tablet 3    Multiple Vitamins-Minerals (THERAPEUTIC MULTIVITAMIN-MINERALS) tablet Take 1 tablet by mouth daily      DULoxetine (CYMBALTA) 60 MG extended release capsule Take 1 capsule by mouth daily 30 capsule 1    pregabalin (LYRICA) 150 MG capsule Take 1 capsule by mouth 3 times daily for 30 days. 90 capsule 1    diclofenac (VOLTAREN) 75 MG EC tablet Take 1 tablet by mouth daily as needed for Pain 30 tablet 1    oxyCODONE-acetaminophen (PERCOCET) 7.5-325 MG per tablet Take 1 tablet by mouth every 6 hours as needed for Pain (max 4 per day) for up to 30 days. 120 tablet 0    losartan (COZAAR) 100 MG tablet TAKE ONE TABLET BY MOUTH DAILY 90 tablet 2    furosemide (LASIX) 20 MG tablet TAKE ONE TO TWO TABLETS BY MOUTH EVERY MORNING AS NEEDED FOR EDEMA 180 tablet 3    Compression Stockings MISC by Does not apply route Thigh high 1 each 0    Elastic Bandages & Supports (MEDICAL COMPRESSION THIGH HIGH) MISC Thigh high compression stockings, 30-40 mm Hg 2 each 1     No current facility-administered medications on file prior to encounter. REVIEW OF SYSTEMS    Pertinent items are noted in HPI.       Last Q6F if applicable:   Hemoglobin A1C Date Value Ref Range Status   08/10/2021 4.8 See comment % Final     Comment:     Comment:  Diagnosis of Diabetes: > or = 6.5%  Increased risk of diabetes (Prediabetes): 5.7-6.4%  Glycemic Control: Nonpregnant Adults: <7.0%                    Pregnant: <6.0%           Objective:      BP (!) 172/103   Pulse 80   Temp 96.8 °F (36 °C) (Temporal)   Resp 16   Wt (!) 474 lb 10.4 oz (215.3 kg)   BMI 59.33 kg/m²     Wt Readings from Last 3 Encounters:   10/07/21 (!) 474 lb 10.4 oz (215.3 kg)   09/10/21 (!) 476 lb (215.9 kg)   09/08/21 (!) 472 lb 12.8 oz (214.5 kg)       PHYSICAL EXAM    General Appearance: alert and oriented to person, place and time, well-developed and well-nourished, in no acute distress  Pedal pulses palpable. Protective sensations intact. Ulcer is noted left lateral leg with granular base and fibrotic covering. Ulcer does not probe to bone. There are no signs of infection. There is severe edema bilateral legs. Hemosiderin deposits are noted anterior bilateral legs. Assessment:      1. 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 Varicose veins of right lower extremity with inflammation, with ulcer of calf limited to breakdown of skin ([address]    3. Varicose veins of left lower extremity with inflammation, with ulcer of calf with fat layer exposed ([address]           Procedure Note  Indications:  Based on my examination of this patient's wound(s)/ulcer(s) today, debridement is required to promote healing and evaluate the wound base. Performed by: Valery Libman, DPM    Consent obtained:  Yes    Time out taken:  Yes    Pain Control: Anesthetic  Anesthetic: 4% Lidocaine Liquid Topical       Debridement: Excisional Debridement    Using curette the wound(s)/ulcer(s) was/were debrided down through and including the removal of epidermis, dermis and subcutaneous tissue.         Devitalized Tissue Debrided:  fibrin and biofilm    Pre Debridement Measurements:  Are located in the Wound/Ulcer Documentation Flow Sheet    Wound/Ulcer #: 1    Post Debridement Measurements:  Wound/Ulcer Descriptions are Pre Debridement except measurements:    Wound 10/07/21 Leg Lateral;Left #1 (Active)   Wound Image   10/07/21 1407   Wound Cleansed Cleansed with saline 10/07/21 1407   Wound Length (cm) 4 cm 10/07/21 1407   Wound Width (cm) 2.1 cm 10/07/21 1407   Wound Depth (cm) 0.1 cm 10/07/21 1407   Wound Surface Area (cm^2) 8.4 cm^2 10/07/21 1407   Wound Volume (cm^3) 0.84 cm^3 10/07/21 1407   Post-Procedure Length (cm) 4.1 cm 10/07/21 1443   Post-Procedure Width (cm) 2.2 cm 10/07/21 1443   Post-Procedure Depth (cm) 0.3 cm 10/07/21 1443   Post-Procedure Surface Area (cm^2) 9.02 cm^2 10/07/21 1443   Post-Procedure Volume (cm^3) 2. 706 cm^3 10/07/21 1443   Distance Tunneling (cm) 0 cm 10/07/21 1407   Tunneling Position ___ O'Clock 0 10/07/21 1407   Undermining Starts ___ O'Clock 0 10/07/21 1407   Undermining Ends___ O'Clock 0 10/07/21 1407   Undermining Maxium Distance (cm) 0 10/07/21 1407   Wound Assessment Fibrin;Pink/red 10/07/21 1407   Drainage Amount Small 10/07/21 1407   Drainage Description Serosanguinous 10/07/21 1407   Odor None 10/07/21 1407   Jacy-wound Assessment Edematous 10/07/21 1407   Margins Unattached edges; Undefined edges 10/07/21 1407   Wound Thickness Description not for Pressure Injury Full thickness 10/07/21 1407   Number of days: 0     Incision 12/20/19 Foot Left;Plantar (Active)   Number of days: 656       Percent of Wound(s)/Ulcer(s) Debrided: 100%    Total Surface Area Debrided:  9.02 sq cm     Diabetic/Pressure/Non Pressure Ulcers only:  Ulcer: N/A     Estimated Blood Loss:  Minimal    Hemostasis Achieved:  by pressure    Procedural Pain:  5  / 10     Post Procedural Pain:  5 / 10     Response to treatment:  Well tolerated by patient. Plan:       Treatment Note please see attached Discharge Instructions. Debrided ulcer left lateral leg.   Discussed how swelling impedes the healing of ulcers. Discussed obtaining new compression stockings. Compression applied both legs and patient to elevate when possible. Return 4 days for nurse visit, 1 week for appointment. New Medication(s) at this visit:   New Prescriptions    No medications on file       Other orders at this visit: No orders of the defined types were placed in this encounter. Smoking Cessation: Counseling given: Not Answered      Written patient dismissal instructions given to patient and signed by patient or POA. Discharge [facility] Physician Orders and Discharge Instructions  [address] [address]  Telephone: [phone]  NAME:  Johan Salazar OF BIRTH:  1975  MEDICAL RECORD NUMBER:  [id number]  DATE:  10/7/2021    Wash hands with soap and water prior to and after every dressing change. Wound Cleansing:   · Do not scrub or use excessive force. · With each dressing change, rinse wounds with 0.9% Saline. (May use wound wash or soft contact solution. Both can be purchased at a local drug store). · If unable to obtain saline, may use a gentle soap and water. · Keep wounds dry in the shower unless otherwise instructed by the physician. · For wounds on lower legs, cast covers can be purchased at local drug stores, so that you may shower and keep the wound(s) dry. [] Instructions for Vashe Wash solution ONLY: Apply enough Vashe to soak a piece of gauze and place on wound bed for 5-10 minutes. DO NOT rinse after Vashe has been applied. Follow dressing application as instructed below.     Jacy wound Topical Treatments:  Do not apply lotions, creams, or ointments to the skin around the wound bed unless directed as followed:     [] Apply around the wound: [] moisturizing lotion [] Antifungal ointment [] No-Sting barrier film [] Zinc paste [] Other:       Dressings:           Wound Location: left lower leg      Apply Primary Dressing to wound:       Alginate with silver pad     Cover and Secure with:     Cover and Secure with: ABD pad   Avoid contact of tape with skin if possible.  When to change Dressing: [] Daily [] Every Other Day [] Once a week  [] Three times per week: [] Monday, Wednesday, Friday [] Tuesday, Thursday, Saturday  [x] Do Not Change Dressing [] Other:         Edema Control:         [x] Elevate leg(s) above the level of the heart for 30 minutes 4-5 times a day and/or when sitting. [x] Avoid prolonged standing in one place. Multilayer Compression Wrap:    Type: 2 Layer Lite compression wrap   · Applied in Clinic to the :  Bilateral lower leg(s) on 10/7/2021  · Do not get leg(s) with wrap wet. · If wraps become too tight call the center or completely remove the wrap. · Elevate leg(s) above the level of the heart when sitting. · Avoid prolonged standing in one place. ·     Dietary:  Important dietary reminders:  1. Increase Protein intake (i.e. Lean meats, fish, eggs, legumes, and yogurt)  2. No added salt  3. If diabetic, follow a diabetic diet and check glucose prior to meals or as instructed by your physician. Dietary Supplements:  [] Hector  [] 30ml ProStat  [] EnsureEnlive [] Ensure Max/Premier  [] Other:    If you are still having pain after you go home:   For wounds on lower legs or arms, elevate the affected limb.  Use over-the-counter medications you would normally use for pain as permitted by your primary care doctor.  For persistent pain not relieved by the above interventions, please call your primary care doctor.      Return Appointment:   Return Appointment: With Dr Hernandez Dc  in  [address] [place] [address])  o Scheduled weekly until (Date):      [x] Return Appointment for a Wound Assessment with a nurse on: 10/11 or 10/12    o All other future appointments:  Future Appointments   Date Time Provider Francie Salmon   10/8/2021 11:15 AM MD ELIAZAR Harris PAIN [facility]   10/27/2021 12:45 PM DO MAMADOU Tamayo WT [facility]   12/17/2021 12:00 PM Dena Pegge Goodell, MD [facility] DER [facility]   1/28/2022 12:30 PM MD Sammi Barbour S/ON [facility]       [x]DME/Wound Dressing Supplies ordered at this visit: []Yes []No  o Supplies Provided by:   o Please call them directly to reorder supplies when you run out.  o CONTINUE TO USE THE SUPPLIES YOU HAVE AVAILABLE. IT IS MOST IMPORTANT TO KEEP THE WOUND COVERED AT ALL TIMES. [x] Orders placed during your visit: No orders of the defined types were placed in this encounter. Your nurse  is:  Bonifacio Franks     Electronically signed by Mary Conn RN on 10/7/2021 at 2:51 PM     [address] [place] [address] Information: Should you experience any significant changes in your wound(s) or have questions about your wound care, please contact the [address] [place] [address] at [phone]. We are open from 8:00am - 4:30p Monday thru Friday except for Wednesdays which we are closed. Please give us 24-48 hours to return your call. Call your doctor now or seek immediate medical care if:    · You have symptoms of infection, such as:  ? Increased pain, swelling, warmth, or redness. ? Red streaks leading from the area. ? Pus draining from the area. ? A fever.         Physician for this visit and orders: Gillian Mejia DPM    [] Patient unable to sign Discharge Instructions given to ECF/Transportation/POA              Electronically signed by Gillian Mejia DPM on 10/7/2021 at 2:56 PM

## 2021-10-08 ENCOUNTER — OFFICE VISIT (OUTPATIENT)
Dept: PAIN MANAGEMENT | Age: 46
End: 2021-10-08
Payer: COMMERCIAL

## 2021-10-08 VITALS
OXYGEN SATURATION: 93 % | HEART RATE: 75 BPM | TEMPERATURE: 97.9 F | BODY MASS INDEX: 39.17 KG/M2 | DIASTOLIC BLOOD PRESSURE: 86 MMHG | HEIGHT: 75 IN | WEIGHT: 315 LBS | SYSTOLIC BLOOD PRESSURE: 162 MMHG

## 2021-10-08 DIAGNOSIS — S33.9XXA SPRAIN OF LIGAMENT OF LUMBOSACRAL JOINT, INITIAL ENCOUNTER: ICD-10-CM

## 2021-10-08 DIAGNOSIS — S83.92XA SPRAIN OF LEFT KNEE/LEG, INITIAL ENCOUNTER: ICD-10-CM

## 2021-10-08 DIAGNOSIS — S33.9XXD SPRAIN OF LIGAMENT OF LUMBOSACRAL JOINT, SUBSEQUENT ENCOUNTER: ICD-10-CM

## 2021-10-08 DIAGNOSIS — S83.92XD SPRAIN OF LEFT KNEE/LEG, SUBSEQUENT ENCOUNTER: ICD-10-CM

## 2021-10-08 DIAGNOSIS — M51.27 LUMBAGO-SCIATICA DUE TO DISPLACEMENT OF LUMBAR INTERVERTEBRAL DISC: ICD-10-CM

## 2021-10-08 PROCEDURE — 99213 OFFICE O/P EST LOW 20 MIN: CPT | Performed by: INTERNAL MEDICINE

## 2021-10-08 RX ORDER — DICLOFENAC SODIUM 75 MG/1
75 TABLET, DELAYED RELEASE ORAL DAILY PRN
Qty: 30 TABLET | Refills: 1 | Status: SHIPPED | OUTPATIENT
Start: 2021-10-08 | End: 2021-11-05 | Stop reason: SDUPTHER

## 2021-10-08 RX ORDER — OXYCODONE AND ACETAMINOPHEN 7.5; 325 MG/1; MG/1
1 TABLET ORAL EVERY 6 HOURS PRN
Qty: 112 TABLET | Refills: 0 | Status: SHIPPED | OUTPATIENT
Start: 2021-10-08 | End: 2021-11-05 | Stop reason: SDUPTHER

## 2021-10-08 NOTE — PROGRESS NOTES
Ni Goldberg [facility]  1975  [id number]      HISTORY OF PRESENT ILLNESS:  Mr. Sandie Owen is a 55 y.o. male returns for a follow up visit for pain management  He has a diagnosis of   1. 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 BWC Sprain of ligament of lumbosacral joint, subsequent encounter    3. BWC Sprain of left knee/leg, subsequent encounter    4. BWC Sprain of left knee/leg, initial encounter    5. BWC Sprain of ligament of lumbosacral joint, initial encounter    . He complains of pain in the Low back, left foot  He rates the pain 4/10 and describes it as aching. Current treatment regimen has helped relieve about 70% of the pain. He denies any side effects from the current pain regimen. Patient reports that since the last follow up visit the physical functioning is unchanged, family/social relationships are unchanged, mood is unchanged sleep patterns are unchanged, and that the overall functioning is unchanged. Patient denies misusing/abusing his narcotic pain medications or using any illegal drugs. There are No indicators for possible drug abuse, addiction or diversion problems. Patient states he is going to wound care for a sore on the back of his leg that has been going on for 5 months. He mentions had legs \" wrapped\". He says he is managing his ADLs and working full time. He denies any constipation symptoms. He reports his weight has been stable. ALLERGIES: Patients list of allergies were reviewed     MEDICATIONS: Mr. Sandie Owen list of medications were reviewed. His current medications are   Outpatient Medications Prior to Visit   Medication Sig Dispense Refill    penicillin v potassium (VEETID) 500 MG tablet TAKE TWO TABLETS BY MOUTH TWICE A  tablet 3    Multiple Vitamins-Minerals (THERAPEUTIC MULTIVITAMIN-MINERALS) tablet Take 1 tablet by mouth daily      DULoxetine (CYMBALTA) 60 MG extended release capsule Take 1 capsule by mouth daily 30 capsule 1  pregabalin (LYRICA) 150 MG capsule Take 1 capsule by mouth 3 times daily for 30 days. 90 capsule 1    diclofenac (VOLTAREN) 75 MG EC tablet Take 1 tablet by mouth daily as needed for Pain 30 tablet 1    oxyCODONE-acetaminophen (PERCOCET) 7.5-325 MG per tablet Take 1 tablet by mouth every 6 hours as needed for Pain (max 4 per day) for up to 30 days. 120 tablet 0    losartan (COZAAR) 100 MG tablet TAKE ONE TABLET BY MOUTH DAILY 90 tablet 2    furosemide (LASIX) 20 MG tablet TAKE ONE TO TWO TABLETS BY MOUTH EVERY MORNING AS NEEDED FOR EDEMA 180 tablet 3    Compression Stockings MISC by Does not apply route Thigh high 1 each 0    Elastic Bandages & Supports (MEDICAL COMPRESSION THIGH HIGH) MISC Thigh high compression stockings, 30-40 mm Hg 2 each 1     No facility-administered medications prior to visit. REVIEW OF SYSTEMS:    Respiratory: Negative for apnea, chest tightness and shortness of breath or change in baseline breathing. PHYSICAL EXAM:   Nursing note and vitals reviewed. BP (!) 162/86   Pulse 75   Temp 97.9 °F (36.6 °C)   Ht 6' 3\" (1.905 m)   Wt (!) 473 lb (214.6 kg)   SpO2 93%   BMI 59.12 kg/m²   Constitutional: He appears well-developed and well-nourished. No acute distress. Cardiovascular: Normal rate, regular rhythm, normal heart sounds, and does not have murmur. Pulmonary/Chest: Effort normal. No respiratory distress. He does not have wheezes in the lung fields. He has no rales. Neurological/Psychiatric:He is alert and oriented to person, place, and time. Coordination is  normal.  His mood isAppropriate and affect is Neutral/Euthymic(normal) . Other: bilateral legs wrapped   IMPRESSION:   1.  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 BWC Sprain of ligament of lumbosacral joint, subsequent encounter    3. BWC Sprain of left knee/leg, subsequent encounter    4. BWC Sprain of left knee/leg, initial encounter    5.  BWC Sprain of ligament of lumbosacral joint, initial encounter        PLAN:  Informed verbal consent was obtained  -ROM/Stretching exercises   -. He was advised to increase fluids ( 5-7  glasses of fluid daily), limit caffeine, avoid cheese products, increase dietary fiber, increase activity and exercise as tolerated and relax regularly and enjoy meals   -Continue with Percocet 4 per day   -Walking as tolerated   -Advise leg elevation, continue with follow up with wound clinic    Current Outpatient Medications   Medication Sig Dispense Refill    penicillin v potassium (VEETID) 500 MG tablet TAKE TWO TABLETS BY MOUTH TWICE A  tablet 3    Multiple Vitamins-Minerals (THERAPEUTIC MULTIVITAMIN-MINERALS) tablet Take 1 tablet by mouth daily      DULoxetine (CYMBALTA) 60 MG extended release capsule Take 1 capsule by mouth daily 30 capsule 1    pregabalin (LYRICA) 150 MG capsule Take 1 capsule by mouth 3 times daily for 30 days. 90 capsule 1    diclofenac (VOLTAREN) 75 MG EC tablet Take 1 tablet by mouth daily as needed for Pain 30 tablet 1    oxyCODONE-acetaminophen (PERCOCET) 7.5-325 MG per tablet Take 1 tablet by mouth every 6 hours as needed for Pain (max 4 per day) for up to 30 days. 120 tablet 0    losartan (COZAAR) 100 MG tablet TAKE ONE TABLET BY MOUTH DAILY 90 tablet 2    furosemide (LASIX) 20 MG tablet TAKE ONE TO TWO TABLETS BY MOUTH EVERY MORNING AS NEEDED FOR EDEMA 180 tablet 3    Compression Stockings MISC by Does not apply route Thigh high 1 each 0    Elastic Bandages & Supports (MEDICAL COMPRESSION THIGH HIGH) MISC Thigh high compression stockings, 30-40 mm Hg 2 each 1     No current facility-administered medications for this visit. I will continue his current medication regimen  which is part of the above treatment schedule.  It has been helping with Mr. Rosana Miles chronic  medical problems which for this visit include:   Diagnoses of  [facility] Lumbago-sciatica due to displacement of lumbar intervertebral disc, [facility] Sprain of ligament of lumbosacral joint, subsequent encounter, BWC Sprain of left knee/leg, subsequent encounter, BWC Sprain of left knee/leg, initial encounter, and BWC Sprain of ligament of lumbosacral joint, initial encounter were pertinent to this visit. Risks and benefits of the medications and other alternative treatments  including no treatment were discussed with the patient. The common side effects of these medications were also explained to the patient. Informed verbal consent was obtained. Goals of current treatment regimen include improvement in pain, restoration of functioning- with focus on improvement in physical performance, general activity, work or disability,emotional distress, health care utilization and  decreased medication consumption. Will continue to monitor progress towards achieving/maintaining therapeutic goals with special emphasis on  1. Improvement in perceived interfernce  of pain with ADL's. Ability to do home exercises independently. Ability to do household chores indoor and/or outdoor work and social and leisure activities. Improve psychosocial and physical functioning. - he is showing progression towards this treatment goal with the current regimen. He was advised against drinking alcohol with the narcotic pain medicines, advised against driving or handling machinery while adjusting the dose of medicines or if having cognitive  issues related to the current medications. Risk of overdose and death, if medicines not taken as prescribed, were also discussed. If the patient develops new symptoms or if the symptoms worsen, the patient should call the office. While transcribing every attempt was made to maintain the accuracy of the note in terms of it's contents,there may have been some errors made inadvertently. Thank you for allowing me to participate in the care of this patient.     Abram Contreras MD.    Cc: Dheeraj Mcbride MD

## 2021-10-11 ENCOUNTER — TELEPHONE (OUTPATIENT)
Dept: DERMATOLOGY | Age: 46
End: 2021-10-11

## 2021-10-11 NOTE — TELEPHONE ENCOUNTER
Called patient. Patient did not answer. Left message in regards to patients visit on 12/17. Please reschedule patients visit to 12/15 or 12/16.

## 2021-10-12 ENCOUNTER — HOSPITAL ENCOUNTER (OUTPATIENT)
Dept: WOUND CARE | Age: 46
Discharge: HOME | End: 2021-10-12
Payer: COMMERCIAL

## 2021-10-12 VITALS
RESPIRATION RATE: 18 BRPM | SYSTOLIC BLOOD PRESSURE: 175 MMHG | HEART RATE: 71 BPM | TEMPERATURE: 96.6 F | DIASTOLIC BLOOD PRESSURE: 92 MMHG

## 2021-10-12 DIAGNOSIS — I83.222 VARICOSE VEINS OF LEFT LOWER EXTREMITY WITH INFLAMMATION, WITH ULCER OF CALF WITH FAT LAYER EXPOSED (HCC): ICD-10-CM

## 2021-10-12 DIAGNOSIS — L97.222 NON-PRESSURE CHRONIC ULCER OF LEFT CALF WITH FAT LAYER EXPOSED (HCC): ICD-10-CM

## 2021-10-12 DIAGNOSIS — I83.212 VARICOSE VEINS OF RIGHT LOWER EXTREMITY WITH INFLAMMATION, WITH ULCER OF CALF LIMITED TO BREAKDOWN OF SKIN (HCC): ICD-10-CM

## 2021-10-12 DIAGNOSIS — L97.211 VARICOSE VEINS OF RIGHT LOWER EXTREMITY WITH INFLAMMATION, WITH ULCER OF CALF LIMITED TO BREAKDOWN OF SKIN (HCC): ICD-10-CM

## 2021-10-12 DIAGNOSIS — L97.222 VARICOSE VEINS OF LEFT LOWER EXTREMITY WITH INFLAMMATION, WITH ULCER OF CALF WITH FAT LAYER EXPOSED (HCC): ICD-10-CM

## 2021-10-12 PROCEDURE — 29581 APPL MULTLAYER CMPRN SYS LEG: CPT

## 2021-10-12 NOTE — PROGRESS NOTES
Multilayer Compression Wrap   (Not Unna) Below the Knee    NAME:  Jose Harris  YOB: 1975  MEDICAL RECORD NUMBER:  [id number]  DATE:  10/12/2021        Removed old Multilayer wrap if present and washed leg with mild soap/water.   Applied moisturizing agent to dry skin as needed.   Applied primary and secondary dressing as ordered     Applied multilayered dressing below the knee to Left lower leg(s)  (2 layer lite ) .   Instructed patient/caregiver not to remove dressing and to keep it clean and dry.   Instructed patient/caregiver on complications to report to provider, such as pain, numbness in toes, heavy drainage, and slippage of dressing.   Instructed patient on purpose of compression dressing and on activity and exercise recommendations.     Applied per   Guidelines    Electronically signed by Carl Wise RN on 10/12/2021 at 12:51 PM

## 2021-10-14 ENCOUNTER — HOSPITAL ENCOUNTER (OUTPATIENT)
Dept: WOUND CARE | Age: 46
Discharge: HOME | End: 2021-10-14
Payer: COMMERCIAL

## 2021-10-14 VITALS — RESPIRATION RATE: 18 BRPM | TEMPERATURE: 96.8 F

## 2021-10-14 DIAGNOSIS — I83.222 VARICOSE VEINS OF LEFT LOWER EXTREMITY WITH INFLAMMATION, WITH ULCER OF CALF WITH FAT LAYER EXPOSED (HCC): ICD-10-CM

## 2021-10-14 DIAGNOSIS — L97.222 NON-PRESSURE CHRONIC ULCER OF LEFT CALF WITH FAT LAYER EXPOSED (HCC): Primary | ICD-10-CM

## 2021-10-14 DIAGNOSIS — I83.212 VARICOSE VEINS OF RIGHT LOWER EXTREMITY WITH INFLAMMATION, WITH ULCER OF CALF LIMITED TO BREAKDOWN OF SKIN (HCC): ICD-10-CM

## 2021-10-14 DIAGNOSIS — L97.211 VARICOSE VEINS OF RIGHT LOWER EXTREMITY WITH INFLAMMATION, WITH ULCER OF CALF LIMITED TO BREAKDOWN OF SKIN (HCC): ICD-10-CM

## 2021-10-14 DIAGNOSIS — L97.222 VARICOSE VEINS OF LEFT LOWER EXTREMITY WITH INFLAMMATION, WITH ULCER OF CALF WITH FAT LAYER EXPOSED (HCC): ICD-10-CM

## 2021-10-14 PROCEDURE — 11042 DBRDMT SUBQ TIS 1ST 20SQCM/<: CPT

## 2021-10-14 PROCEDURE — 29581 APPL MULTLAYER CMPRN SYS LEG: CPT

## 2021-10-14 PROCEDURE — 6370000000 HC RX 637 (ALT 250 FOR IP): Performed by: PODIATRIST

## 2021-10-14 RX ORDER — BACITRACIN ZINC AND POLYMYXIN B SULFATE 500; 1000 [USP'U]/G; [USP'U]/G
OINTMENT TOPICAL ONCE
Status: CANCELLED | OUTPATIENT
Start: 2021-10-14 | End: 2021-10-14

## 2021-10-14 RX ORDER — GINSENG 100 MG
CAPSULE ORAL ONCE
Status: CANCELLED | OUTPATIENT
Start: 2021-10-14 | End: 2021-10-14

## 2021-10-14 RX ORDER — LIDOCAINE HYDROCHLORIDE 20 MG/ML
JELLY TOPICAL ONCE
Status: CANCELLED | OUTPATIENT
Start: 2021-10-14 | End: 2021-10-14

## 2021-10-14 RX ORDER — LIDOCAINE 50 MG/G
OINTMENT TOPICAL ONCE
Status: CANCELLED | OUTPATIENT
Start: 2021-10-14 | End: 2021-10-14

## 2021-10-14 RX ORDER — LIDOCAINE HYDROCHLORIDE 40 MG/ML
SOLUTION TOPICAL ONCE
Status: COMPLETED | OUTPATIENT
Start: 2021-10-14 | End: 2021-10-14

## 2021-10-14 RX ORDER — BACITRACIN, NEOMYCIN, POLYMYXIN B 400; 3.5; 5 [USP'U]/G; MG/G; [USP'U]/G
OINTMENT TOPICAL ONCE
Status: CANCELLED | OUTPATIENT
Start: 2021-10-14 | End: 2021-10-14

## 2021-10-14 RX ORDER — LIDOCAINE HYDROCHLORIDE 40 MG/ML
SOLUTION TOPICAL ONCE
Status: CANCELLED | OUTPATIENT
Start: 2021-10-14 | End: 2021-10-14

## 2021-10-14 RX ORDER — GENTAMICIN SULFATE 1 MG/G
OINTMENT TOPICAL ONCE
Status: CANCELLED | OUTPATIENT
Start: 2021-10-14 | End: 2021-10-14

## 2021-10-14 RX ORDER — CLOBETASOL PROPIONATE 0.5 MG/G
OINTMENT TOPICAL ONCE
Status: CANCELLED | OUTPATIENT
Start: 2021-10-14 | End: 2021-10-14

## 2021-10-14 RX ORDER — LIDOCAINE 40 MG/G
CREAM TOPICAL ONCE
Status: CANCELLED | OUTPATIENT
Start: 2021-10-14 | End: 2021-10-14

## 2021-10-14 RX ORDER — BETAMETHASONE DIPROPIONATE 0.05 %
OINTMENT (GRAM) TOPICAL ONCE
Status: CANCELLED | OUTPATIENT
Start: 2021-10-14 | End: 2021-10-14

## 2021-10-14 RX ADMIN — LIDOCAINE HYDROCHLORIDE: 40 SOLUTION TOPICAL at 13:08

## 2021-10-14 NOTE — PROGRESS NOTES
Multilayer Compression Wrap   (Not Unna) Below the Knee    NAME:  Rom Harris  YOB: 1975  MEDICAL RECORD NUMBER:  [id number]  DATE:  10/14/2021        Removed old Multilayer wrap if present and washed leg with mild soap/water.   Applied moisturizing agent to dry skin as needed.   Applied primary and secondary dressing as ordered     Applied multilayered dressing below the knee to Left lower leg(s)  (2 Layer compression wrap ) .   Instructed patient/caregiver not to remove dressing and to keep it clean and dry.   Instructed patient/caregiver on complications to report to provider, such as pain, numbness in toes, heavy drainage, and slippage of dressing.   Instructed patient on purpose of compression dressing and on activity and exercise recommendations.     Applied per   Guidelines    Electronically signed by Mervat Lucas RN on 10/14/2021 at 2:08 PM

## 2021-10-14 NOTE — PROGRESS NOTES
1227 [facility] [place]  Progress Note and Procedure Note      Mei Askew  MEDICAL RECORD NUMBER:  [id number]  AGE: 55 y.o. GENDER: male  : 1975  EPISODE DATE:  10/14/2021    Subjective:     Chief Complaint   Patient presents with    Wound Check     bilateral legs         HISTORY of PRESENT ILLNESS HPI     Mei Askew is a 55 y.o. male who presents today for wound/ulcer evaluation. History of Wound Context: Patient has ulcer left lateral leg. He has chronic swelling in his legs and uses 20-30mmHg compression socks on the left leg. He has a history for melanoma of the left foot.   Wound/Ulcer Pain Timing/Severity: intermittent, mild, moderate  Quality of pain: sharp  Severity:  5 / 10   Modifying Factors: Pain worsens with walking  Associated Signs/Symptoms: edema    Ulcer Identification:  Ulcer Type: venous    Contributing Factors: edema, venous stasis and lymphedema    Acute Wound: N/A        PAST MEDICAL HISTORY        Diagnosis Date    Arthritis     Cellulitis     Chronic back pain     Hepatitis age 24    HNP (herniated nucleus pulposus with myelopathy), thoracic     Hypertension     Melanoma ([address]     left foot     Obesity 2011    ANN-MARIE (obstructive sleep apnea)     NO CPAP     Stasis dermatitis        PAST SURGICAL HISTORY    Past Surgical History:   Procedure Laterality Date    ADENOIDECTOMY      FOOT SURGERY Left 2019    WIDE EXCISION OF LEFT FOOT MELANOMA performed by Merlinda Sabal, MD at [address] [place] [address] Left 2/10/2020    LEFT FOOT MELANOMA WIDE EXCISION AND FULL THICKNESS SKIN GRAFT,LEFT GROIN SENTINEL LYMPH NODE BIOPSY performed by Merlinda Sabal, MD at [facility]  06    Arand    UPPER GASTROINTESTINAL ENDOSCOPY N/A 2021    EGD BIOPSY performed by Keegan Hedrick DO at [address]         FAMILY HISTORY    Family History   Problem Relation Age of Onset    Cancer Mother     COPD Paternal Grandfather     Colon Cancer Maternal Grandfather        SOCIAL HISTORY    Social History     Tobacco Use    Smoking status: Former Smoker     Packs/day: 1.50     Types: Cigarettes     Quit date: 2005     Years since quittin.7    Smokeless tobacco: Former User     Types: Chew   Vaping Use    Vaping Use: Every day    Substances: Nicotine, Flavoring    Devices: Refillable tank   Substance Use Topics    Alcohol use: No     Alcohol/week: 0.0 standard drinks     Comment: rarely -- 1 every few months    Drug use: No       ALLERGIES    No Known Allergies    MEDICATIONS    Current Outpatient Medications on File Prior to Encounter   Medication Sig Dispense Refill    oxyCODONE-acetaminophen (PERCOCET) 7.5-325 MG per tablet Take 1 tablet by mouth every 6 hours as needed for Pain (max 4 per day) for up to 28 days. 112 tablet 0    diclofenac (VOLTAREN) 75 MG EC tablet Take 1 tablet by mouth daily as needed for Pain 30 tablet 1    penicillin v potassium (VEETID) 500 MG tablet TAKE TWO TABLETS BY MOUTH TWICE A  tablet 3    Multiple Vitamins-Minerals (THERAPEUTIC MULTIVITAMIN-MINERALS) tablet Take 1 tablet by mouth daily      DULoxetine (CYMBALTA) 60 MG extended release capsule Take 1 capsule by mouth daily 30 capsule 1    pregabalin (LYRICA) 150 MG capsule Take 1 capsule by mouth 3 times daily for 30 days. 90 capsule 1    losartan (COZAAR) 100 MG tablet TAKE ONE TABLET BY MOUTH DAILY 90 tablet 2    furosemide (LASIX) 20 MG tablet TAKE ONE TO TWO TABLETS BY MOUTH EVERY MORNING AS NEEDED FOR EDEMA 180 tablet 3    Compression Stockings MISC by Does not apply route Thigh high 1 each 0    Elastic Bandages & Supports (MEDICAL COMPRESSION THIGH HIGH) MISC Thigh high compression stockings, 30-40 mm Hg 2 each 1     No current facility-administered medications on file prior to encounter. REVIEW OF SYSTEMS    Pertinent items are noted in HPI.       Last D1D if applicable: Hemoglobin A1C   Date Value Ref Range Status   08/10/2021 4.8 See comment % Final     Comment:     Comment:  Diagnosis of Diabetes: > or = 6.5%  Increased risk of diabetes (Prediabetes): 5.7-6.4%  Glycemic Control: Nonpregnant Adults: <7.0%                    Pregnant: <6.0%           Objective:      Temp 96.8 °F (36 °C) (Temporal)   Resp 18     Wt Readings from Last 3 Encounters:   10/08/21 (!) 473 lb (214.6 kg)   10/07/21 (!) 474 lb 10.4 oz (215.3 kg)   09/10/21 (!) 476 lb (215.9 kg)       PHYSICAL EXAM    General Appearance: alert and oriented to person, place and time, well-developed and well-nourished, in no acute distress  Pedal pulses palpable. Protective sensations intact. Ulcer is noted left lateral leg with granular base and fibrotic covering. Ulcer does not probe to bone. There are no signs of infection. There is severe edema bilateral legs. Hemosiderin deposits are noted anterior bilateral legs. Assessment:      1. 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 Varicose veins of right lower extremity with inflammation, with ulcer of calf limited to breakdown of skin ([address]    3. Varicose veins of left lower extremity with inflammation, with ulcer of calf with fat layer exposed ([address]           Procedure Note  Indications:  Based on my examination of this patient's wound(s)/ulcer(s) today, debridement is required to promote healing and evaluate the wound base. Performed by: Brandie Espinoza DPM    Consent obtained:  Yes    Time out taken:  Yes    Pain Control:         Debridement: Excisional Debridement    Using curette the wound(s)/ulcer(s) was/were debrided down through and including the removal of epidermis, dermis and subcutaneous tissue.         Devitalized Tissue Debrided:  fibrin and biofilm    Pre Debridement Measurements:  Are located in the Wound/Ulcer Documentation Flow Sheet    Wound/Ulcer #: 1    Post Debridement Measurements:  Wound/Ulcer Descriptions are Pre Debridement except measurements:    Wound 10/07/21 Leg Lateral;Left #1 (Active)   Wound Image   10/07/21 1407   Wound Cleansed Cleansed with saline 10/14/21 1309   Dressing/Treatment Alginate with Ag 10/14/21 1407   Wound Length (cm) 4 cm 10/14/21 1309   Wound Width (cm) 1 cm 10/14/21 1309   Wound Depth (cm) 0.1 cm 10/14/21 1309   Wound Surface Area (cm^2) 4 cm^2 10/14/21 1309   Change in Wound Size % (l*w) 52.38 10/14/21 1309   Wound Volume (cm^3) 0.4 cm^3 10/14/21 1309   Wound Healing % 52 10/14/21 1309   Post-Procedure Length (cm) 4.1 cm 10/14/21 1346   Post-Procedure Width (cm) 1.1 cm 10/14/21 1346   Post-Procedure Depth (cm) 0.3 cm 10/14/21 1346   Post-Procedure Surface Area (cm^2) 4.51 cm^2 10/14/21 1346   Post-Procedure Volume (cm^3) 1. 353 cm^3 10/14/21 1346   Distance Tunneling (cm) 0 cm 10/12/21 1258   Tunneling Position ___ O'Clock 0 10/12/21 1258   Undermining Starts ___ O'Clock 0 10/12/21 1258   Undermining Ends___ O'Clock 0 10/12/21 1258   Undermining Maxium Distance (cm) 0 10/12/21 1258   Wound Assessment Fibrin;Pink/red 10/14/21 1309   Drainage Amount Small 10/14/21 1309   Drainage Description Serosanguinous 10/14/21 1309   Odor None 10/14/21 1309   Jacy-wound Assessment Edematous 10/14/21 1309   Margins Unattached edges; Undefined edges 10/14/21 1309   Wound Thickness Description not for Pressure Injury Full thickness 10/14/21 1309   Number of days: 7     Incision 12/20/19 Foot Left;Plantar (Active)   Number of days: 663       Percent of Wound(s)/Ulcer(s) Debrided: 100%    Total Surface Area Debrided:  4.51 sq cm     Diabetic/Pressure/Non Pressure Ulcers only:  Ulcer: N/A     Estimated Blood Loss:  Minimal    Hemostasis Achieved:  by pressure    Procedural Pain:  5  / 10     Post Procedural Pain:  5 / 10     Response to treatment:  Well tolerated by patient. Plan:       Treatment Note please see attached Discharge Instructions. Debrided ulcer left lateral leg.   Discussed how swelling impedes the healing of ulcers. Discussed obtaining new compression stockings. Compression applied both legs and patient to elevate when possible. Return 1 week. New Medication(s) at this visit:   New Prescriptions    No medications on file       Other orders at this visit:   Orders Placed This Encounter   Procedures    Initiate Outpatient Wound Care Protocol       Smoking Cessation: Counseling given: Not Answered      Written patient dismissal instructions given to patient and signed by patient or POA. Discharge 315 [facility] Physician Orders and Discharge Instructions  [address] [place] [address]  Telephone: [phone]  NAME:  Liliana Combs OF BIRTH:  1975  MEDICAL RECORD NUMBER:  [id number]  DATE:  10/14/2021    Wash hands with soap and water prior to and after every dressing change. Wound Cleansing:   · Do not scrub or use excessive force. · With each dressing change, rinse wounds with 0.9% Saline. (May use wound wash or soft contact solution. Both can be purchased at a local drug store). · If unable to obtain saline, may use a gentle soap and water. · Keep wounds dry in the shower unless otherwise instructed by the physician. · For wounds on lower legs, cast covers can be purchased at local drug stores, so that you may shower and keep the wound(s) dry. []  Vashe Wash solution instructions (if prescribed): Apply enough Vashe to soak a piece of gauze and place on wound bed for 5-10 minutes. DO NOT rinse after Vashe has been applied. Follow dressing application as instructed below.     Jacy wound Topical Treatments:  Do not apply lotions, creams, or ointments to the skin around the wound bed unless directed as followed:     [] Apply around the wound: [] moisturizing lotion [] Antifungal ointment [] No-Sting barrier film [] Zinc paste [] Other:       Dressings:           Wound Location: 10/27/2021 12:45 PM DO MAMADOU Desai WT MMA   11/5/2021 12:00 PM Yahir Mcduffie MD R BANK PAIN MMA   12/15/2021  2:00 PM MD AMY Mojica NIDA MMA   1/28/2022 12:30 PM MD Waqas Casanova S/ON MMA         [x] Orders placed during your visit:   Orders Placed This Encounter   Procedures    Initiate Outpatient Wound Care Protocol       Your nurse  is:  Aleja Evangelista     Electronically signed by Sundeep Guzman RN on 10/14/2021 at 1:48 PM     [address] Information: Should you experience any significant changes in your wound(s) or have questions about your wound care, please contact the [address] [place] [address] at [phone]. We are open from 8:00am - 4:30p Monday thru Friday except for Wednesdays which we are closed. Please give us 24-48 hours to return your call. Call your doctor now or seek immediate medical care if:    · You have symptoms of infection, such as:  ? Increased pain, swelling, warmth, or redness. ? Red streaks leading from the area. ? Pus draining from the area. ? A fever.         Physician for this visit and orders: Lucho Peña DPM    [] Patient unable to sign Discharge Instructions given to ECF/Transportation/POA              Electronically signed by Lucho Peña DPM on 10/14/2021 at 2:47 PM

## 2021-10-21 ENCOUNTER — HOSPITAL ENCOUNTER (OUTPATIENT)
Dept: WOUND CARE | Age: 46
Discharge: HOME | End: 2021-10-21
Payer: COMMERCIAL

## 2021-10-21 VITALS
HEART RATE: 78 BPM | TEMPERATURE: 96.9 F | RESPIRATION RATE: 18 BRPM | DIASTOLIC BLOOD PRESSURE: 74 MMHG | SYSTOLIC BLOOD PRESSURE: 189 MMHG

## 2021-10-21 DIAGNOSIS — I83.222 VARICOSE VEINS OF LEFT LOWER EXTREMITY WITH INFLAMMATION, WITH ULCER OF CALF WITH FAT LAYER EXPOSED (HCC): ICD-10-CM

## 2021-10-21 DIAGNOSIS — L97.222 NON-PRESSURE CHRONIC ULCER OF LEFT CALF WITH FAT LAYER EXPOSED (HCC): Primary | ICD-10-CM

## 2021-10-21 DIAGNOSIS — L97.222 VARICOSE VEINS OF LEFT LOWER EXTREMITY WITH INFLAMMATION, WITH ULCER OF CALF WITH FAT LAYER EXPOSED (HCC): ICD-10-CM

## 2021-10-21 DIAGNOSIS — I83.212 VARICOSE VEINS OF RIGHT LOWER EXTREMITY WITH INFLAMMATION, WITH ULCER OF CALF LIMITED TO BREAKDOWN OF SKIN (HCC): ICD-10-CM

## 2021-10-21 DIAGNOSIS — L97.211 VARICOSE VEINS OF RIGHT LOWER EXTREMITY WITH INFLAMMATION, WITH ULCER OF CALF LIMITED TO BREAKDOWN OF SKIN (HCC): ICD-10-CM

## 2021-10-21 PROCEDURE — 29581 APPL MULTLAYER CMPRN SYS LEG: CPT

## 2021-10-21 PROCEDURE — 6370000000 HC RX 637 (ALT 250 FOR IP): Performed by: PODIATRIST

## 2021-10-21 PROCEDURE — 11042 DBRDMT SUBQ TIS 1ST 20SQCM/<: CPT

## 2021-10-21 RX ORDER — LIDOCAINE HYDROCHLORIDE 20 MG/ML
JELLY TOPICAL ONCE
Status: CANCELLED | OUTPATIENT
Start: 2021-10-21 | End: 2021-10-21

## 2021-10-21 RX ORDER — BACITRACIN ZINC AND POLYMYXIN B SULFATE 500; 1000 [USP'U]/G; [USP'U]/G
OINTMENT TOPICAL ONCE
Status: CANCELLED | OUTPATIENT
Start: 2021-10-21 | End: 2021-10-21

## 2021-10-21 RX ORDER — BACITRACIN, NEOMYCIN, POLYMYXIN B 400; 3.5; 5 [USP'U]/G; MG/G; [USP'U]/G
OINTMENT TOPICAL ONCE
Status: CANCELLED | OUTPATIENT
Start: 2021-10-21 | End: 2021-10-21

## 2021-10-21 RX ORDER — LIDOCAINE 40 MG/G
CREAM TOPICAL ONCE
Status: CANCELLED | OUTPATIENT
Start: 2021-10-21 | End: 2021-10-21

## 2021-10-21 RX ORDER — LIDOCAINE 50 MG/G
OINTMENT TOPICAL ONCE
Status: CANCELLED | OUTPATIENT
Start: 2021-10-21 | End: 2021-10-21

## 2021-10-21 RX ORDER — GENTAMICIN SULFATE 1 MG/G
OINTMENT TOPICAL ONCE
Status: CANCELLED | OUTPATIENT
Start: 2021-10-21 | End: 2021-10-21

## 2021-10-21 RX ORDER — LIDOCAINE HYDROCHLORIDE 40 MG/ML
SOLUTION TOPICAL ONCE
Status: CANCELLED | OUTPATIENT
Start: 2021-10-21 | End: 2021-10-21

## 2021-10-21 RX ORDER — BETAMETHASONE DIPROPIONATE 0.05 %
OINTMENT (GRAM) TOPICAL ONCE
Status: CANCELLED | OUTPATIENT
Start: 2021-10-21 | End: 2021-10-21

## 2021-10-21 RX ORDER — LIDOCAINE HYDROCHLORIDE 40 MG/ML
SOLUTION TOPICAL ONCE
Status: COMPLETED | OUTPATIENT
Start: 2021-10-21 | End: 2021-10-21

## 2021-10-21 RX ORDER — GINSENG 100 MG
CAPSULE ORAL ONCE
Status: CANCELLED | OUTPATIENT
Start: 2021-10-21 | End: 2021-10-21

## 2021-10-21 RX ORDER — CLOBETASOL PROPIONATE 0.5 MG/G
OINTMENT TOPICAL ONCE
Status: CANCELLED | OUTPATIENT
Start: 2021-10-21 | End: 2021-10-21

## 2021-10-21 RX ADMIN — LIDOCAINE HYDROCHLORIDE: 40 SOLUTION TOPICAL at 13:28

## 2021-10-21 ASSESSMENT — PAIN DESCRIPTION - ORIENTATION: ORIENTATION: RIGHT;LEFT

## 2021-10-21 ASSESSMENT — PAIN SCALES - GENERAL: PAINLEVEL_OUTOF10: 4

## 2021-10-21 ASSESSMENT — PAIN DESCRIPTION - LOCATION: LOCATION: GENERALIZED

## 2021-10-21 ASSESSMENT — PAIN DESCRIPTION - DESCRIPTORS: DESCRIPTORS: ACHING

## 2021-10-21 ASSESSMENT — PAIN DESCRIPTION - PAIN TYPE: TYPE: CHRONIC PAIN

## 2021-10-21 NOTE — PROGRESS NOTES
1227 [facility]  Progress Note and Procedure Note      Mathew Corley  MEDICAL RECORD NUMBER:  [id number]  AGE: 55 y.o. GENDER: male  : 1975  EPISODE DATE:  10/21/2021    Subjective:     Chief Complaint   Patient presents with    Wound Check     left leg         HISTORY of PRESENT ILLNESS HPI     Mathew Corley is a 55 y.o. male who presents today for wound/ulcer evaluation. History of Wound Context: Patient has ulcer left lateral leg. Patient relates he has followed all instructions. He relates he did have a severe infection left leg when he was young.     Wound/Ulcer Pain Timing/Severity: intermittent, mild, moderate  Quality of pain: sharp  Severity:  5 / 10   Modifying Factors: Pain worsens with walking  Associated Signs/Symptoms: edema    Ulcer Identification:  Ulcer Type: venous    Contributing Factors: edema, venous stasis and lymphedema    Acute Wound: N/A        PAST MEDICAL HISTORY        Diagnosis Date    Arthritis     Cellulitis     Chronic back pain     Hepatitis age 24    HNP (herniated nucleus pulposus with myelopathy), thoracic     Hypertension     [address]     left foot     Obesity 2011    ANN-MARIE (obstructive sleep apnea)     NO CPAP     Stasis dermatitis        PAST SURGICAL HISTORY    Past Surgical History:   Procedure Laterality Date    ADENOIDECTOMY      FOOT SURGERY Left 2019    WIDE EXCISION OF LEFT FOOT MELANOMA performed by Teresa Coyne MD at [address] [place] [address] Left 2/10/2020    LEFT FOOT MELANOMA WIDE EXCISION AND FULL THICKNESS SKIN GRAFT,LEFT GROIN SENTINEL LYMPH NODE BIOPSY performed by Teresa Coyne MD at [facility]  06    [address]    UPPER GASTROINTESTINAL ENDOSCOPY N/A 2021    EGD BIOPSY performed by Linda Sanchez DO at [address] [place] [address] EXTRACTION         FAMILY HISTORY    Family History   Problem Relation Age of Onset    Cancer Mother     COPD Paternal Ref Range Status   08/10/2021 4.8 See comment % Final     Comment:     Comment:  Diagnosis of Diabetes: > or = 6.5%  Increased risk of diabetes (Prediabetes): 5.7-6.4%  Glycemic Control: Nonpregnant Adults: <7.0%                    Pregnant: <6.0%           Objective:      BP (!) 189/74   Pulse 78   Temp 96.9 °F (36.1 °C) (Temporal)   Resp 18     Wt Readings from Last 3 Encounters:   10/08/21 (!) 473 lb (214.6 kg)   10/07/21 (!) 474 lb 10.4 oz (215.3 kg)   09/10/21 (!) 476 lb (215.9 kg)       PHYSICAL EXAM    General Appearance: alert and oriented to person, place and time, well-developed and well-nourished, in no acute distress  Pedal pulses palpable. Protective sensations intact. Ulcer is noted left lateral leg with granular base and fibrotic covering. Ulcer does not probe to bone. There are no signs of infection. There is severe edema bilateral legs. Hemosiderin deposits are noted anterior bilateral legs. Assessment:      1. 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 Varicose veins of right lower extremity with inflammation, with ulcer of calf limited to breakdown of skin ([address]    3. Varicose veins of left lower extremity with inflammation, with ulcer of calf with fat layer exposed ([address]           Procedure Note  Indications:  Based on my examination of this patient's wound(s)/ulcer(s) today, debridement is required to promote healing and evaluate the wound base. Performed by: Shabbir Emery DPM    Consent obtained:  Yes    Time out taken:  Yes    Pain Control: Anesthetic  Anesthetic: 4% Lidocaine Liquid Topical       Debridement: Excisional Debridement    Using curette the wound(s)/ulcer(s) was/were debrided down through and including the removal of epidermis, dermis and subcutaneous tissue.         Devitalized Tissue Debrided:  fibrin and biofilm    Pre Debridement Measurements:  Are located in the [facility]  Documentation Flow Sheet    Wound/Ulcer #: 1    Post Instructions. Debrided ulcer left lateral leg. We are increasing compression to left leg. If no improvement next visit, I am ordering venous reflux studies. Discussed obtaining new compression stockings. Compression applied left leg and patient to elevate when possible. Return 1 week. New Medication(s) at this visit:   New Prescriptions    No medications on file       Other orders at this visit:   Orders Placed This Encounter   Procedures    Initiate Outpatient Wound Care Protocol       Smoking Cessation: Counseling given: Not Answered      Written patient dismissal instructions given to patient and signed by patient or POA. Discharge 315 [facility] Physician Orders and Discharge Instructions  [address]. [address] [place] [address]. [place] [address]  Telephone: [phone]  NAME:  Domingo Peace OF BIRTH:  1975  MEDICAL RECORD NUMBER:  [id number]  DATE:  10/21/2021    Wash hands with soap and water prior to and after every dressing change. Wound Cleansing:   · Do not scrub or use excessive force. · With each dressing change, rinse wounds with 0.9% Saline. (May use wound wash or soft contact solution. Both can be purchased at a local drug store). · If unable to obtain saline, may use a gentle soap and water. · Keep wounds dry in the shower unless otherwise instructed by the physician. · For wounds on lower legs, cast covers can be purchased at local drug stores, so that you may shower and keep the wound(s) dry. []  Vashe Wash solution instructions (if prescribed): Apply enough Vashe to soak a piece of gauze and place on wound bed for 5-10 minutes. DO NOT rinse after Vashe has been applied. Follow dressing application as instructed below.     Jacy wound Topical Treatments:  Do not apply lotions, creams, or ointments to the skin around the wound bed unless directed as followed:     [] Apply around the wound: [] moisturizing lotion [] Antifungal ointment [] No-Sting barrier film [x] Zinc paste [] Other:       Dressings:           Wound Location:    Left lower leg   Apply Primary Dressing to wound:       Alginate with silver pad     Cover and Secure with:     Cover and Secure with: ABD pad  Other optilock   Avoid contact of tape with skin if possible.  When to change Dressing: [] Daily [] Every Other Day [] Once a week  [] Three times per week: [] Monday, Wednesday, Friday [] Tuesday, Thursday, Saturday  [x] Do Not Change Dressing [] Other:         Edema Control:  Apply: [x] Compression Stocking  [] Left Lower Leg [x] Right Lower Leg         [x] Elevate leg(s) above the level of the heart for 30 minutes 4-5 times a day and/or when sitting. [x] Avoid prolonged standing in one place. Multilayer Compression Wrap:    Type: 2 Layer compression wrap   · Applied in Clinic to the :  Left lower leg(s) on 10/21/2021  · Do not get leg(s) with wrap wet. · If wraps become too tight call the center or completely remove the wrap. · Elevate leg(s) above the level of the heart when sitting. · Avoid prolonged standing in one place. Dietary:  Important dietary reminders:  1. Increase Protein intake (i.e. Lean meats, fish, eggs, legumes, and yogurt)  2. No added salt  3. If diabetic, follow a diabetic diet and check glucose prior to meals or as instructed by your physician. Dietary Supplements:  [] Hector  [] 30ml ProStat  [] EnsureEnlive [] Ensure Max/Premier  [] Other:    If you are still having pain after you go home:   For wounds on lower legs or arms, elevate the affected limb.  Use over-the-counter medications you would normally use for pain as permitted by your primary care doctor.  For persistent pain not relieved by the above interventions, please call your primary care doctor.      Return Appointment:   Return Appointment: With Dr Ger Nova  in  [address] [place] [address])  o Scheduled weekly until (Date):      [] Return Appointment for a Wound Assessment with a nurse on:     o All other future appointments:  Future Appointments   Date Time Provider Francie Marroquini   10/27/2021 12:45 PM DO MAMADOU Rivas WT MMA   10/28/2021  1:15 PM Deshawn Mchugh DPM [facility] WOUND [facility]   11/5/2021 12:00 PM Charlotte Galvez MD R BANK PAIN MMA   12/15/2021  2:00 PM MD AMY Araya NIDA MMA   1/28/2022 12:30 PM MD Claudy Reed S/ON MMA         [x] Orders placed during your visit:   Orders Placed This Encounter   Procedures    Initiate Outpatient Wound Care Protocol       Your nurse  is:  Zhang Garnica     Electronically signed by David Miller RN on 10/21/2021 at [address]: Should you experience any significant changes in your wound(s) or have questions about your wound care, please contact the [address] [place] [address] at [phone]. We are open from 8:00am - 4:30p Monday thru Friday except for Wednesdays which we are closed. Please give us 24-48 hours to return your call. Call your doctor now or seek immediate medical care if:    · You have symptoms of infection, such as:  ? Increased pain, swelling, warmth, or redness. ? Red streaks leading from the area. ? Pus draining from the area. ? A fever.         Physician for this visit and orders: Deshawn Mchugh DPM    [] Patient unable to sign Discharge Instructions given to ECF/Transportation/POA              Electronically signed by Deshawn Mchugh DPM on 10/21/2021 at 1:59 PM [facility]

## 2021-10-21 NOTE — PROGRESS NOTES
Multilayer Compression Wrap   (Not Unna) Below the Knee    NAME:  Cammy Harris  YOB: 1975  MEDICAL RECORD NUMBER:  [id number]  DATE:  10/21/2021        Removed old Multilayer wrap if present and washed leg with mild soap/water.   Applied moisturizing agent to dry skin as needed.   Applied primary and secondary dressing as ordered     Applied multilayered dressing below the knee to Left lower leg(s)  (2 Layer compression wrap ) .   Instructed patient/caregiver not to remove dressing and to keep it clean and dry.   Instructed patient/caregiver on complications to report to provider, such as pain, numbness in toes, heavy drainage, and slippage of dressing.   Instructed patient on purpose of compression dressing and on activity and exercise recommendations.     Applied per   Guidelines    Electronically signed by Janet Sandoval RN on 10/21/2021 at 2:00 PM

## 2021-10-28 ENCOUNTER — HOSPITAL ENCOUNTER (OUTPATIENT)
Dept: WOUND CARE | Age: 46
Discharge: HOME | End: 2021-10-28
Payer: COMMERCIAL

## 2021-10-28 VITALS
RESPIRATION RATE: 18 BRPM | DIASTOLIC BLOOD PRESSURE: 78 MMHG | HEART RATE: 81 BPM | SYSTOLIC BLOOD PRESSURE: 156 MMHG | TEMPERATURE: 97 F

## 2021-10-28 DIAGNOSIS — L97.222 NON-PRESSURE CHRONIC ULCER OF LEFT CALF WITH FAT LAYER EXPOSED (HCC): Primary | ICD-10-CM

## 2021-10-28 DIAGNOSIS — L97.222 VARICOSE VEINS OF LEFT LOWER EXTREMITY WITH INFLAMMATION, WITH ULCER OF CALF WITH FAT LAYER EXPOSED (HCC): ICD-10-CM

## 2021-10-28 DIAGNOSIS — L97.211 VARICOSE VEINS OF RIGHT LOWER EXTREMITY WITH INFLAMMATION, WITH ULCER OF CALF LIMITED TO BREAKDOWN OF SKIN (HCC): ICD-10-CM

## 2021-10-28 DIAGNOSIS — I83.212 VARICOSE VEINS OF RIGHT LOWER EXTREMITY WITH INFLAMMATION, WITH ULCER OF CALF LIMITED TO BREAKDOWN OF SKIN (HCC): ICD-10-CM

## 2021-10-28 DIAGNOSIS — I83.222 VARICOSE VEINS OF LEFT LOWER EXTREMITY WITH INFLAMMATION, WITH ULCER OF CALF WITH FAT LAYER EXPOSED (HCC): ICD-10-CM

## 2021-10-28 PROCEDURE — 29581 APPL MULTLAYER CMPRN SYS LEG: CPT

## 2021-10-28 PROCEDURE — 11042 DBRDMT SUBQ TIS 1ST 20SQCM/<: CPT

## 2021-10-28 PROCEDURE — 6370000000 HC RX 637 (ALT 250 FOR IP): Performed by: PODIATRIST

## 2021-10-28 RX ORDER — LIDOCAINE HYDROCHLORIDE 40 MG/ML
SOLUTION TOPICAL ONCE
Status: COMPLETED | OUTPATIENT
Start: 2021-10-28 | End: 2021-10-28

## 2021-10-28 RX ORDER — LIDOCAINE 40 MG/G
CREAM TOPICAL ONCE
Status: CANCELLED | OUTPATIENT
Start: 2021-10-28 | End: 2021-10-28

## 2021-10-28 RX ORDER — LIDOCAINE HYDROCHLORIDE 20 MG/ML
JELLY TOPICAL ONCE
Status: CANCELLED | OUTPATIENT
Start: 2021-10-28 | End: 2021-10-28

## 2021-10-28 RX ORDER — BETAMETHASONE DIPROPIONATE 0.05 %
OINTMENT (GRAM) TOPICAL ONCE
Status: CANCELLED | OUTPATIENT
Start: 2021-10-28 | End: 2021-10-28

## 2021-10-28 RX ORDER — CLOBETASOL PROPIONATE 0.5 MG/G
OINTMENT TOPICAL ONCE
Status: CANCELLED | OUTPATIENT
Start: 2021-10-28 | End: 2021-10-28

## 2021-10-28 RX ORDER — LIDOCAINE 50 MG/G
OINTMENT TOPICAL ONCE
Status: CANCELLED | OUTPATIENT
Start: 2021-10-28 | End: 2021-10-28

## 2021-10-28 RX ORDER — BACITRACIN ZINC AND POLYMYXIN B SULFATE 500; 1000 [USP'U]/G; [USP'U]/G
OINTMENT TOPICAL ONCE
Status: CANCELLED | OUTPATIENT
Start: 2021-10-28 | End: 2021-10-28

## 2021-10-28 RX ORDER — LIDOCAINE HYDROCHLORIDE 40 MG/ML
SOLUTION TOPICAL ONCE
Status: CANCELLED | OUTPATIENT
Start: 2021-10-28 | End: 2021-10-28

## 2021-10-28 RX ORDER — BACITRACIN, NEOMYCIN, POLYMYXIN B 400; 3.5; 5 [USP'U]/G; MG/G; [USP'U]/G
OINTMENT TOPICAL ONCE
Status: CANCELLED | OUTPATIENT
Start: 2021-10-28 | End: 2021-10-28

## 2021-10-28 RX ORDER — GENTAMICIN SULFATE 1 MG/G
OINTMENT TOPICAL ONCE
Status: CANCELLED | OUTPATIENT
Start: 2021-10-28 | End: 2021-10-28

## 2021-10-28 RX ORDER — GINSENG 100 MG
CAPSULE ORAL ONCE
Status: CANCELLED | OUTPATIENT
Start: 2021-10-28 | End: 2021-10-28

## 2021-10-28 RX ADMIN — LIDOCAINE HYDROCHLORIDE: 40 SOLUTION TOPICAL at 13:40

## 2021-10-28 NOTE — CARE COORDINATION
Compression  [facility] for Compression Stockings:     [facility] [address] f: [phone] f: [phone]: [phone] [email or web address]      Ordering Center:     [address] [place] [address]  [address] [address]  [address] [place] [address],[address]  FAX NUMBER: [phone]    Patient Information:      Adolfo Rausch  [address]   [phone]   : 1975  AGE: 55 y.o.      GENDER: male   TODAYS DATE:  10/28/2021    Insurance:      PRIMARY INSURANCE:  Plan: [address] [place] [address] [employer] [place]  Coverage: BCBS  Effective Date: 2021  Group Number: [unfilled]  Subscriber Number: [id number] - (BX Traditional)    SECONDARY INSURANCE:  Plan:   Coverage:   Effective Date:   @[email or web address]@    [unfilled]     [unfilled]   [unfilled]     Patient Information:      Problem List Items Addressed This Visit     Non-pressure chronic ulcer of left calf with fat layer exposed ([address] - Primary    Relevant Orders    Initiate Outpatient Wound Care Protocol    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 ([address]    Relevant Orders    Initiate Outpatient Wound Care Protocol    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 (HCC)    Relevant Orders    Initiate Outpatient Wound Care Protocol          Wound 10/07/21 Leg Lateral;Left #1 (Active)   Wound Image   10/07/21 1407   Wound Cleansed Cleansed with saline 10/28/21 1333   Dressing/Treatment Alginate with Ag;Gauze dressing/dressing sponge 10/28/21 1434   Wound Length (cm) 3.9 cm 10/28/21 1333   Wound Width (cm) 2 cm 10/28/21 1333   Wound Depth (cm) 0.1 cm 10/28/21 1333   Wound Surface Area (cm^2) 7.8 cm^2 10/28/21 1333   Change in Wound Size % (l*w) 7.14 10/28/21 1333   Wound Volume (cm^3) 0.78 cm^3 10/28/21 1333   Wound Healing % 7 10/28/21 1333   Post-Procedure Length (cm) 4 cm 10/28/21 2549 Post-Procedure Width (cm) 2.1 cm 10/28/21 1359   Post-Procedure Depth (cm) 0.3 cm 10/28/21 1359   Post-Procedure Surface Area (cm^2) 8.4 cm^2 10/28/21 1359   Post-Procedure Volume (cm^3) 2.52 cm^3 10/28/21 1359   Distance Tunneling (cm) 0 cm 10/28/21 1333   Tunneling Position ___ O'Clock 0 10/28/21 1333   Undermining Starts ___ O'Clock 0 10/28/21 1333   Undermining Ends___ O'Clock 0 10/28/21 1333   Undermining Maxium Distance (cm) 0 10/28/21 1333   Wound Assessment Fibrin;Pink/red 10/28/21 1333   Drainage Amount Small 10/28/21 1333   Drainage Description Brown 10/28/21 1333   Odor Mild 10/28/21 1333   Jacy-wound Assessment Intact 10/28/21 1333   Margins Defined edges 10/28/21 1333   Wound Thickness Description not for Pressure Injury Full thickness 10/28/21 1333   Number of days: 21       Right Leg Measurements: (ALL measurements are in cm)    Left Leg Edema Point of Measurement  Leg circumference: 51.5 cm  Ankle circumference: 29.5 cm  Foot circumference: 29.5 cm  Compression Therapy: 2 layer compression wrap    Left Leg Measurements: (ALL measurements are in cm)  Leg circ 52   ankle circ30    Foot circ31     Heel to below the knee bilateral 41 cm  Supplies Requested :     Medicare Requirements  Patient must have a qualifying Active Venus Ulcer if ordering Bilateral Compression Wounds MUST be present on both legs for Medicare Coverage. The patient can Not be on home health or have had a Medicare part A stay in the past 24 hours. Patient Wound(s) Debrided: [x] Yes if yes please add date 10/28/2021   [] No    Debribement Type: Excisional/Sharp    Patient currently being seen by Home Health: [] Yes   [x] No     Compression Type: Other farrow wrap for bilateral lower legs. Abby Crumble PLEASE SEND FARROW WRAPS IN THE COLOR BLACK.      Provider Information:      PROVIDER'S NAME/NPI: [facility]  NPI: [id number]    Electronically signed by Richard Purvis DPM on 10/28/2021 at 4:00 PM

## 2021-10-28 NOTE — PROGRESS NOTES
[facility] [place]      Yann Castellon  MEDICAL RECORD NUMBER:  [id number]  AGE: 55 y.o. GENDER: male  : 1975  EPISODE DATE:  10/28/2021    Subjective:     Chief Complaint   Patient presents with    Wound Check     left lower leg         HISTORY of PRESENT ILLNESS HPI     Yann Castellon is a 55 y.o. male who presents today for wound/ulcer evaluation. History of Wound Context: Patient has ulcer left lateral leg. Patient relates he has followed all instructions.   Wound/Ulcer Pain Timing/Severity: intermittent, mild, moderate  Quality of pain: sharp  Severity:  5 / 10   Modifying Factors: Pain worsens with walking  Associated Signs/Symptoms: edema    Ulcer Identification:  Ulcer Type: venous    Contributing Factors: edema, venous stasis and lymphedema    Acute Wound: N/A        PAST MEDICAL HISTORY        Diagnosis Date    Arthritis     Cellulitis     Chronic back pain     Hepatitis age 24    HNP (herniated nucleus pulposus with myelopathy), thoracic     Hypertension     Melanoma ([address]     left foot     Obesity 2011    ANN-MARIE (obstructive sleep apnea)     NO CPAP     Stasis dermatitis        PAST SURGICAL HISTORY    Past Surgical History:   Procedure Laterality Date    ADENOIDECTOMY      FOOT SURGERY Left 2019    WIDE EXCISION OF LEFT FOOT MELANOMA performed by Betsy Nava MD at [address] [place] [address] Left 2/10/2020    LEFT FOOT MELANOMA WIDE EXCISION AND FULL THICKNESS SKIN GRAFT,LEFT GROIN SENTINEL LYMPH NODE BIOPSY performed by Betsy Nava MD at [facility]  06    Arand    UPPER GASTROINTESTINAL ENDOSCOPY N/A 2021    EGD BIOPSY performed by Emily Cali DO at [facility] EXTRACTION         FAMILY HISTORY    Family History   Problem Relation Age of Onset    Cancer Mother     COPD Paternal Grandfather     Colon Cancer Maternal Grandfather        SOCIAL HISTORY    Social History     Tobacco Use    Smoking status: Former Smoker     Packs/day: 1.50     Types: Cigarettes     Quit date: 2005     Years since quittin.8    Smokeless tobacco: Former User     Types: Chew   Vaping Use    Vaping Use: Every day    Substances: Nicotine, Flavoring    Devices: Refillable tank   Substance Use Topics    Alcohol use: No     Alcohol/week: 0.0 standard drinks     Comment: rarely -- 1 every few months    Drug use: No       ALLERGIES    No Known Allergies    MEDICATIONS    Current Outpatient Medications on File Prior to Encounter   Medication Sig Dispense Refill    oxyCODONE-acetaminophen (PERCOCET) 7.5-325 MG per tablet Take 1 tablet by mouth every 6 hours as needed for Pain (max 4 per day) for up to 28 days. 112 tablet 0    diclofenac (VOLTAREN) 75 MG EC tablet Take 1 tablet by mouth daily as needed for Pain 30 tablet 1    penicillin v potassium (VEETID) 500 MG tablet TAKE TWO TABLETS BY MOUTH TWICE A  tablet 3    Multiple Vitamins-Minerals (THERAPEUTIC MULTIVITAMIN-MINERALS) tablet Take 1 tablet by mouth daily      pregabalin (LYRICA) 150 MG capsule Take 1 capsule by mouth 3 times daily for 30 days. 90 capsule 1    losartan (COZAAR) 100 MG tablet TAKE ONE TABLET BY MOUTH DAILY 90 tablet 2    furosemide (LASIX) 20 MG tablet TAKE ONE TO TWO TABLETS BY MOUTH EVERY MORNING AS NEEDED FOR EDEMA 180 tablet 3    DULoxetine (CYMBALTA) 60 MG extended release capsule Take 1 capsule by mouth daily 30 capsule 1    Compression Stockings MISC by Does not apply route Thigh high 1 each 0    Elastic Bandages & Supports (MEDICAL COMPRESSION THIGH HIGH) MISC Thigh high compression stockings, 30-40 mm Hg 2 each 1     No current facility-administered medications on file prior to encounter. REVIEW OF SYSTEMS    Pertinent items are noted in HPI.       Last R6U if applicable:   Hemoglobin A1C   Date Value Ref Range Status   08/10/2021 4.8 See comment % Final     Comment: Comment:  Diagnosis of Diabetes: > or = 6.5%  Increased risk of diabetes (Prediabetes): 5.7-6.4%  Glycemic Control: Nonpregnant Adults: <7.0%                    Pregnant: <6.0%           Objective:      BP (!) 156/78   Pulse 81   Temp 97 °F (36.1 °C) (Temporal)   Resp 18     Wt Readings from Last 3 Encounters:   10/08/21 (!) 473 lb (214.6 kg)   10/07/21 (!) 474 lb 10.4 oz (215.3 kg)   09/10/21 (!) 476 lb (215.9 kg)       PHYSICAL EXAM    General Appearance: alert and oriented to person, place and time, well-developed and well-nourished, in no acute distress  Pedal pulses palpable. Protective sensations intact. Ulcer is noted left lateral leg with granular base and fibrotic covering. Ulcer does not probe to bone. There are no signs of infection. There is moderate to severe edema bilateral legs. Hemosiderin deposits are noted anterior bilateral legs. Assessment:      1. 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 Varicose veins of right lower extremity with inflammation, with ulcer of calf limited to breakdown of skin ([address]    3. Varicose veins of left lower extremity with inflammation, with ulcer of calf with fat layer exposed ([address]           Procedure Note  Indications:  Based on my examination of this patient's wound(s)/ulcer(s) today, debridement is required to promote healing and evaluate the wound base. Performed by: Anthony Caldwell DPM    Consent obtained:  Yes    Time out taken:  Yes    Pain Control: Anesthetic  Anesthetic: 4% Lidocaine Liquid Topical       Debridement: Excisional Debridement    Using curette the wound(s)/ulcer(s) was/were debrided down through and including the removal of epidermis, dermis and subcutaneous tissue.         Devitalized Tissue Debrided:  fibrin and biofilm    Pre Debridement Measurements:  Are located in the Wound/Ulcer Documentation Flow Sheet    Wound/Ulcer #: 1    Post Debridement Measurements:  Wound/Ulcer Descriptions are Pre Debridement except measurements:    Wound 10/07/21 Leg Lateral;Left #1 (Active)   Wound Image   10/07/21 1407   Wound Cleansed Cleansed with saline 10/28/21 1333   Dressing/Treatment Alginate with Ag;Gauze dressing/dressing sponge 10/28/21 1434   Wound Length (cm) 3.9 cm 10/28/21 1333   Wound Width (cm) 2 cm 10/28/21 1333   Wound Depth (cm) 0.1 cm 10/28/21 1333   Wound Surface Area (cm^2) 7.8 cm^2 10/28/21 1333   Change in Wound Size % (l*w) 7.14 10/28/21 1333   Wound Volume (cm^3) 0.78 cm^3 10/28/21 1333   Wound Healing % 7 10/28/21 1333   Post-Procedure Length (cm) 4 cm 10/28/21 1359   Post-Procedure Width (cm) 2.1 cm 10/28/21 1359   Post-Procedure Depth (cm) 0.3 cm 10/28/21 1359   Post-Procedure Surface Area (cm^2) 8.4 cm^2 10/28/21 1359   Post-Procedure Volume (cm^3) 2.52 cm^3 10/28/21 1359   Distance Tunneling (cm) 0 cm 10/28/21 1333   Tunneling Position ___ O'Clock 0 10/28/21 1333   Undermining Starts ___ O'Clock 0 10/28/21 1333   Undermining Ends___ O'Clock 0 10/28/21 1333   Undermining Maxium Distance (cm) 0 10/28/21 1333   Wound Assessment Fibrin;Pink/red 10/28/21 1333   Drainage Amount Small 10/28/21 1333   Drainage Description Brown 10/28/21 1333   Odor Mild 10/28/21 1333   Jacy-wound Assessment Intact 10/28/21 1333   Margins Defined edges 10/28/21 1333   Wound Thickness Description not for Pressure Injury Full thickness 10/28/21 1333   Number of days: 21     Incision 12/20/19 Foot Left;Plantar (Active)   Number of days: 677       Percent of Wound(s)/Ulcer(s) Debrided: 100%    Total Surface Area Debrided:  8.4 sq cm     Diabetic/Pressure/Non Pressure Ulcers only:  Ulcer: N/A     Estimated Blood Loss:  Minimal    Hemostasis Achieved:  by pressure    Procedural Pain:  5  / 10     Post Procedural Pain:  5 / 10     Response to treatment:  Well tolerated by patient. Plan:       Treatment Note please see attached Discharge Instructions. Debrided ulcer left lateral leg.   Compression applied left leg and patient to elevate when possible. Return 1 week. New Medication(s) at this visit:   New Prescriptions    COMPRESSION STOCKINGS MISC    by Does not apply route Strength 30-40mmHg  Style: Farrow wrap , below the knee  Extremity: bilateral  Donning aid recommended: No       Other orders at this visit:   Orders Placed This Encounter   Procedures    Initiate Outpatient Wound Care Protocol       Smoking Cessation: Counseling given: Not Answered      Written patient dismissal instructions given to patient and signed by patient or POA. Discharge [facility] Physician Orders and Discharge Instructions  [address] [address] [address] [address] [facility]. [place] [address]  Telephone: [phone]  NAME:  Vernon Huffman OF BIRTH:  1975  MEDICAL RECORD NUMBER:  [id number]  DATE:  10/28/2021    Wash hands with soap and water prior to and after every dressing change. Wound Cleansing:   · Do not scrub or use excessive force. · With each dressing change, rinse wounds with 0.9% Saline. (May use wound wash or soft contact solution. Both can be purchased at a local drug store). · If unable to obtain saline, may use a gentle soap and water. · Keep wounds dry in the shower unless otherwise instructed by the physician. · For wounds on lower legs, cast covers can be purchased at local drug stores, so that you may shower and keep the wound(s) dry. []  Vashe Wash solution instructions (if prescribed): Apply enough Vashe to soak a piece of gauze and place on wound bed for 5-10 minutes. DO NOT rinse after Vashe has been applied. Follow dressing application as instructed below.     Jacy wound Topical Treatments:  Do not apply lotions, creams, or ointments to the skin around the wound bed unless directed as followed:     [] Apply around the wound: [] moisturizing lotion [] Antifungal ointment [] No-Sting barrier film [x] Zinc paste [] Other:       Dressings:           Wound Location: left lower leg      Apply Primary Dressing to wound:       Alginate with silver pad     Cover and Secure with:     Cover and Secure with: 4X4 gauze pad   Avoid contact of tape with skin if possible.  When to change Dressing: [] Daily [] Every Other Day [] Once a week  [] Three times per week: [] Monday, Wednesday, Friday [] Tuesday, Thursday, Saturday  [x] Do Not Change Dressing [] Other:         Edema Control:  Apply: [x] Compression Stocking  [] Left Lower Leg [x] Right Lower Leg       Apply every morning immediately when getting up. They should be applied to affected leg(s) from mid foot to knee making sure to cover the heel. Remove every night before going to bed. [x] Elevate leg(s) above the level of the heart for 30 minutes 4-5 times a day and/or when sitting. [x] Avoid prolonged standing in one place. Multilayer Compression Wrap:    Type: 2 Layer compression wrap   · Applied in Clinic to the :  Left lower leg(s) on 10/28/2021  · Do not get leg(s) with wrap wet. · If wraps become too tight call the center or completely remove the wrap. · Elevate leg(s) above the level of the heart when sitting. · Avoid prolonged standing in one place. Dietary:  Important dietary reminders:  1. Increase Protein intake (i.e. Lean meats, fish, eggs, legumes, and yogurt)  2. No added salt  3. If diabetic, follow a diabetic diet and check glucose prior to meals or as instructed by your physician. Dietary Supplements:  [] Hector  [] 30ml ProStat  [] EnsureEnlive [] Ensure Max/Premier  [] Other:    If you are still having pain after you go home:   For wounds on lower legs or arms, elevate the affected limb.  Use over-the-counter medications you would normally use for pain as permitted by your primary care doctor.  For persistent pain not relieved by the above interventions, please call your primary care doctor.      Return Appointment:  Salome Agrawal Return

## 2021-11-04 ENCOUNTER — HOSPITAL ENCOUNTER (OUTPATIENT)
Dept: WOUND CARE | Age: 46
Discharge: HOME | End: 2021-11-04
Payer: COMMERCIAL

## 2021-11-04 VITALS
WEIGHT: 315 LBS | TEMPERATURE: 97 F | DIASTOLIC BLOOD PRESSURE: 94 MMHG | SYSTOLIC BLOOD PRESSURE: 164 MMHG | BODY MASS INDEX: 59.52 KG/M2 | HEART RATE: 85 BPM | RESPIRATION RATE: 18 BRPM

## 2021-11-04 DIAGNOSIS — I83.222 VARICOSE VEINS OF LEFT LOWER EXTREMITY WITH INFLAMMATION, WITH ULCER OF CALF WITH FAT LAYER EXPOSED (HCC): ICD-10-CM

## 2021-11-04 DIAGNOSIS — L97.212 VARICOSE VEINS OF RIGHT LOWER EXTREMITY WITH INFLAMMATION, WITH ULCER OF CALF WITH FAT LAYER EXPOSED (HCC): ICD-10-CM

## 2021-11-04 DIAGNOSIS — L97.222 NON-PRESSURE CHRONIC ULCER OF LEFT CALF WITH FAT LAYER EXPOSED (HCC): Primary | ICD-10-CM

## 2021-11-04 DIAGNOSIS — L97.222 VARICOSE VEINS OF LEFT LOWER EXTREMITY WITH INFLAMMATION, WITH ULCER OF CALF WITH FAT LAYER EXPOSED (HCC): ICD-10-CM

## 2021-11-04 DIAGNOSIS — R60.0 LOCALIZED EDEMA: ICD-10-CM

## 2021-11-04 DIAGNOSIS — I83.212 VARICOSE VEINS OF RIGHT LOWER EXTREMITY WITH INFLAMMATION, WITH ULCER OF CALF WITH FAT LAYER EXPOSED (HCC): ICD-10-CM

## 2021-11-04 DIAGNOSIS — I83.212 VARICOSE VEINS OF RIGHT LOWER EXTREMITY WITH INFLAMMATION, WITH ULCER OF CALF LIMITED TO BREAKDOWN OF SKIN (HCC): ICD-10-CM

## 2021-11-04 DIAGNOSIS — I87.332 IDIOPATHIC CHRONIC VENOUS HYPERTENSION OF LEFT LOWER EXTREMITY WITH ULCER AND INFLAMMATION (HCC): ICD-10-CM

## 2021-11-04 DIAGNOSIS — L97.211 VARICOSE VEINS OF RIGHT LOWER EXTREMITY WITH INFLAMMATION, WITH ULCER OF CALF LIMITED TO BREAKDOWN OF SKIN (HCC): ICD-10-CM

## 2021-11-04 DIAGNOSIS — L97.929 IDIOPATHIC CHRONIC VENOUS HYPERTENSION OF LEFT LOWER EXTREMITY WITH ULCER AND INFLAMMATION (HCC): ICD-10-CM

## 2021-11-04 PROCEDURE — 29581 APPL MULTLAYER CMPRN SYS LEG: CPT

## 2021-11-04 PROCEDURE — 11042 DBRDMT SUBQ TIS 1ST 20SQCM/<: CPT

## 2021-11-04 PROCEDURE — 6370000000 HC RX 637 (ALT 250 FOR IP): Performed by: PODIATRIST

## 2021-11-04 RX ORDER — LIDOCAINE HYDROCHLORIDE 20 MG/ML
JELLY TOPICAL ONCE
Status: CANCELLED | OUTPATIENT
Start: 2021-11-04 | End: 2021-11-04

## 2021-11-04 RX ORDER — LIDOCAINE 50 MG/G
OINTMENT TOPICAL ONCE
Status: CANCELLED | OUTPATIENT
Start: 2021-11-04 | End: 2021-11-04

## 2021-11-04 RX ORDER — GENTAMICIN SULFATE 1 MG/G
OINTMENT TOPICAL ONCE
Status: CANCELLED | OUTPATIENT
Start: 2021-11-04 | End: 2021-11-04

## 2021-11-04 RX ORDER — GINSENG 100 MG
CAPSULE ORAL ONCE
Status: CANCELLED | OUTPATIENT
Start: 2021-11-04 | End: 2021-11-04

## 2021-11-04 RX ORDER — LIDOCAINE HYDROCHLORIDE 40 MG/ML
SOLUTION TOPICAL ONCE
Status: CANCELLED | OUTPATIENT
Start: 2021-11-04 | End: 2021-11-04

## 2021-11-04 RX ORDER — CLOBETASOL PROPIONATE 0.5 MG/G
OINTMENT TOPICAL ONCE
Status: CANCELLED | OUTPATIENT
Start: 2021-11-04 | End: 2021-11-04

## 2021-11-04 RX ORDER — LIDOCAINE HYDROCHLORIDE 40 MG/ML
SOLUTION TOPICAL ONCE
Status: COMPLETED | OUTPATIENT
Start: 2021-11-04 | End: 2021-11-04

## 2021-11-04 RX ORDER — BETAMETHASONE DIPROPIONATE 0.05 %
OINTMENT (GRAM) TOPICAL ONCE
Status: CANCELLED | OUTPATIENT
Start: 2021-11-04 | End: 2021-11-04

## 2021-11-04 RX ORDER — BACITRACIN ZINC AND POLYMYXIN B SULFATE 500; 1000 [USP'U]/G; [USP'U]/G
OINTMENT TOPICAL ONCE
Status: CANCELLED | OUTPATIENT
Start: 2021-11-04 | End: 2021-11-04

## 2021-11-04 RX ORDER — LIDOCAINE 40 MG/G
CREAM TOPICAL ONCE
Status: CANCELLED | OUTPATIENT
Start: 2021-11-04 | End: 2021-11-04

## 2021-11-04 RX ORDER — BACITRACIN, NEOMYCIN, POLYMYXIN B 400; 3.5; 5 [USP'U]/G; MG/G; [USP'U]/G
OINTMENT TOPICAL ONCE
Status: CANCELLED | OUTPATIENT
Start: 2021-11-04 | End: 2021-11-04

## 2021-11-04 RX ADMIN — LIDOCAINE HYDROCHLORIDE: 40 SOLUTION TOPICAL at 13:35

## 2021-11-04 NOTE — PROGRESS NOTES
[facility]  Progress Note and Procedure Note      Cordelia Ambriz  MEDICAL RECORD NUMBER:  [id number]  AGE: 55 y.o. GENDER: male  : 1975  EPISODE DATE:  2021    Subjective:     Chief Complaint   Patient presents with    Wound Check     left lower leg         HISTORY of PRESENT ILLNESS HPI     Cordelia Ambriz is a 55 y.o. male who presents today for wound/ulcer evaluation. History of Wound Context: Patient has ulcer left lateral leg. Patient relates he has followed all instructions.     Wound/Ulcer Pain Timing/Severity: intermittent, mild, moderate  Quality of pain: sharp  Severity:  5 / 10   Modifying Factors: Pain worsens with walking  Associated Signs/Symptoms: edema    Ulcer Identification:  Ulcer Type: venous    Contributing Factors: edema, venous stasis and lymphedema    Acute Wound: N/A        PAST MEDICAL HISTORY        Diagnosis Date    Arthritis     Cellulitis     Chronic back pain     Hepatitis age 24    HNP (herniated nucleus pulposus with myelopathy), thoracic     Hypertension     Melanoma ([address]     left foot     Obesity 2011    ANN-MARIE (obstructive sleep apnea)     NO CPAP     Stasis dermatitis        PAST SURGICAL HISTORY    Past Surgical History:   Procedure Laterality Date    ADENOIDECTOMY      FOOT SURGERY Left 2019    WIDE EXCISION OF LEFT FOOT MELANOMA performed by Geoffrey Farley MD at [address] [place] [address] Left 2/10/2020    LEFT FOOT MELANOMA WIDE EXCISION AND FULL THICKNESS SKIN GRAFT,LEFT GROIN SENTINEL LYMPH NODE BIOPSY performed by Geoffrey Farley MD at [facility]  06    Ar[address]    UPPER GASTROINTESTINAL ENDOSCOPY N/A 2021    EGD BIOPSY performed by Corwin Bateman DO at [facility] EXTRACTION         FAMILY HISTORY    Family History   Problem Relation Age of Onset    Cancer Mother     COPD Paternal Grandfather     Colon Cancer Maternal Grandfather        SOCIAL HISTORY    Social History     Tobacco Use    Smoking status: Former Smoker     Packs/day: 1.50     Types: Cigarettes     Quit date: 2005     Years since quittin.8    Smokeless tobacco: Former User     Types: Chew   Vaping Use    Vaping Use: Every day    Substances: Nicotine, Flavoring    Devices: Refillable tank   Substance Use Topics    Alcohol use: No     Alcohol/week: 0.0 standard drinks     Comment: rarely -- 1 every few months    Drug use: No       ALLERGIES    No Known Allergies    MEDICATIONS    Current Outpatient Medications on File Prior to Encounter   Medication Sig Dispense Refill    oxyCODONE-acetaminophen (PERCOCET) 7.5-325 MG per tablet Take 1 tablet by mouth every 6 hours as needed for Pain (max 4 per day) for up to 28 days. 112 tablet 0    diclofenac (VOLTAREN) 75 MG EC tablet Take 1 tablet by mouth daily as needed for Pain 30 tablet 1    penicillin v potassium (VEETID) 500 MG tablet TAKE TWO TABLETS BY MOUTH TWICE A  tablet 3    Multiple Vitamins-Minerals (THERAPEUTIC MULTIVITAMIN-MINERALS) tablet Take 1 tablet by mouth daily      DULoxetine (CYMBALTA) 60 MG extended release capsule Take 1 capsule by mouth daily 30 capsule 1    pregabalin (LYRICA) 150 MG capsule Take 1 capsule by mouth 3 times daily for 30 days. 90 capsule 1    losartan (COZAAR) 100 MG tablet TAKE ONE TABLET BY MOUTH DAILY 90 tablet 2    furosemide (LASIX) 20 MG tablet TAKE ONE TO TWO TABLETS BY MOUTH EVERY MORNING AS NEEDED FOR EDEMA 180 tablet 3    Compression Stockings MISC by Does not apply route Thigh high 1 each 0    Compression Stockings MISC by Does not apply route Strength 30-40mmHg  Style: Farrow wrap , below the knee  Extremity: bilateral  Donning aid recommended: No 1 each 0    Elastic Bandages & Supports (MEDICAL COMPRESSION THIGH HIGH) MISC Thigh high compression stockings, 30-40 mm Hg 2 each 1     No current facility-administered medications on file prior to encounter.        REVIEW OF SYSTEMS    Pertinent items are noted in HPI. Last O4T if applicable:   Hemoglobin A1C   Date Value Ref Range Status   08/10/2021 4.8 See comment % Final     Comment:     Comment:  Diagnosis of Diabetes: > or = 6.5%  Increased risk of diabetes (Prediabetes): 5.7-6.4%  Glycemic Control: Nonpregnant Adults: <7.0%                    Pregnant: <6.0%           Objective:      BP (!) 164/94   Pulse 85   Temp 97 °F (36.1 °C) (Temporal)   Resp 18   Wt (!) 476 lb 3.1 oz (216 kg)   BMI 59.52 kg/m²     Wt Readings from Last 3 Encounters:   11/04/21 (!) 476 lb 3.1 oz (216 kg)   10/08/21 (!) 473 lb (214.6 kg)   10/07/21 (!) 474 lb 10.4 oz (215.3 kg)       PHYSICAL EXAM    General Appearance: alert and oriented to person, place and time, well-developed and well-nourished, in no acute distress  Pedal pulses palpable. Protective sensations intact. Ulcer is noted left lateral leg with granular base and fibrotic covering. Ulcer does not probe to bone. There are no signs of infection. There is moderate to severe edema bilateral legs. Hemosiderin deposits are noted anterior bilateral legs. Assessment:      1. 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 Varicose veins of right lower extremity with inflammation, with ulcer of calf with fat layer exposed ([address]    3. Varicose veins of left lower extremity with inflammation, with ulcer of calf with fat layer exposed ([address]    4. Varicose veins of right lower extremity with inflammation, with ulcer of calf limited to breakdown of skin ([address]           Procedure Note  Indications:  Based on my examination of this patient's wound(s)/ulcer(s) today, debridement is required to promote healing and evaluate the wound base.     Performed by: Stephany Dey DPM    Consent obtained:  Yes    Time out taken:  Yes    Pain Control: Anesthetic  Anesthetic: 4% Lidocaine Liquid Topical       Debridement: Excisional Debridement    Using curette the wound(s)/ulcer(s) was/were debrided down through and including the removal of epidermis, dermis and subcutaneous tissue.         Devitalized Tissue Debrided:  fibrin and biofilm    Pre Debridement Measurements:  Are located in the [place]  Documentation Flow Sheet    Wound/Ulcer #: 1    Post Debridement Measurements:  Wound/Ulcer Descriptions are Pre Debridement except measurements:    Wound 10/07/21 Leg Lateral;Left #1 (Active)   Wound Image   11/04/21 1329   Wound Cleansed Cleansed with saline 11/04/21 1329   Dressing/Treatment Alginate with Ag;Gauze dressing/dressing sponge 11/04/21 1401   Wound Length (cm) 4.5 cm 11/04/21 1329   Wound Width (cm) 1.6 cm 11/04/21 1329   Wound Depth (cm) 0.1 cm 11/04/21 1329   Wound Surface Area (cm^2) 7.2 cm^2 11/04/21 1329   Change in Wound Size % (l*w) 14.29 11/04/21 1329   Wound Volume (cm^3) 0.72 cm^3 11/04/21 1329   Wound Healing % 14 11/04/21 1329   Post-Procedure Length (cm) 4.6 cm 11/04/21 1354   Post-Procedure Width (cm) 17 cm 11/04/21 1354   Post-Procedure Depth (cm) 0.3 cm 11/04/21 1354   Post-Procedure Surface Area (cm^2) 78.2 cm^2 11/04/21 1354   Post-Procedure Volume (cm^3) 23.46 cm^3 11/04/21 1354   Distance Tunneling (cm) 0 cm 11/04/21 1329   Tunneling Position ___ O'Clock 0 10/28/21 1333   Undermining Starts ___ O'Clock 0 10/28/21 1333   Undermining Ends___ O'Clock 0 10/28/21 1333   Undermining Maxium Distance (cm) 0 11/04/21 1329   Wound Assessment Fibrin;Pink/red 11/04/21 1329   Drainage Amount Moderate 11/04/21 1329   Drainage Description Green;Brown 11/04/21 1329   Odor Mild 11/04/21 1329   Jacy-wound Assessment Intact 11/04/21 1329   Margins Undefined edges 11/04/21 1329   Wound Thickness Description not for Pressure Injury Full thickness 11/04/21 1329   Number of days: 27     Incision 12/20/19 Foot Left;Plantar (Active)   Number of days: 684       Percent of Wound(s)/Ulcer(s) Debrided: 100%    Total Surface Area Debrided:  7.8 sq cm Diabetic/Pressure/Non Pressure Ulcers only:  Ulcer: N/A     Estimated Blood Loss:  Minimal    Hemostasis Achieved:  by pressure    Procedural Pain:  5  / 10     Post Procedural Pain:  5 / 10     Response to treatment:  Well tolerated by patient. Plan:       Treatment Note please see attached Discharge Instructions. Debrided ulcer left lateral leg. Compression applied left leg and patient to elevate when possible. Return 1 week. New Medication(s) at this visit:   New Prescriptions    COMPRESSION STOCKINGS MISC    by Does not apply route Strength 30-40mmHg  Style: pull on below the knee; open or closed toe  Extremity: bilateral  Donning aid recommended: Yes if needed       Other orders at this visit:   Orders Placed This Encounter   Procedures    Initiate Outpatient Wound Care Protocol       Smoking Cessation: Counseling given: Not Answered      Written patient dismissal instructions given to patient and signed by patient or POA. Discharge [facility] Physician Orders and Discharge Instructions  [address] [place] [address]. [place] [address]  Telephone: [phone]  NAME:  Cinda Guerrero OF BIRTH:  1975  MEDICAL RECORD NUMBER:  [id number]  DATE:  11/4/2021    Wash hands with soap and water prior to and after every dressing change. Wound Cleansing:   · Do not scrub or use excessive force. · With each dressing change, rinse wounds with 0.9% Saline. (May use wound wash or soft contact solution. Both can be purchased at a local drug store). · If unable to obtain saline, may use a gentle soap and water. · Keep wounds dry in the shower unless otherwise instructed by the physician. · For wounds on lower legs, cast covers can be purchased at local drug stores, so that you may shower and keep the wound(s) dry.     [x]  Vashe Wash solution instructions (if prescribed): Apply enough Vashe to soak a piece of gauze and place on wound bed for 5-10 minutes. DO NOT rinse after Vashe has been applied. Follow dressing application as instructed below. Jacy wound Topical Treatments:  Do not apply lotions, creams, or ointments to the skin around the wound bed unless directed as followed:     [] Apply around the wound: [] moisturizing lotion [] Antifungal ointment [] No-Sting barrier film [x] Zinc paste [] Other:       Dressings:           Wound Location: left lower leg    Apply Primary Dressing to wound:       Alginate with silver pad     Cover and Secure with:     Cover and Secure with: 4X4 non woven gauze pad   Avoid contact of tape with skin if possible.  When to change Dressing: [] Daily [] Every Other Day [] Once a week  [] Three times per week: [] Monday, Wednesday, Friday [] Tuesday, Thursday, Saturday  [x] Do Not Change Dressing [] Other:      Edema Control:   Apply: [x] Compression Stocking  [] Left Lower Leg [x] Right Lower Leg      [x] Elevate leg(s) above the level of the heart for 30 minutes 4-5 times a day and/or when sitting. [x] Avoid prolonged standing in one place. Multilayer Compression Wrap:    Type: 2 Layer compression wrap   · Applied in Clinic to the :  Left lower leg(s) on 11/4/2021  · Do not get leg(s) with wrap wet. · If wraps become too tight call the center or completely remove the wrap. · Elevate leg(s) above the level of the heart when sitting. · Avoid prolonged standing in one place. ·     Dietary:  Important dietary reminders:  1. Increase Protein intake (i.e. Lean meats, fish, eggs, legumes, and yogurt)  2. No added salt  3. If diabetic, follow a diabetic diet and check glucose prior to meals or as instructed by your physician. Dietary Supplements:  [] Hector  [] 30ml ProStat  [] EnsureEnlive [] Ensure Max/Premier  [] Other:    If you are still having pain after you go home:   For wounds on lower legs or arms, elevate the affected limb.    Use over-the-counter medications you would normally use for pain as permitted by your primary care doctor.  For persistent pain not relieved by the above interventions, please call your primary care doctor. Return Appointment:   Return Appointment: With Dr Chris Patel  in  [facility])  o Scheduled weekly until (Date):      [] Return Appointment for a Wound Assessment with a nurse on:     o All other future appointments:  Future Appointments   Date Time Provider Francie Salmon   11/5/2021 12:00 PM Mookie Degroot MD [facility]   11/24/2021 12:30 PM Ministerio Stein DO [facility]   12/15/2021  2:00 PM MD AMY Maria NIDA [facility]   1/28/2022 12:30 PM Nadir Leung MD [facility] S/ON [address] [place] [address] [phone] for stockings        [x] Orders placed during your visit:   Orders Placed This Encounter   Procedures    Initiate Outpatient Wound Care Protocol       Your nurse  is:  Morenita Byrd     Electronically signed by Penny Rubin RN on 11/4/2021 at [facility] Information: Should you experience any significant changes in your wound(s) or have questions about your wound care, please contact the [address] [place] [address] at [phone]. We are open from 8:00am - 4:30p Monday thru Friday except for Wednesdays which we are closed. Please give us 24-48 hours to return your call. Call your doctor now or seek immediate medical care if:    · You have symptoms of infection, such as:  ? Increased pain, swelling, warmth, or redness. ? Red streaks leading from the area. ? Pus draining from the area. ? A fever.         Physician for this visit and orders: Giorgio Dawson DPM    [] Patient unable to sign Discharge Instructions given to ECF/Transportation/POA              Electronically signed by Giorgio Dawson DPM on 11/4/2021 at 2:04 PM

## 2021-11-04 NOTE — PROGRESS NOTES
Multilayer Compression Wrap   (Not Unna) Below the Knee    NAME:  Cammy Harris  YOB: 1975  MEDICAL RECORD NUMBER:  [id number]  DATE:  11/4/2021        Removed old Multilayer wrap if present and washed leg with mild soap/water.   Applied moisturizing agent to dry skin as needed.   Applied primary and secondary dressing as ordered     Applied multilayered dressing below the knee to Left lower leg(s)  (2 Layer compression wrap ) .   Instructed patient/caregiver not to remove dressing and to keep it clean and dry.   Instructed patient/caregiver on complications to report to provider, such as pain, numbness in toes, heavy drainage, and slippage of dressing.   Instructed patient on purpose of compression dressing and on activity and exercise recommendations.     Applied per   Guidelines    Electronically signed by Janet Sandoavl RN on 11/4/2021 at 2:01 PM

## 2021-11-05 ENCOUNTER — OFFICE VISIT (OUTPATIENT)
Dept: PAIN MANAGEMENT | Age: 46
End: 2021-11-05
Payer: COMMERCIAL

## 2021-11-05 VITALS
WEIGHT: 315 LBS | SYSTOLIC BLOOD PRESSURE: 125 MMHG | HEART RATE: 68 BPM | OXYGEN SATURATION: 95 % | DIASTOLIC BLOOD PRESSURE: 68 MMHG | BODY MASS INDEX: 60.1 KG/M2

## 2021-11-05 DIAGNOSIS — S33.9XXD SPRAIN OF LIGAMENT OF LUMBOSACRAL JOINT, SUBSEQUENT ENCOUNTER: ICD-10-CM

## 2021-11-05 DIAGNOSIS — M51.27 LUMBAGO-SCIATICA DUE TO DISPLACEMENT OF LUMBAR INTERVERTEBRAL DISC: ICD-10-CM

## 2021-11-05 DIAGNOSIS — S83.92XD SPRAIN OF LEFT KNEE/LEG, SUBSEQUENT ENCOUNTER: ICD-10-CM

## 2021-11-05 DIAGNOSIS — S83.92XA SPRAIN OF LEFT KNEE/LEG, INITIAL ENCOUNTER: ICD-10-CM

## 2021-11-05 DIAGNOSIS — S33.9XXA SPRAIN OF LIGAMENT OF LUMBOSACRAL JOINT, INITIAL ENCOUNTER: ICD-10-CM

## 2021-11-05 PROCEDURE — 99213 OFFICE O/P EST LOW 20 MIN: CPT | Performed by: INTERNAL MEDICINE

## 2021-11-05 RX ORDER — DICLOFENAC SODIUM 75 MG/1
75 TABLET, DELAYED RELEASE ORAL DAILY PRN
Qty: 30 TABLET | Refills: 1 | Status: SHIPPED | OUTPATIENT
Start: 2021-11-05 | End: 2021-12-06 | Stop reason: SDUPTHER

## 2021-11-05 RX ORDER — PREGABALIN 150 MG/1
150 CAPSULE ORAL 3 TIMES DAILY
Qty: 90 CAPSULE | Refills: 0 | Status: SHIPPED | OUTPATIENT
Start: 2021-11-05 | End: 2021-12-06 | Stop reason: SDUPTHER

## 2021-11-05 RX ORDER — OXYCODONE AND ACETAMINOPHEN 7.5; 325 MG/1; MG/1
1 TABLET ORAL EVERY 6 HOURS PRN
Qty: 120 TABLET | Refills: 0 | Status: SHIPPED | OUTPATIENT
Start: 2021-11-05 | End: 2021-12-06 | Stop reason: SDUPTHER

## 2021-11-05 NOTE — PROGRESS NOTES
Esequiel Garcia [facility]  1975  [id number]    HISTORY OF PRESENT ILLNESS:  Mr. Red Singh is a 55 y.o. male returns for a follow up visit for multiple medical problems. His current presenting problems are   1. 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 BWC Sprain of left knee/leg, initial encounter    3. BWC Sprain of ligament of lumbosacral joint, subsequent encounter    4. BWC Sprain of ligament of lumbosacral joint, initial encounter    5. BWC Sprain of left knee/leg, subsequent encounter    . As per information/history obtained from the PADT(patient assessment and documentation tool) - He complains of pain in the lower back with radiation to the knees Bilateral, lower leg Left, ankles Left and feet Left He rates the pain 4/10 and describes it as sharp, aching, burning, pins and needles. Pain is made worse by: nothing, movement, walking, standing, sitting, bending, lifting. Current treatment regimen has helped relieve about 70% of the pain. He denies side effects from the current pain regimen. Patient reports that since the last follow up visit the physical functioning is unchanged, family/social relationships are unchanged, mood is unchanged and sleep patterns are unchanged, and that the overall functioning is unchanged. Patient denies neurological bowel or bladder. Patient denies misusing/abusing his narcotic pain medications or using any illegal drugs. There are No indicators for possible drug abuse, addiction or diversion problems. Upon obtaining the medical history from Mr. Red Singh regarding today's office visit for his presenting problems, patient states his leg is finally starting to get a little better, he states he still has a sore on the leg but healing going to wound clinic. Mr. Moon Hence mentions he is using Percocet 4 per day along with Lyrica and Voltaren , he quit using Cymbalta. Patient says he is waiting on his CPAP machine.        ALLERGIES: Patients list of allergies were reviewed     MEDICATIONS: Mr. Moon Hence list of medications were reviewed. His current medications are   Outpatient Medications Prior to Visit   Medication Sig Dispense Refill    Compression Stockings MISC by Does not apply route Strength 30-40mmHg  Style: pull on below the knee; open or closed toe  Extremity: bilateral  Donning aid recommended: Yes if needed 1 each 2    Compression Stockings MISC by Does not apply route Strength 30-40mmHg  Style: Farrow wrap , below the knee  Extremity: bilateral  Donning aid recommended: No 1 each 0    oxyCODONE-acetaminophen (PERCOCET) 7.5-325 MG per tablet Take 1 tablet by mouth every 6 hours as needed for Pain (max 4 per day) for up to 28 days. 112 tablet 0    diclofenac (VOLTAREN) 75 MG EC tablet Take 1 tablet by mouth daily as needed for Pain 30 tablet 1    penicillin v potassium (VEETID) 500 MG tablet TAKE TWO TABLETS BY MOUTH TWICE A  tablet 3    Multiple Vitamins-Minerals (THERAPEUTIC MULTIVITAMIN-MINERALS) tablet Take 1 tablet by mouth daily      losartan (COZAAR) 100 MG tablet TAKE ONE TABLET BY MOUTH DAILY 90 tablet 2    furosemide (LASIX) 20 MG tablet TAKE ONE TO TWO TABLETS BY MOUTH EVERY MORNING AS NEEDED FOR EDEMA 180 tablet 3    Compression Stockings MISC by Does not apply route Thigh high 1 each 0    Elastic Bandages & Supports (MEDICAL COMPRESSION THIGH HIGH) MISC Thigh high compression stockings, 30-40 mm Hg 2 each 1    pregabalin (LYRICA) 150 MG capsule Take 1 capsule by mouth 3 times daily for 30 days. 90 capsule 1    DULoxetine (CYMBALTA) 60 MG extended release capsule Take 1 capsule by mouth daily (Patient not taking: Reported on 11/5/2021) 30 capsule 1     No facility-administered medications prior to visit. REVIEW OF SYSTEMS:    Respiratory: Negative for apnea, chest tightness and shortness of breath or change in baseline breathing. PHYSICAL EXAM:   Nursing note and vitals reviewed.  /68   Pulse 68   Wt (!) 480 lb 12.8 oz (218.1 kg)   SpO2 95%   BMI 60.10 kg/m²   Constitutional: He appears well-developed and well-nourished. No acute distress. Cardiovascular: Normal rate, regular rhythm, normal heart sounds, and does not have murmur. Pulmonary/Chest: Effort normal. No respiratory distress. He does not have wheezes in the lung fields. He has no rales. Neurological/Psychiatric:He is alert and oriented to person, place, and time. Coordination is  normal.  His mood isAppropriate and affect is Neutral/Euthymic(normal) . Other: leg bandaged     IMPRESSION:   1.  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 BWC Sprain of left knee/leg, initial encounter    3. BWC Sprain of ligament of lumbosacral joint, subsequent encounter    4. BWC Sprain of ligament of lumbosacral joint, initial encounter    5.  BWC Sprain of left knee/leg, subsequent encounter        PLAN:  Informed verbal consent was obtained  -ROM/Stretching exercises as advised   -He was advised weight reduction, diet changes- 800-1200 roc diet, diet diary, exercising, nutritional  consult increased physical activity as tolerated   -Continue with Percocet 4 per day  -He was advised to increase fluids ( 5-7  glasses of fluid daily), limit caffeine, avoid cheese products, increase dietary fiber, increase activity and exercise as tolerated and relax regularly and enjoy meals   -Walking 30 minutes daily as tolerated   -Advised to elevate the legs     Current Outpatient Medications   Medication Sig Dispense Refill    Compression Stockings MISC by Does not apply route Strength 30-40mmHg  Style: pull on below the knee; open or closed toe  Extremity: bilateral  Donning aid recommended: Yes if needed 1 each 2    Compression Stockings MISC by Does not apply route Strength 30-40mmHg  Style: Farrow wrap , below the knee  Extremity: bilateral  Donning aid recommended: No 1 each 0    oxyCODONE-acetaminophen (PERCOCET) 7.5-325 MG per tablet Take 1 tablet by mouth every 6 hours as needed for Pain (max 4 per day) for up to 28 days. 112 tablet 0    diclofenac (VOLTAREN) 75 MG EC tablet Take 1 tablet by mouth daily as needed for Pain 30 tablet 1    penicillin v potassium (VEETID) 500 MG tablet TAKE TWO TABLETS BY MOUTH TWICE A  tablet 3    Multiple Vitamins-Minerals (THERAPEUTIC MULTIVITAMIN-MINERALS) tablet Take 1 tablet by mouth daily      losartan (COZAAR) 100 MG tablet TAKE ONE TABLET BY MOUTH DAILY 90 tablet 2    furosemide (LASIX) 20 MG tablet TAKE ONE TO TWO TABLETS BY MOUTH EVERY MORNING AS NEEDED FOR EDEMA 180 tablet 3    Compression Stockings MISC by Does not apply route Thigh high 1 each 0    Elastic Bandages & Supports (MEDICAL COMPRESSION THIGH HIGH) MISC Thigh high compression stockings, 30-40 mm Hg 2 each 1    pregabalin (LYRICA) 150 MG capsule Take 1 capsule by mouth 3 times daily for 30 days. 90 capsule 1     No current facility-administered medications for this visit. I will continue his current medication regimen  which is part of the above treatment schedule. It has been helping with Mr. 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 Risks and benefits of the medications and other alternative treatments  including no treatment were discussed with the patient. The common side effects of these medications were also explained to the patient. Informed verbal consent was obtained.    Goals of current treatment regimen include improvement in pain, restoration of functioning- with focus on improvement in physical performance, general activity, work or disability,emotional distress, health care utilization and  decreased medication consumption. Will continue to monitor progress towards achieving/maintaining therapeutic goals with special emphasis on  1. Improvement in perceived interfernce  of pain with ADL's. Ability to do home exercises independently. Ability to do household chores indoor and/or outdoor work and social and leisure activities. Improve psychosocial and physical functioning. - he is showing progression towards this treatment goal with the current regimen. He was advised against drinking alcohol with the narcotic pain medicines, advised against driving or handling machinery while adjusting the dose of medicines or if having cognitive  issues related to the current medications. Risk of overdose and death, if medicines not taken as prescribed, were also discussed. If the patient develops new symptoms or if the symptoms worsen, the patient should call the office. While transcribing every attempt was made to maintain the accuracy of the note in terms of it's contents,there may have been some errors made inadvertently. Thank you for allowing me to participate in the care of this patient.     Yahir Mcduffie MD.    Cc: Tamie Limon MD

## 2021-11-11 ENCOUNTER — HOSPITAL ENCOUNTER (OUTPATIENT)
Dept: WOUND CARE | Age: 46
Discharge: HOME | End: 2021-11-11
Payer: COMMERCIAL

## 2021-11-11 ENCOUNTER — HOSPITAL ENCOUNTER (OUTPATIENT)
Dept: VASCULAR LAB | Age: 46
Discharge: HOME | End: 2021-11-11
Payer: COMMERCIAL

## 2021-11-11 ENCOUNTER — TELEPHONE (OUTPATIENT)
Dept: VASCULAR SURGERY | Age: 46
End: 2021-11-11

## 2021-11-11 VITALS
TEMPERATURE: 97.7 F | SYSTOLIC BLOOD PRESSURE: 120 MMHG | DIASTOLIC BLOOD PRESSURE: 72 MMHG | RESPIRATION RATE: 18 BRPM | HEART RATE: 80 BPM

## 2021-11-11 DIAGNOSIS — R60.0 LOCALIZED EDEMA: ICD-10-CM

## 2021-11-11 DIAGNOSIS — L97.929 IDIOPATHIC CHRONIC VENOUS HYPERTENSION OF LEFT LOWER EXTREMITY WITH ULCER AND INFLAMMATION (HCC): ICD-10-CM

## 2021-11-11 DIAGNOSIS — I83.212 VARICOSE VEINS OF RIGHT LOWER EXTREMITY WITH INFLAMMATION, WITH ULCER OF CALF WITH FAT LAYER EXPOSED (HCC): ICD-10-CM

## 2021-11-11 DIAGNOSIS — L97.211 VARICOSE VEINS OF RIGHT LOWER EXTREMITY WITH INFLAMMATION, WITH ULCER OF CALF LIMITED TO BREAKDOWN OF SKIN (HCC): ICD-10-CM

## 2021-11-11 DIAGNOSIS — L97.222 NON-PRESSURE CHRONIC ULCER OF LEFT CALF WITH FAT LAYER EXPOSED (HCC): ICD-10-CM

## 2021-11-11 DIAGNOSIS — I83.212 VARICOSE VEINS OF RIGHT LOWER EXTREMITY WITH INFLAMMATION, WITH ULCER OF CALF LIMITED TO BREAKDOWN OF SKIN (HCC): ICD-10-CM

## 2021-11-11 DIAGNOSIS — I83.222 VARICOSE VEINS OF LEFT LOWER EXTREMITY WITH INFLAMMATION, WITH ULCER OF CALF WITH FAT LAYER EXPOSED (HCC): ICD-10-CM

## 2021-11-11 DIAGNOSIS — L97.212 VARICOSE VEINS OF RIGHT LOWER EXTREMITY WITH INFLAMMATION, WITH ULCER OF CALF WITH FAT LAYER EXPOSED (HCC): ICD-10-CM

## 2021-11-11 DIAGNOSIS — I73.9 PVD (PERIPHERAL VASCULAR DISEASE) (HCC): ICD-10-CM

## 2021-11-11 DIAGNOSIS — L97.222 NON-PRESSURE CHRONIC ULCER OF LEFT CALF WITH FAT LAYER EXPOSED (HCC): Primary | ICD-10-CM

## 2021-11-11 DIAGNOSIS — L97.222 VARICOSE VEINS OF LEFT LOWER EXTREMITY WITH INFLAMMATION, WITH ULCER OF CALF WITH FAT LAYER EXPOSED (HCC): ICD-10-CM

## 2021-11-11 DIAGNOSIS — I87.332 IDIOPATHIC CHRONIC VENOUS HYPERTENSION OF LEFT LOWER EXTREMITY WITH ULCER AND INFLAMMATION (HCC): ICD-10-CM

## 2021-11-11 PROCEDURE — 11042 DBRDMT SUBQ TIS 1ST 20SQCM/<: CPT

## 2021-11-11 PROCEDURE — 29581 APPL MULTLAYER CMPRN SYS LEG: CPT

## 2021-11-11 PROCEDURE — 6370000000 HC RX 637 (ALT 250 FOR IP): Performed by: PODIATRIST

## 2021-11-11 PROCEDURE — 93971 EXTREMITY STUDY: CPT

## 2021-11-11 RX ORDER — CLOBETASOL PROPIONATE 0.5 MG/G
OINTMENT TOPICAL ONCE
Status: CANCELLED | OUTPATIENT
Start: 2021-11-11 | End: 2021-11-11

## 2021-11-11 RX ORDER — LIDOCAINE 40 MG/G
CREAM TOPICAL ONCE
Status: CANCELLED | OUTPATIENT
Start: 2021-11-11 | End: 2021-11-11

## 2021-11-11 RX ORDER — LIDOCAINE HYDROCHLORIDE 40 MG/ML
SOLUTION TOPICAL ONCE
Status: COMPLETED | OUTPATIENT
Start: 2021-11-11 | End: 2021-11-11

## 2021-11-11 RX ORDER — LIDOCAINE HYDROCHLORIDE 20 MG/ML
JELLY TOPICAL ONCE
Status: CANCELLED | OUTPATIENT
Start: 2021-11-11 | End: 2021-11-11

## 2021-11-11 RX ORDER — BACITRACIN, NEOMYCIN, POLYMYXIN B 400; 3.5; 5 [USP'U]/G; MG/G; [USP'U]/G
OINTMENT TOPICAL ONCE
Status: CANCELLED | OUTPATIENT
Start: 2021-11-11 | End: 2021-11-11

## 2021-11-11 RX ORDER — GINSENG 100 MG
CAPSULE ORAL ONCE
Status: CANCELLED | OUTPATIENT
Start: 2021-11-11 | End: 2021-11-11

## 2021-11-11 RX ORDER — BETAMETHASONE DIPROPIONATE 0.05 %
OINTMENT (GRAM) TOPICAL ONCE
Status: CANCELLED | OUTPATIENT
Start: 2021-11-11 | End: 2021-11-11

## 2021-11-11 RX ORDER — GENTAMICIN SULFATE 1 MG/G
OINTMENT TOPICAL ONCE
Status: CANCELLED | OUTPATIENT
Start: 2021-11-11 | End: 2021-11-11

## 2021-11-11 RX ORDER — LIDOCAINE HYDROCHLORIDE 40 MG/ML
SOLUTION TOPICAL ONCE
Status: CANCELLED | OUTPATIENT
Start: 2021-11-11 | End: 2021-11-11

## 2021-11-11 RX ORDER — LIDOCAINE 50 MG/G
OINTMENT TOPICAL ONCE
Status: CANCELLED | OUTPATIENT
Start: 2021-11-11 | End: 2021-11-11

## 2021-11-11 RX ORDER — BACITRACIN ZINC AND POLYMYXIN B SULFATE 500; 1000 [USP'U]/G; [USP'U]/G
OINTMENT TOPICAL ONCE
Status: CANCELLED | OUTPATIENT
Start: 2021-11-11 | End: 2021-11-11

## 2021-11-11 RX ADMIN — LIDOCAINE HYDROCHLORIDE: 40 SOLUTION TOPICAL at 13:51

## 2021-11-11 ASSESSMENT — PAIN SCALES - GENERAL: PAINLEVEL_OUTOF10: 0

## 2021-11-11 NOTE — TELEPHONE ENCOUNTER
Pt called was referred by Dr. Kathey Bamberger for vein reflex study (?). Pt just had an ultrasound done on his leg. Pt is aware of call to schedule new pt appt and set up any necessary testing. Pt contact [phone].

## 2021-11-11 NOTE — PROGRESS NOTES
Multilayer Compression Wrap   (Not Unna) Below the Knee    NAME:  Karli Harris  YOB: 1975  MEDICAL RECORD NUMBER:  [id number]  DATE:  11/11/2021        Removed old Multilayer wrap if present and washed leg with mild soap/water.   Applied moisturizing agent to dry skin as needed.   Applied primary and secondary dressing as ordered     Applied multilayered dressing below the knee toLEFT lower leg(s)  (2 Layer compression wrap ) .   Instructed patient/caregiver not to remove dressing and to keep it clean and dry.   Instructed patient/caregiver on complications to report to provider, such as pain, numbness in toes, heavy drainage, and slippage of dressing.   Instructed patient on purpose of compression dressing and on activity and exercise recommendations.     Applied per   Guidelines    Electronically signed by Carla Cavazos RN on 11/11/2021 at 2:50 PM

## 2021-11-11 NOTE — PROGRESS NOTES
[facility] [facility]  Progress Note and Procedure Note      Janet Cartagena  MEDICAL RECORD NUMBER:  [id number]  AGE: 55 y.o. GENDER: male  : 1975  EPISODE DATE:  2021    Subjective:     Chief Complaint   Patient presents with    Wound Check     left lower extremity         HISTORY of PRESENT ILLNESS HPI     Janet Cartagena is a 55 y.o. male who presents today for wound/ulcer evaluation. History of Wound Context: Patient has ulcer left lateral leg. Patient relates he has followed all instructions. Patient relates he got his reflux study this week.   Wound/Ulcer Pain Timing/Severity: intermittent, mild, moderate  Quality of pain: sharp  Severity:  5 / 10   Modifying Factors: Pain worsens with walking  Associated Signs/Symptoms: edema    Ulcer Identification:  Ulcer Type: venous    Contributing Factors: edema, venous stasis and lymphedema    Acute Wound: N/A        PAST MEDICAL HISTORY        Diagnosis Date    Arthritis     Cellulitis     Chronic back pain     Hepatitis age 24    HNP (herniated nucleus pulposus with myelopathy), thoracic     Hypertension     Melanoma ([facility] [address]     left foot     Obesity 2011    ANN-MARIE (obstructive sleep apnea)     NO CPAP     Stasis dermatitis        PAST SURGICAL HISTORY    Past Surgical History:   Procedure Laterality Date    ADENOIDECTOMY      FOOT SURGERY Left 2019    WIDE EXCISION OF LEFT FOOT MELANOMA performed by Jeannine Guerra MD at [address] [place] [address] Left 2/10/2020    LEFT FOOT MELANOMA WIDE EXCISION AND FULL THICKNESS SKIN GRAFT,LEFT GROIN SENTINEL LYMPH NODE BIOPSY performed by Jeannine Guerra MD at [facility]  06    Arand    UPPER GASTROINTESTINAL ENDOSCOPY N/A 2021    EGD BIOPSY performed by Lovely Krishnamurthy DO at [facility] EXTRACTION         FAMILY HISTORY    Family History   Problem Relation Age of Onset    Cancer Mother     COPD Paternal Grandfather     Colon Cancer Maternal Grandfather        SOCIAL HISTORY    Social History     Tobacco Use    Smoking status: Former Smoker     Packs/day: 1.50     Types: Cigarettes     Quit date: 2005     Years since quittin.8    Smokeless tobacco: Former User     Types: Chew   Vaping Use    Vaping Use: Every day    Substances: Nicotine, Flavoring    Devices: Refillable tank   Substance Use Topics    Alcohol use: No     Alcohol/week: 0.0 standard drinks     Comment: rarely -- 1 every few months    Drug use: No       ALLERGIES    No Known Allergies    MEDICATIONS    Current Outpatient Medications on File Prior to Encounter   Medication Sig Dispense Refill    oxyCODONE-acetaminophen (PERCOCET) 7.5-325 MG per tablet Take 1 tablet by mouth every 6 hours as needed for Pain (max 4 per day) for up to 30 days. 120 tablet 0    diclofenac (VOLTAREN) 75 MG EC tablet Take 1 tablet by mouth daily as needed for Pain 30 tablet 1    pregabalin (LYRICA) 150 MG capsule Take 1 capsule by mouth 3 times daily for 30 days.  90 capsule 0    penicillin v potassium (VEETID) 500 MG tablet TAKE TWO TABLETS BY MOUTH TWICE A  tablet 3    Multiple Vitamins-Minerals (THERAPEUTIC MULTIVITAMIN-MINERALS) tablet Take 1 tablet by mouth daily      losartan (COZAAR) 100 MG tablet TAKE ONE TABLET BY MOUTH DAILY 90 tablet 2    furosemide (LASIX) 20 MG tablet TAKE ONE TO TWO TABLETS BY MOUTH EVERY MORNING AS NEEDED FOR EDEMA 180 tablet 3    Compression Stockings MISC by Does not apply route Strength 30-40mmHg  Style: pull on below the knee; open or closed toe  Extremity: bilateral  Donning aid recommended: Yes if needed 1 each 2    Compression Stockings MISC by Does not apply route Strength 30-40mmHg  Style: Farrow wrap , below the knee  Extremity: bilateral  Donning aid recommended: No 1 each 0    Compression Stockings MISC by Does not apply route Thigh high 1 each 0    Elastic Bandages & Supports (MEDICAL COMPRESSION THIGH HIGH) MISC Thigh high compression stockings, 30-40 mm Hg 2 each 1     No current facility-administered medications on file prior to encounter. REVIEW OF SYSTEMS    Pertinent items are noted in HPI. Last U4T if applicable:   Hemoglobin A1C   Date Value Ref Range Status   08/10/2021 4.8 See comment % Final     Comment:     Comment:  Diagnosis of Diabetes: > or = 6.5%  Increased risk of diabetes (Prediabetes): 5.7-6.4%  Glycemic Control: Nonpregnant Adults: <7.0%                    Pregnant: <6.0%           Objective:      /72   Pulse 80   Temp 97.7 °F (36.5 °C) (Temporal)   Resp 18     Wt Readings from Last 3 Encounters:   11/05/21 (!) 480 lb 12.8 oz (218.1 kg)   11/04/21 (!) 476 lb 3.1 oz (216 kg)   10/08/21 (!) 473 lb (214.6 kg)       PHYSICAL EXAM    General Appearance: alert and oriented to person, place and time, well-developed and well-nourished, in no acute distress  Pedal pulses palpable. Protective sensations intact. Ulcer is noted left lateral leg with granular base and fibrotic covering. Ulcer does not probe to bone. There are no signs of infection. There is moderate to severe edema bilateral legs. Hemosiderin deposits are noted anterior bilateral legs. Assessment:      1. 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 Varicose veins of right lower extremity with inflammation, with ulcer of calf with fat layer exposed ([address]    3. Varicose veins of left lower extremity with inflammation, with ulcer of calf with fat layer exposed ([address]    4. Varicose veins of right lower extremity with inflammation, with ulcer of calf limited to breakdown of skin ([address]    5. PVD (peripheral vascular disease) ([address]           Procedure Note  Indications:  Based on my examination of this patient's wound(s)/ulcer(s) today, debridement is required to promote healing and evaluate the wound base.     Performed by: Brianna Cardenas DPM    Consent obtained:  Yes    Time out taken: Yes    Pain Control: Anesthetic  Anesthetic: 4% Lidocaine Liquid Topical       Debridement: Excisional Debridement    Using curette the wound(s)/ulcer(s) was/were debrided down through and including the removal of epidermis, dermis and subcutaneous tissue.         Devitalized Tissue Debrided:  fibrin and biofilm    Pre Debridement Measurements:  Are located in the [place]  Documentation Flow Sheet    Wound/Ulcer #: 1    Post Debridement Measurements:  Wound/Ulcer Descriptions are Pre Debridement except measurements:    Wound 10/07/21 Leg Lateral;Left #1 (Active)   Wound Image   11/04/21 1329   Wound Cleansed Cleansed with saline 11/11/21 1317   Dressing/Treatment Alginate with Ag 11/11/21 1344   Wound Length (cm) 4.6 cm 11/11/21 1317   Wound Width (cm) 2.2 cm 11/11/21 1317   Wound Depth (cm) 0.1 cm 11/11/21 1317   Wound Surface Area (cm^2) 10.12 cm^2 11/11/21 1317   Change in Wound Size % (l*w) -20.48 11/11/21 1317   Wound Volume (cm^3) 1.012 cm^3 11/11/21 1317   Wound Healing % -20 11/11/21 1317   Post-Procedure Length (cm) 4.7 cm 11/11/21 1344   Post-Procedure Width (cm) 2.3 cm 11/11/21 1344   Post-Procedure Depth (cm) 0.3 cm 11/11/21 1344   Post-Procedure Surface Area (cm^2) 10.81 cm^2 11/11/21 1344   Post-Procedure Volume (cm^3) 3.243 cm^3 11/11/21 1344   Distance Tunneling (cm) 0 cm 11/11/21 1317   Tunneling Position ___ O'Clock 0 10/28/21 1333   Undermining Starts ___ O'Clock 0 10/28/21 1333   Undermining Ends___ O'Clock 0 10/28/21 1333   Undermining Maxium Distance (cm) 0 11/11/21 1317   Wound Assessment Fibrin; Pink/red; Purple/maroon 11/11/21 1317   Drainage Amount Moderate 11/11/21 1317   Drainage Description Lesley [place]; Lyndle Speaks 11/11/21 1317   Odor Mild 11/11/21 1317   Jacy-wound Assessment Intact 11/11/21 1317   Margins Defined edges 11/11/21 1317   Wound Thickness Description not for Pressure Injury Full thickness 11/11/21 1317   Number of days: 35     Incision 12/20/19 Foot Left;Plantar (Active) Number of days: 692       Percent of Wound(s)/Ulcer(s) Debrided: 100%    Total Surface Area Debrided:  10.81 sq cm     Diabetic/Pressure/Non Pressure Ulcers only:  Ulcer: N/A     Estimated Blood Loss:  Minimal    Hemostasis Achieved:  by pressure    Procedural Pain:  5  / 10     Post Procedural Pain:  5 / 10     Response to treatment:  Well tolerated by patient. Plan:       Treatment Note please see attached Discharge Instructions. Debrided ulcer left lateral leg. Compression applied left leg and patient to elevate when possible. Referring to Dr. Arden Rivera for venous reflux found on venous reflux study. Rx non invasive arterial test.  I would like to increase compression left leg, but want formal ABIs before applying. Return 1 week. New Medication(s) at this visit:   New Prescriptions    No medications on file       Other orders at this visit:   Orders Placed This Encounter   Procedures    Initiate Outpatient Wound Care Protocol    [facility], V[facility], [facility]       Smoking Cessation: Counseling given: Not Answered      Written patient dismissal instructions given to patient and signed by patient or POA. Discharge [facility] Physician Orders and Discharge Instructions  [address] [address] [address] [place] [address]. [place] [address]  Telephone: [phone]  NAME:  Kinza Backers OF BIRTH:  1975  MEDICAL RECORD NUMBER:  [id number]  DATE:  11/11/2021    Wash hands with soap and water prior to and after every dressing change. formerly [facility] [phone]   For stocking    Wound Cleansing:   · Do not scrub or use excessive force. · With each dressing change, rinse wounds with 0.9% Saline. (May use wound wash or soft contact solution. Both can be purchased at a local drug store).    · If unable to obtain saline, may use a gentle soap Supplements:  [] Hector  [] 30ml ProStat  [] EnsureEnlive [] Ensure Max/Premier  [] Other:    If you are still having pain after you go home:   For wounds on lower legs or arms, elevate the affected limb.  Use over-the-counter medications you would normally use for pain as permitted by your primary care doctor.  For persistent pain not relieved by the above interventions, please call your primary care doctor. Return Appointment:   Return Appointment: With Dr Keila Chau  in  [address] [place] [address])  o Scheduled weekly until (Date):      [] Return Appointment for a Wound Assessment with a nurse on:     o All other future appointments:  Future Appointments   Date Time Provider Francie Salmon   11/24/2021 12:30 PM DO MAMADOU Fragoso WT MMA   12/6/2021 12:00 PM Gavin Garrido MD R BANK PAIN MMA   12/15/2021  2:00 PM Jovanny Tenorio MD [facility] NIDA MMA   1/28/2022 12:30 PM MD Savana Sena Genera S/ON MMA         [x] Orders placed during your visit:   Orders Placed This Encounter   Procedures    Initiate Outpatient Wound Care Protocol    VL DUP LOWER EXTREMITY ARTERIES LEFT    Paulino Davis MD, Vascular Surgery, [facility]       Your nurse  is:  Luis Felipe Contreras     Electronically signed by Monica Reese RN on 11/11/2021 at 1:50 PM     [facility] Information: Should you experience any significant changes in your wound(s) or have questions about your wound care, please contact the [address] [place] [address] at [phone]. We are open from 8:00am - 4:30p Monday thru Friday except for Wednesdays which we are closed. Please give us 24-48 hours to return your call. Call your doctor now or seek immediate medical care if:    · You have symptoms of infection, such as:  ? Increased pain, swelling, warmth, or redness. ? Red streaks leading from the area. ? Pus draining from the area. ? A fever.         Physician for this visit and orders: Rosalina Branch DPM    [] Patient unable to sign Discharge Instructions given to ECF/Transportation/POA              Electronically signed by Cailin Boss DPM on 11/11/2021 at 4:55 PM

## 2021-11-18 ENCOUNTER — HOSPITAL ENCOUNTER (OUTPATIENT)
Dept: WOUND CARE | Age: 46
Discharge: HOME | End: 2021-11-18
Payer: COMMERCIAL

## 2021-11-18 ENCOUNTER — OFFICE VISIT (OUTPATIENT)
Dept: VASCULAR SURGERY | Age: 46
End: 2021-11-18
Payer: COMMERCIAL

## 2021-11-18 VITALS
HEART RATE: 81 BPM | RESPIRATION RATE: 20 BRPM | DIASTOLIC BLOOD PRESSURE: 75 MMHG | TEMPERATURE: 98.7 F | SYSTOLIC BLOOD PRESSURE: 118 MMHG

## 2021-11-18 VITALS
HEIGHT: 75 IN | DIASTOLIC BLOOD PRESSURE: 81 MMHG | TEMPERATURE: 98.6 F | SYSTOLIC BLOOD PRESSURE: 156 MMHG | HEART RATE: 84 BPM | BODY MASS INDEX: 39.17 KG/M2 | WEIGHT: 315 LBS

## 2021-11-18 DIAGNOSIS — L97.212 VARICOSE VEINS OF RIGHT LOWER EXTREMITY WITH INFLAMMATION, WITH ULCER OF CALF WITH FAT LAYER EXPOSED (HCC): ICD-10-CM

## 2021-11-18 DIAGNOSIS — I83.222 VARICOSE VEINS OF LEFT LOWER EXTREMITY WITH INFLAMMATION, WITH ULCER OF CALF WITH FAT LAYER EXPOSED (HCC): Primary | ICD-10-CM

## 2021-11-18 DIAGNOSIS — I83.212 VARICOSE VEINS OF RIGHT LOWER EXTREMITY WITH INFLAMMATION, WITH ULCER OF CALF WITH FAT LAYER EXPOSED (HCC): ICD-10-CM

## 2021-11-18 DIAGNOSIS — L97.222 NON-PRESSURE CHRONIC ULCER OF LEFT CALF WITH FAT LAYER EXPOSED (HCC): Primary | ICD-10-CM

## 2021-11-18 DIAGNOSIS — L97.222 VARICOSE VEINS OF LEFT LOWER EXTREMITY WITH INFLAMMATION, WITH ULCER OF CALF WITH FAT LAYER EXPOSED (HCC): ICD-10-CM

## 2021-11-18 DIAGNOSIS — I83.222 VARICOSE VEINS OF LEFT LOWER EXTREMITY WITH INFLAMMATION, WITH ULCER OF CALF WITH FAT LAYER EXPOSED (HCC): ICD-10-CM

## 2021-11-18 DIAGNOSIS — L97.211 VARICOSE VEINS OF RIGHT LOWER EXTREMITY WITH INFLAMMATION, WITH ULCER OF CALF LIMITED TO BREAKDOWN OF SKIN (HCC): ICD-10-CM

## 2021-11-18 DIAGNOSIS — L97.222 VARICOSE VEINS OF LEFT LOWER EXTREMITY WITH INFLAMMATION, WITH ULCER OF CALF WITH FAT LAYER EXPOSED (HCC): Primary | ICD-10-CM

## 2021-11-18 DIAGNOSIS — I83.212 VARICOSE VEINS OF RIGHT LOWER EXTREMITY WITH INFLAMMATION, WITH ULCER OF CALF LIMITED TO BREAKDOWN OF SKIN (HCC): ICD-10-CM

## 2021-11-18 PROCEDURE — 99203 OFFICE O/P NEW LOW 30 MIN: CPT | Performed by: SURGERY

## 2021-11-18 PROCEDURE — 11042 DBRDMT SUBQ TIS 1ST 20SQCM/<: CPT

## 2021-11-18 PROCEDURE — 6370000000 HC RX 637 (ALT 250 FOR IP): Performed by: PODIATRIST

## 2021-11-18 PROCEDURE — 29581 APPL MULTLAYER CMPRN SYS LEG: CPT

## 2021-11-18 RX ORDER — BACITRACIN, NEOMYCIN, POLYMYXIN B 400; 3.5; 5 [USP'U]/G; MG/G; [USP'U]/G
OINTMENT TOPICAL ONCE
Status: CANCELLED | OUTPATIENT
Start: 2021-11-18 | End: 2021-11-18

## 2021-11-18 RX ORDER — LIDOCAINE HYDROCHLORIDE 40 MG/ML
SOLUTION TOPICAL ONCE
Status: COMPLETED | OUTPATIENT
Start: 2021-11-18 | End: 2021-11-18

## 2021-11-18 RX ORDER — CLOBETASOL PROPIONATE 0.5 MG/G
OINTMENT TOPICAL ONCE
Status: CANCELLED | OUTPATIENT
Start: 2021-11-18 | End: 2021-11-18

## 2021-11-18 RX ORDER — GENTAMICIN SULFATE 1 MG/G
OINTMENT TOPICAL ONCE
Status: CANCELLED | OUTPATIENT
Start: 2021-11-18 | End: 2021-11-18

## 2021-11-18 RX ORDER — LIDOCAINE 40 MG/G
CREAM TOPICAL ONCE
Status: CANCELLED | OUTPATIENT
Start: 2021-11-18 | End: 2021-11-18

## 2021-11-18 RX ORDER — LIDOCAINE HYDROCHLORIDE 40 MG/ML
SOLUTION TOPICAL ONCE
Status: CANCELLED | OUTPATIENT
Start: 2021-11-18 | End: 2021-11-18

## 2021-11-18 RX ORDER — BETAMETHASONE DIPROPIONATE 0.05 %
OINTMENT (GRAM) TOPICAL ONCE
Status: CANCELLED | OUTPATIENT
Start: 2021-11-18 | End: 2021-11-18

## 2021-11-18 RX ORDER — LIDOCAINE 50 MG/G
OINTMENT TOPICAL ONCE
Status: CANCELLED | OUTPATIENT
Start: 2021-11-18 | End: 2021-11-18

## 2021-11-18 RX ORDER — LIDOCAINE HYDROCHLORIDE 20 MG/ML
JELLY TOPICAL ONCE
Status: CANCELLED | OUTPATIENT
Start: 2021-11-18 | End: 2021-11-18

## 2021-11-18 RX ORDER — GINSENG 100 MG
CAPSULE ORAL ONCE
Status: CANCELLED | OUTPATIENT
Start: 2021-11-18 | End: 2021-11-18

## 2021-11-18 RX ORDER — BACITRACIN ZINC AND POLYMYXIN B SULFATE 500; 1000 [USP'U]/G; [USP'U]/G
OINTMENT TOPICAL ONCE
Status: CANCELLED | OUTPATIENT
Start: 2021-11-18 | End: 2021-11-18

## 2021-11-18 RX ADMIN — LIDOCAINE HYDROCHLORIDE: 40 SOLUTION TOPICAL at 13:09

## 2021-11-18 ASSESSMENT — PAIN SCALES - GENERAL: PAINLEVEL_OUTOF10: 0

## 2021-11-18 NOTE — PROGRESS NOTES
Multilayer Compression Wrap   (Not Unna) Below the Knee    NAME:  Leo Leyden Schmeig  YOB: 1975  MEDICAL RECORD NUMBER:  [id number]  DATE:  11/18/2021        Removed old Multilayer wrap if present and washed leg with mild soap/water.   Applied moisturizing agent to dry skin as needed.   Applied primary and secondary dressing as ordered     Applied multilayered dressing below the knee to Left lower leg(s)  (4 Layer Compression Wrap ) .   Instructed patient/caregiver not to remove dressing and to keep it clean and dry.   Instructed patient/caregiver on complications to report to provider, such as pain, numbness in toes, heavy drainage, and slippage of dressing.   Instructed patient on purpose of compression dressing and on activity and exercise recommendations.     Applied per   Guidelines    Electronically signed by Noah Hammond RN on 11/18/2021 at 1:46 PM

## 2021-11-18 NOTE — PROGRESS NOTES
[facility]  Progress Note and Procedure Note      Charo García  MEDICAL RECORD NUMBER:  [id number]  AGE: 55 y.o. GENDER: male  : 1975  EPISODE DATE:  2021    Subjective:     Chief Complaint   Patient presents with    Wound Check     left lower leg         HISTORY of PRESENT ILLNESS HPI     Charo García is a 55 y.o. male who presents today for wound/ulcer evaluation. History of Wound Context: Patient has ulcer left lateral leg. Patient relates he has followed all instructions. Patient saw Dr. Yaima Vuong for his reflux issue left leg. She recommends increasing compression, changing biweekly, and will likely surgically address reflux.   Wound/Ulcer Pain Timing/Severity: intermittent, mild, moderate  Quality of pain: sharp  Severity:  5 / 10   Modifying Factors: Pain worsens with walking  Associated Signs/Symptoms: edema    Ulcer Identification:  Ulcer Type: venous    Contributing Factors: edema, venous stasis and lymphedema    Acute Wound: N/A        PAST MEDICAL HISTORY        Diagnosis Date    Arthritis     Cellulitis     Chronic back pain     Hepatitis age 24    HNP (herniated nucleus pulposus with myelopathy), thoracic     Hypertension     Melanoma ([facility] [address]     left foot     Obesity 2011    ANN-MARIE (obstructive sleep apnea)     NO CPAP     Stasis dermatitis        PAST SURGICAL HISTORY    Past Surgical History:   Procedure Laterality Date    ADENOIDECTOMY      FOOT SURGERY Left 2019    WIDE EXCISION OF LEFT FOOT MELANOMA performed by Jil Mendez MD at [address] [place] [address] Left 2/10/2020    LEFT FOOT MELANOMA WIDE EXCISION AND FULL THICKNESS SKIN GRAFT,LEFT GROIN SENTINEL LYMPH NODE BIOPSY performed by Jil Mendez MD at [facility]  06    Arand    UPPER GASTROINTESTINAL ENDOSCOPY N/A 2021    EGD BIOPSY performed by Charisse Alejo DO at [address] [address] HISTORY    Family History   Problem Relation Age of Onset    Cancer Mother     COPD Paternal Grandfather     Colon Cancer Maternal Grandfather        SOCIAL HISTORY    Social History     Tobacco Use    Smoking status: Former Smoker     Packs/day: 1.50     Types: Cigarettes     Quit date: 2005     Years since quittin.8    Smokeless tobacco: Former User     Types: Chew    Tobacco comment: States \"vapes a little\"   Vaping Use    Vaping Use: Every day    Substances: Nicotine, Flavoring    Devices: RefAMDL tank   Substance Use Topics    Alcohol use: No     Alcohol/week: 0.0 standard drinks     Comment: rarely -- 1 every few months    Drug use: No       ALLERGIES    No Known Allergies    MEDICATIONS    Current Outpatient Medications on File Prior to Encounter   Medication Sig Dispense Refill    oxyCODONE-acetaminophen (PERCOCET) 7.5-325 MG per tablet Take 1 tablet by mouth every 6 hours as needed for Pain (max 4 per day) for up to 30 days. 120 tablet 0    diclofenac (VOLTAREN) 75 MG EC tablet Take 1 tablet by mouth daily as needed for Pain 30 tablet 1    pregabalin (LYRICA) 150 MG capsule Take 1 capsule by mouth 3 times daily for 30 days.  90 capsule 0    penicillin v potassium (VEETID) 500 MG tablet TAKE TWO TABLETS BY MOUTH TWICE A  tablet 3    Multiple Vitamins-Minerals (THERAPEUTIC MULTIVITAMIN-MINERALS) tablet Take 1 tablet by mouth daily      losartan (COZAAR) 100 MG tablet TAKE ONE TABLET BY MOUTH DAILY 90 tablet 2    furosemide (LASIX) 20 MG tablet TAKE ONE TO TWO TABLETS BY MOUTH EVERY MORNING AS NEEDED FOR EDEMA 180 tablet 3    Compression Stockings MISC by Does not apply route Thigh high 1 each 0    Compression Stockings MISC by Does not apply route Strength 30-40mmHg  Style: pull on below the knee; open or closed toe  Extremity: bilateral  Donning aid recommended: Yes if needed 1 each 2    Compression Stockings MISC by Does not apply route Strength 30-40mmHg  Style: for Pressure Injury Full thickness 11/18/21 1309   Number of days: 42     Incision 12/20/19 Foot Left;Plantar (Active)   Number of days: 698       Percent of Wound(s)/Ulcer(s) Debrided: 100%    Total Surface Area Debrided:  19.61 sq cm     Diabetic/Pressure/Non Pressure Ulcers only:  Ulcer: N/A     Estimated Blood Loss:  Minimal    Hemostasis Achieved:  by pressure    Procedural Pain:  5  / 10     Post Procedural Pain:  5 / 10     Response to treatment:  Well tolerated by patient. Plan:       Treatment Note please see attached Discharge Instructions. Debrided ulcer left lateral leg. Compression applied left leg and patient to elevate when possible. Dr. Kusum Arguelles will see patient in 4 days. New Medication(s) at this visit:   New Prescriptions    No medications on file       Other orders at this visit:   Orders Placed This Encounter   Procedures    Initiate Outpatient Wound Care Protocol       Smoking Cessation: Counseling given: Not Answered  Comment: States \"vapes a little\"      Written patient dismissal instructions given to patient and signed by patient or POA. Discharge [facility] Physician Orders and Discharge Instructions  [address] [address]  Telephone: [phone]  NAME:  Tejal Olivier OF BIRTH:  1975  MEDICAL RECORD NUMBER:  [id number]  DATE:  11/18/2021    Wash hands with soap and water prior to and after every dressing change. Wound Cleansing:   · Do not scrub or use excessive force. · With each dressing change, rinse wounds with 0.9% Saline. (May use wound wash or soft contact solution. Both can be purchased at a local drug store). · If unable to obtain saline, may use a gentle soap and water. · Keep wounds dry in the shower unless otherwise instructed by the physician.   · For wounds on lower legs, cast covers can be purchased at local drug stores, so that you may shower and keep the wound(s) dry. []  Vashe Wash solution instructions (if prescribed): Apply enough Vashe to soak a piece of gauze and place on wound bed for 5-10 minutes. DO NOT rinse after Vashe has been applied. Follow dressing application as instructed below. Jacy wound Topical Treatments:  Do not apply lotions, creams, or ointments to the skin around the wound bed unless directed as followed:     [] Apply around the wound: [] moisturizing lotion [] Antifungal ointment [] No-Sting barrier film [] Zinc paste [] Other:       Dressings:           Wound Location: left lower leg    Apply Primary Dressing to wound:       Alginate with silver pad     Cover and Secure with:     Cover and Secure with: ABD pad  Extra absorbant pad   Avoid contact of tape with skin if possible.  When to change Dressing: [] Daily [] Every Other Day [] Once a week  [] Three times per week: [] Monday, Wednesday, Friday [] Tuesday, Thursday, Saturday  [] Do Not Change Dressing [x] Other: Monday in wound care center       Edema Control:   Apply: [x] Compression Stocking  [] Left Lower Leg [x] Right Lower Leg\        Apply every morning immediately when getting up. They should be applied to affected leg(s) from mid foot to knee making sure to cover the heel. Remove every night before going to bed. [x] Elevate leg(s) above the level of the heart for 30 minutes 4-5 times a day and/or when sitting. [x] Avoid prolonged standing in one place. Multilayer Compression Wrap:    Type: 4 Layer Compression Wrap   · Applied in Clinic to the :  Left lower leg(s) on 11/18/2021  · Do not get leg(s) with wrap wet. · If wraps become too tight call the center or completely remove the wrap. · Elevate leg(s) above the level of the heart when sitting. · Avoid prolonged standing in one place. ·     Dietary:  Important dietary reminders:  1. Increase Protein intake (i.e. Lean meats, fish, eggs, legumes, and yogurt)  2.  No added salt  3. If diabetic, follow a diabetic diet and check glucose prior to meals or as instructed by your physician. Dietary Supplements:  [] Hector  [] 30ml ProStat  [] EnsureEnlive [] Ensure Max/Premier  [] Other:    If you are still having pain after you go home:   For wounds on lower legs or arms, elevate the affected limb.  Use over-the-counter medications you would normally use for pain as permitted by your primary care doctor.  For persistent pain not relieved by the above interventions, please call your primary care doctor. Return Appointment:   Return Appointment: With Dr Curtis Stevens  in 11/22/21  o Scheduled weekly until (Date):      [] Return Appointment for a Wound Assessment with a nurse on:     o All other future appointments:  Future Appointments   Date Time Provider Francie Salmon   11/24/2021 12:30 PM DO MAMADOU Desai WT MMA   12/6/2021 12:00 PM Yahir Mcduffie MD R BANK PAIN MMA   12/15/2021  2:00 PM MD AMY Preston NIDA MMA   1/28/2022 12:30 PM MD Waqas Casanova S/ON MMA         [x] Orders placed during your visit:   Orders Placed This Encounter   Procedures    Initiate Outpatient Wound Care Protocol       Your nurse  is:  Aleja Evangelista     Electronically signed by Sundeep Guzman RN on 11/18/2021 at [facility] Information: Should you experience any significant changes in your wound(s) or have questions about your wound care, please contact the [address] [place] [address] at [phone]. We are open from 8:00am - 4:30p Monday thru Friday except for Wednesdays which we are closed. Please give us 24-48 hours to return your call. Call your doctor now or seek immediate medical care if:    · You have symptoms of infection, such as:  ? Increased pain, swelling, warmth, or redness. ? Red streaks leading from the area. ? Pus draining from the area. ? A fever.         Physician for this visit and orders: Lucho Peña DPM    [] Patient unable to sign Discharge Instructions given to ECF/Transportation/POA              Electronically signed by Richard Purvis DPM on 11/18/2021 at 2:03 PM

## 2021-11-18 NOTE — PROGRESS NOTES
[facility]  [facility] QUETA Ramses Pleitez, [address] [facility], [facility],  [address]    OUTPATIENT CONSULTATION    Date of Consultation:  11/18/2021    PCP:  Terry Elkins MD       Chief Complaint: Left leg venous stasis ulcer    History of Present Illness:   Alexandro Hughes is a very nice 55 y.o. male who presents today for evaluation of chronic left leg ulcer. States this has been present for several months, but most recently he has been seen by Dr. Vaishnavi Holder at the wound care center since approximately October. He has been treated with a 2 layer compression wrap with only mild improvement. States that the odor has worsened. No significant improvement in the ulcer. He had a venous duplex which was notable for both deep and superficial vein reflux. No perforating veins were noted. From an activity standpoint, he is relatively sedentary. 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 He is able to sit or elevate the legs most of that time. He is morbidly obese with BMI of approximately 60 and weight 478 pounds today. He is seeing Dr. Florencio Starr for possible bariatric surgery. He has a former smoker, but currently vapes. He is not diabetic.     I personally reviewed images the following study:  VL Reflux Chris Insufficiency Stdy Left  11/11/2021     Past Medical History:  Past Medical History:   Diagnosis Date    Arthritis     Cellulitis     Chronic back pain     Hepatitis age 24    HNP (herniated nucleus pulposus with myelopathy), thoracic     Hypertension     Melanoma ([facility] [address]     left foot     Obesity 1/12/2011    ANN-MARIE (obstructive sleep apnea)     NO CPAP     Stasis dermatitis        Past Surgical History:  Past Surgical History:   Procedure Laterality Date    ADENOIDECTOMY      FOOT SURGERY Left 12/20/2019    WIDE EXCISION OF LEFT FOOT MELANOMA performed by Griffin Kitchen MD at [facility] Left 2/10/2020    LEFT FOOT MELANOMA WIDE EXCISION AND FULL THICKNESS SKIN GRAFT,LEFT GROIN SENTINEL LYMPH NODE BIOPSY performed by Kamala Kenny MD at [facility] [address]  06/26/06    Arand    UPPER GASTROINTESTINAL ENDOSCOPY N/A 4/12/2021    EGD BIOPSY performed by Kade Newman DO at [address] Medications:   Prior to Admission medications    Medication Sig Start Date End Date Taking? Authorizing Provider   oxyCODONE-acetaminophen (PERCOCET) 7.5-325 MG per tablet Take 1 tablet by mouth every 6 hours as needed for Pain (max 4 per day) for up to 30 days. 11/5/21 12/5/21 Yes Pati Banegas MD   diclofenac (VOLTAREN) 75 MG EC tablet Take 1 tablet by mouth daily as needed for Pain 11/5/21  Yes Pati Banegas MD   pregabalin (LYRICA) 150 MG capsule Take 1 capsule by mouth 3 times daily for 30 days.  11/5/21 12/5/21 Yes Pati Banegas MD   Compression Stockings MISC by Does not apply route Strength 30-40mmHg  Style: pull on below the knee; open or closed toe  Extremity: bilateral  Donning aid recommended: Yes if needed 11/4/21  Yes Anthony Zepeda DPM   Compression Stockings MISC by Does not apply route Strength 30-40mmHg  Style: Farrow wrap , below the knee  Extremity: bilateral  Donning aid recommended: No 10/28/21  Yes Anthony Zepeda DPM   penicillin v potassium (VEETID) 500 MG tablet TAKE TWO TABLETS BY MOUTH TWICE A DAY 10/7/21  Yes Nenita Crooks MD   Multiple Vitamins-Minerals (THERAPEUTIC MULTIVITAMIN-MINERALS) tablet Take 1 tablet by mouth daily   Yes Historical Provider, MD   losartan (COZAAR) 100 MG tablet TAKE ONE TABLET BY MOUTH DAILY 7/8/21  Yes Kitty Barros MD   furosemide (LASIX) 20 MG tablet TAKE ONE TO TWO TABLETS BY MOUTH EVERY MORNING AS NEEDED FOR EDEMA 2/24/21  Yes Kitty Barros MD   Compression Stockings [address] [facility] by Does not apply route Thigh high 10/22/18  Yes Nenita Crooks MD   Elastic Bandages & Supports (MEDICAL COMPRESSION THIGH HIGH) MISC Thigh high compression stockings, 30-40 mm Hg 10/20/18  Yes Binh Craft MD Boogie        Allergies:  Patient has no known allergies. Social History:    Social History     Socioeconomic History    Marital status: Single     Spouse name: Emma Agee Number of children: 2    Years of education: Not on file    Highest education level: Not on file   Occupational History    Not on file   Tobacco Use    Smoking status: Former Smoker     Packs/day: 1.50     Types: Cigarettes     Quit date: 2005     Years since quittin.8    Smokeless tobacco: Former User     Types: Chew    Tobacco comment: States \"vapes a little\"   Vaping Use    Vaping Use: Every day    Substances: Nicotine, Flavoring    Devices: Refillable tank   Substance and Sexual Activity    Alcohol use: No     Alcohol/week: 0.0 standard drinks     Comment: rarely -- 1 every few months    Drug use: No    Sexual activity: Yes     Partners: Female   Other Topics Concern    Not on file   Social History Narrative    Not on file     Social Determinants of Health     Financial Resource Strain: Low Risk     Difficulty of Paying Living Expenses: Not hard at all   Food Insecurity: No Food Insecurity    Worried About [address] [employer] in the Last Year: Never true    [facility] in the Last Year: Never true   Transportation Needs: No Transportation Needs    Lack of Transportation (Medical): No    Lack of Transportation (Non-Medical):  No   Physical Activity:     Days of Exercise per Week: Not on file    Minutes of Exercise per Session: Not on file   Stress:     Feeling of Stress : Not on file   Social Connections:     Frequency of Communication with Friends and Family: Not on file    Frequency of Social Gatherings with Friends and Family: Not on file    Attends Advent Services: Not on file    Active Member of Clubs or Organizations: Not on file    Attends Club or Organization Meetings: Not on file    Marital Status: Not on file   Intimate Partner Violence:     Fear of Current or Ex-Partner: Not on file  Emotionally Abused: Not on file    Physically Abused: Not on file    Sexually Abused: Not on file   Housing Stability:     Unable to Pay for Housing in the Last Year: Not on file    Number of Places Lived in the Last Year: Not on file    Unstable Housing in the Last Year: Not on file     Review of Systems:  Pertinent positive and negative items are noted in the HPI. A comprehensive review of all other symptoms is otherwise negative. PhysicalExamination:    Vitals:    11/18/21 1150   BP: (!) 156/81   Pulse: 84   Temp: 98.6 °F (37 °C)   Weight: (!) 478 lb (216.8 kg)   Height: 6' 3\" (1.905 m)     Body mass index is 59.75 kg/m². Physical Exam  General: AAOx3. NAD. Very pleasant and conversive. Morbidly obese. Extremities: Bilateral lower extremities notable for chronic pitting edema with significant stasis discoloration on the left anterior and posterior calf as well as the gaiter region. There is a large somewhat necrotic appearing ulceration on the left posterior medial calf. Tender to palpation. Somewhat dry without drainage. The area surrounding the ulceration demonstrates 2-3+ pitting. Bilateral feet are warm and well-perfused. Bilateral DP are 2+ palpable. Diagnosis:  Idiopathic chronic venous hypertension with left leg ulceration, recurrent. Class IV morbid obesity with BMI 60. Plan:   I reviewed the findings of the venous reflux study with the patient and we discussed venous insufficiency and how it relates to the current left leg ulceration. Certainly, he has significant both deep and superficial venous reflux in the left leg. Surgical intervention may bring some benefit to reflux relief; however, given his significant obesity, I think that without managing his weight, either surgical intervention would have little benefit at this time. He does have upcoming visit with Dr. Carly Malone for bariatric surgery and it seems that this is likely to proceed in the near future.   I think this would certainly benefit him and offload a good bit of pressure from the left leg. In the meantime, he needs continued aggressive wound management and I will be happy to see him periodically in the wound care center. I would increase his compression to 4 layers rather than 2. He does have palpable pedal pulses on exam today. I also counseled him regarding activity, particularly walking, as well as elevation of the legs. I think once the bariatric surgery and weight loss are accomplished, we can further evaluate for possibility of venous intervention. I will follow along with Dr. Herlinda Leach in the wound care center.

## 2021-11-22 ENCOUNTER — HOSPITAL ENCOUNTER (OUTPATIENT)
Dept: WOUND CARE | Age: 46
Discharge: HOME | End: 2021-11-22
Payer: COMMERCIAL

## 2021-11-22 VITALS — RESPIRATION RATE: 18 BRPM | SYSTOLIC BLOOD PRESSURE: 175 MMHG | DIASTOLIC BLOOD PRESSURE: 93 MMHG

## 2021-11-22 DIAGNOSIS — I83.212 VARICOSE VEINS OF RIGHT LOWER EXTREMITY WITH INFLAMMATION, WITH ULCER OF CALF LIMITED TO BREAKDOWN OF SKIN (HCC): ICD-10-CM

## 2021-11-22 DIAGNOSIS — L97.222 VARICOSE VEINS OF LEFT LOWER EXTREMITY WITH INFLAMMATION, WITH ULCER OF CALF WITH FAT LAYER EXPOSED (HCC): ICD-10-CM

## 2021-11-22 DIAGNOSIS — I83.222 VARICOSE VEINS OF LEFT LOWER EXTREMITY WITH INFLAMMATION, WITH ULCER OF CALF WITH FAT LAYER EXPOSED (HCC): ICD-10-CM

## 2021-11-22 DIAGNOSIS — L97.211 VARICOSE VEINS OF RIGHT LOWER EXTREMITY WITH INFLAMMATION, WITH ULCER OF CALF LIMITED TO BREAKDOWN OF SKIN (HCC): ICD-10-CM

## 2021-11-22 DIAGNOSIS — L97.222 NON-PRESSURE CHRONIC ULCER OF LEFT CALF WITH FAT LAYER EXPOSED (HCC): Primary | ICD-10-CM

## 2021-11-22 PROCEDURE — 6370000000 HC RX 637 (ALT 250 FOR IP): Performed by: PODIATRIST

## 2021-11-22 PROCEDURE — 11042 DBRDMT SUBQ TIS 1ST 20SQCM/<: CPT

## 2021-11-22 PROCEDURE — 29581 APPL MULTLAYER CMPRN SYS LEG: CPT

## 2021-11-22 PROCEDURE — 11045 DBRDMT SUBQ TISS EACH ADDL: CPT

## 2021-11-22 PROCEDURE — 11042 DBRDMT SUBQ TIS 1ST 20SQCM/<: CPT | Performed by: SURGERY

## 2021-11-22 PROCEDURE — 11045 DBRDMT SUBQ TISS EACH ADDL: CPT | Performed by: SURGERY

## 2021-11-22 RX ORDER — LIDOCAINE HYDROCHLORIDE 40 MG/ML
SOLUTION TOPICAL ONCE
Status: COMPLETED | OUTPATIENT
Start: 2021-11-22 | End: 2021-11-22

## 2021-11-22 RX ORDER — LIDOCAINE HYDROCHLORIDE 20 MG/ML
JELLY TOPICAL ONCE
Status: CANCELLED | OUTPATIENT
Start: 2021-11-22 | End: 2021-11-22

## 2021-11-22 RX ORDER — LIDOCAINE 40 MG/G
CREAM TOPICAL ONCE
Status: CANCELLED | OUTPATIENT
Start: 2021-11-22 | End: 2021-11-22

## 2021-11-22 RX ORDER — CLOBETASOL PROPIONATE 0.5 MG/G
OINTMENT TOPICAL ONCE
Status: CANCELLED | OUTPATIENT
Start: 2021-11-22 | End: 2021-11-22

## 2021-11-22 RX ORDER — LIDOCAINE HYDROCHLORIDE 40 MG/ML
SOLUTION TOPICAL ONCE
Status: CANCELLED | OUTPATIENT
Start: 2021-11-22 | End: 2021-11-22

## 2021-11-22 RX ORDER — BACITRACIN ZINC AND POLYMYXIN B SULFATE 500; 1000 [USP'U]/G; [USP'U]/G
OINTMENT TOPICAL ONCE
Status: CANCELLED | OUTPATIENT
Start: 2021-11-22 | End: 2021-11-22

## 2021-11-22 RX ORDER — BETAMETHASONE DIPROPIONATE 0.05 %
OINTMENT (GRAM) TOPICAL ONCE
Status: CANCELLED | OUTPATIENT
Start: 2021-11-22 | End: 2021-11-22

## 2021-11-22 RX ORDER — GINSENG 100 MG
CAPSULE ORAL ONCE
Status: CANCELLED | OUTPATIENT
Start: 2021-11-22 | End: 2021-11-22

## 2021-11-22 RX ORDER — LIDOCAINE 50 MG/G
OINTMENT TOPICAL ONCE
Status: CANCELLED | OUTPATIENT
Start: 2021-11-22 | End: 2021-11-22

## 2021-11-22 RX ORDER — BACITRACIN, NEOMYCIN, POLYMYXIN B 400; 3.5; 5 [USP'U]/G; MG/G; [USP'U]/G
OINTMENT TOPICAL ONCE
Status: CANCELLED | OUTPATIENT
Start: 2021-11-22 | End: 2021-11-22

## 2021-11-22 RX ORDER — GENTAMICIN SULFATE 1 MG/G
OINTMENT TOPICAL ONCE
Status: CANCELLED | OUTPATIENT
Start: 2021-11-22 | End: 2021-11-22

## 2021-11-22 RX ADMIN — LIDOCAINE HYDROCHLORIDE: 40 SOLUTION TOPICAL at 14:16

## 2021-11-22 NOTE — PROGRESS NOTES
[address] [facility]  Progress Note and Procedure Note      Jose Raul Leary  MEDICAL RECORD NUMBER:  [id number]  AGE: 55 y.o. GENDER: male  : 1975  EPISODE DATE:  2021    Subjective:     Chief Complaint   Patient presents with    Wound Check     LEFT LEG       HISTORY of PRESENT ILLNESS HPI   Jose Raul Leary is a 55 y.o. male who presents today for wound/ulcer evaluation. History of Wound Context: He tolerated 4-layer wrap last week. Reports less odor through the week. He reports not much change with his weight, though he is trying to cut back. States that he has for the most part stopped drinking sodas. Currently work-up with bariatric surgery, but states he was told he could not have this until his leg wound is healed. Ulcer Identification:  Ulcer Type: venous  Contributing Factors: venous stasis, decreased mobility and obesity      Last S0V if applicable:   Hemoglobin A1C   Date Value Ref Range Status   08/10/2021 4.8 See comment % Final     Comment:     Comment:  Diagnosis of Diabetes: > or = 6.5%  Increased risk of diabetes (Prediabetes): 5.7-6.4%  Glycemic Control: Nonpregnant Adults: <7.0%                    Pregnant: <6.0%           Objective:      BP (!) 175/93   Resp 18     Wt Readings from Last 3 Encounters:   21 (!) 478 lb (216.8 kg)   21 (!) 480 lb 12.8 oz (218.1 kg)   21 (!) 476 lb 3.1 oz (216 kg)     PHYSICAL EXAM  Left leg edema is relatively well controlled today, compared to last week. The wound itself appears improved with mild to moderate exudate which was debrided. Assessment:      1.  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 Varicose veins of right lower extremity with inflammation, with ulcer of calf limited to breakdown of skin ([address]    3. Varicose veins of left lower extremity with inflammation, with ulcer of calf with fat layer exposed ([address]      Procedure Note  Indications:  Based on my examination of this patient's wound(s)/ulcer(s) today, debridement is required to promote healing and evaluate the wound base. Performed by: Ronny Chandra MD    Consent obtained:  Yes    Time out taken:  Yes    Pain Control: Anesthetic  Anesthetic: 4% Lidocaine Liquid Topical     Debridement: Excisional Debridement    Using curette the wound(s)/ulcer(s) was/were debrided down through and including the removal of subcutaneous tissue.         Devitalized Tissue Debrided:  exudate    Pre Debridement Measurements:  Are located in the [facility]  Documentation Flow Sheet    Wound/Ulcer #: 1    Post Debridement Measurements:  Wound/Ulcer Descriptions are Pre Debridement except measurements:    Wound 10/07/21 Leg Lateral;Left #1 (Active)   Wound Image   11/18/21 1309   Wound Cleansed Cleansed with saline 11/22/21 1416   Dressing/Treatment Alginate with Ag 11/22/21 1431   Wound Length (cm) 5.5 cm 11/22/21 1416   Wound Width (cm) 4.3 cm 11/22/21 1416   Wound Depth (cm) 0.1 cm 11/22/21 1416   Wound Surface Area (cm^2) 23.65 cm^2 11/22/21 1416   Change in Wound Size % (l*w) -181.55 11/22/21 1416   Wound Volume (cm^3) 2.365 cm^3 11/22/21 1416   Wound Healing % -182 11/22/21 1416   Post-Procedure Length (cm) 5.4 cm 11/22/21 1431   Post-Procedure Width (cm) 4.4 cm 11/22/21 1431   Post-Procedure Depth (cm) 0.3 cm 11/22/21 1431   Post-Procedure Surface Area (cm^2) 23.76 cm^2 11/22/21 1431   Post-Procedure Volume (cm^3) 7.128 cm^3 11/22/21 1431   Distance Tunneling (cm) 0 cm 11/18/21 1309   Tunneling Position ___ O'Clock 0 10/28/21 1333   Undermining Starts ___ O'Clock 0 10/28/21 1333   Undermining Ends___ O'Clock 0 10/28/21 1333   Undermining Maxium Distance (cm) 0 11/18/21 1309   Wound Assessment Fibrin; Pink/red 11/22/21 1416   Drainage Amount Moderate 11/22/21 1416   Drainage Description Breanne Caro 11/22/21 1416   Odor Moderate 11/22/21 1416   Jacy-wound Assessment Edematous 11/22/21 1416   Margins Attached edges 11/22/21 1416   Wound Thickness Description not for Pressure Injury Full thickness 11/22/21 1416   Number of days: 46     Incision 12/20/19 Foot Left;Plantar (Active)   Number of days: 703       Percent of Wound(s)/Ulcer(s) Debrided: 100%    Total Surface Area Debrided:  23.76 sq cm     Diabetic/Pressure/Non Pressure Ulcers only:  Ulcer: Non-Pressure ulcer, fat layer exposed     Estimated Blood Loss:  Minimal    Hemostasis Achieved:  by pressure    Procedural Pain:  3  / 10     Post Procedural Pain:  2 / 10     Response to treatment:  Well tolerated by patient. Plan:   1. Continue 4 layer Coban compression wrap. 2.  He is traveling this week, approximately 11-hour car ride each way. Advised him this is not optimal for his venous stasis ulcer. Advised he elevate the leg is much as possible. 3.  He is profoundly obese and this is more than likely be most contributed factor. He absolutely must manage his weight or he will have poor long-term success. Treatment Note please see attached Discharge Instructions    New Medication(s) at this visit:   New Prescriptions    No medications on file       Other orders at this visit:   Orders Placed This Encounter   Procedures    Initiate Outpatient Wound Care Protocol       Smoking Cessation: Counseling given: Not Answered  Comment: States \"vapes a little\"      Written patient dismissal instructions given to patient and signed by patient or POA. Discharge [address] [facility] Physician Orders and Discharge Instructions  [address]  Telephone: [phone]  NAME:  Ani Ordaz OF BIRTH:  1975  MEDICAL RECORD NUMBER:  [id number]  DATE:  11/22/2021    Wash hands with soap and water prior to and after every dressing change. Wound Cleansing:   · Do not scrub or use excessive force. · With each dressing change, rinse wounds with 0.9% Saline.  (May use wound wash or soft contact solution. Both can be purchased at a local drug store). · If unable to obtain saline, may use a gentle soap and water. · Keep wounds dry in the shower unless otherwise instructed by the physician. · For wounds on lower legs, cast covers can be purchased at local drug stores, so that you may shower and keep the wound(s) dry. []  Vashe Wash solution instructions (if prescribed): Apply enough Vashe to soak a piece of gauze and place on wound bed for 5-10 minutes. DO NOT rinse after Vashe has been applied. Follow dressing application as instructed below. Jacy wound Topical Treatments:  Do not apply lotions, creams, or ointments to the skin around the wound bed unless directed as followed:     [] Apply around the wound: [] moisturizing lotion [] Antifungal ointment [] No-Sting barrier film [] Zinc paste [] Other:       Dressings:           Wound Location: left lower leg       Apply Primary Dressing to wound:       Alginate with silver pad     Cover and Secure with:     Cover and Secure with: Extra absorbant pad   Avoid contact of tape with skin if possible.  When to change Dressing: [] Daily [] Every Other Day [] Once a week  [] Three times per week: [] Monday, Wednesday, Friday [] Tuesday, Thursday, Saturday  [x] Do Not Change Dressing [] Other:     Edema Control:  Apply: [x] Compression Stocking  [] Left Lower Leg [x] Right Lower Leg          Apply every morning immediately when getting up. They should be applied to affected leg(s) from mid foot to knee making sure to cover the heel. Remove every night before going to bed. [x] Elevate leg(s) above the level of the heart for 30 minutes 4-5 times a day and/or when sitting. [x] Avoid prolonged standing in one place. Multilayer Compression Wrap:    Type: 4 Layer Compression Wrap   · Applied in Clinic to the :  Left lower leg(s) on 11/22/2021  · Do not get leg(s) with wrap wet.   · If wraps become too tight call the center or completely remove the wrap. · Elevate leg(s) above the level of the heart when sitting. · Avoid prolonged standing in one place. Dietary:  Important dietary reminders:  1. Increase Protein intake (i.e. Lean meats, fish, eggs, legumes, and yogurt)  2. No added salt  3. If diabetic, follow a diabetic diet and check glucose prior to meals or as instructed by your physician. Dietary Supplements:  [] Hector  [] 30ml ProStat  [] EnsureEnlive [] Ensure Max/Premier  [] Other:    If you are still having pain after you go home:   For wounds on lower legs or arms, elevate the affected limb.  Use over-the-counter medications you would normally use for pain as permitted by your primary care doctor.  For persistent pain not relieved by the above interventions, please call your primary care doctor. Return Appointment:  Armando Van Return Appointment: With Dr. Keila Chau- 12/2/21  o Scheduled weekly until (Date):      [] Return Appointment for a Wound Assessment with a nurse on: 11/29/21    o All other future appointments:  Future Appointments   Date Time Provider Francie Salmon   12/6/2021 12:00 PM aGvin Garrido MD R [facility]   12/15/2021  2:00 PM MD AMY Ortiz NIDA [facility]   1/28/2022 12:30 PM MD Savana Sena Genera S/ON [facility]   -       [x] Orders placed during your visit:   Orders Placed This Encounter   Procedures    Initiate Outpatient Wound Care Protocol       Your nurse  is:  Fernytine Smoker     Electronically signed by Ana Rosa Flor RN on 11/22/2021 at 2:33 PM     [facility] Information: Should you experience any significant changes in your wound(s) or have questions about your wound care, please contact the [address] [place] [address] at [phone]. We are open from 8:00am - 4:30p Monday thru Friday except for Wednesdays which we are closed. Please give us 24-48 hours to return your call.       Call your doctor now or seek immediate medical care if:    · You have symptoms of infection, such as:  ? Increased pain, swelling, warmth, or redness. ? Red streaks leading from the area. ? Pus draining from the area. ? A fever.         Physician for this visit and orders: Michael Engel MD    [] Patient unable to sign Discharge Instructions given to ECF/Transportation/POA            Electronically signed by Michael Engel MD on 11/22/2021 at 3:09 PM

## 2021-11-22 NOTE — PROGRESS NOTES
Multilayer Compression Wrap   (Not Unna) Below the Knee    NAME:  Cammy Harris  YOB: 1975  MEDICAL RECORD NUMBER:  [id number]  DATE:  11/22/2021        Removed old Multilayer wrap if present and washed leg with mild soap/water.   Applied moisturizing agent to dry skin as needed.   Applied primary and secondary dressing as ordered     Applied multilayered dressing below the knee to Left lower leg(s)  (4 Layer Compression Wrap ) .   Instructed patient/caregiver not to remove dressing and to keep it clean and dry.   Instructed patient/caregiver on complications to report to provider, such as pain, numbness in toes, heavy drainage, and slippage of dressing.   Instructed patient on purpose of compression dressing and on activity and exercise recommendations.     Applied per   Guidelines    Electronically signed by Kerri Yu RN on 11/22/2021 at 2:40 PM

## 2021-11-22 NOTE — PROGRESS NOTES
Multilayer Compression Wrap   (Not Unna) Below the Knee    NAME:  Amber Harris  YOB: 1975  MEDICAL RECORD NUMBER:  [id number]  DATE:  11/22/2021        Removed old Multilayer wrap if present and washed leg with mild soap/water.   Applied moisturizing agent to dry skin as needed.   Applied primary and secondary dressing as ordered     Applied multilayered dressing below the knee to Left lower leg(s)  (4 Layer Compression Wrap ) .   Instructed patient/caregiver not to remove dressing and to keep it clean and dry.   Instructed patient/caregiver on complications to report to provider, such as pain, numbness in toes, heavy drainage, and slippage of dressing.   Instructed patient on purpose of compression dressing and on activity and exercise recommendations.     Applied per   Guidelines    Electronically signed by Madalyn Chapman RN on 11/22/2021 at 2:42 PM

## 2021-11-29 ENCOUNTER — HOSPITAL ENCOUNTER (OUTPATIENT)
Dept: WOUND CARE | Age: 46
Discharge: HOME | End: 2021-11-29
Payer: COMMERCIAL

## 2021-11-29 VITALS
HEART RATE: 75 BPM | TEMPERATURE: 97 F | DIASTOLIC BLOOD PRESSURE: 72 MMHG | RESPIRATION RATE: 18 BRPM | SYSTOLIC BLOOD PRESSURE: 153 MMHG

## 2021-11-29 DIAGNOSIS — L97.212 VARICOSE VEINS OF RIGHT LOWER EXTREMITY WITH INFLAMMATION, WITH ULCER OF CALF WITH FAT LAYER EXPOSED (HCC): ICD-10-CM

## 2021-11-29 DIAGNOSIS — I83.212 VARICOSE VEINS OF RIGHT LOWER EXTREMITY WITH INFLAMMATION, WITH ULCER OF CALF WITH FAT LAYER EXPOSED (HCC): ICD-10-CM

## 2021-11-29 DIAGNOSIS — L97.222 VARICOSE VEINS OF LEFT LOWER EXTREMITY WITH INFLAMMATION, WITH ULCER OF CALF WITH FAT LAYER EXPOSED (HCC): ICD-10-CM

## 2021-11-29 DIAGNOSIS — I83.222 VARICOSE VEINS OF LEFT LOWER EXTREMITY WITH INFLAMMATION, WITH ULCER OF CALF WITH FAT LAYER EXPOSED (HCC): ICD-10-CM

## 2021-11-29 DIAGNOSIS — L97.222 NON-PRESSURE CHRONIC ULCER OF LEFT CALF WITH FAT LAYER EXPOSED (HCC): ICD-10-CM

## 2021-11-29 PROCEDURE — 29581 APPL MULTLAYER CMPRN SYS LEG: CPT

## 2021-11-29 NOTE — PROGRESS NOTES
Multilayer Compression Wrap   (Not Unna) Below the Knee    NAME:  Karli Harris  YOB: 1975  MEDICAL RECORD NUMBER:  [id number]  DATE:  11/29/2021        Removed old Multilayer wrap if present and washed leg with mild soap/water.   Applied moisturizing agent to dry skin as needed.   Applied primary and secondary dressing as ordered     Applied multilayered dressing below the knee to Left lower leg(s)  (4 Layer Compression Wrap ) .   Instructed patient/caregiver not to remove dressing and to keep it clean and dry.   Instructed patient/caregiver on complications to report to provider, such as pain, numbness in toes, heavy drainage, and slippage of dressing.   Instructed patient on purpose of compression dressing and on activity and exercise recommendations.     Applied per   Guidelines    Electronically signed by Carla Cavazos RN on 11/29/2021 at 12:49 PM

## 2021-12-02 ENCOUNTER — HOSPITAL ENCOUNTER (OUTPATIENT)
Dept: WOUND CARE | Age: 46
Discharge: HOME | End: 2021-12-02
Payer: COMMERCIAL

## 2021-12-02 VITALS
BODY MASS INDEX: 59.58 KG/M2 | TEMPERATURE: 97.7 F | DIASTOLIC BLOOD PRESSURE: 73 MMHG | RESPIRATION RATE: 20 BRPM | WEIGHT: 315 LBS | SYSTOLIC BLOOD PRESSURE: 139 MMHG | HEART RATE: 74 BPM

## 2021-12-02 DIAGNOSIS — L97.222 NON-PRESSURE CHRONIC ULCER OF LEFT CALF WITH FAT LAYER EXPOSED (HCC): Primary | ICD-10-CM

## 2021-12-02 DIAGNOSIS — I83.212 VARICOSE VEINS OF RIGHT LOWER EXTREMITY WITH INFLAMMATION, WITH ULCER OF CALF LIMITED TO BREAKDOWN OF SKIN (HCC): ICD-10-CM

## 2021-12-02 DIAGNOSIS — L97.211 VARICOSE VEINS OF RIGHT LOWER EXTREMITY WITH INFLAMMATION, WITH ULCER OF CALF LIMITED TO BREAKDOWN OF SKIN (HCC): ICD-10-CM

## 2021-12-02 DIAGNOSIS — I83.222 VARICOSE VEINS OF LEFT LOWER EXTREMITY WITH INFLAMMATION, WITH ULCER OF CALF WITH FAT LAYER EXPOSED (HCC): ICD-10-CM

## 2021-12-02 DIAGNOSIS — L97.222 VARICOSE VEINS OF LEFT LOWER EXTREMITY WITH INFLAMMATION, WITH ULCER OF CALF WITH FAT LAYER EXPOSED (HCC): ICD-10-CM

## 2021-12-02 PROCEDURE — 11045 DBRDMT SUBQ TISS EACH ADDL: CPT

## 2021-12-02 PROCEDURE — 6370000000 HC RX 637 (ALT 250 FOR IP): Performed by: PODIATRIST

## 2021-12-02 PROCEDURE — 11042 DBRDMT SUBQ TIS 1ST 20SQCM/<: CPT

## 2021-12-02 PROCEDURE — 29581 APPL MULTLAYER CMPRN SYS LEG: CPT

## 2021-12-02 RX ORDER — LIDOCAINE HYDROCHLORIDE 20 MG/ML
JELLY TOPICAL ONCE
Status: CANCELLED | OUTPATIENT
Start: 2021-12-02 | End: 2021-12-02

## 2021-12-02 RX ORDER — GINSENG 100 MG
CAPSULE ORAL ONCE
Status: CANCELLED | OUTPATIENT
Start: 2021-12-02 | End: 2021-12-02

## 2021-12-02 RX ORDER — CLOBETASOL PROPIONATE 0.5 MG/G
OINTMENT TOPICAL ONCE
Status: CANCELLED | OUTPATIENT
Start: 2021-12-02 | End: 2021-12-02

## 2021-12-02 RX ORDER — LIDOCAINE 50 MG/G
OINTMENT TOPICAL ONCE
Status: CANCELLED | OUTPATIENT
Start: 2021-12-02 | End: 2021-12-02

## 2021-12-02 RX ORDER — BACITRACIN, NEOMYCIN, POLYMYXIN B 400; 3.5; 5 [USP'U]/G; MG/G; [USP'U]/G
OINTMENT TOPICAL ONCE
Status: CANCELLED | OUTPATIENT
Start: 2021-12-02 | End: 2021-12-02

## 2021-12-02 RX ORDER — BETAMETHASONE DIPROPIONATE 0.05 %
OINTMENT (GRAM) TOPICAL ONCE
Status: CANCELLED | OUTPATIENT
Start: 2021-12-02 | End: 2021-12-02

## 2021-12-02 RX ORDER — LIDOCAINE 40 MG/G
CREAM TOPICAL ONCE
Status: CANCELLED | OUTPATIENT
Start: 2021-12-02 | End: 2021-12-02

## 2021-12-02 RX ORDER — LIDOCAINE HYDROCHLORIDE 40 MG/ML
SOLUTION TOPICAL ONCE
Status: COMPLETED | OUTPATIENT
Start: 2021-12-02 | End: 2021-12-02

## 2021-12-02 RX ORDER — BACITRACIN ZINC AND POLYMYXIN B SULFATE 500; 1000 [USP'U]/G; [USP'U]/G
OINTMENT TOPICAL ONCE
Status: CANCELLED | OUTPATIENT
Start: 2021-12-02 | End: 2021-12-02

## 2021-12-02 RX ORDER — GENTAMICIN SULFATE 1 MG/G
OINTMENT TOPICAL ONCE
Status: CANCELLED | OUTPATIENT
Start: 2021-12-02 | End: 2021-12-02

## 2021-12-02 RX ORDER — LIDOCAINE HYDROCHLORIDE 40 MG/ML
SOLUTION TOPICAL ONCE
Status: CANCELLED | OUTPATIENT
Start: 2021-12-02 | End: 2021-12-02

## 2021-12-02 RX ADMIN — LIDOCAINE HYDROCHLORIDE: 40 SOLUTION TOPICAL at 16:03

## 2021-12-02 ASSESSMENT — PAIN DESCRIPTION - LOCATION: LOCATION: LEG

## 2021-12-02 ASSESSMENT — PAIN DESCRIPTION - PAIN TYPE: TYPE: ACUTE PAIN

## 2021-12-02 ASSESSMENT — PAIN SCALES - GENERAL: PAINLEVEL_OUTOF10: 2

## 2021-12-02 ASSESSMENT — PAIN DESCRIPTION - DESCRIPTORS: DESCRIPTORS: OTHER (COMMENT)

## 2021-12-02 ASSESSMENT — PAIN DESCRIPTION - ORIENTATION: ORIENTATION: LEFT

## 2021-12-02 NOTE — PROGRESS NOTES
[facility]  Progress Note and Procedure Note      Alexandro Hughes  MEDICAL RECORD NUMBER:  [id number]  AGE: 55 y.o. GENDER: male  : 1975  EPISODE DATE:  2021    Subjective:     Chief Complaint   Patient presents with    Wound Check     LEFT LOWER LEG         HISTORY of PRESENT ILLNESS HPI     Alexandro Hughes is a 55 y.o. male who presents today for wound/ulcer evaluation. History of Wound Context: Patient has ulcer left lateral leg. Patient relates he has followed all instructions.     Wound/Ulcer Pain Timing/Severity: intermittent, mild, moderate  Quality of pain: sharp  Severity:  5 / 10   Modifying Factors: Pain worsens with walking  Associated Signs/Symptoms: edema    Ulcer Identification:  Ulcer Type: venous    Contributing Factors: edema, venous stasis and lymphedema    Acute Wound: N/A        PAST MEDICAL HISTORY        Diagnosis Date    Arthritis     Cellulitis     Chronic back pain     Hepatitis age 24    HNP (herniated nucleus pulposus with myelopathy), thoracic     Hypertension     Melanoma ([address]     left foot     Obesity 2011    ANN-MARIE (obstructive sleep apnea)     NO CPAP     Stasis dermatitis        PAST SURGICAL HISTORY    Past Surgical History:   Procedure Laterality Date    ADENOIDECTOMY      FOOT SURGERY Left 2019    WIDE EXCISION OF LEFT FOOT MELANOMA performed by Griffin Kitchen MD at [facility] [address] Left 2/10/2020    LEFT FOOT MELANOMA WIDE EXCISION AND FULL THICKNESS SKIN GRAFT,LEFT GROIN SENTINEL LYMPH NODE BIOPSY performed by Griffin Kitchen MD at [address]  06    Arand    UPPER GASTROINTESTINAL ENDOSCOPY N/A 2021    EGD BIOPSY performed by Dannis Brunner, DO at [address] [place] [address] EXTRACTION         FAMILY HISTORY    Family History   Problem Relation Age of Onset    Cancer Mother     COPD Paternal Grandfather     Colon Cancer Maternal Grandfather        SOCIAL high compression stockings, 30-40 mm Hg 2 each 1     No current facility-administered medications on file prior to encounter. REVIEW OF SYSTEMS    Pertinent items are noted in HPI. Last P3S if applicable:   Hemoglobin A1C   Date Value Ref Range Status   08/10/2021 4.8 See comment % Final     Comment:     Comment:  Diagnosis of Diabetes: > or = 6.5%  Increased risk of diabetes (Prediabetes): 5.7-6.4%  Glycemic Control: Nonpregnant Adults: <7.0%                    Pregnant: <6.0%           Objective:      /73   Pulse 74   Temp 97.7 °F (36.5 °C) (Temporal)   Resp 20   Wt (!) 476 lb 10.2 oz (216.2 kg)   BMI 59.58 kg/m²     Wt Readings from Last 3 Encounters:   12/02/21 (!) 476 lb 10.2 oz (216.2 kg)   11/18/21 (!) 478 lb (216.8 kg)   11/05/21 (!) 480 lb 12.8 oz (218.1 kg)       PHYSICAL EXAM    General Appearance: alert and oriented to person, place and time, well-developed and well-nourished, in no acute distress  Pedal pulses palpable. Protective sensations intact. Ulcer is noted left lateral leg with granular base and heavy coagulated blood and fibrotic covering. Ulcer does not probe to bone. There are no signs of infection. There is moderate to severe edema bilateral legs. Hemosiderin deposits are noted anterior bilateral legs. Assessment:      1. 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 Varicose veins of right lower extremity with inflammation, with ulcer of calf limited to breakdown of skin ([address]    3. Varicose veins of left lower extremity with inflammation, with ulcer of calf with fat layer exposed ([address]           Procedure Note  Indications:  Based on my examination of this patient's wound(s)/ulcer(s) today, debridement is required to promote healing and evaluate the wound base.     Performed by: Idalia Garcia DPM    Consent obtained:  Yes    Time out taken:  Yes    Pain Control: Anesthetic  Anesthetic: 4% Lidocaine Liquid Topical       Debridement: Excisional Debridement    Using curette the wound(s)/ulcer(s) was/were debrided down through and including the removal of epidermis, dermis and subcutaneous tissue.         Devitalized Tissue Debrided:  fibrin and biofilm    Pre Debridement Measurements:  Are located in the [employer]  Documentation Flow Sheet    Wound/Ulcer #: 1    Post Debridement Measurements:  Wound/Ulcer Descriptions are Pre Debridement except measurements:    Wound 10/07/21 Leg Lateral;Left #1 (Active)   Wound Image   12/02/21 1552   Wound Cleansed Cleansed with saline 12/02/21 1552   Dressing/Treatment Alginate with Ag 12/02/21 1619   Wound Length (cm) 6 cm 12/02/21 1552   Wound Width (cm) 4.2 cm 12/02/21 1552   Wound Depth (cm) 0.1 cm 12/02/21 1552   Wound Surface Area (cm^2) 25.2 cm^2 12/02/21 1552   Change in Wound Size % (l*w) -200 12/02/21 1552   Wound Volume (cm^3) 2.52 cm^3 12/02/21 1552   Wound Healing % -200 12/02/21 1552   Post-Procedure Length (cm) 6.1 cm 12/02/21 1612   Post-Procedure Width (cm) 4.3 cm 12/02/21 1612   Post-Procedure Depth (cm) 0.3 cm 12/02/21 1612   Post-Procedure Surface Area (cm^2) 26.23 cm^2 12/02/21 1612   Post-Procedure Volume (cm^3) 7.869 cm^3 12/02/21 1612   Distance Tunneling (cm) 0 cm 12/02/21 1552   Tunneling Position ___ O'Clock 0 12/02/21 1552   Undermining Starts ___ O'Clock 0 12/02/21 1552   Undermining Ends___ O'Clock 0 12/02/21 1552   Undermining Maxium Distance (cm) 0 12/02/21 1552   Wound Assessment Fibrin; [place]/red; [facility] 12/02/21 1552   Drainage Amount Moderate 12/02/21 1552   Drainage Description Eileen Samayoa 12/02/21 1552   Odor None 12/02/21 1552   Jacy-wound Assessment Intact 12/02/21 1552   Margins Defined edges 12/02/21 1552   Wound Thickness Description not for Pressure Injury Full thickness 12/02/21 1552   Number of days: 56     Incision 12/20/19 Foot Left;Plantar (Active)   Number of days: 713       Percent of Wound(s)/Ulcer(s) Debrided: 100%    Total Surface Area Debrided:  26.23 sq cm     Diabetic/Pressure/Non Pressure Ulcers only:  Ulcer: N/A     Estimated Blood Loss:  Minimal    Hemostasis Achieved:  by pressure    Procedural Pain:  5  / 10     Post Procedural Pain:  5 / 10     Response to treatment:  Well tolerated by patient. Plan:       Treatment Note please see attached Discharge Instructions. Debrided ulcer left lateral leg. Compression applied left leg and patient to elevate when possible. Patient is scheduled to begin a CPAP for apnea, then will see Dr. John Alvarez for weight loss. Return 1 week. New Medication(s) at this visit:   New Prescriptions    No medications on file       Other orders at this visit:   Orders Placed This Encounter   Procedures    Initiate Outpatient Wound Care Protocol       Smoking Cessation: Counseling given: Not Answered  Comment: States \"vapes a little\"      Written patient dismissal instructions given to patient and signed by patient or POA. Discharge [facility] Physician Orders and Discharge Instructions  [address] [place] [address]  Telephone: [phone]  NAME:  Curtis Smoker OF BIRTH:  1975  MEDICAL RECORD NUMBER:  [id number]  DATE:  12/2/2021    Wash hands with soap and water prior to and after every dressing change. Wound Cleansing:   · Do not scrub or use excessive force. · With each dressing change, rinse wounds with 0.9% Saline. (May use wound wash or soft contact solution. Both can be purchased at a local drug store). · If unable to obtain saline, may use a gentle soap and water. · Keep wounds dry in the shower unless otherwise instructed by the physician. · For wounds on lower legs, cast covers can be purchased at local drug stores, so that you may shower and keep the wound(s) dry.     []  Vashe Wash solution instructions (if prescribed): Apply enough Vashe to soak a piece of gauze and place on wound bed for 5-10 minutes. DO NOT rinse after Vashe has been applied. Follow dressing application as instructed below. Jacy wound Topical Treatments:  Do not apply lotions, creams, or ointments to the skin around the wound bed unless directed as followed:     [] Apply around the wound: [] moisturizing lotion [] Antifungal ointment [] No-Sting barrier film [] Zinc paste [] Other:       Dressings:           Wound Location: left lower leg    Apply Primary Dressing to wound:       Alginate with silver pad ;    Cover and Secure with:     Cover and Secure with: Extra absorbant pad  Other charcoal pad   Avoid contact of tape with skin if possible.  When to change Dressing: [] Daily [] Every Other Day [] Once a week  [] Three times per week: [] Monday, Wednesday, Friday [] Tuesday, Thursday, Saturday  [x] Do Not Change Dressing [] Other:    Edema Control:   [x] Elevate leg(s) above the level of the heart for 30 minutes 4-5 times a day and/or when sitting. [x] Avoid prolonged standing in one place. Multilayer Compression Wrap:    Type: 4 Layer Compression Wrap   · Applied in Clinic to the :  Left lower leg(s) on 12/2/2021  · Do not get leg(s) with wrap wet. · If wraps become too tight call the center or completely remove the wrap. · Elevate leg(s) above the level of the heart when sitting. · Avoid prolonged standing in one place. Dietary:  Important dietary reminders:  1. Increase Protein intake (i.e. Lean meats, fish, eggs, legumes, and yogurt)  2. No added salt  3. If diabetic, follow a diabetic diet and check glucose prior to meals or as instructed by your physician. Dietary Supplements:  [] Hector  [] 30ml ProStat  [] EnsureEnlive [] Ensure Max/Premier  [] Other:    If you are still having pain after you go home:   For wounds on lower legs or arms, elevate the affected limb.  Use over-the-counter medications you would normally use for pain as permitted by your primary care doctor.    For persistent pain not relieved by the above interventions, please call your primary care doctor. Return Appointment:   Return Appointment: With Dr Divya Zaidi  in  [facility])  o Scheduled weekly until (Date):      [] Return Appointment for a Wound Assessment with a nurse on:     o All other future appointments:  Future Appointments   Date Time Provider Francie Salmon   12/6/2021 12:00 PM Estevan Be MD R [facility]   12/15/2021  2:00 PM MD AMY Cross NIDA [facility]   1/28/2022 12:30 PM Shyam Barry Phoenix, MD Janeece Living S/ON [facility]         [x] Orders placed during your visit:   Orders Placed This Encounter   Procedures    Initiate Outpatient Wound Care Protocol       Your nurse  is:  Nadia Gallardo     Electronically signed by Raina Temple RN on 12/2/2021 at [employer] Information: Should you experience any significant changes in your wound(s) or have questions about your wound care, please contact the [address] [place] [address] at [phone]. We are open from 8:00am - 4:30p Monday thru Friday except for Wednesdays which we are closed. Please give us 24-48 hours to return your call. Call your doctor now or seek immediate medical care if:    · You have symptoms of infection, such as:  ? Increased pain, swelling, warmth, or redness. ? Red streaks leading from the area. ? Pus draining from the area. ? A fever.         Physician for this visit and orders: Jesus Crabtree DPM    [] Patient unable to sign Discharge Instructions given to ECF/Transportation/POA              Electronically signed by Jesus Crabtree DPM on 12/2/2021 at 4:20 PM

## 2021-12-06 ENCOUNTER — OFFICE VISIT (OUTPATIENT)
Dept: PAIN MANAGEMENT | Age: 46
End: 2021-12-06
Payer: COMMERCIAL

## 2021-12-06 ENCOUNTER — TELEPHONE (OUTPATIENT)
Dept: PULMONOLOGY | Age: 46
End: 2021-12-06

## 2021-12-06 VITALS
DIASTOLIC BLOOD PRESSURE: 82 MMHG | HEIGHT: 75 IN | OXYGEN SATURATION: 97 % | TEMPERATURE: 97.2 F | SYSTOLIC BLOOD PRESSURE: 138 MMHG | HEART RATE: 94 BPM | WEIGHT: 315 LBS | BODY MASS INDEX: 39.17 KG/M2

## 2021-12-06 DIAGNOSIS — S33.9XXD SPRAIN OF LIGAMENT OF LUMBOSACRAL JOINT, SUBSEQUENT ENCOUNTER: ICD-10-CM

## 2021-12-06 DIAGNOSIS — S33.9XXA SPRAIN OF LIGAMENT OF LUMBOSACRAL JOINT, INITIAL ENCOUNTER: ICD-10-CM

## 2021-12-06 DIAGNOSIS — S83.92XD SPRAIN OF LEFT KNEE/LEG, SUBSEQUENT ENCOUNTER: ICD-10-CM

## 2021-12-06 DIAGNOSIS — M51.27 LUMBAGO-SCIATICA DUE TO DISPLACEMENT OF LUMBAR INTERVERTEBRAL DISC: ICD-10-CM

## 2021-12-06 DIAGNOSIS — S83.92XA SPRAIN OF LEFT KNEE/LEG, INITIAL ENCOUNTER: ICD-10-CM

## 2021-12-06 PROCEDURE — 99213 OFFICE O/P EST LOW 20 MIN: CPT | Performed by: INTERNAL MEDICINE

## 2021-12-06 RX ORDER — OXYCODONE AND ACETAMINOPHEN 7.5; 325 MG/1; MG/1
1 TABLET ORAL EVERY 6 HOURS PRN
Qty: 120 TABLET | Refills: 0 | Status: SHIPPED | OUTPATIENT
Start: 2021-12-06 | End: 2022-01-03 | Stop reason: SDUPTHER

## 2021-12-06 RX ORDER — DICLOFENAC SODIUM 75 MG/1
75 TABLET, DELAYED RELEASE ORAL DAILY PRN
Qty: 30 TABLET | Refills: 1 | Status: SHIPPED | OUTPATIENT
Start: 2021-12-06 | End: 2022-01-03 | Stop reason: SDUPTHER

## 2021-12-06 RX ORDER — PREGABALIN 150 MG/1
150 CAPSULE ORAL 3 TIMES DAILY
Qty: 90 CAPSULE | Refills: 0 | Status: SHIPPED | OUTPATIENT
Start: 2021-12-06 | End: 2022-01-03 | Stop reason: SDUPTHER

## 2021-12-06 NOTE — TELEPHONE ENCOUNTER
Pt states he has not yet to receive hi machine since May  He is calling to get a list of DME--so he can chose a new one and have us fax ordre to them    He will call [employer] or send a [employer] message

## 2021-12-06 NOTE — PROGRESS NOTES
[facility]  1975  [id number]    HISTORY OF PRESENT ILLNESS:  Mr. Cathleen Sutton is a 55 y.o. male returns for a follow up visit for multiple medical problems. His current presenting problems are   1. 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 BWC Sprain of ligament of lumbosacral joint, initial encounter    3. BWC Sprain of left knee/leg, initial encounter    4. BWC Sprain of ligament of lumbosacral joint, subsequent encounter    5. BWC Sprain of left knee/leg, subsequent encounter    . As per information/history obtained from the PADT(patient assessment and documentation tool) - He complains of pain in the lower back He rates the pain 4/10 and describes it as aching, burning, numbness, stabbing. Pain is made worse by: movement, walking, standing, sitting, bending, lifting. Current treatment regimen has helped relieve about 70% of the pain. He denies side effects from the current pain regimen. Patient reports that since the last follow up visit the physical functioning is unchanged, family/social relationships are unchanged, mood is unchanged and sleep patterns are unchanged, and that the overall functioning is unchanged. Patient denies neurological bowel or bladder. Patient denies misusing/abusing his narcotic pain medications or using any illegal drugs. There are No indicators for possible drug abuse, addiction or diversion problems. Upon obtaining the medical history from Mr. Cathleen Sutton regarding today's office visit for his presenting problems, patient states he is going for a skin graft on the left leg. Mr. Cathleen Sutton states he has been compliant with his regimen. Patient reports he is working full time still. He mentions he is on Lyrica along with Voltaren PRN and Percocet 7.5 mg 4 per day. Patient denies any constipation symptoms. ALLERGIES: Patients list of allergies were reviewed     MEDICATIONS: Mr. Cathleen Sutton list of medications were reviewed. His current medications are   Outpatient Medications Prior to Visit   Medication Sig Dispense Refill    Compression Stockings MISC by Does not apply route Strength 30-40mmHg  Style: pull on below the knee; open or closed toe  Extremity: bilateral  Donning aid recommended: Yes if needed 1 each 2    Compression Stockings MISC by Does not apply route Strength 30-40mmHg  Style: Farrow wrap , below the knee  Extremity: bilateral  Donning aid recommended: No 1 each 0    penicillin v potassium (VEETID) 500 MG tablet TAKE TWO TABLETS BY MOUTH TWICE A  tablet 3    Multiple Vitamins-Minerals (THERAPEUTIC MULTIVITAMIN-MINERALS) tablet Take 1 tablet by mouth daily      losartan (COZAAR) 100 MG tablet TAKE ONE TABLET BY MOUTH DAILY 90 tablet 2    furosemide (LASIX) 20 MG tablet TAKE ONE TO TWO TABLETS BY MOUTH EVERY MORNING AS NEEDED FOR EDEMA 180 tablet 3    Compression Stockings MISC by Does not apply route Thigh high 1 each 0    Elastic Bandages & Supports (MEDICAL COMPRESSION THIGH HIGH) MISC Thigh high compression stockings, 30-40 mm Hg 2 each 1    oxyCODONE-acetaminophen (PERCOCET) 7.5-325 MG per tablet Take 1 tablet by mouth every 6 hours as needed for Pain (max 4 per day) for up to 30 days. 120 tablet 0    diclofenac (VOLTAREN) 75 MG EC tablet Take 1 tablet by mouth daily as needed for Pain 30 tablet 1    pregabalin (LYRICA) 150 MG capsule Take 1 capsule by mouth 3 times daily for 30 days. 90 capsule 0     No facility-administered medications prior to visit. REVIEW OF SYSTEMS:    Respiratory: Negative for apnea, chest tightness and shortness of breath or change in baseline breathing. PHYSICAL EXAM:   Nursing note and vitals reviewed. /82   Pulse 94   Temp 97.2 °F (36.2 °C)   Ht 6' 3\" (1.905 m)   Wt (!) 479 lb (217.3 kg)   SpO2 97%   BMI 59.87 kg/m²   Constitutional: He appears well-developed and well-nourished. No acute distress.    Cardiovascular: Normal rate, regular rhythm, normal heart sounds, and does not have murmur. Pulmonary/Chest: Effort normal. No respiratory distress. He does not have wheezes in the lung fields. He has no rales. Neurological/Psychiatric:He is alert and oriented to person, place, and time. Coordination is  normal.  His mood isAppropriate and affect is Neutral/Euthymic(normal) . Other: left leg bandaged     IMPRESSION:   1.  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 BWC Sprain of ligament of lumbosacral joint, initial encounter    3. BWC Sprain of left knee/leg, initial encounter    4. BWC Sprain of ligament of lumbosacral joint, subsequent encounter    5. BWC Sprain of left knee/leg, subsequent encounter        PLAN:  Informed verbal consent was obtained  -[place] record was obtained and reviewed  for the last one year and no indicators of drug misuse  were found. Any other controlled substance prescriptions  seen on the record have been accounted for, I am aware of the patient receiving these medications. Caden Nicholson OAR[place] record will be rechecked as part of office protocol. -ROM/Stretching exercises as advised   -He was advised to increase fluids ( 5-7  glasses of fluid daily), limit caffeine, avoid cheese products, increase dietary fiber, increase activity and exercise as tolerated and relax regularly and enjoy meals   -Walking as tolerated    -Continue with Percocet 4 per day   -He was advised weight reduction, diet changes- 800-1200 roc diet, diet diary, exercising, nutritional  consult increased physical activity as tolerated      Current Outpatient Medications   Medication Sig Dispense Refill    oxyCODONE-acetaminophen (PERCOCET) 7.5-325 MG per tablet Take 1 tablet by mouth every 6 hours as needed for Pain (max 4 per day) for up to 30 days. 120 tablet 0    pregabalin (LYRICA) 150 MG capsule Take 1 capsule by mouth 3 times daily for 30 days.  90 capsule 0    diclofenac (VOLTAREN) 75 MG EC tablet Take 1 tablet by mouth daily as needed for Pain 30 tablet 1    Compression Stockings MISC by Does not apply route Strength 30-40mmHg  Style: pull on below the knee; open or closed toe  Extremity: bilateral  Donning aid recommended: Yes if needed 1 each 2    Compression Stockings MISC by Does not apply route Strength 30-40mmHg  Style: Farrow wrap , below the knee  Extremity: bilateral  Donning aid recommended: No 1 each 0    penicillin v potassium (VEETID) 500 MG tablet TAKE TWO TABLETS BY MOUTH TWICE A  tablet 3    Multiple Vitamins-Minerals (THERAPEUTIC MULTIVITAMIN-MINERALS) tablet Take 1 tablet by mouth daily      losartan (COZAAR) 100 MG tablet TAKE ONE TABLET BY MOUTH DAILY 90 tablet 2    furosemide (LASIX) 20 MG tablet TAKE ONE TO TWO TABLETS BY MOUTH EVERY MORNING AS NEEDED FOR EDEMA 180 tablet 3    Compression Stockings MISC by Does not apply route Thigh high 1 each 0    Elastic Bandages & Supports (MEDICAL COMPRESSION THIGH HIGH) MISC Thigh high compression stockings, 30-40 mm Hg 2 each 1     No current facility-administered medications for this visit. I will continue his current medication regimen  which is part of the above treatment schedule. It has been helping with Mr. 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 Risks and benefits of the medications and other alternative treatments  including no treatment were discussed with the patient. The common side effects of these medications were also explained to the patient. Informed verbal consent was obtained.    Goals of current treatment regimen include improvement in pain, restoration of functioning- with focus on improvement in physical performance, general activity, work or disability,emotional distress, health care utilization and  decreased medication consumption. Will continue to monitor progress towards achieving/maintaining therapeutic goals with special emphasis on  1. Improvement in perceived interfernce  of pain with ADL's. Ability to do home exercises independently. Ability to do household chores indoor and/or outdoor work and social and leisure activities. Improve psychosocial and physical functioning. - he is showing progression towards this treatment goal with the current regimen. He was advised against drinking alcohol with the narcotic pain medicines, advised against driving or handling machinery while adjusting the dose of medicines or if having cognitive  issues related to the current medications. Risk of overdose and death, if medicines not taken as prescribed, were also discussed. If the patient develops new symptoms or if the symptoms worsen, the patient should call the office. While transcribing every attempt was made to maintain the accuracy of the note in terms of it's contents,there may have been some errors made inadvertently. Thank you for allowing me to participate in the care of this patient.     John Wilde MD.    Cc: Yadira Nava MD

## 2021-12-09 ENCOUNTER — HOSPITAL ENCOUNTER (OUTPATIENT)
Dept: WOUND CARE | Age: 46
Discharge: HOME | End: 2021-12-09
Payer: COMMERCIAL

## 2021-12-09 VITALS
HEART RATE: 73 BPM | DIASTOLIC BLOOD PRESSURE: 89 MMHG | RESPIRATION RATE: 20 BRPM | SYSTOLIC BLOOD PRESSURE: 169 MMHG | TEMPERATURE: 97 F

## 2021-12-09 DIAGNOSIS — L97.222 NON-PRESSURE CHRONIC ULCER OF LEFT CALF WITH FAT LAYER EXPOSED (HCC): ICD-10-CM

## 2021-12-09 PROCEDURE — 15272 SKIN SUB GRAFT T/A/L ADD-ON: CPT

## 2021-12-09 PROCEDURE — 29581 APPL MULTLAYER CMPRN SYS LEG: CPT

## 2021-12-09 PROCEDURE — 15271 SKIN SUB GRAFT TRNK/ARM/LEG: CPT

## 2021-12-09 PROCEDURE — 11042 DBRDMT SUBQ TIS 1ST 20SQCM/<: CPT

## 2021-12-09 NOTE — PROGRESS NOTES
[facility]27 [facility] [place]  Progress Note and Procedure Note      Joyce Kelly  MEDICAL RECORD NUMBER:  [id number]  AGE: 55 y.o. GENDER: male  : 1975  EPISODE DATE:  2021    Subjective:     Chief Complaint   Patient presents with    Wound Check     left lower leg         HISTORY of PRESENT ILLNESS HPI     Joyce Kelly is a 55 y.o. male who presents today for wound/ulcer evaluation. History of Wound Context: Patient has ulcer left lateral leg. Patient relates he has followed all instructions.     Wound/Ulcer Pain Timing/Severity: intermittent, mild, moderate  Quality of pain: sharp  Severity:  5 / 10   Modifying Factors: Pain worsens with walking  Associated Signs/Symptoms: edema    Ulcer Identification:  Ulcer Type: venous    Contributing Factors: edema, venous stasis and lymphedema    Acute Wound: N/A        PAST MEDICAL HISTORY        Diagnosis Date    Arthritis     Cellulitis     Chronic back pain     Hepatitis age 24    HNP (herniated nucleus pulposus with myelopathy), thoracic     Hypertension     Melanoma ([address]     left foot     Obesity 2011    ANN-MARIE (obstructive sleep apnea)     NO CPAP     Stasis dermatitis        PAST SURGICAL HISTORY    Past Surgical History:   Procedure Laterality Date    ADENOIDECTOMY      FOOT SURGERY Left 2019    WIDE EXCISION OF LEFT FOOT MELANOMA performed by Cholo Stockton MD at [facility] Left 2/10/2020    LEFT FOOT MELANOMA WIDE EXCISION AND FULL THICKNESS SKIN GRAFT,LEFT GROIN SENTINEL LYMPH NODE BIOPSY performed by Cholo Stockton MD at [facility]  06    [place]    UPPER GASTROINTESTINAL ENDOSCOPY N/A 2021    EGD BIOPSY performed by Sina Matamoros DO at [facility] EXTRACTION         FAMILY HISTORY    Family History   Problem Relation Age of Onset    Cancer Mother     COPD Paternal Grandfather     Colon Cancer Maternal Grandfather        SOCIAL HISTORY    Social History     Tobacco Use    Smoking status: Former Smoker     Packs/day: 1.50     Types: Cigarettes     Quit date: 2005     Years since quittin.9    Smokeless tobacco: Former User     Types: Chew    Tobacco comment: States \"vapes a little\"   Vaping Use    Vaping Use: Every day    Substances: Nicotine, Flavoring    Devices: Refillable tank   Substance Use Topics    Alcohol use: No     Alcohol/week: 0.0 standard drinks     Comment: rarely -- 1 every few months    Drug use: No       ALLERGIES    No Known Allergies    MEDICATIONS    Current Outpatient Medications on File Prior to Encounter   Medication Sig Dispense Refill    oxyCODONE-acetaminophen (PERCOCET) 7.5-325 MG per tablet Take 1 tablet by mouth every 6 hours as needed for Pain (max 4 per day) for up to 30 days. 120 tablet 0    pregabalin (LYRICA) 150 MG capsule Take 1 capsule by mouth 3 times daily for 30 days.  90 capsule 0    penicillin v potassium (VEETID) 500 MG tablet TAKE TWO TABLETS BY MOUTH TWICE A  tablet 3    Multiple Vitamins-Minerals (THERAPEUTIC MULTIVITAMIN-MINERALS) tablet Take 1 tablet by mouth daily      losartan (COZAAR) 100 MG tablet TAKE ONE TABLET BY MOUTH DAILY 90 tablet 2    furosemide (LASIX) 20 MG tablet TAKE ONE TO TWO TABLETS BY MOUTH EVERY MORNING AS NEEDED FOR EDEMA 180 tablet 3    Compression Stockings MISC by Does not apply route Thigh high 1 each 0    diclofenac (VOLTAREN) 75 MG EC tablet Take 1 tablet by mouth daily as needed for Pain 30 tablet 1    Compression Stockings MISC by Does not apply route Strength 30-40mmHg  Style: pull on below the knee; open or closed toe  Extremity: bilateral  Donning aid recommended: Yes if needed 1 each 2    Compression Stockings MISC by Does not apply route Strength 30-40mmHg  Style: Farrow wrap , below the knee  Extremity: bilateral  Donning aid recommended: No 1 each 0    Elastic Bandages & Supports (MEDICAL COMPRESSION THIGH HIGH) MISC Thigh high compression stockings, 30-40 mm Hg 2 each 1     No current facility-administered medications on file prior to encounter. REVIEW OF SYSTEMS    Pertinent items are noted in HPI. Last X6S if applicable:   Hemoglobin A1C   Date Value Ref Range Status   08/10/2021 4.8 See comment % Final     Comment:     Comment:  Diagnosis of Diabetes: > or = 6.5%  Increased risk of diabetes (Prediabetes): 5.7-6.4%  Glycemic Control: Nonpregnant Adults: <7.0%                    Pregnant: <6.0%           Objective:      BP (!) 169/89   Pulse 73   Temp 97 °F (36.1 °C) (Temporal)   Resp 20     Wt Readings from Last 3 Encounters:   12/06/21 (!) 479 lb (217.3 kg)   12/02/21 (!) 476 lb 10.2 oz (216.2 kg)   11/18/21 (!) 478 lb (216.8 kg)       PHYSICAL EXAM    General Appearance: alert and oriented to person, place and time, well-developed and well-nourished, in no acute distress  Pedal pulses palpable. Protective sensations intact. Ulcer is noted left lateral leg with granular base and heavy coagulated blood and fibrotic covering. Ulcer does not probe to bone. There are no signs of infection. There is moderate edema bilateral legs. Hemosiderin deposits are noted anterior bilateral legs. Assessment:      1. Non-pressure chronic ulcer of left calf with fat layer exposed ([address]           Procedure Note  Indications:  Based on my examination of this patient's wound(s)/ulcer(s) today, debridement is required to promote healing and evaluate the wound base. Performed by: Giorgio Dawson DPM    Consent obtained:  Yes    Time out taken:  Yes    Pain Control: Anesthetic  Anesthetic: 4% Lidocaine Liquid Topical       Debridement: Excisional Debridement    Using curette the wound(s)/ulcer(s) was/were debrided down through and including the removal of epidermis, dermis and subcutaneous tissue.         Devitalized Tissue Debrided:  fibrin and biofilm    Pre Debridement Measurements:  Are located in the [place]  Documentation Application    Performed by: Stephany Dey DPM    Ulcer Type: venous    Consent obtained: Yes    Time out taken: Yes    Product Utilized: TheraSkin 39 sq/cm     Fenestrated: Yes    Mesher Utilized: No    Instrument(s) curette    Skin Substitute was Applied to Ulcer Number(s):    Ulcer #: 1  Diabetic/Pressure/Non Pressure Ulcers:  Ulcer:   N/A      Total Surface Area of Ulcer(s) Covered 16.56 sq/cm    Was the Product Layered  No     Amount of Product Applied 44 sq/cm     Amount of Product Wasted 0 sq/cm     Reason for Waste: N/A  The ulcer has islands of skin and the graft needed to be cut to size for different portions of the ulcer. This required all 39 sq cm of graft for 16 sq cm ulcer. Surgically Fixated: Yes    Secured With: Steri Strips and Silicone Dressing     Procedural Pain: 0/10     Post Procedural Pain: 0 / 10    Response to Treatment: Well tolerated by patient. Plan:       Treatment Note please see attached Discharge Instructions. Debrided ulcer left lateral leg. Graft applied. Compression applied left leg and patient to elevate when possible. Do not removed compression or dressing. Keep warm dry. Return 1 week. New Medication(s) at this visit:   New Prescriptions    No medications on file       Other orders at this visit:   No orders of the defined types were placed in this encounter. Smoking Cessation: Counseling given: Not Answered  Comment: States \"vapes a little\"      Written patient dismissal instructions given to patient and signed by patient or POA. Discharge [facility] Physician Orders and Discharge Instructions  [address] [place] [facility]. [address]  Telephone: [phone]  NAME:  Rikki Moran OF BIRTH:  1975  MEDICAL RECORD NUMBER:  [id number]  DATE:  12/9/2021    Wash hands with soap and water prior to and after every dressing change.     Wound Cleansing: · Do not scrub or use excessive force. · With each dressing change, rinse wounds with 0.9% Saline. (May use wound wash or soft contact solution. Both can be purchased at a local drug store). · If unable to obtain saline, may use a gentle soap and water. · Keep wounds dry in the shower unless otherwise instructed by the physician. · For wounds on lower legs, cast covers can be purchased at local drug stores, so that you may shower and keep the wound(s) dry. []  Vashe Wash solution instructions (if prescribed): Apply enough Vashe to soak a piece of gauze and place on wound bed for 5-10 minutes. DO NOT rinse after Vashe has been applied. Follow dressing application as instructed below. Ajcy wound Topical Treatments:  Do not apply lotions, creams, or ointments to the skin around the wound bed unless directed as followed:     [] Apply around the wound: [] moisturizing lotion [] Antifungal ointment [] No-Sting barrier film [] Zinc paste [] Other:       Dressings:           Wound Location: left lower leg        Cover and Secure with:     Hydrogel, ABD, charcoal   Avoid contact of tape with skin if possible.  When to change Dressing: [] Daily [] Every Other Day [] Once a week  [] Three times per week: [] Monday, Wednesday, Friday [] Tuesday, Thursday, Saturday  [x] Do Not Change Dressing [] Other:    o       [x]Application of a biologic skin substitute:   Theraskin : Wound Location:left lower leg  This is a highly specialized wound care dressing that is intended to enhance your own bodies ability to increase growth factors and other healing abilities. Please leave the dressing clean, dry and intact unless otherwise instructed by your physician. Leave steri-strips and non adherent in place if changing the dressing as instructed.  Edema Control:     [x] Elevate leg(s) above the level of the heart for 30 minutes 4-5 times a day and/or when sitting.    [x] Avoid prolonged standing in one place.    Multilayer Compression Wrap:    Type: 4 Layer Compression Wrap   · Applied in Clinic to the :  Left lower leg(s) on 12/9/2021  · Do not get leg(s) with wrap wet. · If wraps become too tight call the center or completely remove the wrap. · Elevate leg(s) above the level of the heart when sitting. · Avoid prolonged standing in one place. ·     Dietary:  Important dietary reminders:  1. Increase Protein intake (i.e. Lean meats, fish, eggs, legumes, and yogurt)  2. No added salt  3. If diabetic, follow a diabetic diet and check glucose prior to meals or as instructed by your physician. Dietary Supplements:  [] Hector  [] 30ml ProStat  [] EnsureEnlive [] Ensure Max/Premier  [] Other:    If you are still having pain after you go home:   For wounds on lower legs or arms, elevate the affected limb.  Use over-the-counter medications you would normally use for pain as permitted by your primary care doctor.  For persistent pain not relieved by the above interventions, please call your primary care doctor. Return Appointment:   Return Appointment: With Dr Tres Jay  in  [address] [place] [address])  o Scheduled weekly until (Date):      [] Return Appointment for a Wound Assessment with a nurse on:     o All other future appointments:  Future Appointments   Date Time Provider Francie Salmon   12/9/2021  1:15 PM Ewa Lacy DPM [facility] WOUND [facility]   12/15/2021  2:00 PM Stas Godinez MD [facility]   1/3/2022  1:15 PM Josi Torres MD R BANK PAIN [facility]   1/28/2022 12:30 PM Michail Lose, MD Anita Pallas S[facility]   -       [x] Orders placed during your visit: No orders of the defined types were placed in this encounter.       Your nurse  is:  Hermelindo Cardenas     Electronically signed by Briseida Cervantes RN on 12/9/2021 at [address]  Information: Should you experience any significant changes in your wound(s) or have questions about your wound care, please contact the [address] [facility] at [phone]. We are open from 8:00am - 4:30p Monday thru Friday except for Wednesdays which we are closed. Please give us 24-48 hours to return your call. Call your doctor now or seek immediate medical care if:    · You have symptoms of infection, such as:  ? Increased pain, swelling, warmth, or redness. ? Red streaks leading from the area. ? Pus draining from the area. ? A fever.         Physician for this visit and orders: Jose Daniel Mcdaniels DPM    [] Patient unable to sign Discharge Instructions given to ECF/Transportation/POA              Electronically signed by Jose Daniel Mcdaniels DPM on 12/9/2021 at 2:35 PM

## 2021-12-09 NOTE — PROGRESS NOTES
TheraSkin Treatment Note      Goal:  Patient will receive safe and proper application of skin substitute. Patient will comply with caring for dressing, offloading and reporting complications. · [x] Expiration date of TheraSkin checked immediately prior to use.  [x] Package intact prior to use and no damage noted.  [x] Transport temperature controlled and acceptable. TheraSkin was prepared for application by removing from package. TheraSkin was rinsed 2 times in room temperature normal saline for 5 seconds each time. A 2nd saline rinse was left on the TheraSkin until the provider was ready to apply it within 120 minutes of thawing. White side placed against ulcer bed. · [x] Theraskin was applied to wound # 1 and affixed with steri-strips by the provider. · [x] Secondary dressing applied as ordered. · [x] Patient/caregiver was instructed not to remove dressing and to keep it clean and dry. See AVS for further instructions given to patient/caregiver. Theraskin may be applied a total of 10 times per wound over a 12 week period. Additionally Theraskin may only be used every 12 months per wound. Date of first application of Theraskin for this current wound is December 9, 2021.      Application # 1           Guidelines followed      Electronically signed by Vasu Dillard RN on 12/9/2021 at 2:03 PM

## 2021-12-14 ENCOUNTER — TELEPHONE (OUTPATIENT)
Dept: DERMATOLOGY | Age: 46
End: 2021-12-14

## 2021-12-16 ENCOUNTER — HOSPITAL ENCOUNTER (OUTPATIENT)
Dept: WOUND CARE | Age: 46
Discharge: HOME | End: 2021-12-16
Payer: COMMERCIAL

## 2021-12-16 VITALS
RESPIRATION RATE: 18 BRPM | TEMPERATURE: 97.8 F | HEART RATE: 75 BPM | DIASTOLIC BLOOD PRESSURE: 73 MMHG | SYSTOLIC BLOOD PRESSURE: 123 MMHG

## 2021-12-16 DIAGNOSIS — L97.222 VARICOSE VEINS OF LEFT LOWER EXTREMITY WITH INFLAMMATION, WITH ULCER OF CALF WITH FAT LAYER EXPOSED (HCC): ICD-10-CM

## 2021-12-16 DIAGNOSIS — I83.212 VARICOSE VEINS OF RIGHT LOWER EXTREMITY WITH INFLAMMATION, WITH ULCER OF CALF LIMITED TO BREAKDOWN OF SKIN (HCC): ICD-10-CM

## 2021-12-16 DIAGNOSIS — I83.222 VARICOSE VEINS OF LEFT LOWER EXTREMITY WITH INFLAMMATION, WITH ULCER OF CALF WITH FAT LAYER EXPOSED (HCC): ICD-10-CM

## 2021-12-16 DIAGNOSIS — L97.211 VARICOSE VEINS OF RIGHT LOWER EXTREMITY WITH INFLAMMATION, WITH ULCER OF CALF LIMITED TO BREAKDOWN OF SKIN (HCC): ICD-10-CM

## 2021-12-16 DIAGNOSIS — L97.222 NON-PRESSURE CHRONIC ULCER OF LEFT CALF WITH FAT LAYER EXPOSED (HCC): Primary | ICD-10-CM

## 2021-12-16 PROCEDURE — 29581 APPL MULTLAYER CMPRN SYS LEG: CPT

## 2021-12-16 PROCEDURE — 6370000000 HC RX 637 (ALT 250 FOR IP): Performed by: PODIATRIST

## 2021-12-16 RX ORDER — GINSENG 100 MG
CAPSULE ORAL ONCE
Status: CANCELLED | OUTPATIENT
Start: 2021-12-16 | End: 2021-12-16

## 2021-12-16 RX ORDER — LIDOCAINE HYDROCHLORIDE 20 MG/ML
JELLY TOPICAL ONCE
Status: CANCELLED | OUTPATIENT
Start: 2021-12-16 | End: 2021-12-16

## 2021-12-16 RX ORDER — BACITRACIN, NEOMYCIN, POLYMYXIN B 400; 3.5; 5 [USP'U]/G; MG/G; [USP'U]/G
OINTMENT TOPICAL ONCE
Status: CANCELLED | OUTPATIENT
Start: 2021-12-16 | End: 2021-12-16

## 2021-12-16 RX ORDER — GENTAMICIN SULFATE 1 MG/G
OINTMENT TOPICAL ONCE
Status: CANCELLED | OUTPATIENT
Start: 2021-12-16 | End: 2021-12-16

## 2021-12-16 RX ORDER — BETAMETHASONE DIPROPIONATE 0.05 %
OINTMENT (GRAM) TOPICAL ONCE
Status: CANCELLED | OUTPATIENT
Start: 2021-12-16 | End: 2021-12-16

## 2021-12-16 RX ORDER — LIDOCAINE 40 MG/G
CREAM TOPICAL ONCE
Status: CANCELLED | OUTPATIENT
Start: 2021-12-16 | End: 2021-12-16

## 2021-12-16 RX ORDER — CLOBETASOL PROPIONATE 0.5 MG/G
OINTMENT TOPICAL ONCE
Status: CANCELLED | OUTPATIENT
Start: 2021-12-16 | End: 2021-12-16

## 2021-12-16 RX ORDER — BACITRACIN ZINC AND POLYMYXIN B SULFATE 500; 1000 [USP'U]/G; [USP'U]/G
OINTMENT TOPICAL ONCE
Status: CANCELLED | OUTPATIENT
Start: 2021-12-16 | End: 2021-12-16

## 2021-12-16 RX ORDER — LIDOCAINE HYDROCHLORIDE 40 MG/ML
SOLUTION TOPICAL ONCE
Status: COMPLETED | OUTPATIENT
Start: 2021-12-16 | End: 2021-12-16

## 2021-12-16 RX ORDER — LIDOCAINE HYDROCHLORIDE 40 MG/ML
SOLUTION TOPICAL ONCE
Status: CANCELLED | OUTPATIENT
Start: 2021-12-16 | End: 2021-12-16

## 2021-12-16 RX ORDER — LIDOCAINE 50 MG/G
OINTMENT TOPICAL ONCE
Status: CANCELLED | OUTPATIENT
Start: 2021-12-16 | End: 2021-12-16

## 2021-12-16 RX ADMIN — LIDOCAINE HYDROCHLORIDE: 40 SOLUTION TOPICAL at 15:54

## 2021-12-16 NOTE — PROGRESS NOTES
Multilayer Compression Wrap   (Not Unna) Below the Knee    NAME:  Ivory Harris  YOB: 1975  MEDICAL RECORD NUMBER:  [id number]  DATE:  12/16/2021        Removed old Multilayer wrap if present and washed leg with mild soap/water.   Applied moisturizing agent to dry skin as needed.   Applied primary and secondary dressing as ordered     Applied multilayered dressing below the knee to Left lower leg(s)  (4 Layer Compression Wrap ) .   Instructed patient/caregiver not to remove dressing and to keep it clean and dry.   Instructed patient/caregiver on complications to report to provider, such as pain, numbness in toes, heavy drainage, and slippage of dressing.   Instructed patient on purpose of compression dressing and on activity and exercise recommendations.     Applied per   Guidelines    Electronically signed by Genevieve Moreno RN on 12/16/2021 at 4:17 PM

## 2021-12-16 NOTE — PROGRESS NOTES
[facility]  Progress Note and Procedure Note      Vernon Alonso  MEDICAL RECORD NUMBER:  [id number]  AGE: 55 y.o. GENDER: male  : 1975  EPISODE DATE:  2021    Subjective:     Chief Complaint   Patient presents with    Wound Check     left lower leg         HISTORY of PRESENT ILLNESS HPI     Vernon Alonso is a 55 y.o. male who presents today for wound/ulcer evaluation. History of Wound Context: Patient has ulcer left lateral leg. Patient relates his compression fell down this week, so he used a ACE bandage for compression.     Wound/Ulcer Pain Timing/Severity: intermittent, mild, moderate  Quality of pain: sharp  Severity:  5 / 10   Modifying Factors: Pain worsens with walking  Associated Signs/Symptoms: edema    Ulcer Identification:  Ulcer Type: venous    Contributing Factors: edema, venous stasis and lymphedema    Acute Wound: N/A        PAST MEDICAL HISTORY        Diagnosis Date    Arthritis     Cellulitis     Chronic back pain     Hepatitis age 24    HNP (herniated nucleus pulposus with myelopathy), thoracic     Hypertension     [address]oma ([facility] [address] [facility]     left foot     Obesity 2011    ANN-MARIE (obstructive sleep apnea)     NO CPAP     Stasis dermatitis        PAST SURGICAL HISTORY    Past Surgical History:   Procedure Laterality Date    ADENOIDECTOMY      FOOT SURGERY Left 2019    WIDE EXCISION OF LEFT FOOT MELANOMA performed by Clyde Fung MD at [address] [place] [address] Left 2/10/2020    LEFT FOOT MELANOMA WIDE EXCISION AND FULL THICKNESS SKIN GRAFT,LEFT GROIN SENTINEL LYMPH NODE BIOPSY performed by Clyde Fung MD at [address]  06    [place]    UPPER GASTROINTESTINAL ENDOSCOPY N/A 2021    EGD BIOPSY performed by Domonique Mccoy DO at [facility] ENDOSCOPY    WISDOM TOOTH EXTRACTION         FAMILY HISTORY    Family History   Problem Relation Age of Onset    Cancer Mother     COPD Paternal Grandfather    [facility] Colon Cancer Maternal Grandfather        SOCIAL HISTORY    Social History     Tobacco Use    Smoking status: Former Smoker     Packs/day: 1.50     Types: Cigarettes     Quit date: 2005     Years since quittin.9    Smokeless tobacco: Former User     Types: Chew    Tobacco comment: States \"vapes a little\"   Vaping Use    Vaping Use: Every day    Substances: Nicotine, Flavoring    Devices: Refillable tank   Substance Use Topics    Alcohol use: No     Alcohol/week: 0.0 standard drinks     Comment: rarely -- 1 every few months    Drug use: No       ALLERGIES    No Known Allergies    MEDICATIONS    Current Outpatient Medications on File Prior to Encounter   Medication Sig Dispense Refill    oxyCODONE-acetaminophen (PERCOCET) 7.5-325 MG per tablet Take 1 tablet by mouth every 6 hours as needed for Pain (max 4 per day) for up to 30 days. 120 tablet 0    pregabalin (LYRICA) 150 MG capsule Take 1 capsule by mouth 3 times daily for 30 days.  90 capsule 0    diclofenac (VOLTAREN) 75 MG EC tablet Take 1 tablet by mouth daily as needed for Pain 30 tablet 1    penicillin v potassium (VEETID) 500 MG tablet TAKE TWO TABLETS BY MOUTH TWICE A  tablet 3    Multiple Vitamins-Minerals (THERAPEUTIC MULTIVITAMIN-MINERALS) tablet Take 1 tablet by mouth daily      losartan (COZAAR) 100 MG tablet TAKE ONE TABLET BY MOUTH DAILY 90 tablet 2    Compression Stockings MISC by Does not apply route Strength 30-40mmHg  Style: pull on below the knee; open or closed toe  Extremity: bilateral  Donning aid recommended: Yes if needed 1 each 2    Compression Stockings MISC by Does not apply route Strength 30-40mmHg  Style: Farrow wrap , below the knee  Extremity: bilateral  Donning aid recommended: No 1 each 0    furosemide (LASIX) 20 MG tablet TAKE ONE TO TWO TABLETS BY MOUTH EVERY MORNING AS NEEDED FOR EDEMA 180 tablet 3    Compression Stockings MISC by Does not apply route Thigh high 1 each 0    Elastic Bandages & Supports (MEDICAL COMPRESSION THIGH HIGH) MISC Thigh high compression stockings, 30-40 mm Hg 2 each 1     No current facility-administered medications on file prior to encounter. REVIEW OF SYSTEMS    Pertinent items are noted in HPI. Last X4K if applicable:   Hemoglobin A1C   Date Value Ref Range Status   08/10/2021 4.8 See comment % Final     Comment:     Comment:  Diagnosis of Diabetes: > or = 6.5%  Increased risk of diabetes (Prediabetes): 5.7-6.4%  Glycemic Control: Nonpregnant Adults: <7.0%                    Pregnant: <6.0%           Objective:      /73   Pulse 75   Temp 97.8 °F (36.6 °C) (Temporal)   Resp 18     Wt Readings from Last 3 Encounters:   12/06/21 (!) 479 lb (217.3 kg)   12/02/21 (!) 476 lb 10.2 oz (216.2 kg)   11/18/21 (!) 478 lb (216.8 kg)       PHYSICAL EXAM    General Appearance: alert and oriented to person, place and time, well-developed and well-nourished, in no acute distress  Pedal pulses palpable. Protective sensations intact. Ulcer is noted left lateral leg with graft partially adhered to the ulcer. Ulcer does not probe to bone. There are no signs of infection. There is moderate edema bilateral legs. Hemosiderin deposits are noted anterior bilateral legs. Assessment:      1. 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 Varicose veins of right lower extremity with inflammation, with ulcer of calf limited to breakdown of skin ([address]    3. Varicose veins of left lower extremity with inflammation, with ulcer of calf with fat layer exposed ([address]           Plan:       Treatment Note please see attached Discharge Instructions. No debridement today, allowing graft to incorporate. Patient to return for compression change if compressions fall down this week. Continue to elevate legs. Return 1 week.   New Medication(s) at this visit:   New Prescriptions    No medications on file       Other orders at this visit:   Orders Placed This Encounter   Procedures    Initiate Outpatient Wound Care Protocol       Smoking Cessation: Counseling given: Not Answered  Comment: States \"vapes a little\"      Written patient dismissal instructions given to patient and signed by patient or POA. Discharge [facility] Physician Orders and Discharge Instructions  [address] [place] [address] [address]  Telephone: [phone]  NAME:  Haydee Light OF BIRTH:  1975  MEDICAL RECORD NUMBER:  [id number]  DATE:  12/16/2021    Wash hands with soap and water prior to and after every dressing change. Wound Cleansing:   · Do not scrub or use excessive force. · With each dressing change, rinse wounds with 0.9% Saline. (May use wound wash or soft contact solution. Both can be purchased at a local drug store). · If unable to obtain saline, may use a gentle soap and water. · Keep wounds dry in the shower unless otherwise instructed by the physician. · For wounds on lower legs, cast covers can be purchased at local drug stores, so that you may shower and keep the wound(s) dry. []  Vashe Wash solution instructions (if prescribed): Apply enough Vashe to soak a piece of gauze and place on wound bed for 5-10 minutes. DO NOT rinse after Vashe has been applied. Follow dressing application as instructed below. Jacy wound Topical Treatments:  Do not apply lotions, creams, or ointments to the skin around the wound bed unless directed as followed:     [] Apply around the wound: [] moisturizing lotion [] Antifungal ointment [] No-Sting barrier film [x] Zinc paste [] Other:       Dressings:           Wound Location: left lower leg    Apply Primary Dressing to wound:       Collagen       Cover and Secure with:     Cover and Secure with: Other charcoal pad   Avoid contact of tape with skin if possible.      When to change Dressing: [] Daily [] Every Other Day [] Once manager is:  Mady Cheney     Electronically signed by Lora Lopes RN on 12/16/2021 at 4:01 PM     [address] [facility] Information: Should you experience any significant changes in your wound(s) or have questions about your wound care, please contact the [address] [place] [address] at [phone]. We are open from 8:00am - 4:30p Monday thru Friday except for Wednesdays which we are closed. Please give us 24-48 hours to return your call. Call your doctor now or seek immediate medical care if:    · You have symptoms of infection, such as:  ? Increased pain, swelling, warmth, or redness. ? Red streaks leading from the area. ? Pus draining from the area. ? A fever.         Physician for this visit and orders: Crissy Gonzales DPM    [] Patient unable to sign Discharge Instructions given to ECF/Transportation/POA              Electronically signed by Crissy Gonzales DPM on 12/16/2021 at 4:20 PM

## 2021-12-23 ENCOUNTER — HOSPITAL ENCOUNTER (OUTPATIENT)
Dept: WOUND CARE | Age: 46
Discharge: HOME | End: 2021-12-23
Payer: COMMERCIAL

## 2021-12-23 VITALS
HEART RATE: 76 BPM | TEMPERATURE: 96.8 F | RESPIRATION RATE: 18 BRPM | DIASTOLIC BLOOD PRESSURE: 72 MMHG | SYSTOLIC BLOOD PRESSURE: 149 MMHG

## 2021-12-23 DIAGNOSIS — I83.222 VARICOSE VEINS OF LEFT LOWER EXTREMITY WITH INFLAMMATION, WITH ULCER OF CALF WITH FAT LAYER EXPOSED (HCC): ICD-10-CM

## 2021-12-23 DIAGNOSIS — L97.211 VARICOSE VEINS OF RIGHT LOWER EXTREMITY WITH INFLAMMATION, WITH ULCER OF CALF LIMITED TO BREAKDOWN OF SKIN (HCC): ICD-10-CM

## 2021-12-23 DIAGNOSIS — I83.212 VARICOSE VEINS OF RIGHT LOWER EXTREMITY WITH INFLAMMATION, WITH ULCER OF CALF LIMITED TO BREAKDOWN OF SKIN (HCC): ICD-10-CM

## 2021-12-23 DIAGNOSIS — L97.222 VARICOSE VEINS OF LEFT LOWER EXTREMITY WITH INFLAMMATION, WITH ULCER OF CALF WITH FAT LAYER EXPOSED (HCC): ICD-10-CM

## 2021-12-23 DIAGNOSIS — L97.222 NON-PRESSURE CHRONIC ULCER OF LEFT CALF WITH FAT LAYER EXPOSED (HCC): Primary | ICD-10-CM

## 2021-12-23 PROCEDURE — 11045 DBRDMT SUBQ TISS EACH ADDL: CPT

## 2021-12-23 PROCEDURE — 6370000000 HC RX 637 (ALT 250 FOR IP): Performed by: PODIATRIST

## 2021-12-23 PROCEDURE — 11042 DBRDMT SUBQ TIS 1ST 20SQCM/<: CPT

## 2021-12-23 PROCEDURE — 99213 OFFICE O/P EST LOW 20 MIN: CPT

## 2021-12-23 RX ORDER — BETAMETHASONE DIPROPIONATE 0.05 %
OINTMENT (GRAM) TOPICAL ONCE
Status: CANCELLED | OUTPATIENT
Start: 2021-12-23 | End: 2021-12-23

## 2021-12-23 RX ORDER — LIDOCAINE HYDROCHLORIDE 40 MG/ML
SOLUTION TOPICAL ONCE
Status: CANCELLED | OUTPATIENT
Start: 2021-12-23 | End: 2021-12-23

## 2021-12-23 RX ORDER — BACITRACIN, NEOMYCIN, POLYMYXIN B 400; 3.5; 5 [USP'U]/G; MG/G; [USP'U]/G
OINTMENT TOPICAL ONCE
Status: CANCELLED | OUTPATIENT
Start: 2021-12-23 | End: 2021-12-23

## 2021-12-23 RX ORDER — GENTAMICIN SULFATE 1 MG/G
OINTMENT TOPICAL ONCE
Status: CANCELLED | OUTPATIENT
Start: 2021-12-23 | End: 2021-12-23

## 2021-12-23 RX ORDER — LIDOCAINE HYDROCHLORIDE 40 MG/ML
SOLUTION TOPICAL ONCE
Status: COMPLETED | OUTPATIENT
Start: 2021-12-23 | End: 2021-12-23

## 2021-12-23 RX ORDER — CLOBETASOL PROPIONATE 0.5 MG/G
OINTMENT TOPICAL ONCE
Status: CANCELLED | OUTPATIENT
Start: 2021-12-23 | End: 2021-12-23

## 2021-12-23 RX ORDER — LIDOCAINE 40 MG/G
CREAM TOPICAL ONCE
Status: CANCELLED | OUTPATIENT
Start: 2021-12-23 | End: 2021-12-23

## 2021-12-23 RX ORDER — LIDOCAINE HYDROCHLORIDE 20 MG/ML
JELLY TOPICAL ONCE
Status: CANCELLED | OUTPATIENT
Start: 2021-12-23 | End: 2021-12-23

## 2021-12-23 RX ORDER — BACITRACIN ZINC AND POLYMYXIN B SULFATE 500; 1000 [USP'U]/G; [USP'U]/G
OINTMENT TOPICAL ONCE
Status: CANCELLED | OUTPATIENT
Start: 2021-12-23 | End: 2021-12-23

## 2021-12-23 RX ORDER — LIDOCAINE 50 MG/G
OINTMENT TOPICAL ONCE
Status: CANCELLED | OUTPATIENT
Start: 2021-12-23 | End: 2021-12-23

## 2021-12-23 RX ORDER — GINSENG 100 MG
CAPSULE ORAL ONCE
Status: CANCELLED | OUTPATIENT
Start: 2021-12-23 | End: 2021-12-23

## 2021-12-23 RX ADMIN — LIDOCAINE HYDROCHLORIDE: 40 SOLUTION TOPICAL at 15:22

## 2021-12-23 NOTE — PROGRESS NOTES
[address] [facility]  Progress Note and Procedure Note      Danielito Luevano  MEDICAL RECORD NUMBER:  [id number]  AGE: 55 y.o. GENDER: male  : 1975  EPISODE DATE:  2021    Subjective:     Chief Complaint   Patient presents with    Wound Check     left leg         HISTORY of PRESENT ILLNESS HPI     Danielito Luevano is a 55 y.o. male who presents today for wound/ulcer evaluation. History of Wound Context: Patient has ulcer left lateral leg. Patient relates he has followed all instructions, but his compression kinked and it caused a new ulcer.     Wound/Ulcer Pain Timing/Severity: intermittent, mild, moderate  Quality of pain: sharp  Severity:  5 / 10   Modifying Factors: Pain worsens with walking  Associated Signs/Symptoms: edema    Ulcer Identification:  Ulcer Type: venous    Contributing Factors: edema, venous stasis and lymphedema    Acute Wound: N/A        PAST MEDICAL HISTORY        Diagnosis Date    Arthritis     Cellulitis     Chronic back pain     Hepatitis age 24    HNP (herniated nucleus pulposus with myelopathy), thoracic     Hypertension     Melanoma ([facility] [address]     left foot     Obesity 2011    ANN-MARIE (obstructive sleep apnea)     NO CPAP     Stasis dermatitis        PAST SURGICAL HISTORY    Past Surgical History:   Procedure Laterality Date    ADENOIDECTOMY      FOOT SURGERY Left 2019    WIDE EXCISION OF LEFT FOOT MELANOMA performed by Dee Perez MD at [address] [place] [address] Left 2/10/2020    LEFT FOOT MELANOMA WIDE EXCISION AND FULL THICKNESS SKIN GRAFT,LEFT GROIN SENTINEL LYMPH NODE BIOPSY performed by Dee Perez MD at [facility]  06    Arand    UPPER GASTROINTESTINAL ENDOSCOPY N/A 2021    EGD BIOPSY performed by Lucy Domingo DO at [facility] ENDOSCOPY    WISDOM TOOTH EXTRACTION         FAMILY HISTORY    Family History   Problem Relation Age of Onset    Cancer Mother     COPD Paternal Grandfather    [facility] Colon Cancer Maternal Grandfather        SOCIAL HISTORY    Social History     Tobacco Use    Smoking status: Former Smoker     Packs/day: 1.50     Types: Cigarettes     Quit date: 2005     Years since quittin.9    Smokeless tobacco: Former User     Types: Chew    Tobacco comment: States \"vapes a little\"   Vaping Use    Vaping Use: Every day    Substances: Nicotine, Flavoring    Devices: Refillable tank   Substance Use Topics    Alcohol use: No     Alcohol/week: 0.0 standard drinks     Comment: rarely -- 1 every few months    Drug use: No       ALLERGIES    No Known Allergies    MEDICATIONS    Current Outpatient Medications on File Prior to Encounter   Medication Sig Dispense Refill    pregabalin (LYRICA) 150 MG capsule Take 1 capsule by mouth 3 times daily for 30 days. 90 capsule 0    diclofenac (VOLTAREN) 75 MG EC tablet Take 1 tablet by mouth daily as needed for Pain 30 tablet 1    penicillin v potassium (VEETID) 500 MG tablet TAKE TWO TABLETS BY MOUTH TWICE A  tablet 3    Multiple Vitamins-Minerals (THERAPEUTIC MULTIVITAMIN-MINERALS) tablet Take 1 tablet by mouth daily      losartan (COZAAR) 100 MG tablet TAKE ONE TABLET BY MOUTH DAILY 90 tablet 2    furosemide (LASIX) 20 MG tablet TAKE ONE TO TWO TABLETS BY MOUTH EVERY MORNING AS NEEDED FOR EDEMA 180 tablet 3    oxyCODONE-acetaminophen (PERCOCET) 7.5-325 MG per tablet Take 1 tablet by mouth every 6 hours as needed for Pain (max 4 per day) for up to 30 days.  120 tablet 0    Compression Stockings MISC by Does not apply route Strength 30-40mmHg  Style: pull on below the knee; open or closed toe  Extremity: bilateral  Donning aid recommended: Yes if needed 1 each 2    Compression Stockings MISC by Does not apply route Strength 30-40mmHg  Style: Farrow wrap , below the knee  Extremity: bilateral  Donning aid recommended: No 1 each 0    Compression Stockings MISC by Does not apply route Thigh high 1 each 0    Elastic Bandages & taken: Yes    Pain Control: Anesthetic  Anesthetic: 4% Lidocaine Liquid Topical       Debridement: Excisional Debridement    Using curette the wound(s)/ulcer(s) was/were debrided down through and including the removal of epidermis, dermis and subcutaneous tissue.         Devitalized Tissue Debrided:  fibrin and biofilm    Pre Debridement Measurements:  Are located in the [facility]  Documentation Flow Sheet    Wound/Ulcer #: 1 and 2    Post Debridement Measurements:  Wound/Ulcer Descriptions are Pre Debridement except measurements:    Wound 10/07/21 Leg Lateral;Left;Distal #1 (Active)   Wound Image   12/02/21 1552   Wound Cleansed Cleansed with saline 12/23/21 1524   Dressing/Treatment Collagen 12/16/21 1600   Wound Length (cm) 4.7 cm 12/23/21 1524   Wound Width (cm) 3.8 cm 12/23/21 1524   Wound Depth (cm) 0.1 cm 12/23/21 1524   Wound Surface Area (cm^2) 17.86 cm^2 12/23/21 1524   Change in Wound Size % (l*w) -112.62 12/23/21 1524   Wound Volume (cm^3) 1.786 cm^3 12/23/21 1524   Wound Healing % -113 12/23/21 1524   Post-Procedure Length (cm) 4.8 cm 12/23/21 1545   Post-Procedure Width (cm) 3.9 cm 12/23/21 1545   Post-Procedure Depth (cm) 0.3 cm 12/23/21 1545   Post-Procedure Surface Area (cm^2) 18.72 cm^2 12/23/21 1545   Post-Procedure Volume (cm^3) 5.616 cm^3 12/23/21 1545   Distance Tunneling (cm) 0 cm 12/23/21 1524   Tunneling Position ___ O'Clock 0 12/23/21 1524   Undermining Starts ___ O'Clock 0 12/23/21 1524   Undermining Ends___ O'Clock 0 12/23/21 1524   Undermining Maxium Distance (cm) 0 12/23/21 1524   Wound Assessment Graft;Fibrin;Pink/red 12/23/21 1524   Drainage Amount Large 12/23/21 1524   Drainage Description Green;Brown 12/23/21 1524   Odor Moderate 12/23/21 1524   Jacy-wound Assessment Hemosiderin staining (brown yellow) 12/23/21 1524   Margins Defined edges 12/23/21 1524   Wound Thickness Description not for Pressure Injury Full thickness 12/23/21 1524   Number of days: 77       Wound 12/23/21 Proximal;Left;Lower #2 (Active)   Wound Image   12/23/21 1524   Wound Etiology Traumatic 12/23/21 1524   Wound Cleansed Cleansed with saline 12/23/21 1524   Wound Length (cm) 3 cm 12/23/21 1524   Wound Width (cm) 6.3 cm 12/23/21 1524   Wound Depth (cm) 0.1 cm 12/23/21 1524   Wound Surface Area (cm^2) 18.9 cm^2 12/23/21 1524   Wound Volume (cm^3) 1.89 cm^3 12/23/21 1524   Post-Procedure Length (cm) 3.1 cm 12/23/21 1545   Post-Procedure Width (cm) 6.4 cm 12/23/21 1545   Post-Procedure Depth (cm) 0.3 cm 12/23/21 1545   Post-Procedure Surface Area (cm^2) 19.84 cm^2 12/23/21 1545   Post-Procedure Volume (cm^3) 5.952 cm^3 12/23/21 1545   Distance Tunneling (cm) 0 cm 12/23/21 1524   Undermining Maxium Distance (cm) 0 12/23/21 1524   Wound Assessment Pink/red 12/23/21 1524   Drainage Amount Moderate 12/23/21 1524   Drainage Description Brown;Yellow 12/23/21 1524   Odor Moderate 12/23/21 1524   Jacy-wound Assessment Hemosiderin staining (brown yellow); Maceration 12/23/21 1524   Margins Undefined edges 12/23/21 1524   Wound Thickness Description not for Pressure Injury Full thickness 12/23/21 1524   Number of days: 0     Incision 12/20/19 Foot Left;Plantar (Active)   Number of days: 734       Percent of Wound(s)/Ulcer(s) Debrided: 100%    Total Surface Area Debrided:  38.56 sq cm     Diabetic/Pressure/Non Pressure Ulcers only:  Ulcer: N/A     Estimated Blood Loss:  Minimal    Hemostasis Achieved:  by pressure    Procedural Pain:  5  / 10     Post Procedural Pain:  5 / 10     Response to treatment:  Well tolerated by patient. Plan:       Treatment Note please see attached Discharge Instructions. Debrided ulcers left lateral leg. Patient to begin compression stockings changing the dressing tiw. Return 1 week.   New Medication(s) at this visit:   New Prescriptions    No medications on file       Other orders at this visit:   Orders Placed This Encounter   Procedures    Initiate Outpatient Wound Care Protocol       Smoking Do Not Change Dressing [] Other:         Edema Control: In woundcare setting may use high spandagrip. At home remove spandagrip and apply compression stocking velcro to left lower leg   [x] Elevate leg(s) above the level of the heart for 30 minutes 4-5 times a day and/or when sitting. [x] Avoid prolonged standing in one place. ·      ·     Dietary:  Important dietary reminders:  1. Increase Protein intake (i.e. Lean meats, fish, eggs, legumes, and yogurt)  2. No added salt  3. If diabetic, follow a diabetic diet and check glucose prior to meals or as instructed by your physician. Dietary Supplements:  [] Hector  [] 30ml ProStat  [] EnsureEnlive [] Ensure Max/Premier  [] Other:    If you are still having pain after you go home:   For wounds on lower legs or arms, elevate the affected limb.  Use over-the-counter medications you would normally use for pain as permitted by your primary care doctor.  For persistent pain not relieved by the above interventions, please call your primary care doctor. Return Appointment:   Return Appointment: With Dr Kathey Bamberger  in  [address] [place] [address])  o Scheduled weekly until (Date):      [] Return Appointment for a Wound Assessment with a nurse on:     o All other future appointments:  Future Appointments   Date Time Provider Francie Salmon   1/3/2022  1:15 PM Rubén Chandler MD R B[facility]   1/12/2022 12:40 PM MD AMY Harmon NDIA [facility]   1/28/2022 12:30 PM MD Marcin Lewis S/ON [facility]         [x] Orders placed during your visit:   Orders Placed This Encounter   Procedures    Initiate Outpatient Wound Care Protocol       Your nurse  is:  Sherwin Haile     Electronically signed by Tahira Sandhu RN on 12/23/2021 at 4:04 PM     [address] Information: Should you experience any significant changes in your wound(s) or have questions about your wound care, please contact the [address] [place] [address] at [phone].  We are open from 8:00am - 4:30p Monday thru Friday except for Wednesdays which we are closed. Please give us 24-48 hours to return your call. Call your doctor now or seek immediate medical care if:    · You have symptoms of infection, such as:  ? Increased pain, swelling, warmth, or redness. ? Red streaks leading from the area. ? Pus draining from the area. ? A fever.         Physician for this visit and orders: Sallie Kamara DPM    [] Patient unable to sign Discharge Instructions given to ECF/Transportation/POA              Electronically signed by Sallie Kamara DPM on 12/23/2021 at 4:06 PM

## 2021-12-23 NOTE — CARE COORDINATION
[address] [facility] Floor:     [address] f: [phone] f: [phone] p: [phone] [email or web address]      Ordering Center: The [address] [facility]  [address] Mikayla Myers  P:  [facility] [address]    Patient Information:      Davi Goodman S [address] [facility] 3   [phone]   : 1975  AGE: 55 y.o. GENDER: male   TODAYS DATE:  2021    Insurance:      PRIMARY INSURANCE:  Plan: [employer]  Coverage: BCBS  Effective Date: 2021  [id number] - (Worker's Comp)    SECONDARY INSURANCE:  Plan:   Coverage:   Effective Date:   [unfilled]    [unfilled]   [unfilled]     Patient Wound Information:      1.  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 Varicose veins of right lower extremity with inflammation, with ulcer of calf limited to breakdown of skin ([address]    3. Varicose veins of left lower extremity with inflammation, with ulcer of calf with fat layer exposed ([address]        WOUNDS REQUIRING DRESSING SUPPLIES:     Wound 10/07/21 Leg Lateral;Left;Distal #1 (Active)   Wound Image   21 1552   Wound Cleansed Cleansed with saline 21 1524   Dressing/Treatment Collagen 21 1600   Wound Length (cm) 4.7 cm 21 1524   Wound Width (cm) 3.8 cm 21 1524   Wound Depth (cm) 0.1 cm 21 1524   Wound Surface Area (cm^2) 17.86 cm^2 21 1524   Change in Wound Size % (l*w) -112.62 21 1524   Wound Volume (cm^3) 1.786 cm^3 21 1524   Wound Healing % -113 21 1524   Post-Procedure Length (cm) 4.8 cm 21 1545   Post-Procedure Width (cm) 3.9 cm 21 1545   Post-Procedure Depth (cm) 0.3 cm 21 1545   Post-Procedure Surface Area (cm^2) 18.72 cm^2 21 1545   Post-Procedure Volume (cm^3) 5.616 cm^3 21 1545   Distance Tunneling (cm) 0 cm 21 1524   Tunneling Position ___ O'Clock 0 12/23/21 1524   Undermining Starts ___ O'Clock 0 12/23/21 1524   Undermining Ends___ O'Clock 0 12/23/21 1524   Undermining Maxium Distance (cm) 0 12/23/21 1524   Wound Assessment Graft;Fibrin;Pink/red 12/23/21 1524   Drainage Amount Moderate 12/23/21 1524   Drainage Description Green;Brown 12/23/21 1524   Odor Moderate 12/23/21 1524   Jacy-wound Assessment Hemosiderin staining (brown yellow) 12/23/21 1524   Margins Defined edges 12/23/21 1524   Wound Thickness Description not for Pressure Injury Full thickness 12/23/21 1524   Number of days: 77       Wound 12/23/21 Proximal;Left;Lower #2 (Active)   Wound Image   12/23/21 1524   Wound Etiology Traumatic 12/23/21 1524   Wound Cleansed Cleansed with saline 12/23/21 1524   Wound Length (cm) 3 cm 12/23/21 1524   Wound Width (cm) 6.3 cm 12/23/21 1524   Wound Depth (cm) 0.1 cm 12/23/21 1524   Wound Surface Area (cm^2) 18.9 cm^2 12/23/21 1524   Wound Volume (cm^3) 1.89 cm^3 12/23/21 1524   Post-Procedure Length (cm) 3.1 cm 12/23/21 1545   Post-Procedure Width (cm) 6.4 cm 12/23/21 1545   Post-Procedure Depth (cm) 0.3 cm 12/23/21 1545   Post-Procedure Surface Area (cm^2) 19.84 cm^2 12/23/21 1545   Post-Procedure Volume (cm^3) 5.952 cm^3 12/23/21 1545   Distance Tunneling (cm) 0 cm 12/23/21 1524   Undermining Maxium Distance (cm) 0 12/23/21 1524   Wound Assessment Pink/red 12/23/21 1524   Drainage Amount Moderate 12/23/21 1524   Drainage Description Brown;Yellow 12/23/21 1524   Odor Moderate 12/23/21 1524   Jacy-wound Assessment Hemosiderin staining (brown yellow); Maceration 12/23/21 1524   Margins Undefined edges 12/23/21 1524   Wound Thickness Description not for Pressure Injury Full thickness 12/23/21 1524   Number of days: 0     Incision 12/20/19 Foot Left;Plantar (Active)   Number of days: 960       Supplies Requested :      WOUND #: 1   PRIMARY DRESSING:  Alginate with silver pad   Cover and Secure with: ABD pad  Bulky roll gauze     FREQUENCY OF DRESSING CHANGES:  Every other day       WOUND #: 2   PRIMARY DRESSING:  Alginate with silver pad   Cover and Secure with: ABD pad  Bulky roll gauze     FREQUENCY OF DRESSING CHANGES:  Every other day         ADDITIONAL ITEMS:  [] Gloves Small  [] Gloves Medium [] Gloves Large [] Gloves XLarge  [] Tape 1\" [x] Tape 2\" [] Tape 3\"  [] Medipore Tape  [x] Saline  [] Skin Prep   [] Adhesive Remover   [] Cotton Tip Applicators   [] Other:    Patient Wound(s) Debrided: [x] Yes   [] No    Debridement Date: 12/23/2021    Debribement Type: Excisional/Sharp    Patient currently being seen by Home Health: [] Yes   [x] No    Duration for needed supplies:  []15  []30  [x]60  []90 Days    Provider Information:      PROVIDER'S NAME/NPI: Teresita Sierra 26  NPI: [id number]  Electronically signed by Jose Daniel Mcdaniels DPM on 12/23/2021 at 4:14 PM      I give permission to coordinate the care for this patient   assisting with verbal orders: Faiza Bennett RN 12/23/2021

## 2021-12-30 ENCOUNTER — HOSPITAL ENCOUNTER (OUTPATIENT)
Dept: WOUND CARE | Age: 46
Discharge: HOME | End: 2021-12-30
Payer: COMMERCIAL

## 2021-12-30 VITALS
TEMPERATURE: 97.5 F | DIASTOLIC BLOOD PRESSURE: 69 MMHG | HEART RATE: 80 BPM | RESPIRATION RATE: 18 BRPM | SYSTOLIC BLOOD PRESSURE: 197 MMHG

## 2021-12-30 DIAGNOSIS — I83.222 VARICOSE VEINS OF LEFT LOWER EXTREMITY WITH INFLAMMATION, WITH ULCER OF CALF WITH FAT LAYER EXPOSED (HCC): ICD-10-CM

## 2021-12-30 DIAGNOSIS — I83.212 VARICOSE VEINS OF RIGHT LOWER EXTREMITY WITH INFLAMMATION, WITH ULCER OF CALF LIMITED TO BREAKDOWN OF SKIN (HCC): ICD-10-CM

## 2021-12-30 DIAGNOSIS — L97.211 VARICOSE VEINS OF RIGHT LOWER EXTREMITY WITH INFLAMMATION, WITH ULCER OF CALF LIMITED TO BREAKDOWN OF SKIN (HCC): ICD-10-CM

## 2021-12-30 DIAGNOSIS — L97.222 VARICOSE VEINS OF LEFT LOWER EXTREMITY WITH INFLAMMATION, WITH ULCER OF CALF WITH FAT LAYER EXPOSED (HCC): ICD-10-CM

## 2021-12-30 DIAGNOSIS — L97.222 NON-PRESSURE CHRONIC ULCER OF LEFT CALF WITH FAT LAYER EXPOSED (HCC): Primary | ICD-10-CM

## 2021-12-30 PROCEDURE — 11045 DBRDMT SUBQ TISS EACH ADDL: CPT

## 2021-12-30 PROCEDURE — 29581 APPL MULTLAYER CMPRN SYS LEG: CPT

## 2021-12-30 PROCEDURE — 6370000000 HC RX 637 (ALT 250 FOR IP): Performed by: PODIATRIST

## 2021-12-30 PROCEDURE — 11042 DBRDMT SUBQ TIS 1ST 20SQCM/<: CPT

## 2021-12-30 RX ORDER — LIDOCAINE 40 MG/G
CREAM TOPICAL ONCE
Status: CANCELLED | OUTPATIENT
Start: 2021-12-30 | End: 2021-12-30

## 2021-12-30 RX ORDER — LIDOCAINE HYDROCHLORIDE 40 MG/ML
SOLUTION TOPICAL ONCE
Status: COMPLETED | OUTPATIENT
Start: 2021-12-30 | End: 2021-12-30

## 2021-12-30 RX ORDER — LIDOCAINE HYDROCHLORIDE 40 MG/ML
SOLUTION TOPICAL ONCE
Status: CANCELLED | OUTPATIENT
Start: 2021-12-30 | End: 2021-12-30

## 2021-12-30 RX ORDER — GENTAMICIN SULFATE 1 MG/G
OINTMENT TOPICAL ONCE
Status: CANCELLED | OUTPATIENT
Start: 2021-12-30 | End: 2021-12-30

## 2021-12-30 RX ORDER — LIDOCAINE HYDROCHLORIDE 20 MG/ML
JELLY TOPICAL ONCE
Status: CANCELLED | OUTPATIENT
Start: 2021-12-30 | End: 2021-12-30

## 2021-12-30 RX ORDER — LIDOCAINE 50 MG/G
OINTMENT TOPICAL ONCE
Status: CANCELLED | OUTPATIENT
Start: 2021-12-30 | End: 2021-12-30

## 2021-12-30 RX ORDER — GINSENG 100 MG
CAPSULE ORAL ONCE
Status: CANCELLED | OUTPATIENT
Start: 2021-12-30 | End: 2021-12-30

## 2021-12-30 RX ORDER — BETAMETHASONE DIPROPIONATE 0.05 %
OINTMENT (GRAM) TOPICAL ONCE
Status: CANCELLED | OUTPATIENT
Start: 2021-12-30 | End: 2021-12-30

## 2021-12-30 RX ORDER — BACITRACIN, NEOMYCIN, POLYMYXIN B 400; 3.5; 5 [USP'U]/G; MG/G; [USP'U]/G
OINTMENT TOPICAL ONCE
Status: CANCELLED | OUTPATIENT
Start: 2021-12-30 | End: 2021-12-30

## 2021-12-30 RX ORDER — CLOBETASOL PROPIONATE 0.5 MG/G
OINTMENT TOPICAL ONCE
Status: CANCELLED | OUTPATIENT
Start: 2021-12-30 | End: 2021-12-30

## 2021-12-30 RX ORDER — BACITRACIN ZINC AND POLYMYXIN B SULFATE 500; 1000 [USP'U]/G; [USP'U]/G
OINTMENT TOPICAL ONCE
Status: CANCELLED | OUTPATIENT
Start: 2021-12-30 | End: 2021-12-30

## 2021-12-30 RX ADMIN — LIDOCAINE HYDROCHLORIDE: 40 SOLUTION TOPICAL at 15:54

## 2021-12-30 ASSESSMENT — PAIN DESCRIPTION - PAIN TYPE: TYPE: ACUTE PAIN

## 2021-12-30 ASSESSMENT — PAIN DESCRIPTION - DESCRIPTORS: DESCRIPTORS: ACHING

## 2021-12-30 ASSESSMENT — PAIN DESCRIPTION - LOCATION: LOCATION: LEG

## 2021-12-30 ASSESSMENT — PAIN DESCRIPTION - ORIENTATION: ORIENTATION: LEFT

## 2021-12-30 ASSESSMENT — PAIN SCALES - GENERAL: PAINLEVEL_OUTOF10: 1

## 2021-12-30 NOTE — PROGRESS NOTES
[facility]  Progress Note and Procedure Note      Candy Villafuerte  MEDICAL RECORD NUMBER:  [id number]  AGE: 55 y.o. GENDER: male  : 1975  EPISODE DATE:  2021    Subjective:     Chief Complaint   Patient presents with    Wound Check     left leg         HISTORY of PRESENT ILLNESS HPI     Candy Villafuerte is a 55 y.o. male who presents today for wound/ulcer evaluation. History of Wound Context: Patient has ulcer left lateral leg. Patient relates he is unable to apply his compression this week until Tuesday because his girlfriend is out of town.     Wound/Ulcer Pain Timing/Severity: intermittent, mild, moderate  Quality of pain: sharp  Severity:  5 / 10   Modifying Factors: Pain worsens with walking  Associated Signs/Symptoms: edema    Ulcer Identification:  Ulcer Type: venous    Contributing Factors: edema, venous stasis and lymphedema    Acute Wound: N/A        PAST MEDICAL HISTORY        Diagnosis Date    Arthritis     Cellulitis     Chronic back pain     Hepatitis age 24    HNP (herniated nucleus pulposus with myelopathy), thoracic     Hypertension     Melanoma ([facility] [address]     left foot     Obesity 2011    ANN-MARIE (obstructive sleep apnea)     NO CPAP     Stasis dermatitis        PAST SURGICAL HISTORY    Past Surgical History:   Procedure Laterality Date    ADENOIDECTOMY      FOOT SURGERY Left 2019    WIDE EXCISION OF LEFT FOOT MELANOMA performed by Malia Guerrero MD at [address] [place] [address] Left 2/10/2020    LEFT FOOT MELANOMA WIDE EXCISION AND FULL THICKNESS SKIN GRAFT,LEFT GROIN SENTINEL LYMPH NODE BIOPSY performed by Malia Guerrero MD at [address]  06    Arand    UPPER GASTROINTESTINAL ENDOSCOPY N/A 2021    EGD BIOPSY performed by Jad Rico DO at [address] [place] [address] EXTRACTION         FAMILY HISTORY    Family History   Problem Relation Age of Onset    Cancer Mother     COPD Paternal Grandfather     Colon Cancer Maternal Grandfather        SOCIAL HISTORY    Social History     Tobacco Use    Smoking status: Former Smoker     Packs/day: 1.50     Types: Cigarettes     Quit date: 2005     Years since quittin.0    Smokeless tobacco: Former User     Types: Chew    Tobacco comment: States \"vapes a little\"   Vaping Use    Vaping Use: Every day    Substances: Nicotine, Flavoring    Devices: Refillable tank   Substance Use Topics    Alcohol use: No     Alcohol/week: 0.0 standard drinks     Comment: rarely -- 1 every few months    Drug use: No       ALLERGIES    No Known Allergies    MEDICATIONS    Current Outpatient Medications on File Prior to Encounter   Medication Sig Dispense Refill    pregabalin (LYRICA) 150 MG capsule Take 1 capsule by mouth 3 times daily for 30 days. 90 capsule 0    diclofenac (VOLTAREN) 75 MG EC tablet Take 1 tablet by mouth daily as needed for Pain 30 tablet 1    penicillin v potassium (VEETID) 500 MG tablet TAKE TWO TABLETS BY MOUTH TWICE A  tablet 3    Multiple Vitamins-Minerals (THERAPEUTIC MULTIVITAMIN-MINERALS) tablet Take 1 tablet by mouth daily      losartan (COZAAR) 100 MG tablet TAKE ONE TABLET BY MOUTH DAILY 90 tablet 2    furosemide (LASIX) 20 MG tablet TAKE ONE TO TWO TABLETS BY MOUTH EVERY MORNING AS NEEDED FOR EDEMA 180 tablet 3    oxyCODONE-acetaminophen (PERCOCET) 7.5-325 MG per tablet Take 1 tablet by mouth every 6 hours as needed for Pain (max 4 per day) for up to 30 days.  120 tablet 0    Compression Stockings MISC by Does not apply route Strength 30-40mmHg  Style: pull on below the knee; open or closed toe  Extremity: bilateral  Donning aid recommended: Yes if needed 1 each 2    Compression Stockings MISC by Does not apply route Strength 30-40mmHg  Style: Farrow wrap , below the knee  Extremity: bilateral  Donning aid recommended: No 1 each 0    Compression Stockings MISC by Does not apply route Thigh high 1 each 0    Elastic Yes    Time out taken:  Yes    Pain Control: Anesthetic  Anesthetic: 4% Lidocaine Liquid Topical       Debridement: Excisional Debridement    Using curette the wound(s)/ulcer(s) was/were debrided down through and including the removal of epidermis, dermis and subcutaneous tissue. Devitalized Tissue Debrided:  fibrin and biofilm    Pre Debridement Measurements:  Are located in the [place]  Documentation Flow Sheet    Wound/Ulcer #: 1 and 2    Post Debridement Measurements:  Wound/Ulcer Descriptions are Pre Debridement except measurements:    Wound 10/07/21 Leg Lateral;Left;Distal #1 (Active)   Wound Image   12/02/21 1552   Wound Cleansed Cleansed with saline 12/30/21 1555   Dressing/Treatment Collagen 12/16/21 1600   Wound Length (cm) 1.5 cm 12/30/21 1555   Wound Width (cm) 2 cm 12/30/21 1555   Wound Depth (cm) 0.1 cm 12/30/21 1555   Wound Surface Area (cm^2) 3 cm^2 12/30/21 1555   Change in Wound Size % (l*w) 64.29 12/30/21 1555   Wound Volume (cm^3) 0.3 cm^3 12/30/21 1555   Wound Healing % 64 12/30/21 1555   Post-Procedure Length (cm) 1.6 cm 12/30/21 1610   Post-Procedure Width (cm) 2.1 cm 12/30/21 1610   Post-Procedure Depth (cm) 0.3 cm 12/30/21 1610   Post-Procedure Surface Area (cm^2) 3.36 cm^2 12/30/21 1610   Post-Procedure Volume (cm^3) 1.008 cm^3 12/30/21 1610   Distance Tunneling (cm) 0 cm 12/30/21 1555   Tunneling Position ___ O'Clock 0 12/23/21 1524   Undermining Starts ___ O'Clock 0 12/23/21 1524   Undermining Ends___ O'Clock 0 12/23/21 1524   Undermining Maxium Distance (cm) 0 12/30/21 1555   Wound Assessment Fibrin;Pink/red 12/30/21 1555   Drainage Amount Moderate 12/30/21 1555   Drainage Description Green;Brown 12/30/21 1555   Odor Moderate 12/30/21 1555   Jacy-wound Assessment Hemosiderin staining (brown yellow); Maceration 12/30/21 1555   Margins Defined edges 12/30/21 1555   Wound Thickness Description not for Pressure Injury Full thickness 12/30/21 1555   Number of days: 84       Wound 12/23/21 Proximal;Left;Lower #2 (Active)   Wound Image   12/23/21 1524   Wound Etiology Traumatic 12/23/21 1524   Wound Cleansed Wound cleanser;Cleansed with saline 12/30/21 1555   Wound Length (cm) 3.5 cm 12/30/21 1555   Wound Width (cm) 9.5 cm 12/30/21 1555   Wound Depth (cm) 0.1 cm 12/30/21 1555   Wound Surface Area (cm^2) 33.25 cm^2 12/30/21 1555   Change in Wound Size % (l*w) -75.93 12/30/21 1555   Wound Volume (cm^3) 3.325 cm^3 12/30/21 1555   Wound Healing % -76 12/30/21 1555   Post-Procedure Length (cm) 3.6 cm 12/30/21 1610   Post-Procedure Width (cm) 9.7 cm 12/30/21 1610   Post-Procedure Depth (cm) 0.3 cm 12/30/21 1610   Post-Procedure Surface Area (cm^2) 34.92 cm^2 12/30/21 1610   Post-Procedure Volume (cm^3) 10.476 cm^3 12/30/21 1610   Distance Tunneling (cm) 0 cm 12/30/21 1555   Undermining Maxium Distance (cm) 0 12/30/21 1555   Wound Assessment Pink/red;Devitalized tissue;Fibrin 12/30/21 1555   Drainage Amount Moderate 12/30/21 1555   Drainage Description Green;Brown;Black 12/30/21 1555   Odor Moderate 12/30/21 1555   Jacy-wound Assessment Hemosiderin staining (brown yellow); Maceration 12/30/21 1555   Margins Undefined edges 12/30/21 1555   Wound Thickness Description not for Pressure Injury Full thickness 12/30/21 1555   Number of days: 7     Incision 12/20/19 Foot Left;Plantar (Active)   Number of days: 741       Percent of Wound(s)/Ulcer(s) Debrided: 100%    Total Surface Area Debrided:  38.28 sq cm     Diabetic/Pressure/Non Pressure Ulcers only:  Ulcer: N/A     Estimated Blood Loss:  Minimal    Hemostasis Achieved:  by pressure    Procedural Pain:  5  / 10     Post Procedural Pain:  5 / 10     Response to treatment:  Well tolerated by patient. Plan:       Treatment Note please see attached Discharge Instructions. Debrided ulcers left lateral leg. Compression applied left leg. Zinc and alginate applied. Return 1 week.   New Medication(s) at this visit:   New Prescriptions    No medications on file       Other orders at this visit:   Orders Placed This Encounter   Procedures    Initiate Outpatient Wound Care Protocol       Smoking Cessation: Counseling given: Not Answered  Comment: States \"vapes a little\"      Written patient dismissal instructions given to patient and signed by patient or POA. Discharge [facility] Physician Orders and Discharge Instructions  [address] [address] [address] [address]  Telephone: [phone]  NAME:  Patricia Jean Baptiste OF BIRTH:  1975  MEDICAL RECORD NUMBER:  [id number]  DATE:  12/30/2021    Wash hands with soap and water prior to and after every dressing change. Wound Cleansing:   · Do not scrub or use excessive force. · With each dressing change, rinse wounds with 0.9% Saline. (May use wound wash or soft contact solution. Both can be purchased at a local drug store). · If unable to obtain saline, may use a gentle soap and water. · Keep wounds dry in the shower unless otherwise instructed by the physician. · For wounds on lower legs, cast covers can be purchased at local drug stores, so that you may shower and keep the wound(s) dry. []  Vashe Wash solution instructions (if prescribed): Apply enough Vashe to soak a piece of gauze and place on wound bed for 5-10 minutes. DO NOT rinse after Vashe has been applied. Follow dressing application as instructed below.     Jacy wound Topical Treatments:  Do not apply lotions, creams, or ointments to the skin around the wound bed unless directed as followed:     [] Apply around the wound: [] moisturizing lotion [] Antifungal ointment [] No-Sting barrier film [x] Zinc paste to wound [] Other:       Dressings:           Wound Location: left lower leg    Apply Primary Dressing to wound:       Alginate with silver pad     Cover and Secure with:     Cover and Secure with: Antimicrobial gauze pad  Other charcoal pad   Avoid contact of tape with skin if possible.  When to change Dressing: [] Daily [] Every Other Day [] Once a week  [] Three times per week: [] Monday, Wednesday, Friday [] Tuesday, Thursday, Saturday  [x] Do Not Change Dressing [] Other:       Edema Control:  Apply: [x] Compression Stocking  [] Left Lower Leg [x] Right Lower Leg          [x] Elevate leg(s) above the level of the heart for 30 minutes 4-5 times a day and/or when sitting. [x] Avoid prolonged standing in one place. Multilayer Compression Wrap:    Type: 4 Layer Compression Wrap   · Applied in Clinic to the :  Left lower leg(s) on 12/30/2021  · Do not get leg(s) with wrap wet. · If wraps become too tight call the center or completely remove the wrap. · Elevate leg(s) above the level of the heart when sitting. · Avoid prolonged standing in one place. ·     Dietary:  Important dietary reminders:  1. Increase Protein intake (i.e. Lean meats, fish, eggs, legumes, and yogurt)  2. No added salt  3. If diabetic, follow a diabetic diet and check glucose prior to meals or as instructed by your physician. Dietary Supplements:  [] Hector  [] 30ml ProStat  [] EnsureEnlive [] Ensure Max/Premier  [] Other:    If you are still having pain after you go home:   For wounds on lower legs or arms, elevate the affected limb.  Use over-the-counter medications you would normally use for pain as permitted by your primary care doctor.  For persistent pain not relieved by the above interventions, please call your primary care doctor.      Return Appointment:   Return Appointment: With Dr Levine Mail  in  [facility])  o Scheduled weekly until (Date):      [] Return Appointment for a Wound Assessment with a nurse on:     o All other future appointments:  Future Appointments   Date Time Provider Francie Salmon   1/3/2022  1:15 PM Kristin Mcbride MD [facility]   1/12/2022 12:40 PM Rosendo Garcia MD [facility]   1/28/2022 12:30 PM Nadir PEREA Claudetta Merck, MD [facility]         [x] Orders placed during your visit:   Orders Placed This Encounter   Procedures    Initiate Outpatient Wound Care Protocol       Your nurse  is:  Peggy Waldron     Electronically signed by Eduardo Ornelas RN on 12/30/2021 at [facility] [facility] [employer] Information: Should you experience any significant changes in your wound(s) or have questions about your wound care, please contact the [address] [place] [address] at [phone]. We are open from 8:00am - 4:30p Monday thru Friday except for Wednesdays which we are closed. Please give us 24-48 hours to return your call. Call your doctor now or seek immediate medical care if:    · You have symptoms of infection, such as:  ? Increased pain, swelling, warmth, or redness. ? Red streaks leading from the area. ? Pus draining from the area. ? A fever.         Physician for this visit and orders: Brynn Villanueva DPM    [] Patient unable to sign Discharge Instructions given to ECF/Transportation/POA              Electronically signed by Brynn Villanueva DPM on 12/30/2021 at 4:16 PM

## 2021-12-30 NOTE — PROGRESS NOTES
Multilayer Compression Wrap   (Not Unna) Below the Knee    NAME:  Horacio Harris  YOB: 1975  MEDICAL RECORD NUMBER:  [id number]  DATE:  12/30/2021        Removed old Multilayer wrap if present and washed leg with mild soap/water.   Applied moisturizing agent to dry skin as needed.   Applied primary and secondary dressing as ordered     Applied multilayered dressing below the knee to Left lower leg(s)  (4 Layer Compression Wrap ) .   Instructed patient/caregiver not to remove dressing and to keep it clean and dry.   Instructed patient/caregiver on complications to report to provider, such as pain, numbness in toes, heavy drainage, and slippage of dressing.   Instructed patient on purpose of compression dressing and on activity and exercise recommendations.     Applied per   Guidelines    Electronically signed by Ingrid Vines RN on 12/30/2021 at 4:16 PM

## 2022-01-03 ENCOUNTER — OFFICE VISIT (OUTPATIENT)
Dept: PAIN MANAGEMENT | Age: 47
End: 2022-01-03
Payer: COMMERCIAL

## 2022-01-03 VITALS
SYSTOLIC BLOOD PRESSURE: 163 MMHG | OXYGEN SATURATION: 94 % | DIASTOLIC BLOOD PRESSURE: 79 MMHG | RESPIRATION RATE: 16 BRPM | HEIGHT: 75 IN | WEIGHT: 315 LBS | HEART RATE: 73 BPM | BODY MASS INDEX: 39.17 KG/M2

## 2022-01-03 DIAGNOSIS — S33.9XXA SPRAIN OF LIGAMENT OF LUMBOSACRAL JOINT, INITIAL ENCOUNTER: ICD-10-CM

## 2022-01-03 DIAGNOSIS — S83.92XA SPRAIN OF LEFT KNEE/LEG, INITIAL ENCOUNTER: ICD-10-CM

## 2022-01-03 DIAGNOSIS — S83.92XD SPRAIN OF LEFT KNEE/LEG, SUBSEQUENT ENCOUNTER: ICD-10-CM

## 2022-01-03 DIAGNOSIS — M51.27 LUMBAGO-SCIATICA DUE TO DISPLACEMENT OF LUMBAR INTERVERTEBRAL DISC: ICD-10-CM

## 2022-01-03 DIAGNOSIS — S33.9XXD SPRAIN OF LIGAMENT OF LUMBOSACRAL JOINT, SUBSEQUENT ENCOUNTER: ICD-10-CM

## 2022-01-03 PROCEDURE — 99213 OFFICE O/P EST LOW 20 MIN: CPT | Performed by: INTERNAL MEDICINE

## 2022-01-03 RX ORDER — DICLOFENAC SODIUM 75 MG/1
75 TABLET, DELAYED RELEASE ORAL DAILY PRN
Qty: 30 TABLET | Refills: 1 | Status: SHIPPED | OUTPATIENT
Start: 2022-01-03 | End: 2022-01-31 | Stop reason: SDUPTHER

## 2022-01-03 RX ORDER — OXYCODONE AND ACETAMINOPHEN 7.5; 325 MG/1; MG/1
1 TABLET ORAL EVERY 6 HOURS PRN
Qty: 120 TABLET | Refills: 0 | Status: SHIPPED | OUTPATIENT
Start: 2022-01-03 | End: 2022-01-31 | Stop reason: SDUPTHER

## 2022-01-03 RX ORDER — PREGABALIN 150 MG/1
150 CAPSULE ORAL 3 TIMES DAILY
Qty: 90 CAPSULE | Refills: 0 | Status: SHIPPED | OUTPATIENT
Start: 2022-01-03 | End: 2022-01-31 | Stop reason: SDUPTHER

## 2022-01-03 NOTE — PROGRESS NOTES
[facility]  1975  [id number]    HISTORY OF PRESENT ILLNESS:  Mr. Hodan Dupont is a 55 y.o. male returns for a follow up visit for multiple medical problems. His current presenting problems are   1. 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 BWC Sprain of left knee/leg, initial encounter    3. BWC Sprain of left knee/leg, subsequent encounter    4. BWC Sprain of ligament of lumbosacral joint, initial encounter    5. BWC Sprain of ligament of lumbosacral joint, subsequent encounter    . As per information/history obtained from the PADT(patient assessment and documentation tool) - He complains of pain in the lower back and lower leg Left with radiation to the lower leg Left He rates the pain 4/10 and describes it as sharp, burning, pins and needles. Pain is made worse by: movement, walking, standing, sitting, bending, lifting. Current treatment regimen has helped relieve about 70% of the pain. He denies side effects from the current pain regimen. Patient reports that since the last follow up visit the physical functioning is unchanged, family/social relationships are unchanged, mood is unchanged and sleep patterns are unchanged, and that the overall functioning is unchanged. Patient denies neurological bowel or bladder. Patient denies misusing/abusing his narcotic pain medications or using any illegal drugs. There are No indicators for possible drug abuse, addiction or diversion problems. Upon obtaining the medical history from Mr. Hodan Dupont regarding today's office visit for his presenting problems, patient states he is doing fair, pain has been baseline and tolerable with the regimen. He says he is working full time. He mentions he is using Percocet along with Lyrica. He reports he is still having problem with his leg. He states he is going to the wound clinic.       ALLERGIES: Patients list of allergies were reviewed     MEDICATIONS: Mr. Hodan Dupont list of medications were reviewed. His current medications are   Outpatient Medications Prior to Visit   Medication Sig Dispense Refill    Compression Stockings MISC by Does not apply route Strength 30-40mmHg  Style: pull on below the knee; open or closed toe  Extremity: bilateral  Donning aid recommended: Yes if needed 1 each 2    Compression Stockings MISC by Does not apply route Strength 30-40mmHg  Style: Farrow wrap , below the knee  Extremity: bilateral  Donning aid recommended: No 1 each 0    penicillin v potassium (VEETID) 500 MG tablet TAKE TWO TABLETS BY MOUTH TWICE A  tablet 3    Multiple Vitamins-Minerals (THERAPEUTIC MULTIVITAMIN-MINERALS) tablet Take 1 tablet by mouth daily      losartan (COZAAR) 100 MG tablet TAKE ONE TABLET BY MOUTH DAILY 90 tablet 2    furosemide (LASIX) 20 MG tablet TAKE ONE TO TWO TABLETS BY MOUTH EVERY MORNING AS NEEDED FOR EDEMA 180 tablet 3    Compression Stockings MISC by Does not apply route Thigh high 1 each 0    Elastic Bandages & Supports (MEDICAL COMPRESSION THIGH HIGH) MISC Thigh high compression stockings, 30-40 mm Hg 2 each 1    oxyCODONE-acetaminophen (PERCOCET) 7.5-325 MG per tablet Take 1 tablet by mouth every 6 hours as needed for Pain (max 4 per day) for up to 30 days. 120 tablet 0    pregabalin (LYRICA) 150 MG capsule Take 1 capsule by mouth 3 times daily for 30 days. 90 capsule 0    diclofenac (VOLTAREN) 75 MG EC tablet Take 1 tablet by mouth daily as needed for Pain 30 tablet 1     No facility-administered medications prior to visit. REVIEW OF SYSTEMS:    Respiratory: Negative for apnea, chest tightness and shortness of breath or change in baseline breathing. PHYSICAL EXAM:   Nursing note and vitals reviewed. BP (!) 163/79   Pulse 73   Resp 16   Ht 6' 3\" (1.905 m)   Wt (!) 476 lb (215.9 kg)   SpO2 94%   BMI 59.50 kg/m²   Constitutional: He appears well-developed and well-nourished. No acute distress.    Cardiovascular: Normal rate, regular rhythm, normal heart sounds, and does not have murmur. Pulmonary/Chest: Effort normal. No respiratory distress. He does not have wheezes in the lung fields. He has no rales. Neurological/Psychiatric:He is alert and oriented to person, place, and time. Coordination is  normal.  His mood isAppropriate and affect is Neutral/Euthymic(normal) . Other: + leg bandaged     IMPRESSION:   1.  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 BWC Sprain of left knee/leg, initial encounter    3. BWC Sprain of left knee/leg, subsequent encounter    4. BWC Sprain of ligament of lumbosacral joint, initial encounter    5. BWC Sprain of ligament of lumbosacral joint, subsequent encounter        PLAN:  Informed verbal consent was obtained  -ROM/Stretching exercises as advised   -He was advised to increase fluids ( 5-7  glasses of fluid daily), limit caffeine, avoid cheese products, increase dietary fiber, increase activity and exercise as tolerated and relax regularly and enjoy meals   -Continue with Percocet 4 per day   -Follow up with wound clinic asap    Current Outpatient Medications   Medication Sig Dispense Refill    oxyCODONE-acetaminophen (PERCOCET) 7.5-325 MG per tablet Take 1 tablet by mouth every 6 hours as needed for Pain (max 4 per day) for up to 30 days. 120 tablet 0    pregabalin (LYRICA) 150 MG capsule Take 1 capsule by mouth 3 times daily for 30 days.  90 capsule 0    diclofenac (VOLTAREN) 75 MG EC tablet Take 1 tablet by mouth daily as needed for Pain 30 tablet 1    Compression Stockings MISC by Does not apply route Strength 30-40mmHg  Style: pull on below the knee; open or closed toe  Extremity: bilateral  Donning aid recommended: Yes if needed 1 each 2    Compression Stockings MISC by Does not apply route Strength 30-40mmHg  Style: Farrow wrap , below the knee  Extremity: bilateral  Donning aid recommended: No 1 each 0    penicillin v potassium (VEETID) 500 MG tablet TAKE TWO TABLETS BY MOUTH TWICE A  tablet 3    Multiple Vitamins-Minerals (THERAPEUTIC MULTIVITAMIN-MINERALS) tablet Take 1 tablet by mouth daily      losartan (COZAAR) 100 MG tablet TAKE ONE TABLET BY MOUTH DAILY 90 tablet 2    furosemide (LASIX) 20 MG tablet TAKE ONE TO TWO TABLETS BY MOUTH EVERY MORNING AS NEEDED FOR EDEMA 180 tablet 3    Compression Stockings MISC by Does not apply route Thigh high 1 each 0    Elastic Bandages & Supports (MEDICAL COMPRESSION THIGH HIGH) MISC Thigh high compression stockings, 30-40 mm Hg 2 each 1     No current facility-administered medications for this visit. I will continue his current medication regimen  which is part of the above treatment schedule. It has been helping with Mr. 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 Risks and benefits of the medications and other alternative treatments  including no treatment were discussed with the patient. The common side effects of these medications were also explained to the patient. Informed verbal consent was obtained. Goals of current treatment regimen include improvement in pain, restoration of functioning- with focus on improvement in physical performance, general activity, work or disability,emotional distress, health care utilization and  decreased medication consumption. Will continue to monitor progress towards achieving/maintaining therapeutic goals with special emphasis on  1. Improvement in perceived interfernce  of pain with ADL's. Ability to do home exercises independently.  Ability to do household chores indoor and/or outdoor work and social and leisure activities. Improve psychosocial and physical functioning. - he is showing progression towards this treatment goal with the current regimen. He was advised against drinking alcohol with the narcotic pain medicines, advised against driving or handling machinery while adjusting the dose of medicines or if having cognitive  issues related to the current medications. Risk of overdose and death, if medicines not taken as prescribed, were also discussed. If the patient develops new symptoms or if the symptoms worsen, the patient should call the office. While transcribing every attempt was made to maintain the accuracy of the note in terms of it's contents,there may have been some errors made inadvertently. Thank you for allowing me to participate in the care of this patient.     Marceol Abraham MD.    Cc: Leigh Underwood MD

## 2022-01-06 ENCOUNTER — HOSPITAL ENCOUNTER (OUTPATIENT)
Dept: WOUND CARE | Age: 47
Discharge: HOME | End: 2022-01-06
Payer: COMMERCIAL

## 2022-01-06 VITALS — DIASTOLIC BLOOD PRESSURE: 85 MMHG | SYSTOLIC BLOOD PRESSURE: 149 MMHG | TEMPERATURE: 96.7 F | RESPIRATION RATE: 20 BRPM

## 2022-01-06 DIAGNOSIS — L97.222 VARICOSE VEINS OF LEFT LOWER EXTREMITY WITH INFLAMMATION, WITH ULCER OF CALF WITH FAT LAYER EXPOSED (HCC): Primary | ICD-10-CM

## 2022-01-06 DIAGNOSIS — I83.222 VARICOSE VEINS OF LEFT LOWER EXTREMITY WITH INFLAMMATION, WITH ULCER OF CALF WITH FAT LAYER EXPOSED (HCC): Primary | ICD-10-CM

## 2022-01-06 DIAGNOSIS — L97.211 VARICOSE VEINS OF RIGHT LOWER EXTREMITY WITH INFLAMMATION, WITH ULCER OF CALF LIMITED TO BREAKDOWN OF SKIN (HCC): ICD-10-CM

## 2022-01-06 DIAGNOSIS — L97.222 NON-PRESSURE CHRONIC ULCER OF LEFT CALF WITH FAT LAYER EXPOSED (HCC): ICD-10-CM

## 2022-01-06 DIAGNOSIS — I83.212 VARICOSE VEINS OF RIGHT LOWER EXTREMITY WITH INFLAMMATION, WITH ULCER OF CALF LIMITED TO BREAKDOWN OF SKIN (HCC): ICD-10-CM

## 2022-01-06 DIAGNOSIS — L97.212 VARICOSE VEINS OF RIGHT LOWER EXTREMITY WITH INFLAMMATION, WITH ULCER OF CALF WITH FAT LAYER EXPOSED (HCC): ICD-10-CM

## 2022-01-06 DIAGNOSIS — I83.212 VARICOSE VEINS OF RIGHT LOWER EXTREMITY WITH INFLAMMATION, WITH ULCER OF CALF WITH FAT LAYER EXPOSED (HCC): ICD-10-CM

## 2022-01-06 PROCEDURE — 11042 DBRDMT SUBQ TIS 1ST 20SQCM/<: CPT

## 2022-01-06 PROCEDURE — 11045 DBRDMT SUBQ TISS EACH ADDL: CPT

## 2022-01-06 PROCEDURE — 6370000000 HC RX 637 (ALT 250 FOR IP): Performed by: PODIATRIST

## 2022-01-06 RX ORDER — BACITRACIN ZINC AND POLYMYXIN B SULFATE 500; 1000 [USP'U]/G; [USP'U]/G
OINTMENT TOPICAL ONCE
Status: CANCELLED | OUTPATIENT
Start: 2022-01-06 | End: 2022-01-06

## 2022-01-06 RX ORDER — LIDOCAINE 40 MG/G
CREAM TOPICAL ONCE
Status: CANCELLED | OUTPATIENT
Start: 2022-01-06 | End: 2022-01-06

## 2022-01-06 RX ORDER — LIDOCAINE HYDROCHLORIDE 40 MG/ML
SOLUTION TOPICAL ONCE
Status: CANCELLED | OUTPATIENT
Start: 2022-01-06 | End: 2022-01-06

## 2022-01-06 RX ORDER — LIDOCAINE HYDROCHLORIDE 20 MG/ML
JELLY TOPICAL ONCE
Status: CANCELLED | OUTPATIENT
Start: 2022-01-06 | End: 2022-01-06

## 2022-01-06 RX ORDER — GENTAMICIN SULFATE 1 MG/G
OINTMENT TOPICAL ONCE
Status: CANCELLED | OUTPATIENT
Start: 2022-01-06 | End: 2022-01-06

## 2022-01-06 RX ORDER — BACITRACIN, NEOMYCIN, POLYMYXIN B 400; 3.5; 5 [USP'U]/G; MG/G; [USP'U]/G
OINTMENT TOPICAL ONCE
Status: CANCELLED | OUTPATIENT
Start: 2022-01-06 | End: 2022-01-06

## 2022-01-06 RX ORDER — LIDOCAINE 50 MG/G
OINTMENT TOPICAL ONCE
Status: CANCELLED | OUTPATIENT
Start: 2022-01-06 | End: 2022-01-06

## 2022-01-06 RX ORDER — BETAMETHASONE DIPROPIONATE 0.05 %
OINTMENT (GRAM) TOPICAL ONCE
Status: CANCELLED | OUTPATIENT
Start: 2022-01-06 | End: 2022-01-06

## 2022-01-06 RX ORDER — GINSENG 100 MG
CAPSULE ORAL ONCE
Status: CANCELLED | OUTPATIENT
Start: 2022-01-06 | End: 2022-01-06

## 2022-01-06 RX ORDER — LIDOCAINE HYDROCHLORIDE 40 MG/ML
SOLUTION TOPICAL ONCE
Status: COMPLETED | OUTPATIENT
Start: 2022-01-06 | End: 2022-01-06

## 2022-01-06 RX ORDER — CLOBETASOL PROPIONATE 0.5 MG/G
OINTMENT TOPICAL ONCE
Status: CANCELLED | OUTPATIENT
Start: 2022-01-06 | End: 2022-01-06

## 2022-01-06 RX ADMIN — LIDOCAINE HYDROCHLORIDE: 40 SOLUTION TOPICAL at 16:01

## 2022-01-06 ASSESSMENT — PAIN SCALES - GENERAL: PAINLEVEL_OUTOF10: 0

## 2022-01-06 NOTE — PROGRESS NOTES
[facility]  Progress Note and Procedure Note      Candy Davis  MEDICAL RECORD NUMBER:  [id number]  AGE: 55 y.o. GENDER: male  : 1975  EPISODE DATE:  2022    Subjective:     Chief Complaint   Patient presents with    Wound Check     lefg lower leg         HISTORY of PRESENT ILLNESS HPI     Candy Davis is a 55 y.o. male who presents today for wound/ulcer evaluation. History of Wound Context: Patient has ulcer left lateral leg. Patient relates he needs a letter of medical necessity to attempt to get his gastric bypass again.   Wound/Ulcer Pain Timing/Severity: intermittent, mild, moderate  Quality of pain: sharp  Severity:  5 / 10   Modifying Factors: Pain worsens with walking  Associated Signs/Symptoms: edema    Ulcer Identification:  Ulcer Type: venous    Contributing Factors: edema, venous stasis and lymphedema    Acute Wound: N/A        PAST MEDICAL HISTORY        Diagnosis Date    Arthritis     Cellulitis     Chronic back pain     Hepatitis age 24    HNP (herniated nucleus pulposus with myelopathy), thoracic     Hypertension     Melanoma ([facility] [address]     left foot     Obesity 2011    ANN-MARIE (obstructive sleep apnea)     NO CPAP     Stasis dermatitis        PAST SURGICAL HISTORY    Past Surgical History:   Procedure Laterality Date    ADENOIDECTOMY      FOOT SURGERY Left 2019    WIDE EXCISION OF LEFT FOOT MELANOMA performed by Denise Virgen MD at [facility] Left 2/10/2020    LEFT FOOT MELANOMA WIDE EXCISION AND FULL THICKNESS SKIN GRAFT,LEFT GROIN SENTINEL LYMPH NODE BIOPSY performed by Denise Virgen MD at [facility]  06    Arand    UPPER GASTROINTESTINAL ENDOSCOPY N/A 2021    EGD BIOPSY performed by Gurmeet Zapien DO at [address] ENDOSCOPY    WISDOM TOOTH EXTRACTION         FAMILY HISTORY    Family History   Problem Relation Age of Onset    Cancer Mother     COPD Paternal Grandfather    [place] Cady Colon Cancer Maternal Grandfather        SOCIAL HISTORY    Social History     Tobacco Use    Smoking status: Former Smoker     Packs/day: 1.50     Types: Cigarettes     Quit date: 2005     Years since quittin.0    Smokeless tobacco: Former User     Types: Chew    Tobacco comment: States \"vapes a little\"   Vaping Use    Vaping Use: Every day    Substances: Nicotine, Flavoring    Devices: Refillable tank   Substance Use Topics    Alcohol use: No     Alcohol/week: 0.0 standard drinks     Comment: rarely -- 1 every few months    Drug use: No       ALLERGIES    No Known Allergies    MEDICATIONS    Current Outpatient Medications on File Prior to Encounter   Medication Sig Dispense Refill    oxyCODONE-acetaminophen (PERCOCET) 7.5-325 MG per tablet Take 1 tablet by mouth every 6 hours as needed for Pain (max 4 per day) for up to 30 days. 120 tablet 0    pregabalin (LYRICA) 150 MG capsule Take 1 capsule by mouth 3 times daily for 30 days.  90 capsule 0    diclofenac (VOLTAREN) 75 MG EC tablet Take 1 tablet by mouth daily as needed for Pain 30 tablet 1    penicillin v potassium (VEETID) 500 MG tablet TAKE TWO TABLETS BY MOUTH TWICE A  tablet 3    Multiple Vitamins-Minerals (THERAPEUTIC MULTIVITAMIN-MINERALS) tablet Take 1 tablet by mouth daily      losartan (COZAAR) 100 MG tablet TAKE ONE TABLET BY MOUTH DAILY 90 tablet 2    furosemide (LASIX) 20 MG tablet TAKE ONE TO TWO TABLETS BY MOUTH EVERY MORNING AS NEEDED FOR EDEMA 180 tablet 3    Compression Stockings MISC by Does not apply route Thigh high 1 each 0    Compression Stockings MISC by Does not apply route Strength 30-40mmHg  Style: pull on below the knee; open or closed toe  Extremity: bilateral  Donning aid recommended: Yes if needed 1 each 2    Compression Stockings MISC by Does not apply route Strength 30-40mmHg  Style: Farrow wrap , below the knee  Extremity: bilateral  Donning aid recommended: No 1 each 0    Elastic Bandages & Supports (MEDICAL COMPRESSION THIGH HIGH) MISC Thigh high compression stockings, 30-40 mm Hg 2 each 1     No current facility-administered medications on file prior to encounter. REVIEW OF SYSTEMS    Pertinent items are noted in HPI. Last H3B if applicable:   Hemoglobin A1C   Date Value Ref Range Status   08/10/2021 4.8 See comment % Final     Comment:     Comment:  Diagnosis of Diabetes: > or = 6.5%  Increased risk of diabetes (Prediabetes): 5.7-6.4%  Glycemic Control: Nonpregnant Adults: <7.0%                    Pregnant: <6.0%           Objective:      BP (!) 149/85   Temp 96.7 °F (35.9 °C) (Temporal)   Resp 20     Wt Readings from Last 3 Encounters:   01/03/22 (!) 476 lb (215.9 kg)   12/06/21 (!) 479 lb (217.3 kg)   12/02/21 (!) 476 lb 10.2 oz (216.2 kg)       PHYSICAL EXAM    General Appearance: alert and oriented to person, place and time, well-developed and well-nourished, in no acute distress  Pedal pulses palpable. Protective sensations intact. Ulcer is noted left lateral leg with granular base and fibrotic covering. Ulcer does not probe to bone. There are no signs of infection. There is moderate edema bilateral legs. Ulcer noted superior to wound #1 exhibits granular base, fibrotic covering, drainage with no signs of infection and does not probe to bone. Hemosiderin deposits are noted anterior bilateral legs. Assessment:      1. 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 Non-pressure chronic ulcer of left calf with fat layer exposed ([address]    3. Varicose veins of right lower extremity with inflammation, with ulcer of calf limited to breakdown of skin ([address]    4.  Varicose veins of right lower extremity with inflammation, with ulcer of calf with fat layer exposed ([address]           Procedure Note  Indications:  Based on my examination of this patient's wound(s)/ulcer(s) today, debridement is required to promote healing and evaluate the wound base. Performed by: Keysha Medina DPM    Consent obtained:  Yes    Time out taken:  Yes    Pain Control: Anesthetic  Anesthetic: 4% Lidocaine Liquid Topical       Debridement: Excisional Debridement    Using curette the wound(s)/ulcer(s) was/were debrided down through and including the removal of epidermis, dermis and subcutaneous tissue. Devitalized Tissue Debrided:  fibrin and biofilm    Pre Debridement Measurements:  Are located in the [place]  Documentation Flow Sheet    Wound/Ulcer #: 1 and 2    Post Debridement Measurements:  Wound/Ulcer Descriptions are Pre Debridement except measurements:    Wound 10/07/21 Leg Lateral;Left;Distal #1 (Active)   Wound Image   12/02/21 1552   Wound Cleansed Cleansed with saline 01/06/22 1549   Dressing/Treatment Alginate with Ag 12/30/21 1616   Wound Length (cm) 3 cm 01/06/22 1549   Wound Width (cm) 2 cm 01/06/22 1549   Wound Depth (cm) 0.1 cm 01/06/22 1549   Wound Surface Area (cm^2) 6 cm^2 01/06/22 1549   Change in Wound Size % (l*w) 28.57 01/06/22 1549   Wound Volume (cm^3) 0.6 cm^3 01/06/22 1549   Wound Healing % 29 01/06/22 1549   Post-Procedure Length (cm) 1.6 cm 12/30/21 1610   Post-Procedure Width (cm) 2.1 cm 12/30/21 1610   Post-Procedure Depth (cm) 0.3 cm 12/30/21 1610   Post-Procedure Surface Area (cm^2) 3.36 cm^2 12/30/21 1610   Post-Procedure Volume (cm^3) 1.008 cm^3 12/30/21 1610   Distance Tunneling (cm) 0 cm 01/06/22 1549   Tunneling Position ___ O'Clock 0 12/23/21 1524   Undermining Starts ___ O'Clock 0 12/23/21 1524   Undermining Ends___ O'Clock 0 12/23/21 1524   Undermining Maxium Distance (cm) 0 01/06/22 1549   Wound Assessment Fibrin;Pink/red 01/06/22 1549   Drainage Amount Moderate 01/06/22 1549   Drainage Description Green;Brown 01/06/22 1549   Odor Moderate 01/06/22 1549   Jacy-wound Assessment Hemosiderin staining (brown yellow); Maceration 01/06/22 1549   Margins Defined edges 01/06/22 1549   Wound Thickness Description not for Pressure Injury Full thickness 01/06/22 1549   Number of days: 91       Wound 12/23/21 Proximal;Left;Lower #2 (Active)   Wound Image   12/23/21 1524   Wound Etiology Traumatic 12/23/21 1524   Wound Cleansed Cleansed with saline 01/06/22 1549   Dressing/Treatment Alginate with Ag 12/30/21 1616   Wound Length (cm) 4 cm 01/06/22 1549   Wound Width (cm) 6.5 cm 01/06/22 1549   Wound Depth (cm) 0.1 cm 01/06/22 1549   Wound Surface Area (cm^2) 26 cm^2 01/06/22 1549   Change in Wound Size % (l*w) -37.57 01/06/22 1549   Wound Volume (cm^3) 2.6 cm^3 01/06/22 1549   Wound Healing % -38 01/06/22 1549   Post-Procedure Length (cm) 3.6 cm 12/30/21 1610   Post-Procedure Width (cm) 9.7 cm 12/30/21 1610   Post-Procedure Depth (cm) 0.3 cm 12/30/21 1610   Post-Procedure Surface Area (cm^2) 34.92 cm^2 12/30/21 1610   Post-Procedure Volume (cm^3) 10.476 cm^3 12/30/21 1610   Distance Tunneling (cm) 0 cm 01/06/22 1549   Undermining Maxium Distance (cm) 0 01/06/22 1549   Wound Assessment Fibrin;Pink/red 01/06/22 1549   Drainage Amount Moderate 01/06/22 1549   Drainage Description Green;Brown 01/06/22 1549   Odor Moderate 01/06/22 1549   Jacy-wound Assessment Hemosiderin staining (brown yellow); Maceration 01/06/22 1549   Margins Undefined edges; Defined edges 01/06/22 1549   Wound Thickness Description not for Pressure Injury Full thickness 01/06/22 1549   Number of days: 14     Incision 12/20/19 Foot Left;Plantar (Active)   Number of days: 748       Percent of Wound(s)/Ulcer(s) Debrided: 100%    Total Surface Area Debrided:  38.28 sq cm     Diabetic/Pressure/Non Pressure Ulcers only:  Ulcer: N/A     Estimated Blood Loss:  Minimal    Hemostasis Achieved:  by pressure    Procedural Pain:  5  / 10     Post Procedural Pain:  5 / 10     Response to treatment:  Well tolerated by patient. Plan:       Treatment Note please see attached Discharge Instructions. Debrided ulcers left lateral leg.   Patient to use his compression wraps.  Zinc and alginate applied. Return 1 week. New Medication(s) at this visit:   New Prescriptions    No medications on file       Other orders at this visit:   Orders Placed This Encounter   Procedures   [address] DO Yuan, [facility]       Smoking Cessation: Counseling given: Not Answered  Comment: States \"vapes a little\"      Written patient dismissal instructions given to patient and signed by patient or POA.                Electronically signed by Jori Pat DPM on 1/6/2022 at 4:14 PM

## 2022-01-06 NOTE — CARE COORDINATION
Compression [address] for [employer]:     [place] [address] f: [phone] f: [phone] p: [phone] [email or web address]      Ordering Center: [facility] [address] [address] [facility]. [address] NUMBER: [phone]    Patient Information:      Aminata Juarez  [address] [facility] [address]   [phone]   : 1975  AGE: 55 y.o.      GENDER: male   TODAYS DATE:  2022    Insurance:      PRIMARY INSURANCE:  Plan: [address] [place] [address] [place]  Coverage: BCBS  Effective Date: 2021  Group Number: [unfilled]  Subscriber Number: [id number] - (Worker's Comp)    SECONDARY INSURANCE:  Plan:   Coverage:   Effective Date:   [unfilled]    [unfilled]     [unfilled]   [unfilled]     Patient Information:      Problem List Items Addressed This Visit       Non-pressure chronic ulcer of left calf with fat layer exposed ([address]    Relevant Orders    Initiate Outpatient Wound Care Protocol    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 ([address]    Relevant Orders    Initiate Outpatient Wound Care Protocol    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 ([address] - Primary    Relevant Orders    [facility], Yassine Porter DO, Bariatric Surgery, [facility]            Wound 10/07/21 Leg Lateral;Left;Distal #1 (Active)   Wound Image   21 1552   Wound Cleansed Cleansed with saline 22 1549   Dressing/Treatment Alginate with Ag 21 1616   Wound Length (cm) 3 cm 22 1549   Wound Width (cm) 2 cm 22 1549   Wound Depth (cm) 0.1 cm 22 1549   Wound Surface Area (cm^2) 6 cm^2 22 1549   Change in Wound Size % (l*w) 28.57 22 1549   Wound Volume (cm^3) 0.6 cm^3 22 1549   Wound Healing % 29 22 1549   Post-Procedure Length (cm) 1.6 cm 12/30/21 1610   Post-Procedure Width (cm) 2.1 cm 12/30/21 1610   Post-Procedure Depth (cm) 0.3 cm 12/30/21 1610   Post-Procedure Surface Area (cm^2) 3.36 cm^2 12/30/21 1610   Post-Procedure Volume (cm^3) 1.008 cm^3 12/30/21 1610   Distance Tunneling (cm) 0 cm 01/06/22 1549   Tunneling Position ___ O'Clock 0 12/23/21 1524   Undermining Starts ___ O'Clock 0 12/23/21 1524   Undermining Ends___ O'Clock 0 12/23/21 1524   Undermining Maxium Distance (cm) 0 01/06/22 1549   Wound Assessment Fibrin;Pink/red 01/06/22 1549   Drainage Amount Moderate 01/06/22 1549   Drainage Description Green;Brown 01/06/22 1549   Odor Moderate 01/06/22 1549   Jacy-wound Assessment Hemosiderin staining (brown yellow); Maceration 01/06/22 1549   Margins Defined edges 01/06/22 1549   Wound Thickness Description not for Pressure Injury Full thickness 01/06/22 1549   Number of days: 91       Wound 12/23/21 Proximal;Left;Lower #2 (Active)   Wound Image   12/23/21 1524   Wound Etiology Traumatic 12/23/21 1524   Wound Cleansed Cleansed with saline 01/06/22 1549   Dressing/Treatment Alginate with Ag 12/30/21 1616   Wound Length (cm) 4 cm 01/06/22 1549   Wound Width (cm) 6.5 cm 01/06/22 1549   Wound Depth (cm) 0.1 cm 01/06/22 1549   Wound Surface Area (cm^2) 26 cm^2 01/06/22 1549   Change in Wound Size % (l*w) -37.57 01/06/22 1549   Wound Volume (cm^3) 2.6 cm^3 01/06/22 1549   Wound Healing % -38 01/06/22 1549   Post-Procedure Length (cm) 3.6 cm 12/30/21 1610   Post-Procedure Width (cm) 9.7 cm 12/30/21 1610   Post-Procedure Depth (cm) 0.3 cm 12/30/21 1610   Post-Procedure Surface Area (cm^2) 34.92 cm^2 12/30/21 1610   Post-Procedure Volume (cm^3) 10.476 cm^3 12/30/21 1610   Distance Tunneling (cm) 0 cm 01/06/22 1549   Undermining Maxium Distance (cm) 0 01/06/22 1549   Wound Assessment Fibrin;Pink/red 01/06/22 1549   Drainage Amount Moderate 01/06/22 1549   Drainage Description Green;Brown 01/06/22 1549   Odor Moderate 01/06/22 1549   Jacy-wound Assessment Hemosiderin staining (brown yellow); Maceration 01/06/22 1549   Margins Undefined edges; Defined edges 01/06/22 1549   Wound Thickness Description not for Pressure Injury Full thickness 01/06/22 1549   Number of days: 14       Right Leg Measurements: (ALL measurements are in cm)       Left Leg Measurements: (ALL measurements are in cm)  Left Leg Edema Point of Measurement  Leg circumference: 52 cm  Ankle circumference: 30 cm  Foot circumference: 30 cm   Heel to below the knee 42 cm    Supplies Requested :     Medicare Requirements  Patient must have a qualifying Active Venus Ulcer if ordering Bilateral Compression Wounds MUST be present on both legs for Medicare Coverage. The patient can Not be on home health or have had a Medicare part A stay in the past 24 hours. Patient Wound(s) Debrided: [x] Yes if yes please add date 1/6/2022   [] No    Debribement Type: Excisional/Sharp    Patient currently being seen by Home Health: [] Yes   [x] No     Compression Type:  Other jobst farrow wrap basic    Provider Information:      PROVIDER'S NAME/NPI: Shanti [facility]CEPC[facility]  NPI: [id number]  Electronically signed by Georgia Shen DPM on 1/6/2022 at 4:34 PM

## 2022-01-13 ENCOUNTER — HOSPITAL ENCOUNTER (OUTPATIENT)
Dept: WOUND CARE | Age: 47
Discharge: HOME | End: 2022-01-13
Payer: COMMERCIAL

## 2022-01-13 VITALS
TEMPERATURE: 96 F | RESPIRATION RATE: 18 BRPM | SYSTOLIC BLOOD PRESSURE: 184 MMHG | HEART RATE: 73 BPM | DIASTOLIC BLOOD PRESSURE: 95 MMHG

## 2022-01-13 DIAGNOSIS — I83.222 VARICOSE VEINS OF LEFT LOWER EXTREMITY WITH INFLAMMATION, WITH ULCER OF CALF WITH FAT LAYER EXPOSED (HCC): ICD-10-CM

## 2022-01-13 DIAGNOSIS — L97.211 VARICOSE VEINS OF RIGHT LOWER EXTREMITY WITH INFLAMMATION, WITH ULCER OF CALF LIMITED TO BREAKDOWN OF SKIN (HCC): ICD-10-CM

## 2022-01-13 DIAGNOSIS — L97.222 VARICOSE VEINS OF LEFT LOWER EXTREMITY WITH INFLAMMATION, WITH ULCER OF CALF WITH FAT LAYER EXPOSED (HCC): ICD-10-CM

## 2022-01-13 DIAGNOSIS — L97.222 NON-PRESSURE CHRONIC ULCER OF LEFT CALF WITH FAT LAYER EXPOSED (HCC): Primary | ICD-10-CM

## 2022-01-13 DIAGNOSIS — I83.212 VARICOSE VEINS OF RIGHT LOWER EXTREMITY WITH INFLAMMATION, WITH ULCER OF CALF LIMITED TO BREAKDOWN OF SKIN (HCC): ICD-10-CM

## 2022-01-13 PROCEDURE — 6370000000 HC RX 637 (ALT 250 FOR IP): Performed by: PODIATRIST

## 2022-01-13 PROCEDURE — 11045 DBRDMT SUBQ TISS EACH ADDL: CPT

## 2022-01-13 PROCEDURE — 11042 DBRDMT SUBQ TIS 1ST 20SQCM/<: CPT

## 2022-01-13 RX ORDER — CLOBETASOL PROPIONATE 0.5 MG/G
OINTMENT TOPICAL ONCE
Status: CANCELLED | OUTPATIENT
Start: 2022-01-13 | End: 2022-01-13

## 2022-01-13 RX ORDER — LIDOCAINE HYDROCHLORIDE 40 MG/ML
SOLUTION TOPICAL ONCE
Status: COMPLETED | OUTPATIENT
Start: 2022-01-13 | End: 2022-01-13

## 2022-01-13 RX ORDER — LIDOCAINE 50 MG/G
OINTMENT TOPICAL ONCE
Status: COMPLETED | OUTPATIENT
Start: 2022-01-13 | End: 2022-01-13

## 2022-01-13 RX ORDER — LIDOCAINE HYDROCHLORIDE 20 MG/ML
JELLY TOPICAL ONCE
Status: CANCELLED | OUTPATIENT
Start: 2022-01-13 | End: 2022-01-13

## 2022-01-13 RX ORDER — BACITRACIN, NEOMYCIN, POLYMYXIN B 400; 3.5; 5 [USP'U]/G; MG/G; [USP'U]/G
OINTMENT TOPICAL ONCE
Status: CANCELLED | OUTPATIENT
Start: 2022-01-13 | End: 2022-01-13

## 2022-01-13 RX ORDER — BETAMETHASONE DIPROPIONATE 0.05 %
OINTMENT (GRAM) TOPICAL ONCE
Status: CANCELLED | OUTPATIENT
Start: 2022-01-13 | End: 2022-01-13

## 2022-01-13 RX ORDER — LIDOCAINE 40 MG/G
CREAM TOPICAL ONCE
Status: CANCELLED | OUTPATIENT
Start: 2022-01-13 | End: 2022-01-13

## 2022-01-13 RX ORDER — LIDOCAINE HYDROCHLORIDE 40 MG/ML
SOLUTION TOPICAL ONCE
Status: CANCELLED | OUTPATIENT
Start: 2022-01-13 | End: 2022-01-13

## 2022-01-13 RX ORDER — LIDOCAINE 50 MG/G
OINTMENT TOPICAL ONCE
Status: CANCELLED | OUTPATIENT
Start: 2022-01-13 | End: 2022-01-13

## 2022-01-13 RX ORDER — BACITRACIN ZINC AND POLYMYXIN B SULFATE 500; 1000 [USP'U]/G; [USP'U]/G
OINTMENT TOPICAL ONCE
Status: CANCELLED | OUTPATIENT
Start: 2022-01-13 | End: 2022-01-13

## 2022-01-13 RX ORDER — GENTAMICIN SULFATE 1 MG/G
OINTMENT TOPICAL ONCE
Status: CANCELLED | OUTPATIENT
Start: 2022-01-13 | End: 2022-01-13

## 2022-01-13 RX ORDER — GINSENG 100 MG
CAPSULE ORAL ONCE
Status: CANCELLED | OUTPATIENT
Start: 2022-01-13 | End: 2022-01-13

## 2022-01-13 RX ADMIN — LIDOCAINE HYDROCHLORIDE: 40 SOLUTION TOPICAL at 14:12

## 2022-01-13 RX ADMIN — LIDOCAINE: 50 OINTMENT TOPICAL at 14:26

## 2022-01-13 ASSESSMENT — PAIN DESCRIPTION - ORIENTATION: ORIENTATION: LEFT

## 2022-01-13 ASSESSMENT — PAIN DESCRIPTION - DESCRIPTORS: DESCRIPTORS: ACHING

## 2022-01-13 ASSESSMENT — PAIN DESCRIPTION - PAIN TYPE: TYPE: ACUTE PAIN

## 2022-01-13 ASSESSMENT — PAIN DESCRIPTION - LOCATION: LOCATION: LEG

## 2022-01-13 ASSESSMENT — PAIN SCALES - GENERAL: PAINLEVEL_OUTOF10: 2

## 2022-01-13 NOTE — PROGRESS NOTES
[address] [facility] [place]  Progress Note and Procedure Note      Junior Dumas  MEDICAL RECORD NUMBER:  [id number]  AGE: 55 y.o. GENDER: male  : 1975  EPISODE DATE:  2022    Subjective:     Chief Complaint   Patient presents with    Wound Check     left leg         HISTORY of PRESENT ILLNESS HPI     Junior Dumas is a 55 y.o. male who presents today for wound/ulcer evaluation. History of Wound Context: Patient has ulcer left lateral leg. He relates his drainage is still significant, but less than last month.   Wound/Ulcer Pain Timing/Severity: intermittent, mild, moderate  Quality of pain: sharp  Severity:  5 / 10   Modifying Factors: Pain worsens with walking  Associated Signs/Symptoms: edema    Ulcer Identification:  Ulcer Type: venous    Contributing Factors: edema, venous stasis and lymphedema    Acute Wound: N/A        PAST MEDICAL HISTORY        Diagnosis Date    Arthritis     Cellulitis     Chronic back pain     Hepatitis age 24    HNP (herniated nucleus pulposus with myelopathy), thoracic     Hypertension     Melanoma ([address]     left foot     Obesity 2011    ANN-MARIE (obstructive sleep apnea)     NO CPAP     Stasis dermatitis        PAST SURGICAL HISTORY    Past Surgical History:   Procedure Laterality Date    ADENOIDECTOMY      FOOT SURGERY Left 2019    WIDE EXCISION OF LEFT FOOT MELANOMA performed by Kiara Mcmahon MD at [address] [place] [address] Left 2/10/2020    LEFT FOOT MELANOMA WIDE EXCISION AND FULL THICKNESS SKIN GRAFT,LEFT GROIN SENTINEL LYMPH NODE BIOPSY performed by Kiara Mcmahon MD at [facility]  06    Arand    UPPER GASTROINTESTINAL ENDOSCOPY N/A 2021    EGD BIOPSY performed by Sage Carrington DO at [facility] EXTRACTION         FAMILY HISTORY    Family History   Problem Relation Age of Onset    Cancer Mother     COPD Paternal Grandfather     Colon Cancer Maternal Grandfather SOCIAL HISTORY    Social History     Tobacco Use    Smoking status: Former Smoker     Packs/day: 1.50     Types: Cigarettes     Quit date: 2005     Years since quittin.0    Smokeless tobacco: Former User     Types: Chew    Tobacco comment: States \"vapes a little\"   Vaping Use    Vaping Use: Every day    Substances: Nicotine, Flavoring    Devices: Refillable tank   Substance Use Topics    Alcohol use: No     Alcohol/week: 0.0 standard drinks     Comment: rarely -- 1 every few months    Drug use: No       ALLERGIES    No Known Allergies    MEDICATIONS    Current Outpatient Medications on File Prior to Encounter   Medication Sig Dispense Refill    oxyCODONE-acetaminophen (PERCOCET) 7.5-325 MG per tablet Take 1 tablet by mouth every 6 hours as needed for Pain (max 4 per day) for up to 30 days. 120 tablet 0    pregabalin (LYRICA) 150 MG capsule Take 1 capsule by mouth 3 times daily for 30 days.  90 capsule 0    diclofenac (VOLTAREN) 75 MG EC tablet Take 1 tablet by mouth daily as needed for Pain 30 tablet 1    penicillin v potassium (VEETID) 500 MG tablet TAKE TWO TABLETS BY MOUTH TWICE A  tablet 3    Multiple Vitamins-Minerals (THERAPEUTIC MULTIVITAMIN-MINERALS) tablet Take 1 tablet by mouth daily      losartan (COZAAR) 100 MG tablet TAKE ONE TABLET BY MOUTH DAILY 90 tablet 2    furosemide (LASIX) 20 MG tablet TAKE ONE TO TWO TABLETS BY MOUTH EVERY MORNING AS NEEDED FOR EDEMA 180 tablet 3    Compression Stockings MISC by Does not apply route Strength 30-40mmHg  Style: pull on below the knee; open or closed toe  Extremity: bilateral  Donning aid recommended: Yes if needed 1 each 2    Compression Stockings MISC by Does not apply route Strength 30-40mmHg  Style: Farrow wrap , below the knee  Extremity: bilateral  Donning aid recommended: No 1 each 0    Compression Stockings MISC by Does not apply route Thigh high 1 each 0    Elastic Bandages & Supports (MEDICAL COMPRESSION THIGH HIGH) MISC Thigh high compression stockings, 30-40 mm Hg 2 each 1     No current facility-administered medications on file prior to encounter. REVIEW OF SYSTEMS    Pertinent items are noted in HPI. Last Q3M if applicable:   Hemoglobin A1C   Date Value Ref Range Status   08/10/2021 4.8 See comment % Final     Comment:     Comment:  Diagnosis of Diabetes: > or = 6.5%  Increased risk of diabetes (Prediabetes): 5.7-6.4%  Glycemic Control: Nonpregnant Adults: <7.0%                    Pregnant: <6.0%           Objective:      BP (!) 184/95   Pulse 73   Temp 96 °F (35.6 °C) (Temporal)   Resp 18     Wt Readings from Last 3 Encounters:   01/03/22 (!) 476 lb (215.9 kg)   12/06/21 (!) 479 lb (217.3 kg)   12/02/21 (!) 476 lb 10.2 oz (216.2 kg)       PHYSICAL EXAM    General Appearance: alert and oriented to person, place and time, well-developed and well-nourished, in no acute distress  Pedal pulses palpable. Protective sensations intact. Ulcer is noted left lateral leg with granular base and fibrotic covering. Ulcer does not probe to bone. There are no signs of infection. There is moderate edema bilateral legs. Ulcer noted superior to wound #1 exhibits granular base, fibrotic covering, drainage with no signs of infection and does not probe to bone. Hemosiderin deposits are noted anterior bilateral legs. Assessment:      1. 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 Varicose veins of right lower extremity with inflammation, with ulcer of calf limited to breakdown of skin ([address]    3. Varicose veins of left lower extremity with inflammation, with ulcer of calf with fat layer exposed ([address]           Procedure Note  Indications:  Based on my examination of this patient's wound(s)/ulcer(s) today, debridement is required to promote healing and evaluate the wound base.     Performed by: Fany Dwyer DPM    Consent obtained:  Yes    Time out taken:  Yes    Pain Control: Anesthetic  Anesthetic: 4% Lidocaine Liquid Topical       Debridement: Excisional Debridement    Using curette the wound(s)/ulcer(s) was/were debrided down through and including the removal of epidermis, dermis and subcutaneous tissue. Devitalized Tissue Debrided:  fibrin and biofilm    Pre Debridement Measurements:  Are located in the [facility]  Documentation Flow Sheet    Wound/Ulcer #: 1 and 2    Post Debridement Measurements:  Wound/Ulcer Descriptions are Pre Debridement except measurements:    Wound 10/07/21 Leg Lateral;Left;Distal #1 (Active)   Wound Image   12/02/21 1552   Wound Etiology Venous 01/13/22 1413   Wound Cleansed Cleansed with saline 01/13/22 1413   Dressing/Treatment Alginate with Ag 01/13/22 1509   Wound Length (cm) 4.5 cm 01/13/22 1413   Wound Width (cm) 2.5 cm 01/13/22 1413   Wound Depth (cm) 0.1 cm 01/13/22 1413   Wound Surface Area (cm^2) 11.25 cm^2 01/13/22 1413   Change in Wound Size % (l*w) -33.93 01/13/22 1413   Wound Volume (cm^3) 1.125 cm^3 01/13/22 1413   Wound Healing % -34 01/13/22 1413   Post-Procedure Length (cm) 4.6 cm 01/13/22 1452   Post-Procedure Width (cm) 2.6 cm 01/13/22 1452   Post-Procedure Depth (cm) 0.3 cm 01/13/22 1452   Post-Procedure Surface Area (cm^2) 11.96 cm^2 01/13/22 1452   Post-Procedure Volume (cm^3) 3.588 cm^3 01/13/22 1452   Distance Tunneling (cm) 0 cm 01/13/22 1413   Tunneling Position ___ O'Clock 0 12/23/21 1524   Undermining Starts ___ O'Clock 0 12/23/21 1524   Undermining Ends___ O'Clock 0 12/23/21 1524   Undermining Maxium Distance (cm) 0 01/13/22 1413   Wound Assessment Fibrin;Pink/red 01/13/22 1413   Drainage Amount Moderate 01/13/22 1413   Drainage Description Green;Brown 01/13/22 1413   Odor Mild 01/13/22 1413   Jacy-wound Assessment Hemosiderin staining (brown yellow); Maceration 01/13/22 1413   Margins Defined edges 01/13/22 1413   Wound Thickness Description not for Pressure Injury Full thickness 01/13/22 1413   Number of days: 98 Wound 12/23/21 Proximal;Left;Lower #2 (Active)   Wound Image   12/23/21 1524   Wound Etiology Traumatic 12/23/21 1524   Wound Cleansed Cleansed with saline 01/13/22 1413   Dressing/Treatment Alginate with Ag 01/13/22 1509   Wound Length (cm) 4.4 cm 01/13/22 1413   Wound Width (cm) 6.5 cm 01/13/22 1413   Wound Depth (cm) 0.1 cm 01/13/22 1413   Wound Surface Area (cm^2) 28.6 cm^2 01/13/22 1413   Change in Wound Size % (l*w) -51.32 01/13/22 1413   Wound Volume (cm^3) 2.86 cm^3 01/13/22 1413   Wound Healing % -51 01/13/22 1413   Post-Procedure Length (cm) 4.5 cm 01/13/22 1452   Post-Procedure Width (cm) 6.6 cm 01/13/22 1452   Post-Procedure Depth (cm) 0.3 cm 01/13/22 1452   Post-Procedure Surface Area (cm^2) 29.7 cm^2 01/13/22 1452   Post-Procedure Volume (cm^3) 8.91 cm^3 01/13/22 1452   Distance Tunneling (cm) 0 cm 01/06/22 1549   Undermining Maxium Distance (cm) 0 01/06/22 1549   Wound Assessment Fibrin;Pink/red 01/13/22 1413   Drainage Amount Moderate 01/13/22 1413   Drainage Description Green;Brown 01/13/22 1413   Odor Mild 01/13/22 1413   Jacy-wound Assessment Hemosiderin staining (brown yellow); Maceration 01/13/22 1413   Margins Defined edges 01/13/22 1413   Wound Thickness Description not for Pressure Injury Full thickness 01/13/22 1413   Number of days: 20     Incision 12/20/19 Foot Left;Plantar (Active)   Number of days: 755       Percent of Wound(s)/Ulcer(s) Debrided: 100%    Total Surface Area Debrided:  41.66 sq cm     Diabetic/Pressure/Non Pressure Ulcers only:  Ulcer: N/A     Estimated Blood Loss:  Minimal    Hemostasis Achieved:  by pressure    Procedural Pain:  5  / 10     Post Procedural Pain:  5 / 10     Response to treatment:  Well tolerated by patient. Plan:       Treatment Note please see attached Discharge Instructions. Debrided ulcers left lateral leg. Patient to use his compression wraps. Zinc and alginate applied.   Patient is not a candidate for ablation of his venous reflux due to his weight. He was denied his approval for gastric bypass surgery. I do feel the patient would benefit from gastric bypass. His wound is not responding to any of the normal protocols for venous wound healing. I will biopsy ulcer next visit. Return 2 weeks. New Medication(s) at this visit:   New Prescriptions    No medications on file       Other orders at this visit:   Orders Placed This Encounter   Procedures    Initiate Outpatient Wound Care Protocol       Smoking Cessation: Counseling given: Not Answered  Comment: States \"vapes a little\"      Written patient dismissal instructions given to patient and signed by patient or POA.                Electronically signed by Sp Barnes DPM on 1/13/2022 at 3:16 PM

## 2022-01-14 ENCOUNTER — OFFICE VISIT (OUTPATIENT)
Dept: DERMATOLOGY | Age: 47
End: 2022-01-14
Payer: COMMERCIAL

## 2022-01-14 VITALS — TEMPERATURE: 98.8 F

## 2022-01-14 DIAGNOSIS — L91.8 ACROCHORDON: ICD-10-CM

## 2022-01-14 DIAGNOSIS — L81.4 SOLAR LENTIGINOSIS: ICD-10-CM

## 2022-01-14 DIAGNOSIS — I87.8 VENOUS STASIS: ICD-10-CM

## 2022-01-14 DIAGNOSIS — D22.9 MULTIPLE BENIGN MELANOCYTIC NEVI: Primary | ICD-10-CM

## 2022-01-14 DIAGNOSIS — D18.01 CHERRY ANGIOMA: ICD-10-CM

## 2022-01-14 DIAGNOSIS — I89.0 ELEPHANTIASIS NOSTRA VERRUCOSA: ICD-10-CM

## 2022-01-14 DIAGNOSIS — L73.8 SEBACEOUS HYPERPLASIA: ICD-10-CM

## 2022-01-14 DIAGNOSIS — Z85.820 PERSONAL HISTORY OF MALIGNANT MELANOMA: ICD-10-CM

## 2022-01-14 PROCEDURE — 99214 OFFICE O/P EST MOD 30 MIN: CPT | Performed by: DERMATOLOGY

## 2022-01-14 NOTE — PROGRESS NOTES
Patient's Name: Biju Morris  MRN: [id number]  YOB: 1975  Date of Visit: 1/14/2022  Primary Care Provider: Vvi Dias MD  Referring Provider: No ref. provider found    Subjective:     Chief Complaint   Patient presents with    Follow-up     3 month follow up, hx of melanoma    Skin Exam        History of Present Illness:  Biju Morris a 55 y.o. male with h/o acral melanoma of Lt foot is a follow up patient to my practice. They were last seen in clinic on 9/22/21     At his last visit, he was referred to the Wound clinic for an ulcer on his Lt leg and lymphedema. He continues to follow up with [facility] every week. He has undergone debridement of the wound. Patient reports his wound healing well. He uses compression stockings and feels his legs are less swollen. Many year history of multiple nevi on the trunk and extremities. Denies any recent new lesions. Denies any changes in size, color or shape. Denies any associated pain, pruritus or bleeding. Denies fever, chills, or unintentional weight loss. Denies chest pain shortness of breath. Denies nausea, diarrhea, or vomiting. Denies any new, changing, non healing or bleeding lesions today. Past medical/surgical/family history reviewed with no changes since last visit     Allergies:  No Known Allergies    Current Medications:  Current Outpatient Medications   Medication Sig Dispense Refill    oxyCODONE-acetaminophen (PERCOCET) 7.5-325 MG per tablet Take 1 tablet by mouth every 6 hours as needed for Pain (max 4 per day) for up to 30 days. 120 tablet 0    pregabalin (LYRICA) 150 MG capsule Take 1 capsule by mouth 3 times daily for 30 days.  90 capsule 0    diclofenac (VOLTAREN) 75 MG EC tablet Take 1 tablet by mouth daily as needed for Pain 30 tablet 1    Compression Stockings MISC by Does not apply route Strength 30-40mmHg  Style: pull on below the knee; open or closed toe  Extremity: bilateral  Donning aid recommended: Yes if needed 1 each 2    Compression Stockings MISC by Does not apply route Strength 30-40mmHg  Style: Farrow wrap , below the knee  Extremity: bilateral  Donning aid recommended: No 1 each 0    penicillin v potassium (VEETID) 500 MG tablet TAKE TWO TABLETS BY MOUTH TWICE A  tablet 3    Multiple Vitamins-Minerals (THERAPEUTIC MULTIVITAMIN-MINERALS) tablet Take 1 tablet by mouth daily      losartan (COZAAR) 100 MG tablet TAKE ONE TABLET BY MOUTH DAILY 90 tablet 2    furosemide (LASIX) 20 MG tablet TAKE ONE TO TWO TABLETS BY MOUTH EVERY MORNING AS NEEDED FOR EDEMA 180 tablet 3    Compression Stockings MISC by Does not apply route Thigh high 1 each 0    Elastic Bandages & Supports (MEDICAL COMPRESSION THIGH HIGH) MISC Thigh high compression stockings, 30-40 mm Hg 2 each 1     No current facility-administered medications for this visit. Review of Systems:  Constitutional: No fevers, chills or recent illness.   Skin: Skin:As per HPI AND otherwise no new, bleeding or symptomatic skin lesions      Objective:     Vitals:    01/14/22 0951   Temp: 98.8 °F (37.1 °C)       Physical Examination:  General: alert, comfortable, no apparent distress, well-appearing  Psych: alert, oriented and pleasant  Neuro: oriented to person, place, and time  Skin: Areas examined: head including face, lips, conjunctiva and lids, neck, hair/scalp, chest, including breasts and axilla, abdomen, back, buttocks, right upper extremity, left upper extremity, right lower extremity, left lower extremity, left hand, right hand, digits and nails, Right foot and toe nails      All areas examined were within normal limits except those listed below with the appropriate assessment and plan    Assessment and Plan (with relevant objective exam findings):     1. Multiple benign melanocytic nevi   Location: Torso and extremities  Objective findings: multiple brown/pink macules and papules without abnormal findings or concerning findings on exam and dermatoscopy    -Counseled the patient that these are benign and need no therapy. Observation for any changes recommended     2. Solar Lentiginosis    Location: sun exposed areas, most prominently on the forearms, neck and shoulders    Objective: Numerous lacy brown macules ranging in size from 2-10 mm diameter. The lentigines seen today are benign in character, caused by the sun, and do not require treatment. However, they do occasionally transform into a malignancy, so the patient needs to monitor for changes. They were educated on what a suspicious change would include, change in size or color, and included education on the ABCDs of melanoma. 3. Personal history of malignant melanoma of left plantar foot (pT1a) s/p WLE and FTSG  Unable to examine as patient does not want to remove wrapping and compression stocking. Discussed importance of examining the area. Patient will send pictures via [employer]  Will continue with melanoma surveillance every 3 months. Benign acquired melanocytic nevi  -Recommend monthly self skin exams   -Educated regarding the ABCDEs of melanoma detection   -Call for any new/changing moles or concerning lesions  -Reviewed sun protective behavior -- sun avoidance during the peak hours of the day, sun-protective clothing (including hat and sunglasses), sunscreen use (water resistant, broad spectrum, SPF at least 50, need for reapplication every 1.5 to 2 hours)  -Plan: Observation with every 3 months skin checks (earlier if indicated) performed in office to monitor current nevi and to assess for new lesions. 4. Cherry Hemangiomas  Location: Arms, chest, abdomen    Objective findings: Multiple bright red, dome-shaped papules     -Due to benign nature, no treatment is necessary. Reassurance and observation recommended.      5. Venous stasis and elephantiasis nostra verrucosa/lymphedema  Location/objective findings: Decreased edema from previous exam. Mottled brown pigmentation  Continue compression stockings daily  Leg elevation  Follow up with wound care    6. Sebaceous hyperplasia. Location: Forehead and cheeks  Objective findings: yellow umbilicated papule(s) with blood vessels overlying them on dermatoscopy. -Reassurance, re: benign nature. No treatment is necessary    7. Skin Tags/Acrochordons  Location/objective findings: axillae with pedunculated skin-colored and faint brownish-pink soft papule   -Reassurance, re: benign nature. No treatment is necessary    Follow up:  Return visit in 3 months or as needed for change in condition. All questions addressed.      Procedure:   No procedure performed          Lou Caldwell MD. MS

## 2022-01-14 NOTE — PATIENT INSTRUCTIONS
Skin Cancer Prevention: After Your Visit    Skin cancer is the abnormal growth of cells in the skin. It usually appears as a growth that changes in color, shape, or size. This can be a sore that does not heal or a change in a wart or a mole. Skin cancer is almost always curable when found early and treated. So it is important to see your doctor if you have any of these changes in your skin. Skin cancer is the most common type of cancer. It often appears on areas of the body that have been exposed to the sun, such as the head, face, neck, back, chest, or shoulders. Follow-up care is a key part of your treatment and safety. Be sure to make and go to all appointments, and call your doctor if you are having problems. It's also a good idea to know your test results and keep a list of the medicines you take. How can you care for yourself at home?  - Wear a wide-brimmed hat and long sleeves and pants if you are going to be outdoors for a long time. - Avoid the sun between 10 a.m. and 4 p.m., which is the peak time for UV rays. - Wear sunscreen on exposed skin. Make sure the sunscreen blocks ultraviolet rays (both UVA and UVB) and has a sun protection factor (SPF) of at least 30. Use it every day, even when it is cloudy. Some doctors may recommend a higher SPF, such as 48.  - Do not use tanning booths or sunlamps. - Use lip balm or cream that has sun protection factor (SPF) to protect your lips from getting sunburned or getting cold sores. - Wear sunglasses that block UV rays. When should you call for help? Watch closely for changes in your health, and be sure to contact your doctor if:  - You are concerned about any problem areas on your skin. - You notice a change in a mole or skin growth. For example:  a. It gets bigger. b. It develops uneven borders. c. It gets thicker, raised, or worn down. d. It changes color. e. It starts to bleed easily. Dr. Suazo Creed [facility]ector Kenyn Safe Strategies:    1.  Avoid the sun if possible especially between the hours of 10 am and 4 pm. That's when the sun's most harmful UV rays are hitting the earth. 2. Use protective clothing. Just like the right equipment helps you perform better in your sport, the proper clothing can do a lot to help us in our fight to prevent skin cancer. Thicker and darker clothing with a tighter weave will block more of the suns harmful rays, and it never wears off! Make wearing long sleeves, a broad brimmed hat and sun glasses part of your routine. 3. Use a broad spectrum sunscreen with SPF 30 or higher on all your exposed skin. Make sure the sunscreen has either titanium dioxide, and/or zinc oxide (preferred), or Avobenzone in it (check the active ingredients on the back of the bottle to make sure). If you are going to be in the water or sweating, make sure the sunscreen you choose is water resistant. Better sunscreens are available in [place]) and [facility] like Tinosorb S (Bemotrizinol) and Tinosorb M ([address] [place] [address]), so if you are serious about sun protection and visit those areas, feel free to stock up. 4. Reapply the sunscreen after 2 hours or after swimming, sweating, or toweling off. We don't care about the brand, but some good brands to use are Lm Valdes MD , [facility], and really just about anything with the above ingredients. Cetaphil oil control moisturizer with SPF 30 or 50 is a good one for the face. It is not water resistant but goes on really light and won't make you break out. 5. Use enough. One ounce of lotion, which is about the size of a golf ball, is the amount needed to cover the exposed parts of an average adult body. For sprays, apply until it \"glistens\" on skin (2 seconds of spray per arm, 4 seconds per leg, and 5 to 8 seconds each for the front torso and back). For sticks apply 3 to 4 passes back and forth per area. 6. Get regular check ups.  Studies show that those who check their own skin every month, get checked by their spouse, partner, or significant other several times a year (we recommend you do this on your birthdays, anniversary, and big holidays), and get their yearly check by a board certified dermatologist have their skin cancers detected much earlier and have a much higher chance of cure than those who don't. A-B-C-D-E guide for Melanoma     Characteristics of unusual moles that may indicate melanomas or other skin cancers follow the A-B-C-D-E guide developed by the American Academy of Dermatology:  2. A is for asymmetrical shape. Look for moles with irregular shapes, such as two very different-looking halves. 3. B is for irregular border. Look for moles with irregular, notched or scalloped borders, these may be characteristics of melanomas. 4. C is for changes in color. Look for growths that have many colors or an uneven distribution of color. 5. D is for diameter. Look for new growth in a mole larger than about 1/4 inch (6 millimeters) or about the size of a pencil eraser. 6. E is for evolving. This is the most important one. Look for changes over time, such as a mole that grows in size or that changes color or shape. Carlos Clark may also evolve to develop new signs and symptoms, such as new itchiness or bleeding. If any of your moles appear to be changing, see your doctor. Other suspicious changes in a mole may include: Scaly skin, itching, spreading of color from the mole into the surrounding skin, oozing or bleeding. Cancerous (malignant) moles vary greatly in appearance. Some may show all of the changes listed above, while others may have only one or two unusual characteristics. Please call and schedule an appointment if you have any concerns or questions.

## 2022-01-27 ENCOUNTER — HOSPITAL ENCOUNTER (OUTPATIENT)
Dept: WOUND CARE | Age: 47
Discharge: HOME | End: 2022-01-27
Payer: COMMERCIAL

## 2022-01-27 VITALS — SYSTOLIC BLOOD PRESSURE: 151 MMHG | HEART RATE: 75 BPM | TEMPERATURE: 96.2 F | DIASTOLIC BLOOD PRESSURE: 70 MMHG

## 2022-01-27 DIAGNOSIS — L97.222 VARICOSE VEINS OF LEFT LOWER EXTREMITY WITH INFLAMMATION, WITH ULCER OF CALF WITH FAT LAYER EXPOSED (HCC): ICD-10-CM

## 2022-01-27 DIAGNOSIS — I83.222 VARICOSE VEINS OF LEFT LOWER EXTREMITY WITH INFLAMMATION, WITH ULCER OF CALF WITH FAT LAYER EXPOSED (HCC): ICD-10-CM

## 2022-01-27 DIAGNOSIS — L97.211 VARICOSE VEINS OF RIGHT LOWER EXTREMITY WITH INFLAMMATION, WITH ULCER OF CALF LIMITED TO BREAKDOWN OF SKIN (HCC): ICD-10-CM

## 2022-01-27 DIAGNOSIS — I83.212 VARICOSE VEINS OF RIGHT LOWER EXTREMITY WITH INFLAMMATION, WITH ULCER OF CALF LIMITED TO BREAKDOWN OF SKIN (HCC): ICD-10-CM

## 2022-01-27 DIAGNOSIS — L97.222 NON-PRESSURE CHRONIC ULCER OF LEFT CALF WITH FAT LAYER EXPOSED (HCC): Primary | ICD-10-CM

## 2022-01-27 PROCEDURE — 88341 IMHCHEM/IMCYTCHM EA ADD ANTB: CPT

## 2022-01-27 PROCEDURE — 88342 IMHCHEM/IMCYTCHM 1ST ANTB: CPT

## 2022-01-27 PROCEDURE — 6370000000 HC RX 637 (ALT 250 FOR IP): Performed by: PODIATRIST

## 2022-01-27 PROCEDURE — 88305 TISSUE EXAM BY PATHOLOGIST: CPT

## 2022-01-27 PROCEDURE — 11045 DBRDMT SUBQ TISS EACH ADDL: CPT

## 2022-01-27 PROCEDURE — 11042 DBRDMT SUBQ TIS 1ST 20SQCM/<: CPT

## 2022-01-27 RX ORDER — LIDOCAINE HYDROCHLORIDE 40 MG/ML
SOLUTION TOPICAL ONCE
Status: COMPLETED | OUTPATIENT
Start: 2022-01-27 | End: 2022-01-27

## 2022-01-27 RX ORDER — CLOBETASOL PROPIONATE 0.5 MG/G
OINTMENT TOPICAL ONCE
Status: CANCELLED | OUTPATIENT
Start: 2022-01-27 | End: 2022-01-27

## 2022-01-27 RX ORDER — LIDOCAINE 50 MG/G
OINTMENT TOPICAL ONCE
Status: COMPLETED | OUTPATIENT
Start: 2022-01-27 | End: 2022-01-27

## 2022-01-27 RX ORDER — LIDOCAINE HYDROCHLORIDE 20 MG/ML
JELLY TOPICAL ONCE
Status: CANCELLED | OUTPATIENT
Start: 2022-01-27 | End: 2022-01-27

## 2022-01-27 RX ORDER — GINSENG 100 MG
CAPSULE ORAL ONCE
Status: CANCELLED | OUTPATIENT
Start: 2022-01-27 | End: 2022-01-27

## 2022-01-27 RX ORDER — GENTAMICIN SULFATE 1 MG/G
OINTMENT TOPICAL ONCE
Status: CANCELLED | OUTPATIENT
Start: 2022-01-27 | End: 2022-01-27

## 2022-01-27 RX ORDER — LIDOCAINE 50 MG/G
OINTMENT TOPICAL ONCE
Status: CANCELLED | OUTPATIENT
Start: 2022-01-27 | End: 2022-01-27

## 2022-01-27 RX ORDER — BACITRACIN, NEOMYCIN, POLYMYXIN B 400; 3.5; 5 [USP'U]/G; MG/G; [USP'U]/G
OINTMENT TOPICAL ONCE
Status: CANCELLED | OUTPATIENT
Start: 2022-01-27 | End: 2022-01-27

## 2022-01-27 RX ORDER — LIDOCAINE HYDROCHLORIDE 40 MG/ML
SOLUTION TOPICAL ONCE
Status: CANCELLED | OUTPATIENT
Start: 2022-01-27 | End: 2022-01-27

## 2022-01-27 RX ORDER — BETAMETHASONE DIPROPIONATE 0.05 %
OINTMENT (GRAM) TOPICAL ONCE
Status: CANCELLED | OUTPATIENT
Start: 2022-01-27 | End: 2022-01-27

## 2022-01-27 RX ORDER — BACITRACIN ZINC AND POLYMYXIN B SULFATE 500; 1000 [USP'U]/G; [USP'U]/G
OINTMENT TOPICAL ONCE
Status: CANCELLED | OUTPATIENT
Start: 2022-01-27 | End: 2022-01-27

## 2022-01-27 RX ORDER — LIDOCAINE 40 MG/G
CREAM TOPICAL ONCE
Status: CANCELLED | OUTPATIENT
Start: 2022-01-27 | End: 2022-01-27

## 2022-01-27 RX ADMIN — LIDOCAINE HYDROCHLORIDE: 40 SOLUTION TOPICAL at 14:15

## 2022-01-27 RX ADMIN — LIDOCAINE: 50 OINTMENT TOPICAL at 14:24

## 2022-01-27 ASSESSMENT — PAIN SCALES - GENERAL: PAINLEVEL_OUTOF10: 5

## 2022-01-27 ASSESSMENT — PAIN DESCRIPTION - ORIENTATION: ORIENTATION: LEFT

## 2022-01-27 ASSESSMENT — PAIN DESCRIPTION - PAIN TYPE: TYPE: ACUTE PAIN

## 2022-01-27 ASSESSMENT — PAIN DESCRIPTION - DESCRIPTORS: DESCRIPTORS: ACHING

## 2022-01-27 ASSESSMENT — PAIN DESCRIPTION - LOCATION: LOCATION: LEG

## 2022-01-27 NOTE — PROGRESS NOTES
[facility]  Progress Note and Procedure Note      Kerline Saavedra  MEDICAL RECORD NUMBER:  [id number]  AGE: 55 y.o. GENDER: male  : 1975  EPISODE DATE:  2022    Subjective:     Chief Complaint   Patient presents with    Wound Check     left leg         HISTORY of PRESENT ILLNESS HPI     Kerline Saavedra is a 55 y.o. male who presents today for wound/ulcer evaluation. History of Wound Context: Patient has ulcer left lateral leg. He relates his drainage is still significant. He just obtained his new compression stockings.   Wound/Ulcer Pain Timing/Severity: intermittent, mild, moderate  Quality of pain: sharp  Severity:  5 / 10   Modifying Factors: Pain worsens with walking  Associated Signs/Symptoms: edema    Ulcer Identification:  Ulcer Type: venous    Contributing Factors: edema, venous stasis and lymphedema    Acute Wound: N/A        PAST MEDICAL HISTORY        Diagnosis Date    Arthritis     Cellulitis     Chronic back pain     Hepatitis age 24    HNP (herniated nucleus pulposus with myelopathy), thoracic     Hypertension     Melanoma ([facility] [address]     left foot     Obesity 2011    ANN-MARIE (obstructive sleep apnea)     NO CPAP     Stasis dermatitis        PAST SURGICAL HISTORY    Past Surgical History:   Procedure Laterality Date    ADENOIDECTOMY      FOOT SURGERY Left 2019    WIDE EXCISION OF LEFT FOOT MELANOMA performed by Bobette Oppenheim, MD at [facility] Left 2/10/2020    LEFT FOOT MELANOMA WIDE EXCISION AND FULL THICKNESS SKIN GRAFT,LEFT GROIN SENTINEL LYMPH NODE BIOPSY performed by Bobette Oppenheim, MD at [address]  06    [place]    UPPER GASTROINTESTINAL ENDOSCOPY N/A 2021    EGD BIOPSY performed by Melina Morgan DO at [address] [place] [address] EXTRACTION         FAMILY HISTORY    Family History   Problem Relation Age of Onset    Cancer Mother     COPD Paternal Grandfather     Colon Cancer Maternal Grandfather        SOCIAL HISTORY    Social History     Tobacco Use    Smoking status: Former Smoker     Packs/day: 1.50     Types: Cigarettes     Quit date: 2005     Years since quittin.0    Smokeless tobacco: Former User     Types: Chew    Tobacco comment: States \"vapes a little\"   Vaping Use    Vaping Use: Every day    Substances: Nicotine, Flavoring    Devices: Refillable tank   Substance Use Topics    Alcohol use: No     Alcohol/week: 0.0 standard drinks     Comment: rarely -- 1 every few months    Drug use: No       ALLERGIES    No Known Allergies    MEDICATIONS    Current Outpatient Medications on File Prior to Encounter   Medication Sig Dispense Refill    oxyCODONE-acetaminophen (PERCOCET) 7.5-325 MG per tablet Take 1 tablet by mouth every 6 hours as needed for Pain (max 4 per day) for up to 30 days. 120 tablet 0    pregabalin (LYRICA) 150 MG capsule Take 1 capsule by mouth 3 times daily for 30 days.  90 capsule 0    diclofenac (VOLTAREN) 75 MG EC tablet Take 1 tablet by mouth daily as needed for Pain 30 tablet 1    penicillin v potassium (VEETID) 500 MG tablet TAKE TWO TABLETS BY MOUTH TWICE A  tablet 3    Multiple Vitamins-Minerals (THERAPEUTIC MULTIVITAMIN-MINERALS) tablet Take 1 tablet by mouth daily      losartan (COZAAR) 100 MG tablet TAKE ONE TABLET BY MOUTH DAILY 90 tablet 2    furosemide (LASIX) 20 MG tablet TAKE ONE TO TWO TABLETS BY MOUTH EVERY MORNING AS NEEDED FOR EDEMA 180 tablet 3    Compression Stockings MISC by Does not apply route Strength 30-40mmHg  Style: pull on below the knee; open or closed toe  Extremity: bilateral  Donning aid recommended: Yes if needed 1 each 2    Compression Stockings MISC by Does not apply route Strength 30-40mmHg  Style: Farrow wrap , below the knee  Extremity: bilateral  Donning aid recommended: No 1 each 0    Compression Stockings MISC by Does not apply route Thigh high 1 each 0    Elastic Bandages & Supports (MEDICAL COMPRESSION THIGH HIGH) MISC Thigh high compression stockings, 30-40 mm Hg 2 each 1     No current facility-administered medications on file prior to encounter. REVIEW OF SYSTEMS    Pertinent items are noted in HPI. Last L0F if applicable:   Hemoglobin A1C   Date Value Ref Range Status   08/10/2021 4.8 See comment % Final     Comment:     Comment:  Diagnosis of Diabetes: > or = 6.5%  Increased risk of diabetes (Prediabetes): 5.7-6.4%  Glycemic Control: Nonpregnant Adults: <7.0%                    Pregnant: <6.0%           Objective:      BP (!) 151/70   Pulse 75   Temp 96.2 °F (35.7 °C) (Temporal)     Wt Readings from Last 3 Encounters:   01/03/22 (!) 476 lb (215.9 kg)   12/06/21 (!) 479 lb (217.3 kg)   12/02/21 (!) 476 lb 10.2 oz (216.2 kg)       PHYSICAL EXAM    General Appearance: alert and oriented to person, place and time, well-developed and well-nourished, in no acute distress  Pedal pulses palpable. Protective sensations intact. Ulcer is noted left lateral leg with granular base and fibrotic covering. Ulcer does not probe to bone. There are no signs of infection. There is moderate edema bilateral legs. Ulcer noted superior to wound #1 exhibits granular base, fibrotic covering, drainage with no signs of infection and does not probe to bone. Hemosiderin deposits are noted anterior bilateral legs. Assessment:      1. 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 Varicose veins of right lower extremity with inflammation, with ulcer of calf limited to breakdown of skin ([address]    3. Varicose veins of left lower extremity with inflammation, with ulcer of calf with fat layer exposed ([address]           Procedure Note  Indications:  Based on my examination of this patient's wound(s)/ulcer(s) today, debridement is required to promote healing and evaluate the wound base.     Performed by: Ruthy Krause DPM    Consent obtained:  Yes    Time out taken:  Yes    Pain Control: Anesthetic  Anesthetic: 5% Lidocaine Ointment Topical       Debridement: Excisional Debridement    Using curette the wound(s)/ulcer(s) was/were debrided down through and including the removal of epidermis, dermis and subcutaneous tissue. Devitalized Tissue Debrided:  fibrin and biofilm    Pre Debridement Measurements:  Are located in the [place]  Documentation Flow Sheet    Wound/Ulcer #: 1 and 2    Post Debridement Measurements:  Wound/Ulcer Descriptions are Pre Debridement except measurements:    Wound 10/07/21 Leg Lateral;Left;Distal #1 (Active)   Wound Image   12/02/21 1552   Wound Etiology Venous 01/13/22 1413   Wound Cleansed Cleansed with saline 01/27/22 1421   Dressing/Treatment Alginate with Ag 01/27/22 1511   Wound Length (cm) 5.2 cm 01/27/22 1421   Wound Width (cm) 2 cm 01/27/22 1421   Wound Depth (cm) 0.1 cm 01/27/22 1421   Wound Surface Area (cm^2) 10.4 cm^2 01/27/22 1421   Change in Wound Size % (l*w) -23.81 01/27/22 1421   Wound Volume (cm^3) 1.04 cm^3 01/27/22 1421   Wound Healing % -24 01/27/22 1421   Post-Procedure Length (cm) 5.3 cm 01/27/22 1448   Post-Procedure Width (cm) 2.1 cm 01/27/22 1448   Post-Procedure Depth (cm) 0.3 cm 01/27/22 1448   Post-Procedure Surface Area (cm^2) 11.13 cm^2 01/27/22 1448   Post-Procedure Volume (cm^3) 3.339 cm^3 01/27/22 1448   Distance Tunneling (cm) 0 cm 01/13/22 1413   Tunneling Position ___ O'Clock 0 12/23/21 1524   Undermining Starts ___ O'Clock 0 12/23/21 1524   Undermining Ends___ O'Clock 0 12/23/21 1524   Undermining Maxium Distance (cm) 0 01/13/22 1413   Wound Assessment Fibrin;Pink/red 01/27/22 1421   Drainage Amount Moderate 01/27/22 1421   Drainage Description Green;Brown 01/27/22 1421   Odor Moderate 01/27/22 1421   Jacy-wound Assessment Hemosiderin staining (brown yellow); Maceration 01/27/22 1421   Margins Defined edges 01/27/22 1421   Wound Thickness Description not for Pressure Injury Full thickness 01/27/22 1421   Number of days: 112 Wound 12/23/21 Proximal;Left;Lower #2 (Active)   Wound Image   12/23/21 1524   Wound Etiology Traumatic 12/23/21 1524   Wound Cleansed Cleansed with saline 01/27/22 1421   Dressing/Treatment Alginate with Ag 01/27/22 1511   Wound Length (cm) 5 cm 01/27/22 1421   Wound Width (cm) 5 cm 01/27/22 1421   Wound Depth (cm) 0.1 cm 01/27/22 1421   Wound Surface Area (cm^2) 25 cm^2 01/27/22 1421   Change in Wound Size % (l*w) -32.28 01/27/22 1421   Wound Volume (cm^3) 2.5 cm^3 01/27/22 1421   Wound Healing % -32 01/27/22 1421   Post-Procedure Length (cm) 5.1 cm 01/27/22 1448   Post-Procedure Width (cm) 5.1 cm 01/27/22 1448   Post-Procedure Depth (cm) 0.3 cm 01/27/22 1448   Post-Procedure Surface Area (cm^2) 26.01 cm^2 01/27/22 1448   Post-Procedure Volume (cm^3) 7.803 cm^3 01/27/22 1448   Distance Tunneling (cm) 0 cm 01/06/22 1549   Undermining Maxium Distance (cm) 0 01/06/22 1549   Wound Assessment Fibrin;Pink/red;Purple/maroon 01/27/22 1421   Drainage Amount Moderate 01/27/22 1421   Drainage Description Green;Brown 01/27/22 1421   Odor Moderate 01/27/22 1421   Jacy-wound Assessment Hemosiderin staining (brown yellow); Maceration 01/27/22 1421   Margins Defined edges 01/27/22 1421   Wound Thickness Description not for Pressure Injury Full thickness 01/27/22 1421   Number of days: 35     Incision 12/20/19 Foot Left;Plantar (Active)   Number of days: 769       Percent of Wound(s)/Ulcer(s) Debrided: 100%    Total Surface Area Debrided:  37.14 sq cm     Diabetic/Pressure/Non Pressure Ulcers only:  Ulcer: N/A     Estimated Blood Loss:  Minimal    Hemostasis Achieved:  by pressure    Procedural Pain:  5  / 10     Post Procedural Pain:  5 / 10     Response to treatment:  Well tolerated by patient. Plan:       Treatment Note please see attached Discharge Instructions. Debrided ulcers left lateral leg. Injected 3 cc of 1% lidocaine plain subcutaneous under the ulcer. A 4 mm punch biopsy was taken and sent to pathology. Patient to use his compression wraps. Zinc and alginate applied. Patient is not a candidate for ablation of his venous reflux due to his weight. He was denied his approval for gastric bypass surgery. I do feel the patient would benefit from gastric bypass. Return 1 week. New Medication(s) at this visit:   New Prescriptions    No medications on file       Other orders at this visit:   Orders Placed This Encounter   Procedures    Initiate Outpatient Wound Care Protocol       Smoking Cessation: Counseling given: Not Answered  Comment: States \"vapes a little\"      Written patient dismissal instructions given to patient and signed by patient or POA.                Electronically signed by Sp Barnes DPM on 1/27/2022 at 3:41 PM

## 2022-01-31 ENCOUNTER — OFFICE VISIT (OUTPATIENT)
Dept: PAIN MANAGEMENT | Age: 47
End: 2022-01-31
Payer: COMMERCIAL

## 2022-01-31 VITALS
HEART RATE: 74 BPM | HEIGHT: 75 IN | DIASTOLIC BLOOD PRESSURE: 83 MMHG | SYSTOLIC BLOOD PRESSURE: 150 MMHG | BODY MASS INDEX: 39.17 KG/M2 | WEIGHT: 315 LBS | OXYGEN SATURATION: 93 % | TEMPERATURE: 98.4 F

## 2022-01-31 DIAGNOSIS — M51.27 LUMBAGO-SCIATICA DUE TO DISPLACEMENT OF LUMBAR INTERVERTEBRAL DISC: ICD-10-CM

## 2022-01-31 DIAGNOSIS — S33.9XXD SPRAIN OF LIGAMENT OF LUMBOSACRAL JOINT, SUBSEQUENT ENCOUNTER: ICD-10-CM

## 2022-01-31 DIAGNOSIS — S83.92XD SPRAIN OF LEFT KNEE/LEG, SUBSEQUENT ENCOUNTER: ICD-10-CM

## 2022-01-31 DIAGNOSIS — S83.92XA SPRAIN OF LEFT KNEE/LEG, INITIAL ENCOUNTER: ICD-10-CM

## 2022-01-31 DIAGNOSIS — S33.9XXA SPRAIN OF LIGAMENT OF LUMBOSACRAL JOINT, INITIAL ENCOUNTER: ICD-10-CM

## 2022-01-31 PROCEDURE — 99213 OFFICE O/P EST LOW 20 MIN: CPT | Performed by: INTERNAL MEDICINE

## 2022-01-31 RX ORDER — OXYCODONE AND ACETAMINOPHEN 7.5; 325 MG/1; MG/1
1 TABLET ORAL EVERY 6 HOURS PRN
Qty: 112 TABLET | Refills: 0 | Status: SHIPPED | OUTPATIENT
Start: 2022-01-31 | End: 2022-02-28 | Stop reason: SDUPTHER

## 2022-01-31 RX ORDER — PREGABALIN 150 MG/1
150 CAPSULE ORAL 3 TIMES DAILY
Qty: 90 CAPSULE | Refills: 0 | Status: SHIPPED | OUTPATIENT
Start: 2022-01-31 | End: 2022-02-28 | Stop reason: SDUPTHER

## 2022-01-31 RX ORDER — DICLOFENAC SODIUM 75 MG/1
75 TABLET, DELAYED RELEASE ORAL DAILY PRN
Qty: 30 TABLET | Refills: 1 | Status: SHIPPED | OUTPATIENT
Start: 2022-01-31 | End: 2022-02-28 | Stop reason: SDUPTHER

## 2022-01-31 NOTE — PROGRESS NOTES
Frandy Sweetcole  1975  [id number]    HISTORY OF PRESENT ILLNESS:  Mr. Morris Varela is a 55 y.o. male returns for a follow up visit for multiple medical problems. His current presenting problems are   1. 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 BWC Sprain of left knee/leg, initial encounter    3. BWC Sprain of ligament of lumbosacral joint, initial encounter    4. BWC Sprain of ligament of lumbosacral joint, subsequent encounter    . As per information/history obtained from the PADT(patient assessment and documentation tool) - He complains of pain in the lower back with radiation to the lower leg Left, ankles Left and feet Left He rates the pain 4/10 and describes it as aching, burning, pins and needles. Pain is made worse by: nothing, movement, walking, standing, sitting, bending, lifting. Current treatment regimen has helped relieve about 70% of the pain. He denies side effects from the current pain regimen. Patient reports that since the last follow up visit the physical functioning is unchanged, family/social relationships are unchanged, mood is unchanged and sleep patterns are unchanged, and that the overall functioning is unchanged. Patient denies neurological bowel or bladder. Patient denies misusing/abusing his narcotic pain medications or using any illegal drugs. There are No indicators for possible drug abuse, addiction or diversion problems. Upon obtaining the medical history from Mr. Morris Varela regarding today's office visit for his presenting problems, patient states he has been doing fair, mentions left leg is still bandaged. He says he has a sore and is going to wound clinic. He reports he had a biopsy done. He states he is using Lyrica along with Percocet 4 per day. He denies any constipation symptoms. He mentions he is working full time.        ALLERGIES: Patients list of allergies were reviewed     MEDICATIONS: Mr. Morris Varela list of medications were reviewed. His current medications are   Outpatient Medications Prior to Visit   Medication Sig Dispense Refill    Compression Stockings MISC by Does not apply route Strength 30-40mmHg  Style: pull on below the knee; open or closed toe  Extremity: bilateral  Donning aid recommended: Yes if needed 1 each 2    Compression Stockings MISC by Does not apply route Strength 30-40mmHg  Style: Farrow wrap , below the knee  Extremity: bilateral  Donning aid recommended: No 1 each 0    penicillin v potassium (VEETID) 500 MG tablet TAKE TWO TABLETS BY MOUTH TWICE A  tablet 3    Multiple Vitamins-Minerals (THERAPEUTIC MULTIVITAMIN-MINERALS) tablet Take 1 tablet by mouth daily      losartan (COZAAR) 100 MG tablet TAKE ONE TABLET BY MOUTH DAILY 90 tablet 2    furosemide (LASIX) 20 MG tablet TAKE ONE TO TWO TABLETS BY MOUTH EVERY MORNING AS NEEDED FOR EDEMA 180 tablet 3    Compression Stockings MISC by Does not apply route Thigh high 1 each 0    Elastic Bandages & Supports (MEDICAL COMPRESSION THIGH HIGH) MISC Thigh high compression stockings, 30-40 mm Hg 2 each 1    oxyCODONE-acetaminophen (PERCOCET) 7.5-325 MG per tablet Take 1 tablet by mouth every 6 hours as needed for Pain (max 4 per day) for up to 30 days. 120 tablet 0    pregabalin (LYRICA) 150 MG capsule Take 1 capsule by mouth 3 times daily for 30 days. 90 capsule 0    diclofenac (VOLTAREN) 75 MG EC tablet Take 1 tablet by mouth daily as needed for Pain 30 tablet 1     No facility-administered medications prior to visit. REVIEW OF SYSTEMS:    Respiratory: Negative for apnea, chest tightness and shortness of breath or change in baseline breathing. PHYSICAL EXAM:   Nursing note and vitals reviewed. BP (!) 150/83   Pulse 74   Temp 98.4 °F (36.9 °C)   Ht 6' 3\" (1.905 m)   Wt (!) 473 lb (214.6 kg)   SpO2 93%   BMI 59.12 kg/m²   Constitutional: He appears well-developed and well-nourished. No acute distress.    Cardiovascular: Normal rate, regular rhythm, normal heart sounds, and does not have murmur. Pulmonary/Chest: Effort normal. No respiratory distress. He does not have wheezes in the lung fields. He has no rales. Neurological/Psychiatric:He is alert and oriented to person, place, and time. Coordination is  normal.  His mood isAppropriate and affect is Neutral/Euthymic(normal) . Other: + limp     IMPRESSION:   1.  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 BWC Sprain of left knee/leg, initial encounter    3. BWC Sprain of ligament of lumbosacral joint, initial encounter    4. BWC Sprain of ligament of lumbosacral joint, subsequent encounter    5. BWC Sprain of left knee/leg, subsequent encounter        PLAN:  Informed verbal consent was obtained  -ROM/Stretching exercises as advised   -He was advised to increase fluids ( 5-7  glasses of fluid daily), limit caffeine, avoid cheese products, increase dietary fiber, increase activity and exercise as tolerated and relax regularly and enjoy meals   -Interim history reviewed   -Continue with Percocet 4 per day   -Advised leg elevation and follow up with wound clinic    Current Outpatient Medications   Medication Sig Dispense Refill    oxyCODONE-acetaminophen (PERCOCET) 7.5-325 MG per tablet Take 1 tablet by mouth every 6 hours as needed for Pain (max 4 per day) for up to 28 days. 112 tablet 0    pregabalin (LYRICA) 150 MG capsule Take 1 capsule by mouth 3 times daily for 30 days.  90 capsule 0    diclofenac (VOLTAREN) 75 MG EC tablet Take 1 tablet by mouth daily as needed for Pain 30 tablet 1    Compression Stockings MISC by Does not apply route Strength 30-40mmHg  Style: pull on below the knee; open or closed toe  Extremity: bilateral  Donning aid recommended: Yes if needed 1 each 2    Compression Stockings MISC by Does not apply route Strength 30-40mmHg  Style: Farrow wrap , below the knee  Extremity: bilateral  Donning aid recommended: No 1 each 0    penicillin v potassium (VEETID) 500 MG tablet TAKE TWO TABLETS BY MOUTH TWICE A  tablet 3    Multiple Vitamins-Minerals (THERAPEUTIC MULTIVITAMIN-MINERALS) tablet Take 1 tablet by mouth daily      losartan (COZAAR) 100 MG tablet TAKE ONE TABLET BY MOUTH DAILY 90 tablet 2    furosemide (LASIX) 20 MG tablet TAKE ONE TO TWO TABLETS BY MOUTH EVERY MORNING AS NEEDED FOR EDEMA 180 tablet 3    Compression Stockings MISC by Does not apply route Thigh high 1 each 0    Elastic Bandages & Supports (MEDICAL COMPRESSION THIGH HIGH) MISC Thigh high compression stockings, 30-40 mm Hg 2 each 1     No current facility-administered medications for this visit. I will continue his current medication regimen  which is part of the above treatment schedule. It has been helping with Mr. 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 Risks and benefits of the medications and other alternative treatments  including no treatment were discussed with the patient. The common side effects of these medications were also explained to the patient. Informed verbal consent was obtained. Goals of current treatment regimen include improvement in pain, restoration of functioning- with focus on improvement in physical performance, general activity, work or disability,emotional distress, health care utilization and  decreased medication consumption. Will continue to monitor progress towards achieving/maintaining therapeutic goals with special emphasis on  1. Improvement in perceived interfernce  of pain with ADL's. Ability to do home exercises independently.  Ability to do household chores indoor and/or outdoor work and social and leisure activities. Improve psychosocial and physical functioning. - he is showing progression towards this treatment goal with the current regimen. 2. Ability to focus/concentrate at work and perform the duties required of him at work  Sit through a workday without lower extremity symptoms. Stand 30-60 minutes without lower extremity symptoms. Ability to lift up to 10-20 lbs. Ability to go up and down stairs. Sit 30-60 minutes  Without having to stand up frequently. - he is maintaining/progressing towards his work related goals with the current regimen. He was advised against drinking alcohol with the narcotic pain medicines, advised against driving or handling machinery while adjusting the dose of medicines or if having cognitive  issues related to the current medications. Risk of overdose and death, if medicines not taken as prescribed, were also discussed. If the patient develops new symptoms or if the symptoms worsen, the patient should call the office. While transcribing every attempt was made to maintain the accuracy of the note in terms of it's contents,there may have been some errors made inadvertently. Thank you for allowing me to participate in the care of this patient.     Bryanna Aiken MD.    Cc: Kathye Gilford, MD

## 2022-02-01 RX ORDER — PENICILLIN V POTASSIUM 500 MG/1
TABLET ORAL
Qty: 120 TABLET | Refills: 3 | Status: SHIPPED | OUTPATIENT
Start: 2022-02-01 | End: 2022-09-02

## 2022-02-11 ENCOUNTER — HOSPITAL ENCOUNTER (OUTPATIENT)
Dept: WOUND CARE | Age: 47
Discharge: HOME | End: 2022-02-11
Payer: COMMERCIAL

## 2022-02-11 VITALS
SYSTOLIC BLOOD PRESSURE: 187 MMHG | WEIGHT: 315 LBS | BODY MASS INDEX: 59.55 KG/M2 | DIASTOLIC BLOOD PRESSURE: 110 MMHG | RESPIRATION RATE: 18 BRPM | TEMPERATURE: 98 F | HEART RATE: 80 BPM

## 2022-02-11 DIAGNOSIS — L97.222 NON-PRESSURE CHRONIC ULCER OF LEFT CALF WITH FAT LAYER EXPOSED (HCC): Primary | ICD-10-CM

## 2022-02-11 DIAGNOSIS — I83.222 VARICOSE VEINS OF LEFT LOWER EXTREMITY WITH INFLAMMATION, WITH ULCER OF CALF WITH FAT LAYER EXPOSED (HCC): ICD-10-CM

## 2022-02-11 DIAGNOSIS — I83.212 VARICOSE VEINS OF RIGHT LOWER EXTREMITY WITH INFLAMMATION, WITH ULCER OF CALF LIMITED TO BREAKDOWN OF SKIN (HCC): ICD-10-CM

## 2022-02-11 DIAGNOSIS — L97.222 VARICOSE VEINS OF LEFT LOWER EXTREMITY WITH INFLAMMATION, WITH ULCER OF CALF WITH FAT LAYER EXPOSED (HCC): ICD-10-CM

## 2022-02-11 DIAGNOSIS — L97.211 VARICOSE VEINS OF RIGHT LOWER EXTREMITY WITH INFLAMMATION, WITH ULCER OF CALF LIMITED TO BREAKDOWN OF SKIN (HCC): ICD-10-CM

## 2022-02-11 PROCEDURE — 6370000000 HC RX 637 (ALT 250 FOR IP): Performed by: PODIATRIST

## 2022-02-11 PROCEDURE — 11042 DBRDMT SUBQ TIS 1ST 20SQCM/<: CPT | Performed by: SPECIALIST

## 2022-02-11 PROCEDURE — 11042 DBRDMT SUBQ TIS 1ST 20SQCM/<: CPT

## 2022-02-11 PROCEDURE — 11045 DBRDMT SUBQ TISS EACH ADDL: CPT | Performed by: SPECIALIST

## 2022-02-11 PROCEDURE — 11045 DBRDMT SUBQ TISS EACH ADDL: CPT

## 2022-02-11 RX ORDER — LIDOCAINE 50 MG/G
OINTMENT TOPICAL ONCE
Status: CANCELLED | OUTPATIENT
Start: 2022-02-11 | End: 2022-02-11

## 2022-02-11 RX ORDER — LIDOCAINE HYDROCHLORIDE 20 MG/ML
JELLY TOPICAL ONCE
Status: CANCELLED | OUTPATIENT
Start: 2022-02-11 | End: 2022-02-11

## 2022-02-11 RX ORDER — GINSENG 100 MG
CAPSULE ORAL ONCE
Status: CANCELLED | OUTPATIENT
Start: 2022-02-11 | End: 2022-02-11

## 2022-02-11 RX ORDER — LIDOCAINE 40 MG/G
CREAM TOPICAL ONCE
Status: CANCELLED | OUTPATIENT
Start: 2022-02-11 | End: 2022-02-11

## 2022-02-11 RX ORDER — GENTAMICIN SULFATE 1 MG/G
OINTMENT TOPICAL ONCE
Status: CANCELLED | OUTPATIENT
Start: 2022-02-11 | End: 2022-02-11

## 2022-02-11 RX ORDER — CLOBETASOL PROPIONATE 0.5 MG/G
OINTMENT TOPICAL ONCE
Status: CANCELLED | OUTPATIENT
Start: 2022-02-11 | End: 2022-02-11

## 2022-02-11 RX ORDER — BACITRACIN ZINC AND POLYMYXIN B SULFATE 500; 1000 [USP'U]/G; [USP'U]/G
OINTMENT TOPICAL ONCE
Status: CANCELLED | OUTPATIENT
Start: 2022-02-11 | End: 2022-02-11

## 2022-02-11 RX ORDER — LIDOCAINE HYDROCHLORIDE 40 MG/ML
SOLUTION TOPICAL ONCE
Status: COMPLETED | OUTPATIENT
Start: 2022-02-11 | End: 2022-02-11

## 2022-02-11 RX ORDER — BACITRACIN, NEOMYCIN, POLYMYXIN B 400; 3.5; 5 [USP'U]/G; MG/G; [USP'U]/G
OINTMENT TOPICAL ONCE
Status: CANCELLED | OUTPATIENT
Start: 2022-02-11 | End: 2022-02-11

## 2022-02-11 RX ORDER — LIDOCAINE HYDROCHLORIDE 40 MG/ML
SOLUTION TOPICAL ONCE
Status: CANCELLED | OUTPATIENT
Start: 2022-02-11 | End: 2022-02-11

## 2022-02-11 RX ORDER — BETAMETHASONE DIPROPIONATE 0.05 %
OINTMENT (GRAM) TOPICAL ONCE
Status: CANCELLED | OUTPATIENT
Start: 2022-02-11 | End: 2022-02-11

## 2022-02-11 RX ADMIN — LIDOCAINE HYDROCHLORIDE: 40 SOLUTION TOPICAL at 10:02

## 2022-02-11 ASSESSMENT — PAIN SCALES - GENERAL: PAINLEVEL_OUTOF10: 2

## 2022-02-11 ASSESSMENT — PAIN DESCRIPTION - PAIN TYPE: TYPE: ACUTE PAIN

## 2022-02-11 ASSESSMENT — PAIN DESCRIPTION - LOCATION: LOCATION: LEG

## 2022-02-11 ASSESSMENT — PAIN DESCRIPTION - ORIENTATION: ORIENTATION: LEFT

## 2022-02-11 NOTE — PROGRESS NOTES
[facility] [place]      Penny Simeon  MEDICAL RECORD NUMBER:  [id number]  AGE: 52 y.o. GENDER: male  : 1975  EPISODE DATE:  2022    Subjective:     Chief Complaint   Patient presents with    Wound Check     left leg         HISTORY of PRESENT ILLNESS HPI     Penny Simeon is a 52 y.o. male who presents today for wound/ulcer evaluation. History of Wound Context: Patient of Dr. Saw Tejeda being followed for chronic left lateral venous stasis ulcer. Pain Assessment:  Wound/Ulcer Pain Timing/Severity: intermittent  Quality of pain: sharp  Severity:  4 / 10   Modifying Factors: None  Associated Signs/Symptoms: edema and drainage    Ulcer Identification:  Ulcer Type: venous  Contributing Factors: edema, venous stasis, lymphedema and obesity    Objective:      BP (!) 187/110   Pulse 80   Temp 98 °F (36.7 °C) (Temporal)   Resp 18   Wt (!) 476 lb 6.6 oz (216.1 kg)   BMI 59.55 kg/m²     Wt Readings from Last 3 Encounters:   22 (!) 476 lb 6.6 oz (216.1 kg)   22 (!) 473 lb (214.6 kg)   22 (!) 476 lb (215.9 kg)       PHYSICAL EXAM    Extremities: no cyanosis, no clubbing, 3 + edema-  bilateral lower extremities with full-thickness granular wound containing fibrin and slough left lateral leg    Assessment:      1. 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 Varicose veins of right lower extremity with inflammation, with ulcer of calf limited to breakdown of skin ([address]    3. Varicose veins of left lower extremity with inflammation, with ulcer of calf with fat layer exposed ([address]         Procedure Note  Indications:  Based on my examination of this patient's wound(s) today, sharp excision is required to promote healing and evaluate the extent healing. Performed by: Shari Scales MD    Consent obtained?  Yes    Time out taken: Yes    Pain Control: Anesthetic: 4% Lidocaine Liquid Topical Debridement:Excisional Debridement    Using curette the wound was sharply debrided    down through and including the removal of  epidermis, dermis and subcutaneous tissue. Devitalized Tissue Debrided:  fibrin and slough      Pre Debridement Measurements:  Are located in the Wound Documentation Flow Sheet   Wound #: 1 and 2     Post  Debridement Measurements:  Wound 10/07/21 Leg Lateral;Left;Distal #1 (Active)   Wound Image   12/02/21 1552   Wound Etiology Venous 01/13/22 1413   Wound Cleansed Cleansed with saline 02/11/22 1007   Dressing/Treatment Alginate with Ag 02/11/22 1108   Wound Length (cm) 3.5 cm 02/11/22 1007   Wound Width (cm) 2.5 cm 02/11/22 1007   Wound Depth (cm) 0.1 cm 02/11/22 1007   Wound Surface Area (cm^2) 8.75 cm^2 02/11/22 1007   Change in Wound Size % (l*w) -4.17 02/11/22 1007   Wound Volume (cm^3) 0.875 cm^3 02/11/22 1007   Wound Healing % -4 02/11/22 1007   Post-Procedure Length (cm) 3.6 cm 02/11/22 1100   Post-Procedure Width (cm) 2.6 cm 02/11/22 1100   Post-Procedure Depth (cm) 0.3 cm 02/11/22 1100   Post-Procedure Surface Area (cm^2) 9.36 cm^2 02/11/22 1100   Post-Procedure Volume (cm^3) 2.808 cm^3 02/11/22 1100   Distance Tunneling (cm) 0 cm 02/11/22 1007   Tunneling Position ___ O'Clock 0 12/23/21 1524   Undermining Starts ___ O'Clock 0 12/23/21 1524   Undermining Ends___ O'Clock 0 12/23/21 1524   Undermining Maxium Distance (cm) 0 02/11/22 1007   Wound Assessment Fibrin;Pink/red 02/11/22 1007   Drainage Amount Moderate 02/11/22 1007   Drainage Description Green;Brown 02/11/22 1007   Odor Moderate 02/11/22 1007   Jacy-wound Assessment Hemosiderin staining (brown yellow); Maceration 02/11/22 1007   Margins Defined edges 02/11/22 1007   Wound Thickness Description not for Pressure Injury Full thickness 02/11/22 1007   Number of days: 126       Wound 12/23/21 Leg Proximal;Left;Lower #2 (Active)   Wound Image   12/23/21 1524   Wound Etiology Traumatic 12/23/21 1524   Wound Cleansed Cleansed with saline 02/11/22 1007   Dressing/Treatment Alginate with Ag 02/11/22 1108   Wound Length (cm) 6 cm 02/11/22 1007   Wound Width (cm) 3 cm 02/11/22 1007   Wound Depth (cm) 0.1 cm 02/11/22 1007   Wound Surface Area (cm^2) 18 cm^2 02/11/22 1007   Change in Wound Size % (l*w) 4.76 02/11/22 1007   Wound Volume (cm^3) 1.8 cm^3 02/11/22 1007   Wound Healing % 5 02/11/22 1007   Post-Procedure Length (cm) 6.1 cm 02/11/22 1100   Post-Procedure Width (cm) 3.1 cm 02/11/22 1100   Post-Procedure Depth (cm) 0.3 cm 02/11/22 1100   Post-Procedure Surface Area (cm^2) 18.91 cm^2 02/11/22 1100   Post-Procedure Volume (cm^3) 5.673 cm^3 02/11/22 1100   Distance Tunneling (cm) 0 cm 02/11/22 1007   Undermining Maxium Distance (cm) 0 02/11/22 1007   Wound Assessment Fibrin;Pink/red;Purple/maroon 02/11/22 1007   Drainage Amount Moderate 02/11/22 1007   Drainage Description Green;Brown 02/11/22 1007   Odor Moderate 02/11/22 1007   Jacy-wound Assessment Hemosiderin staining (brown yellow); Maceration 02/11/22 1007   Margins Defined edges 02/11/22 1007   Wound Thickness Description not for Pressure Injury Full thickness 02/11/22 1007   Number of days: 49     Incision 12/20/19 Foot Left;Plantar (Active)   Number of days: 783       Percent of Wound Debrided: 100%    Total Surface Area Debrided:  27 sq cm    Diabetic/Pressure/Non Pressure Ulcers only:  Ulcer: Non-Pressure ulcer, fat layer exposed    Bleeding: Minimal    Hemostasis Achieved: by pressure    Procedural Pain: 0  / 10     Post Procedural Pain: 0 / 10     Response to treatment:  Well tolerated by patient. I strongly suggested to patient that he pursue bariatric surgery with Dr. Gloria Gagnon, follow-up with Dr. Nanda Goodman next week        Plan:     Treatment Note: Please see attached Discharge Instructions.   These instructions were given and signed by the patient or POA    New Medication(s) at this visit:   New Prescriptions    No medications on file       Other orders at this visit: Other:      Edema Control:  Apply: [x] Compression Stocking- 30-40mmHG velcro compression  [x] Left Lower Leg [] Right Lower Leg        Apply every morning immediately when getting up. They should be applied to affected leg(s) from mid foot to knee making sure to cover the heel. Remove every night before going to bed. [x] Elevate leg(s) above the level of the heart for 30 minutes 4-5 times a day and/or when sitting. [x] Avoid prolonged standing in one place. Dietary:  Important dietary reminders:  1. Increase Protein intake (i.e. Lean meats, fish, eggs, legumes, and yogurt)  2. No added salt  3. If diabetic, follow a diabetic diet and check glucose prior to meals or as instructed by your physician. Dietary Supplements:  [] Hector  [] 30ml ProStat  [] EnsureEnlive [] Ensure Max/Premier  [] Other:    If you are still having pain after you go home:   For wounds on lower legs or arms, elevate the affected limb.  Use over-the-counter medications you would normally use for pain as permitted by your primary care doctor.  For persistent pain not relieved by the above interventions, please call your primary care doctor.      Return Appointment:  Jacquie Return Appointment: With Dr Chuy Johnson  in  [facility])    [] Return Appointment for a Wound Assessment (Nurse Visit) on:       [x] Orders placed during your visit:   Orders Placed This Encounter   Procedures    Initiate Outpatient Wound Care Protocol       o All other future appointments:  Future Appointments   Date Time Provider Francie Salmon   2/17/2022  2:00 PM Keysha Medina DPM [facility] WOUND [facility]   2/24/2022  2:00 PM SP Lindsay WOUND [facility]   2/28/2022  1:30 PM Svetlana Le MD [facility]   4/15/2022 12:00 PM Sonal Patiño MD [facility]   -       Your nurse  is:  Suzanne Stnaley     Electronically signed by Alexander Rosario RN on 2/11/2022 at 11:03 AM     [address] Information: Should you experience any significant changes in your wound(s) or have questions about your wound care, please contact the [address] [place] [address] at [phone]. We are open from 8:00am - 4:30p Monday thru Friday except for Wednesdays which we are closed. Please give us 24-48 hours to return your call. Call your doctor now or seek immediate medical care if:    · You have symptoms of infection, such as:  ? Increased pain, swelling, warmth, or redness. ? Red streaks leading from the area. ? Pus draining from the area. ? A fever.         Physician for this visit and orders: Charli Gonzales MD    [] Patient unable to sign Discharge Instructions given to ECF/Transportation/POA        Electronically signed by Charli Gonzales MD on 2/11/2022 at 11:18 AM

## 2022-02-17 ENCOUNTER — HOSPITAL ENCOUNTER (OUTPATIENT)
Dept: WOUND CARE | Age: 47
Discharge: HOME | End: 2022-02-17
Payer: COMMERCIAL

## 2022-02-17 VITALS
DIASTOLIC BLOOD PRESSURE: 68 MMHG | RESPIRATION RATE: 18 BRPM | HEART RATE: 91 BPM | SYSTOLIC BLOOD PRESSURE: 176 MMHG | TEMPERATURE: 97.6 F

## 2022-02-17 DIAGNOSIS — I83.212 VARICOSE VEINS OF RIGHT LOWER EXTREMITY WITH INFLAMMATION, WITH ULCER OF CALF LIMITED TO BREAKDOWN OF SKIN (HCC): ICD-10-CM

## 2022-02-17 DIAGNOSIS — L97.211 VARICOSE VEINS OF RIGHT LOWER EXTREMITY WITH INFLAMMATION, WITH ULCER OF CALF LIMITED TO BREAKDOWN OF SKIN (HCC): ICD-10-CM

## 2022-02-17 DIAGNOSIS — L97.222 VARICOSE VEINS OF LEFT LOWER EXTREMITY WITH INFLAMMATION, WITH ULCER OF CALF WITH FAT LAYER EXPOSED (HCC): ICD-10-CM

## 2022-02-17 DIAGNOSIS — I83.222 VARICOSE VEINS OF LEFT LOWER EXTREMITY WITH INFLAMMATION, WITH ULCER OF CALF WITH FAT LAYER EXPOSED (HCC): ICD-10-CM

## 2022-02-17 DIAGNOSIS — L97.222 NON-PRESSURE CHRONIC ULCER OF LEFT CALF WITH FAT LAYER EXPOSED (HCC): Primary | ICD-10-CM

## 2022-02-17 PROCEDURE — 11045 DBRDMT SUBQ TISS EACH ADDL: CPT

## 2022-02-17 PROCEDURE — 6370000000 HC RX 637 (ALT 250 FOR IP): Performed by: PODIATRIST

## 2022-02-17 PROCEDURE — 11042 DBRDMT SUBQ TIS 1ST 20SQCM/<: CPT

## 2022-02-17 RX ORDER — GENTAMICIN SULFATE 1 MG/G
OINTMENT TOPICAL ONCE
Status: CANCELLED | OUTPATIENT
Start: 2022-02-17 | End: 2022-02-17

## 2022-02-17 RX ORDER — BACITRACIN, NEOMYCIN, POLYMYXIN B 400; 3.5; 5 [USP'U]/G; MG/G; [USP'U]/G
OINTMENT TOPICAL ONCE
Status: CANCELLED | OUTPATIENT
Start: 2022-02-17 | End: 2022-02-17

## 2022-02-17 RX ORDER — LIDOCAINE HYDROCHLORIDE 40 MG/ML
SOLUTION TOPICAL ONCE
Status: COMPLETED | OUTPATIENT
Start: 2022-02-17 | End: 2022-02-17

## 2022-02-17 RX ORDER — LIDOCAINE 50 MG/G
OINTMENT TOPICAL ONCE
Status: CANCELLED | OUTPATIENT
Start: 2022-02-17 | End: 2022-02-17

## 2022-02-17 RX ORDER — LIDOCAINE 40 MG/G
CREAM TOPICAL ONCE
Status: CANCELLED | OUTPATIENT
Start: 2022-02-17 | End: 2022-02-17

## 2022-02-17 RX ORDER — LIDOCAINE HYDROCHLORIDE 20 MG/ML
JELLY TOPICAL ONCE
Status: CANCELLED | OUTPATIENT
Start: 2022-02-17 | End: 2022-02-17

## 2022-02-17 RX ORDER — BETAMETHASONE DIPROPIONATE 0.05 %
OINTMENT (GRAM) TOPICAL ONCE
Status: CANCELLED | OUTPATIENT
Start: 2022-02-17 | End: 2022-02-17

## 2022-02-17 RX ORDER — BACITRACIN ZINC AND POLYMYXIN B SULFATE 500; 1000 [USP'U]/G; [USP'U]/G
OINTMENT TOPICAL ONCE
Status: CANCELLED | OUTPATIENT
Start: 2022-02-17 | End: 2022-02-17

## 2022-02-17 RX ORDER — GINSENG 100 MG
CAPSULE ORAL ONCE
Status: CANCELLED | OUTPATIENT
Start: 2022-02-17 | End: 2022-02-17

## 2022-02-17 RX ORDER — DOXYCYCLINE HYCLATE 100 MG
100 TABLET ORAL 2 TIMES DAILY
Qty: 28 TABLET | Refills: 0 | Status: SHIPPED | OUTPATIENT
Start: 2022-02-17 | End: 2022-03-03

## 2022-02-17 RX ORDER — LIDOCAINE HYDROCHLORIDE 40 MG/ML
SOLUTION TOPICAL ONCE
Status: CANCELLED | OUTPATIENT
Start: 2022-02-17 | End: 2022-02-17

## 2022-02-17 RX ORDER — CLOBETASOL PROPIONATE 0.5 MG/G
OINTMENT TOPICAL ONCE
Status: CANCELLED | OUTPATIENT
Start: 2022-02-17 | End: 2022-02-17

## 2022-02-17 RX ADMIN — LIDOCAINE HYDROCHLORIDE: 40 SOLUTION TOPICAL at 14:30

## 2022-02-17 ASSESSMENT — PAIN DESCRIPTION - DESCRIPTORS: DESCRIPTORS: ACHING

## 2022-02-17 ASSESSMENT — PAIN DESCRIPTION - LOCATION: LOCATION: GENERALIZED

## 2022-02-17 ASSESSMENT — PAIN SCALES - GENERAL: PAINLEVEL_OUTOF10: 4

## 2022-02-17 ASSESSMENT — PAIN DESCRIPTION - ORIENTATION: ORIENTATION: RIGHT;LEFT

## 2022-02-17 ASSESSMENT — PAIN DESCRIPTION - PAIN TYPE: TYPE: CHRONIC PAIN

## 2022-02-17 NOTE — PROGRESS NOTES
Colon Cancer Maternal Grandfather        SOCIAL HISTORY    Social History     Tobacco Use    Smoking status: Former Smoker     Packs/day: 1.50     Types: Cigarettes     Quit date: 2005     Years since quittin.1    Smokeless tobacco: Former User     Types: Chew    Tobacco comment: States \"vapes a little\"   Vaping Use    Vaping Use: Every day    Substances: Nicotine, Flavoring    Devices: Refillable tank   Substance Use Topics    Alcohol use: No     Alcohol/week: 0.0 standard drinks     Comment: rarely -- 1 every few months    Drug use: No       ALLERGIES    No Known Allergies    MEDICATIONS    Current Outpatient Medications on File Prior to Encounter   Medication Sig Dispense Refill    penicillin v potassium (VEETID) 500 MG tablet TAKE TWO TABLETS BY MOUTH TWICE A  tablet 3    oxyCODONE-acetaminophen (PERCOCET) 7.5-325 MG per tablet Take 1 tablet by mouth every 6 hours as needed for Pain (max 4 per day) for up to 28 days. 112 tablet 0    pregabalin (LYRICA) 150 MG capsule Take 1 capsule by mouth 3 times daily for 30 days.  90 capsule 0    diclofenac (VOLTAREN) 75 MG EC tablet Take 1 tablet by mouth daily as needed for Pain 30 tablet 1    Multiple Vitamins-Minerals (THERAPEUTIC MULTIVITAMIN-MINERALS) tablet Take 1 tablet by mouth daily      losartan (COZAAR) 100 MG tablet TAKE ONE TABLET BY MOUTH DAILY 90 tablet 2    furosemide (LASIX) 20 MG tablet TAKE ONE TO TWO TABLETS BY MOUTH EVERY MORNING AS NEEDED FOR EDEMA 180 tablet 3    Compression Stockings MISC by Does not apply route Strength 30-40mmHg  Style: pull on below the knee; open or closed toe  Extremity: bilateral  Donning aid recommended: Yes if needed 1 each 2    Compression Stockings MISC by Does not apply route Strength 30-40mmHg  Style: Farrow wrap , below the knee  Extremity: bilateral  Donning aid recommended: No 1 each 0    Compression Stockings MISC by Does not apply route Thigh high 1 each 0    Elastic Bandages & Supports (MEDICAL COMPRESSION THIGH HIGH) MISC Thigh high compression stockings, 30-40 mm Hg 2 each 1     No current facility-administered medications on file prior to encounter. REVIEW OF SYSTEMS    Pertinent items are noted in HPI. Last H6Z if applicable:   Hemoglobin A1C   Date Value Ref Range Status   08/10/2021 4.8 See comment % Final     Comment:     Comment:  Diagnosis of Diabetes: > or = 6.5%  Increased risk of diabetes (Prediabetes): 5.7-6.4%  Glycemic Control: Nonpregnant Adults: <7.0%                    Pregnant: <6.0%           Objective:      BP (!) 176/68   Pulse 91   Temp 97.6 °F (36.4 °C) (Temporal)   Resp 18     Wt Readings from Last 3 Encounters:   02/11/22 (!) 476 lb 6.6 oz (216.1 kg)   01/31/22 (!) 473 lb (214.6 kg)   01/03/22 (!) 476 lb (215.9 kg)       PHYSICAL EXAM    General Appearance: alert and oriented to person, place and time, well-developed and well-nourished, in no acute distress  Pedal pulses palpable. Protective sensations intact. Ulcer is noted left lateral leg with granular base and fibrotic covering. Ulcer does not probe to bone. There are no signs of infection. There is moderate edema bilateral legs. Ulcer noted superior to wound #1 exhibits granular base, fibrotic covering, drainage with no signs of infection and does not probe to bone. Hemosiderin deposits are noted anterior bilateral legs. Assessment:      1. 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 Varicose veins of right lower extremity with inflammation, with ulcer of calf limited to breakdown of skin ([address]    3. Varicose veins of left lower extremity with inflammation, with ulcer of calf with fat layer exposed ([address]           Procedure Note  Indications:  Based on my examination of this patient's wound(s)/ulcer(s) today, debridement is required to promote healing and evaluate the wound base.     Performed by: Carolina Mace DPM    Consent obtained:  Yes    Time out taken: Yes    Pain Control: Anesthetic  Anesthetic: 4% Lidocaine Liquid Topical       Debridement: Excisional Debridement    Using curette the wound(s)/ulcer(s) was/were debrided down through and including the removal of epidermis, dermis and subcutaneous tissue. Devitalized Tissue Debrided:  fibrin and biofilm    Pre Debridement Measurements:  Are located in the [place]  Documentation Flow Sheet    Wound/Ulcer #: 1 and 2    Post Debridement Measurements:  Wound/Ulcer Descriptions are Pre Debridement except measurements:    Wound 10/07/21 Leg Lateral;Left;Distal #1 (Active)   Wound Image   12/02/21 1552   Wound Etiology Venous 01/13/22 1413   Wound Cleansed Cleansed with saline 02/17/22 1434   Dressing/Treatment Alginate with Ag 02/11/22 1108   Wound Length (cm) 4.5 cm 02/17/22 1434   Wound Width (cm) 2.5 cm 02/17/22 1434   Wound Depth (cm) 0.1 cm 02/17/22 1434   Wound Surface Area (cm^2) 11.25 cm^2 02/17/22 1434   Change in Wound Size % (l*w) -33.93 02/17/22 1434   Wound Volume (cm^3) 1.125 cm^3 02/17/22 1434   Wound Healing % -34 02/17/22 1434   Post-Procedure Length (cm) 4.6 cm 02/17/22 1501   Post-Procedure Width (cm) 2.6 cm 02/17/22 1501   Post-Procedure Depth (cm) 0.3 cm 02/17/22 1501   Post-Procedure Surface Area (cm^2) 11.96 cm^2 02/17/22 1501   Post-Procedure Volume (cm^3) 3.588 cm^3 02/17/22 1501   Distance Tunneling (cm) 0 cm 02/11/22 1007   Tunneling Position ___ O'Clock 0 12/23/21 1524   Undermining Starts ___ O'Clock 0 12/23/21 1524   Undermining Ends___ O'Clock 0 12/23/21 1524   Undermining Maxium Distance (cm) 0 02/11/22 1007   Wound Assessment Fibrin;Pink/red 02/17/22 1434   Drainage Amount Moderate 02/17/22 1434   Drainage Description Green;Brown 02/17/22 1434   Odor Moderate 02/17/22 1434   Jacy-wound Assessment Hemosiderin staining (brown yellow); Maceration 02/17/22 1434   Margins Defined edges 02/17/22 1434   Wound Thickness Description not for Pressure Injury Full thickness 02/17/22 candidate for ablation of his venous reflux due to his weight. He was denied his approval for gastric bypass surgery. I do feel the patient would benefit from gastric bypass. Hold PCN for 2 weeks, Rx doxycycline 100 mg #28 take bid 0 refills. Return 1 week. New Medication(s) at this visit:   New Prescriptions    No medications on file       Other orders at this visit:   Orders Placed This Encounter   Procedures    Initiate Outpatient Wound Care Protocol       Smoking Cessation: Counseling given: Not Answered  Comment: States \"vapes a little\"      Written patient dismissal instructions given to patient and signed by patient or POA.                Electronically signed by Eris Song DPM on 2/17/2022 at 3:04 PM

## 2022-02-24 ENCOUNTER — HOSPITAL ENCOUNTER (OUTPATIENT)
Dept: WOUND CARE | Age: 47
Discharge: HOME | End: 2022-02-24
Payer: COMMERCIAL

## 2022-02-24 VITALS
RESPIRATION RATE: 18 BRPM | DIASTOLIC BLOOD PRESSURE: 75 MMHG | HEART RATE: 75 BPM | TEMPERATURE: 97.7 F | SYSTOLIC BLOOD PRESSURE: 176 MMHG

## 2022-02-24 DIAGNOSIS — L97.211 VARICOSE VEINS OF RIGHT LOWER EXTREMITY WITH INFLAMMATION, WITH ULCER OF CALF LIMITED TO BREAKDOWN OF SKIN (HCC): ICD-10-CM

## 2022-02-24 DIAGNOSIS — I83.212 VARICOSE VEINS OF RIGHT LOWER EXTREMITY WITH INFLAMMATION, WITH ULCER OF CALF LIMITED TO BREAKDOWN OF SKIN (HCC): ICD-10-CM

## 2022-02-24 DIAGNOSIS — L97.222 NON-PRESSURE CHRONIC ULCER OF LEFT CALF WITH FAT LAYER EXPOSED (HCC): Primary | ICD-10-CM

## 2022-02-24 DIAGNOSIS — I83.222 VARICOSE VEINS OF LEFT LOWER EXTREMITY WITH INFLAMMATION, WITH ULCER OF CALF WITH FAT LAYER EXPOSED (HCC): ICD-10-CM

## 2022-02-24 DIAGNOSIS — L97.222 VARICOSE VEINS OF LEFT LOWER EXTREMITY WITH INFLAMMATION, WITH ULCER OF CALF WITH FAT LAYER EXPOSED (HCC): ICD-10-CM

## 2022-02-24 PROCEDURE — 11045 DBRDMT SUBQ TISS EACH ADDL: CPT

## 2022-02-24 PROCEDURE — 11042 DBRDMT SUBQ TIS 1ST 20SQCM/<: CPT

## 2022-02-24 PROCEDURE — 6370000000 HC RX 637 (ALT 250 FOR IP): Performed by: PODIATRIST

## 2022-02-24 RX ORDER — LIDOCAINE HYDROCHLORIDE 40 MG/ML
SOLUTION TOPICAL ONCE
Status: CANCELLED | OUTPATIENT
Start: 2022-02-24 | End: 2022-02-24

## 2022-02-24 RX ORDER — LIDOCAINE HYDROCHLORIDE 20 MG/ML
JELLY TOPICAL ONCE
Status: CANCELLED | OUTPATIENT
Start: 2022-02-24 | End: 2022-02-24

## 2022-02-24 RX ORDER — LIDOCAINE HYDROCHLORIDE 40 MG/ML
SOLUTION TOPICAL ONCE
Status: COMPLETED | OUTPATIENT
Start: 2022-02-24 | End: 2022-02-24

## 2022-02-24 RX ORDER — BACITRACIN, NEOMYCIN, POLYMYXIN B 400; 3.5; 5 [USP'U]/G; MG/G; [USP'U]/G
OINTMENT TOPICAL ONCE
Status: CANCELLED | OUTPATIENT
Start: 2022-02-24 | End: 2022-02-24

## 2022-02-24 RX ORDER — LIDOCAINE 40 MG/G
CREAM TOPICAL ONCE
Status: CANCELLED | OUTPATIENT
Start: 2022-02-24 | End: 2022-02-24

## 2022-02-24 RX ORDER — LIDOCAINE 50 MG/G
OINTMENT TOPICAL ONCE
Status: CANCELLED | OUTPATIENT
Start: 2022-02-24 | End: 2022-02-24

## 2022-02-24 RX ORDER — BACITRACIN ZINC AND POLYMYXIN B SULFATE 500; 1000 [USP'U]/G; [USP'U]/G
OINTMENT TOPICAL ONCE
Status: CANCELLED | OUTPATIENT
Start: 2022-02-24 | End: 2022-02-24

## 2022-02-24 RX ORDER — BETAMETHASONE DIPROPIONATE 0.05 %
OINTMENT (GRAM) TOPICAL ONCE
Status: CANCELLED | OUTPATIENT
Start: 2022-02-24 | End: 2022-02-24

## 2022-02-24 RX ORDER — GINSENG 100 MG
CAPSULE ORAL ONCE
Status: CANCELLED | OUTPATIENT
Start: 2022-02-24 | End: 2022-02-24

## 2022-02-24 RX ORDER — CLOBETASOL PROPIONATE 0.5 MG/G
OINTMENT TOPICAL ONCE
Status: CANCELLED | OUTPATIENT
Start: 2022-02-24 | End: 2022-02-24

## 2022-02-24 RX ORDER — GENTAMICIN SULFATE 1 MG/G
OINTMENT TOPICAL ONCE
Status: CANCELLED | OUTPATIENT
Start: 2022-02-24 | End: 2022-02-24

## 2022-02-24 RX ADMIN — LIDOCAINE HYDROCHLORIDE: 40 SOLUTION TOPICAL at 14:44

## 2022-02-24 ASSESSMENT — PAIN SCALES - GENERAL: PAINLEVEL_OUTOF10: 2

## 2022-02-24 ASSESSMENT — PAIN DESCRIPTION - PAIN TYPE: TYPE: CHRONIC PAIN

## 2022-02-24 ASSESSMENT — PAIN DESCRIPTION - LOCATION: LOCATION: BACK

## 2022-02-24 ASSESSMENT — PAIN DESCRIPTION - DESCRIPTORS: DESCRIPTORS: SORE

## 2022-02-24 NOTE — PROGRESS NOTES
[facility] [place] and Procedure Note      Silvia Momin  MEDICAL RECORD NUMBER:  [id number]  AGE: 52 y.o. GENDER: male  : 1975  EPISODE DATE:  2022    Subjective:     Chief Complaint   Patient presents with    Wound Check     left lower leg         HISTORY of PRESENT ILLNESS HPI     Silvia Momin is a 52 y.o. male who presents today for wound/ulcer evaluation. History of Wound Context: Patient has ulcer left lateral leg. He has been wearing his stockings as instructed.     Wound/Ulcer Pain Timing/Severity: intermittent, mild, moderate  Quality of pain: sharp  Severity:  5 / 10   Modifying Factors: Pain worsens with walking  Associated Signs/Symptoms: edema    Ulcer Identification:  Ulcer Type: venous    Contributing Factors: edema, venous stasis and lymphedema    Acute Wound: N/A        PAST MEDICAL HISTORY        Diagnosis Date    Arthritis     Cellulitis     Chronic back pain     Hepatitis age 24    HNP (herniated nucleus pulposus with myelopathy), thoracic     Hypertension     Melanoma ([facility] [address]     left foot     Obesity 2011    ANN-MARIE (obstructive sleep apnea)     NO CPAP     Stasis dermatitis        PAST SURGICAL HISTORY    Past Surgical History:   Procedure Laterality Date    ADENOIDECTOMY      FOOT SURGERY Left 2019    WIDE EXCISION OF LEFT FOOT MELANOMA performed by Karthik Calix MD at [address] [place] [address] Left 2/10/2020    LEFT FOOT MELANOMA WIDE EXCISION AND FULL THICKNESS SKIN GRAFT,LEFT GROIN SENTINEL LYMPH NODE BIOPSY performed by Karthik Calix MD at [facility]  06    [address]    UPPER GASTROINTESTINAL ENDOSCOPY N/A 2021    EGD BIOPSY performed by Camila Hardy DO at [facility] [address] EXTRACTION         FAMILY HISTORY    Family History   Problem Relation Age of Onset    Cancer Mother     COPD Paternal Grandfather     Colon Cancer Maternal Grandfather        SOCIAL HISTORY    Social History     Tobacco Use    Smoking status: Former Smoker     Packs/day: 1.50     Types: Cigarettes     Quit date: 2005     Years since quittin.1    Smokeless tobacco: Former User     Types: Chew    Tobacco comment: States \"vapes a little\"   Vaping Use    Vaping Use: Every day    Substances: Nicotine, Flavoring    Devices: Refillable tank   Substance Use Topics    Alcohol use: No     Alcohol/week: 0.0 standard drinks     Comment: rarely -- 1 every few months    Drug use: No       ALLERGIES    No Known Allergies    MEDICATIONS    Current Outpatient Medications on File Prior to Encounter   Medication Sig Dispense Refill    doxycycline hyclate (VIBRA-TABS) 100 MG tablet Take 1 tablet by mouth 2 times daily for 14 days 28 tablet 0    oxyCODONE-acetaminophen (PERCOCET) 7.5-325 MG per tablet Take 1 tablet by mouth every 6 hours as needed for Pain (max 4 per day) for up to 28 days. 112 tablet 0    pregabalin (LYRICA) 150 MG capsule Take 1 capsule by mouth 3 times daily for 30 days.  90 capsule 0    diclofenac (VOLTAREN) 75 MG EC tablet Take 1 tablet by mouth daily as needed for Pain 30 tablet 1    Multiple Vitamins-Minerals (THERAPEUTIC MULTIVITAMIN-MINERALS) tablet Take 1 tablet by mouth daily      losartan (COZAAR) 100 MG tablet TAKE ONE TABLET BY MOUTH DAILY 90 tablet 2    furosemide (LASIX) 20 MG tablet TAKE ONE TO TWO TABLETS BY MOUTH EVERY MORNING AS NEEDED FOR EDEMA 180 tablet 3    penicillin v potassium (VEETID) 500 MG tablet TAKE TWO TABLETS BY MOUTH TWICE A  tablet 3    Compression Stockings MISC by Does not apply route Strength 30-40mmHg  Style: pull on below the knee; open or closed toe  Extremity: bilateral  Donning aid recommended: Yes if needed 1 each 2    Compression Stockings MISC by Does not apply route Strength 30-40mmHg  Style: Farrow wrap , below the knee  Extremity: bilateral  Donning aid recommended: No 1 each 0    Compression Stockings MISC by Does not apply route Thigh high 1 each 0    Elastic Bandages & Supports (MEDICAL COMPRESSION THIGH HIGH) MISC Thigh high compression stockings, 30-40 mm Hg 2 each 1     No current facility-administered medications on file prior to encounter. REVIEW OF SYSTEMS    Pertinent items are noted in HPI. Last W5J if applicable:   Hemoglobin A1C   Date Value Ref Range Status   08/10/2021 4.8 See comment % Final     Comment:     Comment:  Diagnosis of Diabetes: > or = 6.5%  Increased risk of diabetes (Prediabetes): 5.7-6.4%  Glycemic Control: Nonpregnant Adults: <7.0%                    Pregnant: <6.0%           Objective:      BP (!) 176/75   Pulse 75   Temp 97.7 °F (36.5 °C) (Temporal)   Resp 18     Wt Readings from Last 3 Encounters:   02/11/22 (!) 476 lb 6.6 oz (216.1 kg)   01/31/22 (!) 473 lb (214.6 kg)   01/03/22 (!) 476 lb (215.9 kg)       PHYSICAL EXAM    General Appearance: alert and oriented to person, place and time, well-developed and well-nourished, in no acute distress  Pedal pulses palpable. Protective sensations intact. Ulcer is noted left lateral leg with granular base and fibrotic covering. Ulcer does not probe to bone. There are no signs of infection. There is moderate edema bilateral legs. Ulcer noted superior to wound #1 exhibits granular base, fibrotic covering, drainage with no signs of infection and does not probe to bone. Hemosiderin deposits are noted anterior bilateral legs. Assessment:      1. 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 Varicose veins of right lower extremity with inflammation, with ulcer of calf limited to breakdown of skin ([address]    3. Varicose veins of left lower extremity with inflammation, with ulcer of calf with fat layer exposed ([address]           Procedure Note  Indications:  Based on my examination of this patient's wound(s)/ulcer(s) today, debridement is required to promote healing and evaluate the wound base.     Performed by: Barrera Pinedo DPM    Consent obtained:  Yes    Time out taken:  Yes    Pain Control: Anesthetic  Anesthetic: None       Debridement: Excisional Debridement    Using curette the wound(s)/ulcer(s) was/were debrided down through and including the removal of epidermis, dermis and subcutaneous tissue.         Devitalized Tissue Debrided:  fibrin and biofilm    Pre Debridement Measurements:  Are located in the [place]  Documentation Flow Sheet    Wound/Ulcer #: 1 and 2    Post Debridement Measurements:  Wound/Ulcer Descriptions are Pre Debridement except measurements:    Wound 10/07/21 Leg Lateral;Left;Distal #1 (Active)   Wound Image   12/02/21 1552   Wound Etiology Venous 01/13/22 1413   Wound Cleansed Cleansed with saline 02/24/22 1431   Dressing/Treatment Alginate with Ag 02/24/22 1518   Wound Length (cm) 5 cm 02/24/22 1431   Wound Width (cm) 2.8 cm 02/24/22 1431   Wound Depth (cm) 0.1 cm 02/24/22 1431   Wound Surface Area (cm^2) 14 cm^2 02/24/22 1431   Change in Wound Size % (l*w) -66.67 02/24/22 1431   Wound Volume (cm^3) 1.4 cm^3 02/24/22 1431   Wound Healing % -67 02/24/22 1431   Post-Procedure Length (cm) 5.1 cm 02/24/22 1518   Post-Procedure Width (cm) 2.9 cm 02/24/22 1518   Post-Procedure Depth (cm) 0.3 cm 02/24/22 1518   Post-Procedure Surface Area (cm^2) 14.79 cm^2 02/24/22 1518   Post-Procedure Volume (cm^3) 4.437 cm^3 02/24/22 1518   Distance Tunneling (cm) 0 cm 02/24/22 1431   Tunneling Position ___ O'Clock 0 02/24/22 1431   Undermining Starts ___ O'Clock 0 02/24/22 1431   Undermining Ends___ O'Clock 0 02/24/22 1431   Undermining Maxium Distance (cm) 0 02/24/22 1431   Wound Assessment Fibrin;Pink/red 02/24/22 1431   Drainage Amount Moderate 02/24/22 1431   Drainage Description Purulent;Yellow;Brown 02/24/22 1431   Odor Moderate 02/24/22 1431   Jacy-wound Assessment Hemosiderin staining (brown yellow) 02/24/22 1431   Margins Defined edges 02/24/22 1431   Wound Thickness Description not for Pressure Injury Full thickness 02/24/22 1431   Number of days: 140       Wound 12/23/21 Leg Proximal;Left;Lower #2 (Active)   Wound Image   12/23/21 1524   Wound Etiology Traumatic 12/23/21 1524   Wound Cleansed Cleansed with saline 02/24/22 1431   Dressing/Treatment Alginate with Ag 02/24/22 1518   Wound Length (cm) 6 cm 02/24/22 1431   Wound Width (cm) 3.3 cm 02/24/22 1431   Wound Depth (cm) 0.1 cm 02/24/22 1431   Wound Surface Area (cm^2) 19.8 cm^2 02/24/22 1431   Change in Wound Size % (l*w) -4.76 02/24/22 1431   Wound Volume (cm^3) 1.98 cm^3 02/24/22 1431   Wound Healing % -5 02/24/22 1431   Post-Procedure Length (cm) 6.1 cm 02/24/22 1518   Post-Procedure Width (cm) 3.4 cm 02/24/22 1518   Post-Procedure Depth (cm) 0.3 cm 02/24/22 1518   Post-Procedure Surface Area (cm^2) 20.74 cm^2 02/24/22 1518   Post-Procedure Volume (cm^3) 6.222 cm^3 02/24/22 1518   Distance Tunneling (cm) 0 cm 02/24/22 1431   Tunneling Position ___ O'Clock 0 02/24/22 1431   Undermining Starts ___ O'Clock 0 02/24/22 1431   Undermining Ends___ O'Clock 0 02/24/22 1431   Undermining Maxium Distance (cm) 0 02/24/22 1431   Wound Assessment Fibrin;Pink/red 02/24/22 1431   Drainage Amount Moderate 02/24/22 1431   Drainage Description Purulent;Yellow;Brown 02/24/22 1431   Odor None 02/24/22 1431   Jacy-wound Assessment Hemosiderin staining (brown yellow) 02/24/22 1431   Margins Defined edges 02/24/22 1431   Wound Thickness Description not for Pressure Injury Full thickness 02/24/22 1431   Number of days: 63     Incision 12/20/19 Foot Left;Plantar (Active)   Number of days: 797       Percent of Wound(s)/Ulcer(s) Debrided: 100%    Total Surface Area Debrided:  35.53 sq cm     Diabetic/Pressure/Non Pressure Ulcers only:  Ulcer: N/A     Estimated Blood Loss:  Minimal    Hemostasis Achieved:  by pressure    Procedural Pain:  5  / 10     Post Procedural Pain:  5 / 10     Response to treatment:  Well tolerated by patient.      Plan:       Treatment Note please see attached

## 2022-02-28 ENCOUNTER — OFFICE VISIT (OUTPATIENT)
Dept: PAIN MANAGEMENT | Age: 47
End: 2022-02-28
Payer: COMMERCIAL

## 2022-02-28 VITALS
DIASTOLIC BLOOD PRESSURE: 76 MMHG | HEART RATE: 79 BPM | BODY MASS INDEX: 59.62 KG/M2 | WEIGHT: 315 LBS | SYSTOLIC BLOOD PRESSURE: 145 MMHG | OXYGEN SATURATION: 96 %

## 2022-02-28 DIAGNOSIS — M51.27 LUMBAGO-SCIATICA DUE TO DISPLACEMENT OF LUMBAR INTERVERTEBRAL DISC: ICD-10-CM

## 2022-02-28 DIAGNOSIS — S33.9XXA SPRAIN OF LIGAMENT OF LUMBOSACRAL JOINT, INITIAL ENCOUNTER: ICD-10-CM

## 2022-02-28 DIAGNOSIS — S83.92XD SPRAIN OF LEFT KNEE/LEG, SUBSEQUENT ENCOUNTER: ICD-10-CM

## 2022-02-28 DIAGNOSIS — S83.92XA SPRAIN OF LEFT KNEE/LEG, INITIAL ENCOUNTER: ICD-10-CM

## 2022-02-28 DIAGNOSIS — S33.9XXD SPRAIN OF LIGAMENT OF LUMBOSACRAL JOINT, SUBSEQUENT ENCOUNTER: ICD-10-CM

## 2022-02-28 PROCEDURE — 99213 OFFICE O/P EST LOW 20 MIN: CPT | Performed by: INTERNAL MEDICINE

## 2022-02-28 RX ORDER — OXYCODONE AND ACETAMINOPHEN 7.5; 325 MG/1; MG/1
1 TABLET ORAL EVERY 6 HOURS PRN
Qty: 112 TABLET | Refills: 0 | Status: SHIPPED | OUTPATIENT
Start: 2022-02-28 | End: 2022-03-28 | Stop reason: SDUPTHER

## 2022-02-28 RX ORDER — DICLOFENAC SODIUM 75 MG/1
75 TABLET, DELAYED RELEASE ORAL DAILY PRN
Qty: 30 TABLET | Refills: 1 | Status: SHIPPED | OUTPATIENT
Start: 2022-02-28 | End: 2022-03-28 | Stop reason: SDUPTHER

## 2022-02-28 RX ORDER — PREGABALIN 150 MG/1
150 CAPSULE ORAL 3 TIMES DAILY
Qty: 90 CAPSULE | Refills: 0 | Status: SHIPPED | OUTPATIENT
Start: 2022-02-28 | End: 2022-03-28 | Stop reason: SDUPTHER

## 2022-02-28 NOTE — PROGRESS NOTES
Ramiro Richmond [facility]  1975  [id number]    HISTORY OF PRESENT ILLNESS:  Mr. Miguel Rasheed is a 52 y.o. male returns for a follow up visit for multiple medical problems. His current presenting problems are   1. 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 BWC Sprain of ligament of lumbosacral joint, initial encounter    3. BWC Sprain of left knee/leg, subsequent encounter    4. BWC Sprain of left knee/leg, initial encounter    5. BWC Sprain of ligament of lumbosacral joint, subsequent encounter    . As per information/history obtained from the PADT(patient assessment and documentation tool) - He complains of pain in the lower back, upper leg Left, lower leg Left and feet Left. He rates the pain 4/10 and describes it as aching, burning, numbness, pins and needles, stabbing. Pain is made worse by: nothing, movement, walking, standing, sitting, bending, lifting. Current treatment regimen has helped relieve about 70% of the pain. He denies side effects from the current pain regimen. Patient reports that since the last follow up visit the physical functioning is unchanged, family/social relationships are unchanged, mood is unchanged and sleep patterns are unchanged, and that the overall functioning is unchanged. Patient denies neurological bowel or bladder. Patient denies misusing/abusing his narcotic pain medications or using any illegal drugs. There are No indicators for possible drug abuse, addiction or diversion problems. Upon obtaining the medical history from Mr. Miguel Rasheed regarding today's office visit for his presenting problems, patient states is doing fair, Left leg is getting better. He says he still has an open sore and is on antibiotics. He reports he is going to wound clinic. He states he is working from home. He reports he is using Lyrica along with Voltaren. He denies any constipation symptoms.        ALLERGIES: Patients list of allergies were reviewed     MEDICATIONS: Mr. Ramos Pac list of medications were reviewed. His current medications are   Outpatient Medications Prior to Visit   Medication Sig Dispense Refill    doxycycline hyclate (VIBRA-TABS) 100 MG tablet Take 1 tablet by mouth 2 times daily for 14 days 28 tablet 0    penicillin v potassium (VEETID) 500 MG tablet TAKE TWO TABLETS BY MOUTH TWICE A  tablet 3    Compression Stockings MISC by Does not apply route Strength 30-40mmHg  Style: pull on below the knee; open or closed toe  Extremity: bilateral  Donning aid recommended: Yes if needed 1 each 2    Compression Stockings MISC by Does not apply route Strength 30-40mmHg  Style: Farrow wrap , below the knee  Extremity: bilateral  Donning aid recommended: No 1 each 0    Multiple Vitamins-Minerals (THERAPEUTIC MULTIVITAMIN-MINERALS) tablet Take 1 tablet by mouth daily      losartan (COZAAR) 100 MG tablet TAKE ONE TABLET BY MOUTH DAILY 90 tablet 2    furosemide (LASIX) 20 MG tablet TAKE ONE TO TWO TABLETS BY MOUTH EVERY MORNING AS NEEDED FOR EDEMA 180 tablet 3    Compression Stockings MISC by Does not apply route Thigh high 1 each 0    Elastic Bandages & Supports (MEDICAL COMPRESSION THIGH HIGH) MISC Thigh high compression stockings, 30-40 mm Hg 2 each 1    oxyCODONE-acetaminophen (PERCOCET) 7.5-325 MG per tablet Take 1 tablet by mouth every 6 hours as needed for Pain (max 4 per day) for up to 28 days. 112 tablet 0    pregabalin (LYRICA) 150 MG capsule Take 1 capsule by mouth 3 times daily for 30 days. 90 capsule 0    diclofenac (VOLTAREN) 75 MG EC tablet Take 1 tablet by mouth daily as needed for Pain 30 tablet 1     No facility-administered medications prior to visit. REVIEW OF SYSTEMS:    Respiratory: Negative for apnea, chest tightness and shortness of breath or change in baseline breathing. PHYSICAL EXAM:   Nursing note and vitals reviewed.  BP (!) 145/76   Pulse 79   Wt (!) 477 lb (216.4 kg)   SpO2 96%   BMI 59.62 kg/m²   Constitutional: He appears well-developed and well-nourished. No acute distress. Cardiovascular: Normal rate, regular rhythm, normal heart sounds, and does not have murmur. Pulmonary/Chest: Effort normal. No respiratory distress. He does not have wheezes in the lung fields. He has no rales. Neurological/Psychiatric:He is alert and oriented to person, place, and time. Coordination is  normal.  His mood isAppropriate and affect is Neutral/Euthymic(normal) . Other: + right leg bandaged     IMPRESSION:   1.  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 BWC Sprain of ligament of lumbosacral joint, initial encounter    3. BWC Sprain of left knee/leg, subsequent encounter    4. BWC Sprain of left knee/leg, initial encounter    5. BWC Sprain of ligament of lumbosacral joint, subsequent encounter        PLAN:  Informed verbal consent was obtained  - He was advised to increase fluids ( 5-7  glasses of fluid daily), limit caffeine, avoid cheese products, increase dietary fiber, increase activity and exercise as tolerated and relax regularly and enjoy meals   -Continue with Percocet 4 per day   -He was advised weight reduction, diet changes- 800-1200 roc diet, diet diary, exercising, nutritional  consult increased physical activity as tolerated   -Continue with Lyrica and Voltaren   Current Outpatient Medications   Medication Sig Dispense Refill    oxyCODONE-acetaminophen (PERCOCET) 7.5-325 MG per tablet Take 1 tablet by mouth every 6 hours as needed for Pain (max 4 per day) for up to 28 days. 112 tablet 0    pregabalin (LYRICA) 150 MG capsule Take 1 capsule by mouth 3 times daily for 30 days.  90 capsule 0    diclofenac (VOLTAREN) 75 MG EC tablet Take 1 tablet by mouth daily as needed for Pain 30 tablet 1    doxycycline hyclate (VIBRA-TABS) 100 MG tablet Take 1 tablet by mouth 2 times daily for 14 days 28 tablet 0    penicillin v potassium (VEETID) 500 MG tablet TAKE TWO TABLETS BY MOUTH TWICE A  tablet 3    Compression Stockings MISC by Does not apply route Strength 30-40mmHg  Style: pull on below the knee; open or closed toe  Extremity: bilateral  Donning aid recommended: Yes if needed 1 each 2    Compression Stockings MISC by Does not apply route Strength 30-40mmHg  Style: Farrow wrap , below the knee  Extremity: bilateral  Donning aid recommended: No 1 each 0    Multiple Vitamins-Minerals (THERAPEUTIC MULTIVITAMIN-MINERALS) tablet Take 1 tablet by mouth daily      losartan (COZAAR) 100 MG tablet TAKE ONE TABLET BY MOUTH DAILY 90 tablet 2    furosemide (LASIX) 20 MG tablet TAKE ONE TO TWO TABLETS BY MOUTH EVERY MORNING AS NEEDED FOR EDEMA 180 tablet 3    Compression Stockings MISC by Does not apply route Thigh high 1 each 0    Elastic Bandages & Supports (MEDICAL COMPRESSION THIGH HIGH) MISC Thigh high compression stockings, 30-40 mm Hg 2 each 1     No current facility-administered medications for this visit. I will continue his current medication regimen  which is part of the above treatment schedule. It has been helping with Mr. 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 Risks and benefits of the medications and other alternative treatments  including no treatment were discussed with the patient. The common side effects of these medications were also explained to the patient. Informed verbal consent was obtained.    Goals of current treatment regimen include improvement in pain, restoration of functioning- with focus on improvement in physical performance, general activity, work or disability,emotional distress, health care utilization and  decreased opioid medication consumption- titrating to the lowest effective dose. Will continue to monitor progress towards achieving/maintaining therapeutic goals with special emphasis on  1. Improvement in perceived interfernce  of pain with ADL's. Ability to do home exercises independently. Ability to do household chores indoor and/or outdoor work and social and leisure activities. Improve psychosocial and physical functioning. - he is showing progression towards this treatment goal with the current regimen. He was advised against drinking alcohol with the narcotic pain medicines, advised against driving or handling machinery while adjusting the dose of medicines or if having cognitive  issues related to the current medications. Risk of overdose and death, if medicines not taken as prescribed, were also discussed. If the patient develops new symptoms or if the symptoms worsen, the patient should call the office. While transcribing every attempt was made to maintain the accuracy of the note in terms of it's contents,there may have been some errors made inadvertently. Thank you for allowing me to participate in the care of this patient.     Demetrius Ramirez MD.    Cc: Nasima Augustine MD

## 2022-03-03 ENCOUNTER — HOSPITAL ENCOUNTER (OUTPATIENT)
Dept: WOUND CARE | Age: 47
Discharge: HOME | End: 2022-03-03
Payer: COMMERCIAL

## 2022-03-03 VITALS
RESPIRATION RATE: 18 BRPM | TEMPERATURE: 98.6 F | BODY MASS INDEX: 59.16 KG/M2 | DIASTOLIC BLOOD PRESSURE: 94 MMHG | WEIGHT: 315 LBS | HEART RATE: 69 BPM | SYSTOLIC BLOOD PRESSURE: 164 MMHG

## 2022-03-03 DIAGNOSIS — I83.222 VARICOSE VEINS OF LEFT LOWER EXTREMITY WITH INFLAMMATION, WITH ULCER OF CALF WITH FAT LAYER EXPOSED (HCC): ICD-10-CM

## 2022-03-03 DIAGNOSIS — I83.212 VARICOSE VEINS OF RIGHT LOWER EXTREMITY WITH INFLAMMATION, WITH ULCER OF CALF LIMITED TO BREAKDOWN OF SKIN (HCC): ICD-10-CM

## 2022-03-03 DIAGNOSIS — L97.222 NON-PRESSURE CHRONIC ULCER OF LEFT CALF WITH FAT LAYER EXPOSED (HCC): Primary | ICD-10-CM

## 2022-03-03 DIAGNOSIS — L97.222 VARICOSE VEINS OF LEFT LOWER EXTREMITY WITH INFLAMMATION, WITH ULCER OF CALF WITH FAT LAYER EXPOSED (HCC): ICD-10-CM

## 2022-03-03 DIAGNOSIS — L97.211 VARICOSE VEINS OF RIGHT LOWER EXTREMITY WITH INFLAMMATION, WITH ULCER OF CALF LIMITED TO BREAKDOWN OF SKIN (HCC): ICD-10-CM

## 2022-03-03 PROCEDURE — 6370000000 HC RX 637 (ALT 250 FOR IP): Performed by: PODIATRIST

## 2022-03-03 PROCEDURE — 11045 DBRDMT SUBQ TISS EACH ADDL: CPT

## 2022-03-03 PROCEDURE — 11042 DBRDMT SUBQ TIS 1ST 20SQCM/<: CPT

## 2022-03-03 RX ORDER — CLOBETASOL PROPIONATE 0.5 MG/G
OINTMENT TOPICAL ONCE
Status: CANCELLED | OUTPATIENT
Start: 2022-03-03 | End: 2022-03-03

## 2022-03-03 RX ORDER — LIDOCAINE 50 MG/G
OINTMENT TOPICAL ONCE
Status: COMPLETED | OUTPATIENT
Start: 2022-03-03 | End: 2022-03-03

## 2022-03-03 RX ORDER — LIDOCAINE HYDROCHLORIDE 20 MG/ML
JELLY TOPICAL ONCE
Status: CANCELLED | OUTPATIENT
Start: 2022-03-03 | End: 2022-03-03

## 2022-03-03 RX ORDER — BACITRACIN ZINC AND POLYMYXIN B SULFATE 500; 1000 [USP'U]/G; [USP'U]/G
OINTMENT TOPICAL ONCE
Status: CANCELLED | OUTPATIENT
Start: 2022-03-03 | End: 2022-03-03

## 2022-03-03 RX ORDER — GENTAMICIN SULFATE 1 MG/G
OINTMENT TOPICAL ONCE
Status: CANCELLED | OUTPATIENT
Start: 2022-03-03 | End: 2022-03-03

## 2022-03-03 RX ORDER — LIDOCAINE HYDROCHLORIDE 40 MG/ML
SOLUTION TOPICAL ONCE
Status: CANCELLED | OUTPATIENT
Start: 2022-03-03 | End: 2022-03-03

## 2022-03-03 RX ORDER — BACITRACIN, NEOMYCIN, POLYMYXIN B 400; 3.5; 5 [USP'U]/G; MG/G; [USP'U]/G
OINTMENT TOPICAL ONCE
Status: CANCELLED | OUTPATIENT
Start: 2022-03-03 | End: 2022-03-03

## 2022-03-03 RX ORDER — LIDOCAINE 50 MG/G
OINTMENT TOPICAL ONCE
Status: CANCELLED | OUTPATIENT
Start: 2022-03-03 | End: 2022-03-03

## 2022-03-03 RX ORDER — GINSENG 100 MG
CAPSULE ORAL ONCE
Status: CANCELLED | OUTPATIENT
Start: 2022-03-03 | End: 2022-03-03

## 2022-03-03 RX ORDER — LIDOCAINE 40 MG/G
CREAM TOPICAL ONCE
Status: CANCELLED | OUTPATIENT
Start: 2022-03-03 | End: 2022-03-03

## 2022-03-03 RX ORDER — BETAMETHASONE DIPROPIONATE 0.05 %
OINTMENT (GRAM) TOPICAL ONCE
Status: CANCELLED | OUTPATIENT
Start: 2022-03-03 | End: 2022-03-03

## 2022-03-03 RX ADMIN — LIDOCAINE: 50 OINTMENT TOPICAL at 14:35

## 2022-03-03 NOTE — PROGRESS NOTES
[facility] [place]  Progress Note and Procedure Note      Casandra Avitia  MEDICAL RECORD NUMBER:  [id number]  AGE: 52 y.o. GENDER: male  : 1975  EPISODE DATE:  3/3/2022    Subjective:     Chief Complaint   Patient presents with    Wound Check     LEFT LOWER LEG         HISTORY of PRESENT ILLNESS HPI     Casandra Avitia is a 52 y.o. male who presents today for wound/ulcer evaluation. History of Wound Context: Patient has ulcer left lateral leg. He has been wearing his stockings as instructed. Patient obtained his C Pap machine this week.   Wound/Ulcer Pain Timing/Severity: intermittent, mild, moderate  Quality of pain: sharp  Severity:  5 / 10   Modifying Factors: Pain worsens with walking  Associated Signs/Symptoms: edema    Ulcer Identification:  Ulcer Type: venous    Contributing Factors: edema, venous stasis and lymphedema    Acute Wound: N/A        PAST MEDICAL HISTORY        Diagnosis Date    Arthritis     Cellulitis     Chronic back pain     Hepatitis age 24    HNP (herniated nucleus pulposus with myelopathy), thoracic     Hypertension     Melanoma ([facility] [address]     left foot     Obesity 2011    ANN-MARIE (obstructive sleep apnea)     NO CPAP     Stasis dermatitis        PAST SURGICAL HISTORY    Past Surgical History:   Procedure Laterality Date    ADENOIDECTOMY      FOOT SURGERY Left 2019    WIDE EXCISION OF LEFT FOOT MELANOMA performed by Anthony Richter MD at [address] [place] [address] 2/10/2020    LEFT FOOT MELANOMA WIDE EXCISION AND FULL THICKNESS SKIN GRAFT,LEFT GROIN SENTINEL LYMPH NODE BIOPSY performed by Anthony Richter MD at [facility]  06    Arand    UPPER GASTROINTESTINAL ENDOSCOPY N/A 2021    EGD BIOPSY performed by Seng Ca DO at [address] [place] [address] EXTRACTION         FAMILY HISTORY    Family History   Problem Relation Age of Onset    Cancer Mother     COPD Paternal Bradford Leland Colon Cancer Maternal Grandfather        SOCIAL HISTORY    Social History     Tobacco Use    Smoking status: Former Smoker     Packs/day: 1.50     Types: Cigarettes     Quit date: 2005     Years since quittin.1    Smokeless tobacco: Former User     Types: Chew    Tobacco comment: States \"vapes a little\"   Vaping Use    Vaping Use: Every day    Substances: Nicotine, Flavoring    Devices: Refillable tank   Substance Use Topics    Alcohol use: No     Alcohol/week: 0.0 standard drinks     Comment: rarely -- 1 every few months    Drug use: No       ALLERGIES    No Known Allergies    MEDICATIONS    Current Outpatient Medications on File Prior to Encounter   Medication Sig Dispense Refill    oxyCODONE-acetaminophen (PERCOCET) 7.5-325 MG per tablet Take 1 tablet by mouth every 6 hours as needed for Pain (max 4 per day) for up to 28 days. 112 tablet 0    pregabalin (LYRICA) 150 MG capsule Take 1 capsule by mouth 3 times daily for 30 days.  90 capsule 0    diclofenac (VOLTAREN) 75 MG EC tablet Take 1 tablet by mouth daily as needed for Pain 30 tablet 1    doxycycline hyclate (VIBRA-TABS) 100 MG tablet Take 1 tablet by mouth 2 times daily for 14 days 28 tablet 0    Multiple Vitamins-Minerals (THERAPEUTIC MULTIVITAMIN-MINERALS) tablet Take 1 tablet by mouth daily      losartan (COZAAR) 100 MG tablet TAKE ONE TABLET BY MOUTH DAILY 90 tablet 2    furosemide (LASIX) 20 MG tablet TAKE ONE TO TWO TABLETS BY MOUTH EVERY MORNING AS NEEDED FOR EDEMA 180 tablet 3    penicillin v potassium (VEETID) 500 MG tablet TAKE TWO TABLETS BY MOUTH TWICE A  tablet 3    Compression Stockings MISC by Does not apply route Strength 30-40mmHg  Style: pull on below the knee; open or closed toe  Extremity: bilateral  Donning aid recommended: Yes if needed 1 each 2    Compression Stockings MISC by Does not apply route Strength 30-40mmHg  Style: Farrow wrap , below the knee  Extremity: bilateral  Donning aid recommended: No 1 each wound(s)/ulcer(s) today, debridement is required to promote healing and evaluate the wound base. Performed by: Eris Song DPM    Consent obtained:  Yes    Time out taken:  Yes    Pain Control: Anesthetic  Anesthetic: 5% Lidocaine Ointment Topical       Debridement: Excisional Debridement    Using curette the wound(s)/ulcer(s) was/were debrided down through and including the removal of epidermis, dermis and subcutaneous tissue.         Devitalized Tissue Debrided:  fibrin and biofilm    Pre Debridement Measurements:  Are located in the [place]  Documentation Flow Sheet    Wound/Ulcer #: 1 and 2    Post Debridement Measurements:  Wound/Ulcer Descriptions are Pre Debridement except measurements:    Wound 10/07/21 Leg Lateral;Left;Distal #1 (Active)   Wound Image   03/03/22 1429   Wound Etiology Venous 01/13/22 1413   Wound Cleansed Cleansed with saline 03/03/22 1429   Dressing/Treatment Alginate with Ag 02/24/22 1518   Wound Length (cm) 5 cm 03/03/22 1429   Wound Width (cm) 2.8 cm 03/03/22 1429   Wound Depth (cm) 0.1 cm 03/03/22 1429   Wound Surface Area (cm^2) 14 cm^2 03/03/22 1429   Change in Wound Size % (l*w) -66.67 03/03/22 1429   Wound Volume (cm^3) 1.4 cm^3 03/03/22 1429   Wound Healing % -67 03/03/22 1429   Post-Procedure Length (cm) 5.1 cm 03/03/22 1446   Post-Procedure Width (cm) 2.9 cm 03/03/22 1446   Post-Procedure Depth (cm) 0.3 cm 03/03/22 1446   Post-Procedure Surface Area (cm^2) 14.79 cm^2 03/03/22 1446   Post-Procedure Volume (cm^3) 4.437 cm^3 03/03/22 1446   Distance Tunneling (cm) 0 cm 02/24/22 1431   Tunneling Position ___ O'Clock 0 02/24/22 1431   Undermining Starts ___ O'Clock 0 02/24/22 1431   Undermining Ends___ O'Clock 0 02/24/22 1431   Undermining Maxium Distance (cm) 0 02/24/22 1431   Wound Assessment Fibrin;Pink/red 03/03/22 1429   Drainage Amount Moderate 03/03/22 1429   Drainage Description Purulent;Yellow;Brown 03/03/22 1429   Odor Moderate 03/03/22 1429   Jacy-wound Assessment Hemosiderin staining (brown yellow) 03/03/22 1429   Margins Defined edges 03/03/22 1429   Wound Thickness Description not for Pressure Injury Full thickness 03/03/22 1429   Number of days: 147       Wound 12/23/21 Leg Proximal;Left;Lower #2 (Active)   Wound Image   03/03/22 1429   Wound Etiology Traumatic 12/23/21 1524   Wound Cleansed Cleansed with saline 03/03/22 1429   Dressing/Treatment Alginate with Ag 02/24/22 1518   Wound Length (cm) 6 cm 03/03/22 1429   Wound Width (cm) 3.3 cm 03/03/22 1429   Wound Depth (cm) 0.1 cm 03/03/22 1429   Wound Surface Area (cm^2) 19.8 cm^2 03/03/22 1429   Change in Wound Size % (l*w) -4.76 03/03/22 1429   Wound Volume (cm^3) 1.98 cm^3 03/03/22 1429   Wound Healing % -5 03/03/22 1429   Post-Procedure Length (cm) 6.1 cm 03/03/22 1446   Post-Procedure Width (cm) 3.4 cm 03/03/22 1446   Post-Procedure Depth (cm) 0.3 cm 03/03/22 1446   Post-Procedure Surface Area (cm^2) 20.74 cm^2 03/03/22 1446   Post-Procedure Volume (cm^3) 6.222 cm^3 03/03/22 1446   Distance Tunneling (cm) 0 cm 02/24/22 1431   Tunneling Position ___ O'Clock 0 02/24/22 1431   Undermining Starts ___ O'Clock 0 02/24/22 1431   Undermining Ends___ O'Clock 0 02/24/22 1431   Undermining Maxium Distance (cm) 0 02/24/22 1431   Wound Assessment Fibrin;Pink/red 03/03/22 1429   Drainage Amount Moderate 03/03/22 1429   Drainage Description Purulent;Yellow;Brown 03/03/22 1429   Odor None 03/03/22 1429   Jacy-wound Assessment Hemosiderin staining (brown yellow) 03/03/22 1429   Margins Defined edges 03/03/22 1429   Wound Thickness Description not for Pressure Injury Full thickness 03/03/22 1429   Number of days: 69     Incision 12/20/19 Foot Left;Plantar (Active)   Number of days: 803       Percent of Wound(s)/Ulcer(s) Debrided: 100%    Total Surface Area Debrided:  35.53 sq cm     Diabetic/Pressure/Non Pressure Ulcers only:  Ulcer: N/A     Estimated Blood Loss:  Minimal    Hemostasis Achieved:  by pressure    Procedural Pain:  5  / 10     Post Procedural Pain:  5 / 10     Response to treatment:  Well tolerated by patient. Plan:       Treatment Note please see attached Discharge Instructions. Debrided ulcers left lateral leg. Patient to use his compression wraps. Zinc and alginate applied. Patient is not a candidate for ablation of his venous reflux due to his weight. He was denied his approval for gastric bypass surgery. I do feel the patient would benefit from gastric bypass. Hold PCN for 2 weeks, Complete doxycycline. Return 1 week. New Medication(s) at this visit:   New Prescriptions    No medications on file       Other orders at this visit:   Orders Placed This Encounter   Procedures    Initiate Outpatient Wound Care Protocol       Smoking Cessation: Counseling given: Not Answered  Comment: States \"vapes a little\"      Written patient dismissal instructions given to patient and signed by patient or POA.                Electronically signed by Priyanka Alford DPM on 3/3/2022 at 2:49 PM

## 2022-03-04 RX ORDER — FUROSEMIDE 20 MG/1
TABLET ORAL
Qty: 60 TABLET | Refills: 0 | Status: SHIPPED | OUTPATIENT
Start: 2022-03-04 | End: 2022-05-02

## 2022-03-10 ENCOUNTER — HOSPITAL ENCOUNTER (OUTPATIENT)
Dept: WOUND CARE | Age: 47
Discharge: HOME | End: 2022-03-10
Payer: COMMERCIAL

## 2022-03-10 VITALS
HEART RATE: 79 BPM | TEMPERATURE: 97.1 F | SYSTOLIC BLOOD PRESSURE: 163 MMHG | DIASTOLIC BLOOD PRESSURE: 71 MMHG | RESPIRATION RATE: 18 BRPM

## 2022-03-10 DIAGNOSIS — I83.222 VARICOSE VEINS OF LEFT LOWER EXTREMITY WITH INFLAMMATION, WITH ULCER OF CALF WITH FAT LAYER EXPOSED (HCC): ICD-10-CM

## 2022-03-10 DIAGNOSIS — I83.212 VARICOSE VEINS OF RIGHT LOWER EXTREMITY WITH INFLAMMATION, WITH ULCER OF CALF LIMITED TO BREAKDOWN OF SKIN (HCC): ICD-10-CM

## 2022-03-10 DIAGNOSIS — L97.211 VARICOSE VEINS OF RIGHT LOWER EXTREMITY WITH INFLAMMATION, WITH ULCER OF CALF LIMITED TO BREAKDOWN OF SKIN (HCC): ICD-10-CM

## 2022-03-10 DIAGNOSIS — L97.222 NON-PRESSURE CHRONIC ULCER OF LEFT CALF WITH FAT LAYER EXPOSED (HCC): Primary | ICD-10-CM

## 2022-03-10 DIAGNOSIS — L97.222 VARICOSE VEINS OF LEFT LOWER EXTREMITY WITH INFLAMMATION, WITH ULCER OF CALF WITH FAT LAYER EXPOSED (HCC): ICD-10-CM

## 2022-03-10 PROCEDURE — 11042 DBRDMT SUBQ TIS 1ST 20SQCM/<: CPT

## 2022-03-10 PROCEDURE — 11045 DBRDMT SUBQ TISS EACH ADDL: CPT

## 2022-03-10 PROCEDURE — 6370000000 HC RX 637 (ALT 250 FOR IP): Performed by: PODIATRIST

## 2022-03-10 RX ORDER — BACITRACIN, NEOMYCIN, POLYMYXIN B 400; 3.5; 5 [USP'U]/G; MG/G; [USP'U]/G
OINTMENT TOPICAL ONCE
Status: CANCELLED | OUTPATIENT
Start: 2022-03-10 | End: 2022-03-10

## 2022-03-10 RX ORDER — DOXYCYCLINE HYCLATE 100 MG
100 TABLET ORAL 2 TIMES DAILY
Qty: 60 TABLET | Refills: 1 | Status: SHIPPED | OUTPATIENT
Start: 2022-03-10 | End: 2022-05-09

## 2022-03-10 RX ORDER — LIDOCAINE HYDROCHLORIDE 40 MG/ML
SOLUTION TOPICAL ONCE
Status: COMPLETED | OUTPATIENT
Start: 2022-03-10 | End: 2022-03-10

## 2022-03-10 RX ORDER — GINSENG 100 MG
CAPSULE ORAL ONCE
Status: CANCELLED | OUTPATIENT
Start: 2022-03-10 | End: 2022-03-10

## 2022-03-10 RX ORDER — LIDOCAINE HYDROCHLORIDE 20 MG/ML
JELLY TOPICAL ONCE
Status: CANCELLED | OUTPATIENT
Start: 2022-03-10 | End: 2022-03-10

## 2022-03-10 RX ORDER — LIDOCAINE 50 MG/G
OINTMENT TOPICAL ONCE
Status: CANCELLED | OUTPATIENT
Start: 2022-03-10 | End: 2022-03-10

## 2022-03-10 RX ORDER — BETAMETHASONE DIPROPIONATE 0.05 %
OINTMENT (GRAM) TOPICAL ONCE
Status: CANCELLED | OUTPATIENT
Start: 2022-03-10 | End: 2022-03-10

## 2022-03-10 RX ORDER — LIDOCAINE 40 MG/G
CREAM TOPICAL ONCE
Status: CANCELLED | OUTPATIENT
Start: 2022-03-10 | End: 2022-03-10

## 2022-03-10 RX ORDER — CLOBETASOL PROPIONATE 0.5 MG/G
OINTMENT TOPICAL ONCE
Status: CANCELLED | OUTPATIENT
Start: 2022-03-10 | End: 2022-03-10

## 2022-03-10 RX ORDER — LIDOCAINE HYDROCHLORIDE 40 MG/ML
SOLUTION TOPICAL ONCE
Status: CANCELLED | OUTPATIENT
Start: 2022-03-10 | End: 2022-03-10

## 2022-03-10 RX ORDER — GENTAMICIN SULFATE 1 MG/G
OINTMENT TOPICAL ONCE
Status: CANCELLED | OUTPATIENT
Start: 2022-03-10 | End: 2022-03-10

## 2022-03-10 RX ORDER — BACITRACIN ZINC AND POLYMYXIN B SULFATE 500; 1000 [USP'U]/G; [USP'U]/G
OINTMENT TOPICAL ONCE
Status: CANCELLED | OUTPATIENT
Start: 2022-03-10 | End: 2022-03-10

## 2022-03-10 RX ADMIN — LIDOCAINE HYDROCHLORIDE: 40 SOLUTION TOPICAL at 14:30

## 2022-03-10 ASSESSMENT — PAIN SCALES - GENERAL: PAINLEVEL_OUTOF10: 4

## 2022-03-10 ASSESSMENT — PAIN DESCRIPTION - PROGRESSION: CLINICAL_PROGRESSION: NOT CHANGED

## 2022-03-10 ASSESSMENT — PAIN DESCRIPTION - LOCATION: LOCATION: BACK

## 2022-03-10 ASSESSMENT — PAIN DESCRIPTION - FREQUENCY: FREQUENCY: CONTINUOUS

## 2022-03-10 ASSESSMENT — PAIN DESCRIPTION - DESCRIPTORS: DESCRIPTORS: SORE

## 2022-03-10 ASSESSMENT — PAIN DESCRIPTION - ONSET: ONSET: ON-GOING

## 2022-03-10 ASSESSMENT — PAIN DESCRIPTION - PAIN TYPE: TYPE: CHRONIC PAIN

## 2022-03-10 ASSESSMENT — PAIN DESCRIPTION - ORIENTATION: ORIENTATION: RIGHT;LEFT

## 2022-03-10 NOTE — PROGRESS NOTES
[facility]  Progress Note and Procedure Note      Zion Ayala  MEDICAL RECORD NUMBER:  [id number]  AGE: 52 y.o. GENDER: male  : 1975  EPISODE DATE:  3/10/2022    Subjective:     Chief Complaint   Patient presents with    Wound Check     left lower leg         HISTORY of PRESENT ILLNESS HPI     Zion Ayala is a 52 y.o. male who presents today for wound/ulcer evaluation. History of Wound Context: Patient has ulcer left lateral leg. He has been wearing his stockings as instructed.     Wound/Ulcer Pain Timing/Severity: intermittent, mild, moderate  Quality of pain: sharp  Severity:  5 / 10   Modifying Factors: Pain worsens with walking  Associated Signs/Symptoms: edema    Ulcer Identification:  Ulcer Type: venous    Contributing Factors: edema, venous stasis and lymphedema    Acute Wound: N/A        PAST MEDICAL HISTORY        Diagnosis Date    Arthritis     Cellulitis     Chronic back pain     Hepatitis age 24    HNP (herniated nucleus pulposus with myelopathy), thoracic     Hypertension     Melanoma ([address]     left foot     Obesity 2011    ANN-MARIE (obstructive sleep apnea)     NO CPAP     Stasis dermatitis        PAST SURGICAL HISTORY    Past Surgical History:   Procedure Laterality Date    ADENOIDECTOMY      FOOT SURGERY Left 2019    WIDE EXCISION OF LEFT FOOT MELANOMA performed by Gregorio Lynn MD at [address] [place] [address] Left 2/10/2020    LEFT FOOT MELANOMA WIDE EXCISION AND FULL THICKNESS SKIN GRAFT,LEFT GROIN SENTINEL LYMPH NODE BIOPSY performed by Gregorio Lynn MD at [facility]  06    [place]    UPPER GASTROINTESTINAL ENDOSCOPY N/A 2021    EGD BIOPSY performed by Nate Orlando DO at [address] [place] [address] EXTRACTION         FAMILY HISTORY    Family History   Problem Relation Age of Onset    Cancer Mother     COPD Paternal Grandfather     Colon Cancer Maternal Grandfather        SOCIAL HISTORY    Social History     Tobacco Use    Smoking status: Former Smoker     Packs/day: 1.50     Types: Cigarettes     Quit date: 2005     Years since quittin.1    Smokeless tobacco: Former User     Types: Chew    Tobacco comment: States \"vapes a little\"   Vaping Use    Vaping Use: Every day    Substances: Nicotine, Flavoring    Devices: Refillable tank   Substance Use Topics    Alcohol use: No     Alcohol/week: 0.0 standard drinks     Comment: rarely -- 1 every few months    Drug use: No       ALLERGIES    No Known Allergies    MEDICATIONS    Current Outpatient Medications on File Prior to Encounter   Medication Sig Dispense Refill    furosemide (LASIX) 20 MG tablet TAKE ONE TO TWO TABLETS BY MOUTH EVERY MORNING AS NEEDED FOR EDEMA 60 tablet 0    oxyCODONE-acetaminophen (PERCOCET) 7.5-325 MG per tablet Take 1 tablet by mouth every 6 hours as needed for Pain (max 4 per day) for up to 28 days. 112 tablet 0    pregabalin (LYRICA) 150 MG capsule Take 1 capsule by mouth 3 times daily for 30 days.  90 capsule 0    penicillin v potassium (VEETID) 500 MG tablet TAKE TWO TABLETS BY MOUTH TWICE A  tablet 3    Multiple Vitamins-Minerals (THERAPEUTIC MULTIVITAMIN-MINERALS) tablet Take 1 tablet by mouth daily      losartan (COZAAR) 100 MG tablet TAKE ONE TABLET BY MOUTH DAILY 90 tablet 2    Compression Stockings MISC by Does not apply route Thigh high 1 each 0    diclofenac (VOLTAREN) 75 MG EC tablet Take 1 tablet by mouth daily as needed for Pain 30 tablet 1    Compression Stockings MISC by Does not apply route Strength 30-40mmHg  Style: pull on below the knee; open or closed toe  Extremity: bilateral  Donning aid recommended: Yes if needed 1 each 2    Compression Stockings MISC by Does not apply route Strength 30-40mmHg  Style: Farrow wrap , below the knee  Extremity: bilateral  Donning aid recommended: No 1 each 0    Elastic Bandages & Supports (MEDICAL COMPRESSION THIGH HIGH) MISC Thigh high compression stockings, 30-40 mm Hg 2 each 1     No current facility-administered medications on file prior to encounter. REVIEW OF SYSTEMS    Pertinent items are noted in HPI. Last W2J if applicable:   Hemoglobin A1C   Date Value Ref Range Status   08/10/2021 4.8 See comment % Final     Comment:     Comment:  Diagnosis of Diabetes: > or = 6.5%  Increased risk of diabetes (Prediabetes): 5.7-6.4%  Glycemic Control: Nonpregnant Adults: <7.0%                    Pregnant: <6.0%           Objective:      BP (!) 163/71   Pulse 79   Temp 97.1 °F (36.2 °C) (Temporal)   Resp 18     Wt Readings from Last 3 Encounters:   03/03/22 (!) 473 lb 5.2 oz (214.7 kg)   02/28/22 (!) 477 lb (216.4 kg)   02/11/22 (!) 476 lb 6.6 oz (216.1 kg)       PHYSICAL EXAM    General Appearance: alert and oriented to person, place and time, well-developed and well-nourished, in no acute distress  Pedal pulses palpable. Protective sensations intact. Ulcer is noted left lateral leg with granular base and fibrotic covering. Ulcer does not probe to bone. There are no signs of infection. There is moderate edema bilateral legs. Ulcer noted superior to wound #1 exhibits granular base, fibrotic covering, drainage with no signs of infection and does not probe to bone. Hemosiderin deposits are noted anterior bilateral legs. Assessment:      1. 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 Varicose veins of right lower extremity with inflammation, with ulcer of calf limited to breakdown of skin ([address]    3. Varicose veins of left lower extremity with inflammation, with ulcer of calf with fat layer exposed ([address]           Procedure Note  Indications:  Based on my examination of this patient's wound(s)/ulcer(s) today, debridement is required to promote healing and evaluate the wound base.     Performed by: Andrea Akers DPM    Consent obtained:  Yes    Time out taken:  Yes    Pain Control: Anesthetic  Anesthetic: 4% Lidocaine Liquid Topical       Debridement: Excisional Debridement    Using curette the wound(s)/ulcer(s) was/were debrided down through and including the removal of epidermis, dermis and subcutaneous tissue.         Devitalized Tissue Debrided:  fibrin and biofilm    Pre Debridement Measurements:  Are located in the [facility]  Documentation Flow Sheet    Wound/Ulcer #: 1 and 2    Post Debridement Measurements:  Wound/Ulcer Descriptions are Pre Debridement except measurements:    Wound 10/07/21 Leg Lateral;Left;Distal #1 (Active)   Wound Image   03/03/22 1429   Wound Etiology Venous 01/13/22 1413   Wound Cleansed Cleansed with saline 03/10/22 1431   Dressing/Treatment Hydrofera blue 03/10/22 1501   Wound Length (cm) 6 cm 03/10/22 1431   Wound Width (cm) 3 cm 03/10/22 1431   Wound Depth (cm) 0.1 cm 03/10/22 1431   Wound Surface Area (cm^2) 18 cm^2 03/10/22 1431   Change in Wound Size % (l*w) -114.29 03/10/22 1431   Wound Volume (cm^3) 1.8 cm^3 03/10/22 1431   Wound Healing % -114 03/10/22 1431   Post-Procedure Length (cm) 6.1 cm 03/10/22 1454   Post-Procedure Width (cm) 3.1 cm 03/10/22 1454   Post-Procedure Depth (cm) 0.3 cm 03/10/22 1454   Post-Procedure Surface Area (cm^2) 18.91 cm^2 03/10/22 1454   Post-Procedure Volume (cm^3) 5.673 cm^3 03/10/22 1454   Distance Tunneling (cm) 0 cm 03/10/22 1431   Tunneling Position ___ O'Clock 0 02/24/22 1431   Undermining Starts ___ O'Clock 0 02/24/22 1431   Undermining Ends___ O'Clock 0 02/24/22 1431   Undermining Maxium Distance (cm) 0 03/10/22 1431   Wound Assessment Slough;Pink/red;Fibrin 03/10/22 1431   Drainage Amount Large 03/10/22 1431   Drainage Description Brown;Purulent 03/10/22 1431   Odor Moderate 03/10/22 1431   Jacy-wound Assessment Hemosiderin staining (brown yellow) 03/03/22 1429   Margins Defined edges 03/03/22 1429   Wound Thickness Description not for Pressure Injury Full thickness 03/03/22 1429   Number of days: 154       Wound 12/23/21 Leg Proximal;Left;Lower #2 (Active)   Wound Image   03/03/22 1429   Wound Etiology Traumatic 12/23/21 1524   Wound Cleansed Cleansed with saline 03/10/22 1431   Dressing/Treatment Hydrofera blue 03/10/22 1501   Wound Length (cm) 6 cm 03/10/22 1431   Wound Width (cm) 3 cm 03/10/22 1431   Wound Depth (cm) 0.1 cm 03/10/22 1431   Wound Surface Area (cm^2) 18 cm^2 03/10/22 1431   Change in Wound Size % (l*w) 4.76 03/10/22 1431   Wound Volume (cm^3) 1.8 cm^3 03/10/22 1431   Wound Healing % 5 03/10/22 1431   Post-Procedure Length (cm) 6.1 cm 03/10/22 1454   Post-Procedure Width (cm) 3.1 cm 03/10/22 1454   Post-Procedure Depth (cm) 0.3 cm 03/10/22 1454   Post-Procedure Surface Area (cm^2) 18.91 cm^2 03/10/22 1454   Post-Procedure Volume (cm^3) 5.673 cm^3 03/10/22 1454   Distance Tunneling (cm) 0 cm 02/24/22 1431   Tunneling Position ___ O'Clock 0 02/24/22 1431   Undermining Starts ___ O'Clock 0 02/24/22 1431   Undermining Ends___ O'Clock 0 02/24/22 1431   Undermining Maxium Distance (cm) 0 02/24/22 1431   Wound Assessment Fibrin;Pink/red;Slough 03/10/22 1431   Drainage Amount Large 03/10/22 1431   Drainage Description Purulent;Brown 03/10/22 1431   Odor Mild 03/10/22 1431   Jacy-wound Assessment Hemosiderin staining (brown yellow); Dry/flaky; Intact 03/10/22 1431   Margins Defined edges 03/03/22 1429   Wound Thickness Description not for Pressure Injury Full thickness 03/03/22 1429   Number of days: 76     Incision 12/20/19 Foot Left;Plantar (Active)   Number of days: 811       Percent of Wound(s)/Ulcer(s) Debrided: 100%    Total Surface Area Debrided:  37.82 sq cm     Diabetic/Pressure/Non Pressure Ulcers only:  Ulcer: N/A     Estimated Blood Loss:  Minimal    Hemostasis Achieved:  by pressure    Procedural Pain:  5  / 10     Post Procedural Pain:  5 / 10     Response to treatment:  Well tolerated by patient. Plan:       Treatment Note please see attached Discharge Instructions.   Debrided ulcers left lateral leg.  Patient to use his compression wraps. Doxycycline has appeared to improve ulcer granular base, we will continue doxycycline and begin using hydrafera blue to reduce inflammation. Patient is not a candidate for ablation of his venous reflux due to his weight. He was denied his approval for gastric bypass surgery. I do feel the patient would benefit from gastric bypass. Hold PCN while on doxycycline. Return 1 week. New Medication(s) at this visit:   New Prescriptions    DOXYCYCLINE HYCLATE (VIBRA-TABS) 100 MG TABLET    Take 1 tablet by mouth 2 times daily       Other orders at this visit:   Orders Placed This Encounter   Procedures    Initiate Outpatient Wound Care Protocol       Smoking Cessation: Counseling given: Not Answered  Comment: States \"vapes a little\"      Written patient dismissal instructions given to patient and signed by patient or POA.                Electronically signed by Josselin Alicea DPM on 3/10/2022 at 3:11 PM

## 2022-03-10 NOTE — PROGRESS NOTES
[address] [facility],[address]Rd Floor:     [address] f: [phone] f: [phone] p: [phone] [email or web address]      Ordering Center: [facility]  3 [facility] [address] [place] [address]  P:  [address]    Patient Information:      Kerline Saavedra  [address] [facility] 3   [phone]   : 1975  AGE: 52 y.o. GENDER: male   TODAYS DATE:  3/10/2022    Insurance:      PRIMARY INSURANCE:  Plan: [facility] [place]  Coverage: BCBS  Effective Date: 2021  [id number] - (BX Traditional)    SECONDARY INSURANCE:  Plan:   Coverage:   Effective Date:   [unfilled]    [unfilled]   [unfilled]     Patient Wound Information:      1.  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 Varicose veins of right lower extremity with inflammation, with ulcer of calf limited to breakdown of skin ([address]    3. Varicose veins of left lower extremity with inflammation, with ulcer of calf with fat layer exposed ([address]        WOUNDS REQUIRING DRESSING SUPPLIES:     Wound 10/07/21 Leg Lateral;Left;Distal #1 (Active)   Wound Image   22 1429   Wound Etiology Venous 22 1413   Wound Cleansed Cleansed with saline 03/10/22 1431   Dressing/Treatment Alginate with Ag 22 1455   Wound Length (cm) 6 cm 03/10/22 1431   Wound Width (cm) 3 cm 03/10/22 1431   Wound Depth (cm) 0.1 cm 03/10/22 1431   Wound Surface Area (cm^2) 18 cm^2 03/10/22 1431   Change in Wound Size % (l*w) -114.29 03/10/22 1431   Wound Volume (cm^3) 1.8 cm^3 03/10/22 1431   Wound Healing % -114 03/10/22 1431   Post-Procedure Length (cm) 5.1 cm 22 1446   Post-Procedure Width (cm) 2.9 cm 22 144   Post-Procedure Depth (cm) 0.3 cm 22 144   Post-Procedure Surface Area (cm^2) 14.79 cm^2 22 1446   Post-Procedure Volume (cm^3) 4.437 cm^3 22 1446   Distance Tunneling (cm) 0 cm 03/10/22 1431   Tunneling Position ___ O'Clock 0 02/24/22 1431   Undermining Starts ___ O'Clock 0 02/24/22 1431   Undermining Ends___ O'Clock 0 02/24/22 1431   Undermining Maxium Distance (cm) 0 03/10/22 1431   Wound Assessment Slough;Pink/red;Fibrin 03/10/22 1431   Drainage Amount Large 03/10/22 1431   Drainage Description Brown;Purulent 03/10/22 1431   Odor Moderate 03/10/22 1431   Jacy-wound Assessment Hemosiderin staining (brown yellow) 03/03/22 1429   Margins Defined edges 03/03/22 1429   Wound Thickness Description not for Pressure Injury Full thickness 03/03/22 1429   Number of days: 154       Wound 12/23/21 Leg Proximal;Left;Lower #2 (Active)   Wound Image   03/03/22 1429   Wound Etiology Traumatic 12/23/21 1524   Wound Cleansed Cleansed with saline 03/10/22 1431   Dressing/Treatment Alginate with Ag 03/03/22 1455   Wound Length (cm) 6 cm 03/10/22 1431   Wound Width (cm) 3 cm 03/10/22 1431   Wound Depth (cm) 0.1 cm 03/10/22 1431   Wound Surface Area (cm^2) 18 cm^2 03/10/22 1431   Change in Wound Size % (l*w) 4.76 03/10/22 1431   Wound Volume (cm^3) 1.8 cm^3 03/10/22 1431   Wound Healing % 5 03/10/22 1431   Post-Procedure Length (cm) 6.1 cm 03/03/22 1446   Post-Procedure Width (cm) 3.4 cm 03/03/22 1446   Post-Procedure Depth (cm) 0.3 cm 03/03/22 1446   Post-Procedure Surface Area (cm^2) 20.74 cm^2 03/03/22 1446   Post-Procedure Volume (cm^3) 6.222 cm^3 03/03/22 1446   Distance Tunneling (cm) 0 cm 02/24/22 1431   Tunneling Position ___ O'Clock 0 02/24/22 1431   Undermining Starts ___ O'Clock 0 02/24/22 1431   Undermining Ends___ O'Clock 0 02/24/22 1431   Undermining Maxium Distance (cm) 0 02/24/22 1431   Wound Assessment Fibrin;Pink/red;Slough 03/10/22 1431   Drainage Amount Large 03/10/22 1431   Drainage Description Purulent;Brown 03/10/22 1431   Odor Mild 03/10/22 1431   Jacy-wound Assessment Hemosiderin staining (brown yellow); Dry/flaky; Intact 03/10/22 1431   Margins Defined edges 03/03/22 1429   Wound Thickness Description not for Pressure Injury Full thickness 03/03/22 1429   Number of days: 76     Incision 12/20/19 Foot Left;Plantar (Active)   Number of days: 810       Supplies Requested :      WOUND #: 1   PRIMARY DRESSING:  hydrofera blue   ABD, super absorbant pad, kerlex     FREQUENCY OF DRESSING CHANGES:  Three times per week       WOUND #: 2   PRIMARY DRESSING:  hydrofera blue   Cover and Secure with: ABD pad  Bulky roll gauze  Coban     FREQUENCY OF DRESSING CHANGES:  Three times per week         ADDITIONAL ITEMS:  [] Gloves Small  [] Gloves Medium [] Gloves Large [] Gloves XLarge  [] Tape 1\" [x] Tape 2\" [] Tape 3\"  [] Medipore Tape  [x] Saline  [] Skin Prep   [] Adhesive Remover   [] Cotton Tip Applicators   [] Other:    Patient Wound(s) Debrided: [x] Yes   [] No    Debridement Date: 3/10/2022    Debribement Type: Excisional/Sharp    Patient currently being seen by Home Health: [] Yes   [x] No    Duration for needed supplies:  []15  [x]30  []60  []90 Days    Provider Information:      PROVIDER'S NAME/NPI: Marla Randall [facility]  NPI: [id number]  Electronically signed by Evelin Cardoza DPM on 3/10/2022 at 4:30 PM     I give permission to coordinate the care for this patient   assisting with verbal orders: Stephen Story RN 3/10/2022

## 2022-03-17 ENCOUNTER — HOSPITAL ENCOUNTER (OUTPATIENT)
Dept: WOUND CARE | Age: 47
Discharge: HOME | End: 2022-03-17
Payer: COMMERCIAL

## 2022-03-17 VITALS
RESPIRATION RATE: 18 BRPM | HEART RATE: 69 BPM | DIASTOLIC BLOOD PRESSURE: 66 MMHG | TEMPERATURE: 97.1 F | SYSTOLIC BLOOD PRESSURE: 99 MMHG

## 2022-03-17 DIAGNOSIS — L97.222 NON-PRESSURE CHRONIC ULCER OF LEFT CALF WITH FAT LAYER EXPOSED (HCC): Primary | ICD-10-CM

## 2022-03-17 DIAGNOSIS — L97.211 VARICOSE VEINS OF RIGHT LOWER EXTREMITY WITH INFLAMMATION, WITH ULCER OF CALF LIMITED TO BREAKDOWN OF SKIN (HCC): ICD-10-CM

## 2022-03-17 DIAGNOSIS — L97.222 VARICOSE VEINS OF LEFT LOWER EXTREMITY WITH INFLAMMATION, WITH ULCER OF CALF WITH FAT LAYER EXPOSED (HCC): ICD-10-CM

## 2022-03-17 DIAGNOSIS — I83.212 VARICOSE VEINS OF RIGHT LOWER EXTREMITY WITH INFLAMMATION, WITH ULCER OF CALF LIMITED TO BREAKDOWN OF SKIN (HCC): ICD-10-CM

## 2022-03-17 DIAGNOSIS — I83.222 VARICOSE VEINS OF LEFT LOWER EXTREMITY WITH INFLAMMATION, WITH ULCER OF CALF WITH FAT LAYER EXPOSED (HCC): ICD-10-CM

## 2022-03-17 PROCEDURE — 11042 DBRDMT SUBQ TIS 1ST 20SQCM/<: CPT

## 2022-03-17 PROCEDURE — 11045 DBRDMT SUBQ TISS EACH ADDL: CPT

## 2022-03-17 PROCEDURE — 6370000000 HC RX 637 (ALT 250 FOR IP): Performed by: PODIATRIST

## 2022-03-17 RX ORDER — LIDOCAINE 40 MG/G
CREAM TOPICAL ONCE
Status: CANCELLED | OUTPATIENT
Start: 2022-03-17 | End: 2022-03-17

## 2022-03-17 RX ORDER — BACITRACIN, NEOMYCIN, POLYMYXIN B 400; 3.5; 5 [USP'U]/G; MG/G; [USP'U]/G
OINTMENT TOPICAL ONCE
Status: CANCELLED | OUTPATIENT
Start: 2022-03-17 | End: 2022-03-17

## 2022-03-17 RX ORDER — LIDOCAINE HYDROCHLORIDE 20 MG/ML
JELLY TOPICAL ONCE
Status: CANCELLED | OUTPATIENT
Start: 2022-03-17 | End: 2022-03-17

## 2022-03-17 RX ORDER — BACITRACIN ZINC AND POLYMYXIN B SULFATE 500; 1000 [USP'U]/G; [USP'U]/G
OINTMENT TOPICAL ONCE
Status: CANCELLED | OUTPATIENT
Start: 2022-03-17 | End: 2022-03-17

## 2022-03-17 RX ORDER — LIDOCAINE 50 MG/G
OINTMENT TOPICAL ONCE
Status: COMPLETED | OUTPATIENT
Start: 2022-03-17 | End: 2022-03-17

## 2022-03-17 RX ORDER — GINSENG 100 MG
CAPSULE ORAL ONCE
Status: CANCELLED | OUTPATIENT
Start: 2022-03-17 | End: 2022-03-17

## 2022-03-17 RX ORDER — LIDOCAINE HYDROCHLORIDE 40 MG/ML
SOLUTION TOPICAL ONCE
Status: CANCELLED | OUTPATIENT
Start: 2022-03-17 | End: 2022-03-17

## 2022-03-17 RX ORDER — CLOBETASOL PROPIONATE 0.5 MG/G
OINTMENT TOPICAL ONCE
Status: CANCELLED | OUTPATIENT
Start: 2022-03-17 | End: 2022-03-17

## 2022-03-17 RX ORDER — BETAMETHASONE DIPROPIONATE 0.05 %
OINTMENT (GRAM) TOPICAL ONCE
Status: CANCELLED | OUTPATIENT
Start: 2022-03-17 | End: 2022-03-17

## 2022-03-17 RX ORDER — GENTAMICIN SULFATE 1 MG/G
OINTMENT TOPICAL ONCE
Status: CANCELLED | OUTPATIENT
Start: 2022-03-17 | End: 2022-03-17

## 2022-03-17 RX ORDER — LIDOCAINE 50 MG/G
OINTMENT TOPICAL ONCE
Status: CANCELLED | OUTPATIENT
Start: 2022-03-17 | End: 2022-03-17

## 2022-03-17 RX ADMIN — LIDOCAINE: 50 OINTMENT TOPICAL at 14:39

## 2022-03-17 ASSESSMENT — PAIN DESCRIPTION - ORIENTATION: ORIENTATION: LEFT

## 2022-03-17 ASSESSMENT — PAIN DESCRIPTION - LOCATION: LOCATION: LEG

## 2022-03-17 ASSESSMENT — PAIN DESCRIPTION - DESCRIPTORS: DESCRIPTORS: BURNING;SHARP

## 2022-03-17 ASSESSMENT — PAIN SCALES - GENERAL: PAINLEVEL_OUTOF10: 2

## 2022-03-17 ASSESSMENT — PAIN DESCRIPTION - PAIN TYPE: TYPE: ACUTE PAIN

## 2022-03-17 NOTE — PROGRESS NOTES
[facility] [place]      Kaitlynn Mcmahan  MEDICAL RECORD NUMBER:  [id number]  AGE: 52 y.o. GENDER: male  : 1975  EPISODE DATE:  3/17/2022    Subjective:     Chief Complaint   Patient presents with    Wound Check     left lower leg         HISTORY of PRESENT ILLNESS HPI     Kaitlynn Mcmahan is a 52 y.o. male who presents today for wound/ulcer evaluation. History of Wound Context: Patient has ulcer left lateral leg. He has been wearing his stockings as instructed. Patient relates his wound has been extremely painful when changing his dressing during the week.   Wound/Ulcer Pain Timing/Severity: intermittent, mild, moderate  Quality of pain: sharp  Severity:  5 / 10   Modifying Factors: Pain worsens with walking  Associated Signs/Symptoms: edema    Ulcer Identification:  Ulcer Type: venous    Contributing Factors: edema, venous stasis and lymphedema    Acute Wound: N/A        PAST MEDICAL HISTORY        Diagnosis Date    Arthritis     Cellulitis     Chronic back pain     Hepatitis age 24    HNP (herniated nucleus pulposus with myelopathy), thoracic     Hypertension     Melanoma ([facility] [address]     left foot     Obesity 2011    ANN-MARIE (obstructive sleep apnea)     NO CPAP     Stasis dermatitis        PAST SURGICAL HISTORY    Past Surgical History:   Procedure Laterality Date    ADENOIDECTOMY      FOOT SURGERY Left 2019    WIDE EXCISION OF LEFT FOOT MELANOMA performed by Dillon Matta MD at [address] [place] [address] Left 2/10/2020    LEFT FOOT MELANOMA WIDE EXCISION AND FULL THICKNESS SKIN GRAFT,LEFT GROIN SENTINEL LYMPH NODE BIOPSY performed by Dillon Matta MD at [address]  06    [place]    UPPER GASTROINTESTINAL ENDOSCOPY N/A 2021    EGD BIOPSY performed by Austyn John DO at [address] [place] [address] EXTRACTION         FAMILY HISTORY    Family History   Problem Relation Age of Onset    Cancer Mother     COPD Paternal Grandfather     Colon Cancer Maternal Grandfather        SOCIAL HISTORY    Social History     Tobacco Use    Smoking status: Former Smoker     Packs/day: 1.50     Types: Cigarettes     Quit date: 2005     Years since quittin.2    Smokeless tobacco: Former User     Types: Chew    Tobacco comment: States \"vapes a little\"   Vaping Use    Vaping Use: Every day    Substances: Nicotine, Flavoring    Devices: Refillable tank   Substance Use Topics    Alcohol use: No     Alcohol/week: 0.0 standard drinks     Comment: rarely -- 1 every few months    Drug use: No       ALLERGIES    No Known Allergies    MEDICATIONS    Current Outpatient Medications on File Prior to Encounter   Medication Sig Dispense Refill    doxycycline hyclate (VIBRA-TABS) 100 MG tablet Take 1 tablet by mouth 2 times daily 60 tablet 1    furosemide (LASIX) 20 MG tablet TAKE ONE TO TWO TABLETS BY MOUTH EVERY MORNING AS NEEDED FOR EDEMA 60 tablet 0    oxyCODONE-acetaminophen (PERCOCET) 7.5-325 MG per tablet Take 1 tablet by mouth every 6 hours as needed for Pain (max 4 per day) for up to 28 days. 112 tablet 0    pregabalin (LYRICA) 150 MG capsule Take 1 capsule by mouth 3 times daily for 30 days.  90 capsule 0    diclofenac (VOLTAREN) 75 MG EC tablet Take 1 tablet by mouth daily as needed for Pain 30 tablet 1    penicillin v potassium (VEETID) 500 MG tablet TAKE TWO TABLETS BY MOUTH TWICE A  tablet 3    Compression Stockings MISC by Does not apply route Strength 30-40mmHg  Style: pull on below the knee; open or closed toe  Extremity: bilateral  Donning aid recommended: Yes if needed 1 each 2    Compression Stockings MISC by Does not apply route Strength 30-40mmHg  Style: Farrow wrap , below the knee  Extremity: bilateral  Donning aid recommended: No 1 each 0    Multiple Vitamins-Minerals (THERAPEUTIC MULTIVITAMIN-MINERALS) tablet Take 1 tablet by mouth daily      losartan (COZAAR) 100 MG tablet TAKE ONE TABLET BY MOUTH DAILY 90 tablet 2    Compression Stockings MISC by Does not apply route Thigh high 1 each 0    Elastic Bandages & Supports (MEDICAL COMPRESSION THIGH HIGH) MISC Thigh high compression stockings, 30-40 mm Hg 2 each 1     No current facility-administered medications on file prior to encounter. REVIEW OF SYSTEMS    Pertinent items are noted in HPI. Last O0J if applicable:   Hemoglobin A1C   Date Value Ref Range Status   08/10/2021 4.8 See comment % Final     Comment:     Comment:  Diagnosis of Diabetes: > or = 6.5%  Increased risk of diabetes (Prediabetes): 5.7-6.4%  Glycemic Control: Nonpregnant Adults: <7.0%                    Pregnant: <6.0%           Objective:      BP 99/66   Pulse 69   Temp 97.1 °F (36.2 °C) (Temporal)   Resp 18     Wt Readings from Last 3 Encounters:   03/03/22 (!) 473 lb 5.2 oz (214.7 kg)   02/28/22 (!) 477 lb (216.4 kg)   02/11/22 (!) 476 lb 6.6 oz (216.1 kg)       PHYSICAL EXAM    General Appearance: alert and oriented to person, place and time, well-developed and well-nourished, in no acute distress  Pedal pulses palpable. Protective sensations intact. Ulcer is noted left lateral leg with granular base and fibrotic covering. Ulcer does not probe to bone. There are no signs of infection. There is moderate edema bilateral legs. Ulcer noted superior to wound #1 exhibits granular base, fibrotic covering, drainage with no signs of infection and does not probe to bone. Hemosiderin deposits are noted anterior bilateral legs. Assessment:      1.  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 Varicose veins of right lower extremity with inflammation, with ulcer of calf limited to breakdown of skin ([address]    3. Varicose veins of left lower extremity with inflammation, with ulcer of calf with fat layer exposed ([address]           Procedure Note  Indications:  Based on my examination of this patient's wound(s)/ulcer(s) today, debridement is required to promote healing and evaluate the wound base. Performed by: Mariza Valdez DPM    Consent obtained:  Yes    Time out taken:  Yes    Pain Control: Anesthetic  Anesthetic: 5% Lidocaine Ointment Topical       Debridement: Excisional Debridement    Using curette the wound(s)/ulcer(s) was/were debrided down through and including the removal of epidermis, dermis and subcutaneous tissue. Devitalized Tissue Debrided:  fibrin and biofilm    Pre Debridement Measurements:  Are located in the [facility]  Documentation Flow Sheet    Wound/Ulcer #: 1 and 2    Post Debridement Measurements:  Wound/Ulcer Descriptions are Pre Debridement except measurements:    Wound 10/07/21 Leg Lateral;Left;Distal #1 (Active)   Wound Image   03/03/22 1429   Wound Etiology Venous 01/13/22 1413   Wound Cleansed Cleansed with saline 03/17/22 1432   Dressing/Treatment Hydrofera blue 03/10/22 1501   Wound Length (cm) 5 cm 03/17/22 1432   Wound Width (cm) 2.9 cm 03/17/22 1432   Wound Depth (cm) 0.1 cm 03/17/22 1432   Wound Surface Area (cm^2) 14.5 cm^2 03/17/22 1432   Change in Wound Size % (l*w) -72.62 03/17/22 1432   Wound Volume (cm^3) 1.45 cm^3 03/17/22 1432   Wound Healing % -73 03/17/22 1432   Post-Procedure Length (cm) 6.1 cm 03/10/22 1454   Post-Procedure Width (cm) 3.1 cm 03/10/22 1454   Post-Procedure Depth (cm) 0.3 cm 03/10/22 1454   Post-Procedure Surface Area (cm^2) 18.91 cm^2 03/10/22 1454   Post-Procedure Volume (cm^3) 5.673 cm^3 03/10/22 1454   Distance Tunneling (cm) 0 cm 03/17/22 1432   Tunneling Position ___ O'Clock 0 03/17/22 1432   Undermining Starts ___ O'Clock 0 03/17/22 1432   Undermining Ends___ O'Clock 0 03/17/22 1432   Undermining Maxium Distance (cm) 0 03/17/22 1432   Wound Assessment Pink/red 03/17/22 1432   Drainage Amount Moderate 03/17/22 1432   Drainage Description Brown 03/17/22 1432   Odor None 03/17/22 1432   Jacy-wound Assessment Intact; Hemosiderin staining (brown yellow) 03/17/22 1432   Margins Defined edges 03/17/22 1432   Wound Thickness Description not for Pressure Injury Full thickness 03/17/22 1432   Number of days: 161       Wound 12/23/21 Leg Proximal;Left;Lower #2 (Active)   Wound Image   03/03/22 1429   Wound Etiology Traumatic 12/23/21 1524   Wound Cleansed Cleansed with saline 03/17/22 1432   Dressing/Treatment Hydrofera blue 03/10/22 1501   Wound Length (cm) 6 cm 03/17/22 1432   Wound Width (cm) 2.9 cm 03/17/22 1432   Wound Depth (cm) 0.1 cm 03/17/22 1432   Wound Surface Area (cm^2) 17.4 cm^2 03/17/22 1432   Change in Wound Size % (l*w) 7.94 03/17/22 1432   Wound Volume (cm^3) 1.74 cm^3 03/17/22 1432   Wound Healing % 8 03/17/22 1432   Post-Procedure Length (cm) 6.1 cm 03/10/22 1454   Post-Procedure Width (cm) 3.1 cm 03/10/22 1454   Post-Procedure Depth (cm) 0.3 cm 03/10/22 1454   Post-Procedure Surface Area (cm^2) 18.91 cm^2 03/10/22 1454   Post-Procedure Volume (cm^3) 5.673 cm^3 03/10/22 1454   Distance Tunneling (cm) 0 cm 03/17/22 1432   Tunneling Position ___ O'Clock 0 03/17/22 1432   Undermining Starts ___ O'Clock 0 03/17/22 1432   Undermining Ends___ O'Clock 0 03/17/22 1432   Undermining Maxium Distance (cm) 0 03/17/22 1432   Wound Assessment Fibrin;Pink/red 03/17/22 1432   Drainage Amount Moderate 03/17/22 1432   Drainage Description Brown 03/17/22 1432   Odor None 03/17/22 1432   Jacy-wound Assessment Intact; Hemosiderin staining (brown yellow) 03/17/22 1432   Margins Defined edges 03/17/22 1432   Wound Thickness Description not for Pressure Injury Full thickness 03/17/22 1432   Number of days: 83     Incision 12/20/19 Foot Left;Plantar (Active)   Number of days: 817       Percent of Wound(s)/Ulcer(s) Debrided: 100%    Total Surface Area Debrided:  37.82 sq cm     Diabetic/Pressure/Non Pressure Ulcers only:  Ulcer: N/A     Estimated Blood Loss:  Minimal    Hemostasis Achieved:  by pressure    Procedural Pain:  5  / 10     Post Procedural Pain:  5 / 10     Response to treatment:  Well tolerated by patient. Plan:       Treatment Note please see attached Discharge Instructions. Debrided ulcers left lateral leg. Patient to use his compression wraps. Doxycycline has appeared to improve ulcer granular base, we will continue doxycycline and continue using hydrafera blue to reduce inflammation. Patient is not a candidate for ablation of his venous reflux due to his weight. He was denied his approval for gastric bypass surgery. I do feel the patient would benefit from gastric bypass. Return 1 week. New Medication(s) at this visit:   New Prescriptions    No medications on file       Other orders at this visit:   Orders Placed This Encounter   Procedures    Initiate Outpatient Wound Care Protocol       Smoking Cessation: Counseling given: Not Answered  Comment: States \"vapes a little\"      Written patient dismissal instructions given to patient and signed by patient or POA.                Electronically signed by Jese Good DPM on 3/17/2022 at 3:00 PM

## 2022-03-24 ENCOUNTER — HOSPITAL ENCOUNTER (OUTPATIENT)
Dept: WOUND CARE | Age: 47
Discharge: HOME | End: 2022-03-24
Payer: COMMERCIAL

## 2022-03-24 VITALS
SYSTOLIC BLOOD PRESSURE: 158 MMHG | TEMPERATURE: 97.7 F | DIASTOLIC BLOOD PRESSURE: 96 MMHG | HEART RATE: 80 BPM | RESPIRATION RATE: 20 BRPM

## 2022-03-24 DIAGNOSIS — L97.222 NON-PRESSURE CHRONIC ULCER OF LEFT CALF WITH FAT LAYER EXPOSED (HCC): Primary | ICD-10-CM

## 2022-03-24 DIAGNOSIS — L97.222 VARICOSE VEINS OF LEFT LOWER EXTREMITY WITH INFLAMMATION, WITH ULCER OF CALF WITH FAT LAYER EXPOSED (HCC): ICD-10-CM

## 2022-03-24 DIAGNOSIS — I83.222 VARICOSE VEINS OF LEFT LOWER EXTREMITY WITH INFLAMMATION, WITH ULCER OF CALF WITH FAT LAYER EXPOSED (HCC): ICD-10-CM

## 2022-03-24 DIAGNOSIS — I83.212 VARICOSE VEINS OF RIGHT LOWER EXTREMITY WITH INFLAMMATION, WITH ULCER OF CALF LIMITED TO BREAKDOWN OF SKIN (HCC): ICD-10-CM

## 2022-03-24 DIAGNOSIS — L97.211 VARICOSE VEINS OF RIGHT LOWER EXTREMITY WITH INFLAMMATION, WITH ULCER OF CALF LIMITED TO BREAKDOWN OF SKIN (HCC): ICD-10-CM

## 2022-03-24 PROCEDURE — 6370000000 HC RX 637 (ALT 250 FOR IP): Performed by: PODIATRIST

## 2022-03-24 PROCEDURE — 11045 DBRDMT SUBQ TISS EACH ADDL: CPT

## 2022-03-24 PROCEDURE — 11042 DBRDMT SUBQ TIS 1ST 20SQCM/<: CPT

## 2022-03-24 RX ORDER — LIDOCAINE HYDROCHLORIDE 20 MG/ML
JELLY TOPICAL ONCE
Status: CANCELLED | OUTPATIENT
Start: 2022-03-24 | End: 2022-03-24

## 2022-03-24 RX ORDER — LIDOCAINE 40 MG/G
CREAM TOPICAL ONCE
Status: CANCELLED | OUTPATIENT
Start: 2022-03-24 | End: 2022-03-24

## 2022-03-24 RX ORDER — GENTAMICIN SULFATE 1 MG/G
OINTMENT TOPICAL ONCE
Status: CANCELLED | OUTPATIENT
Start: 2022-03-24 | End: 2022-03-24

## 2022-03-24 RX ORDER — BETAMETHASONE DIPROPIONATE 0.05 %
OINTMENT (GRAM) TOPICAL ONCE
Status: CANCELLED | OUTPATIENT
Start: 2022-03-24 | End: 2022-03-24

## 2022-03-24 RX ORDER — BACITRACIN ZINC AND POLYMYXIN B SULFATE 500; 1000 [USP'U]/G; [USP'U]/G
OINTMENT TOPICAL ONCE
Status: CANCELLED | OUTPATIENT
Start: 2022-03-24 | End: 2022-03-24

## 2022-03-24 RX ORDER — CLOBETASOL PROPIONATE 0.5 MG/G
OINTMENT TOPICAL ONCE
Status: CANCELLED | OUTPATIENT
Start: 2022-03-24 | End: 2022-03-24

## 2022-03-24 RX ORDER — LIDOCAINE HYDROCHLORIDE 40 MG/ML
SOLUTION TOPICAL ONCE
Status: COMPLETED | OUTPATIENT
Start: 2022-03-24 | End: 2022-03-24

## 2022-03-24 RX ORDER — LIDOCAINE HYDROCHLORIDE 40 MG/ML
SOLUTION TOPICAL ONCE
Status: CANCELLED | OUTPATIENT
Start: 2022-03-24 | End: 2022-03-24

## 2022-03-24 RX ORDER — GINSENG 100 MG
CAPSULE ORAL ONCE
Status: CANCELLED | OUTPATIENT
Start: 2022-03-24 | End: 2022-03-24

## 2022-03-24 RX ORDER — LIDOCAINE 50 MG/G
OINTMENT TOPICAL ONCE
Status: CANCELLED | OUTPATIENT
Start: 2022-03-24 | End: 2022-03-24

## 2022-03-24 RX ORDER — BACITRACIN, NEOMYCIN, POLYMYXIN B 400; 3.5; 5 [USP'U]/G; MG/G; [USP'U]/G
OINTMENT TOPICAL ONCE
Status: CANCELLED | OUTPATIENT
Start: 2022-03-24 | End: 2022-03-24

## 2022-03-24 RX ADMIN — LIDOCAINE HYDROCHLORIDE: 40 SOLUTION TOPICAL at 14:34

## 2022-03-24 ASSESSMENT — PAIN DESCRIPTION - LOCATION: LOCATION: KNEE;BACK

## 2022-03-24 ASSESSMENT — PAIN DESCRIPTION - PAIN TYPE: TYPE: CHRONIC PAIN

## 2022-03-24 ASSESSMENT — PAIN SCALES - GENERAL: PAINLEVEL_OUTOF10: 3

## 2022-03-24 NOTE — PROGRESS NOTES
[facility]  Progress Note and Procedure Note      Katerina Ag  MEDICAL RECORD NUMBER:  [id number]  AGE: 52 y.o. GENDER: male  : 1975  EPISODE DATE:  3/24/2022    Subjective:     Chief Complaint   Patient presents with    Wound Check     left lower leg         HISTORY of PRESENT ILLNESS HPI     Katerina Ag is a 52 y.o. male who presents today for wound/ulcer evaluation. History of Wound Context: Patient has ulcer left lateral leg. He has been wearing his stockings as instructed. Patient relates he has taken his doxycycline as instructed.   Wound/Ulcer Pain Timing/Severity: intermittent, mild, moderate  Quality of pain: sharp  Severity:  5 / 10   Modifying Factors: Pain worsens with walking  Associated Signs/Symptoms: edema    Ulcer Identification:  Ulcer Type: venous    Contributing Factors: edema, venous stasis and lymphedema    Acute Wound: N/A        PAST MEDICAL HISTORY        Diagnosis Date    Arthritis     Cellulitis     Chronic back pain     Hepatitis age 24    HNP (herniated nucleus pulposus with myelopathy), thoracic     Hypertension     Melanoma ([address]     left foot     Obesity 2011    ANN-MARIE (obstructive sleep apnea)     NO CPAP     Stasis dermatitis        PAST SURGICAL HISTORY    Past Surgical History:   Procedure Laterality Date    ADENOIDECTOMY      FOOT SURGERY Left 2019    WIDE EXCISION OF LEFT FOOT MELANOMA performed by Marta Burkett MD at [address] [place] [address] Left 2/10/2020    LEFT FOOT MELANOMA WIDE EXCISION AND FULL THICKNESS SKIN GRAFT,LEFT GROIN SENTINEL LYMPH NODE BIOPSY performed by Marta Burkett MD at [facility]  [address]    UPPER GASTROINTESTINAL ENDOSCOPY N/A 2021    EGD BIOPSY performed by Rosibel Mcdaniels DO at [facility] [address] EXTRACTION         FAMILY HISTORY    Family History   Problem Relation Age of Onset    Cancer Mother     COPD Paternal Grandfather     Colon Cancer Maternal Grandfather        SOCIAL HISTORY    Social History     Tobacco Use    Smoking status: Former Smoker     Packs/day: 1.50     Types: Cigarettes     Quit date: 2005     Years since quittin.2    Smokeless tobacco: Former User     Types: Chew    Tobacco comment: States \"vapes a little\"   Vaping Use    Vaping Use: Every day    Substances: Nicotine, Flavoring    Devices: Refillable tank   Substance Use Topics    Alcohol use: No     Alcohol/week: 0.0 standard drinks     Comment: rarely -- 1 every few months    Drug use: No       ALLERGIES    No Known Allergies    MEDICATIONS    Current Outpatient Medications on File Prior to Encounter   Medication Sig Dispense Refill    doxycycline hyclate (VIBRA-TABS) 100 MG tablet Take 1 tablet by mouth 2 times daily 60 tablet 1    furosemide (LASIX) 20 MG tablet TAKE ONE TO TWO TABLETS BY MOUTH EVERY MORNING AS NEEDED FOR EDEMA 60 tablet 0    oxyCODONE-acetaminophen (PERCOCET) 7.5-325 MG per tablet Take 1 tablet by mouth every 6 hours as needed for Pain (max 4 per day) for up to 28 days. 112 tablet 0    pregabalin (LYRICA) 150 MG capsule Take 1 capsule by mouth 3 times daily for 30 days.  90 capsule 0    diclofenac (VOLTAREN) 75 MG EC tablet Take 1 tablet by mouth daily as needed for Pain 30 tablet 1    penicillin v potassium (VEETID) 500 MG tablet TAKE TWO TABLETS BY MOUTH TWICE A  tablet 3    Compression Stockings MISC by Does not apply route Strength 30-40mmHg  Style: pull on below the knee; open or closed toe  Extremity: bilateral  Donning aid recommended: Yes if needed 1 each 2    Compression Stockings MISC by Does not apply route Strength 30-40mmHg  Style: Farrow wrap , below the knee  Extremity: bilateral  Donning aid recommended: No 1 each 0    Multiple Vitamins-Minerals (THERAPEUTIC MULTIVITAMIN-MINERALS) tablet Take 1 tablet by mouth daily      losartan (COZAAR) 100 MG tablet TAKE ONE TABLET BY MOUTH DAILY 90 tablet 2    Compression Stockings MISC by Does not apply route Thigh high 1 each 0    Elastic Bandages & Supports (MEDICAL COMPRESSION THIGH HIGH) MISC Thigh high compression stockings, 30-40 mm Hg 2 each 1     No current facility-administered medications on file prior to encounter. REVIEW OF SYSTEMS    Pertinent items are noted in HPI. Last P8Y if applicable:   Hemoglobin A1C   Date Value Ref Range Status   08/10/2021 4.8 See comment % Final     Comment:     Comment:  Diagnosis of Diabetes: > or = 6.5%  Increased risk of diabetes (Prediabetes): 5.7-6.4%  Glycemic Control: Nonpregnant Adults: <7.0%                    Pregnant: <6.0%           Objective:      BP (!) 158/96   Pulse 80   Temp 97.7 °F (36.5 °C) (Temporal)   Resp 20     Wt Readings from Last 3 Encounters:   03/03/22 (!) 473 lb 5.2 oz (214.7 kg)   02/28/22 (!) 477 lb (216.4 kg)   02/11/22 (!) 476 lb 6.6 oz (216.1 kg)       PHYSICAL EXAM    General Appearance: alert and oriented to person, place and time, well-developed and well-nourished, in no acute distress  Pedal pulses palpable. Protective sensations intact. Ulcer is noted left lateral leg with granular base and fibrotic covering. Ulcer does not probe to bone. There are no signs of infection. There is moderate edema bilateral legs. Ulcer noted superior to wound #1 exhibits granular base, fibrotic covering, drainage with no signs of infection and does not probe to bone. Hemosiderin deposits are noted anterior bilateral legs. Assessment:      1.  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 Varicose veins of right lower extremity with inflammation, with ulcer of calf limited to breakdown of skin ([address]    3. Varicose veins of left lower extremity with inflammation, with ulcer of calf with fat layer exposed ([address]           Procedure Note  Indications:  Based on my examination of this patient's wound(s)/ulcer(s) today, debridement is required to promote healing and evaluate the wound base. Performed by: Randal Gill DPM    Consent obtained:  Yes    Time out taken:  Yes    Pain Control: Anesthetic  Anesthetic: 4% Lidocaine Liquid Topical       Debridement: Excisional Debridement    Using curette the wound(s)/ulcer(s) was/were debrided down through and including the removal of epidermis, dermis and subcutaneous tissue.         Devitalized Tissue Debrided:  fibrin and biofilm    Pre Debridement Measurements:  Are located in the [place]  Documentation Flow Sheet    Wound/Ulcer #: 1 and 2    Post Debridement Measurements:  Wound/Ulcer Descriptions are Pre Debridement except measurements:    Wound 10/07/21 Leg Lateral;Left;Distal #1 (Active)   Wound Image   03/03/22 1429   Wound Etiology Venous 01/13/22 1413   Wound Cleansed Cleansed with saline 03/24/22 1428   Dressing/Treatment Hydrofera blue 03/10/22 1501   Wound Length (cm) 4 cm 03/24/22 1428   Wound Width (cm) 2.4 cm 03/24/22 1428   Wound Depth (cm) 0.1 cm 03/24/22 1428   Wound Surface Area (cm^2) 9.6 cm^2 03/24/22 1428   Change in Wound Size % (l*w) -14.29 03/24/22 1428   Wound Volume (cm^3) 0.96 cm^3 03/24/22 1428   Wound Healing % -14 03/24/22 1428   Post-Procedure Length (cm) 4.1 cm 03/24/22 1448   Post-Procedure Width (cm) 2.5 cm 03/24/22 1448   Post-Procedure Depth (cm) 0.3 cm 03/24/22 1448   Post-Procedure Surface Area (cm^2) 10.25 cm^2 03/24/22 1448   Post-Procedure Volume (cm^3) 3.075 cm^3 03/24/22 1448   Distance Tunneling (cm) 0 cm 03/24/22 1428   Tunneling Position ___ O'Clock 0 03/24/22 1428   Undermining Starts ___ O'Clock 0 03/24/22 1428   Undermining Ends___ O'Clock 0 03/24/22 1428   Undermining Maxium Distance (cm) 0 03/24/22 1428   Wound Assessment Fibrin;Pink/red 03/24/22 1428   Drainage Amount Small 03/24/22 1428   Drainage Description Brown 03/24/22 1428   Odor None 03/24/22 1428   Jacy-wound Assessment Intact 03/24/22 1428   Margins Defined edges 03/24/22 1428   Wound Thickness Description not for Pressure Injury Full thickness 03/24/22 1428   Number of days: 168       Wound 12/23/21 Leg Proximal;Left;Lower #2 (Active)   Wound Image   03/03/22 1429   Wound Etiology Traumatic 12/23/21 1524   Wound Cleansed Cleansed with saline 03/24/22 1428   Dressing/Treatment Hydrofera blue 03/10/22 1501   Wound Length (cm) 5 cm 03/24/22 1428   Wound Width (cm) 2.5 cm 03/24/22 1428   Wound Depth (cm) 0.1 cm 03/24/22 1428   Wound Surface Area (cm^2) 12.5 cm^2 03/24/22 1428   Change in Wound Size % (l*w) 33.86 03/24/22 1428   Wound Volume (cm^3) 1.25 cm^3 03/24/22 1428   Wound Healing % 34 03/24/22 1428   Post-Procedure Length (cm) 5.1 cm 03/24/22 1448   Post-Procedure Width (cm) 2.6 cm 03/24/22 1448   Post-Procedure Depth (cm) 0.3 cm 03/24/22 1448   Post-Procedure Surface Area (cm^2) 13.26 cm^2 03/24/22 1448   Post-Procedure Volume (cm^3) 3.978 cm^3 03/24/22 1448   Distance Tunneling (cm) 0 cm 03/24/22 1428   Tunneling Position ___ O'Clock 0 03/24/22 1428   Undermining Starts ___ O'Clock 0 03/24/22 1428   Undermining Ends___ O'Clock 0 03/24/22 1428   Undermining Maxium Distance (cm) 0 03/24/22 1428   Wound Assessment Fibrin;Pink/red 03/24/22 1428   Drainage Amount Small 03/24/22 1428   Drainage Description Brown 03/24/22 1428   Odor None 03/24/22 1428   Jacy-wound Assessment Intact 03/24/22 1428   Margins Defined edges 03/24/22 1428   Wound Thickness Description not for Pressure Injury Full thickness 03/24/22 1428   Number of days: 90     Incision 12/20/19 Foot Left;Plantar (Active)   Number of days: 262       Percent of Wound(s)/Ulcer(s) Debrided: 100%    Total Surface Area Debrided:  23.51 sq cm     Diabetic/Pressure/Non Pressure Ulcers only:  Ulcer: N/A     Estimated Blood Loss:  Minimal    Hemostasis Achieved:  by pressure    Procedural Pain:  5  / 10     Post Procedural Pain:  5 / 10     Response to treatment:  Well tolerated by patient.      Plan:       Treatment Note please see attached Discharge Instructions. Debrided ulcers left lateral leg. Patient to use his compression wraps. Doxycycline has appeared to improve ulcer granular base, we will continue doxycycline and continue using hydrafera blue to reduce inflammation. Patient is not a candidate for ablation of his venous reflux due to his weight. He was denied his approval for gastric bypass surgery. I do feel the patient would benefit from gastric bypass. Return 1 week. New Medication(s) at this visit:   New Prescriptions    No medications on file       Other orders at this visit:   Orders Placed This Encounter   Procedures    Initiate Outpatient Wound Care Protocol       Smoking Cessation: Counseling given: Not Answered  Comment: States \"vapes a little\"      Written patient dismissal instructions given to patient and signed by patient or POA.                Electronically signed by Danielle Stinson DPM on 3/24/2022 at 2:51 PM

## 2022-03-28 ENCOUNTER — OFFICE VISIT (OUTPATIENT)
Dept: PAIN MANAGEMENT | Age: 47
End: 2022-03-28
Payer: COMMERCIAL

## 2022-03-28 VITALS
HEIGHT: 75 IN | BODY MASS INDEX: 39.17 KG/M2 | OXYGEN SATURATION: 92 % | HEART RATE: 76 BPM | WEIGHT: 315 LBS | DIASTOLIC BLOOD PRESSURE: 74 MMHG | SYSTOLIC BLOOD PRESSURE: 135 MMHG

## 2022-03-28 DIAGNOSIS — S83.92XD SPRAIN OF LEFT KNEE/LEG, SUBSEQUENT ENCOUNTER: ICD-10-CM

## 2022-03-28 DIAGNOSIS — S83.92XA SPRAIN OF LEFT KNEE/LEG, INITIAL ENCOUNTER: ICD-10-CM

## 2022-03-28 DIAGNOSIS — M51.27 LUMBAGO-SCIATICA DUE TO DISPLACEMENT OF LUMBAR INTERVERTEBRAL DISC: ICD-10-CM

## 2022-03-28 DIAGNOSIS — S33.9XXA SPRAIN OF LIGAMENT OF LUMBOSACRAL JOINT, INITIAL ENCOUNTER: ICD-10-CM

## 2022-03-28 DIAGNOSIS — S33.9XXD SPRAIN OF LIGAMENT OF LUMBOSACRAL JOINT, SUBSEQUENT ENCOUNTER: ICD-10-CM

## 2022-03-28 PROCEDURE — 99213 OFFICE O/P EST LOW 20 MIN: CPT | Performed by: INTERNAL MEDICINE

## 2022-03-28 RX ORDER — PREGABALIN 150 MG/1
150 CAPSULE ORAL 3 TIMES DAILY
Qty: 90 CAPSULE | Refills: 0 | Status: SHIPPED | OUTPATIENT
Start: 2022-03-28 | End: 2022-04-25 | Stop reason: SDUPTHER

## 2022-03-28 RX ORDER — OXYCODONE AND ACETAMINOPHEN 7.5; 325 MG/1; MG/1
1 TABLET ORAL EVERY 6 HOURS PRN
Qty: 112 TABLET | Refills: 0 | Status: SHIPPED | OUTPATIENT
Start: 2022-03-28 | End: 2022-04-25 | Stop reason: SDUPTHER

## 2022-03-28 RX ORDER — DICLOFENAC SODIUM 75 MG/1
75 TABLET, DELAYED RELEASE ORAL 2 TIMES DAILY PRN
Qty: 45 TABLET | Refills: 1 | Status: SHIPPED | OUTPATIENT
Start: 2022-03-28 | End: 2022-04-25 | Stop reason: SDUPTHER

## 2022-03-28 NOTE — PROGRESS NOTES
[facility]  1975  [id number]      HISTORY OF PRESENT ILLNESS:  Mr. Oneyda Francois is a 52 y.o. male returns for a follow up visit for pain management  He has a diagnosis of   1. 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 BWC Sprain of left knee/leg, subsequent encounter    3. BWC Sprain of ligament of lumbosacral joint, subsequent encounter    4. BWC Sprain of ligament of lumbosacral joint, initial encounter    5. BWC Sprain of left knee/leg, initial encounter    . He complains of pain in the lower back, left knee with radiation to the left foot  He rates the pain 4/10 and describes it as sharp, aching, burning, numbness, pins and needles. Current treatment regimen has helped relieve about 70% of the pain. He denies any side effects from the current pain regimen. Patient reports that since the last follow up visit the physical functioning is unchanged, family/social relationships are unchanged, mood is unchanged sleep patterns are unchanged, and that the overall functioning is unchanged. Patient denies misusing/abusing his narcotic pain medications or using any illegal drugs. There are No indicators for possible drug abuse, addiction or diversion problems, patient states he has been doing fair, his pain has been baseline, manageable with the medications. Mr. Oneyda Francois states he has occasional days where he has to take more than 1 per day of the Voltaren. He mentions he is on Antibiotics for leg Cellulitis. Patient denies any constipation symptoms. ALLERGIES: Patients list of allergies were reviewed     MEDICATIONS: Mr. Oneyda Francois list of medications were reviewed. His current medications are   Outpatient Medications Prior to Visit   Medication Sig Dispense Refill    doxycycline hyclate (VIBRA-TABS) 100 MG tablet Take 1 tablet by mouth 2 times daily 60 tablet 1    furosemide (LASIX) 20 MG tablet TAKE ONE TO TWO TABLETS BY MOUTH EVERY MORNING AS NEEDED FOR EDEMA 60 tablet 0    oxyCODONE-acetaminophen (PERCOCET) 7.5-325 MG per tablet Take 1 tablet by mouth every 6 hours as needed for Pain (max 4 per day) for up to 28 days. 112 tablet 0    pregabalin (LYRICA) 150 MG capsule Take 1 capsule by mouth 3 times daily for 30 days. 90 capsule 0    diclofenac (VOLTAREN) 75 MG EC tablet Take 1 tablet by mouth daily as needed for Pain 30 tablet 1    penicillin v potassium (VEETID) 500 MG tablet TAKE TWO TABLETS BY MOUTH TWICE A  tablet 3    Compression Stockings MISC by Does not apply route Strength 30-40mmHg  Style: pull on below the knee; open or closed toe  Extremity: bilateral  Donning aid recommended: Yes if needed 1 each 2    Compression Stockings MISC by Does not apply route Strength 30-40mmHg  Style: Farrow wrap , below the knee  Extremity: bilateral  Donning aid recommended: No 1 each 0    Multiple Vitamins-Minerals (THERAPEUTIC MULTIVITAMIN-MINERALS) tablet Take 1 tablet by mouth daily      losartan (COZAAR) 100 MG tablet TAKE ONE TABLET BY MOUTH DAILY 90 tablet 2    Compression Stockings MISC by Does not apply route Thigh high 1 each 0    Elastic Bandages & Supports (MEDICAL COMPRESSION THIGH HIGH) MISC Thigh high compression stockings, 30-40 mm Hg 2 each 1     No facility-administered medications prior to visit. REVIEW OF SYSTEMS:    Respiratory: Negative for apnea, chest tightness and shortness of breath or change in baseline breathing. PHYSICAL EXAM:   Nursing note and vitals reviewed. /74   Pulse 76   Ht 6' 3\" (1.905 m)   Wt (!) 475 lb 6.4 oz (215.6 kg)   SpO2 92%   BMI 59.42 kg/m²   Constitutional: He appears well-developed and well-nourished. No acute distress. Cardiovascular: Normal rate, regular rhythm, normal heart sounds, and does not have murmur. Pulmonary/Chest: Effort normal. No respiratory distress. He does not have wheezes in the lung fields. He has no rales.      Neurological/Psychiatric:He is alert and oriented to person, place, and time. Coordination is  normal.  His mood isAppropriate and affect is Neutral/Euthymic(normal) . His  Other: left leg bandaged     IMPRESSION:   1.  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 BWC Sprain of left knee/leg, subsequent encounter    3. BWC Sprain of ligament of lumbosacral joint, subsequent encounter    4. BWC Sprain of ligament of lumbosacral joint, initial encounter    5. BWC Sprain of left knee/leg, initial encounter        PLAN:  Informed verbal consent was obtained  -[place] was obtained and reviewed  for the last one year and no indicators of drug misuse  were found. Any other controlled substance prescriptions  seen on the record have been accounted for, I am aware of the patient receiving these medications. Tegan Esquivel [place] record will be rechecked as part of office protocol. -ROM/Stretching exercises as advised   -He was advised to increase fluids ( 5-7  glasses of fluid daily), limit caffeine, avoid cheese products, increase dietary fiber, increase activity and exercise as tolerated and relax regularly and enjoy meals   -Continue with Percocet 4 per day  -Walking as tolerated    -Continue with all other adjuvant medications as before      Current Outpatient Medications   Medication Sig Dispense Refill    doxycycline hyclate (VIBRA-TABS) 100 MG tablet Take 1 tablet by mouth 2 times daily 60 tablet 1    furosemide (LASIX) 20 MG tablet TAKE ONE TO TWO TABLETS BY MOUTH EVERY MORNING AS NEEDED FOR EDEMA 60 tablet 0    oxyCODONE-acetaminophen (PERCOCET) 7.5-325 MG per tablet Take 1 tablet by mouth every 6 hours as needed for Pain (max 4 per day) for up to 28 days. 112 tablet 0    pregabalin (LYRICA) 150 MG capsule Take 1 capsule by mouth 3 times daily for 30 days.  90 capsule 0    diclofenac (VOLTAREN) 75 MG EC tablet Take 1 tablet by mouth daily as needed for Pain 30 tablet 1    penicillin v potassium (VEETID) 500 MG tablet TAKE TWO TABLETS BY MOUTH TWICE A  tablet 3    Compression Stockings MISC by Does not apply route Strength 30-40mmHg  Style: pull on below the knee; open or closed toe  Extremity: bilateral  Donning aid recommended: Yes if needed 1 each 2    Compression Stockings MISC by Does not apply route Strength 30-40mmHg  Style: Farrow wrap , below the knee  Extremity: bilateral  Donning aid recommended: No 1 each 0    Multiple Vitamins-Minerals (THERAPEUTIC MULTIVITAMIN-MINERALS) tablet Take 1 tablet by mouth daily      losartan (COZAAR) 100 MG tablet TAKE ONE TABLET BY MOUTH DAILY 90 tablet 2    Compression Stockings MISC by Does not apply route Thigh high 1 each 0    Elastic Bandages & Supports (MEDICAL COMPRESSION THIGH HIGH) MISC Thigh high compression stockings, 30-40 mm Hg 2 each 1     No current facility-administered medications for this visit. I will continue his current medication regimen  which is part of the above treatment schedule. It has been helping with Mr. 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 Risks and benefits of the medications and other alternative treatments  including no treatment were discussed with the patient. The common side effects of these medications were also explained to the patient. Informed verbal consent was obtained. Goals of current treatment regimen include improvement in pain, restoration of functioning- with focus on improvement in physical performance, general activity, work or disability,emotional distress, health care utilization and  decreased medication consumption.  Will continue to monitor progress towards

## 2022-03-31 ENCOUNTER — HOSPITAL ENCOUNTER (OUTPATIENT)
Dept: WOUND CARE | Age: 47
Discharge: HOME | End: 2022-03-31
Payer: COMMERCIAL

## 2022-03-31 DIAGNOSIS — L97.222 NON-PRESSURE CHRONIC ULCER OF LEFT CALF WITH FAT LAYER EXPOSED (HCC): ICD-10-CM

## 2022-03-31 PROCEDURE — 11042 DBRDMT SUBQ TIS 1ST 20SQCM/<: CPT

## 2022-03-31 NOTE — PROGRESS NOTES
[facility]  Progress Note and Procedure Note      Laurence Clayton  MEDICAL RECORD NUMBER:  [id number]  AGE: 52 y.o. GENDER: male  : 1975  EPISODE DATE:  3/31/2022    Subjective:     No chief complaint on file. HISTORY of PRESENT ILLNESS HPI     Laurence Clayton is a 52 y.o. male who presents today for wound/ulcer evaluation. History of Wound Context: Patient has ulcer left lateral leg. He has been wearing his stockings as instructed. Patient relates he has taken his doxycycline as instructed.   Wound/Ulcer Pain Timing/Severity: intermittent, mild, moderate  Quality of pain: sharp  Severity:  5 / 10   Modifying Factors: Pain worsens with walking  Associated Signs/Symptoms: edema    Ulcer Identification:  Ulcer Type: venous    Contributing Factors: edema, venous stasis and lymphedema    Acute Wound: N/A        PAST MEDICAL HISTORY        Diagnosis Date    Arthritis     Cellulitis     Chronic back pain     Hepatitis age 24    HNP (herniated nucleus pulposus with myelopathy), thoracic     Hypertension     Melanoma ([address]     left foot     Obesity 2011    ANN-MARIE (obstructive sleep apnea)     NO CPAP     Stasis dermatitis        PAST SURGICAL HISTORY    Past Surgical History:   Procedure Laterality Date    ADENOIDECTOMY      FOOT SURGERY Left 2019    WIDE EXCISION OF LEFT FOOT MELANOMA performed by Windy Cintron MD at [address] [place] [address] 2/10/2020    LEFT FOOT MELANOMA WIDE EXCISION AND FULL THICKNESS SKIN GRAFT,LEFT GROIN SENTINEL LYMPH NODE BIOPSY performed by Windy Cintron MD at [address]  06    Arand    UPPER GASTROINTESTINAL ENDOSCOPY N/A 2021    EGD BIOPSY performed by Lucy Maddox DO at [address] [place] [address] EXTRACTION         FAMILY HISTORY    Family History   Problem Relation Age of Onset    Cancer Mother     COPD Paternal Grandfather     Colon Cancer Maternal Grandfather SOCIAL HISTORY    Social History     Tobacco Use    Smoking status: Former Smoker     Packs/day: 1.50     Types: Cigarettes     Quit date: 2005     Years since quittin.2    Smokeless tobacco: Former User     Types: Chew    Tobacco comment: States \"vapes a little\"   Vaping Use    Vaping Use: Every day    Substances: Nicotine, Flavoring    Devices: Refillable tank   Substance Use Topics    Alcohol use: No     Alcohol/week: 0.0 standard drinks     Comment: rarely -- 1 every few months    Drug use: No       ALLERGIES    No Known Allergies    MEDICATIONS    Current Outpatient Medications on File Prior to Encounter   Medication Sig Dispense Refill    oxyCODONE-acetaminophen (PERCOCET) 7.5-325 MG per tablet Take 1 tablet by mouth every 6 hours as needed for Pain (max 4 per day) for up to 28 days. 112 tablet 0    pregabalin (LYRICA) 150 MG capsule Take 1 capsule by mouth 3 times daily for 30 days.  90 capsule 0    diclofenac (VOLTAREN) 75 MG EC tablet Take 1 tablet by mouth 2 times daily as needed for Pain 45 tablet 1    doxycycline hyclate (VIBRA-TABS) 100 MG tablet Take 1 tablet by mouth 2 times daily 60 tablet 1    furosemide (LASIX) 20 MG tablet TAKE ONE TO TWO TABLETS BY MOUTH EVERY MORNING AS NEEDED FOR EDEMA 60 tablet 0    penicillin v potassium (VEETID) 500 MG tablet TAKE TWO TABLETS BY MOUTH TWICE A DAY (Patient not taking: Reported on 3/31/2022) 120 tablet 3    Compression Stockings MISC by Does not apply route Strength 30-40mmHg  Style: pull on below the knee; open or closed toe  Extremity: bilateral  Donning aid recommended: Yes if needed 1 each 2    Compression Stockings MISC by Does not apply route Strength 30-40mmHg  Style: Farrow wrap , below the knee  Extremity: bilateral  Donning aid recommended: No 1 each 0    Multiple Vitamins-Minerals (THERAPEUTIC MULTIVITAMIN-MINERALS) tablet Take 1 tablet by mouth daily      losartan (COZAAR) 100 MG tablet TAKE ONE TABLET BY MOUTH DAILY 90 tablet 2    Compression Stockings MISC by Does not apply route Thigh high 1 each 0    Elastic Bandages & Supports (MEDICAL COMPRESSION THIGH HIGH) MISC Thigh high compression stockings, 30-40 mm Hg 2 each 1     No current facility-administered medications on file prior to encounter. REVIEW OF SYSTEMS    Pertinent items are noted in HPI. Last S8U if applicable:   Hemoglobin A1C   Date Value Ref Range Status   08/10/2021 4.8 See comment % Final     Comment:     Comment:  Diagnosis of Diabetes: > or = 6.5%  Increased risk of diabetes (Prediabetes): 5.7-6.4%  Glycemic Control: Nonpregnant Adults: <7.0%                    Pregnant: <6.0%           Objective: There were no vitals taken for this visit. Wt Readings from Last 3 Encounters:   03/28/22 (!) 475 lb 6.4 oz (215.6 kg)   03/03/22 (!) 473 lb 5.2 oz (214.7 kg)   02/28/22 (!) 477 lb (216.4 kg)       PHYSICAL EXAM    General Appearance: alert and oriented to person, place and time, well-developed and well-nourished, in no acute distress  Pedal pulses palpable. Protective sensations intact. Ulcer is noted left lateral leg with granular base and fibrotic covering. Ulcer does not probe to bone. There are no signs of infection. There is moderate edema bilateral legs. Ulcer noted superior to wound #1 exhibits granular base, fibrotic covering, drainage with no signs of infection and does not probe to bone. Hemosiderin deposits are noted anterior bilateral legs. Assessment:      1. Non-pressure chronic ulcer of left calf with fat layer exposed ([address]           Procedure Note  Indications:  Based on my examination of this patient's wound(s)/ulcer(s) today, debridement is required to promote healing and evaluate the wound base.     Performed by: Tiana Kendall DPM    Consent obtained:  Yes    Time out taken:  Yes    Pain Control: Anesthetic  Anesthetic: 4% Lidocaine Liquid Topical       Debridement: Excisional Debridement    Using curette the wound(s)/ulcer(s) was/were debrided down through and including the removal of epidermis, dermis and subcutaneous tissue.         Devitalized Tissue Debrided:  fibrin and biofilm    Pre Debridement Measurements:  Are located in the Wound/Ulcer Documentation Flow Sheet    Wound/Ulcer #: 1 and 2    Post Debridement Measurements:  Wound/Ulcer Descriptions are Pre Debridement except measurements:    Wound 10/07/21 Leg Lateral;Left;Distal #1 (Active)   Wound Image   03/03/22 1429   Wound Etiology Venous 01/13/22 1413   Wound Cleansed Cleansed with saline 03/31/22 1427   Dressing/Treatment Hydrofera blue 03/24/22 1505   Wound Length (cm) 2.6 cm 03/31/22 1427   Wound Width (cm) 2 cm 03/31/22 1427   Wound Depth (cm) 0.1 cm 03/31/22 1427   Wound Surface Area (cm^2) 5.2 cm^2 03/31/22 1427   Change in Wound Size % (l*w) 38.1 03/31/22 1427   Wound Volume (cm^3) 0.52 cm^3 03/31/22 1427   Wound Healing % 38 03/31/22 1427   Post-Procedure Length (cm) 2.7 cm 03/31/22 1455   Post-Procedure Width (cm) 2.1 cm 03/31/22 1455   Post-Procedure Depth (cm) 0.3 cm 03/31/22 1455   Post-Procedure Surface Area (cm^2) 5.67 cm^2 03/31/22 1455   Post-Procedure Volume (cm^3) 1.701 cm^3 03/31/22 1455   Distance Tunneling (cm) 0 cm 03/31/22 1427   Tunneling Position ___ O'Clock 0 03/24/22 1428   Undermining Starts ___ O'Clock 0 03/24/22 1428   Undermining Ends___ O'Clock 0 03/24/22 1428   Undermining Maxium Distance (cm) 0 03/31/22 1427   Wound Assessment Fibrin;Pink/red 03/31/22 1427   Drainage Amount Small 03/31/22 1427   Drainage Description Brown 03/31/22 1427   Odor None 03/31/22 1427   Jacy-wound Assessment Intact 03/31/22 1427   Margins Defined edges 03/31/22 1427   Wound Thickness Description not for Pressure Injury Full thickness 03/31/22 1427   Number of days: 175       Wound 12/23/21 Leg Proximal;Left;Lower #2 (Active)   Wound Image   03/03/22 1429   Wound Etiology Traumatic 12/23/21 1524   Wound Cleansed Cleansed with saline 03/31/22 1428 of his venous reflux due to his weight. He was denied his approval for gastric bypass surgery. I do feel the patient would benefit from gastric bypass. Return 1 week. New Medication(s) at this visit:   New Prescriptions    No medications on file       Other orders at this visit:   No orders of the defined types were placed in this encounter. Smoking Cessation: Counseling given: Not Answered  Comment: States \"vapes a little\"      Written patient dismissal instructions given to patient and signed by patient or POA.                Electronically signed by Linda Rivera DPM on 3/31/2022 at 3:02 PM

## 2022-04-04 RX ORDER — LOSARTAN POTASSIUM 100 MG/1
TABLET ORAL
Qty: 30 TABLET | Refills: 0 | Status: SHIPPED | OUTPATIENT
Start: 2022-04-04 | End: 2022-05-02

## 2022-04-04 NOTE — TELEPHONE ENCOUNTER
Medication:   Requested Prescriptions     Pending Prescriptions Disp Refills    losartan (COZAAR) 100 MG tablet [Pharmacy Med Name: LOSARTAN POTASSIUM 100 MG TAB] 30 tablet      Sig: TAKE ONE TABLET BY MOUTH DAILY       Last Filled:  7/8/2021    Patient Phone Number: [phone] (home) [phone] (work)    Last appt: 3/12/2021   Next appt: Visit date not found    Lab Results   Component Value Date     08/10/2021    K 4.9 08/10/2021    CL 98 (L) 08/10/2021    CO2 29 08/10/2021    BUN 11 08/10/2021    CREATININE 0.6 (L) 08/10/2021    GLUCOSE 98 08/10/2021    CALCIUM 9.2 08/10/2021    PROT 7.1 08/10/2021    LABALBU 4.6 08/10/2021    BILITOT 0.7 08/10/2021    ALKPHOS 75 08/10/2021    AST 29 08/10/2021    ALT 35 08/10/2021    LABGLOM >60 08/10/2021    GFRAA >60 08/10/2021    AGRATIO 1.8 08/10/2021    GLOB 2.5 08/10/2021

## 2022-04-07 ENCOUNTER — HOSPITAL ENCOUNTER (OUTPATIENT)
Dept: WOUND CARE | Age: 47
Discharge: HOME | End: 2022-04-07
Payer: COMMERCIAL

## 2022-04-07 VITALS
SYSTOLIC BLOOD PRESSURE: 177 MMHG | BODY MASS INDEX: 59.24 KG/M2 | DIASTOLIC BLOOD PRESSURE: 78 MMHG | WEIGHT: 315 LBS | TEMPERATURE: 97.8 F | RESPIRATION RATE: 20 BRPM | HEART RATE: 78 BPM

## 2022-04-07 DIAGNOSIS — I83.212 VARICOSE VEINS OF RIGHT LOWER EXTREMITY WITH INFLAMMATION, WITH ULCER OF CALF LIMITED TO BREAKDOWN OF SKIN (HCC): ICD-10-CM

## 2022-04-07 DIAGNOSIS — I83.222 VARICOSE VEINS OF LEFT LOWER EXTREMITY WITH INFLAMMATION, WITH ULCER OF CALF WITH FAT LAYER EXPOSED (HCC): ICD-10-CM

## 2022-04-07 DIAGNOSIS — L97.222 NON-PRESSURE CHRONIC ULCER OF LEFT CALF WITH FAT LAYER EXPOSED (HCC): Primary | ICD-10-CM

## 2022-04-07 DIAGNOSIS — L97.212 VARICOSE VEINS OF RIGHT LOWER EXTREMITY WITH INFLAMMATION, WITH ULCER OF CALF WITH FAT LAYER EXPOSED (HCC): ICD-10-CM

## 2022-04-07 DIAGNOSIS — L97.211 VARICOSE VEINS OF RIGHT LOWER EXTREMITY WITH INFLAMMATION, WITH ULCER OF CALF LIMITED TO BREAKDOWN OF SKIN (HCC): ICD-10-CM

## 2022-04-07 DIAGNOSIS — I83.212 VARICOSE VEINS OF RIGHT LOWER EXTREMITY WITH INFLAMMATION, WITH ULCER OF CALF WITH FAT LAYER EXPOSED (HCC): ICD-10-CM

## 2022-04-07 DIAGNOSIS — L97.222 VARICOSE VEINS OF LEFT LOWER EXTREMITY WITH INFLAMMATION, WITH ULCER OF CALF WITH FAT LAYER EXPOSED (HCC): ICD-10-CM

## 2022-04-07 PROCEDURE — 6370000000 HC RX 637 (ALT 250 FOR IP): Performed by: PODIATRIST

## 2022-04-07 PROCEDURE — 11042 DBRDMT SUBQ TIS 1ST 20SQCM/<: CPT

## 2022-04-07 RX ORDER — GENTAMICIN SULFATE 1 MG/G
OINTMENT TOPICAL ONCE
OUTPATIENT
Start: 2022-04-07 | End: 2022-04-07

## 2022-04-07 RX ORDER — CLOBETASOL PROPIONATE 0.5 MG/G
OINTMENT TOPICAL ONCE
OUTPATIENT
Start: 2022-04-07 | End: 2022-04-07

## 2022-04-07 RX ORDER — BACITRACIN, NEOMYCIN, POLYMYXIN B 400; 3.5; 5 [USP'U]/G; MG/G; [USP'U]/G
OINTMENT TOPICAL ONCE
OUTPATIENT
Start: 2022-04-07 | End: 2022-04-07

## 2022-04-07 RX ORDER — GINSENG 100 MG
CAPSULE ORAL ONCE
OUTPATIENT
Start: 2022-04-07 | End: 2022-04-07

## 2022-04-07 RX ORDER — LIDOCAINE 40 MG/G
CREAM TOPICAL ONCE
OUTPATIENT
Start: 2022-04-07 | End: 2022-04-07

## 2022-04-07 RX ORDER — LIDOCAINE HYDROCHLORIDE 20 MG/ML
JELLY TOPICAL ONCE
OUTPATIENT
Start: 2022-04-07 | End: 2022-04-07

## 2022-04-07 RX ORDER — BETAMETHASONE DIPROPIONATE 0.05 %
OINTMENT (GRAM) TOPICAL ONCE
OUTPATIENT
Start: 2022-04-07 | End: 2022-04-07

## 2022-04-07 RX ORDER — LIDOCAINE HYDROCHLORIDE 40 MG/ML
SOLUTION TOPICAL ONCE
Status: COMPLETED | OUTPATIENT
Start: 2022-04-07 | End: 2022-04-07

## 2022-04-07 RX ORDER — LIDOCAINE 50 MG/G
OINTMENT TOPICAL ONCE
OUTPATIENT
Start: 2022-04-07 | End: 2022-04-07

## 2022-04-07 RX ORDER — BACITRACIN ZINC AND POLYMYXIN B SULFATE 500; 1000 [USP'U]/G; [USP'U]/G
OINTMENT TOPICAL ONCE
OUTPATIENT
Start: 2022-04-07 | End: 2022-04-07

## 2022-04-07 RX ORDER — LIDOCAINE HYDROCHLORIDE 40 MG/ML
SOLUTION TOPICAL ONCE
OUTPATIENT
Start: 2022-04-07 | End: 2022-04-07

## 2022-04-07 RX ADMIN — LIDOCAINE HYDROCHLORIDE: 40 SOLUTION TOPICAL at 14:46

## 2022-04-07 NOTE — PROGRESS NOTES
[facility]  [place] Note and Procedure Note      En Coyne  MEDICAL RECORD NUMBER:  [id number]  AGE: 52 y.o. GENDER: male  : 1975  EPISODE DATE:  2022    Subjective:     Chief Complaint   Patient presents with    Wound Check     LEFT LOWER LEG         HISTORY of PRESENT ILLNESS HPI     En Coyne is a 52 y.o. male who presents today for wound/ulcer evaluation. History of Wound Context: Patient has ulcer left lateral leg. He has been wearing his stockings as instructed. Patient relates he has taken his doxycycline as instructed.   Wound/Ulcer Pain Timing/Severity: intermittent, mild, moderate  Quality of pain: sharp  Severity:  5 / 10   Modifying Factors: Pain worsens with walking  Associated Signs/Symptoms: edema    Ulcer Identification:  Ulcer Type: venous    Contributing Factors: edema, venous stasis and lymphedema    Acute Wound: N/A        PAST MEDICAL HISTORY        Diagnosis Date    Arthritis     Cellulitis     Chronic back pain     Hepatitis age 24    HNP (herniated nucleus pulposus with myelopathy), thoracic     Hypertension     [address] [address]     left foot     Obesity 2011    ANN-MARIE (obstructive sleep apnea)     NO CPAP     Stasis dermatitis        PAST SURGICAL HISTORY    Past Surgical History:   Procedure Laterality Date    ADENOIDECTOMY      FOOT SURGERY Left 2019    WIDE EXCISION OF LEFT FOOT MELANOMA performed by Kristen Phan MD at [address] [place] [address] Left 2/10/2020    LEFT FOOT MELANOMA WIDE EXCISION AND FULL THICKNESS SKIN GRAFT,LEFT GROIN SENTINEL LYMPH NODE BIOPSY performed by Kristen Phan MD at [facility]  06    [place]    UPPER GASTROINTESTINAL ENDOSCOPY N/A 2021    EGD BIOPSY performed by Gaye Bloch, DO at [address] [place] [address] EXTRACTION         FAMILY HISTORY    Family History   Problem Relation Age of Onset    Cancer Mother     COPD Paternal Grandfather     Colon Cancer Maternal Grandfather        SOCIAL HISTORY    Social History     Tobacco Use    Smoking status: Former Smoker     Packs/day: 1.50     Types: Cigarettes     Quit date: 2005     Years since quittin.2    Smokeless tobacco: Former User     Types: Chew    Tobacco comment: States \"vapes a little\"   Vaping Use    Vaping Use: Every day    Substances: Nicotine, Flavoring    Devices: Refillable tank   Substance Use Topics    Alcohol use: No     Alcohol/week: 0.0 standard drinks     Comment: rarely -- 1 every few months    Drug use: No       ALLERGIES    No Known Allergies    MEDICATIONS    Current Outpatient Medications on File Prior to Encounter   Medication Sig Dispense Refill    losartan (COZAAR) 100 MG tablet TAKE ONE TABLET BY MOUTH DAILY 30 tablet 0    oxyCODONE-acetaminophen (PERCOCET) 7.5-325 MG per tablet Take 1 tablet by mouth every 6 hours as needed for Pain (max 4 per day) for up to 28 days. 112 tablet 0    pregabalin (LYRICA) 150 MG capsule Take 1 capsule by mouth 3 times daily for 30 days.  90 capsule 0    diclofenac (VOLTAREN) 75 MG EC tablet Take 1 tablet by mouth 2 times daily as needed for Pain 45 tablet 1    doxycycline hyclate (VIBRA-TABS) 100 MG tablet Take 1 tablet by mouth 2 times daily 60 tablet 1    furosemide (LASIX) 20 MG tablet TAKE ONE TO TWO TABLETS BY MOUTH EVERY MORNING AS NEEDED FOR EDEMA 60 tablet 0    penicillin v potassium (VEETID) 500 MG tablet TAKE TWO TABLETS BY MOUTH TWICE A DAY (Patient not taking: Reported on 3/31/2022) 120 tablet 3    Compression Stockings MISC by Does not apply route Strength 30-40mmHg  Style: pull on below the knee; open or closed toe  Extremity: bilateral  Donning aid recommended: Yes if needed 1 each 2    Compression Stockings MISC by Does not apply route Strength 30-40mmHg  Style: Farrow wrap , below the knee  Extremity: bilateral  Donning aid recommended: No 1 each 0    Multiple Vitamins-Minerals (THERAPEUTIC MULTIVITAMIN-MINERALS) tablet Take 1 tablet by mouth daily      Compression Stockings MISC by Does not apply route Thigh high 1 each 0    Elastic Bandages & Supports (MEDICAL COMPRESSION THIGH HIGH) MISC Thigh high compression stockings, 30-40 mm Hg 2 each 1     No current facility-administered medications on file prior to encounter. REVIEW OF SYSTEMS    Pertinent items are noted in HPI. Last N9G if applicable:   Hemoglobin A1C   Date Value Ref Range Status   08/10/2021 4.8 See comment % Final     Comment:     Comment:  Diagnosis of Diabetes: > or = 6.5%  Increased risk of diabetes (Prediabetes): 5.7-6.4%  Glycemic Control: Nonpregnant Adults: <7.0%                    Pregnant: <6.0%           Objective:      BP (!) 177/78   Pulse 78   Temp 97.8 °F (36.6 °C) (Temporal)   Resp 20   Wt (!) 473 lb 15.8 oz (215 kg)   BMI 59.24 kg/m²     Wt Readings from Last 3 Encounters:   04/07/22 (!) 473 lb 15.8 oz (215 kg)   03/28/22 (!) 475 lb 6.4 oz (215.6 kg)   03/03/22 (!) 473 lb 5.2 oz (214.7 kg)       PHYSICAL EXAM    General Appearance: alert and oriented to person, place and time, well-developed and well-nourished, in no acute distress  Pedal pulses palpable. Protective sensations intact. Ulcer is noted left lateral leg with granular base and fibrotic covering. Ulcer does not probe to bone. There are no signs of infection. There is moderate edema bilateral legs. Ulcer noted superior to wound #1 exhibits granular base, fibrotic covering, drainage with no signs of infection and does not probe to bone. Hemosiderin deposits are noted anterior bilateral legs. Assessment:      1. 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 Varicose veins of right lower extremity with inflammation, with ulcer of calf with fat layer exposed ([address]    3. Varicose veins of left lower extremity with inflammation, with ulcer of calf with fat layer exposed ([address]    4.  Varicose veins of right lower extremity with inflammation, with ulcer of calf limited to breakdown of skin [facility])           Procedure Note  Indications:  Based on my examination of this patient's wound(s)/ulcer(s) today, debridement is required to promote healing and evaluate the wound base. Performed by: Denisa Brito DPM    Consent obtained:  Yes    Time out taken:  Yes    Pain Control: Anesthetic  Anesthetic: 4% Lidocaine Liquid Topical       Debridement: Excisional Debridement    Using curette the wound(s)/ulcer(s) was/were debrided down through and including the removal of epidermis, dermis and subcutaneous tissue.         Devitalized Tissue Debrided:  fibrin and biofilm    Pre Debridement Measurements:  Are located in the [facility]  Documentation Flow Sheet    Wound/Ulcer #: 1 and 2    Post Debridement Measurements:  Wound/Ulcer Descriptions are Pre Debridement except measurements:    Wound 10/07/21 Leg Lateral;Left;Distal #1 (Active)   Wound Image   04/07/22 1441   Wound Etiology Venous 01/13/22 1413   Wound Cleansed Cleansed with saline 04/07/22 1441   Dressing/Treatment Hydrofera blue 03/31/22 1501   Wound Length (cm) 2.5 cm 04/07/22 1441   Wound Width (cm) 1.4 cm 04/07/22 1441   Wound Depth (cm) 0.1 cm 04/07/22 1441   Wound Surface Area (cm^2) 3.5 cm^2 04/07/22 1441   Change in Wound Size % (l*w) 58.33 04/07/22 1441   Wound Volume (cm^3) 0.35 cm^3 04/07/22 1441   Wound Healing % 58 04/07/22 1441   Post-Procedure Length (cm) 2.6 cm 04/07/22 1457   Post-Procedure Width (cm) 1.5 cm 04/07/22 1457   Post-Procedure Depth (cm) 0.3 cm 04/07/22 1457   Post-Procedure Surface Area (cm^2) 3.9 cm^2 04/07/22 1457   Post-Procedure Volume (cm^3) 1.17 cm^3 04/07/22 1457   Distance Tunneling (cm) 0 cm 04/07/22 1441   Tunneling Position ___ O'Clock 0 03/24/22 1428   Undermining Starts ___ O'Clock 0 03/24/22 1428   Undermining Ends___ O'Clock 0 03/24/22 1428   Undermining Maxium Distance (cm) 0 04/07/22 1441   Wound Assessment Fibrin;Pink/red 04/07/22 1441   Drainage Amount Small 04/07/22 1441   Drainage Description Brown 04/07/22 1441   Odor None 04/07/22 1441   Jacy-wound Assessment Intact 04/07/22 1441   Margins Defined edges 04/07/22 1441   Wound Thickness Description not for Pressure Injury Full thickness 04/07/22 1441   Number of days: 182       Wound 12/23/21 Leg Proximal;Left;Lower #2 (Active)   Wound Image   04/07/22 1441   Wound Etiology Traumatic 12/23/21 1524   Wound Cleansed Cleansed with saline 04/07/22 1441   Dressing/Treatment Hydrofera blue 03/31/22 1501   Wound Length (cm) 2 cm 04/07/22 1441   Wound Width (cm) 0.8 cm 04/07/22 1441   Wound Depth (cm) 0.1 cm 04/07/22 1441   Wound Surface Area (cm^2) 1.6 cm^2 04/07/22 1441   Change in Wound Size % (l*w) 91.53 04/07/22 1441   Wound Volume (cm^3) 0.16 cm^3 04/07/22 1441   Wound Healing % 92 04/07/22 1441   Post-Procedure Length (cm) 2.1 cm 04/07/22 1457   Post-Procedure Width (cm) 0.9 cm 04/07/22 1457   Post-Procedure Depth (cm) 0.3 cm 04/07/22 1457   Post-Procedure Surface Area (cm^2) 1.89 cm^2 04/07/22 1457   Post-Procedure Volume (cm^3) 0.567 cm^3 04/07/22 1457   Distance Tunneling (cm) 0 cm 04/07/22 1441   Tunneling Position ___ O'Clock 0 03/24/22 1428   Undermining Starts ___ O'Clock 0 03/24/22 1428   Undermining Ends___ O'Clock 0 03/24/22 1428   Undermining Maxium Distance (cm) 0 04/07/22 1441   Wound Assessment Fibrin;Pink/red 04/07/22 1441   Drainage Amount Small 03/31/22 1427   Drainage Description Brown 04/07/22 1441   Odor None 04/07/22 1441   Jacy-wound Assessment Intact 04/07/22 1441   Margins Defined edges 04/07/22 1441   Wound Thickness Description not for Pressure Injury Full thickness 04/07/22 1441   Number of days: 104     Incision 12/20/19 Foot Left;Plantar (Active)   Number of days: 838       Percent of Wound(s)/Ulcer(s) Debrided: 100%    Total Surface Area Debrided:  3.06 sq cm     Diabetic/Pressure/Non Pressure Ulcers only:  Ulcer: N/A     Estimated Blood Loss:  Minimal    Hemostasis Achieved:  by pressure    Procedural Pain:  5  / 10     Post Procedural Pain:  5 / 10     Response to treatment:  Well tolerated by patient. Plan:       Treatment Note please see attached Discharge Instructions. Debrided ulcers left lateral leg. Patient to use his compression wraps. Doxycycline has appeared to improve ulcer granular base, we will continue doxycycline and continue using hydrafera blue to reduce inflammation. Patient is not a candidate for ablation of his venous reflux due to his weight. He was denied his approval for gastric bypass surgery. I do feel the patient would benefit from gastric bypass. Return 1 week. New Medication(s) at this visit:   New Prescriptions    No medications on file       Other orders at this visit:   Orders Placed This Encounter   Procedures    Initiate Outpatient Wound Care Protocol       Smoking Cessation: Counseling given: Not Answered  Comment: States \"vapes a little\"      Written patient dismissal instructions given to patient and signed by patient or POA.                Electronically signed by Maikel Sifuentes DPM on 4/7/2022 at 2:58 PM

## 2022-04-21 ENCOUNTER — HOSPITAL ENCOUNTER (OUTPATIENT)
Dept: WOUND CARE | Age: 47
Discharge: HOME | End: 2022-04-21
Payer: COMMERCIAL

## 2022-04-21 VITALS
RESPIRATION RATE: 20 BRPM | DIASTOLIC BLOOD PRESSURE: 77 MMHG | HEART RATE: 77 BPM | SYSTOLIC BLOOD PRESSURE: 121 MMHG | TEMPERATURE: 97.3 F

## 2022-04-21 DIAGNOSIS — I83.212 VARICOSE VEINS OF RIGHT LOWER EXTREMITY WITH INFLAMMATION, WITH ULCER OF CALF LIMITED TO BREAKDOWN OF SKIN (HCC): ICD-10-CM

## 2022-04-21 DIAGNOSIS — L97.211 VARICOSE VEINS OF RIGHT LOWER EXTREMITY WITH INFLAMMATION, WITH ULCER OF CALF LIMITED TO BREAKDOWN OF SKIN (HCC): ICD-10-CM

## 2022-04-21 DIAGNOSIS — L97.222 VARICOSE VEINS OF LEFT LOWER EXTREMITY WITH INFLAMMATION, WITH ULCER OF CALF WITH FAT LAYER EXPOSED (HCC): ICD-10-CM

## 2022-04-21 DIAGNOSIS — L97.222 NON-PRESSURE CHRONIC ULCER OF LEFT CALF WITH FAT LAYER EXPOSED (HCC): Primary | ICD-10-CM

## 2022-04-21 DIAGNOSIS — I83.222 VARICOSE VEINS OF LEFT LOWER EXTREMITY WITH INFLAMMATION, WITH ULCER OF CALF WITH FAT LAYER EXPOSED (HCC): ICD-10-CM

## 2022-04-21 PROCEDURE — 99211 OFF/OP EST MAY X REQ PHY/QHP: CPT

## 2022-04-21 RX ORDER — GENTAMICIN SULFATE 1 MG/G
OINTMENT TOPICAL ONCE
OUTPATIENT
Start: 2022-04-21 | End: 2022-04-21

## 2022-04-21 RX ORDER — LIDOCAINE 40 MG/G
CREAM TOPICAL ONCE
OUTPATIENT
Start: 2022-04-21 | End: 2022-04-21

## 2022-04-21 RX ORDER — BACITRACIN, NEOMYCIN, POLYMYXIN B 400; 3.5; 5 [USP'U]/G; MG/G; [USP'U]/G
OINTMENT TOPICAL ONCE
OUTPATIENT
Start: 2022-04-21 | End: 2022-04-21

## 2022-04-21 RX ORDER — LIDOCAINE HYDROCHLORIDE 40 MG/ML
SOLUTION TOPICAL ONCE
OUTPATIENT
Start: 2022-04-21 | End: 2022-04-21

## 2022-04-21 RX ORDER — BETAMETHASONE DIPROPIONATE 0.05 %
OINTMENT (GRAM) TOPICAL ONCE
OUTPATIENT
Start: 2022-04-21 | End: 2022-04-21

## 2022-04-21 RX ORDER — LIDOCAINE 50 MG/G
OINTMENT TOPICAL ONCE
OUTPATIENT
Start: 2022-04-21 | End: 2022-04-21

## 2022-04-21 RX ORDER — BACITRACIN ZINC AND POLYMYXIN B SULFATE 500; 1000 [USP'U]/G; [USP'U]/G
OINTMENT TOPICAL ONCE
OUTPATIENT
Start: 2022-04-21 | End: 2022-04-21

## 2022-04-21 RX ORDER — LIDOCAINE HYDROCHLORIDE 20 MG/ML
JELLY TOPICAL ONCE
OUTPATIENT
Start: 2022-04-21 | End: 2022-04-21

## 2022-04-21 RX ORDER — CLOBETASOL PROPIONATE 0.5 MG/G
OINTMENT TOPICAL ONCE
OUTPATIENT
Start: 2022-04-21 | End: 2022-04-21

## 2022-04-21 RX ORDER — GINSENG 100 MG
CAPSULE ORAL ONCE
OUTPATIENT
Start: 2022-04-21 | End: 2022-04-21

## 2022-04-21 ASSESSMENT — PAIN SCALES - GENERAL: PAINLEVEL_OUTOF10: 0

## 2022-04-21 NOTE — PROGRESS NOTES
[facility] [place] and Procedure Note      En Coyne  MEDICAL RECORD NUMBER:  [id number]  AGE: 52 y.o. GENDER: male  : 1975  EPISODE DATE:  2022    Subjective:     Chief Complaint   Patient presents with    Wound Check     left lower leg          HISTORY of PRESENT ILLNESS HPI     En Coyne is a 52 y.o. male who presents today for wound/ulcer evaluation. History of Wound Context: Patient has ulcer left lateral leg. He has been wearing his stockings as instructed. Patient relates he has taken his doxycycline as instructed.   Wound/Ulcer Pain Timing/Severity: intermittent, mild, moderate  Quality of pain: sharp  Severity:  5 / 10   Modifying Factors: Pain worsens with walking  Associated Signs/Symptoms: edema    Ulcer Identification:  Ulcer Type: venous    Contributing Factors: edema, venous stasis and lymphedema    Acute Wound: N/A        PAST MEDICAL HISTORY        Diagnosis Date    Arthritis     Cellulitis     Chronic back pain     Hepatitis age 24    HNP (herniated nucleus pulposus with myelopathy), thoracic     Hypertension     Melanoma ([facility] [address]     left foot     Obesity 2011    ANN-MARIE (obstructive sleep apnea)     NO CPAP     Stasis dermatitis        PAST SURGICAL HISTORY    Past Surgical History:   Procedure Laterality Date    ADENOIDECTOMY      FOOT SURGERY Left 2019    WIDE EXCISION OF LEFT FOOT MELANOMA performed by Kristen Phan MD at [address] [place] [address] Left 2/10/2020    LEFT FOOT MELANOMA WIDE EXCISION AND FULL THICKNESS SKIN GRAFT,LEFT GROIN SENTINEL LYMPH NODE BIOPSY performed by Kristen Phan MD at [facility]  06    [address]    UPPER GASTROINTESTINAL ENDOSCOPY N/A 2021    EGD BIOPSY performed by Gaye Bloch, DO at [address] [place] [address] EXTRACTION         FAMILY HISTORY    Family History   Problem Relation Age of Onset    Cancer Mother     COPD Paternal Grandfather     Colon Cancer Maternal Grandfather        SOCIAL HISTORY    Social History     Tobacco Use    Smoking status: Former Smoker     Packs/day: 1.50     Types: Cigarettes     Quit date: 2005     Years since quittin.3    Smokeless tobacco: Former User     Types: Chew    Tobacco comment: States \"vapes a little\"   Vaping Use    Vaping Use: Every day    Substances: Nicotine, Flavoring    Devices: Refillable tank   Substance Use Topics    Alcohol use: No     Alcohol/week: 0.0 standard drinks     Comment: rarely -- 1 every few months    Drug use: No       ALLERGIES    No Known Allergies    MEDICATIONS    Current Outpatient Medications on File Prior to Encounter   Medication Sig Dispense Refill    losartan (COZAAR) 100 MG tablet TAKE ONE TABLET BY MOUTH DAILY 30 tablet 0    oxyCODONE-acetaminophen (PERCOCET) 7.5-325 MG per tablet Take 1 tablet by mouth every 6 hours as needed for Pain (max 4 per day) for up to 28 days. 112 tablet 0    pregabalin (LYRICA) 150 MG capsule Take 1 capsule by mouth 3 times daily for 30 days.  90 capsule 0    diclofenac (VOLTAREN) 75 MG EC tablet Take 1 tablet by mouth 2 times daily as needed for Pain 45 tablet 1    doxycycline hyclate (VIBRA-TABS) 100 MG tablet Take 1 tablet by mouth 2 times daily 60 tablet 1    furosemide (LASIX) 20 MG tablet TAKE ONE TO TWO TABLETS BY MOUTH EVERY MORNING AS NEEDED FOR EDEMA 60 tablet 0    penicillin v potassium (VEETID) 500 MG tablet TAKE TWO TABLETS BY MOUTH TWICE A DAY (Patient not taking: Reported on 3/31/2022) 120 tablet 3    Compression Stockings MISC by Does not apply route Strength 30-40mmHg  Style: pull on below the knee; open or closed toe  Extremity: bilateral  Donning aid recommended: Yes if needed 1 each 2    Compression Stockings MISC by Does not apply route Strength 30-40mmHg  Style: Farrow wrap , below the knee  Extremity: bilateral  Donning aid recommended: No 1 each 0    Multiple Vitamins-Minerals (THERAPEUTIC MULTIVITAMIN-MINERALS) tablet Take 1 tablet by mouth daily      Compression Stockings MISC by Does not apply route Thigh high 1 each 0    Elastic Bandages & Supports (MEDICAL COMPRESSION THIGH HIGH) MISC Thigh high compression stockings, 30-40 mm Hg 2 each 1     No current facility-administered medications on file prior to encounter. REVIEW OF SYSTEMS    Pertinent items are noted in HPI. Last Z1E if applicable:   Hemoglobin A1C   Date Value Ref Range Status   08/10/2021 4.8 See comment % Final     Comment:     Comment:  Diagnosis of Diabetes: > or = 6.5%  Increased risk of diabetes (Prediabetes): 5.7-6.4%  Glycemic Control: Nonpregnant Adults: <7.0%                    Pregnant: <6.0%           Objective:      /77   Pulse 77   Temp 97.3 °F (36.3 °C) (Temporal)   Resp 20     Wt Readings from Last 3 Encounters:   04/07/22 (!) 473 lb 15.8 oz (215 kg)   03/28/22 (!) 475 lb 6.4 oz (215.6 kg)   03/03/22 (!) 473 lb 5.2 oz (214.7 kg)       PHYSICAL EXAM    General Appearance: alert and oriented to person, place and time, well-developed and well-nourished, in no acute distress  Pedal pulses palpable. Protective sensations intact. Ulcer left lateral leg is healed with no signs of infection. There is mild edema bilateral legs. Assessment:      1. 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 Varicose veins of right lower extremity with inflammation, with ulcer of calf limited to breakdown of skin ([address]    3. Varicose veins of left lower extremity with inflammation, with ulcer of calf with fat layer exposed ([address]        Plan:       Treatment Note please see attached Discharge Instructions. Applied No Sting to the previous ulcer sites. Patient to apply in 2 days, then 4 days. Patient instructed to wear compression stockings.   He was educated this will be a weak area of the skin for a period of time and is very likely to reopen if there is edema in the left leg.  Continue to elevate legs. Patient discharged from wound care. Return prn. New Medication(s) at this visit:   New Prescriptions    No medications on file       Other orders at this visit:   Orders Placed This Encounter   Procedures    Initiate Outpatient Wound Care Protocol       Smoking Cessation: Counseling given: Not Answered  Comment: States \"vapes a little\"      Written patient dismissal instructions given to patient and signed by patient or POA.                Electronically signed by Sudhir Everett DPM on 4/21/2022 at 3:00 PM

## 2022-04-25 ENCOUNTER — OFFICE VISIT (OUTPATIENT)
Dept: PAIN MANAGEMENT | Age: 47
End: 2022-04-25
Payer: COMMERCIAL

## 2022-04-25 VITALS
WEIGHT: 315 LBS | HEART RATE: 73 BPM | BODY MASS INDEX: 39.17 KG/M2 | HEIGHT: 75 IN | OXYGEN SATURATION: 92 % | SYSTOLIC BLOOD PRESSURE: 140 MMHG | DIASTOLIC BLOOD PRESSURE: 73 MMHG

## 2022-04-25 DIAGNOSIS — S33.9XXA SPRAIN OF LIGAMENT OF LUMBOSACRAL JOINT, INITIAL ENCOUNTER: ICD-10-CM

## 2022-04-25 DIAGNOSIS — S83.92XD SPRAIN OF LEFT KNEE/LEG, SUBSEQUENT ENCOUNTER: ICD-10-CM

## 2022-04-25 DIAGNOSIS — M51.27 LUMBAGO-SCIATICA DUE TO DISPLACEMENT OF LUMBAR INTERVERTEBRAL DISC: ICD-10-CM

## 2022-04-25 DIAGNOSIS — S83.92XA SPRAIN OF LEFT KNEE/LEG, INITIAL ENCOUNTER: ICD-10-CM

## 2022-04-25 DIAGNOSIS — M54.9 CHRONIC MIDLINE BACK PAIN, UNSPECIFIED BACK LOCATION: ICD-10-CM

## 2022-04-25 DIAGNOSIS — S33.9XXD SPRAIN OF LIGAMENT OF LUMBOSACRAL JOINT, SUBSEQUENT ENCOUNTER: ICD-10-CM

## 2022-04-25 DIAGNOSIS — G89.29 CHRONIC MIDLINE BACK PAIN, UNSPECIFIED BACK LOCATION: ICD-10-CM

## 2022-04-25 PROCEDURE — 99213 OFFICE O/P EST LOW 20 MIN: CPT | Performed by: INTERNAL MEDICINE

## 2022-04-25 RX ORDER — PREGABALIN 150 MG/1
150 CAPSULE ORAL 3 TIMES DAILY
Qty: 90 CAPSULE | Refills: 0 | Status: SHIPPED | OUTPATIENT
Start: 2022-04-25 | End: 2022-05-23 | Stop reason: SDUPTHER

## 2022-04-25 RX ORDER — OXYCODONE AND ACETAMINOPHEN 7.5; 325 MG/1; MG/1
1 TABLET ORAL EVERY 6 HOURS PRN
Qty: 112 TABLET | Refills: 0 | Status: SHIPPED | OUTPATIENT
Start: 2022-04-25 | End: 2022-05-23 | Stop reason: SDUPTHER

## 2022-04-25 RX ORDER — DICLOFENAC SODIUM 75 MG/1
75 TABLET, DELAYED RELEASE ORAL 2 TIMES DAILY PRN
Qty: 45 TABLET | Refills: 1 | Status: SHIPPED | OUTPATIENT
Start: 2022-04-25 | End: 2022-05-23 | Stop reason: SDUPTHER

## 2022-04-25 NOTE — PROGRESS NOTES
Nancy Zhang [facility]  1975  [id number]      HISTORY OF PRESENT ILLNESS:  Mr. Idamae Duane is a 52 y.o. male returns for a follow up visit for pain management  He has a diagnosis of   1. 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 Chronic midline back pain, unspecified back location    . He complains of pain in the lower back, left knee with radiation to the left foot  He rates the pain 4/10 and describes it as sharp, aching, burning, numbness, pins and needles. Current treatment regimen has helped relieve about 70% of the pain. He denies any side effects from the current pain regimen. Patient reports that since the last follow up visit the physical functioning is unchanged, family/social relationships are unchanged, mood is unchanged sleep patterns are unchanged, and that the overall functioning is unchanged. Patient denies misusing/abusing his narcotic pain medications or using any illegal drugs. There are No indicators for possible drug abuse, addiction or diversion problems, patient states he has been doing better. Mr. Idamae Duane says the wound on the leg is almost healed, he does not have to go to wound clinic. Patient reports he is working full time from home. He mentions he is using Lyrica and Voltaren 75 mg daily only. Patient reports her weight has been stable. ALLERGIES: Patients list of allergies were reviewed     MEDICATIONS: Mr. Idamae Duane list of medications were reviewed. His current medications are   Outpatient Medications Prior to Visit   Medication Sig Dispense Refill    losartan (COZAAR) 100 MG tablet TAKE ONE TABLET BY MOUTH DAILY 30 tablet 0    oxyCODONE-acetaminophen (PERCOCET) 7.5-325 MG per tablet Take 1 tablet by mouth every 6 hours as needed for Pain (max 4 per day) for up to 28 days. 112 tablet 0    pregabalin (LYRICA) 150 MG capsule Take 1 capsule by mouth 3 times daily for 30 days.  90 capsule 0    diclofenac (VOLTAREN) 75 MG EC tablet Take 1 tablet by mouth 2 times daily as needed for Pain 45 tablet 1    doxycycline hyclate (VIBRA-TABS) 100 MG tablet Take 1 tablet by mouth 2 times daily 60 tablet 1    furosemide (LASIX) 20 MG tablet TAKE ONE TO TWO TABLETS BY MOUTH EVERY MORNING AS NEEDED FOR EDEMA 60 tablet 0    penicillin v potassium (VEETID) 500 MG tablet TAKE TWO TABLETS BY MOUTH TWICE A  tablet 3    Compression Stockings MISC by Does not apply route Strength 30-40mmHg  Style: pull on below the knee; open or closed toe  Extremity: bilateral  Donning aid recommended: Yes if needed 1 each 2    Compression Stockings MISC by Does not apply route Strength 30-40mmHg  Style: Farrow wrap , below the knee  Extremity: bilateral  Donning aid recommended: No 1 each 0    Multiple Vitamins-Minerals (THERAPEUTIC MULTIVITAMIN-MINERALS) tablet Take 1 tablet by mouth daily      Compression Stockings MISC by Does not apply route Thigh high 1 each 0    Elastic Bandages & Supports (MEDICAL COMPRESSION THIGH HIGH) MISC Thigh high compression stockings, 30-40 mm Hg 2 each 1     No facility-administered medications prior to visit. REVIEW OF SYSTEMS:    Respiratory: Negative for apnea, chest tightness and shortness of breath or change in baseline breathing. PHYSICAL EXAM:   Nursing note and vitals reviewed. BP (!) 140/73 (Site: Left Upper Arm, Position: Sitting)   Pulse 73   Ht 6' 3\" (1.905 m)   Wt (!) 482 lb (218.6 kg)   SpO2 92%   BMI 60.25 kg/m²   Constitutional: He appears well-developed and well-nourished. No acute distress. Cardiovascular: Normal rate, regular rhythm, normal heart sounds, and does not have murmur. Pulmonary/Chest: Effort normal. No respiratory distress. He does not have wheezes in the lung fields. He has no rales. Neurological/Psychiatric:He is alert and oriented to person, place, and time. Coordination is  normal.  His mood isAppropriate and affect is Neutral/Euthymic(normal) .  His  Other: left leg bandaged IMPRESSION:   1.  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 Chronic midline back pain, unspecified back location    3. BWC Sprain of left knee/leg, subsequent encounter    4. BWC Sprain of ligament of lumbosacral joint, subsequent encounter    5. BWC Sprain of ligament of lumbosacral joint, initial encounter    6. BWC Sprain of left knee/leg, initial encounter        PLAN:  Informed verbal consent was obtained  -Continue with current opioid regimen   -He was advised to increase fluids ( 5-7  glasses of fluid daily), limit caffeine, avoid cheese products, increase dietary fiber, increase activity and exercise as tolerated and relax regularly and enjoy meals   -Continue with Percocet 4 per day  -Walking 20 minutes daily as tolerated      Current Outpatient Medications   Medication Sig Dispense Refill    losartan (COZAAR) 100 MG tablet TAKE ONE TABLET BY MOUTH DAILY 30 tablet 0    oxyCODONE-acetaminophen (PERCOCET) 7.5-325 MG per tablet Take 1 tablet by mouth every 6 hours as needed for Pain (max 4 per day) for up to 28 days. 112 tablet 0    pregabalin (LYRICA) 150 MG capsule Take 1 capsule by mouth 3 times daily for 30 days.  90 capsule 0    diclofenac (VOLTAREN) 75 MG EC tablet Take 1 tablet by mouth 2 times daily as needed for Pain 45 tablet 1    doxycycline hyclate (VIBRA-TABS) 100 MG tablet Take 1 tablet by mouth 2 times daily 60 tablet 1    furosemide (LASIX) 20 MG tablet TAKE ONE TO TWO TABLETS BY MOUTH EVERY MORNING AS NEEDED FOR EDEMA 60 tablet 0    penicillin v potassium (VEETID) 500 MG tablet TAKE TWO TABLETS BY MOUTH TWICE A  tablet 3    Compression Stockings MISC by Does not apply route Strength 30-40mmHg  Style: pull on below the knee; open or closed toe  Extremity: bilateral  Donning aid recommended: Yes if needed 1 each 2    Compression Stockings MISC by Does not apply route Strength 30-40mmHg  Style: Farrow wrap , below the knee  Extremity: bilateral  Donning aid recommended: No 1 each 0    Multiple Vitamins-Minerals (THERAPEUTIC MULTIVITAMIN-MINERALS) tablet Take 1 tablet by mouth daily      Compression Stockings MISC by Does not apply route Thigh high 1 each 0    Elastic Bandages & Supports (MEDICAL COMPRESSION THIGH HIGH) MISC Thigh high compression stockings, 30-40 mm Hg 2 each 1     No current facility-administered medications for this visit. I will continue his current medication regimen  which is part of the above treatment schedule. It has been helping with Mr. Gavin Mclaughlin chronic  medical problems which for this visit include:   Diagnoses of  C Lumbago-sciatica due to displacement of lumbar intervertebral disc and Chronic midline back pain, unspecified back location were pertinent to this visit. Risks and benefits of the medications and other alternative treatments  including no treatment were discussed with the patient. The common side effects of these medications were also explained to the patient. Informed verbal consent was obtained. Goals of current treatment regimen include improvement in pain, restoration of functioning- with focus on improvement in physical performance, general activity, work or disability,emotional distress, health care utilization and  decreased medication consumption. Will continue to monitor progress towards achieving/maintaining therapeutic goals with special emphasis on  1. Improvement in perceived interfernce  of pain with ADL's. Ability to do home exercises independently. Ability to do household chores indoor and/or outdoor work and social and leisure activities. Improve psychosocial and physical functioning. - he is showing progression towards this treatment goal with the current regimen.      He was advised against drinking alcohol with the narcotic pain medicines, advised against driving or handling machinery while adjusting the dose of medicines or if having cognitive  issues related to the current medications. Risk of overdose and death, if medicines not taken as prescribed, were also discussed. If the patient develops new symptoms or if the symptoms worsen, the patient should call the office. While transcribing every attempt was made to maintain the accuracy of the note in terms of it's contents,there may have been some errors made inadvertently. Thank you for allowing me to participate in the care of this patient.     Mara Moeller MD.    Cc: Birtha Sever, MD

## 2022-05-02 RX ORDER — LOSARTAN POTASSIUM 100 MG/1
TABLET ORAL
Qty: 30 TABLET | Refills: 0 | Status: SHIPPED | OUTPATIENT
Start: 2022-05-02 | End: 2022-06-06

## 2022-05-02 RX ORDER — FUROSEMIDE 20 MG/1
TABLET ORAL
Qty: 60 TABLET | Refills: 0 | Status: SHIPPED | OUTPATIENT
Start: 2022-05-02 | End: 2022-06-06

## 2022-05-02 NOTE — TELEPHONE ENCOUNTER
Medication:   Requested Prescriptions     Pending Prescriptions Disp Refills    losartan (COZAAR) 100 MG tablet [Pharmacy Med Name: LOSARTAN POTASSIUM 100 MG TAB] 30 tablet 0     Sig: TAKE ONE TABLET BY MOUTH DAILY    furosemide (LASIX) 20 MG tablet [Pharmacy Med Name: FUROSEMIDE 20 MG TABLET] 60 tablet 0     Sig: TAKE ONE TO TWO TABLETS BY MOUTH EVERY MORNING AS NEEDED FOR EDMA       Last Filled:  04/04/2022    Patient Phone Number: [phone] (home) [phone] (work)    Last appt: 3/12/2021   Next appt: Visit date not found    Lab Results   Component Value Date     08/10/2021    K 4.9 08/10/2021    CL 98 (L) 08/10/2021    CO2 29 08/10/2021    BUN 11 08/10/2021    CREATININE 0.6 (L) 08/10/2021    GLUCOSE 98 08/10/2021    CALCIUM 9.2 08/10/2021    PROT 7.1 08/10/2021    LABALBU 4.6 08/10/2021    BILITOT 0.7 08/10/2021    ALKPHOS 75 08/10/2021    AST 29 08/10/2021    ALT 35 08/10/2021    LABGLOM >60 08/10/2021    GFRAA >60 08/10/2021    AGRATIO 1.8 08/10/2021    GLOB 2.5 08/10/2021

## 2022-05-13 DIAGNOSIS — G89.29 CHRONIC PAIN IN RIGHT EAR: Primary | ICD-10-CM

## 2022-05-13 DIAGNOSIS — H92.01 CHRONIC PAIN IN RIGHT EAR: Primary | ICD-10-CM

## 2022-05-23 ENCOUNTER — TELEPHONE (OUTPATIENT)
Dept: ADMINISTRATIVE | Age: 47
End: 2022-05-23

## 2022-05-23 ENCOUNTER — OFFICE VISIT (OUTPATIENT)
Dept: PAIN MANAGEMENT | Age: 47
End: 2022-05-23
Payer: COMMERCIAL

## 2022-05-23 VITALS
WEIGHT: 315 LBS | OXYGEN SATURATION: 91 % | SYSTOLIC BLOOD PRESSURE: 126 MMHG | BODY MASS INDEX: 59.85 KG/M2 | HEART RATE: 80 BPM | DIASTOLIC BLOOD PRESSURE: 70 MMHG

## 2022-05-23 DIAGNOSIS — S83.92XD SPRAIN OF LEFT KNEE/LEG, SUBSEQUENT ENCOUNTER: ICD-10-CM

## 2022-05-23 DIAGNOSIS — S83.92XA SPRAIN OF LEFT KNEE/LEG, INITIAL ENCOUNTER: ICD-10-CM

## 2022-05-23 DIAGNOSIS — M51.27 LUMBAGO-SCIATICA DUE TO DISPLACEMENT OF LUMBAR INTERVERTEBRAL DISC: ICD-10-CM

## 2022-05-23 DIAGNOSIS — S33.9XXA SPRAIN OF LIGAMENT OF LUMBOSACRAL JOINT, INITIAL ENCOUNTER: ICD-10-CM

## 2022-05-23 DIAGNOSIS — M54.9 CHRONIC MIDLINE BACK PAIN, UNSPECIFIED BACK LOCATION: ICD-10-CM

## 2022-05-23 DIAGNOSIS — S33.9XXD SPRAIN OF LIGAMENT OF LUMBOSACRAL JOINT, SUBSEQUENT ENCOUNTER: ICD-10-CM

## 2022-05-23 DIAGNOSIS — G89.29 CHRONIC MIDLINE BACK PAIN, UNSPECIFIED BACK LOCATION: ICD-10-CM

## 2022-05-23 PROCEDURE — 99213 OFFICE O/P EST LOW 20 MIN: CPT | Performed by: INTERNAL MEDICINE

## 2022-05-23 RX ORDER — PREGABALIN 150 MG/1
150 CAPSULE ORAL 3 TIMES DAILY
Qty: 90 CAPSULE | Refills: 0 | Status: SHIPPED | OUTPATIENT
Start: 2022-05-23 | End: 2022-06-21 | Stop reason: SDUPTHER

## 2022-05-23 RX ORDER — OXYCODONE AND ACETAMINOPHEN 7.5; 325 MG/1; MG/1
1 TABLET ORAL EVERY 6 HOURS PRN
Qty: 112 TABLET | Refills: 0 | Status: SHIPPED | OUTPATIENT
Start: 2022-05-23 | End: 2022-06-21 | Stop reason: SDUPTHER

## 2022-05-23 RX ORDER — DICLOFENAC SODIUM 75 MG/1
75 TABLET, DELAYED RELEASE ORAL 2 TIMES DAILY PRN
Qty: 45 TABLET | Refills: 1 | Status: SHIPPED | OUTPATIENT
Start: 2022-05-23 | End: 2022-06-21 | Stop reason: SDUPTHER

## 2022-05-23 NOTE — TELEPHONE ENCOUNTER
Submitted PA for oxyCODONE-Acetaminophen 7.5-325MG tablets Via [facility] Key: [id number] STATUS: APPROVED effective 5/23/2022-11/19/2022    Patient notified via VM

## 2022-05-23 NOTE — PROGRESS NOTES
[facility]  1975  [id number]      HISTORY OF PRESENT ILLNESS:  Mr. Rajwinder Bello is a 52 y.o. male returns for a follow up visit for pain management  He has a diagnosis of   1. 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 [facility] Sprain of left knee/leg, subsequent encounter    3. [facility] Sprain of ligament of lumbosacral joint, initial encounter    4. [facility] Chronic midline back pain, unspecified back location    5. [facility] Sprain of ligament of lumbosacral joint, subsequent encounter    6. [facility] Sprain of left knee/leg, initial encounter    . He complains of pain in the Low back, left lower leg He rates the pain 4/10 and describes it as sharp, aching, burning, numbness, pins and needles. Current treatment regimen has helped relieve about 70% of the pain. He denies any side effects from the current pain regimen. Patient reports that since the last follow up visit the physical functioning is unchanged, family/social relationships are unchanged, mood is unchanged sleep patterns are unchanged, and that the overall functioning is unchanged. Patient denies misusing/abusing his narcotic pain medications or using any illegal drugs. There are No indicators for possible drug abuse, addiction or diversion problems.  reports he has been doing fair, legs symptoms are better. He says he is using Percocet still 4 per day along with the other adjuvants. He reports he is walking from home still. He denies any constipation symptoms. ALLERGIES: Patients list of allergies were reviewed     MEDICATIONS: Mr. Rajwinder Bello list of medications were reviewed. His current medications are   Outpatient Medications Prior to Visit   Medication Sig Dispense Refill    losartan (COZAAR) 100 MG tablet TAKE ONE TABLET BY MOUTH DAILY 30 tablet 0    furosemide (LASIX) 20 MG tablet TAKE ONE TO TWO TABLETS BY MOUTH EVERY MORNING AS NEEDED FOR EDMA 60 tablet 0    penicillin v potassium (VEETID) 500 MG tablet TAKE TWO TABLETS BY MOUTH TWICE A  tablet 3    Compression Stockings MISC by Does not apply route Strength 30-40mmHg  Style: pull on below the knee; open or closed toe  Extremity: bilateral  Donning aid recommended: Yes if needed 1 each 2    Compression Stockings MISC by Does not apply route Strength 30-40mmHg  Style: Farrow wrap , below the knee  Extremity: bilateral  Donning aid recommended: No 1 each 0    Multiple Vitamins-Minerals (THERAPEUTIC MULTIVITAMIN-MINERALS) tablet Take 1 tablet by mouth daily      Compression Stockings MISC by Does not apply route Thigh high 1 each 0    Elastic Bandages & Supports (MEDICAL COMPRESSION THIGH HIGH) MISC Thigh high compression stockings, 30-40 mm Hg 2 each 1    oxyCODONE-acetaminophen (PERCOCET) 7.5-325 MG per tablet Take 1 tablet by mouth every 6 hours as needed for Pain (max 4 per day) for up to 28 days. 112 tablet 0    pregabalin (LYRICA) 150 MG capsule Take 1 capsule by mouth 3 times daily for 30 days. 90 capsule 0    diclofenac (VOLTAREN) 75 MG EC tablet Take 1 tablet by mouth 2 times daily as needed for Pain 45 tablet 1     No facility-administered medications prior to visit. REVIEW OF SYSTEMS:    Respiratory: Negative for apnea, chest tightness and shortness of breath or change in baseline breathing. PHYSICAL EXAM:   Nursing note and vitals reviewed. BP (!) 208/83   Pulse 80   Wt (!) 478 lb 12.8 oz (217.2 kg)   SpO2 91%   BMI 59.85 kg/m²   Constitutional: He appears well-developed and well-nourished. No acute distress. Cardiovascular: Normal rate, regular rhythm, normal heart sounds, and does not have murmur. Pulmonary/Chest: Effort normal. No respiratory distress. He does not have wheezes in the lung fields. He has no rales. Neurological/Psychiatric:He is alert and oriented to person, place, and time. Coordination is  normal.  His mood isAppropriate and affect is Neutral/Euthymic(normal) .   Other: + trace of edema, + limp, + left leg brace   IMPRESSION:   1.  C Lumbago-sciatica due to displacement of lumbar intervertebral disc    2. [facility] Sprain of left knee/leg, subsequent encounter    3. B[facility] Sprain of ligament of lumbosacral joint, initial encounter    4. BWC Chronic midline back pain, unspecified back location    5. B[facility] Sprain of ligament of lumbosacral joint, subsequent encounter    6. B[facility] Sprain of left knee/leg, initial encounter        PLAN:  Informed verbal consent was obtained  - ROM/stretching exercises as advised   -He was advised to increase fluids ( 5-7  glasses of fluid daily), limit caffeine, avoid cheese products, increase dietary fiber, increase activity and exercise as tolerated and relax regularly and enjoy meals   -Continue with Percocet 4 per day   -Walking as tolerated 20-30 minutes daily   Current Outpatient Medications   Medication Sig Dispense Refill    oxyCODONE-acetaminophen (PERCOCET) 7.5-325 MG per tablet Take 1 tablet by mouth every 6 hours as needed for Pain (max 4 per day) for up to 28 days. 112 tablet 0    pregabalin (LYRICA) 150 MG capsule Take 1 capsule by mouth 3 times daily for 30 days.  90 capsule 0    diclofenac (VOLTAREN) 75 MG EC tablet Take 1 tablet by mouth 2 times daily as needed for Pain 45 tablet 1    losartan (COZAAR) 100 MG tablet TAKE ONE TABLET BY MOUTH DAILY 30 tablet 0    furosemide (LASIX) 20 MG tablet TAKE ONE TO TWO TABLETS BY MOUTH EVERY MORNING AS NEEDED FOR EDMA 60 tablet 0    penicillin v potassium (VEETID) 500 MG tablet TAKE TWO TABLETS BY MOUTH TWICE A  tablet 3    Compression Stockings MISC by Does not apply route Strength 30-40mmHg  Style: pull on below the knee; open or closed toe  Extremity: bilateral  Donning aid recommended: Yes if needed 1 each 2    Compression Stockings MISC by Does not apply route Strength 30-40mmHg  Style: Farrow wrap , below the knee  Extremity: bilateral  Donning aid recommended: No 1 each 0    Multiple Vitamins-Minerals (THERAPEUTIC MULTIVITAMIN-MINERALS) tablet Take 1 tablet by mouth daily      Compression Stockings MISC by Does not apply route Thigh high 1 each 0    Elastic Bandages & Supports (MEDICAL COMPRESSION THIGH HIGH) MISC Thigh high compression stockings, 30-40 mm Hg 2 each 1     No current facility-administered medications for this visit. I will continue his current medication regimen  which is part of the above treatment schedule. It has been helping with Mr. Mariusz Daniel chronic  medical problems which for this visit include:   Diagnoses of  [facility] Lumbago-sciatica due to displacement of lumbar intervertebral disc, B[facility] Sprain of left knee/leg, subsequent encounter, BWC Sprain of ligament of lumbosacral joint, initial encounter, C Chronic midline back pain, unspecified back location, BWC Sprain of ligament of lumbosacral joint, subsequent encounter, and [facility] Sprain of left knee/leg, initial encounter were pertinent to this visit. Risks and benefits of the medications and other alternative treatments  including no treatment were discussed with the patient. The common side effects of these medications were also explained to the patient. Informed verbal consent was obtained. Goals of current treatment regimen include improvement in pain, restoration of functioning- with focus on improvement in physical performance, general activity, work or disability,emotional distress, health care utilization and  decreased medication consumption. Will continue to monitor progress towards achieving/maintaining therapeutic goals with special emphasis on  1. Improvement in perceived interfernce  of pain with ADL's. Ability to do home exercises independently. Ability to do household chores indoor and/or outdoor work and social and leisure activities. Improve psychosocial and physical functioning. - he is showing progression towards this treatment goal with the current regimen.        He was advised against drinking alcohol with the narcotic pain medicines, advised against driving or handling machinery while adjusting the dose of medicines or if having cognitive  issues related to the current medications. Risk of overdose and death, if medicines not taken as prescribed, were also discussed. If the patient develops new symptoms or if the symptoms worsen, the patient should call the office. While transcribing every attempt was made to maintain the accuracy of the note in terms of it's contents,there may have been some errors made inadvertently. Thank you for allowing me to participate in the care of this patient.     Milnea Milan MD.    Cc: Leanna Cuenca MD

## 2022-05-24 ENCOUNTER — TELEPHONE (OUTPATIENT)
Dept: FAMILY MEDICINE CLINIC | Age: 47
End: 2022-05-24

## 2022-05-24 DIAGNOSIS — R05.9 COUGH: Primary | ICD-10-CM

## 2022-05-24 NOTE — TELEPHONE ENCOUNTER
Patient for the last few days's patient has had a heavy cough,sob walking around moving. Patient has tried taking mucinex. Doesn't seem to help.  No other symptoms

## 2022-05-24 NOTE — TELEPHONE ENCOUNTER
He should get a drive-through PCR COVID test which I have ordered and once the COVID test is back we can do a video visit with him tomorrow

## 2022-05-25 ENCOUNTER — NURSE ONLY (OUTPATIENT)
Dept: FAMILY MEDICINE CLINIC | Age: 47
End: 2022-05-25

## 2022-05-25 DIAGNOSIS — Z11.52 ENCOUNTER FOR SCREENING FOR COVID-19: Primary | ICD-10-CM

## 2022-05-26 ENCOUNTER — TELEPHONE (OUTPATIENT)
Dept: FAMILY MEDICINE CLINIC | Age: 47
End: 2022-05-26

## 2022-05-26 ENCOUNTER — TELEMEDICINE (OUTPATIENT)
Dept: FAMILY MEDICINE CLINIC | Age: 47
End: 2022-05-26
Payer: COMMERCIAL

## 2022-05-26 DIAGNOSIS — G89.29 CHRONIC MIDLINE BACK PAIN, UNSPECIFIED BACK LOCATION: Chronic | ICD-10-CM

## 2022-05-26 DIAGNOSIS — M54.9 CHRONIC MIDLINE BACK PAIN, UNSPECIFIED BACK LOCATION: Chronic | ICD-10-CM

## 2022-05-26 DIAGNOSIS — G47.33 OBSTRUCTIVE SLEEP APNEA: ICD-10-CM

## 2022-05-26 DIAGNOSIS — E66.01 MORBID OBESITY WITH BMI OF 50.0-59.9, ADULT (HCC): Primary | Chronic | ICD-10-CM

## 2022-05-26 LAB — SARS-COV-2: NOT DETECTED

## 2022-05-26 PROCEDURE — 99213 OFFICE O/P EST LOW 20 MIN: CPT | Performed by: FAMILY MEDICINE

## 2022-05-26 RX ORDER — ALBUTEROL SULFATE 90 UG/1
2 AEROSOL, METERED RESPIRATORY (INHALATION) EVERY 6 HOURS PRN
Qty: 18 G | Refills: 3 | Status: SHIPPED | OUTPATIENT
Start: 2022-05-26

## 2022-05-26 SDOH — ECONOMIC STABILITY: FOOD INSECURITY: WITHIN THE PAST 12 MONTHS, YOU WORRIED THAT YOUR FOOD WOULD RUN OUT BEFORE YOU GOT MONEY TO BUY MORE.: NEVER TRUE

## 2022-05-26 SDOH — ECONOMIC STABILITY: FOOD INSECURITY: WITHIN THE PAST 12 MONTHS, THE FOOD YOU BOUGHT JUST DIDN'T LAST AND YOU DIDN'T HAVE MONEY TO GET MORE.: NEVER TRUE

## 2022-05-26 ASSESSMENT — PATIENT HEALTH QUESTIONNAIRE - PHQ9
SUM OF ALL RESPONSES TO PHQ QUESTIONS 1-9: 0
SUM OF ALL RESPONSES TO PHQ9 QUESTIONS 1 & 2: 0
2. FEELING DOWN, DEPRESSED OR HOPELESS: 0
SUM OF ALL RESPONSES TO PHQ QUESTIONS 1-9: 0
1. LITTLE INTEREST OR PLEASURE IN DOING THINGS: 0

## 2022-05-26 ASSESSMENT — SOCIAL DETERMINANTS OF HEALTH (SDOH): HOW HARD IS IT FOR YOU TO PAY FOR THE VERY BASICS LIKE FOOD, HOUSING, MEDICAL CARE, AND HEATING?: NOT HARD AT ALL

## 2022-05-26 NOTE — PROGRESS NOTES
Fern Michele is a 52 y.o. male. Due to the current coronavirus pandemic, this telephone/video visit was insisted, with patient's consent, to reduce the patient's risk of exposure to COVID-19 and provide continuity of care for an established patient. The patient was at home while the provider was either at home or at the clinic. Services were provided through a synchronous discussion over the telephone and/or video chat to substitute for in person clinic visit, and coded as such. HPI:  Unvaccinated for exposure COVID and had a light case several months ago and recovered  He has now had 3 days of chest congestion with a deep cough bringing up some phlegm shortness of breath but no fever  The cough has improved slightly this phlegm is clear he has been using Mucinex. He does vape with nicotine but does not smoke cigarettes  He denies earache sore throat loss of sense of taste or smell nausea vomiting diarrhea or constipation and fever. He is sleeping fine    Wt Readings from Last 3 Encounters:   05/23/22 (!) 478 lb 12.8 oz (217.2 kg)   04/25/22 (!) 482 lb (218.6 kg)   04/07/22 (!) 473 lb 15.8 oz (215 kg)     Meds, vitamins and allergies reviewed with Patient    ROS:  Gen: No fever  HEENT: No cold symptoms, no sore throat. CV:  Denies chest pain or palpitations. Pulm: Some shortness of breath with exertion, deep slowly improving cough. Abd:  Denies abdominal pain, nausea and vomiting. Skin: no rash    No Known Allergies    Prior to Visit Medications    Medication Sig Taking? Authorizing Provider   oxyCODONE-acetaminophen (PERCOCET) 7.5-325 MG per tablet Take 1 tablet by mouth every 6 hours as needed for Pain (max 4 per day) for up to 28 days. Yes Aditya Pacheco MD   pregabalin (LYRICA) 150 MG capsule Take 1 capsule by mouth 3 times daily for 30 days.  Yes Aditya Pacheco MD   diclofenac (VOLTAREN) 75 MG EC tablet Take 1 tablet by mouth 2 times daily as needed for Pain Yes Aditya Pacheco MD   losartan (COZAAR) 100 MG tablet TAKE ONE TABLET BY MOUTH DAILY Yes Red Curry MD   furosemide (LASIX) 20 MG tablet TAKE ONE TO TWO TABLETS BY MOUTH EVERY MORNING AS NEEDED FOR EDMA Yes Red Curry MD   penicillin v potassium (VEETID) 500 MG tablet TAKE TWO TABLETS BY MOUTH TWICE A DAY Yes Franklin Sandhu MD   Compression Stockings MISC by Does not apply route Strength 30-40mmHg  Style: pull on below the knee; open or closed toe  Extremity: bilateral  Donning aid recommended: Yes if needed Yes Leigh Almaguer DPM   Multiple Vitamins-Minerals (THERAPEUTIC MULTIVITAMIN-MINERALS) tablet Take 1 tablet by mouth daily Yes Historical Provider, MD       OBJECTIVE:  There were no vitals taken for this visit. GEN:  in NAD appears well  Sinuses: Nontender per patient self palpation  PULM: Deep wet cough heard, no audible wheezes unless he does forced expiration then you could hear wheezing and rhonchi  NEURO: Alert and oriented ×3  COVID test - 5/25/2022  ASSESSMENT/PLAN:  Encounter Diagnoses   Name Primary?     Morbid obesity with BMI of 50.0-59.9, adult ([facility]) Yes    Chronic midline back pain, unspecified back location     Obstructive sleep apnea      Most likely viral bronchitis slowly improving  Supportive care with fluids rest Tylenol  Mucinex and albuterol  Doxycycline 100 twice daily for 10 days if he gets worse only  Warning signs discussed

## 2022-05-26 NOTE — TELEPHONE ENCOUNTER
Patient states Covid test is negative, done  On 5-25-22  Patient thought he was sched. for VV visit  Patient is having congestion and would like to speak w/Dr. Phillip Saliva re: symptoms  Patient states light yellow when he coughs  Since Tuesday

## 2022-06-06 RX ORDER — FUROSEMIDE 20 MG/1
TABLET ORAL
Qty: 60 TABLET | Refills: 3 | Status: SHIPPED | OUTPATIENT
Start: 2022-06-06 | End: 2022-10-03

## 2022-06-06 RX ORDER — LOSARTAN POTASSIUM 100 MG/1
TABLET ORAL
Qty: 30 TABLET | Refills: 3 | Status: SHIPPED | OUTPATIENT
Start: 2022-06-06 | End: 2022-10-03

## 2022-06-06 NOTE — TELEPHONE ENCOUNTER
lov 5/26/22  lrf 30 0 5/2/22   60 0 5/2/22    Lab Results   Component Value Date     08/10/2021    K 4.9 08/10/2021    CL 98 (L) 08/10/2021    CO2 29 08/10/2021    BUN 11 08/10/2021    CREATININE 0.6 (L) 08/10/2021    GLUCOSE 98 08/10/2021    CALCIUM 9.2 08/10/2021    PROT 7.1 08/10/2021    LABALBU 4.6 08/10/2021    BILITOT 0.7 08/10/2021    ALKPHOS 75 08/10/2021    AST 29 08/10/2021    ALT 35 08/10/2021    LABGLOM >60 08/10/2021    GFRAA >60 08/10/2021    AGRATIO 1.8 08/10/2021    GLOB 2.5 08/10/2021

## 2022-06-21 ENCOUNTER — OFFICE VISIT (OUTPATIENT)
Dept: PAIN MANAGEMENT | Age: 47
End: 2022-06-21
Payer: COMMERCIAL

## 2022-06-21 VITALS
HEART RATE: 76 BPM | BODY MASS INDEX: 39.17 KG/M2 | SYSTOLIC BLOOD PRESSURE: 179 MMHG | WEIGHT: 315 LBS | DIASTOLIC BLOOD PRESSURE: 62 MMHG | OXYGEN SATURATION: 98 % | HEIGHT: 75 IN

## 2022-06-21 DIAGNOSIS — M54.9 CHRONIC MIDLINE BACK PAIN, UNSPECIFIED BACK LOCATION: ICD-10-CM

## 2022-06-21 DIAGNOSIS — S83.92XA SPRAIN OF LEFT KNEE/LEG, INITIAL ENCOUNTER: ICD-10-CM

## 2022-06-21 DIAGNOSIS — S33.9XXD SPRAIN OF LIGAMENT OF LUMBOSACRAL JOINT, SUBSEQUENT ENCOUNTER: ICD-10-CM

## 2022-06-21 DIAGNOSIS — S83.92XD SPRAIN OF LEFT KNEE/LEG, SUBSEQUENT ENCOUNTER: ICD-10-CM

## 2022-06-21 DIAGNOSIS — G89.29 CHRONIC MIDLINE BACK PAIN, UNSPECIFIED BACK LOCATION: ICD-10-CM

## 2022-06-21 DIAGNOSIS — S33.9XXA SPRAIN OF LIGAMENT OF LUMBOSACRAL JOINT, INITIAL ENCOUNTER: ICD-10-CM

## 2022-06-21 DIAGNOSIS — M51.27 LUMBAGO-SCIATICA DUE TO DISPLACEMENT OF LUMBAR INTERVERTEBRAL DISC: ICD-10-CM

## 2022-06-21 PROCEDURE — 99213 OFFICE O/P EST LOW 20 MIN: CPT | Performed by: INTERNAL MEDICINE

## 2022-06-21 RX ORDER — DICLOFENAC SODIUM 75 MG/1
75 TABLET, DELAYED RELEASE ORAL 2 TIMES DAILY PRN
Qty: 45 TABLET | Refills: 1 | Status: SHIPPED | OUTPATIENT
Start: 2022-06-21 | End: 2022-07-19 | Stop reason: SDUPTHER

## 2022-06-21 RX ORDER — OXYCODONE AND ACETAMINOPHEN 7.5; 325 MG/1; MG/1
1 TABLET ORAL EVERY 6 HOURS PRN
Qty: 112 TABLET | Refills: 0 | Status: SHIPPED | OUTPATIENT
Start: 2022-06-21 | End: 2022-07-19 | Stop reason: SDUPTHER

## 2022-06-21 RX ORDER — PREGABALIN 150 MG/1
150 CAPSULE ORAL 3 TIMES DAILY
Qty: 90 CAPSULE | Refills: 0 | Status: SHIPPED | OUTPATIENT
Start: 2022-06-21 | End: 2022-07-19 | Stop reason: SDUPTHER

## 2022-06-21 NOTE — PROGRESS NOTES
[facility]  1975  [id number]      HISTORY OF PRESENT ILLNESS:  Mr. Rajwinder Bello is a 52 y.o. male returns for a follow up visit for pain management  He has a diagnosis of   1. 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 BWC Chronic midline back pain, unspecified back location    3. BWC Sprain of left knee/leg, subsequent encounter    4. BWC Sprain of ligament of lumbosacral joint, subsequent encounter    5. BWC Sprain of ligament of lumbosacral joint, initial encounter    6. BWC Sprain of left knee/leg, initial encounter    . He complains of pain in the lower back, left knee with radiation to the left lower leg, left ankle, left foot  He rates the pain 4/10 and describes it as aching, burning, numbness. Current treatment regimen has helped relieve about 70% of the pain. He denies any side effects from the current pain regimen. Patient reports that since the last follow up visit the physical functioning is unchanged, family/social relationships are unchanged, mood is unchanged sleep patterns are unchanged, and that the overall functioning is unchanged. Patient denies misusing/abusing his narcotic pain medications or using any illegal drugs. There are No indicators for possible drug abuse, addiction or diversion problems. Patient reports he has been doing fair, left leg wound is better. She mentions she is using Percocet 4 per day. Along with Lyrica and the other adjuvants. She denies any constipation symptoms. She states her weight has been stable. ALLERGIES: Patients list of allergies were reviewed     MEDICATIONS: Mr. Rajwinder Bello list of medications were reviewed. His current medications are   Outpatient Medications Prior to Visit   Medication Sig Dispense Refill    losartan (COZAAR) 100 MG tablet TAKE ONE TABLET BY MOUTH DAILY 30 tablet 3    furosemide (LASIX) 20 MG tablet TAKE ONE TO TWO TABLETS BY MOUTH EVERY MORNING AS NEEDED FOR EDEMA 60 tablet 3    albuterol sulfate HFA (PROAIR HFA) 108 (90 Base) MCG/ACT inhaler Inhale 2 puffs into the lungs every 6 hours as needed for Wheezing 18 g 3    penicillin v potassium (VEETID) 500 MG tablet TAKE TWO TABLETS BY MOUTH TWICE A  tablet 3    Compression Stockings MISC by Does not apply route Strength 30-40mmHg  Style: pull on below the knee; open or closed toe  Extremity: bilateral  Donning aid recommended: Yes if needed 1 each 2    Multiple Vitamins-Minerals (THERAPEUTIC MULTIVITAMIN-MINERALS) tablet Take 1 tablet by mouth daily      pregabalin (LYRICA) 150 MG capsule Take 1 capsule by mouth 3 times daily for 30 days. 90 capsule 0    diclofenac (VOLTAREN) 75 MG EC tablet Take 1 tablet by mouth 2 times daily as needed for Pain 45 tablet 1     No facility-administered medications prior to visit. REVIEW OF SYSTEMS:    Respiratory: Negative for apnea, chest tightness and shortness of breath or change in baseline breathing. PHYSICAL EXAM:   Nursing note and vitals reviewed. BP (!) 179/62   Pulse 76   Ht 6' 3\" (1.905 m)   Wt (!) 483 lb (219.1 kg)   SpO2 98%   BMI 60.37 kg/m²   Constitutional: He appears well-developed and well-nourished. No acute distress. Cardiovascular: Normal rate, regular rhythm, normal heart sounds, and does not have murmur. Pulmonary/Chest: Effort normal. No respiratory distress. He does not have wheezes in the lung fields. He has no rales. Neurological/Psychiatric:He is alert and oriented to person, place, and time. Coordination is  normal.  His mood isAppropriate and affect is Neutral/Euthymic(normal) . His  Other: +1 edema. Left leg brace     IMPRESSION:   1. [facility] Lumbago-sciatica due to displacement of lumbar intervertebral disc    2. [facility] Chronic midline back pain, unspecified back location    3. C Sprain of left knee/leg, subsequent encounter    4. BWC Sprain of ligament of lumbosacral joint, subsequent encounter    5.  C Sprain of ligament of lumbosacral joint, initial encounter    6. BWC Sprain of left knee/leg, initial encounter        PLAN:  Informed verbal consent was obtained  -ROM/Stretching exercises as advised   -He was advised to increase fluids ( 5-7  glasses of fluid daily), limit caffeine, avoid cheese products, increase dietary fiber, increase activity and exercise as tolerated and relax regularly and enjoy meals   -Urine drug screen with GC/MS for opiates and drugs of abuse was ordered and will follow up on results.   -Continue with Percocet 4 per day   -Continue with Lyrica and Voltaren PRN      Current Outpatient Medications   Medication Sig Dispense Refill    pregabalin (LYRICA) 150 MG capsule Take 1 capsule by mouth 3 times daily for 30 days. 90 capsule 0    diclofenac (VOLTAREN) 75 MG EC tablet Take 1 tablet by mouth 2 times daily as needed for Pain 45 tablet 1    oxyCODONE-acetaminophen (PERCOCET) 7.5-325 MG per tablet Take 1 tablet by mouth every 6 hours as needed for Pain (max 4 per day) for up to 28 days. 112 tablet 0    losartan (COZAAR) 100 MG tablet TAKE ONE TABLET BY MOUTH DAILY 30 tablet 3    furosemide (LASIX) 20 MG tablet TAKE ONE TO TWO TABLETS BY MOUTH EVERY MORNING AS NEEDED FOR EDEMA 60 tablet 3    albuterol sulfate HFA (PROAIR HFA) 108 (90 Base) MCG/ACT inhaler Inhale 2 puffs into the lungs every 6 hours as needed for Wheezing 18 g 3    penicillin v potassium (VEETID) 500 MG tablet TAKE TWO TABLETS BY MOUTH TWICE A  tablet 3    Compression Stockings MISC by Does not apply route Strength 30-40mmHg  Style: pull on below the knee; open or closed toe  Extremity: bilateral  Donning aid recommended: Yes if needed 1 each 2    Multiple Vitamins-Minerals (THERAPEUTIC MULTIVITAMIN-MINERALS) tablet Take 1 tablet by mouth daily       No current facility-administered medications for this visit. I will continue his current medication regimen  which is part of the above treatment schedule.  It has been helping with Mr. Soares Gain chronic medical problems which for this visit include:   Diagnoses of  [facility] Lumbago-sciatica due to displacement of lumbar intervertebral disc, [facility] Chronic midline back pain, unspecified back location, BWC Sprain of left knee/leg, subsequent encounter, BWC Sprain of ligament of lumbosacral joint, subsequent encounter, BWC Sprain of ligament of lumbosacral joint, initial encounter, and BWC Sprain of left knee/leg, initial encounter were pertinent to this visit. Risks and benefits of the medications and other alternative treatments  including no treatment were discussed with the patient. The common side effects of these medications were also explained to the patient. Informed verbal consent was obtained. Goals of current treatment regimen include improvement in pain, restoration of functioning- with focus on improvement in physical performance, general activity, work or disability,emotional distress, health care utilization and  decreased medication consumption. Will continue to monitor progress towards achieving/maintaining therapeutic goals with special emphasis on  1. Improvement in perceived interfernce  of pain with ADL's. Ability to do home exercises independently. Ability to do household chores indoor and/or outdoor work and social and leisure activities. Improve psychosocial and physical functioning. - he is showing progression towards this treatment goal with the current regimen. He was advised against drinking alcohol with the narcotic pain medicines, advised against driving or handling machinery while adjusting the dose of medicines or if having cognitive  issues related to the current medications. Risk of overdose and death, if medicines not taken as prescribed, were also discussed. If the patient develops new symptoms or if the symptoms worsen, the patient should call the office.     While transcribing every attempt was made to maintain the accuracy of the note in terms of it's contents,there may have been some errors made inadvertently. Thank you for allowing me to participate in the care of this patient.     Allyson Avitia MD.    Cc: Lulu Aguilar MD

## 2022-07-19 ENCOUNTER — OFFICE VISIT (OUTPATIENT)
Dept: PAIN MANAGEMENT | Age: 47
End: 2022-07-19
Payer: COMMERCIAL

## 2022-07-19 ENCOUNTER — TELEPHONE (OUTPATIENT)
Dept: ADMINISTRATIVE | Age: 47
End: 2022-07-19

## 2022-07-19 VITALS
WEIGHT: 315 LBS | HEART RATE: 85 BPM | DIASTOLIC BLOOD PRESSURE: 81 MMHG | BODY MASS INDEX: 60 KG/M2 | SYSTOLIC BLOOD PRESSURE: 143 MMHG

## 2022-07-19 DIAGNOSIS — S33.9XXD SPRAIN OF LIGAMENT OF LUMBOSACRAL JOINT, SUBSEQUENT ENCOUNTER: ICD-10-CM

## 2022-07-19 DIAGNOSIS — S33.9XXA SPRAIN OF LIGAMENT OF LUMBOSACRAL JOINT, INITIAL ENCOUNTER: ICD-10-CM

## 2022-07-19 DIAGNOSIS — S83.92XD SPRAIN OF LEFT KNEE/LEG, SUBSEQUENT ENCOUNTER: ICD-10-CM

## 2022-07-19 DIAGNOSIS — S83.92XA SPRAIN OF LEFT KNEE/LEG, INITIAL ENCOUNTER: ICD-10-CM

## 2022-07-19 DIAGNOSIS — M51.27 LUMBAGO-SCIATICA DUE TO DISPLACEMENT OF LUMBAR INTERVERTEBRAL DISC: ICD-10-CM

## 2022-07-19 DIAGNOSIS — G89.29 CHRONIC MIDLINE BACK PAIN, UNSPECIFIED BACK LOCATION: ICD-10-CM

## 2022-07-19 DIAGNOSIS — M54.9 CHRONIC MIDLINE BACK PAIN, UNSPECIFIED BACK LOCATION: ICD-10-CM

## 2022-07-19 PROCEDURE — 99213 OFFICE O/P EST LOW 20 MIN: CPT | Performed by: INTERNAL MEDICINE

## 2022-07-19 RX ORDER — OXYCODONE AND ACETAMINOPHEN 7.5; 325 MG/1; MG/1
1 TABLET ORAL EVERY 6 HOURS PRN
Qty: 112 TABLET | Refills: 0 | Status: SHIPPED | OUTPATIENT
Start: 2022-07-19 | End: 2022-08-17 | Stop reason: SDUPTHER

## 2022-07-19 RX ORDER — PREGABALIN 150 MG/1
CAPSULE ORAL
Qty: 90 CAPSULE | Refills: 0 | Status: SHIPPED | OUTPATIENT
Start: 2022-07-19 | End: 2022-08-17 | Stop reason: SDUPTHER

## 2022-07-19 RX ORDER — DICLOFENAC SODIUM 75 MG/1
75 TABLET, DELAYED RELEASE ORAL 2 TIMES DAILY PRN
Qty: 45 TABLET | Refills: 1 | Status: SHIPPED | OUTPATIENT
Start: 2022-07-19 | End: 2022-08-17 | Stop reason: SDUPTHER

## 2022-07-19 NOTE — TELEPHONE ENCOUNTER
Submitted PA for Diclofenac Sodium 75MG dr tablets Via CM Key: [id number] STATUS: \"Available without authorization. \"

## 2022-07-19 NOTE — PROGRESS NOTES
[facility]  1975  [id number]      HISTORY OF PRESENT ILLNESS:  Mr. Selvin Hernandez is a 52 y.o. male returns for a follow up visit for pain management  He has a diagnosis of   1. 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 B[facility] Chronic midline back pain, unspecified back location    3. B[facility] Sprain of left knee/leg, subsequent encounter    4. BWC Sprain of ligament of lumbosacral joint, initial encounter    5. BWC Sprain of ligament of lumbosacral joint, subsequent encounter    6. BWC Sprain of left knee/leg, initial encounter    . He complains of pain in the  Low back, left lower leg and foot. He rates the pain 4/10 and describes it as sharp, aching. Current treatment regimen has helped relieve about 70% of the pain. He denies any side effects from the current pain regimen. Patient reports that since the last follow up visit the physical functioning is unchanged, family/social relationships are unchanged, mood is unchanged sleep patterns are unchanged, and that the overall functioning is unchanged. Patient denies misusing/abusing his narcotic pain medications or using any illegal drugs. There are No indicators for possible drug abuse, addiction or diversion problems, patient states he has been doing fair, his pain has been manageable. Mr. Selvin Hernandez reports he has been working full time from home. He mentions he is using Voltaren along with Lyrica and Percocet 4 per day. He states his leg symptoms are better. Patient denies any constipation symptoms. Patient reports his weight has been stable. ALLERGIES: Patients list of allergies were reviewed     MEDICATIONS: Mr. Selvin Hernandez list of medications were reviewed. His current medications are   Outpatient Medications Prior to Visit   Medication Sig Dispense Refill    losartan (COZAAR) 100 MG tablet TAKE ONE TABLET BY MOUTH DAILY 30 tablet 3    furosemide (LASIX) 20 MG tablet TAKE ONE TO TWO TABLETS BY MOUTH EVERY MORNING AS NEEDED FOR EDEMA 60 tablet 3    albuterol sulfate HFA (PROAIR HFA) 108 (90 Base) MCG/ACT inhaler Inhale 2 puffs into the lungs every 6 hours as needed for Wheezing 18 g 3    penicillin v potassium (VEETID) 500 MG tablet TAKE TWO TABLETS BY MOUTH TWICE A  tablet 3    Compression Stockings MISC by Does not apply route Strength 30-40mmHg  Style: pull on below the knee; open or closed toe  Extremity: bilateral  Donning aid recommended: Yes if needed 1 each 2    Multiple Vitamins-Minerals (THERAPEUTIC MULTIVITAMIN-MINERALS) tablet Take 1 tablet by mouth daily      pregabalin (LYRICA) 150 MG capsule Take 1 capsule by mouth 3 times daily for 30 days. 90 capsule 0    diclofenac (VOLTAREN) 75 MG EC tablet Take 1 tablet by mouth 2 times daily as needed for Pain 45 tablet 1    oxyCODONE-acetaminophen (PERCOCET) 7.5-325 MG per tablet Take 1 tablet by mouth every 6 hours as needed for Pain (max 4 per day) for up to 28 days. 112 tablet 0     No facility-administered medications prior to visit. REVIEW OF SYSTEMS:    Respiratory: Negative for apnea, chest tightness and shortness of breath or change in baseline breathing. PHYSICAL EXAM:   Nursing note and vitals reviewed. BP (!) 143/81   Pulse 85   Wt (!) 480 lb (217.7 kg)   BMI 60.00 kg/m²   Constitutional: He appears well-developed and well-nourished. No acute distress. Cardiovascular: Normal rate, regular rhythm, normal heart sounds, and does not have murmur. Pulmonary/Chest: Effort normal. No respiratory distress. He does not have wheezes in the lung fields. He has no rales. Neurological/Psychiatric:He is alert and oriented to person, place, and time. Coordination is  normal.  His mood isAppropriate and affect is Neutral/Euthymic(normal) . His  Other: +hyperkeratosis with hyperpigmenting LE  IMPRESSION:   1. 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 [facility] Chronic midline back pain, unspecified back location    3. B[facility] Sprain of left knee/leg, subsequent encounter    4. BWC Sprain of ligament of lumbosacral joint, initial encounter    5. BWC Sprain of ligament of lumbosacral joint, subsequent encounter    6. BWC Sprain of left knee/leg, initial encounter        PLAN:  Informed verbal consent was obtained  -ROM/Stretching exercises as advised   -Labs ordered CBC, CMP.   -He was advised to increase fluids ( 5-7  glasses of fluid daily), limit caffeine, avoid cheese products, increase dietary fiber, increase activity and exercise as tolerated and relax regularly and enjoy meals   -Continue with Percocet 4 per day  -Patient's urine drug screen results with GC/MS confirmation were obtained and reviewed and were negative for any illicit drugs. Prescribed medications were within acceptable range.   -Continue with all other adjuvant medications as before      Current Outpatient Medications   Medication Sig Dispense Refill    pregabalin (LYRICA) 150 MG capsule Take one capsule TID 90 capsule 0    diclofenac (VOLTAREN) 75 MG EC tablet Take 1 tablet by mouth 2 times daily as needed for Pain 45 tablet 1    oxyCODONE-acetaminophen (PERCOCET) 7.5-325 MG per tablet Take 1 tablet by mouth every 6 hours as needed for Pain (max 4 per day) for up to 28 days.  112 tablet 0    losartan (COZAAR) 100 MG tablet TAKE ONE TABLET BY MOUTH DAILY 30 tablet 3    furosemide (LASIX) 20 MG tablet TAKE ONE TO TWO TABLETS BY MOUTH EVERY MORNING AS NEEDED FOR EDEMA 60 tablet 3    albuterol sulfate HFA (PROAIR HFA) 108 (90 Base) MCG/ACT inhaler Inhale 2 puffs into the lungs every 6 hours as needed for Wheezing 18 g 3    penicillin v potassium (VEETID) 500 MG tablet TAKE TWO TABLETS BY MOUTH TWICE A  tablet 3    Compression Stockings MISC by Does not apply route Strength 30-40mmHg  Style: pull on below the knee; open or closed toe  Extremity: bilateral  Donning aid recommended: Yes if needed 1 each 2    Multiple Vitamins-Minerals (THERAPEUTIC MULTIVITAMIN-MINERALS) tablet Take 1 tablet by mouth daily       No current facility-administered medications for this visit. I will continue his current medication regimen  which is part of the above treatment schedule. It has been helping with Mr. Windy Alex chronic  medical problems which for this visit include:   Diagnoses of C Lumbago-sciatica due to displacement of lumbar intervertebral disc, BWC Chronic midline back pain, unspecified back location, BWC Sprain of left knee/leg, subsequent encounter, BWC Sprain of ligament of lumbosacral joint, initial encounter, BWC Sprain of ligament of lumbosacral joint, subsequent encounter, and BWC Sprain of left knee/leg, initial encounter were pertinent to this visit. Risks and benefits of the medications and other alternative treatments  including no treatment were discussed with the patient. The common side effects of these medications were also explained to the patient. Informed verbal consent was obtained. Goals of current treatment regimen include improvement in pain, restoration of functioning- with focus on improvement in physical performance, general activity, work or disability,emotional distress, health care utilization and  decreased medication consumption. Will continue to monitor progress towards achieving/maintaining therapeutic goals with special emphasis on  1. Improvement in perceived interfernce  of pain with ADL's. Ability to do home exercises independently. Ability to do household chores indoor and/or outdoor work and social and leisure activities. Improve psychosocial and physical functioning. - he is showing progression towards this treatment goal with the current regimen. He was advised against drinking alcohol with the narcotic pain medicines, advised against driving or handling machinery while adjusting the dose of medicines or if having cognitive  issues related to the current medications. Risk of overdose and death, if medicines not taken as prescribed, were also discussed. If the patient develops new symptoms or if the symptoms worsen, the patient should call the office. While transcribing every attempt was made to maintain the accuracy of the note in terms of it's contents,there may have been some errors made inadvertently. Thank you for allowing me to participate in the care of this patient.     Andrew Novak MD.    Cc: Savage Valadez MD

## 2022-08-17 ENCOUNTER — OFFICE VISIT (OUTPATIENT)
Dept: PAIN MANAGEMENT | Age: 47
End: 2022-08-17
Payer: COMMERCIAL

## 2022-08-17 VITALS
SYSTOLIC BLOOD PRESSURE: 130 MMHG | WEIGHT: 315 LBS | HEIGHT: 75 IN | OXYGEN SATURATION: 94 % | BODY MASS INDEX: 39.17 KG/M2 | TEMPERATURE: 97.1 F | DIASTOLIC BLOOD PRESSURE: 73 MMHG | HEART RATE: 82 BPM

## 2022-08-17 DIAGNOSIS — S83.92XD SPRAIN OF LEFT KNEE/LEG, SUBSEQUENT ENCOUNTER: ICD-10-CM

## 2022-08-17 DIAGNOSIS — S33.9XXA SPRAIN OF LIGAMENT OF LUMBOSACRAL JOINT, INITIAL ENCOUNTER: ICD-10-CM

## 2022-08-17 DIAGNOSIS — S33.9XXD SPRAIN OF LIGAMENT OF LUMBOSACRAL JOINT, SUBSEQUENT ENCOUNTER: ICD-10-CM

## 2022-08-17 DIAGNOSIS — M54.9 CHRONIC MIDLINE BACK PAIN, UNSPECIFIED BACK LOCATION: ICD-10-CM

## 2022-08-17 DIAGNOSIS — G89.29 CHRONIC MIDLINE BACK PAIN, UNSPECIFIED BACK LOCATION: ICD-10-CM

## 2022-08-17 DIAGNOSIS — M51.27 LUMBAGO-SCIATICA DUE TO DISPLACEMENT OF LUMBAR INTERVERTEBRAL DISC: ICD-10-CM

## 2022-08-17 PROCEDURE — 99213 OFFICE O/P EST LOW 20 MIN: CPT | Performed by: INTERNAL MEDICINE

## 2022-08-17 RX ORDER — OXYCODONE AND ACETAMINOPHEN 7.5; 325 MG/1; MG/1
1 TABLET ORAL EVERY 6 HOURS PRN
Qty: 112 TABLET | Refills: 0 | Status: SHIPPED | OUTPATIENT
Start: 2022-08-17 | End: 2022-09-14 | Stop reason: SDUPTHER

## 2022-08-17 RX ORDER — PREGABALIN 150 MG/1
CAPSULE ORAL
Qty: 90 CAPSULE | Refills: 1 | Status: SHIPPED | OUTPATIENT
Start: 2022-08-17 | End: 2022-10-12 | Stop reason: SDUPTHER

## 2022-08-17 RX ORDER — DICLOFENAC SODIUM 75 MG/1
75 TABLET, DELAYED RELEASE ORAL 2 TIMES DAILY PRN
Qty: 45 TABLET | Refills: 1 | Status: SHIPPED | OUTPATIENT
Start: 2022-08-17 | End: 2022-09-14 | Stop reason: SDUPTHER

## 2022-08-17 NOTE — PROGRESS NOTES
[facility]  1975  [id number]      HISTORY OF PRESENT ILLNESS:  Mr. Loreto Jade is a 52 y.o. male returns for a follow up visit for pain management  He has a diagnosis of   1. 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 BWC Sprain of ligament of lumbosacral joint, initial encounter    3. BWC Chronic midline back pain, unspecified back location    4. BWC Sprain of ligament of lumbosacral joint, subsequent encounter    5. BWC Sprain of left knee/leg, subsequent encounter    6. BWC Sprain of left knee/leg, initial encounter    . He complains of pain in the  lower back, left knee with radiation to the left lower leg, left ankle,left foot  He rates the pain 5/10 and describes it as sharp, aching, burning, numbness, pins and needles. Current treatment regimen has helped relieve about 70% of the pain. He denies any side effects from the current pain regimen. Patient reports that since the last follow up visit the physical functioning is unchanged, family/social relationships are unchanged, mood is unchanged sleep patterns are unchanged, and that the overall functioning is unchanged. Patient denies misusing/abusing his narcotic pain medications or using any illegal drugs. There are No indicators for possible drug abuse, addiction or diversion problems, patient states he has been doing about the same. Mr. Loreto Jade says he is having increase soreness from walking more and standing. He mentions he is using Percocet 4 per day along with Lyrica. Patient states her leg symptoms are better and his wound has healed. ALLERGIES: Patients list of allergies were reviewed     MEDICATIONS: Mr. Loreto Jade list of medications were reviewed. His current medications are   Outpatient Medications Prior to Visit   Medication Sig Dispense Refill    pregabalin (LYRICA) 150 MG capsule Take one capsule TID 90 capsule 0    diclofenac (VOLTAREN) 75 MG EC tablet Take 1 tablet by mouth 2 times daily as needed for Pain 45 tablet 1    losartan (COZAAR) 100 MG tablet TAKE ONE TABLET BY MOUTH DAILY 30 tablet 3    furosemide (LASIX) 20 MG tablet TAKE ONE TO TWO TABLETS BY MOUTH EVERY MORNING AS NEEDED FOR EDEMA 60 tablet 3    albuterol sulfate HFA (PROAIR HFA) 108 (90 Base) MCG/ACT inhaler Inhale 2 puffs into the lungs every 6 hours as needed for Wheezing 18 g 3    penicillin v potassium (VEETID) 500 MG tablet TAKE TWO TABLETS BY MOUTH TWICE A  tablet 3    Compression Stockings MISC by Does not apply route Strength 30-40mmHg  Style: pull on below the knee; open or closed toe  Extremity: bilateral  Donning aid recommended: Yes if needed 1 each 2    Multiple Vitamins-Minerals (THERAPEUTIC MULTIVITAMIN-MINERALS) tablet Take 1 tablet by mouth daily       No facility-administered medications prior to visit. REVIEW OF SYSTEMS:    Respiratory: Negative for apnea, chest tightness and shortness of breath or change in baseline breathing. PHYSICAL EXAM:   Nursing note and vitals reviewed. /73   Pulse 82   Temp 97.1 °F (36.2 °C) (Temporal)   Ht 6' 3\" (1.905 m)   Wt (!) 482 lb (218.6 kg)   SpO2 94%   BMI 60.25 kg/m²   Constitutional: He appears well-developed and well-nourished. No acute distress. Cardiovascular: Normal rate, regular rhythm, normal heart sounds, and does not have murmur. Pulmonary/Chest: Effort normal. No respiratory distress. He does not have wheezes in the lung fields. He has no rales. Neurological/Psychiatric:He is alert and oriented to person, place, and time. Coordination is  normal.  His mood isAppropriate and affect is Neutral/Euthymic(normal) . His    IMPRESSION:   1. 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 C Sprain of ligament of lumbosacral joint, initial encounter    3. [facility] Chronic midline back pain, unspecified back location    4. BWC Sprain of ligament of lumbosacral joint, subsequent encounter    5.  BWC Sprain of left knee/leg, subsequent encounter    6. BWC Sprain of left knee/leg, initial encounter        PLAN:  Informed verbal consent was obtained  -ROM/Stretching exercises as advised   -He was advised to increase fluids ( 5-7  glasses of fluid daily), limit caffeine, avoid cheese products, increase dietary fiber, increase activity and exercise as tolerated and relax regularly and enjoy meals   -Continue with Percocet 4 per day  -He was advised weight reduction, diet changes- 800-1200 roc diet, diet diary, exercising, nutritional  consult increased physical activity as tolerated   -Walking as tolerated     Current Outpatient Medications   Medication Sig Dispense Refill    pregabalin (LYRICA) 150 MG capsule Take one capsule TID 90 capsule 0    diclofenac (VOLTAREN) 75 MG EC tablet Take 1 tablet by mouth 2 times daily as needed for Pain 45 tablet 1    losartan (COZAAR) 100 MG tablet TAKE ONE TABLET BY MOUTH DAILY 30 tablet 3    furosemide (LASIX) 20 MG tablet TAKE ONE TO TWO TABLETS BY MOUTH EVERY MORNING AS NEEDED FOR EDEMA 60 tablet 3    albuterol sulfate HFA (PROAIR HFA) 108 (90 Base) MCG/ACT inhaler Inhale 2 puffs into the lungs every 6 hours as needed for Wheezing 18 g 3    penicillin v potassium (VEETID) 500 MG tablet TAKE TWO TABLETS BY MOUTH TWICE A  tablet 3    Compression Stockings MISC by Does not apply route Strength 30-40mmHg  Style: pull on below the knee; open or closed toe  Extremity: bilateral  Donning aid recommended: Yes if needed 1 each 2    Multiple Vitamins-Minerals (THERAPEUTIC MULTIVITAMIN-MINERALS) tablet Take 1 tablet by mouth daily       No current facility-administered medications for this visit. I will continue his current medication regimen  which is part of the above treatment schedule.  It has been helping with Mr. Tirado Kendal chronic  medical problems which for this visit include:   Diagnoses of BWC Lumbago-sciatica due to displacement of lumbar intervertebral disc, BWC Sprain of ligament of lumbosacral joint, initial encounter, BWC Chronic midline back pain, unspecified back location, BWC Sprain of ligament of lumbosacral joint, subsequent encounter, BWC Sprain of left knee/leg, subsequent encounter, and BWC Sprain of left knee/leg, initial encounter were pertinent to this visit. Risks and benefits of the medications and other alternative treatments  including no treatment were discussed with the patient. The common side effects of these medications were also explained to the patient. Informed verbal consent was obtained. Goals of current treatment regimen include improvement in pain, restoration of functioning- with focus on improvement in physical performance, general activity, work or disability,emotional distress, health care utilization and  decreased medication consumption. Will continue to monitor progress towards achieving/maintaining therapeutic goals with special emphasis on  1. Improvement in perceived interfernce  of pain with ADL's. Ability to do home exercises independently. Ability to do household chores indoor and/or outdoor work and social and leisure activities. Improve psychosocial and physical functioning. - he is showing progression towards this treatment goal with the current regimen. He was advised against drinking alcohol with the narcotic pain medicines, advised against driving or handling machinery while adjusting the dose of medicines or if having cognitive  issues related to the current medications. Risk of overdose and death, if medicines not taken as prescribed, were also discussed. If the patient develops new symptoms or if the symptoms worsen, the patient should call the office. While transcribing every attempt was made to maintain the accuracy of the note in terms of it's contents,there may have been some errors made inadvertently. Thank you for allowing me to participate in the care of this patient.     Andrew Novak MD.    Cc: Savage Valadez MD

## 2022-09-02 RX ORDER — PENICILLIN V POTASSIUM 500 MG/1
TABLET ORAL
Qty: 120 TABLET | Refills: 3 | Status: SHIPPED | OUTPATIENT
Start: 2022-09-02

## 2022-09-14 ENCOUNTER — OFFICE VISIT (OUTPATIENT)
Dept: PAIN MANAGEMENT | Age: 47
End: 2022-09-14
Payer: COMMERCIAL

## 2022-09-14 VITALS
DIASTOLIC BLOOD PRESSURE: 71 MMHG | SYSTOLIC BLOOD PRESSURE: 130 MMHG | HEART RATE: 77 BPM | TEMPERATURE: 96.8 F | HEIGHT: 75 IN | WEIGHT: 315 LBS | BODY MASS INDEX: 39.17 KG/M2

## 2022-09-14 DIAGNOSIS — M54.9 CHRONIC MIDLINE BACK PAIN, UNSPECIFIED BACK LOCATION: ICD-10-CM

## 2022-09-14 DIAGNOSIS — S83.92XA SPRAIN OF LEFT KNEE/LEG, INITIAL ENCOUNTER: ICD-10-CM

## 2022-09-14 DIAGNOSIS — S33.9XXD SPRAIN OF LIGAMENT OF LUMBOSACRAL JOINT, SUBSEQUENT ENCOUNTER: ICD-10-CM

## 2022-09-14 DIAGNOSIS — S33.9XXA SPRAIN OF LIGAMENT OF LUMBOSACRAL JOINT, INITIAL ENCOUNTER: ICD-10-CM

## 2022-09-14 DIAGNOSIS — M51.27 LUMBAGO-SCIATICA DUE TO DISPLACEMENT OF LUMBAR INTERVERTEBRAL DISC: ICD-10-CM

## 2022-09-14 DIAGNOSIS — S83.92XD SPRAIN OF LEFT KNEE/LEG, SUBSEQUENT ENCOUNTER: ICD-10-CM

## 2022-09-14 DIAGNOSIS — G89.29 CHRONIC MIDLINE BACK PAIN, UNSPECIFIED BACK LOCATION: ICD-10-CM

## 2022-09-14 PROCEDURE — 99213 OFFICE O/P EST LOW 20 MIN: CPT | Performed by: INTERNAL MEDICINE

## 2022-09-14 RX ORDER — DICLOFENAC SODIUM 75 MG/1
75 TABLET, DELAYED RELEASE ORAL 2 TIMES DAILY PRN
Qty: 45 TABLET | Refills: 1 | Status: SHIPPED | OUTPATIENT
Start: 2022-09-14 | End: 2022-10-12 | Stop reason: SDUPTHER

## 2022-09-14 RX ORDER — OXYCODONE AND ACETAMINOPHEN 7.5; 325 MG/1; MG/1
1 TABLET ORAL EVERY 6 HOURS PRN
Qty: 112 TABLET | Refills: 0 | Status: SHIPPED | OUTPATIENT
Start: 2022-09-14 | End: 2022-10-12 | Stop reason: SDUPTHER

## 2022-09-15 NOTE — PROGRESS NOTES
Horacio Rios [facility]  1975  [id number]      HISTORY OF PRESENT ILLNESS:  Mr. Andrés Gonzalez is a 52 y.o. male returns for a follow up visit for pain management  He has a diagnosis of   1. 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 B[facility] Sprain of ligament of lumbosacral joint, initial encounter    3. BWC Chronic midline back pain, unspecified back location    4. BWC Sprain of ligament of lumbosacral joint, subsequent encounter    5. BWC Sprain of left knee/leg, subsequent encounter    6. BWC Sprain of left knee/leg, initial encounter    . He complains of pain in the  lower back, left knee with radiation to the left lower leg, left ankle, left foot  He rates the pain 4/10 and describes it as sharp, aching, burning, numbness, pins and needles. Current treatment regimen has helped relieve about 60% of the pain. He denies any side effects from the current pain regimen. Patient reports that since the last follow up visit the physical functioning is unchanged, family/social relationships are unchanged, mood is unchanged sleep patterns are unchanged, and that the overall functioning is unchanged. Patient denies misusing/abusing his narcotic pain medications or using any illegal drugs. There are No indicators for possible drug abuse, addiction or diversion problems. Patient reports he is doing fair, pain has been manageable somewhat. He complains he was having increase pain about a week ago, but is getting better. He complains of increased pain with changes in weather. Extreme temperatures- cold and damp weather causes increased pain. He mentions he had to do extra physical work. He says he is using Percocet along with Lyrica and Voltaren tablet 1 per day. He denies any constipation symptoms. ALLERGIES: Patients list of allergies were reviewed     MEDICATIONS: Mr. Andrés Gonzalez list of medications were reviewed. His current medications are   Outpatient Medications Prior to Visit Medication Sig Dispense Refill    penicillin v potassium (VEETID) 500 MG tablet TAKE TWO TABLETS BY MOUTH TWICE A  tablet 3    pregabalin (LYRICA) 150 MG capsule Take one capsule TID 90 capsule 1    losartan (COZAAR) 100 MG tablet TAKE ONE TABLET BY MOUTH DAILY 30 tablet 3    furosemide (LASIX) 20 MG tablet TAKE ONE TO TWO TABLETS BY MOUTH EVERY MORNING AS NEEDED FOR EDEMA 60 tablet 3    albuterol sulfate HFA (PROAIR HFA) 108 (90 Base) MCG/ACT inhaler Inhale 2 puffs into the lungs every 6 hours as needed for Wheezing 18 g 3    Compression Stockings MISC by Does not apply route Strength 30-40mmHg  Style: pull on below the knee; open or closed toe  Extremity: bilateral  Donning aid recommended: Yes if needed 1 each 2    Multiple Vitamins-Minerals (THERAPEUTIC MULTIVITAMIN-MINERALS) tablet Take 1 tablet by mouth daily      diclofenac (VOLTAREN) 75 MG EC tablet Take 1 tablet by mouth 2 times daily as needed for Pain 45 tablet 1    oxyCODONE-acetaminophen (PERCOCET) 7.5-325 MG per tablet Take 1 tablet by mouth every 6 hours as needed for Pain (max 4 per day) for up to 28 days. 112 tablet 0     No facility-administered medications prior to visit. REVIEW OF SYSTEMS:    Respiratory: Negative for apnea, chest tightness and shortness of breath or change in baseline breathing. PHYSICAL EXAM:   Nursing note and vitals reviewed. /71 (Site: Right Upper Arm, Position: Sitting, Cuff Size: Large Adult)   Pulse 77   Temp 96.8 °F (36 °C) (Temporal)   Ht 6' 3\" (1.905 m)   Wt (!) 480 lb (217.7 kg)   BMI 60.00 kg/m²   Constitutional: He appears well-developed and well-nourished. No acute distress. Cardiovascular: Normal rate, regular rhythm, normal heart sounds, and does not have murmur. Pulmonary/Chest: Effort normal. No respiratory distress. He does not have wheezes in the lung fields. He has no rales. Neurological/Psychiatric:He is alert and oriented to person, place, and time.  Coordination is normal.  His mood isAppropriate and affect is Neutral/Euthymic(normal) . His  Other: +venous stasis dermatitis   IMPRESSION:   1. 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 B[facility] Sprain of ligament of lumbosacral joint, initial encounter    3. BWC Chronic midline back pain, unspecified back location    4. BWC Sprain of ligament of lumbosacral joint, subsequent encounter    5. BWC Sprain of left knee/leg, subsequent encounter    6. B[facility] Sprain of left knee/leg, initial encounter        PLAN:  Informed verbal consent was obtained  -[place] record was obtained and reviewed  for the last one year and no indicators of drug misuse  were found. Any other controlled substance prescriptions  seen on the record have been accounted for, I am aware of the patient receiving these medications. William Pappas [place] record will be rechecked as part of office protocol.    -He was advised to increase fluids ( 5-7  glasses of fluid daily), limit caffeine, avoid cheese products, increase dietary fiber, increase activity and exercise as tolerated and relax regularly and enjoy meals   -Continue with Percocet 4 per day  -Continue with all other adjuvant medications as before    Current Outpatient Medications   Medication Sig Dispense Refill    diclofenac (VOLTAREN) 75 MG EC tablet Take 1 tablet by mouth 2 times daily as needed for Pain 45 tablet 1    oxyCODONE-acetaminophen (PERCOCET) 7.5-325 MG per tablet Take 1 tablet by mouth every 6 hours as needed for Pain (max 4 per day) for up to 28 days.  112 tablet 0    penicillin v potassium (VEETID) 500 MG tablet TAKE TWO TABLETS BY MOUTH TWICE A  tablet 3    pregabalin (LYRICA) 150 MG capsule Take one capsule TID 90 capsule 1    losartan (COZAAR) 100 MG tablet TAKE ONE TABLET BY MOUTH DAILY 30 tablet 3    furosemide (LASIX) 20 MG tablet TAKE ONE TO TWO TABLETS BY MOUTH EVERY MORNING AS NEEDED FOR EDEMA 60 tablet 3    albuterol sulfate HFA (PROAIR HFA) 108 (90 Base) MCG/ACT inhaler Inhale 2 puffs into the lungs every 6 hours as needed for Wheezing 18 g 3    Compression Stockings MISC by Does not apply route Strength 30-40mmHg  Style: pull on below the knee; open or closed toe  Extremity: bilateral  Donning aid recommended: Yes if needed 1 each 2    Multiple Vitamins-Minerals (THERAPEUTIC MULTIVITAMIN-MINERALS) tablet Take 1 tablet by mouth daily       No current facility-administered medications for this visit. I will continue his current medication regimen  which is part of the above treatment schedule. It has been helping with Mr. 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 Risks and benefits of the medications and other alternative treatments  including no treatment were discussed with the patient. The common side effects of these medications were also explained to the patient. Informed verbal consent was obtained. Goals of current treatment regimen include improvement in pain, restoration of functioning- with focus on improvement in physical performance, general activity, work or disability,emotional distress, health care utilization and  decreased medication consumption. Will continue to monitor progress towards achieving/maintaining therapeutic goals with special emphasis on  1. Improvement in perceived interfernce  of pain with ADL's. Ability to do home exercises independently. Ability to do household chores indoor and/or outdoor work and social and leisure activities. Improve psychosocial and physical functioning. - he is showing progression towards this treatment goal with the current regimen. 2. Ability to focus/concentrate at work and perform the duties required of him at work  Sit through a workday without lower extremity symptoms. Stand 30-60 minutes without lower extremity symptoms. Ability to lift up to 10-20 lbs. Ability to go up and down stairs. Sit 30-60 minutes  Without having to stand up frequently. - he is maintaining/progressing towards his work related goals with the current regimen. He was advised against drinking alcohol with the narcotic pain medicines, advised against driving or handling machinery while adjusting the dose of medicines or if having cognitive  issues related to the current medications. Risk of overdose and death, if medicines not taken as prescribed, were also discussed. If the patient develops new symptoms or if the symptoms worsen, the patient should call the office. While transcribing every attempt was made to maintain the accuracy of the note in terms of it's contents,there may have been some errors made inadvertently. Thank you for allowing me to participate in the care of this patient.     Rola Escobar MD.    Cc: Khadijah Cortes MD

## 2022-10-03 RX ORDER — LOSARTAN POTASSIUM 100 MG/1
TABLET ORAL
Qty: 30 TABLET | Refills: 3 | Status: SHIPPED | OUTPATIENT
Start: 2022-10-03

## 2022-10-03 RX ORDER — FUROSEMIDE 20 MG/1
TABLET ORAL
Qty: 60 TABLET | Refills: 3 | Status: SHIPPED | OUTPATIENT
Start: 2022-10-03

## 2022-10-03 NOTE — TELEPHONE ENCOUNTER
Lov 5/26/22  Lrf 30 3 6/6/22   60 3 6/6/22  Lab Results   Component Value Date     08/10/2021    K 4.9 08/10/2021    CL 98 (L) 08/10/2021    CO2 29 08/10/2021    BUN 11 08/10/2021    CREATININE 0.6 (L) 08/10/2021    GLUCOSE 98 08/10/2021    CALCIUM 9.2 08/10/2021    PROT 7.1 08/10/2021    LABALBU 4.6 08/10/2021    BILITOT 0.7 08/10/2021    ALKPHOS 75 08/10/2021    AST 29 08/10/2021    ALT 35 08/10/2021    LABGLOM >60 08/10/2021    GFRAA >60 08/10/2021    AGRATIO 1.8 08/10/2021    GLOB 2.5 08/10/2021

## 2022-10-12 ENCOUNTER — OFFICE VISIT (OUTPATIENT)
Dept: PAIN MANAGEMENT | Age: 47
End: 2022-10-12
Payer: COMMERCIAL

## 2022-10-12 VITALS
WEIGHT: 315 LBS | SYSTOLIC BLOOD PRESSURE: 132 MMHG | HEART RATE: 71 BPM | BODY MASS INDEX: 60 KG/M2 | DIASTOLIC BLOOD PRESSURE: 82 MMHG

## 2022-10-12 DIAGNOSIS — S83.92XA SPRAIN OF LEFT KNEE/LEG, INITIAL ENCOUNTER: ICD-10-CM

## 2022-10-12 DIAGNOSIS — M51.27 LUMBAGO-SCIATICA DUE TO DISPLACEMENT OF LUMBAR INTERVERTEBRAL DISC: ICD-10-CM

## 2022-10-12 DIAGNOSIS — G89.29 CHRONIC MIDLINE BACK PAIN, UNSPECIFIED BACK LOCATION: ICD-10-CM

## 2022-10-12 DIAGNOSIS — M54.9 CHRONIC MIDLINE BACK PAIN, UNSPECIFIED BACK LOCATION: ICD-10-CM

## 2022-10-12 DIAGNOSIS — S33.9XXD SPRAIN OF LIGAMENT OF LUMBOSACRAL JOINT, SUBSEQUENT ENCOUNTER: ICD-10-CM

## 2022-10-12 DIAGNOSIS — S83.92XD SPRAIN OF LEFT KNEE/LEG, SUBSEQUENT ENCOUNTER: ICD-10-CM

## 2022-10-12 DIAGNOSIS — S33.9XXA SPRAIN OF LIGAMENT OF LUMBOSACRAL JOINT, INITIAL ENCOUNTER: ICD-10-CM

## 2022-10-12 PROCEDURE — 99213 OFFICE O/P EST LOW 20 MIN: CPT | Performed by: INTERNAL MEDICINE

## 2022-10-12 RX ORDER — DICLOFENAC SODIUM 75 MG/1
75 TABLET, DELAYED RELEASE ORAL 2 TIMES DAILY PRN
Qty: 45 TABLET | Refills: 1 | Status: SHIPPED | OUTPATIENT
Start: 2022-10-12

## 2022-10-12 RX ORDER — OXYCODONE AND ACETAMINOPHEN 7.5; 325 MG/1; MG/1
1 TABLET ORAL EVERY 6 HOURS PRN
Qty: 112 TABLET | Refills: 0 | Status: SHIPPED | OUTPATIENT
Start: 2022-10-12 | End: 2022-11-09

## 2022-10-12 RX ORDER — PREGABALIN 150 MG/1
CAPSULE ORAL
Qty: 90 CAPSULE | Refills: 1 | Status: SHIPPED | OUTPATIENT
Start: 2022-10-12 | End: 2022-11-12

## 2022-10-12 NOTE — PROGRESS NOTES
[facility]  1975  [id number]      HISTORY OF PRESENT ILLNESS:  Mr. Ean Funes is a 52 y.o. male returns for a follow up visit for pain management  He has a diagnosis of   1. 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 BWC Chronic midline back pain, unspecified back location    3. BWC Sprain of left knee/leg, subsequent encounter    4. BWC Sprain of ligament of lumbosacral joint, initial encounter    5. BWC Sprain of ligament of lumbosacral joint, subsequent encounter    6. BWC Sprain of left knee/leg, initial encounter    . He complains of pain in the  Low back, left leg   He rates the pain 4/10 and describes it as sharp, aching, burning. Current treatment regimen has helped relieve about 70% of the pain. He denies any side effects from the current pain regimen. Patient reports that since the last follow up visit the physical functioning is unchanged, family/social relationships are unchanged, mood is unchanged sleep patterns are unchanged, and that the overall functioning is unchanged. Patient denies misusing/abusing his narcotic pain medications or using any illegal drugs. There are No indicators for possible drug abuse, addiction or diversion problems. Patient reports he has been doing fair, pian has been manageable with the medications. He says he is using Percocet 4 per day along with other adjuvants. He states he is managing with the regimen. He says he has been working from home. ALLERGIES: Patients list of allergies were reviewed     MEDICATIONS: Mr. Ean Funes list of medications were reviewed. His current medications are   Outpatient Medications Prior to Visit   Medication Sig Dispense Refill    furosemide (LASIX) 20 MG tablet TAKE ONE TO TWO TABLETS BY MOUTH EVERY MORNING AS NEEDED FOR EDEMA 60 tablet 3    losartan (COZAAR) 100 MG tablet TAKE ONE TABLET BY MOUTH DAILY 30 tablet 3    diclofenac (VOLTAREN) 75 MG EC tablet Take 1 tablet by mouth 2 times daily as needed for Pain 45 tablet 1    oxyCODONE-acetaminophen (PERCOCET) 7.5-325 MG per tablet Take 1 tablet by mouth every 6 hours as needed for Pain (max 4 per day) for up to 28 days. 112 tablet 0    penicillin v potassium (VEETID) 500 MG tablet TAKE TWO TABLETS BY MOUTH TWICE A  tablet 3    albuterol sulfate HFA (PROAIR HFA) 108 (90 Base) MCG/ACT inhaler Inhale 2 puffs into the lungs every 6 hours as needed for Wheezing 18 g 3    Compression Stockings MISC by Does not apply route Strength 30-40mmHg  Style: pull on below the knee; open or closed toe  Extremity: bilateral  Donning aid recommended: Yes if needed 1 each 2    Multiple Vitamins-Minerals (THERAPEUTIC MULTIVITAMIN-MINERALS) tablet Take 1 tablet by mouth daily      pregabalin (LYRICA) 150 MG capsule Take one capsule TID 90 capsule 1     No facility-administered medications prior to visit. REVIEW OF SYSTEMS:    Respiratory: Negative for apnea, chest tightness and shortness of breath or change in baseline breathing. PHYSICAL EXAM:   Nursing note and vitals reviewed. /82   Pulse 71   Wt (!) 480 lb (217.7 kg)   BMI 60.00 kg/m²   Constitutional: He appears well-developed and well-nourished. No acute distress. Cardiovascular: Normal rate, regular rhythm, normal heart sounds, and does not have murmur. Pulmonary/Chest: Effort normal. No respiratory distress. He does not have wheezes in the lung fields. He has no rales. Neurological/Psychiatric:He is alert and oriented to person, place, and time. Coordination is  normal.  His mood isAppropriate and affect is Neutral/Euthymic(normal) . IMPRESSION:   1. 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 [facility] Chronic midline back pain, unspecified back location    3. C Sprain of left knee/leg, subsequent encounter    4. BWC Sprain of ligament of lumbosacral joint, initial encounter    5.  BWC Sprain of ligament of lumbosacral joint, subsequent encounter    6. [facility] Sprain of left knee/leg, initial encounter        PLAN:  Informed verbal consent was obtained  -[place] record was obtained and reviewed  for the last one year and no indicators of drug misuse  were found. Any other controlled substance prescriptions  seen on the record have been accounted for, I am aware of the patient receiving these medications. Melania Soto [place] record will be rechecked as part of office protocol. -ROM/Stretching exercises as advised   -He was advised to increase fluids ( 5-7  glasses of fluid daily), limit caffeine, avoid cheese products, increase dietary fiber, increase activity and exercise as tolerated and relax regularly and enjoy meals   -Continue with Percocet 4 per day   -Continue with Lyrica along with Voltaren    Current Outpatient Medications   Medication Sig Dispense Refill    furosemide (LASIX) 20 MG tablet TAKE ONE TO TWO TABLETS BY MOUTH EVERY MORNING AS NEEDED FOR EDEMA 60 tablet 3    losartan (COZAAR) 100 MG tablet TAKE ONE TABLET BY MOUTH DAILY 30 tablet 3    diclofenac (VOLTAREN) 75 MG EC tablet Take 1 tablet by mouth 2 times daily as needed for Pain 45 tablet 1    oxyCODONE-acetaminophen (PERCOCET) 7.5-325 MG per tablet Take 1 tablet by mouth every 6 hours as needed for Pain (max 4 per day) for up to 28 days.  112 tablet 0    penicillin v potassium (VEETID) 500 MG tablet TAKE TWO TABLETS BY MOUTH TWICE A  tablet 3    albuterol sulfate HFA (PROAIR HFA) 108 (90 Base) MCG/ACT inhaler Inhale 2 puffs into the lungs every 6 hours as needed for Wheezing 18 g 3    Compression Stockings MISC by Does not apply route Strength 30-40mmHg  Style: pull on below the knee; open or closed toe  Extremity: bilateral  Donning aid recommended: Yes if needed 1 each 2    Multiple Vitamins-Minerals (THERAPEUTIC MULTIVITAMIN-MINERALS) tablet Take 1 tablet by mouth daily      pregabalin (LYRICA) 150 MG capsule Take one capsule TID 90 capsule 1     No current facility-administered medications for this visit. I will continue his current medication regimen  which is part of the above treatment schedule. It has been helping with Mr. 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 Risks and benefits of the medications and other alternative treatments  including no treatment were discussed with the patient. The common side effects of these medications were also explained to the patient. Informed verbal consent was obtained. Goals of current treatment regimen include improvement in pain, restoration of functioning- with focus on improvement in physical performance, general activity, work or disability,emotional distress, health care utilization and  decreased medication consumption. Will continue to monitor progress towards achieving/maintaining therapeutic goals with special emphasis on  1. Improvement in perceived interfernce  of pain with ADL's. Ability to do home exercises independently. Ability to do household chores indoor and/or outdoor work and social and leisure activities. Improve psychosocial and physical functioning. - he is showing progression towards this treatment goal with the current regimen. He was advised against drinking alcohol with the narcotic pain medicines, advised against driving or handling machinery while adjusting the dose of medicines or if having cognitive  issues related to the current medications. Risk of overdose and death, if medicines not taken as prescribed, were also discussed.  If the patient develops new symptoms or if the symptoms worsen, the patient should call the office. While transcribing every attempt was made to maintain the accuracy of the note in terms of it's contents,there may have been some errors made inadvertently. Thank you for allowing me to participate in the care of this patient.     Candy Grady MD.    Cc: Kayleen Recio MD

## 2022-11-09 ENCOUNTER — OFFICE VISIT (OUTPATIENT)
Dept: PAIN MANAGEMENT | Age: 47
End: 2022-11-09
Payer: COMMERCIAL

## 2022-11-09 VITALS
HEIGHT: 75 IN | DIASTOLIC BLOOD PRESSURE: 78 MMHG | TEMPERATURE: 97 F | SYSTOLIC BLOOD PRESSURE: 148 MMHG | HEART RATE: 72 BPM | BODY MASS INDEX: 39.17 KG/M2 | OXYGEN SATURATION: 96 % | WEIGHT: 315 LBS

## 2022-11-09 DIAGNOSIS — G89.29 CHRONIC MIDLINE BACK PAIN, UNSPECIFIED BACK LOCATION: ICD-10-CM

## 2022-11-09 DIAGNOSIS — S33.9XXD SPRAIN OF LIGAMENT OF LUMBOSACRAL JOINT, SUBSEQUENT ENCOUNTER: ICD-10-CM

## 2022-11-09 DIAGNOSIS — S83.92XA SPRAIN OF LEFT KNEE/LEG, INITIAL ENCOUNTER: ICD-10-CM

## 2022-11-09 DIAGNOSIS — S83.92XD SPRAIN OF LEFT KNEE/LEG, SUBSEQUENT ENCOUNTER: ICD-10-CM

## 2022-11-09 DIAGNOSIS — S33.9XXA SPRAIN OF LIGAMENT OF LUMBOSACRAL JOINT, INITIAL ENCOUNTER: ICD-10-CM

## 2022-11-09 DIAGNOSIS — M54.9 CHRONIC MIDLINE BACK PAIN, UNSPECIFIED BACK LOCATION: ICD-10-CM

## 2022-11-09 DIAGNOSIS — M51.27 LUMBAGO-SCIATICA DUE TO DISPLACEMENT OF LUMBAR INTERVERTEBRAL DISC: ICD-10-CM

## 2022-11-09 PROCEDURE — 99213 OFFICE O/P EST LOW 20 MIN: CPT | Performed by: INTERNAL MEDICINE

## 2022-11-09 RX ORDER — DICLOFENAC SODIUM 75 MG/1
75 TABLET, DELAYED RELEASE ORAL DAILY PRN
Qty: 30 TABLET | Refills: 1 | Status: SHIPPED | OUTPATIENT
Start: 2022-11-09

## 2022-11-09 RX ORDER — OXYCODONE AND ACETAMINOPHEN 7.5; 325 MG/1; MG/1
1 TABLET ORAL EVERY 6 HOURS PRN
Qty: 112 TABLET | Refills: 0 | Status: SHIPPED | OUTPATIENT
Start: 2022-11-09 | End: 2022-12-07

## 2022-11-09 RX ORDER — PREGABALIN 150 MG/1
CAPSULE ORAL
Qty: 90 CAPSULE | Refills: 1 | Status: SHIPPED | OUTPATIENT
Start: 2022-11-09 | End: 2022-12-10

## 2022-11-09 NOTE — PROGRESS NOTES
[place] N[place] Uribe [facility]  1975  [id number]      HISTORY OF PRESENT ILLNESS:  Mr. Jes Dos Santos is a 52 y.o. male returns for a follow up visit for pain management  He has a diagnosis of   1. 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 [facility] Sprain of left knee/leg, subsequent encounter    3. [facility] Sprain of ligament of lumbosacral joint, subsequent encounter    4. [facility] Chronic midline back pain, unspecified back location    5. [facility] Sprain of left knee/leg, initial encounter    6. [facility] Sprain of ligament of lumbosacral joint, initial encounter    . He complains of pain in the  lower back, left knee with radiation to the left leg, left foot  He rates the pain 4/10 and describes it as aching, burning, numbness, pins and needles. Current treatment regimen has helped relieve about 70% of the pain. He denies any side effects from the current pain regimen. Patient reports that since the last follow up visit the physical functioning is unchanged, family/social relationships are unchanged, mood is unchanged sleep patterns are unchanged, and that the overall functioning is unchanged. Patient denies misusing/abusing his narcotic pain medications or using any illegal drugs. There are No indicators for possible drug abuse, addiction or diversion problems, patient states he has been doing fair. Mr. Jes Dos Santos says he had a few bad days, he states he had to just rest. He mentions he is using Percocet 4 per day along with the other adjuvants. Patient denies any constipation symptoms. Patient reports he is working full time still. ALLERGIES: Patients list of allergies were reviewed     MEDICATIONS: Mr. Jes Dos Santos list of medications were reviewed. His current medications are   Outpatient Medications Prior to Visit   Medication Sig Dispense Refill    diclofenac (VOLTAREN) 75 MG EC tablet Take 1 tablet by mouth 2 times daily as needed for Pain 45 tablet 1    oxyCODONE-acetaminophen (PERCOCET) 7.5-325 MG per tablet Take 1 tablet by mouth every 6 hours as needed for Pain (max 4 per day) for up to 28 days. 112 tablet 0    pregabalin (LYRICA) 150 MG capsule Take one capsule TID 90 capsule 1    furosemide (LASIX) 20 MG tablet TAKE ONE TO TWO TABLETS BY MOUTH EVERY MORNING AS NEEDED FOR EDEMA 60 tablet 3    losartan (COZAAR) 100 MG tablet TAKE ONE TABLET BY MOUTH DAILY 30 tablet 3    penicillin v potassium (VEETID) 500 MG tablet TAKE TWO TABLETS BY MOUTH TWICE A  tablet 3    albuterol sulfate HFA (PROAIR HFA) 108 (90 Base) MCG/ACT inhaler Inhale 2 puffs into the lungs every 6 hours as needed for Wheezing 18 g 3    Compression Stockings MISC by Does not apply route Strength 30-40mmHg  Style: pull on below the knee; open or closed toe  Extremity: bilateral  Donning aid recommended: Yes if needed 1 each 2    Multiple Vitamins-Minerals (THERAPEUTIC MULTIVITAMIN-MINERALS) tablet Take 1 tablet by mouth daily       No facility-administered medications prior to visit. REVIEW OF SYSTEMS:    Respiratory: Negative for apnea, chest tightness and shortness of breath or change in baseline breathing. PHYSICAL EXAM:   Nursing note and vitals reviewed. BP (!) 148/78 (Site: Right Lower Arm)   Pulse 72   Temp 97 °F (36.1 °C) (Temporal)   Ht 6' 3\" (1.905 m)   Wt (!) 475 lb (215.5 kg)   SpO2 96%   BMI 59.37 kg/m²   Constitutional: He appears well-developed and well-nourished. No acute distress. Cardiovascular: Normal rate, regular rhythm, normal heart sounds, and does not have murmur. Pulmonary/Chest: Effort normal. No respiratory distress. He does not have wheezes in the lung fields. He has no rales. Neurological/Psychiatric:He is alert and oriented to person, place, and time. Coordination is  normal.  His mood isAppropriate and affect is Neutral/Euthymic(normal) . His  Other: +veinous stasis dermatitis   IMPRESSION:   1. 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  [facility] Sprain of left knee/leg, subsequent encounter    3. [facility] Sprain of ligament of lumbosacral joint, subsequent encounter    4. [facility] Chronic midline back pain, unspecified back location    5. C Sprain of left knee/leg, initial encounter    6. C Sprain of ligament of lumbosacral joint, initial encounter        PLAN:  Informed verbal consent was obtained  -ROM/Stretching exercises as advised   -Continue with Percocet 4 per day   -Continue with Voltaren, decrease to 1 per day PRN   -He was advised to increase fluids ( 5-7  glasses of fluid daily), limit caffeine, avoid cheese products, increase dietary fiber, increase activity and exercise as tolerated and relax regularly and enjoy meals    Current Outpatient Medications   Medication Sig Dispense Refill    diclofenac (VOLTAREN) 75 MG EC tablet Take 1 tablet by mouth 2 times daily as needed for Pain 45 tablet 1    oxyCODONE-acetaminophen (PERCOCET) 7.5-325 MG per tablet Take 1 tablet by mouth every 6 hours as needed for Pain (max 4 per day) for up to 28 days. 112 tablet 0    pregabalin (LYRICA) 150 MG capsule Take one capsule TID 90 capsule 1    furosemide (LASIX) 20 MG tablet TAKE ONE TO TWO TABLETS BY MOUTH EVERY MORNING AS NEEDED FOR EDEMA 60 tablet 3    losartan (COZAAR) 100 MG tablet TAKE ONE TABLET BY MOUTH DAILY 30 tablet 3    penicillin v potassium (VEETID) 500 MG tablet TAKE TWO TABLETS BY MOUTH TWICE A  tablet 3    albuterol sulfate HFA (PROAIR HFA) 108 (90 Base) MCG/ACT inhaler Inhale 2 puffs into the lungs every 6 hours as needed for Wheezing 18 g 3    Compression Stockings MISC by Does not apply route Strength 30-40mmHg  Style: pull on below the knee; open or closed toe  Extremity: bilateral  Donning aid recommended: Yes if needed 1 each 2    Multiple Vitamins-Minerals (THERAPEUTIC MULTIVITAMIN-MINERALS) tablet Take 1 tablet by mouth daily       No current facility-administered medications for this visit.      I will continue his current medication regimen which is part of the above treatment schedule. It has been helping with Mr. Etelvina Hill chronic  medical problems which for this visit include:   Diagnoses of C Lumbago-sciatica due to displacement of lumbar intervertebral disc, [facility] Sprain of left knee/leg, subsequent encounter, B[facility] Sprain of ligament of lumbosacral joint, subsequent encounter, B[facility] Chronic midline back pain, unspecified back location, [facility] Sprain of left knee/leg, initial encounter, and B[facility] Sprain of ligament of lumbosacral joint, initial encounter were pertinent to this visit. Risks and benefits of the medications and other alternative treatments  including no treatment were discussed with the patient. The common side effects of these medications were also explained to the patient. Informed verbal consent was obtained. Goals of current treatment regimen include improvement in pain, restoration of functioning- with focus on improvement in physical performance, general activity, work or disability,emotional distress, health care utilization and  decreased medication consumption. Will continue to monitor progress towards achieving/maintaining therapeutic goals with special emphasis on  1. Improvement in perceived interfernce  of pain with ADL's. Ability to do home exercises independently. Ability to do household chores indoor and/or outdoor work and social and leisure activities. Improve psychosocial and physical functioning. - he is showing progression towards this treatment goal with the current regimen. He was advised against drinking alcohol with the narcotic pain medicines, advised against driving or handling machinery while adjusting the dose of medicines or if having cognitive  issues related to the current medications. Risk of overdose and death, if medicines not taken as prescribed, were also discussed. If the patient develops new symptoms or if the symptoms worsen, the patient should call the office.     While transcribing every attempt was made to maintain the accuracy of the note in terms of it's contents,there may have been some errors made inadvertently. Thank you for allowing me to participate in the care of this patient.     Candy Grady MD.    Cc: Kayleen Recio MD

## 2022-12-07 ENCOUNTER — OFFICE VISIT (OUTPATIENT)
Dept: PAIN MANAGEMENT | Age: 47
End: 2022-12-07

## 2022-12-07 VITALS
DIASTOLIC BLOOD PRESSURE: 75 MMHG | BODY MASS INDEX: 59.12 KG/M2 | SYSTOLIC BLOOD PRESSURE: 131 MMHG | HEART RATE: 79 BPM | WEIGHT: 315 LBS

## 2022-12-07 DIAGNOSIS — G89.29 CHRONIC MIDLINE BACK PAIN, UNSPECIFIED BACK LOCATION: ICD-10-CM

## 2022-12-07 DIAGNOSIS — S33.9XXD SPRAIN OF LIGAMENT OF LUMBOSACRAL JOINT, SUBSEQUENT ENCOUNTER: ICD-10-CM

## 2022-12-07 DIAGNOSIS — M51.27 LUMBAGO-SCIATICA DUE TO DISPLACEMENT OF LUMBAR INTERVERTEBRAL DISC: ICD-10-CM

## 2022-12-07 DIAGNOSIS — M54.9 CHRONIC MIDLINE BACK PAIN, UNSPECIFIED BACK LOCATION: ICD-10-CM

## 2022-12-07 DIAGNOSIS — S83.92XA SPRAIN OF LEFT KNEE/LEG, INITIAL ENCOUNTER: ICD-10-CM

## 2022-12-07 DIAGNOSIS — S83.92XD SPRAIN OF LEFT KNEE/LEG, SUBSEQUENT ENCOUNTER: ICD-10-CM

## 2022-12-07 DIAGNOSIS — S33.9XXA SPRAIN OF LIGAMENT OF LUMBOSACRAL JOINT, INITIAL ENCOUNTER: ICD-10-CM

## 2022-12-07 RX ORDER — DICLOFENAC SODIUM 75 MG/1
75 TABLET, DELAYED RELEASE ORAL DAILY PRN
Qty: 30 TABLET | Refills: 1 | Status: SHIPPED | OUTPATIENT
Start: 2022-12-07

## 2022-12-07 RX ORDER — PREGABALIN 150 MG/1
150 CAPSULE ORAL 3 TIMES DAILY
Qty: 90 CAPSULE | Refills: 1 | Status: SHIPPED | OUTPATIENT
Start: 2022-12-07 | End: 2023-01-06

## 2022-12-07 RX ORDER — OXYCODONE AND ACETAMINOPHEN 7.5; 325 MG/1; MG/1
1 TABLET ORAL EVERY 6 HOURS PRN
Qty: 120 TABLET | Refills: 0 | Status: SHIPPED | OUTPATIENT
Start: 2022-12-07 | End: 2023-01-06

## 2022-12-07 NOTE — PROGRESS NOTES
Diony Harris  1975  [id number]      HISTORY OF PRESENT ILLNESS:  Mr. Jeane Saxena is a 52 y.o. male returns for a follow up visit for pain management  He has a diagnosis of   1. 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 [facility] Sprain of left knee/leg, subsequent encounter    3. [facility] Sprain of ligament of lumbosacral joint, subsequent encounter    4. B[facility] Chronic midline back pain, unspecified back location    5. [facility] Sprain of left knee/leg, initial encounter    6. B[facility] Sprain of ligament of lumbosacral joint, initial encounter    . He complains of pain in the  lower back, left knee with radiation to the left hip, left leg, left foot  He rates the pain 4/10 and describes it as sharp, aching, burning, numbness, pins and needles. Current treatment regimen has helped relieve about 70% of the pain. He denies any side effects from the current pain regimen. Patient reports that since the last follow up visit the physical functioning is unchanged, family/social relationships are unchanged, mood is unchanged sleep patterns are unchanged, and that the overall functioning is unchanged. Patient denies misusing/abusing his narcotic pain medications or using any illegal drugs. There are No indicators for possible drug abuse, addiction or diversion problems, patient states he has been doing about the same. He complains of increased pain with changes in weather. Extreme temperatures- cold and damp weather causes increased pain. Mr. Jeane Saxena mentions he is using Lyrica along with Percocet 4 per day and Voltaren. Patient denies any constipation symptoms. Patient reports his weight has been manageable. ALLERGIES: Patients list of allergies were reviewed     MEDICATIONS: Mr. Jeane Saxena list of medications were reviewed. His current medications are   Outpatient Medications Prior to Visit   Medication Sig Dispense Refill    diclofenac (VOLTAREN) 75 MG EC tablet Take 1 tablet by mouth daily as needed for Pain 30 tablet 1    oxyCODONE-acetaminophen (PERCOCET) 7.5-325 MG per tablet Take 1 tablet by mouth every 6 hours as needed for Pain (max 4 per day) for up to 28 days. 112 tablet 0    pregabalin (LYRICA) 150 MG capsule Take one capsule TID 90 capsule 1    furosemide (LASIX) 20 MG tablet TAKE ONE TO TWO TABLETS BY MOUTH EVERY MORNING AS NEEDED FOR EDEMA 60 tablet 3    losartan (COZAAR) 100 MG tablet TAKE ONE TABLET BY MOUTH DAILY 30 tablet 3    penicillin v potassium (VEETID) 500 MG tablet TAKE TWO TABLETS BY MOUTH TWICE A  tablet 3    albuterol sulfate HFA (PROAIR HFA) 108 (90 Base) MCG/ACT inhaler Inhale 2 puffs into the lungs every 6 hours as needed for Wheezing 18 g 3    Compression Stockings MISC by Does not apply route Strength 30-40mmHg  Style: pull on below the knee; open or closed toe  Extremity: bilateral  Donning aid recommended: Yes if needed 1 each 2    Multiple Vitamins-Minerals (THERAPEUTIC MULTIVITAMIN-MINERALS) tablet Take 1 tablet by mouth daily       No facility-administered medications prior to visit. REVIEW OF SYSTEMS:    Respiratory: Negative for apnea, chest tightness and shortness of breath or change in baseline breathing. PHYSICAL EXAM:   Nursing note and vitals reviewed. /75   Pulse 79   Wt (!) 473 lb (214.6 kg)   BMI 59.12 kg/m²   Constitutional: He appears well-developed and well-nourished. No acute distress. Cardiovascular: Normal rate, regular rhythm, normal heart sounds, and does not have murmur. Pulmonary/Chest: Effort normal. No respiratory distress. He does not have wheezes in the lung fields. He has no rales. Neurological/Psychiatric:He is alert and oriented to person, place, and time. Coordination is  normal.  His mood isAppropriate and affect is Neutral/Euthymic(normal) .  His  Other: +hyperkeratosis with erythema and venous statis dermatitis   IMPRESSION:   1. [facility] Lumbago-sciatica due to displacement of lumbar intervertebral disc 2. [facility] Sprain of left knee/leg, subsequent encounter    3. [facility] Sprain of ligament of lumbosacral joint, subsequent encounter    4. [facility] Chronic midline back pain, unspecified back location    5. [facility] Sprain of left knee/leg, initial encounter    6. [facility] Sprain of ligament of lumbosacral joint, initial encounter        PLAN:  Informed verbal consent was obtained  -[place] was obtained and reviewed  for the last one year and no indicators of drug misuse  were found. Any other controlled substance prescriptions  seen on the record have been accounted for, I am aware of the patient receiving these medications. Peggy Trujillo [place] record will be rechecked as part of office protocol.    -Continue with Percocet 4 per day   -He was advised weight reduction, diet changes- 800-1200 roc diet, diet diary, exercising, nutritional  consult increased physical activity as tolerated   -Continue with Lyrica 150 mg TID    Current Outpatient Medications   Medication Sig Dispense Refill    diclofenac (VOLTAREN) 75 MG EC tablet Take 1 tablet by mouth daily as needed for Pain 30 tablet 1    oxyCODONE-acetaminophen (PERCOCET) 7.5-325 MG per tablet Take 1 tablet by mouth every 6 hours as needed for Pain (max 4 per day) for up to 28 days.  112 tablet 0    pregabalin (LYRICA) 150 MG capsule Take one capsule TID 90 capsule 1    furosemide (LASIX) 20 MG tablet TAKE ONE TO TWO TABLETS BY MOUTH EVERY MORNING AS NEEDED FOR EDEMA 60 tablet 3    losartan (COZAAR) 100 MG tablet TAKE ONE TABLET BY MOUTH DAILY 30 tablet 3    penicillin v potassium (VEETID) 500 MG tablet TAKE TWO TABLETS BY MOUTH TWICE A  tablet 3    albuterol sulfate HFA (PROAIR HFA) 108 (90 Base) MCG/ACT inhaler Inhale 2 puffs into the lungs every 6 hours as needed for Wheezing 18 g 3    Compression Stockings MISC by Does not apply route Strength 30-40mmHg  Style: pull on below the knee; open or closed toe  Extremity: bilateral  Donning aid recommended: Yes if needed 1 each 2 Multiple Vitamins-Minerals (THERAPEUTIC MULTIVITAMIN-MINERALS) tablet Take 1 tablet by mouth daily       No current facility-administered medications for this visit. I will continue his current medication regimen  which is part of the above treatment schedule. It has been helping with Mr. 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 Risks and benefits of the medications and other alternative treatments  including no treatment were discussed with the patient. The common side effects of these medications were also explained to the patient. Informed verbal consent was obtained. Goals of current treatment regimen include improvement in pain, restoration of functioning- with focus on improvement in physical performance, general activity, work or disability,emotional distress, health care utilization and  decreased medication consumption. Will continue to monitor progress towards achieving/maintaining therapeutic goals with special emphasis on  1. Improvement in perceived interfernce  of pain with ADL's. Ability to do home exercises independently. Ability to do household chores indoor and/or outdoor work and social and leisure activities. Improve psychosocial and physical functioning. - he is showing progression towards this treatment goal with the current regimen. He was advised against drinking alcohol with the narcotic pain medicines, advised against driving or handling machinery while adjusting the dose of medicines or if having cognitive  issues related to the current medications. Risk of overdose and death, if medicines not taken as prescribed, were also discussed. If the patient develops new symptoms or if the symptoms worsen, the patient should call the office. While transcribing every attempt was made to maintain the accuracy of the note in terms of it's contents,there may have been some errors made inadvertently. Thank you for allowing me to participate in the care of this patient.     Justin Goldstein MD.    Cc: Argentina Gonzalez MD

## 2023-01-06 ENCOUNTER — OFFICE VISIT (OUTPATIENT)
Dept: PAIN MANAGEMENT | Age: 48
End: 2023-01-06
Payer: COMMERCIAL

## 2023-01-06 VITALS
HEART RATE: 76 BPM | WEIGHT: 315 LBS | BODY MASS INDEX: 59.25 KG/M2 | SYSTOLIC BLOOD PRESSURE: 132 MMHG | DIASTOLIC BLOOD PRESSURE: 81 MMHG

## 2023-01-06 DIAGNOSIS — M51.27 LUMBAGO-SCIATICA DUE TO DISPLACEMENT OF LUMBAR INTERVERTEBRAL DISC: ICD-10-CM

## 2023-01-06 DIAGNOSIS — S83.92XD SPRAIN OF LEFT KNEE/LEG, SUBSEQUENT ENCOUNTER: ICD-10-CM

## 2023-01-06 DIAGNOSIS — G89.29 CHRONIC MIDLINE BACK PAIN, UNSPECIFIED BACK LOCATION: ICD-10-CM

## 2023-01-06 DIAGNOSIS — M54.9 CHRONIC MIDLINE BACK PAIN, UNSPECIFIED BACK LOCATION: ICD-10-CM

## 2023-01-06 DIAGNOSIS — S33.9XXD SPRAIN OF LIGAMENT OF LUMBOSACRAL JOINT, SUBSEQUENT ENCOUNTER: ICD-10-CM

## 2023-01-06 PROCEDURE — 99213 OFFICE O/P EST LOW 20 MIN: CPT | Performed by: INTERNAL MEDICINE

## 2023-01-06 RX ORDER — DICLOFENAC SODIUM 75 MG/1
75 TABLET, DELAYED RELEASE ORAL DAILY PRN
Qty: 30 TABLET | Refills: 1 | Status: SHIPPED | OUTPATIENT
Start: 2023-01-06

## 2023-01-06 RX ORDER — PREGABALIN 150 MG/1
150 CAPSULE ORAL 3 TIMES DAILY
Qty: 90 CAPSULE | Refills: 1 | Status: SHIPPED | OUTPATIENT
Start: 2023-01-06 | End: 2023-02-05

## 2023-01-06 RX ORDER — OXYCODONE AND ACETAMINOPHEN 7.5; 325 MG/1; MG/1
1 TABLET ORAL EVERY 6 HOURS PRN
Qty: 112 TABLET | Refills: 0 | Status: SHIPPED | OUTPATIENT
Start: 2023-01-06 | End: 2023-02-03

## 2023-01-10 NOTE — PROGRESS NOTES
[facility]  1975  [id number]      HISTORY OF PRESENT ILLNESS:  Mr. Roshan Sanchez is a 52 y.o. male returns for a follow up visit for pain management  He has a diagnosis of   1. 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 BWC-Sprain of left knee/leg, subsequent encounter    3. BWC-Sprain of ligament of lumbosacral joint, subsequent encounter    4. BWC-Chronic midline back pain, unspecified back location    . He complains of pain in the  lower back, left knee with radiation to the buttocks, left leg, left ankle, left foot  He rates the pain 4/10 and describes it as sharp, aching, burning, numbness, pins and needles. Current treatment regimen has helped relieve about 70% of the pain. He denies any side effects from the current pain regimen. Patient reports that since the last follow up visit the physical functioning is unchanged, family/social relationships are unchanged, mood is unchanged sleep patterns are unchanged, and that the overall functioning is unchanged. Patient denies misusing/abusing his narcotic pain medications or using any illegal drugs. There are No indicators for possible drug abuse, addiction or diversion problems. Patient states he has been doing good, he states he had some bad days but manageable. Mr. Roshan Sanchez mentions he is using Percocet 4 per day along with the other adjuvants. Patient reports he is working full time still. Patient denies any constipation symptoms. ALLERGIES: Patients list of allergies were reviewed     MEDICATIONS: Mr. Roshan Sanchez list of medications were reviewed. His current medications are   Outpatient Medications Prior to Visit   Medication Sig Dispense Refill    furosemide (LASIX) 20 MG tablet TAKE ONE TO TWO TABLETS BY MOUTH EVERY MORNING AS NEEDED FOR EDEMA 60 tablet 3    losartan (COZAAR) 100 MG tablet TAKE ONE TABLET BY MOUTH DAILY 30 tablet 3    penicillin v potassium (VEETID) 500 MG tablet TAKE TWO TABLETS BY MOUTH TWICE A  tablet 3    albuterol sulfate HFA (PROAIR HFA) 108 (90 Base) MCG/ACT inhaler Inhale 2 puffs into the lungs every 6 hours as needed for Wheezing 18 g 3    Compression Stockings MISC by Does not apply route Strength 30-40mmHg  Style: pull on below the knee; open or closed toe  Extremity: bilateral  Donning aid recommended: Yes if needed 1 each 2    Multiple Vitamins-Minerals (THERAPEUTIC MULTIVITAMIN-MINERALS) tablet Take 1 tablet by mouth daily      pregabalin (LYRICA) 150 MG capsule Take 1 capsule by mouth 3 times daily for 30 days. 90 capsule 1    diclofenac (VOLTAREN) 75 MG EC tablet Take 1 tablet by mouth daily as needed for Pain 30 tablet 1    oxyCODONE-acetaminophen (PERCOCET) 7.5-325 MG per tablet Take 1 tablet by mouth every 6 hours as needed for Pain (max 4 per day) for up to 30 days. 120 tablet 0     No facility-administered medications prior to visit. REVIEW OF SYSTEMS:    Respiratory: Negative for apnea, chest tightness and shortness of breath or change in baseline breathing. PHYSICAL EXAM:   Nursing note and vitals reviewed. /81   Pulse 76   Wt (!) 474 lb (215 kg)   BMI 59.25 kg/m²   Constitutional: He appears well-developed and well-nourished. No acute distress. Cardiovascular: Normal rate, regular rhythm, normal heart sounds, and does not have murmur. Pulmonary/Chest: Effort normal. No respiratory distress. He does not have wheezes in the lung fields. He has no rales. Neurological/Psychiatric:He is alert and oriented to person, place, and time. Coordination is  normal.  His mood isAppropriate and affect is Neutral/Euthymic(normal) . His  Other: trace edema, +hyperkeratosis LE with dermatitis   IMPRESSION:   1. 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 BWC-Sprain of left knee/leg, subsequent encounter    3. BWC-Sprain of ligament of lumbosacral joint, subsequent encounter    4.  BWC-Chronic midline back pain, unspecified back location        PLAN:  Informed verbal consent was obtained  -ROM/Stretching exercises as advised   -He was advised to increase fluids ( 5-7  glasses of fluid daily), limit caffeine, avoid cheese products, increase dietary fiber, increase activity and exercise as tolerated and relax regularly and enjoy meals   -He was advised weight reduction, diet changes- 800-1200 roc diet, diet diary, exercising, nutritional  consult increased physical activity as tolerated   -Continue with Percocet 4 per day  -Continue with all other adjuvant medications as before    Current Outpatient Medications   Medication Sig Dispense Refill    pregabalin (LYRICA) 150 MG capsule Take 1 capsule by mouth 3 times daily for 30 days. 90 capsule 1    diclofenac (VOLTAREN) 75 MG EC tablet Take 1 tablet by mouth daily as needed for Pain 30 tablet 1    oxyCODONE-acetaminophen (PERCOCET) 7.5-325 MG per tablet Take 1 tablet by mouth every 6 hours as needed for Pain (max 4 per day) for up to 28 days. 112 tablet 0    furosemide (LASIX) 20 MG tablet TAKE ONE TO TWO TABLETS BY MOUTH EVERY MORNING AS NEEDED FOR EDEMA 60 tablet 3    losartan (COZAAR) 100 MG tablet TAKE ONE TABLET BY MOUTH DAILY 30 tablet 3    penicillin v potassium (VEETID) 500 MG tablet TAKE TWO TABLETS BY MOUTH TWICE A  tablet 3    albuterol sulfate HFA (PROAIR HFA) 108 (90 Base) MCG/ACT inhaler Inhale 2 puffs into the lungs every 6 hours as needed for Wheezing 18 g 3    Compression Stockings MISC by Does not apply route Strength 30-40mmHg  Style: pull on below the knee; open or closed toe  Extremity: bilateral  Donning aid recommended: Yes if needed 1 each 2    Multiple Vitamins-Minerals (THERAPEUTIC MULTIVITAMIN-MINERALS) tablet Take 1 tablet by mouth daily       No current facility-administered medications for this visit. I will continue his current medication regimen  which is part of the above treatment schedule.  It has been helping with Mr. 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 Risks and benefits of the medications and other alternative treatments  including no treatment were discussed with the patient. The common side effects of these medications were also explained to the patient. Informed verbal consent was obtained. Goals of current treatment regimen include improvement in pain, restoration of functioning- with focus on improvement in physical performance, general activity, work or disability,emotional distress, health care utilization and  decreased medication consumption. Will continue to monitor progress towards achieving/maintaining therapeutic goals with special emphasis on  1. Improvement in perceived interfernce  of pain with ADL's. Ability to do home exercises independently. Ability to do household chores indoor and/or outdoor work and social and leisure activities. Improve psychosocial and physical functioning. - he is showing progression towards this treatment goal with the current regimen. He was advised against drinking alcohol with the narcotic pain medicines, advised against driving or handling machinery while adjusting the dose of medicines or if having cognitive  issues related to the current medications. Risk of overdose and death, if medicines not taken as prescribed, were also discussed. If the patient develops new symptoms or if the symptoms worsen, the patient should call the office. While transcribing every attempt was made to maintain the accuracy of the note in terms of it's contents,there may have been some errors made inadvertently. Thank you for allowing me to participate in the care of this patient.     Dragan Mcgowan, MD.    Cc: Sarah Stark MD

## 2023-02-02 ENCOUNTER — OFFICE VISIT (OUTPATIENT)
Dept: PAIN MANAGEMENT | Age: 48
End: 2023-02-02

## 2023-02-02 VITALS
BODY MASS INDEX: 59.75 KG/M2 | HEART RATE: 92 BPM | DIASTOLIC BLOOD PRESSURE: 87 MMHG | WEIGHT: 315 LBS | SYSTOLIC BLOOD PRESSURE: 131 MMHG

## 2023-02-02 DIAGNOSIS — S83.92XD SPRAIN OF LEFT KNEE/LEG, SUBSEQUENT ENCOUNTER: ICD-10-CM

## 2023-02-02 DIAGNOSIS — S33.9XXD SPRAIN OF LIGAMENT OF LUMBOSACRAL JOINT, SUBSEQUENT ENCOUNTER: ICD-10-CM

## 2023-02-02 DIAGNOSIS — G89.29 CHRONIC MIDLINE BACK PAIN, UNSPECIFIED BACK LOCATION: ICD-10-CM

## 2023-02-02 DIAGNOSIS — M54.9 CHRONIC MIDLINE BACK PAIN, UNSPECIFIED BACK LOCATION: ICD-10-CM

## 2023-02-02 DIAGNOSIS — M51.27 LUMBAGO-SCIATICA DUE TO DISPLACEMENT OF LUMBAR INTERVERTEBRAL DISC: ICD-10-CM

## 2023-02-02 RX ORDER — PREGABALIN 150 MG/1
150 CAPSULE ORAL 3 TIMES DAILY
Qty: 90 CAPSULE | Refills: 1 | Status: SHIPPED | OUTPATIENT
Start: 2023-02-02 | End: 2023-03-04

## 2023-02-02 RX ORDER — OXYCODONE AND ACETAMINOPHEN 7.5; 325 MG/1; MG/1
1 TABLET ORAL EVERY 6 HOURS PRN
Qty: 112 TABLET | Refills: 0 | Status: SHIPPED | OUTPATIENT
Start: 2023-02-02 | End: 2023-03-02

## 2023-02-02 RX ORDER — DICLOFENAC SODIUM 75 MG/1
75 TABLET, DELAYED RELEASE ORAL DAILY PRN
Qty: 30 TABLET | Refills: 1 | Status: SHIPPED | OUTPATIENT
Start: 2023-02-02

## 2023-02-02 NOTE — PROGRESS NOTES
Luly Sweet  1975  [id number]      HISTORY OF PRESENT ILLNESS:  Mr. Leeanne Lewis is a 52 y.o. male returns for a follow up visit for pain management  He has a diagnosis of   1. 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 BWC-Sprain of ligament of lumbosacral joint, subsequent encounter    3. BWC-Chronic midline back pain, unspecified back location    4. BWC-Sprain of left knee/leg, subsequent encounter    . He complains of pain in the  lower back, left knee with radiation to the left lower leg, left ankle, left foot  He rates the pain 4/10 and describes it as sharp, aching, burning, numbness, pins and needles. Current treatment regimen has helped relieve about 70% of the pain. He denies any side effects from the current pain regimen. Patient reports that since the last follow up visit the physical functioning is unchanged, family/social relationships are unchanged, mood is unchanged sleep patterns are unchanged, and that the overall functioning is unchanged. Patient denies misusing/abusing his narcotic pain medications or using any illegal drugs. There are No indicators for possible drug abuse, addiction or diversion problems. Patient states he has been doing about the same, he is managing with the medications. He complains of increased pain with changes in weather. Extreme temperatures- cold and damp weather causes increased pain. Mr. Leeanne Lewis says he is having a few bad days. He mentions he is using Percocet 4 per day still which is helping with pain along with Voltaren 75 mg daily. ALLERGIES: Patients list of allergies were reviewed     MEDICATIONS: Mr. Leeanne Lewis list of medications were reviewed. His current medications are   Outpatient Medications Prior to Visit   Medication Sig Dispense Refill    pregabalin (LYRICA) 150 MG capsule Take 1 capsule by mouth 3 times daily for 30 days.  90 capsule 1    diclofenac (VOLTAREN) 75 MG EC tablet Take 1 tablet by mouth daily as needed for Pain 30 tablet 1    oxyCODONE-acetaminophen (PERCOCET) 7.5-325 MG per tablet Take 1 tablet by mouth every 6 hours as needed for Pain (max 4 per day) for up to 28 days. 112 tablet 0    furosemide (LASIX) 20 MG tablet TAKE ONE TO TWO TABLETS BY MOUTH EVERY MORNING AS NEEDED FOR EDEMA 60 tablet 3    losartan (COZAAR) 100 MG tablet TAKE ONE TABLET BY MOUTH DAILY 30 tablet 3    penicillin v potassium (VEETID) 500 MG tablet TAKE TWO TABLETS BY MOUTH TWICE A  tablet 3    albuterol sulfate HFA (PROAIR HFA) 108 (90 Base) MCG/ACT inhaler Inhale 2 puffs into the lungs every 6 hours as needed for Wheezing 18 g 3    Compression Stockings MISC by Does not apply route Strength 30-40mmHg  Style: pull on below the knee; open or closed toe  Extremity: bilateral  Donning aid recommended: Yes if needed 1 each 2    Multiple Vitamins-Minerals (THERAPEUTIC MULTIVITAMIN-MINERALS) tablet Take 1 tablet by mouth daily       No facility-administered medications prior to visit. REVIEW OF SYSTEMS:    Respiratory: Negative for apnea, chest tightness and shortness of breath or change in baseline breathing. PHYSICAL EXAM:   Nursing note and vitals reviewed. /87   Pulse 92   Wt (!) 478 lb (216.8 kg)   BMI 59.75 kg/m²   Constitutional: He appears well-developed and well-nourished. No acute distress. Cardiovascular: Normal rate, regular rhythm, normal heart sounds, and does not have murmur. Pulmonary/Chest: Effort normal. No respiratory distress. He does not have wheezes in the lung fields. He has no rales. Neurological/Psychiatric:He is alert and oriented to person, place, and time. Coordination is  normal.  His mood isAppropriate and affect is Neutral/Euthymic(normal) . His    IMPRESSION:   1. 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 BWC-Sprain of ligament of lumbosacral joint, subsequent encounter    3.  BWC-Chronic midline back pain, unspecified back location 4. BWC-Sprain of left knee/leg, subsequent encounter        PLAN:  Informed verbal consent was obtained  -He was advised weight reduction, diet changes- 800-1200 roc diet, diet diary, exercising, nutritional  consult increased physical activity as tolerated   -ROM/Stretching exercises as advised   -He was advised to increase fluids ( 5-7  glasses of fluid daily), limit caffeine, avoid cheese products, increase dietary fiber, increase activity and exercise as tolerated and relax regularly and enjoy meals   -Labs ordered CBC, CMP  -Walking as tolerated    -Continue with Percocet 4 per day   Current Outpatient Medications   Medication Sig Dispense Refill    pregabalin (LYRICA) 150 MG capsule Take 1 capsule by mouth 3 times daily for 30 days. 90 capsule 1    diclofenac (VOLTAREN) 75 MG EC tablet Take 1 tablet by mouth daily as needed for Pain 30 tablet 1    oxyCODONE-acetaminophen (PERCOCET) 7.5-325 MG per tablet Take 1 tablet by mouth every 6 hours as needed for Pain (max 4 per day) for up to 28 days. 112 tablet 0    furosemide (LASIX) 20 MG tablet TAKE ONE TO TWO TABLETS BY MOUTH EVERY MORNING AS NEEDED FOR EDEMA 60 tablet 3    losartan (COZAAR) 100 MG tablet TAKE ONE TABLET BY MOUTH DAILY 30 tablet 3    penicillin v potassium (VEETID) 500 MG tablet TAKE TWO TABLETS BY MOUTH TWICE A  tablet 3    albuterol sulfate HFA (PROAIR HFA) 108 (90 Base) MCG/ACT inhaler Inhale 2 puffs into the lungs every 6 hours as needed for Wheezing 18 g 3    Compression Stockings MISC by Does not apply route Strength 30-40mmHg  Style: pull on below the knee; open or closed toe  Extremity: bilateral  Donning aid recommended: Yes if needed 1 each 2    Multiple Vitamins-Minerals (THERAPEUTIC MULTIVITAMIN-MINERALS) tablet Take 1 tablet by mouth daily       No current facility-administered medications for this visit. I will continue his current medication regimen  which is part of the above treatment schedule.  It has been helping with Mr. 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 Risks and benefits of the medications and other alternative treatments  including no treatment were discussed with the patient. The common side effects of these medications were also explained to the patient. Informed verbal consent was obtained. Goals of current treatment regimen include improvement in pain, restoration of functioning- with focus on improvement in physical performance, general activity, work or disability,emotional distress, health care utilization and  decreased medication consumption. Will continue to monitor progress towards achieving/maintaining therapeutic goals with special emphasis on  1. Improvement in perceived interfernce  of pain with ADL's. Ability to do home exercises independently. Ability to do household chores indoor and/or outdoor work and social and leisure activities. Improve psychosocial and physical functioning. - he is showing progression towards this treatment goal with the current regimen. 2. Ability to focus/concentrate at work and perform the duties required of him at work  Sit through a workday without lower extremity symptoms. Stand 30-60 minutes without lower extremity symptoms. Ability to lift up to 10-20 lbs. Ability to go up and down stairs. Sit 30-60 minutes  Without having to stand up frequently. - he is maintaining/progressing towards his work related goals with the current regimen.      He was advised against drinking alcohol with the narcotic pain medicines, advised against driving or handling machinery while adjusting the dose of medicines or if having cognitive  issues related to the current medications. Risk of overdose and death, if medicines not taken as prescribed, were also discussed. If the patient develops new symptoms or if the symptoms worsen, the patient should call the office. While transcribing every attempt was made to maintain the accuracy of the note in terms of it's contents,there may have been some errors made inadvertently. Thank you for allowing me to participate in the care of this patient.     Omar Guthrie MD.    Cc: Lazara Workman MD

## 2023-02-03 RX ORDER — FUROSEMIDE 20 MG/1
TABLET ORAL
Qty: 60 TABLET | Refills: 3 | Status: SHIPPED | OUTPATIENT
Start: 2023-02-03

## 2023-02-03 RX ORDER — LOSARTAN POTASSIUM 100 MG/1
TABLET ORAL
Qty: 30 TABLET | Refills: 3 | Status: SHIPPED | OUTPATIENT
Start: 2023-02-03

## 2023-02-03 NOTE — TELEPHONE ENCOUNTER
Medication:   Requested Prescriptions     Pending Prescriptions Disp Refills    furosemide (LASIX) 20 MG tablet [Pharmacy Med Name: FUROSEMIDE 20 MG TABLET] 60 tablet 3     Sig: TAKE ONE TO TWO TABLETS BY MOUTH EVERY MORNING AS NEEDED FOR EDEMA    losartan (COZAAR) 100 MG tablet [Pharmacy Med Name: LOSARTAN POTASSIUM 100 MG TAB] 30 tablet 3     Sig: TAKE ONE TABLET BY MOUTH DAILY       Last Filled:  10/3/2022, 60, 3  10/3/2022, 30, 3    Patient Phone Number: [phone] (home) [phone] (work)    Last appt: 5/26/2022   Next appt: Visit date not found    Lab Results   Component Value Date     08/10/2021    K 4.9 08/10/2021    CL 98 (L) 08/10/2021    CO2 29 08/10/2021    BUN 11 08/10/2021    CREATININE 0.6 (L) 08/10/2021    GLUCOSE 98 08/10/2021    CALCIUM 9.2 08/10/2021    PROT 7.1 08/10/2021    LABALBU 4.6 08/10/2021    BILITOT 0.7 08/10/2021    ALKPHOS 75 08/10/2021    AST 29 08/10/2021    ALT 35 08/10/2021    LABGLOM >60 08/10/2021    GFRAA >60 08/10/2021    AGRATIO 1.8 08/10/2021    GLOB 2.5 08/10/2021

## 2023-03-06 ENCOUNTER — TELEPHONE (OUTPATIENT)
Dept: PAIN MANAGEMENT | Age: 48
End: 2023-03-06

## 2023-03-06 ENCOUNTER — OFFICE VISIT (OUTPATIENT)
Dept: PAIN MANAGEMENT | Age: 48
End: 2023-03-06

## 2023-03-06 VITALS
DIASTOLIC BLOOD PRESSURE: 84 MMHG | WEIGHT: 315 LBS | BODY MASS INDEX: 59.65 KG/M2 | OXYGEN SATURATION: 95 % | HEART RATE: 69 BPM | SYSTOLIC BLOOD PRESSURE: 139 MMHG

## 2023-03-06 DIAGNOSIS — M51.27 LUMBAGO-SCIATICA DUE TO DISPLACEMENT OF LUMBAR INTERVERTEBRAL DISC: ICD-10-CM

## 2023-03-06 DIAGNOSIS — S33.9XXD SPRAIN OF LIGAMENT OF LUMBOSACRAL JOINT, SUBSEQUENT ENCOUNTER: ICD-10-CM

## 2023-03-06 DIAGNOSIS — M54.9 CHRONIC MIDLINE BACK PAIN, UNSPECIFIED BACK LOCATION: ICD-10-CM

## 2023-03-06 DIAGNOSIS — G89.29 CHRONIC MIDLINE BACK PAIN, UNSPECIFIED BACK LOCATION: ICD-10-CM

## 2023-03-06 DIAGNOSIS — S83.92XD SPRAIN OF LEFT KNEE/LEG, SUBSEQUENT ENCOUNTER: ICD-10-CM

## 2023-03-06 RX ORDER — DICLOFENAC SODIUM 75 MG/1
75 TABLET, DELAYED RELEASE ORAL DAILY PRN
Qty: 30 TABLET | Refills: 1 | Status: SHIPPED | OUTPATIENT
Start: 2023-03-06

## 2023-03-06 RX ORDER — PREGABALIN 150 MG/1
150 CAPSULE ORAL 3 TIMES DAILY
Qty: 90 CAPSULE | Refills: 1 | Status: SHIPPED | OUTPATIENT
Start: 2023-03-06 | End: 2023-04-05

## 2023-03-06 RX ORDER — OXYCODONE AND ACETAMINOPHEN 7.5; 325 MG/1; MG/1
1 TABLET ORAL EVERY 6 HOURS PRN
Qty: 112 TABLET | Refills: 0 | Status: SHIPPED | OUTPATIENT
Start: 2023-03-06 | End: 2023-04-03

## 2023-03-06 NOTE — TELEPHONE ENCOUNTER
Submitted PA for oxyCODONE-Acetaminophen  Via CMM Key: [id number] STATUS: APPROVED    PA Case: [id number], Status: Approved, Coverage Starts on: 3/6/2023 12:00:00 AM, Coverage Ends on: 9/2/2023 12:00:00 AM.    The medication is APPROVED. The patient was notified by .

## 2023-03-06 NOTE — PROGRESS NOTES
Ni Goldberg [facility]  1975  [id number]      HISTORY OF PRESENT ILLNESS:  Mr. Sandie Owen is a 50 y.o. male returns for a follow up visit for pain management  He has a diagnosis of   1. 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 BWC-Sprain of ligament of lumbosacral joint, subsequent encounter    3. BWC-Chronic midline back pain, unspecified back location    4. BWC-Sprain of left knee/leg, subsequent encounter    . He complains of pain in the  Low back, Left knee, left lower leg   He rates the pain 4/10 and describes it as sharp, aching. Current treatment regimen has helped relieve about 70% of the pain. He denies any side effects from the current pain regimen. Patient reports that since the last follow up visit the physical functioning is unchanged, family/social relationships are unchanged, mood is unchanged sleep patterns are unchanged, and that the overall functioning is unchanged. Patient denies misusing/abusing his narcotic pain medications or using any illegal drugs. There are No indicators for possible drug abuse, addiction or diversion problems. Patient states he has been doing fair, he is managing with the medications. Mr. Sandie Owen mentions he is using Percocet along with the other adjuvants. Patient denies any side effects with the medications. He mention he is on Lyrica along with Voltaren daily PRN. Patient reports his weight has been stable. He says his back and leg pain is baseline. ALLERGIES: Patients list of allergies were reviewed     MEDICATIONS: Mr. Sandie Owen list of medications were reviewed. His current medications are   Outpatient Medications Prior to Visit   Medication Sig Dispense Refill    furosemide (LASIX) 20 MG tablet TAKE ONE TO TWO TABLETS BY MOUTH EVERY MORNING AS NEEDED FOR EDEMA 60 tablet 3    losartan (COZAAR) 100 MG tablet TAKE ONE TABLET BY MOUTH DAILY 30 tablet 3    diclofenac (VOLTAREN) 75 MG EC tablet Take 1 tablet by mouth daily as needed for Pain 30 tablet 1    penicillin v potassium (VEETID) 500 MG tablet TAKE TWO TABLETS BY MOUTH TWICE A  tablet 3    albuterol sulfate HFA (PROAIR HFA) 108 (90 Base) MCG/ACT inhaler Inhale 2 puffs into the lungs every 6 hours as needed for Wheezing 18 g 3    Compression Stockings MISC by Does not apply route Strength 30-40mmHg  Style: pull on below the knee; open or closed toe  Extremity: bilateral  Donning aid recommended: Yes if needed 1 each 2    Multiple Vitamins-Minerals (THERAPEUTIC MULTIVITAMIN-MINERALS) tablet Take 1 tablet by mouth daily      pregabalin (LYRICA) 150 MG capsule Take 1 capsule by mouth 3 times daily for 30 days. 90 capsule 1     No facility-administered medications prior to visit. REVIEW OF SYSTEMS:    Respiratory: Negative for apnea, chest tightness and shortness of breath or change in baseline breathing. PHYSICAL EXAM:   Nursing note and vitals reviewed. /84   Pulse 69   Wt (!) 477 lb 3.2 oz (216.5 kg)   SpO2 95%   BMI 59.65 kg/m²   Constitutional: He appears well-developed and well-nourished. No acute distress. Cardiovascular: Normal rate, regular rhythm, normal heart sounds, and does not have murmur. Pulmonary/Chest: Effort normal. No respiratory distress. He does not have wheezes in the lung fields. He has no rales. Neurological/Psychiatric:He is alert and oriented to person, place, and time. Coordination is  normal.  His mood isAppropriate and affect is Neutral/Euthymic(normal) . His  Other: trace edema, +hyperkeratosis LE  IMPRESSION:   1. 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 BWC-Sprain of ligament of lumbosacral joint, subsequent encounter    3. BWC-Chronic midline back pain, unspecified back location    4.  BWC-Sprain of left knee/leg, subsequent encounter        PLAN:  Informed verbal consent was obtained  -[place] was obtained and reviewed  for the last one year and no indicators of drug misuse  were found. Any other controlled substance prescriptions  seen on the record have been accounted for, I am aware of the patient receiving these medications. Sariah Thompson [place] record will be rechecked as part of office protocol. -ROM/Stretching exercises as advised   -He was advised to increase fluids ( 5-7  glasses of fluid daily), limit caffeine, avoid cheese products, increase dietary fiber, increase activity and exercise as tolerated and relax regularly and enjoy meals   -Continue with Percocet 4 per day   Current Outpatient Medications   Medication Sig Dispense Refill    furosemide (LASIX) 20 MG tablet TAKE ONE TO TWO TABLETS BY MOUTH EVERY MORNING AS NEEDED FOR EDEMA 60 tablet 3    losartan (COZAAR) 100 MG tablet TAKE ONE TABLET BY MOUTH DAILY 30 tablet 3    diclofenac (VOLTAREN) 75 MG EC tablet Take 1 tablet by mouth daily as needed for Pain 30 tablet 1    penicillin v potassium (VEETID) 500 MG tablet TAKE TWO TABLETS BY MOUTH TWICE A  tablet 3    albuterol sulfate HFA (PROAIR HFA) 108 (90 Base) MCG/ACT inhaler Inhale 2 puffs into the lungs every 6 hours as needed for Wheezing 18 g 3    Compression Stockings MISC by Does not apply route Strength 30-40mmHg  Style: pull on below the knee; open or closed toe  Extremity: bilateral  Donning aid recommended: Yes if needed 1 each 2    Multiple Vitamins-Minerals (THERAPEUTIC MULTIVITAMIN-MINERALS) tablet Take 1 tablet by mouth daily      pregabalin (LYRICA) 150 MG capsule Take 1 capsule by mouth 3 times daily for 30 days. 90 capsule 1     No current facility-administered medications for this visit. I will continue his current medication regimen  which is part of the above treatment schedule.  It has been helping with Mr. 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 Risks and benefits of the medications and other alternative treatments  including no treatment were discussed with the patient. The common side effects of these medications were also explained to the patient. Informed verbal consent was obtained. Goals of current treatment regimen include improvement in pain, restoration of functioning- with focus on improvement in physical performance, general activity, work or disability,emotional distress, health care utilization and  decreased medication consumption. Will continue to monitor progress towards achieving/maintaining therapeutic goals with special emphasis on  1. Improvement in perceived interfernce  of pain with ADL's. Ability to do home exercises independently. Ability to do household chores indoor and/or outdoor work and social and leisure activities. Improve psychosocial and physical functioning. - he is showing progression towards this treatment goal with the current regimen. He was advised against drinking alcohol with the narcotic pain medicines, advised against driving or handling machinery while adjusting the dose of medicines or if having cognitive  issues related to the current medications. Risk of overdose and death, if medicines not taken as prescribed, were also discussed. If the patient develops new symptoms or if the symptoms worsen, the patient should call the office. While transcribing every attempt was made to maintain the accuracy of the note in terms of it's contents,there may have been some errors made inadvertently. Thank you for allowing me to participate in the care of this patient.     Noe Yadav MD.    Cc: Mary Hernandez MD

## 2023-03-28 ENCOUNTER — TELEPHONE (OUTPATIENT)
Dept: WOUND CARE | Age: 48
End: 2023-03-28

## 2023-03-28 RX ORDER — AMOXICILLIN AND CLAVULANATE POTASSIUM 875; 125 MG/1; MG/1
1 TABLET, FILM COATED ORAL 2 TIMES DAILY
Qty: 20 TABLET | Refills: 0 | Status: SHIPPED | OUTPATIENT
Start: 2023-03-28 | End: 2023-04-07

## 2023-03-28 NOTE — TELEPHONE ENCOUNTER
Patients called for antibiotics for new wound.  Prescribed Augmentin bid x10 days per Dr Trey Alcazar. DPM.

## 2023-04-10 PROBLEM — F11.20 OPIOID DEPENDENCE WITH CURRENT USE (HCC): Status: ACTIVE | Noted: 2023-04-10

## 2023-05-01 ENCOUNTER — OFFICE VISIT (OUTPATIENT)
Dept: PAIN MANAGEMENT | Age: 48
End: 2023-05-01

## 2023-05-01 VITALS
DIASTOLIC BLOOD PRESSURE: 91 MMHG | BODY MASS INDEX: 58.75 KG/M2 | HEART RATE: 73 BPM | WEIGHT: 315 LBS | SYSTOLIC BLOOD PRESSURE: 147 MMHG

## 2023-05-01 DIAGNOSIS — M54.9 CHRONIC MIDLINE BACK PAIN, UNSPECIFIED BACK LOCATION: ICD-10-CM

## 2023-05-01 DIAGNOSIS — S83.92XA SPRAIN OF LEFT KNEE/LEG, INITIAL ENCOUNTER: ICD-10-CM

## 2023-05-01 DIAGNOSIS — M51.27 LUMBAGO-SCIATICA DUE TO DISPLACEMENT OF LUMBAR INTERVERTEBRAL DISC: ICD-10-CM

## 2023-05-01 DIAGNOSIS — S83.92XD SPRAIN OF LEFT KNEE/LEG, SUBSEQUENT ENCOUNTER: ICD-10-CM

## 2023-05-01 DIAGNOSIS — S33.9XXA SPRAIN OF LIGAMENT OF LUMBOSACRAL JOINT, INITIAL ENCOUNTER: ICD-10-CM

## 2023-05-01 DIAGNOSIS — G89.29 CHRONIC MIDLINE BACK PAIN, UNSPECIFIED BACK LOCATION: ICD-10-CM

## 2023-05-01 DIAGNOSIS — S33.9XXD SPRAIN OF LIGAMENT OF LUMBOSACRAL JOINT, SUBSEQUENT ENCOUNTER: ICD-10-CM

## 2023-05-01 RX ORDER — DICLOFENAC SODIUM 75 MG/1
75 TABLET, DELAYED RELEASE ORAL DAILY PRN
Qty: 30 TABLET | Refills: 1 | Status: SHIPPED | OUTPATIENT
Start: 2023-05-01

## 2023-05-01 RX ORDER — PREGABALIN 150 MG/1
150 CAPSULE ORAL 3 TIMES DAILY
Qty: 90 CAPSULE | Refills: 1 | Status: SHIPPED | OUTPATIENT
Start: 2023-05-01 | End: 2023-05-31

## 2023-05-01 RX ORDER — OXYCODONE AND ACETAMINOPHEN 7.5; 325 MG/1; MG/1
1 TABLET ORAL EVERY 6 HOURS PRN
Qty: 112 TABLET | Refills: 0 | Status: SHIPPED | OUTPATIENT
Start: 2023-05-01 | End: 2023-05-29

## 2023-05-01 NOTE — PROGRESS NOTES
taken as prescribed, were also discussed. If the patient develops new symptoms or if the symptoms worsen, the patient should call the office. While transcribing every attempt was made to maintain the accuracy of the note in terms of it's contents,there may have been some errors made inadvertently. Thank you for allowing me to participate in the care of this patient.     Paul Ca MD.    Cc: Hung Lindquist MD

## 2023-05-30 ENCOUNTER — OFFICE VISIT (OUTPATIENT)
Dept: PAIN MANAGEMENT | Age: 48
End: 2023-05-30

## 2023-05-30 VITALS
SYSTOLIC BLOOD PRESSURE: 153 MMHG | DIASTOLIC BLOOD PRESSURE: 78 MMHG | WEIGHT: 315 LBS | BODY MASS INDEX: 59.12 KG/M2 | HEART RATE: 69 BPM

## 2023-05-30 DIAGNOSIS — M51.27 LUMBAGO-SCIATICA DUE TO DISPLACEMENT OF LUMBAR INTERVERTEBRAL DISC: ICD-10-CM

## 2023-05-30 DIAGNOSIS — M54.9 CHRONIC MIDLINE BACK PAIN, UNSPECIFIED BACK LOCATION: ICD-10-CM

## 2023-05-30 DIAGNOSIS — S33.9XXD SPRAIN OF LIGAMENT OF LUMBOSACRAL JOINT, SUBSEQUENT ENCOUNTER: ICD-10-CM

## 2023-05-30 DIAGNOSIS — S83.92XD SPRAIN OF LEFT KNEE/LEG, SUBSEQUENT ENCOUNTER: ICD-10-CM

## 2023-05-30 DIAGNOSIS — G89.29 CHRONIC MIDLINE BACK PAIN, UNSPECIFIED BACK LOCATION: ICD-10-CM

## 2023-05-30 RX ORDER — PREGABALIN 150 MG/1
150 CAPSULE ORAL 3 TIMES DAILY
Qty: 90 CAPSULE | Refills: 1 | Status: SHIPPED | OUTPATIENT
Start: 2023-05-30 | End: 2023-06-29

## 2023-05-30 RX ORDER — OXYCODONE AND ACETAMINOPHEN 7.5; 325 MG/1; MG/1
1 TABLET ORAL EVERY 6 HOURS PRN
Qty: 112 TABLET | Refills: 0 | Status: SHIPPED | OUTPATIENT
Start: 2023-05-30 | End: 2023-06-27

## 2023-05-30 RX ORDER — DICLOFENAC SODIUM 75 MG/1
75 TABLET, DELAYED RELEASE ORAL DAILY PRN
Qty: 30 TABLET | Refills: 1 | Status: SHIPPED | OUTPATIENT
Start: 2023-05-30

## 2023-05-30 NOTE — PROGRESS NOTES
Junior King [facility]  1975  [id number]      HISTORY OF PRESENT ILLNESS:  Mr. Jennifer Good is a 50 y.o. male returns for a follow up visit for pain management  He has a diagnosis of   1. 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 BWC-Sprain of left knee/leg, subsequent encounter    3. BWC-Chronic midline back pain, unspecified back location    4. BWC-Sprain of ligament of lumbosacral joint, subsequent encounter    5. BWC Sprain of left knee/leg, initial encounter    6. BWC Sprain of ligament of lumbosacral joint, initial encounter    . He complains of pain in the  Low back, left knee, left lower leg, left foot   He rates the pain 4/10 and describes it as sharp, aching, burning. Current treatment regimen has helped relieve about 70% of the pain. He denies any side effects from the current pain regimen. Patient reports that since the last follow up visit the physical functioning is unchanged, family/social relationships are unchanged, mood is unchanged sleep patterns are unchanged, and that the overall functioning is unchanged. Patient denies misusing/abusing his narcotic pain medications or using any illegal drugs. There are No indicators for possible drug abuse, addiction or diversion problems. Patient reports he has been doing fair, managing with the medications. He says he is working full time. He mentions his leg symptoms are okay, but his back hurts the most. He states his weight has been stable and managing all her ADLS. ALLERGIES: Patients list of allergies were reviewed     MEDICATIONS: Mr. Jennifer Good list of medications were reviewed. His current medications are   Outpatient Medications Prior to Visit   Medication Sig Dispense Refill    diclofenac (VOLTAREN) 75 MG EC tablet Take 1 tablet by mouth daily as needed for Pain 30 tablet 1    pregabalin (LYRICA) 150 MG capsule Take 1 capsule by mouth 3 times daily for 30 days.  90 capsule 1    furosemide (LASIX) 20 MG tablet

## 2023-05-31 RX ORDER — PENICILLIN V POTASSIUM 500 MG/1
TABLET ORAL
Qty: 120 TABLET | Refills: 3 | Status: SHIPPED | OUTPATIENT
Start: 2023-05-31

## 2023-05-31 RX ORDER — LOSARTAN POTASSIUM 100 MG/1
TABLET ORAL
Qty: 90 TABLET | Refills: 3 | Status: SHIPPED | OUTPATIENT
Start: 2023-05-31

## 2023-05-31 RX ORDER — FUROSEMIDE 20 MG/1
TABLET ORAL
Qty: 90 TABLET | Refills: 3 | Status: SHIPPED | OUTPATIENT
Start: 2023-05-31

## 2023-05-31 NOTE — TELEPHONE ENCOUNTER
Lov 4/10/23  Lrf 60 3 2/3/23  30 3 2/3/23  Lab Results   Component Value Date     08/10/2021    K 4.9 08/10/2021    CL 98 (L) 08/10/2021    CO2 29 08/10/2021    BUN 11 08/10/2021    CREATININE 0.6 (L) 08/10/2021    GLUCOSE 98 08/10/2021    CALCIUM 9.2 08/10/2021    PROT 7.1 08/10/2021    LABALBU 4.6 08/10/2021    BILITOT 0.7 08/10/2021    ALKPHOS 75 08/10/2021    AST 29 08/10/2021    ALT 35 08/10/2021    LABGLOM >60 08/10/2021    GFRAA >60 08/10/2021    AGRATIO 1.8 08/10/2021    GLOB 2.5 08/10/2021

## 2023-06-19 PROBLEM — I83.219 VARICOSE VEINS OF LOWER EXTREMITIES WITH ULCER AND INFLAMMATION (HCC): Status: ACTIVE | Noted: 2021-10-07

## 2023-06-19 PROBLEM — I83.229 VARICOSE VEINS OF LOWER EXTREMITIES WITH ULCER AND INFLAMMATION (HCC): Status: ACTIVE | Noted: 2021-10-07

## 2023-06-19 PROBLEM — G47.33 OBSTRUCTIVE SLEEP APNEA: Status: ACTIVE | Noted: 2021-05-20

## 2023-06-19 PROBLEM — L97.919 VARICOSE VEINS OF LOWER EXTREMITIES WITH ULCER AND INFLAMMATION (HCC): Status: ACTIVE | Noted: 2021-10-07

## 2023-06-19 PROBLEM — L97.929 VARICOSE VEINS OF LOWER EXTREMITIES WITH ULCER AND INFLAMMATION (HCC): Status: ACTIVE | Noted: 2021-10-07

## 2023-06-20 ENCOUNTER — TELEPHONE (OUTPATIENT)
Dept: WOUND CARE | Age: 48
End: 2023-06-20

## 2023-06-20 DIAGNOSIS — I83.219 VARICOSE VEINS OF LOWER EXTREMITIES WITH ULCER AND INFLAMMATION (HCC): ICD-10-CM

## 2023-06-20 DIAGNOSIS — I83.222 VARICOSE VEINS OF LEFT LOWER EXTREMITY WITH INFLAMMATION, WITH ULCER OF CALF WITH FAT LAYER EXPOSED (HCC): ICD-10-CM

## 2023-06-20 DIAGNOSIS — I83.229 VARICOSE VEINS OF LOWER EXTREMITIES WITH ULCER AND INFLAMMATION (HCC): ICD-10-CM

## 2023-06-20 DIAGNOSIS — L97.222 VARICOSE VEINS OF LEFT LOWER EXTREMITY WITH INFLAMMATION, WITH ULCER OF CALF WITH FAT LAYER EXPOSED (HCC): ICD-10-CM

## 2023-06-20 DIAGNOSIS — L97.929 VARICOSE VEINS OF LOWER EXTREMITIES WITH ULCER AND INFLAMMATION (HCC): ICD-10-CM

## 2023-06-20 DIAGNOSIS — L97.222 NON-PRESSURE CHRONIC ULCER OF LEFT CALF WITH FAT LAYER EXPOSED (HCC): Primary | ICD-10-CM

## 2023-06-20 DIAGNOSIS — L97.919 VARICOSE VEINS OF LOWER EXTREMITIES WITH ULCER AND INFLAMMATION (HCC): ICD-10-CM

## 2023-06-23 ENCOUNTER — OFFICE VISIT (OUTPATIENT)
Dept: SURGERY | Age: 48
End: 2023-06-23

## 2023-06-23 VITALS
WEIGHT: 315 LBS | SYSTOLIC BLOOD PRESSURE: 191 MMHG | DIASTOLIC BLOOD PRESSURE: 100 MMHG | HEIGHT: 75 IN | HEART RATE: 68 BPM | BODY MASS INDEX: 39.17 KG/M2 | TEMPERATURE: 98 F

## 2023-06-23 DIAGNOSIS — C43.72 MALIGNANT MELANOMA OF LEFT LOWER EXTREMITY INCLUDING HIP (HCC): Primary | ICD-10-CM

## 2023-06-23 DIAGNOSIS — R19.09 LEFT GROIN MASS: ICD-10-CM

## 2023-06-23 RX ORDER — DOXYCYCLINE HYCLATE 100 MG/1
CAPSULE ORAL
COMMUNITY
Start: 2023-06-21

## 2023-06-26 ENCOUNTER — TELEPHONE (OUTPATIENT)
Dept: SURGERY | Age: 48
End: 2023-06-26

## 2023-06-26 DIAGNOSIS — L97.222 NON-PRESSURE CHRONIC ULCER OF LEFT CALF WITH FAT LAYER EXPOSED (HCC): Primary | ICD-10-CM

## 2023-06-26 DIAGNOSIS — I83.222 VARICOSE VEINS OF LEFT LOWER EXTREMITY WITH INFLAMMATION, WITH ULCER OF CALF WITH FAT LAYER EXPOSED (HCC): ICD-10-CM

## 2023-06-26 DIAGNOSIS — C43.72 MALIGNANT MELANOMA OF LEFT LOWER EXTREMITY INCLUDING HIP (HCC): Primary | ICD-10-CM

## 2023-06-26 DIAGNOSIS — I83.212 VARICOSE VEINS OF RIGHT LOWER EXTREMITY WITH INFLAMMATION, WITH ULCER OF CALF WITH FAT LAYER EXPOSED (HCC): ICD-10-CM

## 2023-06-26 DIAGNOSIS — L97.212 VARICOSE VEINS OF RIGHT LOWER EXTREMITY WITH INFLAMMATION, WITH ULCER OF CALF WITH FAT LAYER EXPOSED (HCC): ICD-10-CM

## 2023-06-26 DIAGNOSIS — R19.09 LEFT GROIN MASS: ICD-10-CM

## 2023-06-26 DIAGNOSIS — L97.222 VARICOSE VEINS OF LEFT LOWER EXTREMITY WITH INFLAMMATION, WITH ULCER OF CALF WITH FAT LAYER EXPOSED (HCC): ICD-10-CM

## 2023-06-27 ENCOUNTER — OFFICE VISIT (OUTPATIENT)
Dept: PAIN MANAGEMENT | Age: 48
End: 2023-06-27

## 2023-06-27 VITALS
WEIGHT: 315 LBS | BODY MASS INDEX: 58.12 KG/M2 | DIASTOLIC BLOOD PRESSURE: 66 MMHG | SYSTOLIC BLOOD PRESSURE: 105 MMHG | HEART RATE: 69 BPM

## 2023-06-27 DIAGNOSIS — G89.29 CHRONIC MIDLINE BACK PAIN, UNSPECIFIED BACK LOCATION: ICD-10-CM

## 2023-06-27 DIAGNOSIS — M51.27 LUMBAGO-SCIATICA DUE TO DISPLACEMENT OF LUMBAR INTERVERTEBRAL DISC: ICD-10-CM

## 2023-06-27 DIAGNOSIS — S33.9XXA SPRAIN OF LIGAMENT OF LUMBOSACRAL JOINT, INITIAL ENCOUNTER: ICD-10-CM

## 2023-06-27 DIAGNOSIS — S83.92XD SPRAIN OF LEFT KNEE/LEG, SUBSEQUENT ENCOUNTER: ICD-10-CM

## 2023-06-27 DIAGNOSIS — S33.9XXD SPRAIN OF LIGAMENT OF LUMBOSACRAL JOINT, SUBSEQUENT ENCOUNTER: ICD-10-CM

## 2023-06-27 DIAGNOSIS — S83.92XA SPRAIN OF LEFT KNEE/LEG, INITIAL ENCOUNTER: ICD-10-CM

## 2023-06-27 DIAGNOSIS — M54.9 CHRONIC MIDLINE BACK PAIN, UNSPECIFIED BACK LOCATION: ICD-10-CM

## 2023-06-27 RX ORDER — PREGABALIN 150 MG/1
150 CAPSULE ORAL 3 TIMES DAILY
Qty: 90 CAPSULE | Refills: 1 | Status: SHIPPED | OUTPATIENT
Start: 2023-06-27 | End: 2023-08-26

## 2023-06-27 RX ORDER — OXYCODONE AND ACETAMINOPHEN 7.5; 325 MG/1; MG/1
1 TABLET ORAL EVERY 6 HOURS PRN
Qty: 112 TABLET | Refills: 0 | Status: SHIPPED | OUTPATIENT
Start: 2023-06-27 | End: 2023-06-30 | Stop reason: SDUPTHER

## 2023-06-27 RX ORDER — DICLOFENAC SODIUM 75 MG/1
75 TABLET, DELAYED RELEASE ORAL DAILY PRN
Qty: 30 TABLET | Refills: 1 | Status: SHIPPED | OUTPATIENT
Start: 2023-06-27

## 2023-06-29 ENCOUNTER — TELEPHONE (OUTPATIENT)
Dept: PAIN MANAGEMENT | Age: 48
End: 2023-06-29

## 2023-06-29 NOTE — TELEPHONE ENCOUNTER
Patient said his preferred pharmacy doesn't have the [address] in [address] [place] [address]. He would like it sent to [address] [facility] [address] [facility], [address] [place] [address].  Sylvia Lee [phone] - F [phone]    Please advise

## 2023-06-30 DIAGNOSIS — M54.9 CHRONIC MIDLINE BACK PAIN, UNSPECIFIED BACK LOCATION: ICD-10-CM

## 2023-06-30 DIAGNOSIS — M51.27 LUMBAGO-SCIATICA DUE TO DISPLACEMENT OF LUMBAR INTERVERTEBRAL DISC: ICD-10-CM

## 2023-06-30 DIAGNOSIS — G89.29 CHRONIC MIDLINE BACK PAIN, UNSPECIFIED BACK LOCATION: ICD-10-CM

## 2023-06-30 DIAGNOSIS — S83.92XD SPRAIN OF LEFT KNEE/LEG, SUBSEQUENT ENCOUNTER: ICD-10-CM

## 2023-06-30 DIAGNOSIS — S33.9XXD SPRAIN OF LIGAMENT OF LUMBOSACRAL JOINT, SUBSEQUENT ENCOUNTER: ICD-10-CM

## 2023-06-30 RX ORDER — OXYCODONE AND ACETAMINOPHEN 7.5; 325 MG/1; MG/1
1 TABLET ORAL EVERY 6 HOURS PRN
Qty: 112 TABLET | Refills: 0 | Status: CANCELLED | OUTPATIENT
Start: 2023-06-30 | End: 2023-07-28

## 2023-06-30 RX ORDER — OXYCODONE AND ACETAMINOPHEN 7.5; 325 MG/1; MG/1
1 TABLET ORAL EVERY 6 HOURS PRN
Qty: 112 TABLET | Refills: 0 | Status: SHIPPED | OUTPATIENT
Start: 2023-06-30 | End: 2023-07-25 | Stop reason: SDUPTHER

## 2023-06-30 NOTE — TELEPHONE ENCOUNTER
Patient called again and said the medications are not at the pharmacy.  Patient would like a callback once sent         Please advise

## 2023-06-30 NOTE — TELEPHONE ENCOUNTER
Called patient to inform that his medication has been sent to the preferred pharmacy requested. Please be advised.

## 2023-07-01 ENCOUNTER — HOSPITAL ENCOUNTER (OUTPATIENT)
Dept: CT IMAGING | Age: 48
Discharge: HOME | End: 2023-07-01
Payer: COMMERCIAL

## 2023-07-01 DIAGNOSIS — R19.09 LEFT GROIN MASS: ICD-10-CM

## 2023-07-01 DIAGNOSIS — C43.72 MALIGNANT MELANOMA OF LEFT LOWER EXTREMITY INCLUDING HIP (HCC): ICD-10-CM

## 2023-07-01 PROCEDURE — 74177 CT ABD & PELVIS W/CONTRAST: CPT

## 2023-07-01 PROCEDURE — 2500000003 HC RX 250 WO HCPCS: Performed by: NURSE PRACTITIONER

## 2023-07-01 PROCEDURE — 6360000004 HC RX CONTRAST MEDICATION: Performed by: NURSE PRACTITIONER

## 2023-07-01 RX ADMIN — BARIUM SULFATE 450 ML: 20 SUSPENSION ORAL at 14:30

## 2023-07-01 RX ADMIN — IOPAMIDOL 75 ML: 755 INJECTION, SOLUTION INTRAVENOUS at 14:31

## 2023-07-25 ENCOUNTER — OFFICE VISIT (OUTPATIENT)
Dept: PAIN MANAGEMENT | Age: 48
End: 2023-07-25
Payer: COMMERCIAL

## 2023-07-25 VITALS
DIASTOLIC BLOOD PRESSURE: 77 MMHG | HEART RATE: 64 BPM | BODY MASS INDEX: 58.12 KG/M2 | SYSTOLIC BLOOD PRESSURE: 125 MMHG | WEIGHT: 315 LBS

## 2023-07-25 DIAGNOSIS — M51.27 LUMBAGO-SCIATICA DUE TO DISPLACEMENT OF LUMBAR INTERVERTEBRAL DISC: ICD-10-CM

## 2023-07-25 DIAGNOSIS — G89.29 CHRONIC MIDLINE BACK PAIN, UNSPECIFIED BACK LOCATION: ICD-10-CM

## 2023-07-25 DIAGNOSIS — S33.9XXD SPRAIN OF LIGAMENT OF LUMBOSACRAL JOINT, SUBSEQUENT ENCOUNTER: ICD-10-CM

## 2023-07-25 DIAGNOSIS — M54.9 CHRONIC MIDLINE BACK PAIN, UNSPECIFIED BACK LOCATION: ICD-10-CM

## 2023-07-25 DIAGNOSIS — S83.92XD SPRAIN OF LEFT KNEE/LEG, SUBSEQUENT ENCOUNTER: ICD-10-CM

## 2023-07-25 PROCEDURE — 99213 OFFICE O/P EST LOW 20 MIN: CPT | Performed by: INTERNAL MEDICINE

## 2023-07-25 RX ORDER — DICLOFENAC SODIUM 75 MG/1
75 TABLET, DELAYED RELEASE ORAL DAILY PRN
Qty: 30 TABLET | Refills: 1 | Status: SHIPPED | OUTPATIENT
Start: 2023-07-25

## 2023-07-25 RX ORDER — OXYCODONE AND ACETAMINOPHEN 7.5; 325 MG/1; MG/1
1 TABLET ORAL EVERY 6 HOURS PRN
Qty: 112 TABLET | Refills: 0 | Status: SHIPPED | OUTPATIENT
Start: 2023-07-25 | End: 2023-08-22

## 2023-07-25 RX ORDER — PREGABALIN 150 MG/1
150 CAPSULE ORAL 3 TIMES DAILY
Qty: 90 CAPSULE | Refills: 1 | Status: SHIPPED | OUTPATIENT
Start: 2023-07-25 | End: 2023-09-23

## 2023-07-25 NOTE — PROGRESS NOTES
Jayne Chung [facility]  1975  [id number]      HISTORY OF PRESENT ILLNESS:  Mr. Barry Gaona is a 50 y.o. male returns for a follow up visit for pain management  He has a diagnosis of   1. 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 BWC-Sprain of ligament of lumbosacral joint, subsequent encounter    3. BWC-Sprain of left knee/leg, subsequent encounter    4. BWC-Chronic midline back pain, unspecified back location    . He complains of pain in the  low back, buttock, left leg   He rates the pain 4/10 and describes it as sharp, aching, burning. Current treatment regimen has helped relieve about 70% of the pain. He denies any side effects from the current pain regimen. Patient reports that since the last follow up visit the physical functioning is unchanged, family/social relationships are unchanged, mood is unchanged sleep patterns are unchanged, and that the overall functioning is unchanged. Patient denies misusing/abusing his narcotic pain medications or using any illegal drugs. There are No indicators for possible drug abuse, addiction or diversion problems. Patient states he has been doing fair, managing with the medications. He says he is using Percocet 4 per day along with Lyrica and Voltaren. He mentions he was on antibiotics for chronic cellulitis. He reports he is doing better. ALLERGIES: Patients list of allergies were reviewed     MEDICATIONS: Mr. Barry Gaona list of medications were reviewed. His current medications are   Outpatient Medications Prior to Visit   Medication Sig Dispense Refill    oxyCODONE-acetaminophen (PERCOCET) 7.5-325 MG per tablet Take 1 tablet by mouth every 6 hours as needed for Pain (max 4 per day) for up to 28 days. 112 tablet 0    diclofenac (VOLTAREN) 75 MG EC tablet Take 1 tablet by mouth daily as needed for Pain 30 tablet 1    pregabalin (LYRICA) 150 MG capsule Take 1 capsule by mouth 3 times daily for 60 days.  Max Daily Amount: 450 mg 90

## 2023-08-22 ENCOUNTER — OFFICE VISIT (OUTPATIENT)
Dept: PAIN MANAGEMENT | Age: 48
End: 2023-08-22

## 2023-08-22 VITALS
SYSTOLIC BLOOD PRESSURE: 157 MMHG | BODY MASS INDEX: 58.12 KG/M2 | OXYGEN SATURATION: 93 % | WEIGHT: 315 LBS | HEART RATE: 80 BPM | DIASTOLIC BLOOD PRESSURE: 88 MMHG

## 2023-08-22 DIAGNOSIS — M51.27 LUMBAGO-SCIATICA DUE TO DISPLACEMENT OF LUMBAR INTERVERTEBRAL DISC: ICD-10-CM

## 2023-08-22 DIAGNOSIS — S83.92XD SPRAIN OF LEFT KNEE/LEG, SUBSEQUENT ENCOUNTER: ICD-10-CM

## 2023-08-22 DIAGNOSIS — S33.9XXD SPRAIN OF LIGAMENT OF LUMBOSACRAL JOINT, SUBSEQUENT ENCOUNTER: ICD-10-CM

## 2023-08-22 DIAGNOSIS — G89.29 CHRONIC MIDLINE BACK PAIN, UNSPECIFIED BACK LOCATION: ICD-10-CM

## 2023-08-22 DIAGNOSIS — S83.92XA SPRAIN OF LEFT KNEE/LEG, INITIAL ENCOUNTER: ICD-10-CM

## 2023-08-22 DIAGNOSIS — M54.9 CHRONIC MIDLINE BACK PAIN, UNSPECIFIED BACK LOCATION: ICD-10-CM

## 2023-08-22 RX ORDER — DICLOFENAC SODIUM 75 MG/1
75 TABLET, DELAYED RELEASE ORAL DAILY PRN
Qty: 30 TABLET | Refills: 1 | Status: SHIPPED | OUTPATIENT
Start: 2023-08-22

## 2023-08-22 RX ORDER — OXYCODONE AND ACETAMINOPHEN 7.5; 325 MG/1; MG/1
1 TABLET ORAL EVERY 6 HOURS PRN
Qty: 112 TABLET | Refills: 0 | Status: SHIPPED | OUTPATIENT
Start: 2023-08-22 | End: 2023-09-19

## 2023-08-22 RX ORDER — PREGABALIN 150 MG/1
150 CAPSULE ORAL 3 TIMES DAILY
Qty: 90 CAPSULE | Refills: 1 | Status: SHIPPED | OUTPATIENT
Start: 2023-08-22 | End: 2023-10-21

## 2023-08-25 NOTE — PROGRESS NOTES
[facility]  1975  [id number]      HISTORY OF PRESENT ILLNESS:  Mr. Abeba Ferrera is a 50 y.o. male returns for a follow up visit for pain management  He has a diagnosis of   1. 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 BWC-Sprain of ligament of lumbosacral joint, subsequent encounter    3. BWC-Sprain of left knee/leg, subsequent encounter    4. BWC-Chronic midline back pain, unspecified back location    5. BWC-Sprain of left knee/leg, initial encounter    6. BWC-Sprain of ligament of lumbosacral joint, initial encounter    . As per information/history obtained from the PADT(patient assessment and documentation tool) -  He complains of pain in the lower back and knees Left with radiation to the buttocks, lower leg Left, ankles Left, and feet Left He rates the pain 4/10 and describes it as aching, burning, numbness, pins and needles. Pain is made worse by: movement, walking, standing, sitting, bending, lifting. He denies any side effects from the current pain regimen. Patient reports that since starting the current regimen under my care the physical functioning is better, family/social relationships are unchanged, mood is unchanged sleep patterns are unchanged, and that the overall functioning is better. Patient denies misusing/abusing his narcotic pain medications or using any illegal drugs. There are No indicators for possible drug abuse, addiction or diversion problems. Patient states he has been doing okay, he is managing his regimen. Mr. Bryce Lopez mentions he is using Percocet along with Lyrica, he denies any side effects. He says his leg symptoms are better, he states he has not had anymore open sores. He states he is using Voltaren as needed. Patient reports he is working full time still and managing his house and yard work. Mr. Bryce Lopez reports he is managing his ADL's and house chores and activities.  Has increased tolerance  for sitting , standing and walking by

## 2023-09-19 ENCOUNTER — OFFICE VISIT (OUTPATIENT)
Dept: PAIN MANAGEMENT | Age: 48
End: 2023-09-19

## 2023-09-19 VITALS
OXYGEN SATURATION: 92 % | SYSTOLIC BLOOD PRESSURE: 120 MMHG | DIASTOLIC BLOOD PRESSURE: 75 MMHG | WEIGHT: 315 LBS | HEART RATE: 106 BPM | BODY MASS INDEX: 58 KG/M2

## 2023-09-19 DIAGNOSIS — M51.27 LUMBAGO-SCIATICA DUE TO DISPLACEMENT OF LUMBAR INTERVERTEBRAL DISC: ICD-10-CM

## 2023-09-19 DIAGNOSIS — S83.92XD SPRAIN OF LEFT KNEE/LEG, SUBSEQUENT ENCOUNTER: ICD-10-CM

## 2023-09-19 DIAGNOSIS — S83.92XA SPRAIN OF LEFT KNEE/LEG, INITIAL ENCOUNTER: ICD-10-CM

## 2023-09-19 DIAGNOSIS — G89.29 CHRONIC MIDLINE BACK PAIN, UNSPECIFIED BACK LOCATION: ICD-10-CM

## 2023-09-19 DIAGNOSIS — M54.9 CHRONIC MIDLINE BACK PAIN, UNSPECIFIED BACK LOCATION: ICD-10-CM

## 2023-09-19 DIAGNOSIS — S33.9XXD SPRAIN OF LIGAMENT OF LUMBOSACRAL JOINT, SUBSEQUENT ENCOUNTER: ICD-10-CM

## 2023-09-19 DIAGNOSIS — S33.9XXA SPRAIN OF LIGAMENT OF LUMBOSACRAL JOINT, INITIAL ENCOUNTER: ICD-10-CM

## 2023-09-19 RX ORDER — PREGABALIN 150 MG/1
150 CAPSULE ORAL 3 TIMES DAILY
Qty: 90 CAPSULE | Refills: 1 | Status: SHIPPED | OUTPATIENT
Start: 2023-09-19 | End: 2023-11-18

## 2023-09-19 RX ORDER — DICLOFENAC SODIUM 75 MG/1
75 TABLET, DELAYED RELEASE ORAL DAILY PRN
Qty: 30 TABLET | Refills: 1 | Status: SHIPPED | OUTPATIENT
Start: 2023-09-19

## 2023-09-19 RX ORDER — OXYCODONE AND ACETAMINOPHEN 7.5; 325 MG/1; MG/1
1 TABLET ORAL EVERY 6 HOURS PRN
Qty: 112 TABLET | Refills: 0 | Status: SHIPPED | OUTPATIENT
Start: 2023-09-19 | End: 2023-10-17

## 2023-09-20 ENCOUNTER — TELEPHONE (OUTPATIENT)
Dept: PAIN MANAGEMENT | Age: 48
End: 2023-09-20

## 2023-09-20 NOTE — TELEPHONE ENCOUNTER
Received PA (Ninfa Galeana) was not [facility]- noticed last PA (3/6/2023) was not [facility] either but processed. Submitted PA for Oxycodone-Acetaminophen Via CMM Key: [id number] STATUS: APPROVED  9/20/2023- 3/18/2024. [id number] [place]  has been notified. Thank you!

## 2023-09-20 NOTE — PROGRESS NOTES
30-40 compression. Wear on lower extremities. 2 each 0    doxycycline hyclate (VIBRAMYCIN) 100 MG capsule       furosemide (LASIX) 20 MG tablet TAKE ONE TO TWO TABLETS BY MOUTH EVERY MORNING AS NEEDED FOR EDEMA. 90 tablet 3    losartan (COZAAR) 100 MG tablet TAKE ONE TABLET BY MOUTH DAILY 90 tablet 3    penicillin v potassium (VEETID) 500 MG tablet TAKE TWO TABLETS BY MOUTH TWICE A  tablet 3    albuterol sulfate HFA (PROAIR HFA) 108 (90 Base) MCG/ACT inhaler Inhale 2 puffs into the lungs every 6 hours as needed for Wheezing 18 g 3    Compression Stockings MISC by Does not apply route Strength 30-40mmHg  Style: pull on below the knee; open or closed toe  Extremity: bilateral  Donning aid recommended: Yes if needed 1 each 2    Multiple Vitamins-Minerals (THERAPEUTIC MULTIVITAMIN-MINERALS) tablet Take 1 tablet by mouth daily      Nirmatrelvir-Ritonavir (PAXLOVID, 300/100, PO)        No current facility-administered medications for this visit. General Goals of current treatment regimen include improvement in pain, restoration of functioning- with focus on improvement in physical performance, general activity, work or disability,emotional distress, health care utilization and  decreased medication consumption. Will continue to monitor progress towards achieving/maintaining therapeutic goals with special emphasis on  1. Improvement in perceived interfernce  of pain with ADL's. Ability to do home exercises independently. Ability to do household chores indoor and/or outdoor work and social and leisure activities. Improve psychosocial and physical functioning. - he is maintaining his treatment goal with the current regimen. 2. Ability to focus/concentrate at work and perform the duties required of him at work  Sit through a workday without lower extremity symptoms. Stand 30-60 minutes without lower extremity symptoms. Ability to lift up to 10-20 lbs. Ability to go up and down stairs.   Sit 30-60 minutes

## 2023-10-17 ENCOUNTER — OFFICE VISIT (OUTPATIENT)
Dept: PAIN MANAGEMENT | Age: 48
End: 2023-10-17

## 2023-10-17 VITALS
SYSTOLIC BLOOD PRESSURE: 149 MMHG | DIASTOLIC BLOOD PRESSURE: 75 MMHG | BODY MASS INDEX: 58.87 KG/M2 | WEIGHT: 315 LBS | OXYGEN SATURATION: 93 % | HEART RATE: 72 BPM

## 2023-10-17 DIAGNOSIS — M51.27 LUMBAGO-SCIATICA DUE TO DISPLACEMENT OF LUMBAR INTERVERTEBRAL DISC: ICD-10-CM

## 2023-10-17 DIAGNOSIS — G89.29 CHRONIC MIDLINE BACK PAIN, UNSPECIFIED BACK LOCATION: ICD-10-CM

## 2023-10-17 DIAGNOSIS — M54.9 CHRONIC MIDLINE BACK PAIN, UNSPECIFIED BACK LOCATION: ICD-10-CM

## 2023-10-17 DIAGNOSIS — S83.92XD SPRAIN OF LEFT KNEE/LEG, SUBSEQUENT ENCOUNTER: ICD-10-CM

## 2023-10-17 DIAGNOSIS — S33.9XXD SPRAIN OF LIGAMENT OF LUMBOSACRAL JOINT, SUBSEQUENT ENCOUNTER: ICD-10-CM

## 2023-10-17 DIAGNOSIS — S33.9XXA SPRAIN OF LIGAMENT OF LUMBOSACRAL JOINT, INITIAL ENCOUNTER: ICD-10-CM

## 2023-10-17 DIAGNOSIS — S83.92XA SPRAIN OF LEFT KNEE/LEG, INITIAL ENCOUNTER: ICD-10-CM

## 2023-10-17 RX ORDER — PREGABALIN 150 MG/1
150 CAPSULE ORAL 3 TIMES DAILY
Qty: 90 CAPSULE | Refills: 1 | Status: SHIPPED | OUTPATIENT
Start: 2023-10-17 | End: 2023-12-16

## 2023-10-17 RX ORDER — OXYCODONE AND ACETAMINOPHEN 7.5; 325 MG/1; MG/1
1 TABLET ORAL EVERY 6 HOURS PRN
Qty: 112 TABLET | Refills: 0 | Status: SHIPPED | OUTPATIENT
Start: 2023-10-17 | End: 2023-11-14

## 2023-10-17 RX ORDER — DICLOFENAC SODIUM 75 MG/1
75 TABLET, DELAYED RELEASE ORAL DAILY PRN
Qty: 30 TABLET | Refills: 1 | Status: SHIPPED | OUTPATIENT
Start: 2023-10-17

## 2023-10-17 NOTE — PROGRESS NOTES
32 yo  PPD5 s/p  (23) presenting with elevated BP at home. Sustained severe range BPs in the ED. Meeting criteria for pre eclampsia with severe features. Patient stable this morning with no acute complaints.     #sPEC  - BP monitoring   - s/p Procardia 10 IR  - Procardia 30XL  - Mg x24h for seizure prophylaxis   - HELLP wnl, f/u AM HELLP   - CT head negative   - outpatient Cardio OB consult    #Maternal wellbeing   - HSQ and SCDs for VT prophylaxis   - pain control prn   - regular diet  - voiding freely     Shawna Tarrash PGY3 capsule 1    Compression Stockings MISC by Does not apply route 30-40 compression. Wear on lower extremities. 2 each 0    doxycycline hyclate (VIBRAMYCIN) 100 MG capsule       furosemide (LASIX) 20 MG tablet TAKE ONE TO TWO TABLETS BY MOUTH EVERY MORNING AS NEEDED FOR EDEMA. 90 tablet 3    losartan (COZAAR) 100 MG tablet TAKE ONE TABLET BY MOUTH DAILY 90 tablet 3    penicillin v potassium (VEETID) 500 MG tablet TAKE TWO TABLETS BY MOUTH TWICE A  tablet 3    albuterol sulfate HFA (PROAIR HFA) 108 (90 Base) MCG/ACT inhaler Inhale 2 puffs into the lungs every 6 hours as needed for Wheezing 18 g 3    Compression Stockings MISC by Does not apply route Strength 30-40mmHg  Style: pull on below the knee; open or closed toe  Extremity: bilateral  Donning aid recommended: Yes if needed 1 each 2    Multiple Vitamins-Minerals (THERAPEUTIC MULTIVITAMIN-MINERALS) tablet Take 1 tablet by mouth daily       No current facility-administered medications for this visit. General Goals of current treatment regimen include improvement in pain, restoration of functioning- with focus on improvement in physical performance, general activity, work or disability,emotional distress, health care utilization and  decreased medication consumption. Will continue to monitor progress towards achieving/maintaining therapeutic goals with special emphasis on  1. Improvement in perceived interfernce  of pain with ADL's. Ability to do home exercises independently. Ability to do household chores indoor and/or outdoor work and social and leisure activities. Improve psychosocial and physical functioning. - he is maintaining his treatment goal with the current regimen. He was advised against drinking alcohol with the narcotic pain medicines, advised against driving or handling machinery while adjusting the dose of medicines or if having cognitive  issues related to the current medications. Risk of overdose and death, if medicines not taken as 32 yo  PPD5 s/p  (23) presenting with elevated BP at home. Sustained severe range BPs in the ED. Meeting criteria for pre eclampsia with severe features. Patient stable this morning with no acute complaints.     #sPEC  - BP monitoring   - s/p Procardia 10 IR  - Procardia 30XL  - Mg x24h for seizure prophylaxis   - HELLP wnl, f/u AM HELLP   - CT head negative   - outpatient Cardio OB consult    #Maternal wellbeing   - HSQ and SCDs for VT prophylaxis   - pain control prn   - regular diet  - voiding freely     ATTG:  Pt seen and evaluated  Stable, HD#1 s/p readmit PP for severe PEC s/p Procardia IRX1 now on MgSo4 and Procardia XL 30mgqD.  BPs are now in control. Pt has a Hx of migraines and has a H/A this morning. I advised caffeine with the tylenol to see if that helps.  As she was admitted later in the day yesterday, would observe her BPs in house today and safely D/C her home tomorrow.      Shawna Becerra PGY3

## 2023-10-18 RX ORDER — PENICILLIN V POTASSIUM 500 MG/1
1000 TABLET ORAL 2 TIMES DAILY
Qty: 120 TABLET | Refills: 3 | Status: SHIPPED | OUTPATIENT
Start: 2023-10-18

## 2023-11-14 ENCOUNTER — OFFICE VISIT (OUTPATIENT)
Dept: PAIN MANAGEMENT | Age: 48
End: 2023-11-14

## 2023-11-14 VITALS
WEIGHT: 315 LBS | OXYGEN SATURATION: 94 % | DIASTOLIC BLOOD PRESSURE: 79 MMHG | SYSTOLIC BLOOD PRESSURE: 143 MMHG | TEMPERATURE: 74 F | BODY MASS INDEX: 58.12 KG/M2

## 2023-11-14 DIAGNOSIS — M54.9 CHRONIC MIDLINE BACK PAIN, UNSPECIFIED BACK LOCATION: ICD-10-CM

## 2023-11-14 DIAGNOSIS — M51.27 LUMBAGO-SCIATICA DUE TO DISPLACEMENT OF LUMBAR INTERVERTEBRAL DISC: ICD-10-CM

## 2023-11-14 DIAGNOSIS — S83.92XD SPRAIN OF LEFT KNEE/LEG, SUBSEQUENT ENCOUNTER: ICD-10-CM

## 2023-11-14 DIAGNOSIS — G89.29 CHRONIC MIDLINE BACK PAIN, UNSPECIFIED BACK LOCATION: ICD-10-CM

## 2023-11-14 DIAGNOSIS — S33.9XXD SPRAIN OF LIGAMENT OF LUMBOSACRAL JOINT, SUBSEQUENT ENCOUNTER: ICD-10-CM

## 2023-11-14 RX ORDER — PREGABALIN 150 MG/1
150 CAPSULE ORAL 3 TIMES DAILY
Qty: 90 CAPSULE | Refills: 1 | Status: SHIPPED | OUTPATIENT
Start: 2023-11-14 | End: 2024-01-13

## 2023-11-14 RX ORDER — DICLOFENAC SODIUM 75 MG/1
75 TABLET, DELAYED RELEASE ORAL DAILY PRN
Qty: 30 TABLET | Refills: 1 | Status: SHIPPED | OUTPATIENT
Start: 2023-11-14

## 2023-11-14 RX ORDER — OXYCODONE AND ACETAMINOPHEN 7.5; 325 MG/1; MG/1
1 TABLET ORAL EVERY 6 HOURS PRN
Qty: 140 TABLET | Refills: 0 | Status: SHIPPED | OUTPATIENT
Start: 2023-11-14 | End: 2023-12-19

## 2023-12-12 NOTE — PROGRESS NOTES
[facility]  [address] [place] [address], [address]  [place] [address]  Dept: [phone]  Dept Fax: [phone]      Visit Date: 12/12/23     Subjective:     Nikolai Harris is a 48 y.o. male here for follow-up on melanoma of the left foot which was excised on 2/10/20.     He is overall doing well. No new major medical issues since last visit. Today he denies new subcutaneous mass, headache, bone pain, cough, abdominal pain, weight loss, jaundice or suspicious new lesions. No nodules noted. Following regularly with dermatology. No new complaints. Review of systems is otherwise negative.    Patient was followed by Dr. Chowdhury. Relationship was terminated due to missing several appointments over a  three month period. Patient was followed by Dr. Luong.    Patient states groin mass is hard as a rock if he sleeps in his recliner. Soft when he elevates hie legs in bed.      Initial Biopsy Date 12/20/19   Thickness  At Least 0.7 mm   Ulceration  No   Regression  No   Mitotic rate  1/mm2   Stage  pT1a     [place] Lymph Node Biopsy: Negative    [place] Decision Dx:  - Class 1  -Subclass 1b       Objective:     Vitals:    12/19/23 1300   BP: (!) 189/104   Pulse: 88   Temp: 97.9 °F (36.6 °C)          Constitutional: Patient is oriented to person, place, and time. No distress.   HENT:  Mucus membranes - moist. No scleral icterus.    Neck:  Normal range of motion. No visible lymphadenopathy present.    Pulmonary/Chest:  Effort normal. No respiratory distress. Bilateral symmetrical chest rise. No audible additional breath sounds.   Abdomen:  Soft, non-tender. No masses, no organomegaly.    Neurological:  Grossly intact motor and sensory systems on limited exam.   Skin: Skin is warm and dry. No rash noted. He is not diaphoretic.    Surgical site:               Incision normal: Yes              Surrounding nodularity: No              Abnormal pigmentation: No              Other lesions: No

## 2023-12-18 PROBLEM — C43.72: Status: ACTIVE | Noted: 2021-02-01

## 2023-12-19 ENCOUNTER — OFFICE VISIT (OUTPATIENT)
Dept: SURGERY | Age: 48
End: 2023-12-19
Payer: COMMERCIAL

## 2023-12-19 VITALS
HEIGHT: 75 IN | HEART RATE: 88 BPM | SYSTOLIC BLOOD PRESSURE: 189 MMHG | BODY MASS INDEX: 39.17 KG/M2 | TEMPERATURE: 97.9 F | WEIGHT: 315 LBS | DIASTOLIC BLOOD PRESSURE: 104 MMHG

## 2023-12-19 DIAGNOSIS — R19.09 LEFT GROIN MASS: ICD-10-CM

## 2023-12-19 DIAGNOSIS — C43.72 MALIGNANT MELANOMA OF LEFT LOWER EXTREMITY INCLUDING HIP (HCC): Primary | ICD-10-CM

## 2023-12-19 PROCEDURE — 99213 OFFICE O/P EST LOW 20 MIN: CPT | Performed by: SURGERY

## 2023-12-28 ENCOUNTER — TELEPHONE (OUTPATIENT)
Dept: SURGERY | Age: 48
End: 2023-12-28

## 2023-12-28 DIAGNOSIS — C43.72 MALIGNANT MELANOMA OF LEFT LOWER EXTREMITY INCLUDING HIP (HCC): Primary | ICD-10-CM

## 2024-01-02 ENCOUNTER — TELEPHONE (OUTPATIENT)
Dept: FAMILY MEDICINE CLINIC | Age: 49
End: 2024-01-02

## 2024-01-02 RX ORDER — DOXYCYCLINE HYCLATE 100 MG
100 TABLET ORAL 2 TIMES DAILY
Qty: 20 TABLET | Refills: 0 | Status: SHIPPED | OUTPATIENT
Start: 2024-01-02 | End: 2024-01-12

## 2024-01-02 RX ORDER — ALBUTEROL SULFATE 90 UG/1
2 AEROSOL, METERED RESPIRATORY (INHALATION) EVERY 6 HOURS PRN
Qty: 18 G | Refills: 3 | Status: SHIPPED | OUTPATIENT
Start: 2024-01-02

## 2024-01-02 RX ORDER — BENZONATATE 200 MG/1
200 CAPSULE ORAL 3 TIMES DAILY PRN
Qty: 21 CAPSULE | Refills: 0 | Status: SHIPPED | OUTPATIENT
Start: 2024-01-02 | End: 2024-01-09

## 2024-01-02 NOTE — TELEPHONE ENCOUNTER
Patient called and had the flu last week he is doing better     Except   Chest congestion   Some shortness of breath   Coughing up with darker color in beg. But is clear now     Taking musniex otc helping some but not getting enough relief     He will need a refill on his   albuterol sulfate HFA (PROAIR HFA) 108 (90 Base) MCG/ACT inhaler [[id number]]  also       He would like to have something called in along with his inhaler     [facility] on [address]       Please call and advise     He has taken 2 covid tests and both were neg

## 2024-01-16 ENCOUNTER — OFFICE VISIT (OUTPATIENT)
Dept: PAIN MANAGEMENT | Age: 49
End: 2024-01-16

## 2024-01-16 VITALS
OXYGEN SATURATION: 95 % | BODY MASS INDEX: 57.62 KG/M2 | DIASTOLIC BLOOD PRESSURE: 73 MMHG | HEART RATE: 76 BPM | SYSTOLIC BLOOD PRESSURE: 119 MMHG | WEIGHT: 315 LBS

## 2024-01-16 DIAGNOSIS — S33.9XXD SPRAIN OF LIGAMENT OF LUMBOSACRAL JOINT, SUBSEQUENT ENCOUNTER: ICD-10-CM

## 2024-01-16 DIAGNOSIS — S33.9XXA SPRAIN OF LIGAMENT OF LUMBOSACRAL JOINT, INITIAL ENCOUNTER: ICD-10-CM

## 2024-01-16 DIAGNOSIS — M51.27 LUMBAGO-SCIATICA DUE TO DISPLACEMENT OF LUMBAR INTERVERTEBRAL DISC: ICD-10-CM

## 2024-01-16 DIAGNOSIS — S83.92XD SPRAIN OF LEFT KNEE/LEG, SUBSEQUENT ENCOUNTER: ICD-10-CM

## 2024-01-16 DIAGNOSIS — G89.29 CHRONIC MIDLINE BACK PAIN, UNSPECIFIED BACK LOCATION: ICD-10-CM

## 2024-01-16 DIAGNOSIS — S83.92XA SPRAIN OF LEFT KNEE/LEG, INITIAL ENCOUNTER: ICD-10-CM

## 2024-01-16 DIAGNOSIS — M54.9 CHRONIC MIDLINE BACK PAIN, UNSPECIFIED BACK LOCATION: ICD-10-CM

## 2024-01-16 RX ORDER — PREGABALIN 150 MG/1
150 CAPSULE ORAL 3 TIMES DAILY
Qty: 90 CAPSULE | Refills: 1 | Status: SHIPPED | OUTPATIENT
Start: 2024-01-16 | End: 2024-03-16

## 2024-01-16 RX ORDER — OXYCODONE AND ACETAMINOPHEN 7.5; 325 MG/1; MG/1
1 TABLET ORAL EVERY 6 HOURS PRN
Qty: 112 TABLET | Refills: 0 | Status: SHIPPED | OUTPATIENT
Start: 2024-01-16 | End: 2024-02-13

## 2024-01-16 RX ORDER — DICLOFENAC SODIUM 75 MG/1
75 TABLET, DELAYED RELEASE ORAL DAILY PRN
Qty: 30 TABLET | Refills: 1 | Status: SHIPPED | OUTPATIENT
Start: 2024-01-16

## 2024-01-16 NOTE — PROGRESS NOTES
Nikolai Harris  1975  [id number]      HISTORY OF PRESENT ILLNESS:  Mr. Harris is a 48 y.o. male returns for a follow up visit for pain management  He has a diagnosis of   1. 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 BWC-Sprain of left knee/leg, subsequent encounter    3. BWC-Sprain of ligament of lumbosacral joint, initial encounter    4. BWC-Chronic midline back pain, unspecified back location    5. BWC-Sprain of left knee/leg, initial encounter    6. BWC-Sprain of ligament of lumbosacral joint, subsequent encounter    .      As per information/history obtained from the PADT(patient assessment and documentation tool) -  He complains of pain in the lower back and knees Left with radiation to the buttocks, hips Left, lower leg Left, ankles Left, and feet Left He rates the pain 4/10 and describes it as aching, burning, numbness, pins and needles.  Pain is made worse by: movement, walking, standing, sitting, bending, lifting. He denies any side effects from the current pain regimen. Patient reports that since last follow up visit the physical functioning is unchanged, family/social relationships are unchanged, mood is unchanged sleep patterns are unchanged. Mr. Harris states that since starting the treatment with the current regimen the  overall functioning  in the above aspects is  better, Patient denies misusing/abusing his narcotic pain medications or using any illegal drugs.  There are No indicators for possible drug abuse, addiction or diversion problems. Upon obtaining the medical history from Mr. Harris regarding today's office visit for his presenting problems, patient states she has been doing fair, she is managing with the medications. Mr. Harris states he is having pain in the right leg and thigh area. He states he has been sick for a few days. He mentions he is using Lyrica along with Percocet 4 per day, he denies any side effects.      ALLERGIES: Patients

## 2024-01-17 RX ORDER — FUROSEMIDE 20 MG/1
TABLET ORAL
Qty: 60 TABLET | Refills: 2 | Status: SHIPPED | OUTPATIENT
Start: 2024-01-17

## 2024-02-13 ENCOUNTER — OFFICE VISIT (OUTPATIENT)
Dept: PAIN MANAGEMENT | Age: 49
End: 2024-02-13

## 2024-02-13 VITALS
HEART RATE: 74 BPM | OXYGEN SATURATION: 93 % | SYSTOLIC BLOOD PRESSURE: 126 MMHG | BODY MASS INDEX: 58.25 KG/M2 | WEIGHT: 315 LBS | DIASTOLIC BLOOD PRESSURE: 71 MMHG

## 2024-02-13 DIAGNOSIS — M54.9 CHRONIC MIDLINE BACK PAIN, UNSPECIFIED BACK LOCATION: ICD-10-CM

## 2024-02-13 DIAGNOSIS — G89.29 CHRONIC MIDLINE BACK PAIN, UNSPECIFIED BACK LOCATION: ICD-10-CM

## 2024-02-13 DIAGNOSIS — S83.92XD SPRAIN OF LEFT KNEE/LEG, SUBSEQUENT ENCOUNTER: ICD-10-CM

## 2024-02-13 DIAGNOSIS — S83.92XA SPRAIN OF LEFT KNEE/LEG, INITIAL ENCOUNTER: ICD-10-CM

## 2024-02-13 DIAGNOSIS — S33.9XXD SPRAIN OF LIGAMENT OF LUMBOSACRAL JOINT, SUBSEQUENT ENCOUNTER: ICD-10-CM

## 2024-02-13 DIAGNOSIS — M51.27 LUMBAGO-SCIATICA DUE TO DISPLACEMENT OF LUMBAR INTERVERTEBRAL DISC: ICD-10-CM

## 2024-02-13 DIAGNOSIS — S33.9XXA SPRAIN OF LIGAMENT OF LUMBOSACRAL JOINT, INITIAL ENCOUNTER: ICD-10-CM

## 2024-02-13 RX ORDER — PREGABALIN 150 MG/1
150 CAPSULE ORAL 3 TIMES DAILY
Qty: 90 CAPSULE | Refills: 1 | Status: SHIPPED | OUTPATIENT
Start: 2024-02-13 | End: 2024-04-13

## 2024-02-13 RX ORDER — OXYCODONE AND ACETAMINOPHEN 7.5; 325 MG/1; MG/1
1 TABLET ORAL EVERY 6 HOURS PRN
Qty: 112 TABLET | Refills: 0 | Status: SHIPPED | OUTPATIENT
Start: 2024-02-13 | End: 2024-03-12

## 2024-02-13 RX ORDER — DICLOFENAC SODIUM 75 MG/1
75 TABLET, DELAYED RELEASE ORAL DAILY PRN
Qty: 30 TABLET | Refills: 1 | Status: SHIPPED | OUTPATIENT
Start: 2024-02-13

## 2024-02-13 NOTE — PROGRESS NOTES
Nikolai Harris  1975  [id number]    HISTORY OF PRESENT ILLNESS:  Mr. Harris is a 49 y.o. male returns for a follow up visit for multiple medical problems.  His  presenting problems are   1. 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 BWC-Sprain of left knee/leg, subsequent encounter    3. BWC-Chronic midline back pain, unspecified back location    4. BWC-Sprain of ligament of lumbosacral joint, initial encounter    5. BWC-Sprain of left knee/leg, initial encounter    6. BWC-Sprain of ligament of lumbosacral joint, subsequent encounter    .    As per information/history obtained from the PADT(patient assessment and documentation tool) -  He complains of pain in the lower back with radiation to the knees Left, lower leg Left, ankles Left, and feet Left He rates the pain 4/10 and describes it as aching, burning.  Pain is made worse by: movement, walking, standing, sitting, bending, lifting.  Current treatment regimen has helped relieve about 70% of the pain.  He denies side effects from the current pain regimen.   Patient reports that since last follow up visit the physical functioning is unchanged, family/social relationships are unchanged, mood is unchanged sleep patterns are unchanged.  Mr. Harris states that since starting the treatment with the current regimen the  overall functioning  in the above aspects is  unchanged,Patient denies neurological bowel or bladder. Patient denies misusing/abusing his narcotic pain medications or using any illegal drugs.  There are No indicators for possible drug abuse, addiction or diversion problems.Upon obtaining the medical history from Mr. Harris regarding today's office visit for his presenting problems,  Patient states he had been doing fair, managing okay with his medications. He mentions he's working from home. He says his legs has been doing okay. He reports he's using Percocet along with Lyrica 450 mg per day . He denies any

## 2024-02-14 RX ORDER — PENICILLIN V POTASSIUM 500 MG/1
1000 TABLET ORAL 2 TIMES DAILY
Qty: 120 TABLET | Refills: 3 | Status: SHIPPED | OUTPATIENT
Start: 2024-02-14

## 2024-03-12 ENCOUNTER — OFFICE VISIT (OUTPATIENT)
Dept: PAIN MANAGEMENT | Age: 49
End: 2024-03-12

## 2024-03-12 VITALS
OXYGEN SATURATION: 95 % | WEIGHT: 315 LBS | BODY MASS INDEX: 57.62 KG/M2 | SYSTOLIC BLOOD PRESSURE: 142 MMHG | DIASTOLIC BLOOD PRESSURE: 71 MMHG | HEART RATE: 75 BPM

## 2024-03-12 DIAGNOSIS — S33.9XXD SPRAIN OF LIGAMENT OF LUMBOSACRAL JOINT, SUBSEQUENT ENCOUNTER: ICD-10-CM

## 2024-03-12 DIAGNOSIS — M51.27 LUMBAGO-SCIATICA DUE TO DISPLACEMENT OF LUMBAR INTERVERTEBRAL DISC: ICD-10-CM

## 2024-03-12 DIAGNOSIS — S33.9XXA SPRAIN OF LIGAMENT OF LUMBOSACRAL JOINT, INITIAL ENCOUNTER: ICD-10-CM

## 2024-03-12 DIAGNOSIS — M54.9 CHRONIC MIDLINE BACK PAIN, UNSPECIFIED BACK LOCATION: ICD-10-CM

## 2024-03-12 DIAGNOSIS — S83.92XA SPRAIN OF LEFT KNEE/LEG, INITIAL ENCOUNTER: ICD-10-CM

## 2024-03-12 DIAGNOSIS — S83.92XD SPRAIN OF LEFT KNEE/LEG, SUBSEQUENT ENCOUNTER: ICD-10-CM

## 2024-03-12 DIAGNOSIS — G89.29 CHRONIC MIDLINE BACK PAIN, UNSPECIFIED BACK LOCATION: ICD-10-CM

## 2024-03-12 RX ORDER — DICLOFENAC SODIUM 75 MG/1
75 TABLET, DELAYED RELEASE ORAL DAILY PRN
Qty: 30 TABLET | Refills: 1 | Status: SHIPPED | OUTPATIENT
Start: 2024-03-12

## 2024-03-12 RX ORDER — PREGABALIN 150 MG/1
150 CAPSULE ORAL 3 TIMES DAILY
Qty: 90 CAPSULE | Refills: 1 | Status: SHIPPED | OUTPATIENT
Start: 2024-03-12 | End: 2024-05-11

## 2024-03-12 RX ORDER — OXYCODONE AND ACETAMINOPHEN 7.5; 325 MG/1; MG/1
1 TABLET ORAL EVERY 6 HOURS PRN
Qty: 112 TABLET | Refills: 0 | Status: SHIPPED | OUTPATIENT
Start: 2024-03-12 | End: 2024-04-09

## 2024-03-12 NOTE — PROGRESS NOTES
Nikolai Harris  1975  [id number]    HISTORY OF PRESENT ILLNESS:  Mr. Harris is a 49 y.o. male returns for a follow up visit for multiple medical problems.  His  presenting problems are   1. 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 BWC-Sprain of left knee/leg, subsequent encounter    3. BWC-Chronic midline back pain, unspecified back location    4. BWC-Sprain of ligament of lumbosacral joint, initial encounter    5. BWC-Sprain of left knee/leg, initial encounter    6. BWC-Sprain of ligament of lumbosacral joint, subsequent encounter    .    As per information/history obtained from the PADT(patient assessment and documentation tool) -  He complains of pain in the lower back with radiation to the knees Left, lower leg Left, ankles Left, and feet Left He rates the pain 4/10 and describes it as sharp, aching, burning, pins and needles.  Pain is made worse by: movement, walking, standing, sitting, bending, lifting.  Current treatment regimen has helped relieve about 70% of the pain.  He denies side effects from the current pain regimen.   Patient reports that since last follow up visit the physical functioning is unchanged, family/social relationships are unchanged, mood is unchanged sleep patterns are unchanged.  Mr. Harris states that since starting the treatment with the current regimen the  overall functioning  in the above aspects is  better,Patient denies neurological bowel or bladder. Patient denies misusing/abusing his narcotic pain medications or using any illegal drugs.  There are No indicators for possible drug abuse, addiction or diversion problems.Upon obtaining the medical history from Mr. Harris regarding today's office visit for his presenting problems, Patient states he has been losing some weight. He reports he has been complaint with his medications. He mentions he's working full time. He says he's using Lyrica along with Voltaren gel. He denies constipation

## 2024-04-09 ENCOUNTER — OFFICE VISIT (OUTPATIENT)
Dept: PAIN MANAGEMENT | Age: 49
End: 2024-04-09

## 2024-04-09 VITALS
BODY MASS INDEX: 57.37 KG/M2 | HEART RATE: 61 BPM | OXYGEN SATURATION: 95 % | SYSTOLIC BLOOD PRESSURE: 133 MMHG | DIASTOLIC BLOOD PRESSURE: 69 MMHG | WEIGHT: 315 LBS

## 2024-04-09 DIAGNOSIS — S83.92XA SPRAIN OF LEFT KNEE/LEG, INITIAL ENCOUNTER: ICD-10-CM

## 2024-04-09 DIAGNOSIS — M54.9 CHRONIC MIDLINE BACK PAIN, UNSPECIFIED BACK LOCATION: ICD-10-CM

## 2024-04-09 DIAGNOSIS — S33.9XXD SPRAIN OF LIGAMENT OF LUMBOSACRAL JOINT, SUBSEQUENT ENCOUNTER: ICD-10-CM

## 2024-04-09 DIAGNOSIS — S83.92XD SPRAIN OF LEFT KNEE/LEG, SUBSEQUENT ENCOUNTER: ICD-10-CM

## 2024-04-09 DIAGNOSIS — G89.29 CHRONIC MIDLINE BACK PAIN, UNSPECIFIED BACK LOCATION: ICD-10-CM

## 2024-04-09 DIAGNOSIS — M51.27 LUMBAGO-SCIATICA DUE TO DISPLACEMENT OF LUMBAR INTERVERTEBRAL DISC: ICD-10-CM

## 2024-04-09 DIAGNOSIS — S33.9XXA SPRAIN OF LIGAMENT OF LUMBOSACRAL JOINT, INITIAL ENCOUNTER: ICD-10-CM

## 2024-04-09 RX ORDER — OXYCODONE AND ACETAMINOPHEN 7.5; 325 MG/1; MG/1
1 TABLET ORAL EVERY 6 HOURS PRN
Qty: 112 TABLET | Refills: 0 | Status: SHIPPED | OUTPATIENT
Start: 2024-04-09 | End: 2024-05-07

## 2024-04-09 RX ORDER — DICLOFENAC SODIUM 75 MG/1
75 TABLET, DELAYED RELEASE ORAL DAILY PRN
Qty: 30 TABLET | Refills: 1 | Status: SHIPPED | OUTPATIENT
Start: 2024-04-09

## 2024-04-09 RX ORDER — PREGABALIN 150 MG/1
150 CAPSULE ORAL 3 TIMES DAILY
Qty: 90 CAPSULE | Refills: 1 | Status: SHIPPED | OUTPATIENT
Start: 2024-04-09 | End: 2024-06-08

## 2024-04-09 NOTE — PROGRESS NOTES
Outpatient Medications   Medication Sig Dispense Refill    diclofenac (VOLTAREN) 75 MG EC tablet Take 1 tablet by mouth daily as needed for Pain 30 tablet 1    oxyCODONE-acetaminophen (PERCOCET) 7.5-325 MG per tablet Take 1 tablet by mouth every 6 hours as needed for Pain (max 4 per day) for up to 28 days. 112 tablet 0    pregabalin (LYRICA) 150 MG capsule Take 1 capsule by mouth 3 times daily for 60 days. 90 capsule 1    penicillin v potassium (VEETID) 500 MG tablet TAKE 2 TABLETS BY MOUTH TWICE A  tablet 3    furosemide (LASIX) 20 MG tablet TAKE ONE TO TWO TABLETS BY MOUTH EVERY MORNING AS NEEDED EDEMA 60 tablet 2    albuterol sulfate HFA (PROAIR HFA) 108 (90 Base) MCG/ACT inhaler Inhale 2 puffs into the lungs every 6 hours as needed for Wheezing 18 g 3    Diclofenac 18 MG CAPS Diclofenac Oral        active      Cyanocobalamin 3000 MCG CAPS 3,000 mcg      Compression Stockings MISC by Does not apply route 30-40 compression. Wear on lower extremities. 2 each 0    doxycycline hyclate (VIBRAMYCIN) 100 MG capsule       losartan (COZAAR) 100 MG tablet TAKE ONE TABLET BY MOUTH DAILY 90 tablet 3    Compression Stockings MISC by Does not apply route Strength 30-40mmHg  Style: pull on below the knee; open or closed toe  Extremity: bilateral  Donning aid recommended: Yes if needed 1 each 2    Multiple Vitamins-Minerals (THERAPEUTIC MULTIVITAMIN-MINERALS) tablet Take 1 tablet by mouth daily       No current facility-administered medications for this visit.       General Goals of current treatment regimen include improvement in pain, restoration of functioning- with focus on improvement in physical performance, general activity, work or disability,emotional distress, health care utilization and  decreased medication consumption.   Will continue to monitor progress towards achieving/maintaining therapeutic goals with special emphasis on  1. Improvement in perceived interfernce  of pain with ADL's. Ability to do home

## 2024-04-29 RX ORDER — FUROSEMIDE 20 MG/1
TABLET ORAL
Qty: 60 TABLET | Refills: 0 | Status: SHIPPED | OUTPATIENT
Start: 2024-04-29

## 2024-05-07 ENCOUNTER — OFFICE VISIT (OUTPATIENT)
Dept: PAIN MANAGEMENT | Age: 49
End: 2024-05-07

## 2024-05-07 VITALS
DIASTOLIC BLOOD PRESSURE: 72 MMHG | WEIGHT: 315 LBS | BODY MASS INDEX: 57.5 KG/M2 | HEART RATE: 79 BPM | OXYGEN SATURATION: 91 % | SYSTOLIC BLOOD PRESSURE: 136 MMHG

## 2024-05-07 DIAGNOSIS — S83.92XD SPRAIN OF LEFT KNEE/LEG, SUBSEQUENT ENCOUNTER: ICD-10-CM

## 2024-05-07 DIAGNOSIS — S83.92XA SPRAIN OF LEFT KNEE/LEG, INITIAL ENCOUNTER: ICD-10-CM

## 2024-05-07 DIAGNOSIS — M54.9 CHRONIC MIDLINE BACK PAIN, UNSPECIFIED BACK LOCATION: ICD-10-CM

## 2024-05-07 DIAGNOSIS — M51.27 LUMBAGO-SCIATICA DUE TO DISPLACEMENT OF LUMBAR INTERVERTEBRAL DISC: ICD-10-CM

## 2024-05-07 DIAGNOSIS — S33.9XXA SPRAIN OF LIGAMENT OF LUMBOSACRAL JOINT, INITIAL ENCOUNTER: ICD-10-CM

## 2024-05-07 DIAGNOSIS — G89.29 CHRONIC MIDLINE BACK PAIN, UNSPECIFIED BACK LOCATION: ICD-10-CM

## 2024-05-07 DIAGNOSIS — S33.9XXD SPRAIN OF LIGAMENT OF LUMBOSACRAL JOINT, SUBSEQUENT ENCOUNTER: ICD-10-CM

## 2024-05-07 RX ORDER — OXYCODONE AND ACETAMINOPHEN 7.5; 325 MG/1; MG/1
1 TABLET ORAL EVERY 6 HOURS PRN
Qty: 112 TABLET | Refills: 0 | Status: SHIPPED | OUTPATIENT
Start: 2024-05-07 | End: 2024-06-04

## 2024-05-07 RX ORDER — DICLOFENAC SODIUM 75 MG/1
75 TABLET, DELAYED RELEASE ORAL DAILY PRN
Qty: 30 TABLET | Refills: 1 | Status: SHIPPED | OUTPATIENT
Start: 2024-05-07

## 2024-05-07 RX ORDER — PREGABALIN 150 MG/1
150 CAPSULE ORAL 3 TIMES DAILY
Qty: 90 CAPSULE | Refills: 1 | Status: SHIPPED | OUTPATIENT
Start: 2024-05-07 | End: 2024-07-06

## 2024-05-07 NOTE — PROGRESS NOTES
Nikolai Harris  1975  [id number]      HISTORY OF PRESENT ILLNESS:  Mr. Harris is a 49 y.o. male returns for a follow up visit for pain management  He has a diagnosis of   1. 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 BWC-Sprain of left knee/leg, initial encounter    3. BWC-Sprain of ligament of lumbosacral joint, initial encounter    4. BWC-Chronic midline back pain, unspecified back location    5. BWC-Sprain of left knee/leg, subsequent encounter    6. BWC-Sprain of ligament of lumbosacral joint, subsequent encounter    .      As per information/history obtained from the PADT(patient assessment and documentation tool) -  He complains of pain in the lower back with radiation to the buttocks, knees Left, lower leg Left, ankles Left, and feet Left He rates the pain 4/10 and describes it as sharp, aching, burning, numbness, pins and needles.  Pain is made worse by: movement, walking, standing, bending, lifting. He denies any side effects from the current pain regimen. Patient reports that since last follow up visit the physical functioning is unchanged, family/social relationships are unchanged, mood is unchanged sleep patterns are unchanged. Mr. Harris states that since starting the treatment with the current regimen the  overall functioning  in the above aspects is  better, Patient denies misusing/abusing his narcotic pain medications or using any illegal drugs.  There are No indicators for possible drug abuse, addiction or diversion problems. Upon obtaining the medical history from Mr. Harris regarding today's office visit for his presenting problems, patient states he has been doing fair, he is managing okay with the medications. Mr. Harris mentions he is using Percocet along with Lyrica, denies any side effects. Patient reports he is trying to lose weight.       ALLERGIES: Patients list of allergies were reviewed     MEDICATIONS: Mr. Harris list of medications were

## 2024-06-04 ENCOUNTER — OFFICE VISIT (OUTPATIENT)
Dept: PAIN MANAGEMENT | Age: 49
End: 2024-06-04

## 2024-06-04 VITALS
BODY MASS INDEX: 57.37 KG/M2 | DIASTOLIC BLOOD PRESSURE: 78 MMHG | OXYGEN SATURATION: 93 % | SYSTOLIC BLOOD PRESSURE: 128 MMHG | WEIGHT: 315 LBS | HEART RATE: 76 BPM

## 2024-06-04 DIAGNOSIS — M54.9 CHRONIC MIDLINE BACK PAIN, UNSPECIFIED BACK LOCATION: ICD-10-CM

## 2024-06-04 DIAGNOSIS — G89.29 CHRONIC MIDLINE BACK PAIN, UNSPECIFIED BACK LOCATION: ICD-10-CM

## 2024-06-04 DIAGNOSIS — S33.9XXD SPRAIN OF LIGAMENT OF LUMBOSACRAL JOINT, SUBSEQUENT ENCOUNTER: ICD-10-CM

## 2024-06-04 DIAGNOSIS — S83.92XA SPRAIN OF LEFT KNEE/LEG, INITIAL ENCOUNTER: ICD-10-CM

## 2024-06-04 DIAGNOSIS — S33.9XXA SPRAIN OF LIGAMENT OF LUMBOSACRAL JOINT, INITIAL ENCOUNTER: ICD-10-CM

## 2024-06-04 DIAGNOSIS — M51.27 LUMBAGO-SCIATICA DUE TO DISPLACEMENT OF LUMBAR INTERVERTEBRAL DISC: ICD-10-CM

## 2024-06-04 DIAGNOSIS — S83.92XD SPRAIN OF LEFT KNEE/LEG, SUBSEQUENT ENCOUNTER: ICD-10-CM

## 2024-06-04 RX ORDER — OXYCODONE AND ACETAMINOPHEN 7.5; 325 MG/1; MG/1
1 TABLET ORAL EVERY 6 HOURS PRN
Qty: 112 TABLET | Refills: 0 | Status: SHIPPED | OUTPATIENT
Start: 2024-06-04 | End: 2024-07-02

## 2024-06-04 RX ORDER — PREGABALIN 150 MG/1
150 CAPSULE ORAL 3 TIMES DAILY
Qty: 90 CAPSULE | Refills: 1 | Status: SHIPPED | OUTPATIENT
Start: 2024-06-04 | End: 2024-08-03

## 2024-06-04 RX ORDER — DICLOFENAC SODIUM 75 MG/1
75 TABLET, DELAYED RELEASE ORAL DAILY PRN
Qty: 30 TABLET | Refills: 1 | Status: SHIPPED | OUTPATIENT
Start: 2024-06-04

## 2024-06-06 RX ORDER — FUROSEMIDE 20 MG/1
TABLET ORAL
Qty: 60 TABLET | Refills: 0 | OUTPATIENT
Start: 2024-06-06

## 2024-06-06 NOTE — TELEPHONE ENCOUNTER
Medication:   Requested Prescriptions     Pending Prescriptions Disp Refills    furosemide (LASIX) 20 MG tablet [Pharmacy Med Name: FUROSEMIDE 20 MG TABLET] 60 tablet 0     Sig: TAKE ONE TO TWO TABLETS BY MOUTH EVERY MORNING AS NEEDED FOR EDEMA.        Last Filled:      Patient Phone Number: [phone] (home) [phone] (work)    Last appt: 4/10/2023   Next appt: Visit date not found    Last OARRS:       4/2/2019     1:09 PM   RX Monitoring   Attestation The Prescription Monitoring Report was requested today but not available.

## 2024-06-13 RX ORDER — FUROSEMIDE 20 MG/1
TABLET ORAL
Qty: 60 TABLET | Refills: 0 | Status: SHIPPED | OUTPATIENT
Start: 2024-06-13

## 2024-07-02 ENCOUNTER — OFFICE VISIT (OUTPATIENT)
Dept: PAIN MANAGEMENT | Age: 49
End: 2024-07-02

## 2024-07-02 VITALS
OXYGEN SATURATION: 98 % | DIASTOLIC BLOOD PRESSURE: 56 MMHG | HEART RATE: 63 BPM | SYSTOLIC BLOOD PRESSURE: 144 MMHG | WEIGHT: 315 LBS | BODY MASS INDEX: 57.37 KG/M2

## 2024-07-02 DIAGNOSIS — S83.92XA SPRAIN OF LEFT KNEE/LEG, INITIAL ENCOUNTER: ICD-10-CM

## 2024-07-02 DIAGNOSIS — M51.27 LUMBAGO-SCIATICA DUE TO DISPLACEMENT OF LUMBAR INTERVERTEBRAL DISC: ICD-10-CM

## 2024-07-02 DIAGNOSIS — M54.9 CHRONIC MIDLINE BACK PAIN, UNSPECIFIED BACK LOCATION: ICD-10-CM

## 2024-07-02 DIAGNOSIS — G89.29 CHRONIC MIDLINE BACK PAIN, UNSPECIFIED BACK LOCATION: ICD-10-CM

## 2024-07-02 DIAGNOSIS — S33.9XXD SPRAIN OF LIGAMENT OF LUMBOSACRAL JOINT, SUBSEQUENT ENCOUNTER: ICD-10-CM

## 2024-07-02 DIAGNOSIS — S33.9XXA SPRAIN OF LIGAMENT OF LUMBOSACRAL JOINT, INITIAL ENCOUNTER: ICD-10-CM

## 2024-07-02 DIAGNOSIS — S83.92XD SPRAIN OF LEFT KNEE/LEG, SUBSEQUENT ENCOUNTER: ICD-10-CM

## 2024-07-02 RX ORDER — DICLOFENAC SODIUM 75 MG/1
75 TABLET, DELAYED RELEASE ORAL DAILY PRN
Qty: 30 TABLET | Refills: 1 | Status: SHIPPED | OUTPATIENT
Start: 2024-07-02

## 2024-07-02 RX ORDER — OXYCODONE AND ACETAMINOPHEN 7.5; 325 MG/1; MG/1
1 TABLET ORAL EVERY 6 HOURS PRN
Qty: 112 TABLET | Refills: 0 | Status: SHIPPED | OUTPATIENT
Start: 2024-07-02 | End: 2024-07-30

## 2024-07-02 RX ORDER — PREGABALIN 150 MG/1
150 CAPSULE ORAL 3 TIMES DAILY
Qty: 90 CAPSULE | Refills: 1 | Status: SHIPPED | OUTPATIENT
Start: 2024-07-02 | End: 2024-08-31

## 2024-07-02 NOTE — PROGRESS NOTES
relax regularly and enjoy meals   -Walking as tolerated    Current Outpatient Medications   Medication Sig Dispense Refill    furosemide (LASIX) 20 MG tablet TAKE ONE TO TWO TABLETS BY MOUTH EVERY MORNING AS NEEDED FOR EDEMA. 60 tablet 0    diclofenac (VOLTAREN) 75 MG EC tablet Take 1 tablet by mouth daily as needed for Pain 30 tablet 1    oxyCODONE-acetaminophen (PERCOCET) 7.5-325 MG per tablet Take 1 tablet by mouth every 6 hours as needed for Pain (max 4 per day) for up to 28 days. 112 tablet 0    pregabalin (LYRICA) 150 MG capsule Take 1 capsule by mouth 3 times daily for 60 days. 90 capsule 1    penicillin v potassium (VEETID) 500 MG tablet TAKE 2 TABLETS BY MOUTH TWICE A  tablet 3    albuterol sulfate HFA (PROAIR HFA) 108 (90 Base) MCG/ACT inhaler Inhale 2 puffs into the lungs every 6 hours as needed for Wheezing 18 g 3    Diclofenac 18 MG CAPS Diclofenac Oral        active      Cyanocobalamin 3000 MCG CAPS 3,000 mcg      Compression Stockings MISC by Does not apply route 30-40 compression. Wear on lower extremities. 2 each 0    doxycycline hyclate (VIBRAMYCIN) 100 MG capsule       losartan (COZAAR) 100 MG tablet TAKE ONE TABLET BY MOUTH DAILY 90 tablet 3    Compression Stockings MISC by Does not apply route Strength 30-40mmHg  Style: pull on below the knee; open or closed toe  Extremity: bilateral  Donning aid recommended: Yes if needed 1 each 2    Multiple Vitamins-Minerals (THERAPEUTIC MULTIVITAMIN-MINERALS) tablet Take 1 tablet by mouth daily       No current facility-administered medications for this visit.       General Goals of current treatment regimen include improvement in pain, restoration of functioning- with focus on improvement in physical performance, general activity, work or disability,emotional distress, health care utilization and  decreased medication consumption.   Will continue to monitor progress towards achieving/maintaining therapeutic goals with special emphasis on  Improvement

## 2024-07-10 RX ORDER — PENICILLIN V POTASSIUM 500 MG/1
1000 TABLET ORAL 2 TIMES DAILY
Qty: 120 TABLET | Refills: 3 | Status: SHIPPED | OUTPATIENT
Start: 2024-07-10

## 2024-07-25 RX ORDER — FUROSEMIDE 20 MG/1
TABLET ORAL
Qty: 60 TABLET | Refills: 0 | Status: SHIPPED | OUTPATIENT
Start: 2024-07-25

## 2024-07-25 NOTE — TELEPHONE ENCOUNTER
Medication:   Requested Prescriptions     Pending Prescriptions Disp Refills    furosemide (LASIX) 20 MG tablet [Pharmacy Med Name: FUROSEMIDE 20 MG TABLET] 60 tablet 0     Sig: TAKE ONE TO TWO TABLETS BY MOUTH EVERY MORNING AS NEEDED FOR EDEMA        Last Filled:  60.0  06.13.2024    Patient Phone Number: [phone] (home) [phone] (work)    Last appt: 4/10/2023   Next appt: Visit date not found    Last OARRS:       4/2/2019     1:09 PM   RX Monitoring   Attestation The Prescription Monitoring Report was requested today but not available.

## 2024-07-30 ENCOUNTER — OFFICE VISIT (OUTPATIENT)
Dept: PAIN MANAGEMENT | Age: 49
End: 2024-07-30

## 2024-07-30 VITALS
DIASTOLIC BLOOD PRESSURE: 61 MMHG | HEART RATE: 75 BPM | WEIGHT: 315 LBS | SYSTOLIC BLOOD PRESSURE: 157 MMHG | BODY MASS INDEX: 58.37 KG/M2 | OXYGEN SATURATION: 93 %

## 2024-07-30 DIAGNOSIS — M51.27 LUMBAGO-SCIATICA DUE TO DISPLACEMENT OF LUMBAR INTERVERTEBRAL DISC: ICD-10-CM

## 2024-07-30 DIAGNOSIS — S33.9XXA SPRAIN OF LIGAMENT OF LUMBOSACRAL JOINT, INITIAL ENCOUNTER: ICD-10-CM

## 2024-07-30 DIAGNOSIS — S83.92XA SPRAIN OF LEFT KNEE/LEG, INITIAL ENCOUNTER: ICD-10-CM

## 2024-07-30 DIAGNOSIS — G89.29 CHRONIC MIDLINE BACK PAIN, UNSPECIFIED BACK LOCATION: ICD-10-CM

## 2024-07-30 DIAGNOSIS — S33.9XXD SPRAIN OF LIGAMENT OF LUMBOSACRAL JOINT, SUBSEQUENT ENCOUNTER: ICD-10-CM

## 2024-07-30 DIAGNOSIS — M54.9 CHRONIC MIDLINE BACK PAIN, UNSPECIFIED BACK LOCATION: ICD-10-CM

## 2024-07-30 DIAGNOSIS — S83.92XD SPRAIN OF LEFT KNEE/LEG, SUBSEQUENT ENCOUNTER: ICD-10-CM

## 2024-07-30 RX ORDER — DICLOFENAC SODIUM 75 MG/1
75 TABLET, DELAYED RELEASE ORAL DAILY PRN
Qty: 30 TABLET | Refills: 1 | Status: SHIPPED | OUTPATIENT
Start: 2024-07-30

## 2024-07-30 RX ORDER — OXYCODONE AND ACETAMINOPHEN 7.5; 325 MG/1; MG/1
1 TABLET ORAL EVERY 6 HOURS PRN
Qty: 120 TABLET | Refills: 0 | Status: SHIPPED | OUTPATIENT
Start: 2024-07-30 | End: 2024-08-29

## 2024-07-30 RX ORDER — PREGABALIN 150 MG/1
150 CAPSULE ORAL 3 TIMES DAILY
Qty: 90 CAPSULE | Refills: 1 | Status: SHIPPED | OUTPATIENT
Start: 2024-07-30 | End: 2024-09-28

## 2024-07-30 NOTE — PROGRESS NOTES
Nikolai Harris  1975  [id number]      HISTORY OF PRESENT ILLNESS:  Mr. Harris is a 49 y.o. male returns for a follow up visit for pain management  He has a diagnosis of   1. 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 BWC-Chronic midline back pain, unspecified back location    3. BWC-Sprain of left knee/leg, initial encounter    4. BWC-Sprain of left knee/leg, subsequent encounter    5. BWC-Sprain of ligament of lumbosacral joint, subsequent encounter    6. BWC-Sprain of ligament of lumbosacral joint, initial encounter    .      As per information/history obtained from the PADT(patient assessment and documentation tool) -  He complains of pain in the lower back and knees Left with radiation to the lower leg Left, ankles Left, and feet Left He rates the pain 4/10 and describes it as aching, burning, numbness, pins and needles.  Pain is made worse by: movement, walking, standing, bending, lifting. He denies any side effects from the current pain regimen. Patient reports that since last follow up visit the physical functioning is unchanged, family/social relationships are unchanged, mood is unchanged sleep patterns are unchanged. Mr. Harris states that since starting the treatment with the current regimen the  overall functioning  in the above aspects is  better, Patient denies misusing/abusing his narcotic pain medications or using any illegal drugs.  There are No indicators for possible drug abuse, addiction or diversion problems. Upon obtaining the medical history from Mr. Harris regarding today's office visit for his presenting problems, patient states he is doing fair, but has been sore this week. He complains of increased pain with changes in weather. Extreme temperatures- rain, cold and damp weather causes increased pain. .he mentions he's using Voltaren 75 mg as needed and Percocet 4 per day. He reports he's using Lyrica also. He mentions he has a sore on his left

## 2024-08-30 RX ORDER — FUROSEMIDE 20 MG
TABLET ORAL
Qty: 60 TABLET | Refills: 0 | OUTPATIENT
Start: 2024-08-30

## 2024-09-03 ENCOUNTER — OFFICE VISIT (OUTPATIENT)
Dept: PAIN MANAGEMENT | Age: 49
End: 2024-09-03

## 2024-09-03 VITALS
OXYGEN SATURATION: 93 % | SYSTOLIC BLOOD PRESSURE: 137 MMHG | BODY MASS INDEX: 58.37 KG/M2 | WEIGHT: 315 LBS | HEART RATE: 67 BPM | DIASTOLIC BLOOD PRESSURE: 79 MMHG

## 2024-09-03 DIAGNOSIS — S33.9XXA SPRAIN OF LIGAMENT OF LUMBOSACRAL JOINT, INITIAL ENCOUNTER: ICD-10-CM

## 2024-09-03 DIAGNOSIS — M54.9 CHRONIC MIDLINE BACK PAIN, UNSPECIFIED BACK LOCATION: ICD-10-CM

## 2024-09-03 DIAGNOSIS — G89.29 CHRONIC MIDLINE BACK PAIN, UNSPECIFIED BACK LOCATION: ICD-10-CM

## 2024-09-03 DIAGNOSIS — M51.27 LUMBAGO-SCIATICA DUE TO DISPLACEMENT OF LUMBAR INTERVERTEBRAL DISC: ICD-10-CM

## 2024-09-03 DIAGNOSIS — S33.9XXD SPRAIN OF LIGAMENT OF LUMBOSACRAL JOINT, SUBSEQUENT ENCOUNTER: ICD-10-CM

## 2024-09-03 RX ORDER — PREGABALIN 150 MG/1
150 CAPSULE ORAL 3 TIMES DAILY
Qty: 90 CAPSULE | Refills: 1 | Status: SHIPPED | OUTPATIENT
Start: 2024-09-03 | End: 2024-11-02

## 2024-09-03 RX ORDER — DICLOFENAC SODIUM 75 MG/1
75 TABLET, DELAYED RELEASE ORAL DAILY PRN
Qty: 30 TABLET | Refills: 0 | Status: SHIPPED | OUTPATIENT
Start: 2024-09-03

## 2024-09-03 RX ORDER — OXYCODONE AND ACETAMINOPHEN 7.5; 325 MG/1; MG/1
1 TABLET ORAL EVERY 6 HOURS PRN
Qty: 112 TABLET | Refills: 0 | Status: SHIPPED | OUTPATIENT
Start: 2024-09-03 | End: 2024-10-01

## 2024-09-03 NOTE — PROGRESS NOTES
Nikolai Harris  1975  [id number]      HISTORY OF PRESENT ILLNESS:  Mr. Harris is a 49 y.o. male returns for a follow up visit for pain management  He has a diagnosis of   1. 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 BWC-Chronic midline back pain, unspecified back location    3. BWC-Sprain of ligament of lumbosacral joint, initial encounter    4. BWC-Sprain of ligament of lumbosacral joint, subsequent encounter    .      As per information/history obtained from the PADT(patient assessment and documentation tool) -  He complains of pain in the lower back with radiation to the lower leg Left, ankles Left, and feet Left He rates the pain 4/10 and describes it as sharp, aching, burning, numbness, pins and needles.  Pain is made worse by: movement, walking, standing, bending, lifting. He denies any side effects from the current pain regimen. Patient reports that since last follow up visit the physical functioning is unchanged, family/social relationships are unchanged, mood is unchanged sleep patterns are unchanged. Mr. Harris states that since starting the treatment with the current regimen the  overall functioning  in the above aspects is  better, Patient denies misusing/abusing his narcotic pain medications or using any illegal drugs.  There are No indicators for possible drug abuse, addiction or diversion problems. Upon obtaining the medical history from Mr. Harris regarding today's office visit for his presenting problems, patient states he has been doing fair, he is managing somewhat with his regimen. Mr. Harris reports he is working full time still. He states the pain is worse in the back than the legs. He complains of increased pain with changes in weather. Extreme temperatures- rain, cold and damp weather causes increased pain.  He states he needs the [facility] claim amended for original 2001 injury, status post surgery under [facility].       ALLERGIES: Patients list of

## 2024-09-06 RX ORDER — FUROSEMIDE 20 MG/1
TABLET ORAL
Qty: 60 TABLET | Refills: 0 | OUTPATIENT
Start: 2024-09-06

## 2024-09-06 NOTE — TELEPHONE ENCOUNTER
Medication:   Requested Prescriptions     Pending Prescriptions Disp Refills    furosemide (LASIX) 20 MG tablet [Pharmacy Med Name: FUROSEMIDE 20 MG TABLET] 60 tablet 0     Sig: TAKE ONE TO TWO TABLETS BY MOUTH EVERY MORNING AS NEEDED FOR EDEMA     Last Filled:  07/25/2024    Last appt: 4/10/2023   Next appt: Visit date not found    Last OARRS:       4/2/2019     1:09 PM   RX Monitoring   Attestation The Prescription Monitoring Report was requested today but not available.

## 2024-09-30 NOTE — PROGRESS NOTES
to increase fluids ( 5-7  glasses of fluid daily), limit caffeine, avoid cheese products, increase dietary fiber, increase activity and exercise as tolerated and relax regularly and enjoy meals   -Walking/ROM exercises as tolerate     Current Outpatient Medications   Medication Sig Dispense Refill    diclofenac (VOLTAREN) 75 MG EC tablet Take 1 tablet by mouth daily as needed for Pain 30 tablet 0    oxyCODONE-acetaminophen (PERCOCET) 7.5-325 MG per tablet Take 1 tablet by mouth every 6 hours as needed for Pain (max 4 per day) for up to 28 days. 112 tablet 0    pregabalin (LYRICA) 150 MG capsule Take 1 capsule by mouth 3 times daily for 60 days. 90 capsule 1    furosemide (LASIX) 20 MG tablet TAKE ONE TO TWO TABLETS BY MOUTH EVERY MORNING AS NEEDED FOR EDEMA 60 tablet 0    penicillin v potassium (VEETID) 500 MG tablet TAKE 2 TABLETS BY MOUTH TWICE A  tablet 3    albuterol sulfate HFA (PROAIR HFA) 108 (90 Base) MCG/ACT inhaler Inhale 2 puffs into the lungs every 6 hours as needed for Wheezing 18 g 3    Diclofenac 18 MG CAPS Diclofenac Oral        active      Cyanocobalamin 3000 MCG CAPS 3,000 mcg      Compression Stockings MISC by Does not apply route 30-40 compression. Wear on lower extremities. 2 each 0    doxycycline hyclate (VIBRAMYCIN) 100 MG capsule       losartan (COZAAR) 100 MG tablet TAKE ONE TABLET BY MOUTH DAILY 90 tablet 3    Compression Stockings MISC by Does not apply route Strength 30-40mmHg  Style: pull on below the knee; open or closed toe  Extremity: bilateral  Donning aid recommended: Yes if needed 1 each 2    Multiple Vitamins-Minerals (THERAPEUTIC MULTIVITAMIN-MINERALS) tablet Take 1 tablet by mouth daily       No current facility-administered medications for this visit.       General Goals of current treatment regimen include improvement in pain, restoration of functioning- with focus on improvement in physical performance, general activity, work or disability,emotional distress, health

## 2024-10-01 ENCOUNTER — OFFICE VISIT (OUTPATIENT)
Dept: PAIN MANAGEMENT | Age: 49
End: 2024-10-01

## 2024-10-01 VITALS
DIASTOLIC BLOOD PRESSURE: 80 MMHG | OXYGEN SATURATION: 98 % | BODY MASS INDEX: 58.5 KG/M2 | SYSTOLIC BLOOD PRESSURE: 165 MMHG | HEART RATE: 81 BPM | WEIGHT: 315 LBS

## 2024-10-01 DIAGNOSIS — S83.92XD SPRAIN OF LEFT KNEE/LEG, SUBSEQUENT ENCOUNTER: ICD-10-CM

## 2024-10-01 DIAGNOSIS — S33.9XXA SPRAIN OF LIGAMENT OF LUMBOSACRAL JOINT, INITIAL ENCOUNTER: ICD-10-CM

## 2024-10-01 DIAGNOSIS — G89.29 CHRONIC MIDLINE BACK PAIN, UNSPECIFIED BACK LOCATION: ICD-10-CM

## 2024-10-01 DIAGNOSIS — M51.27 LUMBAGO-SCIATICA DUE TO DISPLACEMENT OF LUMBAR INTERVERTEBRAL DISC: ICD-10-CM

## 2024-10-01 DIAGNOSIS — S83.92XA SPRAIN OF LEFT KNEE/LEG, INITIAL ENCOUNTER: ICD-10-CM

## 2024-10-01 DIAGNOSIS — M54.9 CHRONIC MIDLINE BACK PAIN, UNSPECIFIED BACK LOCATION: ICD-10-CM

## 2024-10-01 DIAGNOSIS — S33.9XXD SPRAIN OF LIGAMENT OF LUMBOSACRAL JOINT, SUBSEQUENT ENCOUNTER: ICD-10-CM

## 2024-10-01 RX ORDER — OXYCODONE AND ACETAMINOPHEN 7.5; 325 MG/1; MG/1
1 TABLET ORAL EVERY 6 HOURS PRN
Qty: 112 TABLET | Refills: 0 | Status: SHIPPED | OUTPATIENT
Start: 2024-10-01 | End: 2024-10-29

## 2024-10-01 RX ORDER — PREGABALIN 150 MG/1
150 CAPSULE ORAL 3 TIMES DAILY
Qty: 90 CAPSULE | Refills: 1 | Status: SHIPPED | OUTPATIENT
Start: 2024-10-01 | End: 2024-11-30

## 2024-10-01 RX ORDER — DICLOFENAC SODIUM 75 MG/1
75 TABLET, DELAYED RELEASE ORAL DAILY PRN
Qty: 30 TABLET | Refills: 0 | Status: SHIPPED | OUTPATIENT
Start: 2024-10-01

## 2024-10-29 ENCOUNTER — OFFICE VISIT (OUTPATIENT)
Dept: PAIN MANAGEMENT | Age: 49
End: 2024-10-29

## 2024-10-29 VITALS
DIASTOLIC BLOOD PRESSURE: 72 MMHG | OXYGEN SATURATION: 94 % | WEIGHT: 315 LBS | SYSTOLIC BLOOD PRESSURE: 139 MMHG | HEART RATE: 70 BPM | BODY MASS INDEX: 58.12 KG/M2

## 2024-10-29 DIAGNOSIS — S33.9XXD SPRAIN OF LIGAMENT OF LUMBOSACRAL JOINT, SUBSEQUENT ENCOUNTER: ICD-10-CM

## 2024-10-29 DIAGNOSIS — Z91.89 AT RISK FOR RESPIRATORY DEPRESSION DUE TO OPIOID: Primary | ICD-10-CM

## 2024-10-29 DIAGNOSIS — S33.9XXA SPRAIN OF LIGAMENT OF LUMBOSACRAL JOINT, INITIAL ENCOUNTER: ICD-10-CM

## 2024-10-29 DIAGNOSIS — M51.27 LUMBAGO-SCIATICA DUE TO DISPLACEMENT OF LUMBAR INTERVERTEBRAL DISC: ICD-10-CM

## 2024-10-29 DIAGNOSIS — S83.92XD SPRAIN OF LEFT KNEE/LEG, SUBSEQUENT ENCOUNTER: ICD-10-CM

## 2024-10-29 DIAGNOSIS — G89.29 CHRONIC MIDLINE BACK PAIN, UNSPECIFIED BACK LOCATION: ICD-10-CM

## 2024-10-29 DIAGNOSIS — M54.9 CHRONIC MIDLINE BACK PAIN, UNSPECIFIED BACK LOCATION: ICD-10-CM

## 2024-10-29 DIAGNOSIS — S83.92XA SPRAIN OF LEFT KNEE/LEG, INITIAL ENCOUNTER: ICD-10-CM

## 2024-10-29 RX ORDER — OXYCODONE AND ACETAMINOPHEN 7.5; 325 MG/1; MG/1
1 TABLET ORAL EVERY 6 HOURS PRN
Qty: 112 TABLET | Refills: 0 | Status: SHIPPED | OUTPATIENT
Start: 2024-10-29 | End: 2024-11-26

## 2024-10-29 RX ORDER — PREGABALIN 150 MG/1
150 CAPSULE ORAL 3 TIMES DAILY
Qty: 90 CAPSULE | Refills: 1 | Status: SHIPPED | OUTPATIENT
Start: 2024-10-29 | End: 2024-12-28

## 2024-10-29 RX ORDER — DICLOFENAC SODIUM 75 MG/1
75 TABLET, DELAYED RELEASE ORAL DAILY PRN
Qty: 30 TABLET | Refills: 0 | Status: SHIPPED | OUTPATIENT
Start: 2024-10-29

## 2024-10-29 NOTE — PROGRESS NOTES
related to the current medications.Risk of overdose and death, if medicines not taken as prescribed, were also discussed. If the patient develops new symptoms or if the symptoms worsen, the patient should call the office.    Thank you for allowing me to participate in the care of this patient.      Cc: Thom Minor MD

## 2024-11-26 ENCOUNTER — OFFICE VISIT (OUTPATIENT)
Dept: PAIN MANAGEMENT | Age: 49
End: 2024-11-26

## 2024-11-26 VITALS
HEART RATE: 91 BPM | OXYGEN SATURATION: 91 % | DIASTOLIC BLOOD PRESSURE: 83 MMHG | SYSTOLIC BLOOD PRESSURE: 161 MMHG | BODY MASS INDEX: 58.3 KG/M2 | WEIGHT: 315 LBS

## 2024-11-26 DIAGNOSIS — S33.9XXD SPRAIN OF LIGAMENT OF LUMBOSACRAL JOINT, SUBSEQUENT ENCOUNTER: ICD-10-CM

## 2024-11-26 DIAGNOSIS — S83.92XA SPRAIN OF LEFT KNEE/LEG, INITIAL ENCOUNTER: ICD-10-CM

## 2024-11-26 DIAGNOSIS — S83.92XD SPRAIN OF LEFT KNEE/LEG, SUBSEQUENT ENCOUNTER: ICD-10-CM

## 2024-11-26 DIAGNOSIS — M54.9 CHRONIC MIDLINE BACK PAIN, UNSPECIFIED BACK LOCATION: ICD-10-CM

## 2024-11-26 DIAGNOSIS — S33.9XXA SPRAIN OF LIGAMENT OF LUMBOSACRAL JOINT, INITIAL ENCOUNTER: ICD-10-CM

## 2024-11-26 DIAGNOSIS — M51.27 LUMBAGO-SCIATICA DUE TO DISPLACEMENT OF LUMBAR INTERVERTEBRAL DISC: ICD-10-CM

## 2024-11-26 DIAGNOSIS — G89.29 CHRONIC MIDLINE BACK PAIN, UNSPECIFIED BACK LOCATION: ICD-10-CM

## 2024-11-26 RX ORDER — DICLOFENAC SODIUM 75 MG/1
75 TABLET, DELAYED RELEASE ORAL DAILY PRN
Qty: 30 TABLET | Refills: 0 | Status: SHIPPED | OUTPATIENT
Start: 2024-11-26

## 2024-11-26 RX ORDER — OXYCODONE AND ACETAMINOPHEN 7.5; 325 MG/1; MG/1
1 TABLET ORAL EVERY 6 HOURS PRN
Qty: 112 TABLET | Refills: 0 | Status: SHIPPED | OUTPATIENT
Start: 2024-11-26 | End: 2024-12-24

## 2024-11-26 RX ORDER — PREGABALIN 150 MG/1
150 CAPSULE ORAL 3 TIMES DAILY
Qty: 90 CAPSULE | Refills: 1 | Status: SHIPPED | OUTPATIENT
Start: 2024-11-26 | End: 2025-01-25

## 2024-11-26 NOTE — PROGRESS NOTES
each 2    Multiple Vitamins-Minerals (THERAPEUTIC MULTIVITAMIN-MINERALS) tablet Take 1 tablet by mouth daily       No current facility-administered medications for this visit.       General Goals of current treatment regimen include improvement in pain, restoration of functioning- with focus on improvement in physical performance, general activity, work or disability,emotional distress, health care utilization and  decreased medication consumption.   Will continue to monitor progress towards achieving/maintaining therapeutic goals with special emphasis on  1. -Improvement in perceived interfernce  of pain with ADL's. YES  -Ability to do home exercises independently. YES  -Ability to do household chores, indoor work and social and leisure activities. YES  -Improve psychosocial and physical functioning.YES  -Ability to do outside chores/ yard work YES      He was advised against drinking alcohol with the narcotic pain medicines, advised against driving or handling machinery while adjusting the dose of medicines or if having cognitive  issues related to the current medications.Risk of overdose and death, if medicines not taken as prescribed, were also discussed. If the patient develops new symptoms or if the symptoms worsen, the patient should call the office.    Thank you for allowing me to participate in the care of this patient.      Cc: Thom Minor MD

## 2024-12-03 NOTE — PATIENT INSTRUCTIONS
.A-B-C-D-E guide for Melanoma     Characteristics of unusual moles that may indicate melanomas or other skin cancers follow the A-B-C-D-E guide developed by the [employer]:  1. A is for asymmetrical shape. Look for moles with irregular shapes, such as two very different-looking halves. 2. B is for irregular border. Look for moles with irregular, notched or scalloped borders, these may be characteristics of melanomas. 3. C is for changes in color. Look for growths that have many colors or an uneven distribution of color. 4. D is for diameter. Look for new growth in a mole larger than about 1/4 inch (6 millimeters) or about the size of a pencil eraser. 5. E is for evolving. This is the most important one. Look for changes over time, such as a mole that grows in size or that changes color or shape. Evelin Gall may also evolve to develop new signs and symptoms, such as new itchiness or bleeding. If any of your moles appear to be changing, see your doctor. Other suspicious changes in a mole may include: Scaly skin, itching, spreading of color from the mole into the surrounding skin, oozing or bleeding. Cancerous (malignant) moles vary greatly in appearance. Some may show all of the changes listed above, while others may have only one or two unusual characteristics. Please call and schedule an appointment if you have any concerns or questions. Skin Cancer Prevention: After Your Visit    Skin cancer is the abnormal growth of cells in the skin. It usually appears as a growth that changes in color, shape, or size. This can be a sore that does not heal or a change in a wart or a mole. Skin cancer is almost always curable when found early and treated. So it is important to see your doctor if you have any of these changes in your skin. Skin cancer is the most common type of cancer.  It often appears on areas of the body that have been exposed to the sun, such as the head, face, neck, back, chest, or shoulders. Follow-up care is a key part of your treatment and safety. Be sure to make and go to all appointments, and call your doctor if you are having problems. It's also a good idea to know your test results and keep a list of the medicines you take. How can you care for yourself at home?  - Wear a wide-brimmed hat and long sleeves and pants if you are going to be outdoors for a long time. - Avoid the sun between 10 a.m. and 4 p.m., which is the peak time for UV rays. - Wear sunscreen on exposed skin. Make sure the sunscreen blocks ultraviolet rays (both UVA and UVB) and has a sun protection factor (SPF) of at least 30. Use it every day, even when it is cloudy. Some doctors may recommend a higher SPF, such as 48.  - Do not use tanning booths or sunlamps. - Use lip balm or cream that has sun protection factor (SPF) to protect your lips from getting sunburned or getting cold sores. - Wear sunglasses that block UV rays. When should you call for help? Watch closely for changes in your health, and be sure to contact your doctor if:  - You are concerned about any problem areas on your skin. - You notice a change in a mole or skin growth. For example:  a. It gets bigger. b. It develops uneven borders. c. It gets thicker, raised, or worn down. d. It changes color. e. It starts to bleed easily. Walk in

## 2024-12-06 NOTE — PROGRESS NOTES
[facility]  [address] [place] [address], [address]  [place] [address]  Dept: [phone]  Dept Fax: [phone]      Visit Date: 12/6/24     Subjective:     iNkolai Harris is a 49 y.o. male here for follow-up on melanoma of the left foot  which was excised on 2/10/20.     When last seen in December 2023 patient endorsed a left groin mass is hard as a rock if he sleeps in his recliner. Soft when he elevates his legs in bed.This is unchanged today.    Patient was followed by Dr. Chowdhury. Relationship was terminated due to missing several appointments over a  three month period.     He is overall doing well. No new major medical issues since last visit. Today he denies new subcutaneous mass, headache, bone pain, cough, abdominal pain, weight loss, jaundice or suspicious new lesions. No nodules noted. Following regularly with dermatology. No new complaints. Review of systems is otherwise negative    Initial Biopsy Date 12/20/19   Thickness  At Least 0.7 mm    Ulceration  No   Regression  No   Mitotic rate  1/mm2   Stage  pT1a     Surgical Excision Date: 2/10/20   North Lymph Node Biopsy: Negative  Warren Decision Dx:  - Class 1  -Subclass 1b     Objective:     Vitals:    12/17/24 1357   BP: (!) 144/77   Pulse: 76   Temp: 98 °F (36.7 °C)          Constitutional: Patient is oriented to person, place, and time. No distress.   HENT:  Mucus membranes - moist. No scleral icterus.    Neck:  Normal range of motion. No visible lymphadenopathy present.    Pulmonary/Chest:  Effort normal. No respiratory distress. Bilateral symmetrical chest rise. No audible additional breath sounds.   Abdomen:  Soft, non-tender. No masses, no organomegaly.    Neurological:  Grossly intact motor and sensory systems on limited exam.   Skin: Skin is warm and dry. No rash noted. He is not diaphoretic.    Surgical site:               Incision normal: Yes              Surrounding nodularity: No

## 2024-12-13 RX ORDER — PENICILLIN V POTASSIUM 500 MG/1
1000 TABLET, FILM COATED ORAL 2 TIMES DAILY
Qty: 120 TABLET | Refills: 3 | Status: SHIPPED | OUTPATIENT
Start: 2024-12-13

## 2024-12-17 ENCOUNTER — OFFICE VISIT (OUTPATIENT)
Dept: SURGERY | Age: 49
End: 2024-12-17
Payer: COMMERCIAL

## 2024-12-17 VITALS
BODY MASS INDEX: 39.17 KG/M2 | HEART RATE: 76 BPM | DIASTOLIC BLOOD PRESSURE: 77 MMHG | WEIGHT: 315 LBS | SYSTOLIC BLOOD PRESSURE: 144 MMHG | TEMPERATURE: 98 F | HEIGHT: 75 IN

## 2024-12-17 DIAGNOSIS — C43.72 MALIGNANT MELANOMA OF LEFT LOWER EXTREMITY INCLUDING HIP (HCC): Primary | ICD-10-CM

## 2024-12-17 DIAGNOSIS — R19.09 LEFT GROIN MASS: ICD-10-CM

## 2024-12-17 PROCEDURE — 99213 OFFICE O/P EST LOW 20 MIN: CPT | Performed by: SURGERY

## 2024-12-23 ENCOUNTER — TELEPHONE (OUTPATIENT)
Dept: PAIN MANAGEMENT | Age: 49
End: 2024-12-23

## 2024-12-23 ENCOUNTER — OFFICE VISIT (OUTPATIENT)
Dept: PAIN MANAGEMENT | Age: 49
End: 2024-12-23

## 2024-12-23 VITALS
DIASTOLIC BLOOD PRESSURE: 77 MMHG | OXYGEN SATURATION: 95 % | WEIGHT: 315 LBS | SYSTOLIC BLOOD PRESSURE: 134 MMHG | HEART RATE: 83 BPM | BODY MASS INDEX: 58.75 KG/M2

## 2024-12-23 DIAGNOSIS — S33.9XXA SPRAIN OF LIGAMENT OF LUMBOSACRAL JOINT, INITIAL ENCOUNTER: ICD-10-CM

## 2024-12-23 DIAGNOSIS — M51.27 LUMBAGO-SCIATICA DUE TO DISPLACEMENT OF LUMBAR INTERVERTEBRAL DISC: ICD-10-CM

## 2024-12-23 DIAGNOSIS — G89.29 CHRONIC MIDLINE BACK PAIN, UNSPECIFIED BACK LOCATION: ICD-10-CM

## 2024-12-23 DIAGNOSIS — Z91.89 AT RISK FOR RESPIRATORY DEPRESSION DUE TO OPIOID: ICD-10-CM

## 2024-12-23 DIAGNOSIS — S83.92XA SPRAIN OF LEFT KNEE/LEG, INITIAL ENCOUNTER: ICD-10-CM

## 2024-12-23 DIAGNOSIS — M54.9 CHRONIC MIDLINE BACK PAIN, UNSPECIFIED BACK LOCATION: ICD-10-CM

## 2024-12-23 DIAGNOSIS — S83.92XD SPRAIN OF LEFT KNEE/LEG, SUBSEQUENT ENCOUNTER: ICD-10-CM

## 2024-12-23 DIAGNOSIS — S33.9XXD SPRAIN OF LIGAMENT OF LUMBOSACRAL JOINT, SUBSEQUENT ENCOUNTER: ICD-10-CM

## 2024-12-23 RX ORDER — DICLOFENAC SODIUM 75 MG/1
75 TABLET, DELAYED RELEASE ORAL DAILY PRN
Qty: 30 TABLET | Refills: 0 | Status: SHIPPED | OUTPATIENT
Start: 2024-12-23

## 2024-12-23 RX ORDER — OXYCODONE AND ACETAMINOPHEN 7.5; 325 MG/1; MG/1
1 TABLET ORAL EVERY 6 HOURS PRN
Qty: 112 TABLET | Refills: 0 | Status: SHIPPED | OUTPATIENT
Start: 2024-12-23 | End: 2025-01-20

## 2024-12-23 NOTE — PROGRESS NOTES
Nikolai Harris  1975  [id number]      HISTORY OF PRESENT ILLNESS:  Mr. Harris is a 49 y.o. male returns for a follow up visit for pain management  He has a diagnosis of   1. 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 BWC-Sprain of ligament of lumbosacral joint, initial encounter    3. At risk for respiratory depression due to opioid    4. BWC-Sprain of left knee/leg, subsequent encounter    5. BWC-Sprain of left knee/leg, initial encounter    6. BWC-Chronic midline back pain, unspecified back location    7. BWC-Sprain of ligament of lumbosacral joint, subsequent encounter    .      As per information/history obtained from the PADT(patient assessment and documentation tool) -  He complains of pain in the lower back and knees Left with radiation to the buttocks, lower leg Left, ankles Left, and feet Left He rates the pain 4/10 and describes it as aching, burning, numbness, pins and needles.  Pain is made worse by: movement, walking, standing, sitting, bending, lifting. He denies any side effects from the current pain regimen. Patient reports that since last follow up visit the physical functioning is unchanged, family/social relationships are unchanged, mood is unchanged sleep patterns are unchanged. Mr. Harris states that since starting the treatment with the current regimen the  overall functioning  in the above aspects is  better, Patient denies misusing/abusing his narcotic pain medications or using any illegal drugs.  There are No indicators for possible drug abuse, addiction or diversion problems.   Upon obtaining the medical history from Mr. Harris regarding today's office visit for his presenting problems, patient states he has been doing fair, he is managing with the medications. Mr. Harris mentions he is using Percocet 4 per day along with Lyrica and Voltaren. Patient denies any constipation symptoms. Patient reports his weight has been stable. He says he will

## 2025-01-21 ENCOUNTER — OFFICE VISIT (OUTPATIENT)
Dept: PAIN MANAGEMENT | Age: 50
End: 2025-01-21

## 2025-01-21 VITALS
SYSTOLIC BLOOD PRESSURE: 123 MMHG | BODY MASS INDEX: 58.87 KG/M2 | DIASTOLIC BLOOD PRESSURE: 72 MMHG | OXYGEN SATURATION: 92 % | HEART RATE: 75 BPM | WEIGHT: 315 LBS

## 2025-01-21 DIAGNOSIS — S83.92XD SPRAIN OF LEFT KNEE/LEG, SUBSEQUENT ENCOUNTER: ICD-10-CM

## 2025-01-21 DIAGNOSIS — Z91.89 AT RISK FOR RESPIRATORY DEPRESSION DUE TO OPIOID: ICD-10-CM

## 2025-01-21 DIAGNOSIS — M54.9 CHRONIC MIDLINE BACK PAIN, UNSPECIFIED BACK LOCATION: ICD-10-CM

## 2025-01-21 DIAGNOSIS — S33.9XXD SPRAIN OF LIGAMENT OF LUMBOSACRAL JOINT, SUBSEQUENT ENCOUNTER: ICD-10-CM

## 2025-01-21 DIAGNOSIS — G89.29 CHRONIC MIDLINE BACK PAIN, UNSPECIFIED BACK LOCATION: ICD-10-CM

## 2025-01-21 DIAGNOSIS — M51.27 LUMBAGO-SCIATICA DUE TO DISPLACEMENT OF LUMBAR INTERVERTEBRAL DISC: ICD-10-CM

## 2025-01-21 DIAGNOSIS — S33.9XXA SPRAIN OF LIGAMENT OF LUMBOSACRAL JOINT, INITIAL ENCOUNTER: ICD-10-CM

## 2025-01-21 DIAGNOSIS — S83.92XA SPRAIN OF LEFT KNEE/LEG, INITIAL ENCOUNTER: ICD-10-CM

## 2025-01-21 RX ORDER — DICLOFENAC SODIUM 75 MG/1
75 TABLET, DELAYED RELEASE ORAL DAILY PRN
Qty: 30 TABLET | Refills: 0 | Status: SHIPPED | OUTPATIENT
Start: 2025-01-21

## 2025-01-21 RX ORDER — OXYCODONE AND ACETAMINOPHEN 7.5; 325 MG/1; MG/1
1 TABLET ORAL EVERY 6 HOURS PRN
Qty: 120 TABLET | Refills: 0 | Status: SHIPPED | OUTPATIENT
Start: 2025-01-21 | End: 2025-02-20

## 2025-01-21 NOTE — PROGRESS NOTES
Nikolai Harris  1975  [id number]      HISTORY OF PRESENT ILLNESS:  Mr. Harris is a 49 y.o. male returns for a follow up visit for pain management  He has a diagnosis of   1. 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 BWC-Sprain of left knee/leg, subsequent encounter    3. BWC-Sprain of ligament of lumbosacral joint, subsequent encounter    4. BWC-Sprain of ligament of lumbosacral joint, initial encounter    5. BWC-Sprain of left knee/leg, initial encounter    6. At risk for respiratory depression due to opioid    7. BWC-Chronic midline back pain, unspecified back location    .      As per information/history obtained from the PADT(patient assessment and documentation tool) -  He complains of pain in the lower back and knees Left with radiation to the buttocks, lower leg Left, ankles Left, and feet Left He rates the pain 4/10 and describes it as burning, numbness, pins and needles.  Pain is made worse by: walking, standing, bending, lifting. He denies any side effects from the current pain regimen. Patient reports that since last follow up visit the physical functioning is unchanged, family/social relationships are unchanged, mood is unchanged sleep patterns are unchanged. Mr. Harris states that since starting the treatment with the current regimen the  overall functioning  in the above aspects is  better, Patient denies misusing/abusing his narcotic pain medications or using any illegal drugs.  There are No indicators for possible drug abuse, addiction or diversion problems.   Upon obtaining the medical history from Mr. Harris regarding today's office visit for his presenting problems, patient states he has been doing fair, he is managing with the medications. Mr. Harris mentions he is using Percocet along with Lyrica and Voltaren. Patient reports he is working full time mostly from home. He states the pain is worse in the back than the legs. Patient denies any

## 2025-02-21 ENCOUNTER — OFFICE VISIT (OUTPATIENT)
Dept: PAIN MANAGEMENT | Age: 50
End: 2025-02-21

## 2025-02-21 VITALS
OXYGEN SATURATION: 95 % | SYSTOLIC BLOOD PRESSURE: 123 MMHG | DIASTOLIC BLOOD PRESSURE: 73 MMHG | BODY MASS INDEX: 57.87 KG/M2 | WEIGHT: 315 LBS | HEART RATE: 72 BPM

## 2025-02-21 DIAGNOSIS — S33.9XXD SPRAIN OF LIGAMENT OF LUMBOSACRAL JOINT, SUBSEQUENT ENCOUNTER: ICD-10-CM

## 2025-02-21 DIAGNOSIS — S33.9XXA SPRAIN OF LIGAMENT OF LUMBOSACRAL JOINT, INITIAL ENCOUNTER: ICD-10-CM

## 2025-02-21 DIAGNOSIS — S83.92XD SPRAIN OF LEFT KNEE/LEG, SUBSEQUENT ENCOUNTER: ICD-10-CM

## 2025-02-21 DIAGNOSIS — S83.92XA SPRAIN OF LEFT KNEE/LEG, INITIAL ENCOUNTER: ICD-10-CM

## 2025-02-21 DIAGNOSIS — M51.27 LUMBAGO-SCIATICA DUE TO DISPLACEMENT OF LUMBAR INTERVERTEBRAL DISC: ICD-10-CM

## 2025-02-21 DIAGNOSIS — G89.29 CHRONIC MIDLINE BACK PAIN, UNSPECIFIED BACK LOCATION: ICD-10-CM

## 2025-02-21 DIAGNOSIS — Z91.89 AT RISK FOR RESPIRATORY DEPRESSION DUE TO OPIOID: ICD-10-CM

## 2025-02-21 DIAGNOSIS — M54.9 CHRONIC MIDLINE BACK PAIN, UNSPECIFIED BACK LOCATION: ICD-10-CM

## 2025-02-21 RX ORDER — DICLOFENAC SODIUM 75 MG/1
75 TABLET, DELAYED RELEASE ORAL DAILY PRN
Qty: 30 TABLET | Refills: 0 | Status: SHIPPED | OUTPATIENT
Start: 2025-02-21

## 2025-02-21 RX ORDER — OXYCODONE AND ACETAMINOPHEN 7.5; 325 MG/1; MG/1
1 TABLET ORAL EVERY 6 HOURS PRN
Qty: 112 TABLET | Refills: 0 | Status: SHIPPED | OUTPATIENT
Start: 2025-02-21 | End: 2025-03-21

## 2025-02-21 RX ORDER — PREGABALIN 150 MG/1
150 CAPSULE ORAL 3 TIMES DAILY
Qty: 90 CAPSULE | Refills: 1 | Status: SHIPPED | OUTPATIENT
Start: 2025-02-21 | End: 2025-04-22

## 2025-02-21 NOTE — PROGRESS NOTES
Nikolai Harris  1975  [id number]      HISTORY OF PRESENT ILLNESS:  Mr. Harris is a 50 y.o. male returns for a follow up visit for pain management  He has a diagnosis of   1. 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 BWC-Sprain of ligament of lumbosacral joint, initial encounter    3. BWC-Chronic midline back pain, unspecified back location    4. BWC-Sprain of left knee/leg, subsequent encounter    5. BWC-Sprain of left knee/leg, initial encounter    6. BWC-Sprain of ligament of lumbosacral joint, subsequent encounter    7. At risk for respiratory depression due to opioid    .      As per information/history obtained from the PADT(patient assessment and documentation tool) -  He complains of pain in the lower back with radiation to the upper leg Right, knees Right, lower leg Right, ankles Right, and feet Right He rates the pain 4/10 and describes it as sharp, aching, burning, numbness, pins and needles.  Pain is made worse by: movement, walking, standing, sitting, bending, lifting. He denies any side effects from the current pain regimen. Patient reports that since last follow up visit the physical functioning is unchanged, family/social relationships are unchanged, mood is unchanged sleep patterns are unchanged. Mr. Harris states that since starting the treatment with the current regimen the  overall functioning  in the above aspects is  better, Patient denies misusing/abusing his narcotic pain medications or using any illegal drugs.  There are No indicators for possible drug abuse, addiction or diversion problems.     Upon obtaining the medical history from Mr. Harris regarding today's office visit for his presenting problems, Patient reports he has been doing fair, managing okay with the medications. He says he's using Percocet still 4 x per day along with other adjuvants. He says he's using Lyrica 150 mg TID, which is helping with the pain. He mentions his weight has

## 2025-03-18 ENCOUNTER — OFFICE VISIT (OUTPATIENT)
Dept: PAIN MANAGEMENT | Age: 50
End: 2025-03-18

## 2025-03-18 VITALS
DIASTOLIC BLOOD PRESSURE: 75 MMHG | OXYGEN SATURATION: 92 % | SYSTOLIC BLOOD PRESSURE: 121 MMHG | HEART RATE: 66 BPM | WEIGHT: 315 LBS | BODY MASS INDEX: 54.75 KG/M2

## 2025-03-18 DIAGNOSIS — G89.29 CHRONIC MIDLINE BACK PAIN, UNSPECIFIED BACK LOCATION: ICD-10-CM

## 2025-03-18 DIAGNOSIS — Z91.89 AT RISK FOR RESPIRATORY DEPRESSION DUE TO OPIOID: ICD-10-CM

## 2025-03-18 DIAGNOSIS — S33.9XXD SPRAIN OF LIGAMENT OF LUMBOSACRAL JOINT, SUBSEQUENT ENCOUNTER: ICD-10-CM

## 2025-03-18 DIAGNOSIS — S83.92XA SPRAIN OF LEFT KNEE/LEG, INITIAL ENCOUNTER: ICD-10-CM

## 2025-03-18 DIAGNOSIS — M51.27 LUMBAGO-SCIATICA DUE TO DISPLACEMENT OF LUMBAR INTERVERTEBRAL DISC: ICD-10-CM

## 2025-03-18 DIAGNOSIS — M54.9 CHRONIC MIDLINE BACK PAIN, UNSPECIFIED BACK LOCATION: ICD-10-CM

## 2025-03-18 DIAGNOSIS — S83.92XD SPRAIN OF LEFT KNEE/LEG, SUBSEQUENT ENCOUNTER: ICD-10-CM

## 2025-03-18 DIAGNOSIS — S33.9XXA SPRAIN OF LIGAMENT OF LUMBOSACRAL JOINT, INITIAL ENCOUNTER: ICD-10-CM

## 2025-03-18 RX ORDER — OXYCODONE AND ACETAMINOPHEN 7.5; 325 MG/1; MG/1
1 TABLET ORAL EVERY 6 HOURS PRN
Qty: 112 TABLET | Refills: 0 | Status: SHIPPED | OUTPATIENT
Start: 2025-03-18 | End: 2025-04-15

## 2025-03-18 RX ORDER — DICLOFENAC SODIUM 75 MG/1
75 TABLET, DELAYED RELEASE ORAL DAILY PRN
Qty: 30 TABLET | Refills: 0 | Status: SHIPPED | OUTPATIENT
Start: 2025-03-18

## 2025-03-18 RX ORDER — PREGABALIN 150 MG/1
150 CAPSULE ORAL 3 TIMES DAILY
Qty: 90 CAPSULE | Refills: 1 | Status: SHIPPED | OUTPATIENT
Start: 2025-03-18 | End: 2025-05-17

## 2025-03-18 NOTE — PROGRESS NOTES
TWO TABLETS BY MOUTH EVERY MORNING AS NEEDED FOR EDEMA 60 tablet 0     No current facility-administered medications for this visit.       General Goals of current treatment regimen include improvement in pain, restoration of functioning- with focus on improvement in physical performance, general activity, work or disability,emotional distress, health care utilization and  decreased medication consumption.   Will continue to monitor progress towards achieving/maintaining therapeutic goals with special emphasis on  1. -Improvement in perceived interfernce  of pain with ADL's. YES  -Ability to do home exercises independently. YES  -Ability to do household chores, indoor work and social and leisure activities. YES  -Improve psychosocial and physical functioning.YES  -Ability to do outside chores/ yard work YES    He was advised against drinking alcohol with the narcotic pain medicines, advised against driving or handling machinery while adjusting the dose of medicines or if having cognitive  issues related to the current medications.Risk of overdose and death, if medicines not taken as prescribed, were also discussed. If the patient develops new symptoms or if the symptoms worsen, the patient should call the office.    Thank you for allowing me to participate in the care of this patient.      Cc: Thom Minor MD

## 2025-04-15 ENCOUNTER — OFFICE VISIT (OUTPATIENT)
Dept: PAIN MANAGEMENT | Age: 50
End: 2025-04-15

## 2025-04-15 VITALS
WEIGHT: 315 LBS | HEART RATE: 64 BPM | BODY MASS INDEX: 53.5 KG/M2 | DIASTOLIC BLOOD PRESSURE: 72 MMHG | SYSTOLIC BLOOD PRESSURE: 113 MMHG | OXYGEN SATURATION: 95 %

## 2025-04-15 DIAGNOSIS — S33.9XXA SPRAIN OF LIGAMENT OF LUMBOSACRAL JOINT, INITIAL ENCOUNTER: ICD-10-CM

## 2025-04-15 DIAGNOSIS — G89.29 CHRONIC MIDLINE BACK PAIN, UNSPECIFIED BACK LOCATION: ICD-10-CM

## 2025-04-15 DIAGNOSIS — M51.27 LUMBAGO-SCIATICA DUE TO DISPLACEMENT OF LUMBAR INTERVERTEBRAL DISC: ICD-10-CM

## 2025-04-15 DIAGNOSIS — S83.92XA SPRAIN OF LEFT KNEE/LEG, INITIAL ENCOUNTER: ICD-10-CM

## 2025-04-15 DIAGNOSIS — M54.9 CHRONIC MIDLINE BACK PAIN, UNSPECIFIED BACK LOCATION: ICD-10-CM

## 2025-04-15 DIAGNOSIS — S83.92XD SPRAIN OF LEFT KNEE/LEG, SUBSEQUENT ENCOUNTER: ICD-10-CM

## 2025-04-15 DIAGNOSIS — S33.9XXD SPRAIN OF LIGAMENT OF LUMBOSACRAL JOINT, SUBSEQUENT ENCOUNTER: ICD-10-CM

## 2025-04-15 RX ORDER — DICLOFENAC SODIUM 75 MG/1
75 TABLET, DELAYED RELEASE ORAL DAILY PRN
Qty: 30 TABLET | Refills: 0 | Status: SHIPPED | OUTPATIENT
Start: 2025-04-15

## 2025-04-15 RX ORDER — PREGABALIN 150 MG/1
150 CAPSULE ORAL 3 TIMES DAILY
Qty: 90 CAPSULE | Refills: 1 | Status: SHIPPED | OUTPATIENT
Start: 2025-04-15 | End: 2025-06-14

## 2025-04-15 RX ORDER — OXYCODONE AND ACETAMINOPHEN 7.5; 325 MG/1; MG/1
1 TABLET ORAL EVERY 6 HOURS PRN
Qty: 112 TABLET | Refills: 0 | Status: SHIPPED | OUTPATIENT
Start: 2025-04-15 | End: 2025-05-13

## 2025-04-15 NOTE — PROGRESS NOTES
Nikolai Harris  1975  [id number]      HISTORY OF PRESENT ILLNESS:  Mr. Harris is a 50 y.o. male returns for a follow up visit for pain management  He has a diagnosis of   1. 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 BWC-Sprain of ligament of lumbosacral joint, subsequent encounter    3. BWC-Sprain of left knee/leg, initial encounter    4. BWC-Chronic midline back pain, unspecified back location    5. BWC-Sprain of ligament of lumbosacral joint, initial encounter    6. BWC-Sprain of left knee/leg, subsequent encounter    .      As per information/history obtained from the PADT(patient assessment and documentation tool) -  He complains of pain in the lower back with radiation to the lower leg Left, ankles Left, and feet Left He rates the pain 4/10 and describes it as sharp, aching, burning.  Pain is made worse by: movement, walking, standing, sitting, bending, lifting. He denies any side effects from the current pain regimen. Patient reports that since last follow up visit the physical functioning is better, family/social relationships are unchanged, mood is unchanged sleep patterns are unchanged. Mr. Harris states that since starting the treatment with the current regimen the  overall functioning  in the above aspects is  better, Patient denies misusing/abusing his narcotic pain medications or using any illegal drugs.  There are No indicators for possible drug abuse, addiction or diversion problems.   Upon obtaining the medical history from Mr. Harris regarding today's office visit for his presenting problems, Patient reports he has has been doing fair, has lost 10 more lbs. He says he has been doing okay. He mentions he's using Percocet 4 per day along with Lyrica 150 mg TID. He says he still has a mass on his left groin since surgery. He states he is seeing the surgeon and was lymphedema or seroma       ALLERGIES: Patients list of allergies were reviewed

## 2025-04-22 RX ORDER — PENICILLIN V POTASSIUM 500 MG/1
1000 TABLET, FILM COATED ORAL 2 TIMES DAILY
Qty: 120 TABLET | Refills: 3 | Status: SHIPPED | OUTPATIENT
Start: 2025-04-22

## 2025-04-27 NOTE — PROGRESS NOTES
Nikolai Harris is a 50 y.o. male.    HPI:here for complex medical visit  Started fasting, has lost 44# in last 4 mo  Sees pain  Md for back pain  No cellulitis in legs for over last 6 mo on provide penicillin twice daily  No longer using CPAP and his wife says he does not snore as much since he is lost weight  allergies reviewed with pt    ROS: No TIA's or unusual headaches, no dysphagia.  No prolonged cough. No dyspnea or chest pain on exertion.  No abdominal pain, change in bowel habits, black or bloody stools.  No urinary tract symptoms.  No new or unusual musculoskeletal symptoms.       Prior to Visit Medications    Medication Sig Taking? Authorizing Provider   penicillin v potassium (VEETID) 500 MG tablet TAKE 2 TABLETS BY MOUTH TWICE A DAY Yes Nikolai Tony MD   diclofenac (VOLTAREN) 75 MG EC tablet Take 1 tablet by mouth daily as needed for Pain Yes Kehinde Palma MD   oxyCODONE-acetaminophen (PERCOCET) 7.5-325 MG per tablet Take 1 tablet by mouth every 6 hours as needed for Pain (max 4 per day) for up to 28 days. Yes Kehinde Palma MD   pregabalin (LYRICA) 150 MG capsule Take 1 capsule by mouth 3 times daily for 60 days. Yes Kehinde Palma MD   albuterol sulfate HFA (PROAIR HFA) 108 (90 Base) MCG/ACT inhaler Inhale 2 puffs into the lungs every 6 hours as needed for Wheezing Yes Thom Tom MD   Diclofenac 18 MG CAPS Diclofenac Oral        active Yes ProviderEarnestine MD   Cyanocobalamin 3000 MCG CAPS 3,000 mcg Yes ProviderEarnestine MD   Compression Stockings MISC by Does not apply route 30-40 compression. Wear on lower extremities. Yes Gabriela Su, APRN - CNP   losartan (COZAAR) 100 MG tablet TAKE ONE TABLET BY MOUTH DAILY Yes Thom Tom MD   Multiple Vitamins-Minerals (THERAPEUTIC MULTIVITAMIN-MINERALS) tablet Take 1 tablet by mouth daily Yes Earnestine Valdovinos MD   naloxone (NARCAN) 4 MG/0.1ML LIQD nasal spray 1 spray by Nasal route as needed for Opioid

## 2025-04-28 ENCOUNTER — OFFICE VISIT (OUTPATIENT)
Dept: FAMILY MEDICINE CLINIC | Age: 50
End: 2025-04-28
Payer: COMMERCIAL

## 2025-04-28 VITALS
HEART RATE: 63 BPM | WEIGHT: 315 LBS | DIASTOLIC BLOOD PRESSURE: 68 MMHG | BODY MASS INDEX: 53.35 KG/M2 | SYSTOLIC BLOOD PRESSURE: 112 MMHG | OXYGEN SATURATION: 95 %

## 2025-04-28 DIAGNOSIS — G47.33 OBSTRUCTIVE SLEEP APNEA: ICD-10-CM

## 2025-04-28 DIAGNOSIS — I83.219 VARICOSE VEINS OF LOWER EXTREMITIES WITH ULCER AND INFLAMMATION (HCC): ICD-10-CM

## 2025-04-28 DIAGNOSIS — Z12.11 COLON CANCER SCREENING: ICD-10-CM

## 2025-04-28 DIAGNOSIS — Z23 NEED FOR VACCINE FOR DT (DIPHTHERIA-TETANUS): ICD-10-CM

## 2025-04-28 DIAGNOSIS — Z00.00 WELL ADULT EXAM: ICD-10-CM

## 2025-04-28 DIAGNOSIS — L97.929 VARICOSE VEINS OF LOWER EXTREMITIES WITH ULCER AND INFLAMMATION (HCC): ICD-10-CM

## 2025-04-28 DIAGNOSIS — E66.01 MORBID OBESITY WITH BMI OF 50.0-59.9, ADULT (HCC): Chronic | ICD-10-CM

## 2025-04-28 DIAGNOSIS — I83.229 VARICOSE VEINS OF LOWER EXTREMITIES WITH ULCER AND INFLAMMATION (HCC): ICD-10-CM

## 2025-04-28 DIAGNOSIS — I87.2 CHRONIC VENOUS STASIS DERMATITIS OF BOTH LOWER EXTREMITIES: ICD-10-CM

## 2025-04-28 DIAGNOSIS — C43.72 MALIGNANT MELANOMA OF LEFT LOWER LIMB, INCLUDING HIP (HCC): Primary | ICD-10-CM

## 2025-04-28 DIAGNOSIS — L97.919 VARICOSE VEINS OF LOWER EXTREMITIES WITH ULCER AND INFLAMMATION (HCC): ICD-10-CM

## 2025-04-28 PROCEDURE — 90715 TDAP VACCINE 7 YRS/> IM: CPT | Performed by: FAMILY MEDICINE

## 2025-04-28 PROCEDURE — 90471 IMMUNIZATION ADMIN: CPT | Performed by: FAMILY MEDICINE

## 2025-04-28 PROCEDURE — 99214 OFFICE O/P EST MOD 30 MIN: CPT | Performed by: FAMILY MEDICINE

## 2025-04-28 SDOH — ECONOMIC STABILITY: FOOD INSECURITY: WITHIN THE PAST 12 MONTHS, YOU WORRIED THAT YOUR FOOD WOULD RUN OUT BEFORE YOU GOT MONEY TO BUY MORE.: NEVER TRUE

## 2025-04-28 SDOH — ECONOMIC STABILITY: FOOD INSECURITY: WITHIN THE PAST 12 MONTHS, THE FOOD YOU BOUGHT JUST DIDN'T LAST AND YOU DIDN'T HAVE MONEY TO GET MORE.: NEVER TRUE

## 2025-04-28 ASSESSMENT — PATIENT HEALTH QUESTIONNAIRE - PHQ9
SUM OF ALL RESPONSES TO PHQ QUESTIONS 1-9: 0
2. FEELING DOWN, DEPRESSED OR HOPELESS: NOT AT ALL
1. LITTLE INTEREST OR PLEASURE IN DOING THINGS: NOT AT ALL
2. FEELING DOWN, DEPRESSED OR HOPELESS: NOT AT ALL
1. LITTLE INTEREST OR PLEASURE IN DOING THINGS: NOT AT ALL
SUM OF ALL RESPONSES TO PHQ QUESTIONS 1-9: 0
SUM OF ALL RESPONSES TO PHQ9 QUESTIONS 1 & 2: 0
SUM OF ALL RESPONSES TO PHQ QUESTIONS 1-9: 0
SUM OF ALL RESPONSES TO PHQ QUESTIONS 1-9: 0

## 2025-05-13 ENCOUNTER — OFFICE VISIT (OUTPATIENT)
Dept: PAIN MANAGEMENT | Age: 50
End: 2025-05-13

## 2025-05-13 VITALS
WEIGHT: 315 LBS | SYSTOLIC BLOOD PRESSURE: 126 MMHG | HEART RATE: 65 BPM | BODY MASS INDEX: 52.7 KG/M2 | DIASTOLIC BLOOD PRESSURE: 77 MMHG | OXYGEN SATURATION: 94 %

## 2025-05-13 DIAGNOSIS — M51.27 LUMBAGO-SCIATICA DUE TO DISPLACEMENT OF LUMBAR INTERVERTEBRAL DISC: ICD-10-CM

## 2025-05-13 DIAGNOSIS — M54.9 CHRONIC MIDLINE BACK PAIN, UNSPECIFIED BACK LOCATION: ICD-10-CM

## 2025-05-13 DIAGNOSIS — S33.9XXD SPRAIN OF LIGAMENT OF LUMBOSACRAL JOINT, SUBSEQUENT ENCOUNTER: ICD-10-CM

## 2025-05-13 DIAGNOSIS — Z00.00 WELL ADULT EXAM: ICD-10-CM

## 2025-05-13 DIAGNOSIS — G89.29 CHRONIC MIDLINE BACK PAIN, UNSPECIFIED BACK LOCATION: ICD-10-CM

## 2025-05-13 DIAGNOSIS — S33.9XXA SPRAIN OF LIGAMENT OF LUMBOSACRAL JOINT, INITIAL ENCOUNTER: ICD-10-CM

## 2025-05-13 DIAGNOSIS — S83.92XA SPRAIN OF LEFT KNEE/LEG, INITIAL ENCOUNTER: ICD-10-CM

## 2025-05-13 DIAGNOSIS — S83.92XD SPRAIN OF LEFT KNEE/LEG, SUBSEQUENT ENCOUNTER: ICD-10-CM

## 2025-05-13 RX ORDER — PREGABALIN 150 MG/1
150 CAPSULE ORAL 3 TIMES DAILY
Qty: 90 CAPSULE | Refills: 1 | Status: SHIPPED | OUTPATIENT
Start: 2025-05-13 | End: 2025-07-12

## 2025-05-13 RX ORDER — DICLOFENAC SODIUM 75 MG/1
75 TABLET, DELAYED RELEASE ORAL DAILY PRN
Qty: 30 TABLET | Refills: 0 | Status: SHIPPED | OUTPATIENT
Start: 2025-05-13

## 2025-05-13 RX ORDER — OXYCODONE AND ACETAMINOPHEN 7.5; 325 MG/1; MG/1
1 TABLET ORAL EVERY 6 HOURS PRN
Qty: 112 TABLET | Refills: 0 | Status: SHIPPED | OUTPATIENT
Start: 2025-05-13 | End: 2025-06-10

## 2025-05-13 NOTE — PROGRESS NOTES
Nikolai Harris  1975  [id number]      HISTORY OF PRESENT ILLNESS:  Mr. Harris is a 50 y.o. male returns for a follow up visit for pain management  He has a diagnosis of   1. 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 BWC-Sprain of left knee/leg, initial encounter    3. BWC-Sprain of ligament of lumbosacral joint, initial encounter    4. BWC-Sprain of ligament of lumbosacral joint, subsequent encounter    5. BWC-Chronic midline back pain, unspecified back location    6. BWC-Sprain of left knee/leg, subsequent encounter    .      As per information/history obtained from the PADT(patient assessment and documentation tool) -  He complains of pain in the lower back with radiation to the buttocks and hips Bilateral He rates the pain 4/10 and describes it as aching, burning, numbness.  Pain is made worse by: movement, walking, standing, bending, lifting. He denies any side effects from the current pain regimen. Patient reports that since last follow up visit the physical functioning is better, family/social relationships are unchanged, mood is unchanged sleep patterns are unchanged. Mr. Harris states that since starting the treatment with the current regimen the  overall functioning  in the above aspects is  better, Patient denies misusing/abusing his narcotic pain medications or using any illegal drugs.  There are No indicators for possible drug abuse, addiction or diversion problems.   Upon obtaining the medical history from Mr. Harris regarding today's office visit for his presenting problems, patient states he has been doing fair, his pain has been baseline. Mr. Harris states he is still losing some weight. Patient states he had labs done. He mentions he is using Percocet, 4 per day along with Lyrica 150 mg, 3 per day. Patient reports he is working full time from home. He states his leg wound has healed and skin is getting better.       ALLERGIES: Patients list of

## 2025-05-14 ENCOUNTER — RESULTS FOLLOW-UP (OUTPATIENT)
Dept: FAMILY MEDICINE CLINIC | Age: 50
End: 2025-05-14

## 2025-05-14 LAB
ALBUMIN SERPL-MCNC: 4.7 G/DL (ref 3.4–5)
ALBUMIN/GLOB SERPL: 2 {RATIO} (ref 1.1–2.2)
ALP SERPL-CCNC: 72 U/L (ref 40–129)
ALT SERPL-CCNC: 60 U/L (ref 10–40)
ANION GAP SERPL CALCULATED.3IONS-SCNC: 10 MMOL/L (ref 3–16)
AST SERPL-CCNC: 39 U/L (ref 15–37)
BASOPHILS # BLD: 0 K/UL (ref 0–0.2)
BASOPHILS NFR BLD: 0.8 %
BILIRUB SERPL-MCNC: 0.7 MG/DL (ref 0–1)
BUN SERPL-MCNC: 19 MG/DL (ref 7–20)
CALCIUM SERPL-MCNC: 9.6 MG/DL (ref 8.3–10.6)
CHLORIDE SERPL-SCNC: 101 MMOL/L (ref 99–110)
CHOLEST SERPL-MCNC: 129 MG/DL (ref 0–199)
CO2 SERPL-SCNC: 29 MMOL/L (ref 21–32)
CREAT SERPL-MCNC: 0.7 MG/DL (ref 0.9–1.3)
DEPRECATED RDW RBC AUTO: 13.1 % (ref 12.4–15.4)
EOSINOPHIL # BLD: 0 K/UL (ref 0–0.6)
EOSINOPHIL NFR BLD: 0.8 %
GFR SERPLBLD CREATININE-BSD FMLA CKD-EPI: >90 ML/MIN/{1.73_M2}
GLUCOSE P FAST SERPL-MCNC: 95 MG/DL (ref 70–99)
HCT VFR BLD AUTO: 48.9 % (ref 40.5–52.5)
HCV AB SERPL QL IA: NORMAL
HDLC SERPL-MCNC: 41 MG/DL (ref 40–60)
HGB BLD-MCNC: 16.8 G/DL (ref 13.5–17.5)
HIV 1+2 AB+HIV1 P24 AG SERPL QL IA: NORMAL
HIV 2 AB SERPL QL IA: NORMAL
HIV1 AB SERPL QL IA: NORMAL
HIV1 P24 AG SERPL QL IA: NORMAL
LDL CHOLESTEROL: 71 MG/DL
LYMPHOCYTES # BLD: 1 K/UL (ref 1–5.1)
LYMPHOCYTES NFR BLD: 18.2 %
MCH RBC QN AUTO: 30.9 PG (ref 26–34)
MCHC RBC AUTO-ENTMCNC: 34.4 G/DL (ref 31–36)
MCV RBC AUTO: 89.9 FL (ref 80–100)
MONOCYTES # BLD: 0.4 K/UL (ref 0–1.3)
MONOCYTES NFR BLD: 7.4 %
NEUTROPHILS # BLD: 3.9 K/UL (ref 1.7–7.7)
NEUTROPHILS NFR BLD: 72.8 %
PLATELET # BLD AUTO: 147 K/UL (ref 135–450)
PMV BLD AUTO: 10.1 FL (ref 5–10.5)
POTASSIUM SERPL-SCNC: 4.8 MMOL/L (ref 3.5–5.1)
PROT SERPL-MCNC: 7 G/DL (ref 6.4–8.2)
PSA SERPL DL<=0.01 NG/ML-MCNC: 0.2 NG/ML (ref 0–4)
RBC # BLD AUTO: 5.44 M/UL (ref 4.2–5.9)
SODIUM SERPL-SCNC: 140 MMOL/L (ref 136–145)
TRIGL SERPL-MCNC: 85 MG/DL (ref 0–150)
TSH SERPL DL<=0.005 MIU/L-ACNC: 3.98 UIU/ML (ref 0.27–4.2)
VLDLC SERPL CALC-MCNC: 17 MG/DL
WBC # BLD AUTO: 5.4 K/UL (ref 4–11)

## 2025-06-10 ENCOUNTER — OFFICE VISIT (OUTPATIENT)
Dept: PAIN MANAGEMENT | Age: 50
End: 2025-06-10

## 2025-06-10 VITALS
SYSTOLIC BLOOD PRESSURE: 132 MMHG | HEART RATE: 60 BPM | DIASTOLIC BLOOD PRESSURE: 77 MMHG | BODY MASS INDEX: 52.5 KG/M2 | WEIGHT: 315 LBS | OXYGEN SATURATION: 95 %

## 2025-06-10 DIAGNOSIS — M54.9 CHRONIC MIDLINE BACK PAIN, UNSPECIFIED BACK LOCATION: ICD-10-CM

## 2025-06-10 DIAGNOSIS — S83.92XA SPRAIN OF LEFT KNEE/LEG, INITIAL ENCOUNTER: ICD-10-CM

## 2025-06-10 DIAGNOSIS — S33.9XXD SPRAIN OF LIGAMENT OF LUMBOSACRAL JOINT, SUBSEQUENT ENCOUNTER: ICD-10-CM

## 2025-06-10 DIAGNOSIS — S33.9XXA SPRAIN OF LIGAMENT OF LUMBOSACRAL JOINT, INITIAL ENCOUNTER: ICD-10-CM

## 2025-06-10 DIAGNOSIS — G89.29 CHRONIC MIDLINE BACK PAIN, UNSPECIFIED BACK LOCATION: ICD-10-CM

## 2025-06-10 DIAGNOSIS — S83.92XD SPRAIN OF LEFT KNEE/LEG, SUBSEQUENT ENCOUNTER: ICD-10-CM

## 2025-06-10 DIAGNOSIS — M51.27 LUMBAGO-SCIATICA DUE TO DISPLACEMENT OF LUMBAR INTERVERTEBRAL DISC: ICD-10-CM

## 2025-06-10 RX ORDER — DICLOFENAC SODIUM 75 MG/1
75 TABLET, DELAYED RELEASE ORAL DAILY PRN
Qty: 30 TABLET | Refills: 0 | Status: SHIPPED | OUTPATIENT
Start: 2025-06-10

## 2025-06-10 RX ORDER — OXYCODONE AND ACETAMINOPHEN 7.5; 325 MG/1; MG/1
1 TABLET ORAL EVERY 6 HOURS PRN
Qty: 112 TABLET | Refills: 0 | Status: SHIPPED | OUTPATIENT
Start: 2025-06-10 | End: 2025-07-08

## 2025-06-10 RX ORDER — PREGABALIN 150 MG/1
150 CAPSULE ORAL 3 TIMES DAILY
Qty: 90 CAPSULE | Refills: 1 | Status: SHIPPED | OUTPATIENT
Start: 2025-06-10 | End: 2025-08-09

## 2025-06-10 NOTE — PROGRESS NOTES
Nikolai Harris  1975  [id number]      HISTORY OF PRESENT ILLNESS:  Mr. Harris is a 50 y.o. male returns for a follow up visit for pain management  He has a diagnosis of   1. 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 BWC-Sprain of left knee/leg, initial encounter    3. BWC-Sprain of ligament of lumbosacral joint, initial encounter    4. BWC-Sprain of ligament of lumbosacral joint, subsequent encounter    5. BWC-Chronic midline back pain, unspecified back location    6. BWC-Sprain of left knee/leg, subsequent encounter    .      As per information/history obtained from the PADT(patient assessment and documentation tool) -  He complains of pain in the lower back with radiation to the buttocks He rates the pain 4/10 and describes it as aching, burning.  Pain is made worse by: movement, walking, standing, bending, lifting. He denies any side effects from the current pain regimen. Patient reports that since last follow up visit the physical functioning is unchanged, family/social relationships are unchanged, mood is unchanged sleep patterns are unchanged. Mr. Harris states that since starting the treatment with the current regimen the  overall functioning  in the above aspects is  better, Patient denies misusing/abusing his narcotic pain medications or using any illegal drugs.  There are No indicators for possible drug abuse, addiction or diversion problems.   Upon obtaining the medical history from Mr. Harris regarding today's office visit for his presenting problems, patient states he has been doing fair, he states he is getting around better. Mr. Harris states he has lost a few pounds every month, he states it is helping with that, he states he has lost around 55 pounds over 6 months. He mentions he is using Voltaren along with Percocet 3-4 per day. Patient reports he is working full time still still. He says he tried to cut back on his medications but symptoms got worse.

## 2025-07-01 LAB — NONINV COLON CA DNA+OCC BLD SCRN STL QL: NEGATIVE

## 2025-07-08 ENCOUNTER — OFFICE VISIT (OUTPATIENT)
Dept: PAIN MANAGEMENT | Age: 50
End: 2025-07-08

## 2025-07-08 VITALS
WEIGHT: 315 LBS | DIASTOLIC BLOOD PRESSURE: 78 MMHG | HEART RATE: 58 BPM | SYSTOLIC BLOOD PRESSURE: 135 MMHG | BODY MASS INDEX: 51.87 KG/M2

## 2025-07-08 DIAGNOSIS — S33.9XXA SPRAIN OF LIGAMENT OF LUMBOSACRAL JOINT, INITIAL ENCOUNTER: ICD-10-CM

## 2025-07-08 DIAGNOSIS — S83.92XA SPRAIN OF LEFT KNEE/LEG, INITIAL ENCOUNTER: ICD-10-CM

## 2025-07-08 DIAGNOSIS — G89.29 CHRONIC MIDLINE BACK PAIN, UNSPECIFIED BACK LOCATION: ICD-10-CM

## 2025-07-08 DIAGNOSIS — S83.92XD SPRAIN OF LEFT KNEE/LEG, SUBSEQUENT ENCOUNTER: ICD-10-CM

## 2025-07-08 DIAGNOSIS — M54.9 CHRONIC MIDLINE BACK PAIN, UNSPECIFIED BACK LOCATION: ICD-10-CM

## 2025-07-08 DIAGNOSIS — M51.27 LUMBAGO-SCIATICA DUE TO DISPLACEMENT OF LUMBAR INTERVERTEBRAL DISC: ICD-10-CM

## 2025-07-08 DIAGNOSIS — S33.9XXD SPRAIN OF LIGAMENT OF LUMBOSACRAL JOINT, SUBSEQUENT ENCOUNTER: ICD-10-CM

## 2025-07-08 RX ORDER — DICLOFENAC SODIUM 75 MG/1
75 TABLET, DELAYED RELEASE ORAL DAILY PRN
Qty: 30 TABLET | Refills: 0 | Status: SHIPPED | OUTPATIENT
Start: 2025-07-08

## 2025-07-08 RX ORDER — OXYCODONE AND ACETAMINOPHEN 7.5; 325 MG/1; MG/1
1 TABLET ORAL EVERY 6 HOURS PRN
Qty: 112 TABLET | Refills: 0 | Status: SHIPPED | OUTPATIENT
Start: 2025-07-08 | End: 2025-08-05

## 2025-07-08 RX ORDER — PREGABALIN 150 MG/1
150 CAPSULE ORAL 3 TIMES DAILY
Qty: 90 CAPSULE | Refills: 1 | Status: SHIPPED | OUTPATIENT
Start: 2025-07-08 | End: 2025-09-06

## 2025-07-14 NOTE — PROGRESS NOTES
Nikolai Harris  1975  [id number]      HISTORY OF PRESENT ILLNESS:  Mr. Harris is a 50 y.o. male returns for a follow up visit for pain management  He has a diagnosis of   1. 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 BWC-Chronic midline back pain, unspecified back location    3. BWC-Sprain of left knee/leg, initial encounter    4. BWC-Sprain of left knee/leg, subsequent encounter    5. BWC-Sprain of ligament of lumbosacral joint, initial encounter    6. BWC-Sprain of ligament of lumbosacral joint, subsequent encounter    .      As per information/history obtained from the PADT(patient assessment and documentation tool) -  He complains of pain in the lower back and feet Left with radiation to the buttocks He rates the pain 4/10 and describes it as aching, burning, numbness.  Pain is made worse by: movement, walking, standing, bending, lifting. He denies any side effects from the current pain regimen. Patient reports that since last follow up visit the physical functioning is unchanged, family/social relationships are unchanged, mood is unchanged sleep patterns are unchanged. Mr. Harris states that since starting the treatment with the current regimen the  overall functioning  in the above aspects is  better, Patient denies misusing/abusing his narcotic pain medications or using any illegal drugs.  There are No indicators for possible drug abuse, addiction or diversion problems.   Upon obtaining the medical history from Mr. Harris regarding today's office visit for his presenting problems, patient states he has been doing fair, his pain has been baseline, manageable with the medications. Mr. Harris mentions he is using Voltaren along with Percocet 3-4 per day and Lyrica. Patient states he has been out of some of his medications, he states he is having Pharmacy issues.       ALLERGIES: Patients list of allergies were reviewed     MEDICATIONS: Mr. Harris list of

## 2025-07-27 NOTE — PROGRESS NOTES
Nikolai Harris is a 50 y.o. male.    HPI:here for complex medical visit  Sees pain doctor for chronic low back pain  Wt down with better diet and exercise  Not interested in bariatric or glp - 1 at this time  New linear black anisha under left great toenail, sees foot surgeon soon  Meds, vitamins and allergies reviewed with pt    ROS: No TIA's or unusual headaches, no dysphagia.  No prolonged cough. No dyspnea or chest pain on exertion.  No abdominal pain, change in bowel habits, black or bloody stools.  No urinary tract symptoms.  No new or unusual musculoskeletal symptoms.       Prior to Visit Medications    Medication Sig Taking? Authorizing Provider   diclofenac (VOLTAREN) 75 MG EC tablet Take 1 tablet by mouth daily as needed for Pain Yes Kehinde Palma MD   oxyCODONE-acetaminophen (PERCOCET) 7.5-325 MG per tablet Take 1 tablet by mouth every 6 hours as needed for Pain (max 4 per day) for up to 28 days. Yes Kehinde Palma MD   pregabalin (LYRICA) 150 MG capsule Take 1 capsule by mouth 3 times daily for 60 days. Yes Kehinde Palma MD   penicillin v potassium (VEETID) 500 MG tablet TAKE 2 TABLETS BY MOUTH TWICE A DAY Yes Nikolai Tony MD   naloxone (NARCAN) 4 MG/0.1ML LIQD nasal spray 1 spray by Nasal route as needed for Opioid Reversal Yes Kehinde Palma MD   albuterol sulfate HFA (PROAIR HFA) 108 (90 Base) MCG/ACT inhaler Inhale 2 puffs into the lungs every 6 hours as needed for Wheezing Yes Thom Tom MD   Diclofenac 18 MG CAPS Diclofenac Oral        active Yes Earnestine Valdovinos MD   Cyanocobalamin 3000 MCG CAPS 3,000 mcg Yes ProviderEarnestine MD   Compression Stockings MISC by Does not apply route 30-40 compression. Wear on lower extremities. Yes Gabriela Su, APRN - CNP   doxycycline hyclate (VIBRAMYCIN) 100 MG capsule  Yes ProviderEarnestine MD   Multiple Vitamins-Minerals (THERAPEUTIC MULTIVITAMIN-MINERALS) tablet Take 1 tablet by mouth daily Yes Earnestine Valdovinos MD

## 2025-07-28 ENCOUNTER — OFFICE VISIT (OUTPATIENT)
Dept: FAMILY MEDICINE CLINIC | Age: 50
End: 2025-07-28
Payer: COMMERCIAL

## 2025-07-28 VITALS
WEIGHT: 315 LBS | DIASTOLIC BLOOD PRESSURE: 78 MMHG | OXYGEN SATURATION: 95 % | HEIGHT: 75 IN | SYSTOLIC BLOOD PRESSURE: 132 MMHG | BODY MASS INDEX: 39.17 KG/M2 | HEART RATE: 60 BPM

## 2025-07-28 DIAGNOSIS — E66.01 MORBID OBESITY WITH BMI OF 50.0-59.9, ADULT (HCC): Chronic | ICD-10-CM

## 2025-07-28 DIAGNOSIS — L97.222 VARICOSE VEINS OF LEFT LOWER EXTREMITY WITH INFLAMMATION, WITH ULCER OF CALF WITH FAT LAYER EXPOSED (HCC): ICD-10-CM

## 2025-07-28 DIAGNOSIS — C43.72 MALIGNANT MELANOMA OF LEFT LOWER LIMB, INCLUDING HIP (HCC): Primary | ICD-10-CM

## 2025-07-28 DIAGNOSIS — I87.2 CHRONIC VENOUS STASIS DERMATITIS OF BOTH LOWER EXTREMITIES: ICD-10-CM

## 2025-07-28 DIAGNOSIS — I83.222 VARICOSE VEINS OF LEFT LOWER EXTREMITY WITH INFLAMMATION, WITH ULCER OF CALF WITH FAT LAYER EXPOSED (HCC): ICD-10-CM

## 2025-07-28 PROCEDURE — 99214 OFFICE O/P EST MOD 30 MIN: CPT | Performed by: FAMILY MEDICINE

## 2025-08-05 ENCOUNTER — OFFICE VISIT (OUTPATIENT)
Dept: PAIN MANAGEMENT | Age: 50
End: 2025-08-05

## 2025-08-05 VITALS
DIASTOLIC BLOOD PRESSURE: 74 MMHG | SYSTOLIC BLOOD PRESSURE: 138 MMHG | HEART RATE: 62 BPM | WEIGHT: 315 LBS | BODY MASS INDEX: 51.62 KG/M2 | OXYGEN SATURATION: 94 %

## 2025-08-05 DIAGNOSIS — S33.9XXA SPRAIN OF LIGAMENT OF LUMBOSACRAL JOINT, INITIAL ENCOUNTER: ICD-10-CM

## 2025-08-05 DIAGNOSIS — G89.29 CHRONIC MIDLINE BACK PAIN, UNSPECIFIED BACK LOCATION: ICD-10-CM

## 2025-08-05 DIAGNOSIS — S33.9XXD SPRAIN OF LIGAMENT OF LUMBOSACRAL JOINT, SUBSEQUENT ENCOUNTER: ICD-10-CM

## 2025-08-05 DIAGNOSIS — S83.92XA SPRAIN OF LEFT KNEE/LEG, INITIAL ENCOUNTER: ICD-10-CM

## 2025-08-05 DIAGNOSIS — M51.27 LUMBAGO-SCIATICA DUE TO DISPLACEMENT OF LUMBAR INTERVERTEBRAL DISC: ICD-10-CM

## 2025-08-05 DIAGNOSIS — S83.92XD SPRAIN OF LEFT KNEE/LEG, SUBSEQUENT ENCOUNTER: ICD-10-CM

## 2025-08-05 DIAGNOSIS — M54.9 CHRONIC MIDLINE BACK PAIN, UNSPECIFIED BACK LOCATION: ICD-10-CM

## 2025-08-05 DIAGNOSIS — Z91.89 AT RISK FOR RESPIRATORY DEPRESSION DUE TO OPIOID: ICD-10-CM

## 2025-08-05 RX ORDER — PREGABALIN 150 MG/1
150 CAPSULE ORAL 3 TIMES DAILY
Qty: 90 CAPSULE | Refills: 1 | Status: SHIPPED | OUTPATIENT
Start: 2025-08-05 | End: 2025-10-04

## 2025-08-05 RX ORDER — DICLOFENAC SODIUM 75 MG/1
75 TABLET, DELAYED RELEASE ORAL DAILY PRN
Qty: 30 TABLET | Refills: 0 | Status: SHIPPED | OUTPATIENT
Start: 2025-08-05

## 2025-08-05 RX ORDER — OXYCODONE AND ACETAMINOPHEN 7.5; 325 MG/1; MG/1
1 TABLET ORAL EVERY 6 HOURS PRN
Qty: 112 TABLET | Refills: 0 | Status: SHIPPED | OUTPATIENT
Start: 2025-08-05 | End: 2025-09-02

## 2025-08-12 ENCOUNTER — OFFICE VISIT (OUTPATIENT)
Dept: SURGERY | Age: 50
End: 2025-08-12
Payer: COMMERCIAL

## 2025-08-12 ENCOUNTER — TELEPHONE (OUTPATIENT)
Dept: SURGERY | Age: 50
End: 2025-08-12

## 2025-08-12 VITALS
WEIGHT: 315 LBS | SYSTOLIC BLOOD PRESSURE: 136 MMHG | HEIGHT: 75 IN | TEMPERATURE: 98 F | BODY MASS INDEX: 39.17 KG/M2 | DIASTOLIC BLOOD PRESSURE: 86 MMHG | HEART RATE: 62 BPM

## 2025-08-12 DIAGNOSIS — R19.09 LEFT GROIN MASS: ICD-10-CM

## 2025-08-12 DIAGNOSIS — C43.72 MALIGNANT MELANOMA OF LEFT LOWER EXTREMITY INCLUDING HIP (HCC): Primary | ICD-10-CM

## 2025-08-12 PROCEDURE — 99214 OFFICE O/P EST MOD 30 MIN: CPT | Performed by: SURGERY

## 2025-08-25 RX ORDER — PENICILLIN V POTASSIUM 500 MG/1
TABLET, FILM COATED ORAL
Qty: 120 TABLET | Refills: 3 | Status: SHIPPED | OUTPATIENT
Start: 2025-08-25

## 2025-08-28 ENCOUNTER — TELEPHONE (OUTPATIENT)
Dept: SURGERY | Age: 50
End: 2025-08-28

## 2025-09-02 ENCOUNTER — OFFICE VISIT (OUTPATIENT)
Dept: PAIN MANAGEMENT | Age: 50
End: 2025-09-02

## 2025-09-02 VITALS
WEIGHT: 315 LBS | BODY MASS INDEX: 51.62 KG/M2 | SYSTOLIC BLOOD PRESSURE: 137 MMHG | OXYGEN SATURATION: 93 % | DIASTOLIC BLOOD PRESSURE: 81 MMHG | HEART RATE: 63 BPM

## 2025-09-02 DIAGNOSIS — S33.9XXD SPRAIN OF LIGAMENT OF LUMBOSACRAL JOINT, SUBSEQUENT ENCOUNTER: ICD-10-CM

## 2025-09-02 DIAGNOSIS — S83.92XA SPRAIN OF LEFT KNEE/LEG, INITIAL ENCOUNTER: ICD-10-CM

## 2025-09-02 DIAGNOSIS — Z91.89 AT RISK FOR RESPIRATORY DEPRESSION DUE TO OPIOID: ICD-10-CM

## 2025-09-02 DIAGNOSIS — S33.9XXA SPRAIN OF LIGAMENT OF LUMBOSACRAL JOINT, INITIAL ENCOUNTER: ICD-10-CM

## 2025-09-02 DIAGNOSIS — M54.9 CHRONIC MIDLINE BACK PAIN, UNSPECIFIED BACK LOCATION: ICD-10-CM

## 2025-09-02 DIAGNOSIS — M51.27 LUMBAGO-SCIATICA DUE TO DISPLACEMENT OF LUMBAR INTERVERTEBRAL DISC: ICD-10-CM

## 2025-09-02 DIAGNOSIS — S83.92XD SPRAIN OF LEFT KNEE/LEG, SUBSEQUENT ENCOUNTER: ICD-10-CM

## 2025-09-02 DIAGNOSIS — G89.29 CHRONIC MIDLINE BACK PAIN, UNSPECIFIED BACK LOCATION: ICD-10-CM

## 2025-09-02 RX ORDER — PREGABALIN 150 MG/1
150 CAPSULE ORAL 3 TIMES DAILY
Qty: 90 CAPSULE | Refills: 1 | Status: SHIPPED | OUTPATIENT
Start: 2025-09-02 | End: 2025-11-01

## 2025-09-02 RX ORDER — DICLOFENAC SODIUM 75 MG/1
75 TABLET, DELAYED RELEASE ORAL DAILY PRN
Qty: 30 TABLET | Refills: 0 | Status: SHIPPED | OUTPATIENT
Start: 2025-09-02

## 2025-09-02 RX ORDER — OXYCODONE AND ACETAMINOPHEN 7.5; 325 MG/1; MG/1
1 TABLET ORAL EVERY 6 HOURS PRN
Qty: 112 TABLET | Refills: 0 | Status: SHIPPED | OUTPATIENT
Start: 2025-09-02 | End: 2025-09-30

## (undated) DEVICE — GARMENT,MEDLINE,DVT,INT,CALF,MED, GEN2: Brand: MEDLINE

## (undated) DEVICE — SURGICAL SET UP - SURE SET: Brand: MEDLINE INDUSTRIES, INC.

## (undated) DEVICE — STERILE VELCLOSE ELASTIC BANDAGE, 4IN: Brand: VELCLOSE

## (undated) DEVICE — CHLORAPREP 26ML ORANGE

## (undated) DEVICE — Z CONVERTED USE 2276073 ROLL BNDG L W4.5INXL4 1/8YD WHT COT 6 PLY CNFRM KERLIX

## (undated) DEVICE — SURE SET-DOUBLE BASIN-LF: Brand: MEDLINE INDUSTRIES, INC.

## (undated) DEVICE — STANDARD HYPODERMIC NEEDLE,POLYPROPYLENE HUB: Brand: MONOJECT

## (undated) DEVICE — SPONGE GZ W4XL4IN COT 12 PLY TYP VII WVN C FLD DSGN

## (undated) DEVICE — ELECTROSURGICAL PENCIL ROCKER SWITCH NON COATED BLADE ELECTRODE 10 FT (3 M) CORD HOLSTER: Brand: MEGADYNE

## (undated) DEVICE — STOCKINETTE,IMPERVIOUS,12X48,STERILE: Brand: MEDLINE

## (undated) DEVICE — SUTURE PLN GUT SZ 5-0 L18IN ABSRB YELLOWISH TAN L13MM PC-1 1915G

## (undated) DEVICE — JEWISH HOSPITAL TURNOVER KIT: Brand: MEDLINE INDUSTRIES, INC.

## (undated) DEVICE — TRAY PREP DRY W/ PREM GLV 2 APPL 6 SPNG 2 UNDPD 1 OVERWRAP

## (undated) DEVICE — SHEET, T, LAPAROTOMY, STERILE: Brand: MEDLINE

## (undated) DEVICE — GLOVE SURG SZ 7 L12IN FNGR THK87MIL DK GRN LTX POLYMER W

## (undated) DEVICE — INTENDED FOR TISSUE SEPARATION, AND OTHER PROCEDURES THAT REQUIRE A SHARP SURGICAL BLADE TO PUNCTURE OR CUT.: Brand: BARD-PARKER ® CARBON RIB-BACK BLADES

## (undated) DEVICE — MARKER,SKIN,WI/RULER AND LABELS: Brand: MEDLINE

## (undated) DEVICE — SOLUTION IV 1000ML 0.9% SOD CHL

## (undated) DEVICE — CONTAINER,SPECIMEN,PNEU TUBE,3OZ,OR STRL: Brand: MEDLINE

## (undated) DEVICE — GLOVE SURG SZ 7 L12IN FNGR THK75MIL WHT LTX POLYMER BEAD

## (undated) DEVICE — E-Z CLEAN, NON-STICK, PTFE COATED, ELECTROSURGICAL BLADE ELECTRODE, MODIFIED EXTENDED INSULATION, 2.5 INCH (6.35 CM): Brand: MEGADYNE

## (undated) DEVICE — COUNTER NDL 40 COUNT HLD 70 NUM FOAM BLK SGL MAG W BLDE REMV

## (undated) DEVICE — APPLIER CLP L9.38IN M LIG TI DISP STR RNG HNDL LIGACLP

## (undated) DEVICE — DEVICE SEAL L23CM NANO COAT MARYLAND JAW OPN DIV LIGASURE

## (undated) DEVICE — T-DRAPE,EXTREMITY,STERILE: Brand: MEDLINE

## (undated) DEVICE — DRESSING,GAUZE,XEROFORM,CURAD,5"X9",ST: Brand: CURAD

## (undated) DEVICE — SUTURE PERMA-HAND SZ 2-0 L30IN NONABSORBABLE BLK L26MM SH K833H

## (undated) DEVICE — SYSTEM SKIN CLSR 22CM DERMBND PRINEO

## (undated) DEVICE — FORCEPS BX L240CM JAW DIA2.8MM L CAP W/ NDL MIC MESH TOOTH

## (undated) DEVICE — SUTURE PERMAHAND SZ 2-0 L18IN NONABSORBABLE BLK L26MM SH C012D

## (undated) DEVICE — COTTON BALLS: Brand: DEROYAL

## (undated) DEVICE — PLATE ES AD W 9FT CRD 2

## (undated) DEVICE — GOWN,SIRUS,POLYRNF,BRTHSLV,LG,30/CS: Brand: MEDLINE

## (undated) DEVICE — BLANKET WRM W29.9XL79.1IN UP BODY FORC AIR MISTRAL-AIR

## (undated) DEVICE — SUTURE VCRL SZ 2-0 L18IN ABSRB VLT L26MM SH 1/2 CIR J775D

## (undated) DEVICE — SPONGE GZ W4XL8IN COT WVN 12 PLY

## (undated) DEVICE — 3M™ STERI-STRIP™ REINFORCED ADHESIVE SKIN CLOSURES, R1547, 1/2 IN X 4 IN (12 MM X 100 MM), 6 STRIPS/ENVELOPE: Brand: 3M™ STERI-STRIP™

## (undated) DEVICE — COVER LT HNDL BLU PLAS